# Patient Record
Sex: FEMALE | Race: WHITE | NOT HISPANIC OR LATINO | Employment: OTHER | URBAN - METROPOLITAN AREA
[De-identification: names, ages, dates, MRNs, and addresses within clinical notes are randomized per-mention and may not be internally consistent; named-entity substitution may affect disease eponyms.]

---

## 2023-03-11 DIAGNOSIS — R06.02 SOB (SHORTNESS OF BREATH): Primary | ICD-10-CM

## 2023-03-13 DIAGNOSIS — J45.20 MILD INTERMITTENT REACTIVE AIRWAY DISEASE WITHOUT COMPLICATION (HHS-HCC): Primary | ICD-10-CM

## 2023-03-13 RX ORDER — FLUTICASONE PROPIONATE AND SALMETEROL XINAFOATE 115; 21 UG/1; UG/1
2 AEROSOL, METERED RESPIRATORY (INHALATION) 2 TIMES DAILY
Qty: 12 G | Refills: 0 | Status: SHIPPED | OUTPATIENT
Start: 2023-03-13 | End: 2023-05-22 | Stop reason: ALTCHOICE

## 2023-03-13 RX ORDER — FLUTICASONE PROPIONATE AND SALMETEROL XINAFOATE 115; 21 UG/1; UG/1
2 AEROSOL, METERED RESPIRATORY (INHALATION) 2 TIMES DAILY
COMMUNITY
End: 2023-03-13 | Stop reason: SDUPTHER

## 2023-03-15 RX ORDER — FLUTICASONE PROPIONATE AND SALMETEROL XINAFOATE 115; 21 UG/1; UG/1
AEROSOL, METERED RESPIRATORY (INHALATION)
Qty: 12 G | Refills: 0 | Status: SHIPPED | OUTPATIENT
Start: 2023-03-15 | End: 2023-06-13

## 2023-03-16 DIAGNOSIS — I50.32 CHRONIC DIASTOLIC HEART FAILURE (MULTI): Primary | ICD-10-CM

## 2023-03-16 RX ORDER — TORSEMIDE 10 MG/1
10 TABLET ORAL DAILY
Qty: 135 TABLET | Refills: 0 | Status: SHIPPED | OUTPATIENT
Start: 2023-03-16

## 2023-03-16 RX ORDER — TORSEMIDE 10 MG/1
10 TABLET ORAL
COMMUNITY
End: 2023-03-16 | Stop reason: SDUPTHER

## 2023-03-20 DIAGNOSIS — I10 HYPERTENSION, UNSPECIFIED TYPE: Primary | ICD-10-CM

## 2023-03-20 RX ORDER — METOPROLOL TARTRATE 25 MG/1
12.5 TABLET, FILM COATED ORAL DAILY
Qty: 45 TABLET | Refills: 0 | Status: SHIPPED | OUTPATIENT
Start: 2023-03-20 | End: 2023-09-13

## 2023-03-20 RX ORDER — METOPROLOL TARTRATE 25 MG/1
25 TABLET, FILM COATED ORAL DAILY
COMMUNITY
End: 2023-03-20 | Stop reason: SDUPTHER

## 2023-05-18 PROBLEM — I10 HTN (HYPERTENSION): Status: ACTIVE | Noted: 2023-05-18

## 2023-05-18 PROBLEM — I83.899 VARICOSE VEINS OF LEG WITH COMPLICATIONS: Status: ACTIVE | Noted: 2023-05-18

## 2023-05-18 PROBLEM — M19.90 ARTHRITIS: Status: ACTIVE | Noted: 2023-05-18

## 2023-05-18 PROBLEM — I50.20 SYSTOLIC CHF (MULTI): Status: ACTIVE | Noted: 2023-05-18

## 2023-05-18 PROBLEM — R06.09 DYSPNEA ON MINIMAL EXERTION: Status: ACTIVE | Noted: 2023-05-18

## 2023-05-18 PROBLEM — I50.32 CHRONIC DIASTOLIC CONGESTIVE HEART FAILURE (MULTI): Status: ACTIVE | Noted: 2023-05-18

## 2023-05-18 PROBLEM — R20.2 PARESTHESIA: Status: ACTIVE | Noted: 2023-05-18

## 2023-05-18 PROBLEM — Z95.0 PRESENCE OF PERMANENT CARDIAC PACEMAKER: Status: ACTIVE | Noted: 2023-05-18

## 2023-05-18 PROBLEM — R42 VERTIGO: Status: ACTIVE | Noted: 2023-05-18

## 2023-05-18 PROBLEM — Z86.79 HISTORY OF PAROXYSMAL SUPRAVENTRICULAR TACHYCARDIA: Status: ACTIVE | Noted: 2023-05-18

## 2023-05-18 PROBLEM — I35.0 AORTIC STENOSIS, MILD: Status: ACTIVE | Noted: 2023-05-18

## 2023-05-18 PROBLEM — I44.2 COMPLETE HEART BLOCK (MULTI): Status: ACTIVE | Noted: 2023-05-18

## 2023-05-18 PROBLEM — H90.3 SENSORINEURAL HEARING LOSS (SNHL) OF BOTH EARS: Status: ACTIVE | Noted: 2023-05-18

## 2023-05-18 PROBLEM — J44.9 COPD (CHRONIC OBSTRUCTIVE PULMONARY DISEASE) (MULTI): Status: ACTIVE | Noted: 2023-05-18

## 2023-05-18 PROBLEM — I73.00 RAYNAUDS DISEASE: Status: ACTIVE | Noted: 2023-05-18

## 2023-05-18 LAB
FERRITIN (UG/LL) IN SER/PLAS: 72 UG/L (ref 8–150)
IRON (UG/DL) IN SER/PLAS: 114 UG/DL (ref 35–150)
IRON BINDING CAPACITY (UG/DL) IN SER/PLAS: 406 UG/DL (ref 240–445)
IRON SATURATION (%) IN SER/PLAS: 28 % (ref 25–45)

## 2023-05-18 RX ORDER — NAPROXEN SODIUM 220 MG/1
81 TABLET, FILM COATED ORAL DAILY
COMMUNITY
End: 2023-12-11 | Stop reason: ALTCHOICE

## 2023-05-18 RX ORDER — METOPROLOL SUCCINATE 25 MG/1
TABLET, EXTENDED RELEASE ORAL
COMMUNITY
End: 2023-09-05 | Stop reason: ALTCHOICE

## 2023-05-18 RX ORDER — SPIRONOLACTONE 25 MG/1
1 TABLET ORAL DAILY
COMMUNITY
Start: 2018-04-24 | End: 2023-05-22 | Stop reason: ALTCHOICE

## 2023-05-18 RX ORDER — ACETAMINOPHEN 500 MG
TABLET ORAL
COMMUNITY
End: 2023-09-05 | Stop reason: ALTCHOICE

## 2023-05-22 ENCOUNTER — OFFICE VISIT (OUTPATIENT)
Dept: PRIMARY CARE | Facility: CLINIC | Age: 88
End: 2023-05-22
Payer: MEDICARE

## 2023-05-22 VITALS
DIASTOLIC BLOOD PRESSURE: 64 MMHG | OXYGEN SATURATION: 98 % | RESPIRATION RATE: 17 BRPM | BODY MASS INDEX: 29.9 KG/M2 | SYSTOLIC BLOOD PRESSURE: 108 MMHG | TEMPERATURE: 97.3 F | WEIGHT: 168.8 LBS | HEART RATE: 83 BPM

## 2023-05-22 DIAGNOSIS — I10 PRIMARY HYPERTENSION: ICD-10-CM

## 2023-05-22 DIAGNOSIS — I50.32 CHRONIC DIASTOLIC CONGESTIVE HEART FAILURE (MULTI): Primary | ICD-10-CM

## 2023-05-22 DIAGNOSIS — I44.2 COMPLETE HEART BLOCK (MULTI): ICD-10-CM

## 2023-05-22 PROCEDURE — 99213 OFFICE O/P EST LOW 20 MIN: CPT | Performed by: INTERNAL MEDICINE

## 2023-05-22 PROCEDURE — 1159F MED LIST DOCD IN RCRD: CPT | Performed by: INTERNAL MEDICINE

## 2023-05-22 PROCEDURE — 3074F SYST BP LT 130 MM HG: CPT | Performed by: INTERNAL MEDICINE

## 2023-05-22 PROCEDURE — 3078F DIAST BP <80 MM HG: CPT | Performed by: INTERNAL MEDICINE

## 2023-05-22 PROCEDURE — 1036F TOBACCO NON-USER: CPT | Performed by: INTERNAL MEDICINE

## 2023-05-22 ASSESSMENT — PAIN SCALES - GENERAL: PAINLEVEL: 8

## 2023-05-22 NOTE — PROGRESS NOTES
Subjective   Patient ID: Yesenia Milner is a 89 y.o. female who presents for 4 month f/u.    HPI patient requested presents to the office for scheduled follow-up was last seen in the office in mid January she was in the hospital emergency room for an evaluation of paresthesias which we did not find any concerning issues    We have reviewed that the patient was slightly anemic in the emergency room so we will battery of blood test including a autoimmune is setting a vascular study fairly negative finding at the end of the day with mild anemia with normal iron profile    None of those issues have occurred again    Is followed by cardiology fairly regularly    Patient said fairly well compensated diastolic heart failure    I had previous history of complete heart block on a permanent pacemaker status which appears to be reaching its and about 10 years now    Mild valvular disease no progression echo reviewed    BP is good    Activity level is adequate    Patient remains independent    No falls      Review of Systems 10 systems reviewed does not mention were negative    Objective   /64 (BP Location: Left arm, Patient Position: Sitting, BP Cuff Size: Adult)   Pulse 83   Temp 36.3 °C (97.3 °F) (Temporal)   Resp 17   Wt 76.6 kg (168 lb 12.8 oz)   SpO2 98%   BMI 29.90 kg/m²     Physical Exam HEENT normocephalic atraumatic pupils round and reactive no oropharyngeal exudate no thyromegaly  Carotid bruits absent  Cardiovascular regular rate and rhythm no murmurs  Respiratory bilateral breath sounds without rales or rhonchi or mitral chest expansion bilaterally  Abdomen soft nontender bowel sounds are present no organomegaly  Skin warm without any rashes  Musculoskeletal no digital clubbing or cyanosis normal gait no muscle atrophy  Neuro alert and oriented Ãƒ-3. Speech clear. Follows commands appropriately  Pulse normal carotid pulse normal radial pulse normal pedal pulses      Assessment/Plan   Problem List  Items Addressed This Visit          Circulatory    Chronic diastolic congestive heart failure (CMS/HCC) - Primary    Relevant Medications    metoprolol succinate XL (Toprol-XL) 25 mg 24 hr tablet    Complete heart block (CMS/HCC)    HTN (hypertension)     Patient remains fairly independent with no ongoing concern regarding the actual battery life of Her Permanent pacemaker placement    Mild anemia does not appear to be iron deficiency    Diastolic heart failure compensated    BP is adequate    Labs reviewed    Respiratory status COPD on Advair stable    No falls remains independent

## 2023-06-10 DIAGNOSIS — R06.02 SOB (SHORTNESS OF BREATH): ICD-10-CM

## 2023-06-12 DIAGNOSIS — R06.02 SOB (SHORTNESS OF BREATH): ICD-10-CM

## 2023-06-13 RX ORDER — FLUTICASONE PROPIONATE AND SALMETEROL XINAFOATE 115; 21 UG/1; UG/1
AEROSOL, METERED RESPIRATORY (INHALATION)
Qty: 12 G | Refills: 0 | Status: SHIPPED | OUTPATIENT
Start: 2023-06-13 | End: 2023-09-05 | Stop reason: ALTCHOICE

## 2023-06-13 RX ORDER — FLUTICASONE PROPIONATE AND SALMETEROL XINAFOATE 115; 21 UG/1; UG/1
AEROSOL, METERED RESPIRATORY (INHALATION)
Qty: 12 G | Refills: 0 | Status: SHIPPED | OUTPATIENT
Start: 2023-06-13 | End: 2023-09-05 | Stop reason: SDUPTHER

## 2023-07-13 ENCOUNTER — HOSPITAL ENCOUNTER (OUTPATIENT)
Dept: DATA CONVERSION | Facility: HOSPITAL | Age: 88
End: 2023-07-13
Attending: INTERNAL MEDICINE | Admitting: INTERNAL MEDICINE
Payer: MEDICARE

## 2023-07-13 DIAGNOSIS — I44.2 ATRIOVENTRICULAR BLOCK, COMPLETE (MULTI): ICD-10-CM

## 2023-07-13 DIAGNOSIS — I49.5 SICK SINUS SYNDROME (MULTI): ICD-10-CM

## 2023-07-13 DIAGNOSIS — Z45.010 ENCOUNTER FOR CHECKING AND TESTING OF CARDIAC PACEMAKER PULSE GENERATOR (BATTERY): ICD-10-CM

## 2023-07-13 DIAGNOSIS — I50.20 UNSPECIFIED SYSTOLIC (CONGESTIVE) HEART FAILURE (MULTI): ICD-10-CM

## 2023-07-13 LAB
ACTIVATED PARTIAL THROMBOPLASTIN TIME IN PPP BY COAGULATION ASSAY: 33 SEC (ref 27–38)
ANION GAP IN SER/PLAS: 13 MMOL/L (ref 10–20)
CALCIUM (MG/DL) IN SER/PLAS: 9.6 MG/DL (ref 8.6–10.3)
CARBON DIOXIDE, TOTAL (MMOL/L) IN SER/PLAS: 26 MMOL/L (ref 21–32)
CHLORIDE (MMOL/L) IN SER/PLAS: 103 MMOL/L (ref 98–107)
CREATININE (MG/DL) IN SER/PLAS: 1.14 MG/DL (ref 0.5–1.05)
ERYTHROCYTE DISTRIBUTION WIDTH (RATIO) BY AUTOMATED COUNT: 13 % (ref 11.5–14.5)
ERYTHROCYTE MEAN CORPUSCULAR HEMOGLOBIN CONCENTRATION (G/DL) BY AUTOMATED: 31.8 G/DL (ref 32–36)
ERYTHROCYTE MEAN CORPUSCULAR VOLUME (FL) BY AUTOMATED COUNT: 96 FL (ref 80–100)
ERYTHROCYTES (10*6/UL) IN BLOOD BY AUTOMATED COUNT: 3.83 X10E12/L (ref 4–5.2)
GFR FEMALE: 46 ML/MIN/1.73M2
GLUCOSE (MG/DL) IN SER/PLAS: 101 MG/DL (ref 74–99)
HEMATOCRIT (%) IN BLOOD BY AUTOMATED COUNT: 36.8 % (ref 36–46)
HEMOGLOBIN (G/DL) IN BLOOD: 11.7 G/DL (ref 12–16)
INR IN PPP BY COAGULATION ASSAY: 1.1 (ref 0.9–1.1)
LEUKOCYTES (10*3/UL) IN BLOOD BY AUTOMATED COUNT: 5.4 X10E9/L (ref 4.4–11.3)
NRBC (PER 100 WBCS) BY AUTOMATED COUNT: 0 /100 WBC (ref 0–0)
PLATELETS (10*3/UL) IN BLOOD AUTOMATED COUNT: 220 X10E9/L (ref 150–450)
POTASSIUM (MMOL/L) IN SER/PLAS: 4.5 MMOL/L (ref 3.5–5.3)
PROTHROMBIN TIME (PT) IN PPP BY COAGULATION ASSAY: 12.5 SEC (ref 9.8–12.8)
SODIUM (MMOL/L) IN SER/PLAS: 137 MMOL/L (ref 136–145)
UREA NITROGEN (MG/DL) IN SER/PLAS: 26 MG/DL (ref 6–23)

## 2023-07-15 LAB
ATRIAL RATE: 60 BPM
Q ONSET: 184 MS
QRS COUNT: 10 BEATS
QRS DURATION: 164 MS
QT INTERVAL: 460 MS
QTC CALCULATION(BAZETT): 478 MS
QTC FREDERICIA: 472 MS
R AXIS: -60 DEGREES
T AXIS: 109 DEGREES
T OFFSET: 414 MS
VENTRICULAR RATE: 65 BPM

## 2023-08-25 ENCOUNTER — PATIENT OUTREACH (OUTPATIENT)
Dept: PRIMARY CARE | Facility: CLINIC | Age: 88
End: 2023-08-25
Payer: MEDICARE

## 2023-08-25 RX ORDER — ATORVASTATIN CALCIUM 20 MG/1
20 TABLET, FILM COATED ORAL DAILY
COMMUNITY
Start: 2023-08-23 | End: 2023-12-11 | Stop reason: ALTCHOICE

## 2023-08-25 RX ORDER — CLOPIDOGREL BISULFATE 75 MG/1
75 TABLET ORAL DAILY
COMMUNITY
Start: 2023-08-23

## 2023-08-25 RX ORDER — SPIRONOLACTONE 25 MG/1
25 TABLET ORAL DAILY
COMMUNITY
Start: 2023-06-06

## 2023-08-25 NOTE — PROGRESS NOTES
Discharge Facility:  [facility]  Discharge Diagnosis: CVA (cerebral vascular accident)  Admission Date: 8/21/2023  Discharge Date: 8/23/2023    PCP Appointment Date: 9/11 outside of rec 14 day window  Specialist Appointment Date: neurology referral needed per patient and TBD with her cardiologist  Hospital Encounter and Summary: not available at this time  See discharge assessment below for further details  Engagement  Call Start Time: 0917 (8/25/2023  9:29 AM)    Medications  Medications reviewed with patient/caregiver?: Yes (new meds discussed) (8/25/2023  9:29 AM)  Is the patient having any side effects they believe may be caused by any medication additions or changes?: No (8/25/2023  9:29 AM)  Does the patient have all medications ordered at discharge?: Yes (8/25/2023  9:29 AM)  Care Management Interventions: No intervention needed (8/25/2023  9:29 AM)  Prescription Comments: see med list (8/25/2023  9:29 AM)  Is the patient taking all medications as directed (includes completed medication regime)?: Yes (8/25/2023  9:29 AM)  Care Management Interventions: Provided patient education (8/25/2023  9:29 AM)    Appointments  Does the patient have a primary care provider?: Yes (8/25/2023  9:29 AM)  Care Management Interventions: Verified appointment date/time/provider (8/25/2023  9:29 AM)  Has the patient kept scheduled appointments due by today?: Yes (8/25/2023  9:29 AM)  Care Management Interventions: Advised to schedule with specialist (8/25/2023  9:29 AM)    Self Management  What is the home health agency?: [address] [phone] (8/25/2023  9:29 AM)  Has home health visited the patient within 72 hours of discharge?: Not applicable (< 72 hrs) (8/25/2023  9:29 AM)  Has all Durable Medical Equipment (DME) been delivered?: Yes (8/25/2023  9:29 AM)    Patient Teaching  Does the patient have access to their discharge instructions?: Yes (8/25/2023  9:29 AM)  Care Management Interventions: Reviewed instructions  with patient (8/25/2023  9:29 AM)  What is the patient's perception of their health status since discharge?: Improving (8/25/2023  9:29 AM)  Is the patient/caregiver able to teach back the hierarchy of who to call/visit for symptoms/problems? PCP, Specialist, Home Health nurse, Urgent Care, ED, 911: Yes (8/25/2023  9:29 AM)  Patient/Caregiver Education Comments: Patient states she believes her speech is getting better since coming home. She states [facility] has not reached out yet but they will call by end of the day today to ensure services are still being offerred to them. States her daughter, Vannessa, is helping her at this time as needed as well and is staying with her at her home currently. (8/25/2023  9:29 AM)         Home

## 2023-08-29 DIAGNOSIS — I50.32 CHRONIC DIASTOLIC CONGESTIVE HEART FAILURE (MULTI): Primary | ICD-10-CM

## 2023-08-29 DIAGNOSIS — J44.9 CHRONIC OBSTRUCTIVE PULMONARY DISEASE, UNSPECIFIED COPD TYPE (MULTI): ICD-10-CM

## 2023-09-05 ENCOUNTER — TELEMEDICINE (OUTPATIENT)
Dept: PHARMACY | Facility: HOSPITAL | Age: 88
End: 2023-09-05
Payer: MEDICARE

## 2023-09-05 DIAGNOSIS — I50.32 CHRONIC DIASTOLIC CONGESTIVE HEART FAILURE (MULTI): ICD-10-CM

## 2023-09-05 DIAGNOSIS — I63.9 CEREBROVASCULAR ACCIDENT (CVA), UNSPECIFIED MECHANISM (MULTI): Primary | ICD-10-CM

## 2023-09-05 DIAGNOSIS — J44.9 CHRONIC OBSTRUCTIVE PULMONARY DISEASE, UNSPECIFIED COPD TYPE (MULTI): ICD-10-CM

## 2023-09-05 RX ORDER — FLUTICASONE PROPIONATE AND SALMETEROL XINAFOATE 115; 21 UG/1; UG/1
2 AEROSOL, METERED RESPIRATORY (INHALATION) 2 TIMES DAILY
Qty: 12 G | Refills: 11 | Status: SHIPPED | OUTPATIENT
Start: 2023-09-05

## 2023-09-05 NOTE — ASSESSMENT & PLAN NOTE
Patient has COPD diagnosis and is not currently experiencing any SOB, coughing, or wheezing.  Continue advair 115-21 mcg 2 puffs twice daily.  Recommend patient have a rescue inhaler at home, but she declines at this time.

## 2023-09-05 NOTE — PROGRESS NOTES
Pharmacy Post-Discharge Visit  Yesenia Milner is a 89 y.o. female was referred to Clinical Pharmacy Team to complete a post-discharge medication optimization and monitoring visit.  The patient was referred for their COPD, Congestive Heart Failure, and Cerebrovascular Accident.    Admission Date: 23  Discharge Date: 23    Referring Provider: Everton Rubio, DO    Subjective   Allergies   Allergen Reactions    Shrimp Nausea Only and Unknown       [facility] [address] [place], [place] [address]  Phone: [phone] Fax: [phone]      Social History     Social History Narrative    Not on file      Notable Medication changes following discharge:  Start: aspirin 81 mg daily, clopidogrel 75 mg daily, atorvastatin 20 mg daily    HPI    STROKE MANAGEMENT ASSESSMENT    - Needs referral for neurology     Stroke Regimen:  -Cause of stroke: Non-Cardioembolic  -Stroke Meds:   -Anticoagulant: No   -Antiplatelet: Yes, describe: aspirin 81 mg, clopidogrel 75 mg  -Duration of therapy: 21 days DAPT;  plan to take plavix monotherapy thereafter  -Feels like she's bleeding/bruising easier but nothing out of the ordinary     -The ASCVD Risk score (Marquita NESBITT, et al., 2019) failed to calculate for the following reasons:    The 2019 ASCVD risk score is only valid for ages 40 to 79    The patient has a prior MI or stroke diagnosis     HTN Assessment  -HTN diagnosis: yes  -There were no vitals filed for this visit.   -Current Regimen   - none  -BP Cuff at home? yes  -HTN at goal? yes  --115/60; highest 120's/70's    HLD Assessment  -Current LDL: 81  -Current T  -Current Regimen   -atorvastatin 20 mg daily (initiated at mod dose d/t patient's age)  -HLD at goal? yes    Diabetes Mellitus Assessment:  -Diagnosis? no    CHF Assessment    - Cardiologist seeing     Symptom/Staging:  -Most recent ejection fraction: 60-65  -NYHA Stage: N/A  -ABCD Stage:  N/A  -Weight changes?: No  - Swelling in legs? No; wears compression socks    Guideline-Directed Medical Therapy:  -ARNI: No   -If no, then ACEi/ARB?: No  -Beta Blocker: Yes, describe: metoprolol tartrate 12.5 mg daily  -MRA: Yes, describe: spironolactone 25 mg daily  -SGLT2i: No    Secondary Prevention:  -The ASCVD Risk score (Marquita NESBITT, et al., 2019) failed to calculate for the following reasons:    The 2019 ASCVD risk score is only valid for ages 40 to 79    The patient has a prior MI or stroke diagnosis   -Aspirin 81mg? yes  -Statin?: Yes, describe: atorvastatin 20 mg daily  -HTN?: Yes, describe: no medications specifically for htn      COPD ASSESSMENT    - Takes advair 115-21 mcg 2 puffs twice daily; needs refill  - Has had albuterol rescue inhaler in the past, but doesn't want one now    Rescue Inhaler Use:  -How many times per week do you use your rescue inhaler? Doesn't have one    Inhaler Technique:  -How do you use your rescue inhaler?  --N/A    -How do you use your maintenance inhaler?  --no issues    Secondary Prevention (vaccines):  -Influenza: Date [10/12/19]  -PCV13: Date [12/9/14]  -PPSV23: Date [4/3/17]    Sx Management:  -Increased cough? no  -Increased sputum production? no  -Increased SOB? no    Exacerbation Hx:  -When was your last hospitalization for an exacerbation? Not sure  -When was the last time you were treated with antibiotics and/or steroids? Not sure     Review of Systems    Objective     There were no vitals taken for this visit.     LAB  Lab Results   Component Value Date    BILITOT 0.4 08/21/2023    CALCIUM 9.0 08/23/2023    CO2 26 08/23/2023     08/23/2023    CREATININE 0.90 08/23/2023    GLUCOSE 87 08/23/2023    ALKPHOS 84 08/21/2023    K 4.3 08/23/2023    PROT 6.9 08/21/2023     08/23/2023    AST 25 08/21/2023    ALT 20 08/21/2023    BUN 19 08/23/2023    ANIONGAP 10 08/23/2023    MG 2.31 08/22/2023    ALBUMIN 4.3 08/21/2023    GFRF 61 08/23/2023     Lab Results    Component Value Date    TRIG 85 08/21/2023    CHOL 162 08/21/2023    HDL 64.3 08/21/2023     Lab Results   Component Value Date    HGBA1C 5.9 (A) 08/21/2023         Current Outpatient Medications on File Prior to Visit   Medication Sig Dispense Refill    aspirin 81 mg chewable tablet Chew 1 tablet (81 mg) once daily.      atorvastatin (Lipitor) 20 mg tablet Take 1 tablet (20 mg) by mouth once daily.      CALCIUM CARBONATE-VITAMIN D3 ORAL Take by mouth.      clopidogrel (Plavix) 75 mg tablet Take 1 tablet (75 mg) by mouth once daily.      metoprolol tartrate (Lopressor) 25 mg tablet Take 0.5 tablets (12.5 mg) by mouth once daily. 0.5 tab(s) orally once a day (in the morning) 45 tablet 0    spironolactone (Aldactone) 25 mg tablet Take 1 tablet (25 mg) by mouth once daily.      torsemide (Demadex) 10 mg tablet Take 1 tablet (10 mg) by mouth once daily. Take 1 1/2 tablets (15 mg) daily 135 tablet 0    vit A/vit C/vit E/zinc/copper (PRESERVISION AREDS ORAL) Take 2 capsules by mouth once daily.      [DISCONTINUED] fluticasone propion-salmeteroL (Advair) 115-21 mcg/actuation inhaler Inhale 2 puffs by mouth twice daily 12 g 0    [DISCONTINUED] cholecalciferol (Vitamin D-3) 50 mcg (2,000 unit) capsule Take by mouth.      [DISCONTINUED] fluticasone propion-salmeteroL (Advair) 115-21 mcg/actuation inhaler Inhale 2 puffs by mouth twice daily 12 g 0    [DISCONTINUED] metoprolol succinate XL (Toprol-XL) 25 mg 24 hr tablet        No current facility-administered medications on file prior to visit.      DRUG INTERATIONS  - none    Assessment/Plan   Problem List Items Addressed This Visit       Chronic diastolic congestive heart failure (CMS/HCC)     Patient has chronic HFpEF with most recent EF = 60-65%  Continue metoprolol tartrate 12.5 mg daily and spironolactone 25 mg daily.  Follow up with cardiologist (Dr. Jiang) at appointment 9/14 for assessment & medication management.         COPD (chronic obstructive pulmonary  disease) (CMS/[facility])     Patient has COPD diagnosis and is not currently experiencing any SOB, coughing, or wheezing.  Continue advair 115-21 mcg 2 puffs twice daily.  Recommend patient have a rescue inhaler at home, but she declines at this time.          Relevant Medications    fluticasone propion-salmeteroL (Advair) 115-21 mcg/actuation inhaler    CVA (cerebral vascular accident) (CMS/[facility]) - Primary     Patient recently hospitalized for non-cardioembolic CVA (atherosclerotic in nature).   Continue aspirin 81 mg daily, plavix 75 mg daily, and atorvastatin 20 mg daily. DAPT to only be taken x 21 days, then plavix monotherapy thereafter.   Patient hasn't seen neurologist, but will request referral at PCP appt on 9/11.          Continue all meds under the continuation of care with the referring provider and clinical pharmacy team.    Adore Carroll, PharmD     Verbal consent to manage patient's drug therapy was obtained from [the patient and/or an individual authorized to act on behalf of a patient]. They were informed they may decline to participate or withdraw from participation in pharmacy services at any time.

## 2023-09-05 NOTE — ASSESSMENT & PLAN NOTE
Patient recently hospitalized for non-cardioembolic CVA (atherosclerotic in nature).   Continue aspirin 81 mg daily, plavix 75 mg daily, and atorvastatin 20 mg daily. DAPT to only be taken x 21 days, then plavix monotherapy thereafter.   Patient hasn't seen neurologist, but will request referral at PCP appt on 9/11.

## 2023-09-05 NOTE — ASSESSMENT & PLAN NOTE
Patient has chronic HFpEF with most recent EF = 60-65%  Continue metoprolol tartrate 12.5 mg daily and spironolactone 25 mg daily.  Follow up with cardiologist (Dr. Jiang) at appointment 9/14 for assessment & medication management.

## 2023-09-11 ENCOUNTER — OFFICE VISIT (OUTPATIENT)
Dept: PRIMARY CARE | Facility: CLINIC | Age: 88
End: 2023-09-11
Payer: MEDICARE

## 2023-09-11 VITALS
SYSTOLIC BLOOD PRESSURE: 124 MMHG | BODY MASS INDEX: 29.55 KG/M2 | HEART RATE: 81 BPM | OXYGEN SATURATION: 96 % | WEIGHT: 166.8 LBS | TEMPERATURE: 97.1 F | DIASTOLIC BLOOD PRESSURE: 66 MMHG

## 2023-09-11 DIAGNOSIS — I63.9 CEREBROVASCULAR ACCIDENT (CVA), UNSPECIFIED MECHANISM (MULTI): Primary | ICD-10-CM

## 2023-09-11 DIAGNOSIS — R47.1 DYSARTHRIA: ICD-10-CM

## 2023-09-11 PROCEDURE — 3078F DIAST BP <80 MM HG: CPT | Performed by: FAMILY MEDICINE

## 2023-09-11 PROCEDURE — 1126F AMNT PAIN NOTED NONE PRSNT: CPT | Performed by: FAMILY MEDICINE

## 2023-09-11 PROCEDURE — 3074F SYST BP LT 130 MM HG: CPT | Performed by: FAMILY MEDICINE

## 2023-09-11 PROCEDURE — 1036F TOBACCO NON-USER: CPT | Performed by: FAMILY MEDICINE

## 2023-09-11 PROCEDURE — 99214 OFFICE O/P EST MOD 30 MIN: CPT | Performed by: FAMILY MEDICINE

## 2023-09-11 PROCEDURE — 1159F MED LIST DOCD IN RCRD: CPT | Performed by: FAMILY MEDICINE

## 2023-09-11 RX ORDER — ALBUTEROL SULFATE 90 UG/1
2 AEROSOL, METERED RESPIRATORY (INHALATION) 4 TIMES DAILY
COMMUNITY
Start: 2019-04-19

## 2023-09-11 RX ORDER — ACETAMINOPHEN 500 MG
TABLET ORAL
COMMUNITY
End: 2023-09-11

## 2023-09-11 ASSESSMENT — PAIN SCALES - GENERAL: PAINLEVEL: 0-NO PAIN

## 2023-09-11 ASSESSMENT — ENCOUNTER SYMPTOMS: DEPRESSION: 0

## 2023-09-11 NOTE — PROGRESS NOTES
Subjective   Patient ID: Yesenia Milner is a 89 y.o. female who presents for Hospital Follow-up (Hospital follow up ) and New Patient Visit (Establish care with pcp. ).    HPI patient here with her daughter for follow-up from hospitalization for stroke.  She was having right-sided facial drooping and dysarthria.  Her facial droop as well as her dysarthria have improved.  She is not having any choking or swallowing issues.  She would like to get back to driving.    Review of Systems    Objective   /66 (BP Location: Left arm, Patient Position: Sitting, BP Cuff Size: Adult)   Pulse 81   Temp 36.2 °C (97.1 °F) (Temporal)   Wt 75.7 kg (166 lb 12.8 oz)   SpO2 96%   BMI 29.55 kg/m²     Physical Exam  Alert, pleasant and in no acute distress.  Heart: Regular rate and rhythm without murmur  Lungs: Clear to auscultation  Lower extremities: No edema  Very mild right facial droop noted  Assessment/Plan   Problem List Items Addressed This Visit          Neuro    CVA (cerebral vascular accident) (CMS/[facility]) - Primary    Relevant Orders    Referral to Speech Therapy     Other Visit Diagnoses       Dysarthria        Relevant Orders    Referral to Speech Therapy          Patient here with her daughter.  Patient feels she is doing well since her stroke.  Daughter agrees.  Patient and daughter feel she is stable to drive.  Cleared to drive.  Hospital records and labs were reviewed with patient and daughter.  Speech therapy ordered.    I will see her back in 3 months

## 2023-09-12 ENCOUNTER — PATIENT OUTREACH (OUTPATIENT)
Dept: PRIMARY CARE | Facility: CLINIC | Age: 88
End: 2023-09-12
Payer: MEDICARE

## 2023-09-14 ENCOUNTER — TELEPHONE (OUTPATIENT)
Dept: PRIMARY CARE | Facility: CLINIC | Age: 88
End: 2023-09-14
Payer: MEDICARE

## 2023-09-20 PROBLEM — I34.0 MODERATE MITRAL VALVE REGURGITATION: Status: ACTIVE | Noted: 2023-09-20

## 2023-09-20 PROBLEM — R31.0 GROSS HEMATURIA: Status: ACTIVE | Noted: 2020-10-07

## 2023-09-20 PROBLEM — I69.322 DYSARTHRIA FOLLOWING CEREBRAL INFARCTION: Status: ACTIVE | Noted: 2023-09-20

## 2023-09-20 PROBLEM — Z78.0 POST-MENOPAUSAL: Status: ACTIVE | Noted: 2023-09-20

## 2023-09-20 PROBLEM — M79.89 LEG SWELLING: Status: ACTIVE | Noted: 2023-09-20

## 2023-09-20 PROBLEM — I34.0 MITRAL VALVE INSUFFICIENCY: Status: ACTIVE | Noted: 2023-09-20

## 2023-09-20 PROBLEM — E66.3 OVERWEIGHT WITH BODY MASS INDEX (BMI) 25.0-29.9: Status: ACTIVE | Noted: 2023-09-20

## 2023-09-20 PROBLEM — H35.30 MACULAR DEGENERATION: Status: ACTIVE | Noted: 2023-09-20

## 2023-09-20 PROBLEM — H81.10 BENIGN PAROXYSMAL POSITIONAL VERTIGO: Status: ACTIVE | Noted: 2023-09-20

## 2023-09-25 ENCOUNTER — TELEPHONE (OUTPATIENT)
Dept: PRIMARY CARE | Facility: CLINIC | Age: 88
End: 2023-09-25
Payer: MEDICARE

## 2023-09-29 ENCOUNTER — OFFICE VISIT (OUTPATIENT)
Dept: PRIMARY CARE | Facility: CLINIC | Age: 88
End: 2023-09-29
Payer: MEDICARE

## 2023-09-29 VITALS
BODY MASS INDEX: 29.8 KG/M2 | DIASTOLIC BLOOD PRESSURE: 78 MMHG | TEMPERATURE: 97.3 F | WEIGHT: 168.2 LBS | SYSTOLIC BLOOD PRESSURE: 128 MMHG

## 2023-09-29 DIAGNOSIS — I50.32 CHRONIC DIASTOLIC CONGESTIVE HEART FAILURE (MULTI): ICD-10-CM

## 2023-09-29 DIAGNOSIS — I63.9 CEREBROVASCULAR ACCIDENT (CVA), UNSPECIFIED MECHANISM (MULTI): Primary | ICD-10-CM

## 2023-09-29 DIAGNOSIS — I10 PRIMARY HYPERTENSION: ICD-10-CM

## 2023-09-29 DIAGNOSIS — M79.10 MYALGIA: ICD-10-CM

## 2023-09-29 LAB
ANION GAP IN SER/PLAS: 14 MMOL/L (ref 10–20)
BASOPHILS (10*3/UL) IN BLOOD BY AUTOMATED COUNT: 0.03 X10E9/L (ref 0–0.1)
BASOPHILS/100 LEUKOCYTES IN BLOOD BY AUTOMATED COUNT: 0.4 % (ref 0–2)
CALCIUM (MG/DL) IN SER/PLAS: 9.7 MG/DL (ref 8.6–10.6)
CARBON DIOXIDE, TOTAL (MMOL/L) IN SER/PLAS: 26 MMOL/L (ref 21–32)
CHLORIDE (MMOL/L) IN SER/PLAS: 102 MMOL/L (ref 98–107)
CREATINE KINASE (U/L) IN SER/PLAS: 88 U/L (ref 0–215)
CREATININE (MG/DL) IN SER/PLAS: 0.95 MG/DL (ref 0.5–1.05)
EOSINOPHILS (10*3/UL) IN BLOOD BY AUTOMATED COUNT: 0.53 X10E9/L (ref 0–0.4)
EOSINOPHILS/100 LEUKOCYTES IN BLOOD BY AUTOMATED COUNT: 7.8 % (ref 0–6)
ERYTHROCYTE DISTRIBUTION WIDTH (RATIO) BY AUTOMATED COUNT: 12.8 % (ref 11.5–14.5)
ERYTHROCYTE MEAN CORPUSCULAR HEMOGLOBIN CONCENTRATION (G/DL) BY AUTOMATED: 31.2 G/DL (ref 32–36)
ERYTHROCYTE MEAN CORPUSCULAR VOLUME (FL) BY AUTOMATED COUNT: 100 FL (ref 80–100)
ERYTHROCYTES (10*6/UL) IN BLOOD BY AUTOMATED COUNT: 3.54 X10E12/L (ref 4–5.2)
GFR FEMALE: 57 ML/MIN/1.73M2
GLUCOSE (MG/DL) IN SER/PLAS: 104 MG/DL (ref 74–99)
HEMATOCRIT (%) IN BLOOD BY AUTOMATED COUNT: 35.3 % (ref 36–46)
HEMOGLOBIN (G/DL) IN BLOOD: 11 G/DL (ref 12–16)
IMMATURE GRANULOCYTES/100 LEUKOCYTES IN BLOOD BY AUTOMATED COUNT: 0.3 % (ref 0–0.9)
LEUKOCYTES (10*3/UL) IN BLOOD BY AUTOMATED COUNT: 6.8 X10E9/L (ref 4.4–11.3)
LYMPHOCYTES (10*3/UL) IN BLOOD BY AUTOMATED COUNT: 1.16 X10E9/L (ref 0.8–3)
LYMPHOCYTES/100 LEUKOCYTES IN BLOOD BY AUTOMATED COUNT: 17.2 % (ref 13–44)
MONOCYTES (10*3/UL) IN BLOOD BY AUTOMATED COUNT: 0.75 X10E9/L (ref 0.05–0.8)
MONOCYTES/100 LEUKOCYTES IN BLOOD BY AUTOMATED COUNT: 11.1 % (ref 2–10)
NEUTROPHILS (10*3/UL) IN BLOOD BY AUTOMATED COUNT: 4.27 X10E9/L (ref 1.6–5.5)
NEUTROPHILS/100 LEUKOCYTES IN BLOOD BY AUTOMATED COUNT: 63.2 % (ref 40–80)
NRBC (PER 100 WBCS) BY AUTOMATED COUNT: 0 /100 WBC (ref 0–0)
PLATELETS (10*3/UL) IN BLOOD AUTOMATED COUNT: 285 X10E9/L (ref 150–450)
POTASSIUM (MMOL/L) IN SER/PLAS: 4.7 MMOL/L (ref 3.5–5.3)
SODIUM (MMOL/L) IN SER/PLAS: 137 MMOL/L (ref 136–145)
UREA NITROGEN (MG/DL) IN SER/PLAS: 22 MG/DL (ref 6–23)

## 2023-09-29 PROCEDURE — 80048 BASIC METABOLIC PNL TOTAL CA: CPT

## 2023-09-29 PROCEDURE — 1159F MED LIST DOCD IN RCRD: CPT | Performed by: FAMILY MEDICINE

## 2023-09-29 PROCEDURE — 85025 COMPLETE CBC W/AUTO DIFF WBC: CPT

## 2023-09-29 PROCEDURE — 99214 OFFICE O/P EST MOD 30 MIN: CPT | Performed by: FAMILY MEDICINE

## 2023-09-29 PROCEDURE — 1126F AMNT PAIN NOTED NONE PRSNT: CPT | Performed by: FAMILY MEDICINE

## 2023-09-29 PROCEDURE — 3074F SYST BP LT 130 MM HG: CPT | Performed by: FAMILY MEDICINE

## 2023-09-29 PROCEDURE — 82550 ASSAY OF CK (CPK): CPT

## 2023-09-29 PROCEDURE — 3078F DIAST BP <80 MM HG: CPT | Performed by: FAMILY MEDICINE

## 2023-09-29 PROCEDURE — 36415 COLL VENOUS BLD VENIPUNCTURE: CPT

## 2023-09-29 PROCEDURE — 1036F TOBACCO NON-USER: CPT | Performed by: FAMILY MEDICINE

## 2023-09-29 ASSESSMENT — PAIN SCALES - GENERAL: PAINLEVEL: 0-NO PAIN

## 2023-09-29 ASSESSMENT — ENCOUNTER SYMPTOMS: DEPRESSION: 0

## 2023-09-29 NOTE — PROGRESS NOTES
Subjective   Patient ID: Yesenia Milner is a 89 y.o. female who presents for Follow-up (Follow up to review medications. ).    HPI Had severe body aches one week ago.  Also had stomach cramps and diarrhea.  Back to baseline now.  She felt it was her medications and called the office.  We had her cut her aspirin, Plavix and atorvastatin in half as these were her new medications.  She actually was doing better prior to us making this change.    Review of Systems    Objective   /78 (BP Location: Left arm, Patient Position: Sitting, BP Cuff Size: Adult)   Temp 36.3 °C (97.3 °F) (Temporal)   Wt 76.3 kg (168 lb 3.2 oz)   BMI 29.80 kg/m²     Physical Exam  Alert, pleasant and in no acute distress.  Heart: Regular rate and rhythm without murmur  Lungs: Clear to auscultation  Lower extremities: No edema  Assessment/Plan   Problem List Items Addressed This Visit             ICD-10-CM       Cardiac and Vasculature    Chronic diastolic congestive heart failure (CMS/[facility]) I50.32    HTN (hypertension) I10       Neuro    CVA (cerebral vascular accident) (CMS/[facility]) - Primary I63.9     Other Visit Diagnoses         Codes    Myalgia     M79.10    Relevant Orders    CBC and Auto Differential    CK    Basic Metabolic Panel        Patient here for follow-up on her stroke.  She had developed some myalgias and GI symptoms.  Believing it was her medications we had her cut the dose down in half.  She actually was getting better prior to that change.  Listening to the symptoms now, I do believe it was a GI virus or something she ate that perhaps triggered this.  We are going to slowly increase her medications back over the next week.  Discussed that these medications are very important at preventing another stroke.  She is to call with any recurrence of symptoms.  Also have ordered CPK, CBC and BMP to rule out any other potential causes.  Again, patient is presently doing well.  We will contact patient in 3 to 4 days with results.   Plan follow-up in 3 to 4 months

## 2023-09-29 NOTE — PATIENT INSTRUCTIONS
Medications:  Take the aspirin 81mg, the torsemide 10mg, and the sprironolactone 25 daily as directed.  If feeling OK on Sunday, increase the clopidrogel 75mg back to whole tablet daily.  If feeling OK on Wednesday, increase the atorvastatin back to a whole tablet daily.  Call if having any problems.

## 2023-09-30 NOTE — H&P
History of Present Illness:   History Present Illness:  Reason for surgery: CHB, generator change   HPI:    This is a 89 year old female with a PMH significant for HTN, AS, chronic diastolic congestive heart failure and CHB s/p PPM insertion.  he patient underwent [employer]  dual chamber pacemaker in 2012 for high-grade AV block.  The device has reached on of battery life.  She presents to [facility] today, 7/13/23 for pacemaker generator change with Dr. Ramicone.    PMH:        Allergies:        Allergies:  ·  No Known Allergies :        Intolerances:  ·  Shell Fish : Diarrhea (Severe), Nausea/Vomiting (Severe)    Home Medication Review:   Home Medications Reviewed: yes     Impression/Procedure:   ·  Impression and Planned Procedure: Generator change       Physical Exam by System:    Constitutional: alert and cooperative   Eyes: clear sclera   ENMT: mucous membranes moist   Head/Neck: No JVD, trachea midline   Respiratory/Thorax: Patent airways, CTAB, normal  breath sounds with good chest expansion, thorax symmetric   Cardiovascular: Regular, rate and rhythm, no murmurs,  + pulses of the extremities, normal S1 and S2   Gastrointestinal: Nondistended, soft, non-tender,  no masses palpable, no organomegaly, +BS   Musculoskeletal: ROM intact, no joint swelling, normal  strength   Extremities: no cyanosis, no edema   Neurological: alert and oriented x3   Psychological: Appropriate mood and behavior   Skin: Warm and dry     Airway/Sedation Assessment:  ·  Mouth Opening OK yes   ·  Neck Flexibility OK yes   ·  Loose Teeth no     Oropharyngeal Classification:          ·  Oropharyngeal Classification Class III   ·  ASA PS Classification ASA III   ·  Sedation Plan moderate sedation     Consent:   COVID-19 Consent:  ·  COVID-19 Risk Consent Surgeon has reviewed key risks related to the risk of desi COVID-19 and if they contract COVID-19 what the risks are.       Electronic Signatures:  Emely Troy (APRN-CNP)    (Signed 13-Jul-2023 15:56)   Authored: History of Present Illness, Allergies, Home Medication Review, Impression/Procedure, Physical Exam, Consent, Note Completion  Ramicone, James (DO)   (Signed 18-Jul-2023 07:56)   Co-Signer: History of Present Illness, Allergies, Home Medication Review, Impression/Procedure, Physical Exam, Consent, Note Completion    Last Updated: 18-Jul-2023 07:56 by Ramicone, James (DO)

## 2023-10-02 ENCOUNTER — TELEPHONE (OUTPATIENT)
Dept: PRIMARY CARE | Facility: CLINIC | Age: 88
End: 2023-10-02
Payer: MEDICARE

## 2023-10-12 ENCOUNTER — TREATMENT (OUTPATIENT)
Dept: SPEECH THERAPY | Facility: CLINIC | Age: 88
End: 2023-10-12
Payer: MEDICARE

## 2023-10-12 DIAGNOSIS — I69.322 DYSARTHRIA FOLLOWING CEREBRAL INFARCTION: Primary | ICD-10-CM

## 2023-10-12 DIAGNOSIS — I63.9 CEREBROVASCULAR ACCIDENT (CVA), UNSPECIFIED MECHANISM (MULTI): ICD-10-CM

## 2023-10-12 PROCEDURE — 92507 TX SP LANG VOICE COMM INDIV: CPT | Mod: GN | Performed by: SPEECH-LANGUAGE PATHOLOGIST

## 2023-10-12 ASSESSMENT — PAIN SCALES - GENERAL: PAINLEVEL_OUTOF10: 0 - NO PAIN

## 2023-10-12 ASSESSMENT — PAIN - FUNCTIONAL ASSESSMENT: PAIN_FUNCTIONAL_ASSESSMENT: 0-10

## 2023-10-12 NOTE — PROGRESS NOTES
Speech-Language Pathology    Outpatient Speech-Language Pathology Treatment     Patient Name: Yesenia Milner  MRN: [id number]  Today's Date: 10/12/2023  Time Calculation  Start Time: 1430  Stop Time: 1515  Time Calculation (min): 45 min       Patient is being seen for her first follow-up visit in [facility] this date. For full history, evaluation, and other details from previous care to-date, please refer to past medical records in Ambulatory Electronic Medical Records. Most recent eval/re-eval was completed on 9/1/23.      Current Problem:   Patient Active Problem List   Diagnosis    Aortic stenosis, mild    Arthritis    Chronic diastolic congestive heart failure (CMS/HCC)    Complete heart block (CMS/HCC)    COPD (chronic obstructive pulmonary disease) (CMS/HCC)    History of paroxysmal supraventricular tachycardia    HTN (hypertension)    Paresthesia    Presence of permanent cardiac pacemaker    Raynauds disease    Sensorineural hearing loss (SNHL) of both ears    Dyspnea on minimal exertion    Systolic CHF (CMS/HCC)    Varicose veins of lower extremities with inflammation    Vertigo    CVA (cerebral vascular accident) (CMS/HCC)    Acquired deformity of toe    Benign neoplasm of skin of foot    Benign paroxysmal positional vertigo    Dermatophytosis of nail    Gross hematuria    Leg swelling    Macular degeneration    Mitral valve insufficiency    Moderate mitral valve regurgitation    Overweight with body mass index (BMI) 25.0-29.9    Plantar wart of left foot    Ulcer of toe of left foot (CMS/HCC)    Ulcer of toe of right foot (CMS/HCC)    Venous insufficiency    Dysarthria following cerebral infarction    Post-menopausal         SLP Assessment:   Pt did not bring EMST 150 on this date.   Pt participated in an oral reading activity to target loudness and articulatory accuracy. Pt used appropriate volume modulation when reading statements 30% of the time. Pt participated in a word finding activity to target clarity of  speech and loudness. Pt produced answers with clarity in articulation 70% of the time independently. Pt benefited from a verbal prompt to reread the troubled word with a verbal model. Difficulty was observed when producing /t/ and /d/ in all positions of words.   Pt was educated on over articulating words and increasing volume. Pt expressed verbal understanding of this information.        Plan:   Continue with POC.       Subjective   Pt was seen 1-on-1 and was pleasant and participated well throughout the session.     General Visit Information:    Insurance reviewed   Visit number: 2   Onset Date: 2023   Medicare Certification Period: Beginnin2023 Endin2023        Pain Assessment:   Pain Assessment: 0-10  Pain Score: 0 - No pain      Objective   By discharge JULY MILLER will achieve the following goals:   LTGs:  1. Pt will be able to improve her ability to verbally express herself to be understood by others consistently in her ADLs.   STGs:  1. Pt will be able to maintain speech at 55-60 dB in short conversations 90% of the time with mod cues.   2. Pt will be able to complete JOSIAH with return demo at 90% with min cues.   3. Pt will be able to produce words in sentences with 'tongue tip sounds', alveolar ridge articulatory point at 90% with min cues.   4. Pt will be able to produce veolar ridge speech sounds in words in sentences at 90% with min cues.   5. Pt/family education/support to be provided each session.       Outpatient Education:  Education was provided to pt/family and reviewed how to carryover strategies learned in session to home.

## 2023-10-17 ENCOUNTER — TREATMENT (OUTPATIENT)
Dept: SPEECH THERAPY | Facility: CLINIC | Age: 88
End: 2023-10-17
Payer: MEDICARE

## 2023-10-17 ENCOUNTER — PATIENT OUTREACH (OUTPATIENT)
Dept: PRIMARY CARE | Facility: CLINIC | Age: 88
End: 2023-10-17

## 2023-10-17 DIAGNOSIS — I63.9 CEREBROVASCULAR ACCIDENT (CVA), UNSPECIFIED MECHANISM (MULTI): ICD-10-CM

## 2023-10-17 DIAGNOSIS — I69.322 DYSARTHRIA FOLLOWING CEREBRAL INFARCTION: Primary | ICD-10-CM

## 2023-10-17 PROCEDURE — 92507 TX SP LANG VOICE COMM INDIV: CPT | Mod: GN | Performed by: SPEECH-LANGUAGE PATHOLOGIST

## 2023-10-17 ASSESSMENT — PAIN SCALES - GENERAL: PAINLEVEL_OUTOF10: 0 - NO PAIN

## 2023-10-17 ASSESSMENT — PAIN - FUNCTIONAL ASSESSMENT: PAIN_FUNCTIONAL_ASSESSMENT: 0-10

## 2023-10-17 NOTE — PROGRESS NOTES
"Call placed regarding two month post discharge follow up call.  At time of outreach call the patient feels as if their condition has improved since initial visit with PCP or specialist.  Questions or concerns regarding recovery period addressed at this time. Patient asked if she needed to continue her ASA with Plavix. According to notes from last PCP visit both of these medications are to be continued. Patient verbalized understanding of this. Patient also states she contacted her cardiologist r/t abdominal pain and \"trouble with my joints and stomach\" so he did take patient off the Atorvastatin completely. Noted this in patient's med rec.   "

## 2023-10-17 NOTE — PROGRESS NOTES
Speech-Language Pathology    SLP ADULT Outpatient Speech-Language Cognition    Patient Name: Yesenia Milner  MRN: [id number]  Today's Date: 10/17/2023   Time Calculation  Start Time: 1330  Stop Time: 1415  Time Calculation (min): 45 min         Current Problem:   Patient Active Problem List   Diagnosis    Aortic stenosis, mild    Arthritis    Chronic diastolic congestive heart failure (CMS/HCC)    Complete heart block (CMS/HCC)    COPD (chronic obstructive pulmonary disease) (CMS/HCC)    History of paroxysmal supraventricular tachycardia    HTN (hypertension)    Paresthesia    Presence of permanent cardiac pacemaker    Raynauds disease    Sensorineural hearing loss (SNHL) of both ears    Dyspnea on minimal exertion    Systolic CHF (CMS/HCC)    Varicose veins of lower extremities with inflammation    Vertigo    CVA (cerebral vascular accident) (CMS/HCC)    Acquired deformity of toe    Benign neoplasm of skin of foot    Benign paroxysmal positional vertigo    Dermatophytosis of nail    Gross hematuria    Leg swelling    Macular degeneration    Mitral valve insufficiency    Moderate mitral valve regurgitation    Overweight with body mass index (BMI) 25.0-29.9    Plantar wart of left foot    Ulcer of toe of left foot (CMS/HCC)    Ulcer of toe of right foot (CMS/HCC)    Venous insufficiency    Dysarthria following cerebral infarction    Post-menopausal         SLP Assessment:   Pt completed five sets of five repetitions using her EMST 150. Pt had her EMST turned an additional 1/4 turn this date. Pt completed oral reading exercises to target clarity of speech. Pt tends to drop off the endings of her words, which makes it hard to be understood. Pt benefited from reminders to keep her strength strong throughout the remainder of the words. Pt read bi syllabic words at 65% clarity independently, which increased to 100% with minimal verbal cues. Pt read polysyllabic words at 85% clarity independently, which increased to 100%  with minimal verbal cues. Pt read phrases at 70% clarity independently, which increased to 100% with minimal verbal cues. Pt was given additional phrases to practice at home and was encouraged to continue using her EMST. Pt expressed verbal understanding of this information.       SLP Plan:   Continue with POC.       Subjective   Pt was seen 1-on-1 and was pleasant and participated well throughout the session.     Pain:  Pain Assessment  Pain Assessment: 0-10  Pain Score: 0 - No pain  General Visit Information:  Insurance reviewed   Visit number: 3  Onset Date: 2023   Medicare Certification Period: Beginnin2023 Endin2023       Objective   By discharge JULY MILLER will achieve the following goals:   LTGs:  1. Pt will be able to improve her ability to verbally express herself to be understood by others consistently in her ADLs.   STGs:  1. Pt will be able to maintain speech at 55-60 dB in short conversations 90% of the time with mod cues.   2. Pt will be able to complete JOSIAH with return demo at 90% with min cues.   3. Pt will be able to produce words in sentences with 'tongue tip sounds', alveolar ridge articulatory point at 90% with min cues.   4. Pt will be able to produce veolar ridge speech sounds in words in sentences at 90% with min cues.   5. Pt/family education/support to be provided each session.              Outpatient Education:   Education was provided to pt/family and reviewed how to carryover strategies learned in session to home.

## 2023-10-25 ENCOUNTER — TREATMENT (OUTPATIENT)
Dept: SPEECH THERAPY | Facility: CLINIC | Age: 88
End: 2023-10-25
Payer: MEDICARE

## 2023-10-25 DIAGNOSIS — I69.322 DYSARTHRIA FOLLOWING CEREBRAL INFARCTION: Primary | ICD-10-CM

## 2023-10-25 PROCEDURE — 92507 TX SP LANG VOICE COMM INDIV: CPT | Mod: GN | Performed by: SPEECH-LANGUAGE PATHOLOGIST

## 2023-10-25 ASSESSMENT — PAIN SCALES - GENERAL: PAINLEVEL_OUTOF10: 0 - NO PAIN

## 2023-10-25 ASSESSMENT — PAIN - FUNCTIONAL ASSESSMENT: PAIN_FUNCTIONAL_ASSESSMENT: 0-10

## 2023-10-25 NOTE — PROGRESS NOTES
Speech-Language Pathology    Outpatient Speech-Language Pathology Treatment     Patient Name: July Milner  MRN: [id number]  Today's Date: 10/25/2023  Time Calculation  Start Time: 1330  Stop Time: 1415  Time Calculation (min): 45 min     Current Problem:   1. Dysarthria following cerebral infarction          SLP Assessment:   Pt participated in oral reading to target loudness levels and clarity of speech. Pt increased vocal modulation from last week. Pt was easier to understand in an ideal environment, but if Pt was in a restaurant or another location with increased background noise she would be hard to understand. Pt was clear 75% of the time when reading simple sentences independently, which increased to 90% with minimal cues. Pt reported using EMST 150 three days a week at home. Pt was encouraged to keep practicing at home and try to increase to five days. Pt was also sent home with the sentences practiced today to practice at home. Pt expressed verbal understanding of this information.      Plan:   Continue with POC.    Subjective   Pt was seen 1-on-1 and was pleasant and participated well throughout the session.      General Visit Information:   Insurance reviewed   Visit number: 4  Onset Date: 2023   Medicare Certification Period: Beginnin2023 Endin2023     Pain Assessment:   Pain Assessment: 0-10  Pain Score: 0 - No pain    Objective   By discharge JULY MILNER will achieve the following goals:   LTGs:  1. Pt will be able to improve her ability to verbally express herself to be understood by others consistently in her ADLs.   STGs:  1. Pt will be able to maintain speech at 55-60 dB in short conversations 90% of the time with mod cues.   2. Pt will be able to complete JOSIAH with return demo at 90% with min cues.   3. Pt will be able to produce words in sentences with 'tongue tip sounds', alveolar ridge articulatory point at 90% with min cues.   4. Pt will be able to produce  anshu swanson speech sounds in words in sentences at 90% with min cues.   5. Pt/family education/support to be provided each session.     Outpatient Education:   Education was provided to pt/family and reviewed how to carryover strategies learned in session to home.

## 2023-11-01 ENCOUNTER — DOCUMENTATION (OUTPATIENT)
Dept: SPEECH THERAPY | Facility: CLINIC | Age: 88
End: 2023-11-01
Payer: MEDICARE

## 2023-11-01 NOTE — PROGRESS NOTES
Speech-Language Pathology    Discharge Summary    Name: Yesenia Milner  MRN: [id number]  : 1933  Date: 23    Discharge Summary: SLP    Discharge Information: Date of discharge 23  Date of last visit 10/25/23  Date of evaluation 23  Number of attended visits: 5  Referred by: Dr. Everton Rubio  Referred for Dysarthria    Therapy Summary: Pt has made progress towards goals, but has not fully met them.  She called in this date to be discharged at this time. Pt feels that she has returned to her baseline.  She has increased greatly with her clarity of speech, but remains fairly quiet.  Pt is not aware of how quiet she is when she talks and feels that she has a life alert button and does not need to have a louder volume of voice to be able to communicate, even in an emergency.  Progress towards goals:  LTGs:  1. Pt will be able to improve her ability to verbally express herself to be understood by others consistently in her ADLs.  IMPROVING, BUT NOT YET FULLY MET - LIKELY AT 85%.  STGs:  1. Pt will be able to maintain speech at 55-60 dB in short conversations 90% of the time with mod cues. CURRENT IS 50 dB.  2. Pt will be able to complete JOSIAH with return demo at 90% with min cues.  CURRENT IS 90% WITH MOD CUES.  3. Pt will be able to produce words in sentences with 'tongue tip sounds', alveolar ridge articulatory point at 90% with min cues. GOAL MET.  4. Pt will be able to produce veolar ridge speech sounds in words in sentences at 90% with min cues. GOAL MET.  5. Pt/family education/support to be provided each session.     Rehab Discharge Reason: Patient requested due to pt feeling like she has returned to baseline.  Clinician did review with pt that she is welcome to return to speech therapy at any time.  Pt expressed understanding of this.

## 2023-11-02 ENCOUNTER — APPOINTMENT (OUTPATIENT)
Dept: SPEECH THERAPY | Facility: CLINIC | Age: 88
End: 2023-11-02
Payer: MEDICARE

## 2023-11-16 ENCOUNTER — APPOINTMENT (OUTPATIENT)
Dept: SPEECH THERAPY | Facility: CLINIC | Age: 88
End: 2023-11-16
Payer: MEDICARE

## 2023-11-21 ENCOUNTER — PATIENT OUTREACH (OUTPATIENT)
Dept: PRIMARY CARE | Facility: CLINIC | Age: 88
End: 2023-11-21
Payer: MEDICARE

## 2023-11-21 NOTE — PROGRESS NOTES
"Call placed regarding two month post discharge follow up call.  At time of outreach call the patient feels as if their condition has returned to baseline since initial visit with PCP or specialist. Patient states she is doing well and believes that she is \"back to normal\".   Patient has met target of no readmission for (90) days post (hospital) discharge and is graduated from Transitional Care Management program at this time.   " Detail Level: Detailed

## 2023-11-22 ENCOUNTER — APPOINTMENT (OUTPATIENT)
Dept: SPEECH THERAPY | Facility: CLINIC | Age: 88
End: 2023-11-22
Payer: MEDICARE

## 2023-11-29 ENCOUNTER — APPOINTMENT (OUTPATIENT)
Dept: SPEECH THERAPY | Facility: CLINIC | Age: 88
End: 2023-11-29
Payer: MEDICARE

## 2023-12-11 ENCOUNTER — HOSPITAL ENCOUNTER (OUTPATIENT)
Dept: CARDIOLOGY | Facility: CLINIC | Age: 88
Discharge: HOME | End: 2023-12-11
Payer: MEDICARE

## 2023-12-11 ENCOUNTER — OFFICE VISIT (OUTPATIENT)
Dept: CARDIOLOGY | Facility: CLINIC | Age: 88
End: 2023-12-11
Payer: MEDICARE

## 2023-12-11 VITALS
HEART RATE: 64 BPM | OXYGEN SATURATION: 97 % | DIASTOLIC BLOOD PRESSURE: 62 MMHG | SYSTOLIC BLOOD PRESSURE: 122 MMHG | HEIGHT: 63 IN | BODY MASS INDEX: 29.16 KG/M2 | WEIGHT: 164.6 LBS

## 2023-12-11 DIAGNOSIS — I35.0 AORTIC STENOSIS, MILD: ICD-10-CM

## 2023-12-11 DIAGNOSIS — I10 PRIMARY HYPERTENSION: Primary | ICD-10-CM

## 2023-12-11 DIAGNOSIS — Z95.0 PRESENCE OF PERMANENT CARDIAC PACEMAKER: ICD-10-CM

## 2023-12-11 DIAGNOSIS — I50.32 CHRONIC DIASTOLIC CONGESTIVE HEART FAILURE (MULTI): ICD-10-CM

## 2023-12-11 DIAGNOSIS — R06.09 DYSPNEA ON MINIMAL EXERTION: ICD-10-CM

## 2023-12-11 DIAGNOSIS — I44.2 COMPLETE HEART BLOCK (MULTI): ICD-10-CM

## 2023-12-11 PROCEDURE — 99213 OFFICE O/P EST LOW 20 MIN: CPT | Performed by: PHYSICIAN ASSISTANT

## 2023-12-11 PROCEDURE — 3078F DIAST BP <80 MM HG: CPT | Performed by: PHYSICIAN ASSISTANT

## 2023-12-11 PROCEDURE — 93294 REM INTERROG EVL PM/LDLS PM: CPT | Performed by: INTERNAL MEDICINE

## 2023-12-11 PROCEDURE — 1036F TOBACCO NON-USER: CPT | Performed by: PHYSICIAN ASSISTANT

## 2023-12-11 PROCEDURE — 3074F SYST BP LT 130 MM HG: CPT | Performed by: PHYSICIAN ASSISTANT

## 2023-12-11 PROCEDURE — 93296 REM INTERROG EVL PM/IDS: CPT

## 2023-12-11 PROCEDURE — 1126F AMNT PAIN NOTED NONE PRSNT: CPT | Performed by: PHYSICIAN ASSISTANT

## 2023-12-11 PROCEDURE — 1159F MED LIST DOCD IN RCRD: CPT | Performed by: PHYSICIAN ASSISTANT

## 2023-12-11 NOTE — PROGRESS NOTES
"Chief Complaint:   Follow-up (3 month follow up)     History Of Present Illness:    Yesenia Milner is a 89 y.o. female presenting for routine cardiovascular follow up.  Patient denies chest pain, chest pressure, palpitations, dyspnea on exertion, shortness of breath at rest, diaphoresis, nausea/vomiting, back pain, headache, lightheadedness, dizziness, syncope or presyncopal episodes, active bleeding signs or symptoms, excessive weight gain, muscle or joint pain, claudication.       Last Recorded Vitals:  Vitals:    12/11/23 1404   BP: 122/62   BP Location: Left arm   Patient Position: Sitting   BP Cuff Size: Adult   Pulse: 64   SpO2: 97%   Weight: 74.7 kg (164 lb 9.6 oz)   Height: 1.6 m (5' 3\")       Past Medical History:  She has a past medical history of Other conditions influencing health status, Personal history of other medical treatment, and Personal history of other medical treatment.    Past Surgical History:  She has a past surgical history that includes Appendectomy (11/22/2017); Hysterectomy (11/22/2017); Tonsillectomy (11/22/2017); Other surgical history (11/22/2017); Other surgical history (11/22/2017); Cardiac pacemaker placement (03/11/2021); IR angiogram renal bilateral (Bilateral, 12/14/2020); IR angiogram inferior epigastric pelvic (12/14/2020); and IR angiogram inferior epigastric pelvic (12/14/2020).      Social History:  She reports that she quit smoking about 50 years ago. Her smoking use included cigarettes. She has been exposed to tobacco smoke. She has never used smokeless tobacco. She reports that she does not drink alcohol and does not use drugs.    Family History:  Family History   Problem Relation Name Age of Onset    No Known Problems Mother          Allergies:  Iodine and Shrimp    Outpatient Medications:  Current Outpatient Medications   Medication Instructions    albuterol 90 mcg/actuation inhaler 2 puffs, inhalation, 4 times daily    CALCIUM CARBONATE-VITAMIN D3 ORAL oral    " clopidogrel (Plavix) 75 mg tablet Take 1 tablet (75 mg) by mouth once daily.    fluticasone propion-salmeteroL (Advair) 115-21 mcg/actuation inhaler 2 puffs, inhalation, 2 times daily, Rinse mouth with water after use to reduce aftertaste and incidence of candidiasis. Do not swallow.    metoprolol tartrate (Lopressor) 25 mg tablet TAKE 1/2 (ONE-HALF) TABLET BY MOUTH ONCE DAILY IN THE MORNING    spironolactone (ALDACTONE) 25 mg, oral, Daily    torsemide (DEMADEX) 10 mg, oral, Daily, Take 1 1/2 tablets (15 mg) daily    vit A/vit C/vit E/zinc/copper (PRESERVISION AREDS ORAL) 2 capsules, oral, Daily       Physical Exam:  Constitutional: awake and alert, oriented ×3, no apparent distress  Skin: warm, dry, good turgor no obvious lesions  Eyes: pupils equal, round, reactive to light, conjunctiva pink and noninjected, no discharge  HENT: normocephalic and atraumatic, mucous membranes moist, trachea midline with no masses/goiter  Cardiovascular: S1/S2 regular, no murmur no rubs/gallops, no carotid bruits, no JVD  Pulmonary: symmetrical chest expansion, lungs are clear to auscultation bilaterally, no wheezes/rales/rhonchi, normal effort  Abdomen: nontender, nondistended, active bowel sounds, no ascites  Extremities: no cyanosis, clubbing, no LE edema no lesions; palpable pedal pulses  Neurologic: cranial nerves II - XII grossly intact, stable gait, no tremor       Last Labs:  CBC -  Lab Results   Component Value Date    WBC 6.8 09/29/2023    HGB 11.0 (L) 09/29/2023    HCT 35.3 (L) 09/29/2023     09/29/2023     09/29/2023       CMP -  Lab Results   Component Value Date    CALCIUM 9.7 09/29/2023    PROT 6.9 08/21/2023    ALBUMIN 4.3 08/21/2023    AST 25 08/21/2023    ALT 20 08/21/2023    ALKPHOS 84 08/21/2023    BILITOT 0.4 08/21/2023       LIPID PANEL -   Lab Results   Component Value Date    CHOL 162 08/21/2023    TRIG 85 08/21/2023    HDL 64.3 08/21/2023    CHHDL 2.5 08/21/2023    LDLF 81 08/21/2023    VLDL 17  "08/21/2023       RENAL FUNCTION PANEL -   Lab Results   Component Value Date    GLUCOSE 104 (H) 09/29/2023     09/29/2023    K 4.7 09/29/2023     09/29/2023    CO2 26 09/29/2023    ANIONGAP 14 09/29/2023    BUN 22 09/29/2023    CREATININE 0.95 09/29/2023    CALCIUM 9.7 09/29/2023    ALBUMIN 4.3 08/21/2023        Lab Results   Component Value Date     (H) 03/30/2022    HGBA1C 5.9 (A) 08/21/2023       Last Cardiology Tests:  ECG:  No results found for this or any previous visit from the past 1095 days.      Echo:  No results found for this or any previous visit from the past 1095 days.      Ejection Fractions:  No results found for: \"EF\"    Cath:  No results found for this or any previous visit from the past 1095 days.      Stress Test:  No results found for this or any previous visit from the past 1095 days.      Cardiac Imaging:  No results found for this or any previous visit from the past 1095 days.      Assessment/Plan   Problem List Items Addressed This Visit             ICD-10-CM       Cardiac and Vasculature    Aortic stenosis, mild I35.0    Chronic diastolic congestive heart failure (CMS/HCC) I50.32    HTN (hypertension) - Primary I10    Dyspnea on minimal exertion R06.09     -Continue current GDMT as prescribed    -F/U in 6 months    Joselito Madden PA-C  "

## 2023-12-18 ENCOUNTER — APPOINTMENT (OUTPATIENT)
Dept: PRIMARY CARE | Facility: CLINIC | Age: 88
End: 2023-12-18
Payer: MEDICARE

## 2024-01-03 ENCOUNTER — OFFICE VISIT (OUTPATIENT)
Dept: PRIMARY CARE | Facility: CLINIC | Age: 89
End: 2024-01-03
Payer: MEDICARE

## 2024-01-03 VITALS
HEART RATE: 75 BPM | SYSTOLIC BLOOD PRESSURE: 138 MMHG | DIASTOLIC BLOOD PRESSURE: 70 MMHG | WEIGHT: 161 LBS | BODY MASS INDEX: 28.52 KG/M2

## 2024-01-03 DIAGNOSIS — I50.32 CHRONIC DIASTOLIC CONGESTIVE HEART FAILURE (MULTI): ICD-10-CM

## 2024-01-03 DIAGNOSIS — R73.9 HYPERGLYCEMIA: ICD-10-CM

## 2024-01-03 DIAGNOSIS — I10 PRIMARY HYPERTENSION: ICD-10-CM

## 2024-01-03 DIAGNOSIS — I63.9 CEREBROVASCULAR ACCIDENT (CVA), UNSPECIFIED MECHANISM (MULTI): Primary | ICD-10-CM

## 2024-01-03 DIAGNOSIS — D64.9 ANEMIA, UNSPECIFIED TYPE: ICD-10-CM

## 2024-01-03 PROCEDURE — 1126F AMNT PAIN NOTED NONE PRSNT: CPT | Performed by: FAMILY MEDICINE

## 2024-01-03 PROCEDURE — 3078F DIAST BP <80 MM HG: CPT | Performed by: FAMILY MEDICINE

## 2024-01-03 PROCEDURE — 99214 OFFICE O/P EST MOD 30 MIN: CPT | Performed by: FAMILY MEDICINE

## 2024-01-03 PROCEDURE — 1036F TOBACCO NON-USER: CPT | Performed by: FAMILY MEDICINE

## 2024-01-03 PROCEDURE — 3075F SYST BP GE 130 - 139MM HG: CPT | Performed by: FAMILY MEDICINE

## 2024-01-03 PROCEDURE — 1159F MED LIST DOCD IN RCRD: CPT | Performed by: FAMILY MEDICINE

## 2024-01-03 ASSESSMENT — PATIENT HEALTH QUESTIONNAIRE - PHQ9
SUM OF ALL RESPONSES TO PHQ9 QUESTIONS 1 AND 2: 0
1. LITTLE INTEREST OR PLEASURE IN DOING THINGS: NOT AT ALL
2. FEELING DOWN, DEPRESSED OR HOPELESS: NOT AT ALL

## 2024-01-03 NOTE — PROGRESS NOTES
Subjective   Patient ID: Yesenia Milner is a 89 y.o. female who presents for Follow-up (No problems).    HPI   Patient here for follow-up.  She has been doing well since her stroke.  She completed physical and speech therapy.  No complaints today.    Review of Systems    Objective   /70   Pulse 75   Wt 73 kg (161 lb)   BMI 28.52 kg/m²     Physical Exam  Alert, pleasant and in no acute distress.  Heart: Regular rate and rhythm without murmur  Lungs: Clear to auscultation  Lower extremities: No edema  Assessment/Plan   Problem List Items Addressed This Visit             ICD-10-CM       Cardiac and Vasculature    Chronic diastolic congestive heart failure (CMS/[facility]) I50.32    Relevant Orders    Comprehensive Metabolic Panel    CBC    Lipid Panel    HTN (hypertension) I10    Relevant Orders    Comprehensive Metabolic Panel    CBC    Lipid Panel       Neuro    CVA (cerebral vascular accident) (CMS/[facility]) - Primary I63.9    Relevant Orders    Comprehensive Metabolic Panel    CBC    Lipid Panel     Other Visit Diagnoses         Codes    Anemia, unspecified type     D64.9    Relevant Orders    Comprehensive Metabolic Panel    CBC    Vitamin B12    Folate    Hyperglycemia     R73.9    Relevant Orders    Hemoglobin A1C        Patient here for follow-up.  She continues to do well since her stroke.  She has had cardiology as well as podiatric follow-up.  We will recheck some labs to follow-up on her past anemia and hyperglycemia.  Will contact patient in 3 to 5 days with results.  Plan follow-up in 6 months to complete Medicare wellness

## 2024-01-04 DIAGNOSIS — I44.2 COMPLETE HEART BLOCK (MULTI): ICD-10-CM

## 2024-01-15 ENCOUNTER — APPOINTMENT (OUTPATIENT)
Dept: CARDIOLOGY | Facility: CLINIC | Age: 89
End: 2024-01-15
Payer: MEDICARE

## 2024-01-24 ENCOUNTER — LAB (OUTPATIENT)
Dept: LAB | Facility: LAB | Age: 89
End: 2024-01-24
Payer: MEDICARE

## 2024-01-24 DIAGNOSIS — I10 PRIMARY HYPERTENSION: ICD-10-CM

## 2024-01-24 DIAGNOSIS — R73.9 HYPERGLYCEMIA: ICD-10-CM

## 2024-01-24 DIAGNOSIS — I63.9 CEREBROVASCULAR ACCIDENT (CVA), UNSPECIFIED MECHANISM (MULTI): ICD-10-CM

## 2024-01-24 DIAGNOSIS — D64.9 ANEMIA, UNSPECIFIED TYPE: ICD-10-CM

## 2024-01-24 DIAGNOSIS — I50.32 CHRONIC DIASTOLIC CONGESTIVE HEART FAILURE (MULTI): ICD-10-CM

## 2024-01-24 LAB
ALBUMIN SERPL BCP-MCNC: 4.2 G/DL (ref 3.4–5)
ALP SERPL-CCNC: 79 U/L (ref 33–136)
ALT SERPL W P-5'-P-CCNC: 15 U/L (ref 7–45)
ANION GAP SERPL CALC-SCNC: 14 MMOL/L (ref 10–20)
AST SERPL W P-5'-P-CCNC: 18 U/L (ref 9–39)
BILIRUB SERPL-MCNC: 0.7 MG/DL (ref 0–1.2)
BUN SERPL-MCNC: 21 MG/DL (ref 6–23)
CALCIUM SERPL-MCNC: 9.4 MG/DL (ref 8.6–10.3)
CHLORIDE SERPL-SCNC: 104 MMOL/L (ref 98–107)
CHOLEST SERPL-MCNC: 152 MG/DL (ref 0–199)
CHOLESTEROL/HDL RATIO: 2.3
CO2 SERPL-SCNC: 24 MMOL/L (ref 21–32)
CREAT SERPL-MCNC: 1 MG/DL (ref 0.5–1.05)
EGFRCR SERPLBLD CKD-EPI 2021: 54 ML/MIN/1.73M*2
ERYTHROCYTE [DISTWIDTH] IN BLOOD BY AUTOMATED COUNT: 13.3 % (ref 11.5–14.5)
EST. AVERAGE GLUCOSE BLD GHB EST-MCNC: 108 MG/DL
FOLATE SERPL-MCNC: 15.8 NG/ML
GLUCOSE SERPL-MCNC: 98 MG/DL (ref 74–99)
HBA1C MFR BLD: 5.4 %
HCT VFR BLD AUTO: 37.5 % (ref 36–46)
HDLC SERPL-MCNC: 65.4 MG/DL
HGB BLD-MCNC: 12.4 G/DL (ref 12–16)
LDLC SERPL CALC-MCNC: 73 MG/DL
MCH RBC QN AUTO: 31.2 PG (ref 26–34)
MCHC RBC AUTO-ENTMCNC: 33.1 G/DL (ref 32–36)
MCV RBC AUTO: 95 FL (ref 80–100)
NON HDL CHOLESTEROL: 87 MG/DL (ref 0–149)
NRBC BLD-RTO: 0 /100 WBCS (ref 0–0)
PLATELET # BLD AUTO: 233 X10*3/UL (ref 150–450)
POTASSIUM SERPL-SCNC: 4.5 MMOL/L (ref 3.5–5.3)
PROT SERPL-MCNC: 6.8 G/DL (ref 6.4–8.2)
RBC # BLD AUTO: 3.97 X10*6/UL (ref 4–5.2)
SODIUM SERPL-SCNC: 137 MMOL/L (ref 136–145)
TRIGL SERPL-MCNC: 66 MG/DL (ref 0–149)
VIT B12 SERPL-MCNC: 278 PG/ML (ref 211–911)
VLDL: 13 MG/DL (ref 0–40)
WBC # BLD AUTO: 6.2 X10*3/UL (ref 4.4–11.3)

## 2024-01-24 PROCEDURE — 85027 COMPLETE CBC AUTOMATED: CPT

## 2024-01-24 PROCEDURE — 80061 LIPID PANEL: CPT

## 2024-01-24 PROCEDURE — 80053 COMPREHEN METABOLIC PANEL: CPT

## 2024-01-24 PROCEDURE — 83036 HEMOGLOBIN GLYCOSYLATED A1C: CPT

## 2024-01-24 PROCEDURE — 82607 VITAMIN B-12: CPT

## 2024-01-24 PROCEDURE — 36415 COLL VENOUS BLD VENIPUNCTURE: CPT

## 2024-01-24 PROCEDURE — 82746 ASSAY OF FOLIC ACID SERUM: CPT

## 2024-02-16 ENCOUNTER — APPOINTMENT (OUTPATIENT)
Dept: PRIMARY CARE | Facility: CLINIC | Age: 89
End: 2024-02-16
Payer: MEDICARE

## 2024-02-21 ENCOUNTER — APPOINTMENT (OUTPATIENT)
Dept: CARDIOLOGY | Facility: CLINIC | Age: 89
End: 2024-02-21
Payer: MEDICARE

## 2024-02-22 PROBLEM — R20.2 HAND PARESTHESIA: Status: ACTIVE | Noted: 2024-02-22

## 2024-02-22 PROBLEM — N39.0 URINARY TRACT INFECTION: Status: ACTIVE | Noted: 2024-02-22

## 2024-02-22 PROBLEM — H61.20 IMPACTED CERUMEN: Status: ACTIVE | Noted: 2024-02-22

## 2024-02-22 PROBLEM — R73.9 HYPERGLYCEMIA: Status: ACTIVE | Noted: 2023-08-23

## 2024-02-26 ENCOUNTER — OFFICE VISIT (OUTPATIENT)
Dept: CARDIOLOGY | Facility: CLINIC | Age: 89
End: 2024-02-26
Payer: MEDICARE

## 2024-02-26 VITALS
BODY MASS INDEX: 29.23 KG/M2 | SYSTOLIC BLOOD PRESSURE: 128 MMHG | DIASTOLIC BLOOD PRESSURE: 70 MMHG | WEIGHT: 165 LBS | HEIGHT: 63 IN | OXYGEN SATURATION: 95 % | HEART RATE: 91 BPM

## 2024-02-26 DIAGNOSIS — I49.5 SICK SINUS SYNDROME (MULTI): ICD-10-CM

## 2024-02-26 DIAGNOSIS — Z95.0 PRESENCE OF PERMANENT CARDIAC PACEMAKER: Primary | ICD-10-CM

## 2024-02-26 PROCEDURE — 99213 OFFICE O/P EST LOW 20 MIN: CPT | Performed by: INTERNAL MEDICINE

## 2024-02-26 PROCEDURE — 1036F TOBACCO NON-USER: CPT | Performed by: INTERNAL MEDICINE

## 2024-02-26 PROCEDURE — 1159F MED LIST DOCD IN RCRD: CPT | Performed by: INTERNAL MEDICINE

## 2024-02-26 PROCEDURE — 1157F ADVNC CARE PLAN IN RCRD: CPT | Performed by: INTERNAL MEDICINE

## 2024-02-26 PROCEDURE — 3074F SYST BP LT 130 MM HG: CPT | Performed by: INTERNAL MEDICINE

## 2024-02-26 PROCEDURE — 1126F AMNT PAIN NOTED NONE PRSNT: CPT | Performed by: INTERNAL MEDICINE

## 2024-02-26 PROCEDURE — 3078F DIAST BP <80 MM HG: CPT | Performed by: INTERNAL MEDICINE

## 2024-02-26 NOTE — PROGRESS NOTES
"History Of Present Illness:      This is a 89-year-old female with a history of sick sinus syndrome.  She underwent a pacemaker pulse generator replacement in July 2023.  The patient had a stroke in August 2023 and is now taking clopidogrel 75 mg daily.  No complaints of palpitations, chest pain, shortness of breath and no pacemaker related problems.    Review of Systems  Other review of systems negative     Last Recorded Vitals:      5/22/2023     1:17 PM 8/9/2023    10:44 AM 9/11/2023    12:15 PM 9/29/2023     9:58 AM 12/11/2023     2:04 PM 1/3/2024     3:33 PM 2/26/2024     2:00 PM   Vitals   Systolic 108 116 124 128 122 138 128   Diastolic 64 60 66 78 62 70 70   Heart Rate 83 82 81  64 75 91   Temp 36.3 °C (97.3 °F)  36.2 °C (97.1 °F) 36.3 °C (97.3 °F)      Resp 17         Height (in)  1.6 m (5' 3\")   1.6 m (5' 3\")  1.6 m (5' 3\")   Weight (lb) 168.8 168 166.8 168.2 164.6 161 165   BMI 29.9 kg/m2 29.76 kg/m2 29.55 kg/m2 29.8 kg/m2 29.16 kg/m2 28.52 kg/m2 29.23 kg/m2   BSA (m2) 1.85 m2 1.84 m2 1.83 m2 1.84 m2 1.82 m2 1.8 m2 1.82 m2   Visit Report Report  Report Report Report Report Report          Allergies:  Iodine, Shrimp, and Hydrocodone    Outpatient Medications:  Current Outpatient Medications   Medication Instructions    albuterol 90 mcg/actuation inhaler 2 puffs, inhalation, 4 times daily    CALCIUM CARBONATE-VITAMIN D3 ORAL oral    clopidogrel (Plavix) 75 mg tablet Take 1 tablet (75 mg) by mouth once daily.    fluticasone propion-salmeteroL (Advair) 115-21 mcg/actuation inhaler 2 puffs, inhalation, 2 times daily, Rinse mouth with water after use to reduce aftertaste and incidence of candidiasis. Do not swallow.    metoprolol tartrate (Lopressor) 25 mg tablet TAKE 1/2 (ONE-HALF) TABLET BY MOUTH ONCE DAILY IN THE MORNING    spironolactone (ALDACTONE) 25 mg, oral, Daily    torsemide (DEMADEX) 10 mg, oral, Daily, Take 1 1/2 tablets (15 mg) daily    vit A/vit C/vit E/zinc/copper (PRESERVISION AREDS ORAL) 2 " capsules, oral, Daily       Physical Exam:    General Appearance:  Alert, oriented, no distress  Skin:  Warm and dry  Head and Neck:  No elevation of JVP, no carotid bruits  Cardiac Exam:  Rhythm is regular, S1 and S2 are normal, grade 2/6 systolic murmur at the lower left sternal border and apex  Lungs:  Clear to auscultation  Extremities:  no edema  Neurologic:  No focal deficits  Psychiatric:  Appropriate mood and behavior         Cardiology Tests:  I have personally review the diagnostic cardiac testing and my interpretation is as follows:    Echocardiogram August 2023: Ejection fraction 60 to 65% with moderate mitral valve regurgitation      Assessment/Plan   Problem List Items Addressed This Visit             ICD-10-CM    Presence of permanent cardiac pacemaker - Primary Z95.0    Sick sinus syndrome (CMS/HCC) I49.5     1.  Sick sinus syndrome: This patient has a history of symptomatic sinus bradycardia and has a Medtronic dual-chamber permanent pacemaker which was replaced in July 2023.  The original device implantation was October 2012.  The pacemaker is functioning appropriately and the battery status is stable.  Continue follow-up as scheduled through the cardiac device clinic.  The patient will follow-up with me in 1 year.            James C Ramicone, DO

## 2024-02-26 NOTE — ASSESSMENT & PLAN NOTE
1.  Sick sinus syndrome: This patient has a history of symptomatic sinus bradycardia and has a Medtronic dual-chamber permanent pacemaker which was replaced in July 2023.  The original device implantation was October 2012.  The pacemaker is functioning appropriately and the battery status is stable.  Continue follow-up as scheduled through the cardiac device clinic.  The patient will follow-up with me in 1 year.

## 2024-03-06 ENCOUNTER — HOSPITAL ENCOUNTER (OUTPATIENT)
Dept: CARDIOLOGY | Facility: CLINIC | Age: 89
Discharge: HOME | End: 2024-03-06
Payer: MEDICARE

## 2024-03-06 DIAGNOSIS — I44.2 COMPLETE HEART BLOCK (MULTI): ICD-10-CM

## 2024-06-26 ENCOUNTER — APPOINTMENT (OUTPATIENT)
Dept: PRIMARY CARE | Facility: CLINIC | Age: 89
End: 2024-06-26
Payer: MEDICARE

## 2024-06-26 VITALS
DIASTOLIC BLOOD PRESSURE: 68 MMHG | BODY MASS INDEX: 29.02 KG/M2 | SYSTOLIC BLOOD PRESSURE: 124 MMHG | OXYGEN SATURATION: 97 % | HEART RATE: 79 BPM | TEMPERATURE: 97.1 F | WEIGHT: 163.8 LBS

## 2024-06-26 DIAGNOSIS — I10 HYPERTENSION, UNSPECIFIED TYPE: ICD-10-CM

## 2024-06-26 DIAGNOSIS — I63.9 CEREBROVASCULAR ACCIDENT (CVA), UNSPECIFIED MECHANISM (MULTI): ICD-10-CM

## 2024-06-26 DIAGNOSIS — I10 PRIMARY HYPERTENSION: ICD-10-CM

## 2024-06-26 DIAGNOSIS — Z00.00 ANNUAL PHYSICAL EXAM: Primary | ICD-10-CM

## 2024-06-26 DIAGNOSIS — I50.32 CHRONIC DIASTOLIC HEART FAILURE (MULTI): ICD-10-CM

## 2024-06-26 DIAGNOSIS — I50.32 CHRONIC DIASTOLIC CONGESTIVE HEART FAILURE (MULTI): ICD-10-CM

## 2024-06-26 DIAGNOSIS — J44.9 CHRONIC OBSTRUCTIVE PULMONARY DISEASE, UNSPECIFIED COPD TYPE (MULTI): ICD-10-CM

## 2024-06-26 PROBLEM — S37.009A INJURY OF KIDNEY: Status: ACTIVE | Noted: 2024-06-26

## 2024-06-26 LAB
ANION GAP SERPL CALC-SCNC: 15 MMOL/L (ref 10–20)
BUN SERPL-MCNC: 25 MG/DL (ref 6–23)
CALCIUM SERPL-MCNC: 9.6 MG/DL (ref 8.6–10.6)
CHLORIDE SERPL-SCNC: 104 MMOL/L (ref 98–107)
CO2 SERPL-SCNC: 24 MMOL/L (ref 21–32)
CREAT SERPL-MCNC: 0.96 MG/DL (ref 0.5–1.05)
EGFRCR SERPLBLD CKD-EPI 2021: 56 ML/MIN/1.73M*2
GLUCOSE SERPL-MCNC: 96 MG/DL (ref 74–99)
POTASSIUM SERPL-SCNC: 4.2 MMOL/L (ref 3.5–5.3)
SODIUM SERPL-SCNC: 139 MMOL/L (ref 136–145)

## 2024-06-26 PROCEDURE — 1126F AMNT PAIN NOTED NONE PRSNT: CPT | Performed by: FAMILY MEDICINE

## 2024-06-26 PROCEDURE — 3078F DIAST BP <80 MM HG: CPT | Performed by: FAMILY MEDICINE

## 2024-06-26 PROCEDURE — 3074F SYST BP LT 130 MM HG: CPT | Performed by: FAMILY MEDICINE

## 2024-06-26 PROCEDURE — 99214 OFFICE O/P EST MOD 30 MIN: CPT | Performed by: FAMILY MEDICINE

## 2024-06-26 PROCEDURE — 1170F FXNL STATUS ASSESSED: CPT | Performed by: FAMILY MEDICINE

## 2024-06-26 PROCEDURE — 1160F RVW MEDS BY RX/DR IN RCRD: CPT | Performed by: FAMILY MEDICINE

## 2024-06-26 PROCEDURE — 1157F ADVNC CARE PLAN IN RCRD: CPT | Performed by: FAMILY MEDICINE

## 2024-06-26 PROCEDURE — 80048 BASIC METABOLIC PNL TOTAL CA: CPT

## 2024-06-26 PROCEDURE — 1036F TOBACCO NON-USER: CPT | Performed by: FAMILY MEDICINE

## 2024-06-26 PROCEDURE — 36415 COLL VENOUS BLD VENIPUNCTURE: CPT

## 2024-06-26 PROCEDURE — G0439 PPPS, SUBSEQ VISIT: HCPCS | Performed by: FAMILY MEDICINE

## 2024-06-26 PROCEDURE — 1159F MED LIST DOCD IN RCRD: CPT | Performed by: FAMILY MEDICINE

## 2024-06-26 RX ORDER — METOPROLOL TARTRATE 25 MG/1
TABLET, FILM COATED ORAL
Qty: 45 TABLET | Refills: 1 | Status: SHIPPED | OUTPATIENT
Start: 2024-06-26

## 2024-06-26 RX ORDER — TORSEMIDE 10 MG/1
10 TABLET ORAL DAILY
Qty: 90 TABLET | Refills: 1 | Status: SHIPPED | OUTPATIENT
Start: 2024-06-26

## 2024-06-26 RX ORDER — CLOPIDOGREL BISULFATE 75 MG/1
75 TABLET ORAL DAILY
Qty: 90 TABLET | Refills: 1 | Status: SHIPPED | OUTPATIENT
Start: 2024-06-26

## 2024-06-26 RX ORDER — SPIRONOLACTONE 25 MG/1
25 TABLET ORAL DAILY
Qty: 90 TABLET | Refills: 1 | Status: SHIPPED | OUTPATIENT
Start: 2024-06-26

## 2024-06-26 RX ORDER — FLUTICASONE PROPIONATE AND SALMETEROL XINAFOATE 115; 21 UG/1; UG/1
2 AEROSOL, METERED RESPIRATORY (INHALATION) 2 TIMES DAILY
Qty: 12 G | Refills: 11 | Status: SHIPPED | OUTPATIENT
Start: 2024-06-26

## 2024-06-26 ASSESSMENT — ACTIVITIES OF DAILY LIVING (ADL)
BATHING: NEEDS ASSISTANCE
GROCERY_SHOPPING: INDEPENDENT
TAKING_MEDICATION: INDEPENDENT
DOING_HOUSEWORK: INDEPENDENT
MANAGING_FINANCES: INDEPENDENT
DRESSING: INDEPENDENT

## 2024-06-26 ASSESSMENT — ENCOUNTER SYMPTOMS
LOSS OF SENSATION IN FEET: 0
DEPRESSION: 0
OCCASIONAL FEELINGS OF UNSTEADINESS: 0

## 2024-06-26 ASSESSMENT — PAIN SCALES - GENERAL: PAINLEVEL: 0-NO PAIN

## 2024-06-26 ASSESSMENT — PATIENT HEALTH QUESTIONNAIRE - PHQ9
2. FEELING DOWN, DEPRESSED OR HOPELESS: NOT AT ALL
1. LITTLE INTEREST OR PLEASURE IN DOING THINGS: NOT AT ALL
SUM OF ALL RESPONSES TO PHQ9 QUESTIONS 1 AND 2: 0

## 2024-06-26 NOTE — PROGRESS NOTES
Subjective   Patient ID: Yesenia Milner is a 89 y.o. female who presents for Medicare Annual Wellness Visit Subsequent (Medicare annual wellness exam, pt is fasting. ) and Annual Exam (Annual physical exam. ).    HPI   Patient here for Medicare wellness.  She feels she is generally doing good.  She feels she has recovered fairly well from her stroke last summer.  Had pacemaker check 4 months ago.  Seeing podiatry regularly  Review of Systems  Cardiovascular: no chest pain, no edema, no palpitations, and no syncope.   Respiratory: no cough,no shortness of breath, and no wheezing  Gastrointestinal: no abdominal pain, nausea, vomiting or blood in stools  Genitourinary: no dysuria or hematuria  Objective   /68 (BP Location: Left arm, Patient Position: Sitting, BP Cuff Size: Adult)   Pulse 79   Temp 36.2 °C (97.1 °F) (Temporal)   Wt 74.3 kg (163 lb 12.8 oz)   SpO2 97%   BMI 29.02 kg/m²     Physical Exam  Alert, pleasant and in no acute distress.  Neck: Supple, no JVD or carotid bruit  Heart: Regular rate and rhythm, no murmur  Lungs: Clear to auscultation  Lower extremities: No edema    Assessment/Plan   Problem List Items Addressed This Visit             ICD-10-CM    Chronic diastolic congestive heart failure (Multi) I50.32    Relevant Medications    clopidogrel (Plavix) 75 mg tablet    metoprolol tartrate (Lopressor) 25 mg tablet    spironolactone (Aldactone) 25 mg tablet    Other Relevant Orders    Basic Metabolic Panel    COPD (chronic obstructive pulmonary disease) (Multi) J44.9    Relevant Medications    fluticasone propion-salmeteroL (Advair) 115-21 mcg/actuation inhaler    HTN (hypertension) I10    Relevant Medications    metoprolol tartrate (Lopressor) 25 mg tablet    Other Relevant Orders    Basic Metabolic Panel     Other Visit Diagnoses         Codes    Annual physical exam    -  Primary Z00.00    Cerebrovascular accident (CVA), unspecified mechanism (Multi)     I63.9    Relevant Medications     clopidogrel (Plavix) 75 mg tablet    Other Relevant Orders    Basic Metabolic Panel    Chronic diastolic heart failure (Multi)     I50.32    Relevant Medications    clopidogrel (Plavix) 75 mg tablet    metoprolol tartrate (Lopressor) 25 mg tablet    torsemide (Demadex) 10 mg tablet        1.  Health maintenance: Care gaps addressed.  2.  CVA: Stable on Plavix  3.  Hypertension/CHF: Has been stable.  4.  COPD: Mild and doing well.  With no flareups, advised patient to decrease her Advair inhaler to 1 puff twice daily.  Follow-up 6 months  We will call in 2 to 5 days with results of BMP (other labs have been stable)

## 2024-08-21 DIAGNOSIS — I50.32 CHRONIC DIASTOLIC HEART FAILURE (MULTI): ICD-10-CM

## 2024-08-21 RX ORDER — TORSEMIDE 10 MG/1
15 TABLET ORAL DAILY
COMMUNITY
Start: 2024-08-21

## 2024-09-04 ENCOUNTER — HOSPITAL ENCOUNTER (OUTPATIENT)
Dept: CARDIOLOGY | Facility: CLINIC | Age: 89
Discharge: HOME | End: 2024-09-04
Payer: MEDICARE

## 2024-09-04 DIAGNOSIS — Z95.0 PRESENCE OF PERMANENT CARDIAC PACEMAKER: ICD-10-CM

## 2024-09-04 DIAGNOSIS — I44.2 COMPLETE HEART BLOCK (MULTI): ICD-10-CM

## 2024-09-04 PROCEDURE — 93296 REM INTERROG EVL PM/IDS: CPT

## 2024-09-04 PROCEDURE — 93294 REM INTERROG EVL PM/LDLS PM: CPT | Performed by: INTERNAL MEDICINE

## 2024-09-17 ENCOUNTER — HOSPITAL ENCOUNTER (OUTPATIENT)
Dept: CARDIOLOGY | Facility: CLINIC | Age: 89
Discharge: HOME | End: 2024-09-17
Payer: MEDICARE

## 2024-09-17 DIAGNOSIS — I44.2 ATRIOVENTRICULAR BLOCK, COMPLETE (MULTI): ICD-10-CM

## 2024-12-20 ENCOUNTER — APPOINTMENT (OUTPATIENT)
Dept: RADIOLOGY | Facility: HOSPITAL | Age: 89
DRG: 280 | End: 2024-12-20
Payer: MEDICARE

## 2024-12-20 ENCOUNTER — APPOINTMENT (OUTPATIENT)
Dept: CARDIOLOGY | Facility: HOSPITAL | Age: 89
DRG: 280 | End: 2024-12-20
Payer: MEDICARE

## 2024-12-20 ENCOUNTER — HOSPITAL ENCOUNTER (INPATIENT)
Facility: HOSPITAL | Age: 89
DRG: 280 | End: 2024-12-20
Attending: STUDENT IN AN ORGANIZED HEALTH CARE EDUCATION/TRAINING PROGRAM | Admitting: STUDENT IN AN ORGANIZED HEALTH CARE EDUCATION/TRAINING PROGRAM
Payer: MEDICARE

## 2024-12-20 DIAGNOSIS — I21.4 NSTEMI (NON-ST ELEVATED MYOCARDIAL INFARCTION) (MULTI): Primary | ICD-10-CM

## 2024-12-20 DIAGNOSIS — R06.02 SHORTNESS OF BREATH: ICD-10-CM

## 2024-12-20 LAB
ALBUMIN SERPL BCP-MCNC: 4 G/DL (ref 3.4–5)
ALP SERPL-CCNC: 86 U/L (ref 33–136)
ALT SERPL W P-5'-P-CCNC: 16 U/L (ref 7–45)
ANION GAP SERPL CALC-SCNC: 15 MMOL/L (ref 10–20)
APPEARANCE UR: CLEAR
APTT PPP: 31 SECONDS (ref 27–38)
AST SERPL W P-5'-P-CCNC: 21 U/L (ref 9–39)
BASOPHILS # BLD AUTO: 0.02 X10*3/UL (ref 0–0.1)
BASOPHILS NFR BLD AUTO: 0.3 %
BILIRUB SERPL-MCNC: 0.3 MG/DL (ref 0–1.2)
BILIRUB UR STRIP.AUTO-MCNC: NEGATIVE MG/DL
BNP SERPL-MCNC: 379 PG/ML (ref 0–99)
BUN SERPL-MCNC: 32 MG/DL (ref 6–23)
CALCIUM SERPL-MCNC: 9.3 MG/DL (ref 8.6–10.3)
CARDIAC TROPONIN I PNL SERPL HS: 112 NG/L (ref 0–13)
CARDIAC TROPONIN I PNL SERPL HS: 1426 NG/L (ref 0–13)
CARDIAC TROPONIN I PNL SERPL HS: 8220 NG/L (ref 0–13)
CHLORIDE SERPL-SCNC: 104 MMOL/L (ref 98–107)
CHOLEST SERPL-MCNC: 145 MG/DL (ref 0–199)
CHOLESTEROL/HDL RATIO: 2.3
CO2 SERPL-SCNC: 20 MMOL/L (ref 21–32)
COLOR UR: NORMAL
CREAT SERPL-MCNC: 1.19 MG/DL (ref 0.5–1.05)
EGFRCR SERPLBLD CKD-EPI 2021: 43 ML/MIN/1.73M*2
EOSINOPHIL # BLD AUTO: 0.17 X10*3/UL (ref 0–0.4)
EOSINOPHIL NFR BLD AUTO: 2.5 %
ERYTHROCYTE [DISTWIDTH] IN BLOOD BY AUTOMATED COUNT: 14 % (ref 11.5–14.5)
GLUCOSE SERPL-MCNC: 108 MG/DL (ref 74–99)
GLUCOSE UR STRIP.AUTO-MCNC: NORMAL MG/DL
HCT VFR BLD AUTO: 32.5 % (ref 36–46)
HDLC SERPL-MCNC: 63.1 MG/DL
HGB BLD-MCNC: 10.7 G/DL (ref 12–16)
HOLD SPECIMEN: NORMAL
IMM GRANULOCYTES # BLD AUTO: 0.04 X10*3/UL (ref 0–0.5)
IMM GRANULOCYTES NFR BLD AUTO: 0.6 % (ref 0–0.9)
INR PPP: 1.1 (ref 0.9–1.1)
KETONES UR STRIP.AUTO-MCNC: NEGATIVE MG/DL
LDLC SERPL CALC-MCNC: 68 MG/DL
LEUKOCYTE ESTERASE UR QL STRIP.AUTO: NEGATIVE
LYMPHOCYTES # BLD AUTO: 1.91 X10*3/UL (ref 0.8–3)
LYMPHOCYTES NFR BLD AUTO: 27.6 %
MAGNESIUM SERPL-MCNC: 2.19 MG/DL (ref 1.6–2.4)
MCH RBC QN AUTO: 29.6 PG (ref 26–34)
MCHC RBC AUTO-ENTMCNC: 32.9 G/DL (ref 32–36)
MCV RBC AUTO: 90 FL (ref 80–100)
MONOCYTES # BLD AUTO: 0.85 X10*3/UL (ref 0.05–0.8)
MONOCYTES NFR BLD AUTO: 12.3 %
NEUTROPHILS # BLD AUTO: 3.93 X10*3/UL (ref 1.6–5.5)
NEUTROPHILS NFR BLD AUTO: 56.7 %
NITRITE UR QL STRIP.AUTO: NEGATIVE
NON HDL CHOLESTEROL: 82 MG/DL (ref 0–149)
NRBC BLD-RTO: 0 /100 WBCS (ref 0–0)
PH UR STRIP.AUTO: 6 [PH]
PLATELET # BLD AUTO: 253 X10*3/UL (ref 150–450)
POTASSIUM SERPL-SCNC: 4.2 MMOL/L (ref 3.5–5.3)
PROT SERPL-MCNC: 6.5 G/DL (ref 6.4–8.2)
PROT UR STRIP.AUTO-MCNC: NORMAL MG/DL
PROTHROMBIN TIME: 12.6 SECONDS (ref 9.8–12.8)
RBC # BLD AUTO: 3.61 X10*6/UL (ref 4–5.2)
RBC # UR STRIP.AUTO: NEGATIVE /UL
RBC #/AREA URNS AUTO: NORMAL /HPF
SODIUM SERPL-SCNC: 135 MMOL/L (ref 136–145)
SP GR UR STRIP.AUTO: 1.02
SQUAMOUS #/AREA URNS AUTO: NORMAL /HPF
TRIGL SERPL-MCNC: 70 MG/DL (ref 0–149)
UFH PPP CHRO-ACNC: 1.1 IU/ML
UROBILINOGEN UR STRIP.AUTO-MCNC: NORMAL MG/DL
VLDL: 14 MG/DL (ref 0–40)
WBC # BLD AUTO: 6.9 X10*3/UL (ref 4.4–11.3)
WBC #/AREA URNS AUTO: NORMAL /HPF

## 2024-12-20 PROCEDURE — 83880 ASSAY OF NATRIURETIC PEPTIDE: CPT

## 2024-12-20 PROCEDURE — 7100000010 HC PHASE TWO TIME - EACH INCREMENTAL 1 MINUTE: Performed by: INTERNAL MEDICINE

## 2024-12-20 PROCEDURE — 2500000001 HC RX 250 WO HCPCS SELF ADMINISTERED DRUGS (ALT 637 FOR MEDICARE OP): Performed by: STUDENT IN AN ORGANIZED HEALTH CARE EDUCATION/TRAINING PROGRAM

## 2024-12-20 PROCEDURE — 4B02XSZ MEASUREMENT OF CARDIAC PACEMAKER, EXTERNAL APPROACH: ICD-10-PCS | Performed by: INTERNAL MEDICINE

## 2024-12-20 PROCEDURE — 96375 TX/PRO/DX INJ NEW DRUG ADDON: CPT | Mod: 59

## 2024-12-20 PROCEDURE — 71045 X-RAY EXAM CHEST 1 VIEW: CPT

## 2024-12-20 PROCEDURE — 80053 COMPREHEN METABOLIC PANEL: CPT | Performed by: STUDENT IN AN ORGANIZED HEALTH CARE EDUCATION/TRAINING PROGRAM

## 2024-12-20 PROCEDURE — C1887 CATHETER, GUIDING: HCPCS | Performed by: INTERNAL MEDICINE

## 2024-12-20 PROCEDURE — 85610 PROTHROMBIN TIME: CPT | Performed by: STUDENT IN AN ORGANIZED HEALTH CARE EDUCATION/TRAINING PROGRAM

## 2024-12-20 PROCEDURE — 2780000003 HC OR 278 NO HCPCS: Performed by: INTERNAL MEDICINE

## 2024-12-20 PROCEDURE — 96366 THER/PROPH/DIAG IV INF ADDON: CPT | Mod: 59

## 2024-12-20 PROCEDURE — 96365 THER/PROPH/DIAG IV INF INIT: CPT | Mod: 59

## 2024-12-20 PROCEDURE — 99223 1ST HOSP IP/OBS HIGH 75: CPT | Performed by: INTERNAL MEDICINE

## 2024-12-20 PROCEDURE — 71275 CT ANGIOGRAPHY CHEST: CPT

## 2024-12-20 PROCEDURE — 2720000007 HC OR 272 NO HCPCS: Performed by: INTERNAL MEDICINE

## 2024-12-20 PROCEDURE — 2500000001 HC RX 250 WO HCPCS SELF ADMINISTERED DRUGS (ALT 637 FOR MEDICARE OP): Performed by: INTERNAL MEDICINE

## 2024-12-20 PROCEDURE — 4A023N7 MEASUREMENT OF CARDIAC SAMPLING AND PRESSURE, LEFT HEART, PERCUTANEOUS APPROACH: ICD-10-PCS | Performed by: INTERNAL MEDICINE

## 2024-12-20 PROCEDURE — 2500000004 HC RX 250 GENERAL PHARMACY W/ HCPCS (ALT 636 FOR OP/ED): Performed by: INTERNAL MEDICINE

## 2024-12-20 PROCEDURE — 85025 COMPLETE CBC W/AUTO DIFF WBC: CPT | Performed by: STUDENT IN AN ORGANIZED HEALTH CARE EDUCATION/TRAINING PROGRAM

## 2024-12-20 PROCEDURE — 2500000001 HC RX 250 WO HCPCS SELF ADMINISTERED DRUGS (ALT 637 FOR MEDICARE OP)

## 2024-12-20 PROCEDURE — 74174 CTA ABD&PLVS W/CONTRAST: CPT | Performed by: SURGERY

## 2024-12-20 PROCEDURE — 71045 X-RAY EXAM CHEST 1 VIEW: CPT | Mod: FOREIGN READ | Performed by: RADIOLOGY

## 2024-12-20 PROCEDURE — C1894 INTRO/SHEATH, NON-LASER: HCPCS | Performed by: INTERNAL MEDICINE

## 2024-12-20 PROCEDURE — 85520 HEPARIN ASSAY: CPT

## 2024-12-20 PROCEDURE — 99285 EMERGENCY DEPT VISIT HI MDM: CPT | Mod: 25 | Performed by: STUDENT IN AN ORGANIZED HEALTH CARE EDUCATION/TRAINING PROGRAM

## 2024-12-20 PROCEDURE — 84484 ASSAY OF TROPONIN QUANT: CPT | Performed by: STUDENT IN AN ORGANIZED HEALTH CARE EDUCATION/TRAINING PROGRAM

## 2024-12-20 PROCEDURE — 36415 COLL VENOUS BLD VENIPUNCTURE: CPT | Performed by: STUDENT IN AN ORGANIZED HEALTH CARE EDUCATION/TRAINING PROGRAM

## 2024-12-20 PROCEDURE — B2111ZZ FLUOROSCOPY OF MULTIPLE CORONARY ARTERIES USING LOW OSMOLAR CONTRAST: ICD-10-PCS | Performed by: INTERNAL MEDICINE

## 2024-12-20 PROCEDURE — 99152 MOD SED SAME PHYS/QHP 5/>YRS: CPT | Performed by: INTERNAL MEDICINE

## 2024-12-20 PROCEDURE — 81001 URINALYSIS AUTO W/SCOPE: CPT

## 2024-12-20 PROCEDURE — 83735 ASSAY OF MAGNESIUM: CPT | Performed by: STUDENT IN AN ORGANIZED HEALTH CARE EDUCATION/TRAINING PROGRAM

## 2024-12-20 PROCEDURE — 93005 ELECTROCARDIOGRAM TRACING: CPT

## 2024-12-20 PROCEDURE — 71275 CT ANGIOGRAPHY CHEST: CPT | Performed by: SURGERY

## 2024-12-20 PROCEDURE — 99223 1ST HOSP IP/OBS HIGH 75: CPT | Performed by: STUDENT IN AN ORGANIZED HEALTH CARE EDUCATION/TRAINING PROGRAM

## 2024-12-20 PROCEDURE — C1760 CLOSURE DEV, VASC: HCPCS | Performed by: INTERNAL MEDICINE

## 2024-12-20 PROCEDURE — 2550000001 HC RX 255 CONTRASTS: Performed by: INTERNAL MEDICINE

## 2024-12-20 PROCEDURE — 2060000001 HC INTERMEDIATE ICU ROOM DAILY

## 2024-12-20 PROCEDURE — 83036 HEMOGLOBIN GLYCOSYLATED A1C: CPT

## 2024-12-20 PROCEDURE — 2550000001 HC RX 255 CONTRASTS: Performed by: STUDENT IN AN ORGANIZED HEALTH CARE EDUCATION/TRAINING PROGRAM

## 2024-12-20 PROCEDURE — 2500000004 HC RX 250 GENERAL PHARMACY W/ HCPCS (ALT 636 FOR OP/ED): Performed by: STUDENT IN AN ORGANIZED HEALTH CARE EDUCATION/TRAINING PROGRAM

## 2024-12-20 PROCEDURE — 85730 THROMBOPLASTIN TIME PARTIAL: CPT | Performed by: STUDENT IN AN ORGANIZED HEALTH CARE EDUCATION/TRAINING PROGRAM

## 2024-12-20 PROCEDURE — 7100000009 HC PHASE TWO TIME - INITIAL BASE CHARGE: Performed by: INTERNAL MEDICINE

## 2024-12-20 PROCEDURE — 80061 LIPID PANEL: CPT

## 2024-12-20 PROCEDURE — 93458 L HRT ARTERY/VENTRICLE ANGIO: CPT | Performed by: INTERNAL MEDICINE

## 2024-12-20 RX ORDER — HEPARIN SODIUM 5000 [USP'U]/ML
4000 INJECTION, SOLUTION INTRAVENOUS; SUBCUTANEOUS ONCE
Status: COMPLETED | OUTPATIENT
Start: 2024-12-20 | End: 2024-12-20

## 2024-12-20 RX ORDER — NAPROXEN SODIUM 220 MG/1
81 TABLET, FILM COATED ORAL DAILY
Status: DISCONTINUED | OUTPATIENT
Start: 2024-12-21 | End: 2024-12-23 | Stop reason: HOSPADM

## 2024-12-20 RX ORDER — POLYETHYLENE GLYCOL 3350 17 G/17G
17 POWDER, FOR SOLUTION ORAL DAILY PRN
Status: DISCONTINUED | OUTPATIENT
Start: 2024-12-20 | End: 2024-12-23 | Stop reason: HOSPADM

## 2024-12-20 RX ORDER — NITROGLYCERIN 20 MG/100ML
5-200 INJECTION INTRAVENOUS CONTINUOUS
Status: DISCONTINUED | OUTPATIENT
Start: 2024-12-20 | End: 2024-12-21

## 2024-12-20 RX ORDER — ASPIRIN 325 MG
TABLET ORAL AS NEEDED
Status: DISCONTINUED | OUTPATIENT
Start: 2024-12-20 | End: 2024-12-20 | Stop reason: HOSPADM

## 2024-12-20 RX ORDER — ACETAMINOPHEN 325 MG/1
650 TABLET ORAL EVERY 4 HOURS PRN
Status: DISCONTINUED | OUTPATIENT
Start: 2024-12-20 | End: 2024-12-23 | Stop reason: HOSPADM

## 2024-12-20 RX ORDER — FENTANYL CITRATE 50 UG/ML
50 INJECTION, SOLUTION INTRAMUSCULAR; INTRAVENOUS ONCE
Status: COMPLETED | OUTPATIENT
Start: 2024-12-20 | End: 2024-12-20

## 2024-12-20 RX ORDER — DIPHENHYDRAMINE HYDROCHLORIDE 50 MG/ML
INJECTION INTRAMUSCULAR; INTRAVENOUS AS NEEDED
Status: DISCONTINUED | OUTPATIENT
Start: 2024-12-20 | End: 2024-12-20 | Stop reason: HOSPADM

## 2024-12-20 RX ORDER — HEPARIN SODIUM 5000 [USP'U]/ML
2000-4000 INJECTION, SOLUTION INTRAVENOUS; SUBCUTANEOUS EVERY 4 HOURS PRN
Status: DISCONTINUED | OUTPATIENT
Start: 2024-12-20 | End: 2024-12-20

## 2024-12-20 RX ORDER — MIDAZOLAM HYDROCHLORIDE 1 MG/ML
INJECTION, SOLUTION INTRAMUSCULAR; INTRAVENOUS AS NEEDED
Status: DISCONTINUED | OUTPATIENT
Start: 2024-12-20 | End: 2024-12-20 | Stop reason: HOSPADM

## 2024-12-20 RX ORDER — ACETAMINOPHEN 160 MG/5ML
650 SOLUTION ORAL EVERY 4 HOURS PRN
Status: DISCONTINUED | OUTPATIENT
Start: 2024-12-20 | End: 2024-12-23 | Stop reason: HOSPADM

## 2024-12-20 RX ORDER — FENTANYL CITRATE 50 UG/ML
INJECTION, SOLUTION INTRAMUSCULAR; INTRAVENOUS AS NEEDED
Status: DISCONTINUED | OUTPATIENT
Start: 2024-12-20 | End: 2024-12-20 | Stop reason: HOSPADM

## 2024-12-20 RX ORDER — CLOPIDOGREL BISULFATE 75 MG/1
75 TABLET ORAL DAILY
Status: DISCONTINUED | OUTPATIENT
Start: 2024-12-21 | End: 2024-12-23 | Stop reason: HOSPADM

## 2024-12-20 RX ORDER — CALCIUM CARBONATE/VITAMIN D3 600MG-5MCG
1 TABLET ORAL DAILY
Status: ON HOLD | COMMUNITY

## 2024-12-20 RX ORDER — NAPROXEN SODIUM 220 MG/1
324 TABLET, FILM COATED ORAL ONCE
Status: COMPLETED | OUTPATIENT
Start: 2024-12-20 | End: 2024-12-20

## 2024-12-20 RX ORDER — NAPROXEN SODIUM 220 MG/1
81 TABLET, FILM COATED ORAL DAILY
Status: DISCONTINUED | OUTPATIENT
Start: 2024-12-20 | End: 2024-12-20 | Stop reason: SDUPTHER

## 2024-12-20 RX ORDER — ACETAMINOPHEN 650 MG/1
650 SUPPOSITORY RECTAL EVERY 4 HOURS PRN
Status: DISCONTINUED | OUTPATIENT
Start: 2024-12-20 | End: 2024-12-23 | Stop reason: HOSPADM

## 2024-12-20 RX ORDER — HEPARIN SODIUM 10000 [USP'U]/100ML
0-4000 INJECTION, SOLUTION INTRAVENOUS CONTINUOUS
Status: DISCONTINUED | OUTPATIENT
Start: 2024-12-20 | End: 2024-12-23 | Stop reason: HOSPADM

## 2024-12-20 RX ORDER — METHYLPREDNISOLONE SODIUM SUCCINATE 125 MG/2ML
INJECTION INTRAMUSCULAR; INTRAVENOUS AS NEEDED
Status: DISCONTINUED | OUTPATIENT
Start: 2024-12-20 | End: 2024-12-20 | Stop reason: HOSPADM

## 2024-12-20 RX ORDER — LIDOCAINE HYDROCHLORIDE 20 MG/ML
INJECTION, SOLUTION INFILTRATION; PERINEURAL AS NEEDED
Status: DISCONTINUED | OUTPATIENT
Start: 2024-12-20 | End: 2024-12-20 | Stop reason: HOSPADM

## 2024-12-20 RX ORDER — CLOPIDOGREL BISULFATE 75 MG/1
75 TABLET ORAL ONCE
Status: COMPLETED | OUTPATIENT
Start: 2024-12-20 | End: 2024-12-20

## 2024-12-20 RX ORDER — HEPARIN SODIUM 1000 [USP'U]/ML
INJECTION, SOLUTION INTRAVENOUS; SUBCUTANEOUS AS NEEDED
Status: DISCONTINUED | OUTPATIENT
Start: 2024-12-20 | End: 2024-12-20 | Stop reason: HOSPADM

## 2024-12-20 RX ORDER — VIT C/E/ZN/COPPR/LUTEIN/ZEAXAN 250MG-90MG
2 CAPSULE ORAL DAILY
Status: ON HOLD | COMMUNITY

## 2024-12-20 RX ADMIN — IOHEXOL 100 ML: 350 INJECTION, SOLUTION INTRAVENOUS at 03:39

## 2024-12-20 RX ADMIN — CLOPIDOGREL BISULFATE 75 MG: 75 TABLET ORAL at 06:02

## 2024-12-20 RX ADMIN — ACETAMINOPHEN 650 MG: 325 TABLET, FILM COATED ORAL at 14:48

## 2024-12-20 RX ADMIN — NITROGLYCERIN 5 MCG/MIN: 20 INJECTION INTRAVENOUS at 11:07

## 2024-12-20 RX ADMIN — ASPIRIN 81 MG CHEWABLE TABLET 324 MG: 81 TABLET CHEWABLE at 04:48

## 2024-12-20 RX ADMIN — FENTANYL CITRATE 50 MCG: 50 INJECTION INTRAMUSCULAR; INTRAVENOUS at 02:22

## 2024-12-20 RX ADMIN — HEPARIN SODIUM 4000 UNITS: 5000 INJECTION INTRAVENOUS; SUBCUTANEOUS at 05:59

## 2024-12-20 RX ADMIN — HEPARIN SODIUM 900 UNITS/HR: 10000 INJECTION, SOLUTION INTRAVENOUS at 05:59

## 2024-12-20 SDOH — SOCIAL STABILITY: SOCIAL INSECURITY: DO YOU FEEL ANYONE HAS EXPLOITED OR TAKEN ADVANTAGE OF YOU FINANCIALLY OR OF YOUR PERSONAL PROPERTY?: NO

## 2024-12-20 SDOH — SOCIAL STABILITY: SOCIAL INSECURITY: WITHIN THE LAST YEAR, HAVE YOU BEEN AFRAID OF YOUR PARTNER OR EX-PARTNER?: NO

## 2024-12-20 SDOH — SOCIAL STABILITY: SOCIAL INSECURITY
WITHIN THE LAST YEAR, HAVE YOU BEEN RAPED OR FORCED TO HAVE ANY KIND OF SEXUAL ACTIVITY BY YOUR PARTNER OR EX-PARTNER?: NO

## 2024-12-20 SDOH — SOCIAL STABILITY: SOCIAL INSECURITY: WITHIN THE LAST YEAR, HAVE YOU BEEN HUMILIATED OR EMOTIONALLY ABUSED IN OTHER WAYS BY YOUR PARTNER OR EX-PARTNER?: NO

## 2024-12-20 SDOH — SOCIAL STABILITY: SOCIAL INSECURITY: HAVE YOU HAD THOUGHTS OF HARMING ANYONE ELSE?: NO

## 2024-12-20 SDOH — SOCIAL STABILITY: SOCIAL INSECURITY: HAVE YOU HAD ANY THOUGHTS OF HARMING ANYONE ELSE?: NO

## 2024-12-20 SDOH — ECONOMIC STABILITY: FOOD INSECURITY: WITHIN THE PAST 12 MONTHS, THE FOOD YOU BOUGHT JUST DIDN'T LAST AND YOU DIDN'T HAVE MONEY TO GET MORE.: NEVER TRUE

## 2024-12-20 SDOH — SOCIAL STABILITY: SOCIAL INSECURITY
WITHIN THE LAST YEAR, HAVE YOU BEEN KICKED, HIT, SLAPPED, OR OTHERWISE PHYSICALLY HURT BY YOUR PARTNER OR EX-PARTNER?: NO

## 2024-12-20 SDOH — SOCIAL STABILITY: SOCIAL INSECURITY: HAS ANYONE EVER THREATENED TO HURT YOUR FAMILY OR YOUR PETS?: NO

## 2024-12-20 SDOH — SOCIAL STABILITY: SOCIAL INSECURITY: WERE YOU ABLE TO COMPLETE ALL THE BEHAVIORAL HEALTH SCREENINGS?: YES

## 2024-12-20 SDOH — SOCIAL STABILITY: SOCIAL INSECURITY: DOES ANYONE TRY TO KEEP YOU FROM HAVING/CONTACTING OTHER FRIENDS OR DOING THINGS OUTSIDE YOUR HOME?: NO

## 2024-12-20 SDOH — ECONOMIC STABILITY: INCOME INSECURITY: IN THE PAST 12 MONTHS HAS THE ELECTRIC, GAS, OIL, OR WATER COMPANY THREATENED TO SHUT OFF SERVICES IN YOUR HOME?: NO

## 2024-12-20 SDOH — ECONOMIC STABILITY: FOOD INSECURITY: WITHIN THE PAST 12 MONTHS, YOU WORRIED THAT YOUR FOOD WOULD RUN OUT BEFORE YOU GOT THE MONEY TO BUY MORE.: NEVER TRUE

## 2024-12-20 SDOH — SOCIAL STABILITY: SOCIAL INSECURITY: ARE THERE ANY APPARENT SIGNS OF INJURIES/BEHAVIORS THAT COULD BE RELATED TO ABUSE/NEGLECT?: NO

## 2024-12-20 SDOH — SOCIAL STABILITY: SOCIAL INSECURITY: ARE YOU OR HAVE YOU BEEN THREATENED OR ABUSED PHYSICALLY, EMOTIONALLY, OR SEXUALLY BY ANYONE?: NO

## 2024-12-20 SDOH — SOCIAL STABILITY: SOCIAL INSECURITY: ABUSE: ADULT

## 2024-12-20 SDOH — SOCIAL STABILITY: SOCIAL INSECURITY: DO YOU FEEL UNSAFE GOING BACK TO THE PLACE WHERE YOU ARE LIVING?: NO

## 2024-12-20 ASSESSMENT — PAIN SCALES - GENERAL
PAINLEVEL_OUTOF10: 3
PAINLEVEL_OUTOF10: 0 - NO PAIN
PAINLEVEL_OUTOF10: 7
PAINLEVEL_OUTOF10: 8
PAINLEVEL_OUTOF10: 0 - NO PAIN
PAINLEVEL_OUTOF10: 3
PAINLEVEL_OUTOF10: 0 - NO PAIN

## 2024-12-20 ASSESSMENT — LIFESTYLE VARIABLES
HOW OFTEN DO YOU HAVE A DRINK CONTAINING ALCOHOL: NEVER
PRESCIPTION_ABUSE_PAST_12_MONTHS: NO
SKIP TO QUESTIONS 9-10: 1
HOW OFTEN DO YOU HAVE 6 OR MORE DRINKS ON ONE OCCASION: NEVER
SUBSTANCE_ABUSE_PAST_12_MONTHS: NO
HOW MANY STANDARD DRINKS CONTAINING ALCOHOL DO YOU HAVE ON A TYPICAL DAY: PATIENT DOES NOT DRINK
AUDIT-C TOTAL SCORE: 0
AUDIT-C TOTAL SCORE: 0

## 2024-12-20 ASSESSMENT — ACTIVITIES OF DAILY LIVING (ADL)
GROOMING: INDEPENDENT
DRESSING YOURSELF: NEEDS ASSISTANCE
LACK_OF_TRANSPORTATION: NO
TOILETING: NEEDS ASSISTANCE
BATHING: NEEDS ASSISTANCE
PATIENT'S MEMORY ADEQUATE TO SAFELY COMPLETE DAILY ACTIVITIES?: YES
JUDGMENT_ADEQUATE_SAFELY_COMPLETE_DAILY_ACTIVITIES: YES
HEARING - LEFT EAR: HEARING AID
WALKS IN HOME: NEEDS ASSISTANCE
HEARING - RIGHT EAR: HEARING AID
FEEDING YOURSELF: INDEPENDENT
ADEQUATE_TO_COMPLETE_ADL: YES
ASSISTIVE_DEVICE: WALKER

## 2024-12-20 ASSESSMENT — PAIN - FUNCTIONAL ASSESSMENT
PAIN_FUNCTIONAL_ASSESSMENT: 0-10

## 2024-12-20 ASSESSMENT — COGNITIVE AND FUNCTIONAL STATUS - GENERAL
WALKING IN HOSPITAL ROOM: A LITTLE
STANDING UP FROM CHAIR USING ARMS: A LITTLE
MOBILITY SCORE: 17
HELP NEEDED FOR BATHING: A LITTLE
CLIMB 3 TO 5 STEPS WITH RAILING: A LOT
MOVING FROM LYING ON BACK TO SITTING ON SIDE OF FLAT BED WITH BEDRAILS: A LITTLE
MOVING FROM LYING ON BACK TO SITTING ON SIDE OF FLAT BED WITH BEDRAILS: A LITTLE
HELP NEEDED FOR BATHING: A LITTLE
STANDING UP FROM CHAIR USING ARMS: A LITTLE
TOILETING: A LITTLE
CLIMB 3 TO 5 STEPS WITH RAILING: A LOT
WALKING IN HOSPITAL ROOM: A LITTLE
TURNING FROM BACK TO SIDE WHILE IN FLAT BAD: A LITTLE
MOVING TO AND FROM BED TO CHAIR: A LITTLE
DRESSING REGULAR LOWER BODY CLOTHING: A LITTLE
PATIENT BASELINE BEDBOUND: NO
TURNING FROM BACK TO SIDE WHILE IN FLAT BAD: A LITTLE
MOBILITY SCORE: 17
DRESSING REGULAR LOWER BODY CLOTHING: A LITTLE
DAILY ACTIVITIY SCORE: 21
MOVING TO AND FROM BED TO CHAIR: A LITTLE
DAILY ACTIVITIY SCORE: 21
TOILETING: A LITTLE

## 2024-12-20 ASSESSMENT — PAIN DESCRIPTION - LOCATION
LOCATION: CHEST

## 2024-12-20 ASSESSMENT — PAIN DESCRIPTION - ONSET: ONSET: SUDDEN

## 2024-12-20 ASSESSMENT — COLUMBIA-SUICIDE SEVERITY RATING SCALE - C-SSRS
2. HAVE YOU ACTUALLY HAD ANY THOUGHTS OF KILLING YOURSELF?: NO
6. HAVE YOU EVER DONE ANYTHING, STARTED TO DO ANYTHING, OR PREPARED TO DO ANYTHING TO END YOUR LIFE?: NO
2. HAVE YOU ACTUALLY HAD ANY THOUGHTS OF KILLING YOURSELF?: NO
1. IN THE PAST MONTH, HAVE YOU WISHED YOU WERE DEAD OR WISHED YOU COULD GO TO SLEEP AND NOT WAKE UP?: NO
1. IN THE PAST MONTH, HAVE YOU WISHED YOU WERE DEAD OR WISHED YOU COULD GO TO SLEEP AND NOT WAKE UP?: NO
6. HAVE YOU EVER DONE ANYTHING, STARTED TO DO ANYTHING, OR PREPARED TO DO ANYTHING TO END YOUR LIFE?: NO

## 2024-12-20 ASSESSMENT — PATIENT HEALTH QUESTIONNAIRE - PHQ9
SUM OF ALL RESPONSES TO PHQ9 QUESTIONS 1 & 2: 0
1. LITTLE INTEREST OR PLEASURE IN DOING THINGS: NOT AT ALL
2. FEELING DOWN, DEPRESSED OR HOPELESS: NOT AT ALL

## 2024-12-20 ASSESSMENT — PAIN DESCRIPTION - PAIN TYPE: TYPE: ACUTE PAIN

## 2024-12-20 NOTE — ED TRIAGE NOTES
"Pt BIBA from home for chest pain that began approx 1 hour ago while watching TV. Pain is 7/10 to anterior chest non radiating and endorses feeling like \"throat is tight\".   "

## 2024-12-20 NOTE — CARE PLAN
The patient's goals for the shift include      The clinical goals for the shift include        Problem: Skin  Goal: Participates in plan/prevention/treatment measures  Flowsheets (Taken 12/20/2024 1820)  Participates in plan/prevention/treatment measures: Increase activity/out of bed for meals  Goal: Prevent/manage excess moisture  Flowsheets (Taken 12/20/2024 1820)  Prevent/manage excess moisture: Moisturize dry skin  Goal: Prevent/minimize sheer/friction injuries  Flowsheets (Taken 12/20/2024 1820)  Prevent/minimize sheer/friction injuries: Utilize specialty bed per algorithm  Goal: Promote/optimize nutrition  Flowsheets (Taken 12/20/2024 1820)  Promote/optimize nutrition: Monitor/record intake including meals  Goal: Promote skin healing  Flowsheets (Taken 12/20/2024 1820)  Promote skin healing: Turn/reposition every 2 hours/use positioning/transfer devices

## 2024-12-20 NOTE — NURSING NOTE
Report called to Yomaira BASS on 8th floor. Site stable with no bleeding or hematoma observed. Pain interventions in place. No complain of nausea. Belongings with patient daughter. Vital signs OK for transfer.

## 2024-12-20 NOTE — PROGRESS NOTES
Asked by Dr. Hess to see patient for non-STEMI.  89-year-old with sick sinus syndrome has a pacemaker, stroke in 2023 on Plavix.    She lives alone independent in ADLs.  She had sudden onset of chest pressure radiating to the jaw and both arms which prompted ER visit.  EKG showed V paced rhythm, troponin peaked at 8000 from 110.  Patient was started on heparin drip nitro drip.  During my assessment she continues to experience and report chest pain report describes a pressure across her chest.  Daughter at bedside.  Discussed all options including conservative approach although she is on heparin drip and nitro drip and continues to have symptoms versus invasive approach with.  Discussed risk and benefit of cardiac catheterization/PCI, risks include but not limited to death MI stroke vascular complication PCI related complication etc.  After giving him time to discuss all these options, they agree to proceed with cardiac catheterization.  This is reasonable considering patient is independent in ADLs lives alone and has good quality of life.  Indication is for non-STEMI with ongoing chest pain despite nitro drip heparin drip.

## 2024-12-20 NOTE — CONSULTS
Consults  History Of Present Illness:    Yesenia Milner is a 89 y.o. female presenting with chest pain.  PMH of CVA, HFpEF, HTN, SSS S/P PM and COPD presented with chest pain beginning 12:00 last night  whil watching TV.  It began gradually and progressively worsened  eventully going from her stomach to chest down both arms.  Now greatly improved.     Last Recorded Vitals:  Vitals:    12/20/24 0700 12/20/24 0800 12/20/24 0900 12/20/24 1000   BP: 157/70 146/85 167/72 150/67   BP Location:  Right arm Right arm Right arm   Patient Position:  Sitting Sitting Sitting   Pulse: 74 62 62 70   Resp: (!) 24 20 20 20   Temp:  36.2 °C (97.2 °F) 36.3 °C (97.3 °F) 36.4 °C (97.5 °F)   TempSrc:  Temporal Temporal Temporal   SpO2: 97% 97% 96% 96%   Weight:       Height:           Last Labs:  CBC - 12/20/2024:  2:04 AM  6.9 10.7 253    32.5      CMP - 12/20/2024:  2:04 AM  9.3 6.5 21 --- 0.3   _ 4.0 16 86      PTT - 12/20/2024:  2:51 AM  1.1   12.6 31     Troponin I, High Sensitivity   Date/Time Value Ref Range Status   12/20/2024 04:39 AM 8,220 (HH) 0 - 13 ng/L Final     Comment:     Previous result verified on 12/20/2024 0332 on specimen/case [id number] called with component [facility] for procedure Troponin, High Sensitivity, 1 Hour with value 1,426 ng/L.   12/20/2024 02:51 AM 1,426 (HH) 0 - 13 ng/L Final   12/20/2024 02:04  (HH) 0 - 13 ng/L Final     BNP   Date/Time Value Ref Range Status   12/20/2024 02:51  (H) 0 - 99 pg/mL Final   03/30/2022 04:11  (H) 0 - 99 pg/mL Final     Comment:     .  <100 pg/mL - Heart failure unlikely  100-299 pg/mL - Intermediate probability of acute heart  .               failure exacerbation. Correlate with clinical  .               context and patient history.    >=300 pg/mL - Heart Failure likely. Correlate with clinical  .               context and patient history.  BNP testing is performed using different testing   methodology at [facility] than at other   system  hospitals. Direct result comparisons should   only be made within the same method.     04/15/2020 02:03  (H) 0 - 99 pg/mL Final     Comment:     .  <100 pg/mL - Heart failure unlikely  100-299 pg/mL - Intermediate probability of acute heart  .               failure exacerbation. Correlate with clinical  .               context and patient history.    >=300 pg/mL - Heart Failure likely. Correlate with clinical  .               context and patient history.  BNP testing is performed using different testing   methodology at [facility] than at other   [facility] hospitals. Direct result comparisons should   only be made within the same method.       Hemoglobin A1C   Date/Time Value Ref Range Status   01/24/2024 10:49 AM 5.4 see below % Final   08/21/2023 12:56 AM 5.9 (A) % Final     Comment:          Diagnosis of Diabetes-Adults   Non-Diabetic: < or = 5.6%   Increased risk for developing diabetes: 5.7-6.4%   Diagnostic of diabetes: > or = 6.5%  .       Monitoring of Diabetes                Age (y)     Therapeutic Goal (%)   Adults:          >18           <7.0   Pediatrics:    13-18           <7.5                   7-12           <8.0                   0- 6            7.5-8.5   American Diabetes Association. Diabetes Care 33(S1), Jan 2010.       LDL Calculated   Date/Time Value Ref Range Status   01/24/2024 10:49 AM 73 <=99 mg/dL Final     Comment:                                 Near   Borderline      AGE      Desirable  Optimal    High     High     Very High     0-19 Y     0 - 109     ---    110-129   >/= 130     ----    20-24 Y     0 - 119     ---    120-159   >/= 160     ----      >24 Y     0 -  99   100-129  130-159   160-189     >/=190       VLDL   Date/Time Value Ref Range Status   01/24/2024 10:49 AM 13 0 - 40 mg/dL Final   08/21/2023 12:56 AM 17 0 - 40 mg/dL Final   01/17/2023 09:43 AM 14 0 - 40 mg/dL Final   03/16/2021 10:15 AM 16 0 - 40 mg/dL Final      Last I/O:  No intake/output data  "recorded.    Past Cardiology Tests (Last 3 Years):  EKG:  No results found for this or any previous visit from the past 1095 days.    Echo:  No results found for this or any previous visit from the past 1095 days.    Ejection Fractions:  No results found for: \"EF\"  Cath:  No results found for this or any previous visit from the past 1095 days.    Stress Test:  No results found for this or any previous visit from the past 1095 days.    Cardiac Imaging:  No results found for this or any previous visit from the past 1095 days.      Past Medical History:  She has a past medical history of Other conditions influencing health status, Personal history of other medical treatment, and Personal history of other medical treatment.    Past Surgical History:  She has a past surgical history that includes Appendectomy (11/22/2017); Hysterectomy (11/22/2017); Tonsillectomy (11/22/2017); Other surgical history (11/22/2017); Other surgical history (11/22/2017); Cardiac pacemaker placement (03/11/2021); IR angiogram renal bilateral (Bilateral, 12/14/2020); IR angiogram inferior epigastric pelvic (12/14/2020); and IR angiogram inferior epigastric pelvic (12/14/2020).      Social History:  She reports that she quit smoking about 52 years ago. Her smoking use included cigarettes. She has been exposed to tobacco smoke. She has never used smokeless tobacco. She reports that she does not drink alcohol and does not use drugs.    Family History:  Family History   Problem Relation Name Age of Onset    No Known Problems Mother          Allergies:  Iodine, Shrimp, Hydrocodone, and Adhesive tape-silicones    Inpatient Medications:  Scheduled medications   Medication Dose Route Frequency    [START ON 12/21/2024] aspirin  81 mg oral Daily     PRN medications   Medication    acetaminophen    Or    acetaminophen    Or    acetaminophen    heparin    oxygen    polyethylene glycol     Continuous Medications   Medication Dose Last Rate    heparin  " 0-4,000 Units/hr 900 Units/hr (12/20/24 3201)     Outpatient Medications:  Current Outpatient Medications   Medication Instructions    calcium carbonate-vitamin D3 (Calcium 600 + D,3,) 600 mg-5 mcg (200 unit) tablet 1 tablet, oral, Daily    clopidogrel (PLAVIX) 75 mg, oral, Daily    fluticasone propion-salmeteroL (Advair) 115-21 mcg/actuation inhaler 2 puffs, inhalation, 2 times daily, Rinse mouth with water after use to reduce aftertaste and incidence of candidiasis. Do not swallow.    lubricating eye drops ophthalmic solution 1 drop, Both Eyes, As needed    metoprolol tartrate (Lopressor) 25 mg tablet TAKE 1/2 (ONE-HALF) TABLET BY MOUTH ONCE DAILY IN THE MORNING    spironolactone (ALDACTONE) 25 mg, oral, Daily    torsemide (DEMADEX) 10 mg, oral, Daily    vit C,U-Ot-nqeoy-lutein-zeaxan (PreserVision AREDS-2) 250-90-40-1 mg capsule 2 capsules, oral, Daily       Physical Exam:  GEN: Frail 89 yo wf lying 40 degrees comfortably  HEENT: Atraumatic, normocephalic  COR: Reg.  LUNGS Clear  EXT: No edema     Assessment/Plan   1.) Acute NSTEMI with Troponin elevations to  8,220  2.) HFpEf  3.) HTN  4.) SSS S/P PPM  4.) COPD  REC:  1.) Agree with echocardiogram and IV heparin  2.) IV nitroglycerin  Discussed with JERONIMO  Will discuss with Dr. Sanches       Thank you for the consultation                       Will follow with you                                         JLS      Peripheral IV 12/20/24 20 G Left;Dorsal Forearm (Active)   Site Assessment Clean;Dry 12/20/24 0155   Dressing Type Transparent 12/20/24 0155   Line Status Blood return noted;Lab draw;Saline locked;Flushed 12/20/24 0155   Dressing Status Clean;Dry 12/20/24 0155   Number of days: 0       Code Status:  Full Code    I spent 30 minutes in the professional and overall care of this patient.        Magnus Hess MD

## 2024-12-20 NOTE — PROCEDURES
Cardiac catheterization performed from the right radial artery.  No evidence of obstructive coronary artery disease.  LV gram showed an LV function of 35% with basal hypokinesis mid to apical severe hypokinesis apical akinesis.  This is suggestive of stress-induced Takotsubo cardiomyopathy.  Will defer to primary cardiologist for treatment.  Recommend heart failure therapy as blood pressure tolerates  DAPT for 6 months then continue Plavix (chronically for TIA/stroke)

## 2024-12-20 NOTE — ED PROVIDER NOTES
Chief Complaint   Patient presents with    Chest Pain        HPI:  89 y.o. female with a history of sick sinus syndrome status post pacemaker placement, CVA, HFpEF, hypertension, COPD who is presenting to the ED with chest pain.  Patient reports she was laying in bed when she suddenly developed intense chest tightness.  It is located throughout the entire chest radiating to both arms and the neck.  She reports associated shortness of breath and nausea.  No vomiting.  Has never had pain like this before.  Had taken a Tylenol earlier this evening but has not had any improvement.    History provided by: patient  Limitations to history: none    ------------------------------------------------------------------------------------------------------------------------------------------    Physical Exam:  T 36 °C (96.8 °F)  HR 64  /74  RR 18  O2 98 % None (Room air)    Triage vitals reviewed.  Constitutional: Well developed adult, appears uncomfortable, non toxic in appearance  Head: Normocephalic, atraumatic  Eyes: Pupils are equal. No conjunctival injection.  Neck: Supple. Trachea is midline.  Pulmonary: Normal work of breathing with no accessory muscle use noted.  Clear to auscultation bilaterally.  No wheezing rhonchi or rales.  Cardiovascular: RRR. 2+ radial pulses bilaterally.   Abdomen: Soft, nondistended.  Mild epigastric tenderness without rebound or guarding.   Extremities: No peripheral edema or calf pain.  No gross deformities.  Moving all extremities spontaneously without difficulty.  Neuro: Awake and alert. Face is symmetric.  Speech is clear. No obvious focal findings.  Psych: Appropriate mood and affect.    ------------------------------------------------------------------------------------------------------------------------------------------    Medical Decision Making:  This patient is a 89 y.o. female with a history of sick sinus syndrome status post pacemaker placement, CVA, HFpEF, hypertension,  COPD who is presenting to the ED with chest pain.  On arrival, patient is visibly uncomfortable.  She is hypertensive but otherwise hemodynamically stable.  Concern for ACS, aortic dissection.  Lower suspicion for PE given she has no hypoxia, tachycardia, and no evidence of VTE on exam.  Chest x-ray, EKG, and CTA chest abdomen pelvis ordered.  Fentanyl given for pain control. See ED course below for remainder of details.      ED Course:  ED Course as of 02/09/25 0116   Fri Dec 20, 2024   0149 EKG interpreted by me.  AV paced.  Rate of 67 bpm.  Morphology similar to EKG dated 8/21/2023 [DR]   0305 Troponin I, High Sensitivity(!!): 112 [DR]   0305 Repeat EKG interpreted by me. Still paced rhythm with rate of 77 bpm.  Morphology unchanged from initial EKG. [DR]   0530 Troponin I, High Sensitivity(!!): 1,426 [DR]   0531 Troponin I, High Sensitivity(!!): 8,220 [DR]   0555 Discussed with Dr. Yates from cardiology who agrees with NSTEMI treatment and admission for further work up. Recommended clopidogrel as well.  [DR]      ED Course User Index  [] Angella Leos DO         Diagnoses as of 02/09/25 0116   NSTEMI (non-ST elevated myocardial infarction) (Multi)        Clinical Impression:  NSTEMI    Disposition:  Admit     Angella Leos DO  Emergency Medicine         Angella Leos DO  02/09/25 0120

## 2024-12-20 NOTE — H&P
"History Of Present Illness  Yesenia Milner is a 89 y.o. female with a past medical history of CVA, HFpEF, HTN, SSS s/p pacemaker, and COPD who is presenting to the emergency department with complaint of chest pain.  Patient states the chest pain began around 12:00 last night while she was watching TV.  She states it came on little by little at first, then got increasingly worse, so she called 911.  She states she lives at home by herself.  She states the pain is located all the way from her stomach up to her neck and radiates into both arms.  She states her left arm is feeling better now, but her right arm is still hurting her.  She denies any active pain right now, just describes a \"weight\" still on her chest.  She states she took a Tylenol around 10:30 PM last night, but did not take anything after the pain began.  She states she has never had pain like this before, and rates it an 8 out of 10 at its peak.  Currently, rates it a 4 out of 10.  She states that last night she also began experiencing shortness of breath.  She does tell me that her cardiologist recently lowered her dose of her water pill from 1-1/2 pills to just 1.  She otherwise denies fever/chills, headache, dizziness, syncope, URI symptoms, leg swelling, nausea/vomiting, diarrhea, urinary symptoms, numbness/tingling, diaphoresis, use of O2 at home.  She states she is eating and drinking like normal at home.  Daughter is at the bedside assisting with history.  I discussed CODE STATUS with patient and daughter at the bedside.  Patient states she would like to talk to the cardiologist before she makes a decision.  We agreed to leave her status as full code in the computer.    ED course: On arrival, patient's /74, heart rate 64, respirations 18, afebrile, saturating 98% on room air.  Labs and imaging performed, revealing mild hyponatremia, creatinine 1.19 and BUN 32 (0.96 and 25 on 6/26/2024). Initial troponin 112, delta 1,426, third troponin " 8,220.  No white count.  Hemoglobin 10.7.  Platelets WNL.  Chest x-ray shows diffusely prominent peribronchial markings which may reflect viral infection or volume overload.  CTA chest/abdomen shows no evidence of acute abnormality of the imaged systemic arterial vasculature.  No aortic dissection.  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  Fairly well delineated hypoattenuating filling defect in the SVC, suspicious for nonocclusive thrombus.  EKG, per ED physician, AV paced with a rate of 67 bpm.  Morphology similar to EKG dated 8/21/2023. Prolonged QTc at 518.  Patient given aspirin, Plavix, fentanyl, and initiated on heparin drip in the ED.  Cardiology consulted in the ED.  Patient to be admitted inpatient under Dr. Gabriel.     Past Medical History  Past Medical History:   Diagnosis Date    Other conditions influencing health status     Normal stress echocardiogram    Personal history of other medical treatment     History of echocardiogram    Personal history of other medical treatment     H/O Doppler ultrasound       Surgical History  Past Surgical History:   Procedure Laterality Date    APPENDECTOMY  11/22/2017    Appendectomy    CARDIAC PACEMAKER PLACEMENT  03/11/2021    Pacemaker Placement    HYSTERECTOMY  11/22/2017    Hysterectomy    IR ANGIOGRAM INFERIOR EPIGASTRIC PELVIC  12/14/2020    IR ANGIOGRAM INFERIOR EPIGASTRIC PELVIC 12/14/2020 [facility]    IR ANGIOGRAM INFERIOR EPIGASTRIC PELVIC  12/14/2020    IR ANGIOGRAM INFERIOR EPIGASTRIC PELVIC 12/14/2020 [facility]    IR ANGIOGRAM RENAL BILATERAL Bilateral 12/14/2020    IR ANGIOGRAM RENAL BILATERAL 12/14/2020 [facility]    OTHER SURGICAL HISTORY  11/22/2017    Stab Phlebectomy Of Varicose Veins    OTHER SURGICAL HISTORY  11/22/2017    Venous Ligation With Stripping    TONSILLECTOMY  11/22/2017    Tonsillectomy        Social History  She reports that she quit smoking about 52 years ago. Her smoking use included cigarettes. She has been  exposed to tobacco smoke. She has never used smokeless tobacco. She reports that she does not drink alcohol and does not use drugs.    Family History  Family History   Problem Relation Name Age of Onset    No Known Problems Mother          Allergies  Iodine, Shrimp, and Hydrocodone    Review of Systems  Negative except as stated above in HPI.     Physical Exam  Constitutional:       General: She is not in acute distress.     Appearance: She is ill-appearing. She is not toxic-appearing.      Comments: Patient alert and oriented x 3, answering questions appropriately.  Daughter at the bedside assisting with history.   HENT:      Head: Normocephalic and atraumatic.      Nose: Nose normal.      Mouth/Throat:      Mouth: Mucous membranes are moist.      Pharynx: Oropharynx is clear.   Eyes:      Extraocular Movements: Extraocular movements intact.      Conjunctiva/sclera: Conjunctivae normal.      Pupils: Pupils are equal, round, and reactive to light.   Cardiovascular:      Rate and Rhythm: Normal rate and regular rhythm.      Pulses: Normal pulses.   Pulmonary:      Effort: Pulmonary effort is normal.      Breath sounds: Wheezing (Wheezes present in right lower lobe and left lower lobe) present.   Abdominal:      General: Abdomen is flat. Bowel sounds are normal.      Tenderness: There is abdominal tenderness (RLQ).   Musculoskeletal:         General: No tenderness. Normal range of motion.      Cervical back: Normal range of motion.      Right lower leg: No edema.      Left lower leg: No edema.      Comments: Tenderness to palpation of right chest wall.   Skin:     General: Skin is warm and dry.   Neurological:      General: No focal deficit present.      Mental Status: She is alert and oriented to person, place, and time.   Psychiatric:         Mood and Affect: Mood normal.         Behavior: Behavior normal.          Last Recorded Vitals  Blood pressure 146/85, pulse 62, temperature 36.2 °C (97.2 °F), temperature  "source Temporal, resp. rate 20, height 1.626 m (5' 4\"), weight 73.5 kg (162 lb), SpO2 97%.    Relevant Results    Scheduled medications  [START ON 12/21/2024] aspirin, 81 mg, oral, Daily      Continuous medications  heparin, 0-4,000 Units/hr, Last Rate: 900 Units/hr (12/20/24 0559)      PRN medications  PRN medications: acetaminophen **OR** acetaminophen **OR** acetaminophen, heparin, oxygen, polyethylene glycol    Results for orders placed or performed during the hospital encounter of 12/20/24 (from the past 24 hours)   CBC and Auto Differential   Result Value Ref Range    WBC 6.9 4.4 - 11.3 x10*3/uL    nRBC 0.0 0.0 - 0.0 /100 WBCs    RBC 3.61 (L) 4.00 - 5.20 x10*6/uL    Hemoglobin 10.7 (L) 12.0 - 16.0 g/dL    Hematocrit 32.5 (L) 36.0 - 46.0 %    MCV 90 80 - 100 fL    MCH 29.6 26.0 - 34.0 pg    MCHC 32.9 32.0 - 36.0 g/dL    RDW 14.0 11.5 - 14.5 %    Platelets 253 150 - 450 x10*3/uL    Neutrophils % 56.7 40.0 - 80.0 %    Immature Granulocytes %, Automated 0.6 0.0 - 0.9 %    Lymphocytes % 27.6 13.0 - 44.0 %    Monocytes % 12.3 2.0 - 10.0 %    Eosinophils % 2.5 0.0 - 6.0 %    Basophils % 0.3 0.0 - 2.0 %    Neutrophils Absolute 3.93 1.60 - 5.50 x10*3/uL    Immature Granulocytes Absolute, Automated 0.04 0.00 - 0.50 x10*3/uL    Lymphocytes Absolute 1.91 0.80 - 3.00 x10*3/uL    Monocytes Absolute 0.85 (H) 0.05 - 0.80 x10*3/uL    Eosinophils Absolute 0.17 0.00 - 0.40 x10*3/uL    Basophils Absolute 0.02 0.00 - 0.10 x10*3/uL   Comprehensive Metabolic Panel   Result Value Ref Range    Glucose 108 (H) 74 - 99 mg/dL    Sodium 135 (L) 136 - 145 mmol/L    Potassium 4.2 3.5 - 5.3 mmol/L    Chloride 104 98 - 107 mmol/L    Bicarbonate 20 (L) 21 - 32 mmol/L    Anion Gap 15 10 - 20 mmol/L    Urea Nitrogen 32 (H) 6 - 23 mg/dL    Creatinine 1.19 (H) 0.50 - 1.05 mg/dL    eGFR 43 (L) >60 mL/min/1.73m*2    Calcium 9.3 8.6 - 10.3 mg/dL    Albumin 4.0 3.4 - 5.0 g/dL    Alkaline Phosphatase 86 33 - 136 U/L    Total Protein 6.5 6.4 - 8.2 g/dL "    AST 21 9 - 39 U/L    Bilirubin, Total 0.3 0.0 - 1.2 mg/dL    ALT 16 7 - 45 U/L   Magnesium   Result Value Ref Range    Magnesium 2.19 1.60 - 2.40 mg/dL   Troponin I, High Sensitivity, Initial   Result Value Ref Range    Troponin I, High Sensitivity 112 (HH) 0 - 13 ng/L   Troponin, High Sensitivity, 1 Hour   Result Value Ref Range    Troponin I, High Sensitivity 1,426 (HH) 0 - 13 ng/L   Light Blue Top   Result Value Ref Range    Extra Tube Hold for add-ons.    Lavender Top   Result Value Ref Range    Extra Tube Hold for add-ons.    Protime-INR   Result Value Ref Range    Protime 12.6 9.8 - 12.8 seconds    INR 1.1 0.9 - 1.1   APTT   Result Value Ref Range    aPTT 31 27 - 38 seconds   Troponin I, High Sensitivity   Result Value Ref Range    Troponin I, High Sensitivity 8,220 (HH) 0 - 13 ng/L     CT angio chest abdomen pelvis    Result Date: 12/20/2024  Interpreted By:  Abdiel Barrios, STUDY: CT ANGIO CHEST ABDOMEN PELVIS; ;  12/20/2024 3:38 am   INDICATION: Signs/Symptoms:chest pain radiating to shoulders r/o aortic dissection.     COMPARISON: Correlation made to noncontrast CT abdomen and pelvis of 01/22/2020.   ACCESSION NUMBER(S): [id number]   ORDERING CLINICIAN: JAMILAH ELLINGTON   TECHNIQUE: Noncontrast CT of the chest, abdomen and pelvis was followed by CT angiography of the chest, abdomen and pelvis after IV administration of 100 cc Omnipaque 350. Multiplanar, MIP and 3D reformations.   FINDINGS: CHEST:   VESSELS: No evidence of acute intramural hematoma or dissection. Aorta is normal in caliber. Moderate atherosclerotic calcification is present. Coronary artery calcifications are noted; please note that technique is not optimized for evaluation of coronary arteries. 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 There is a fairly well-delineated hypoattenuating  filling defect in the SVC (series 501, images 84-97), suspicious for nonocclusive thrombus. Main pulmonary artery is of normal caliber. No evidence of pulmonary embolus. HEART: Mildly enlarged. Pacing leads in right atrium and right ventricle. No pericardial effusion. MEDIASTINUM AND HARITHA: No pathologically enlarged thoracic lymph nodes. LUNG, PLEURA, LARGE AIRWAYS: Interlobular septal thickening and mosaic attenuation of the lung parenchyma are compatible with acute pulmonary interstitial edema/CHF. Mild dependent atelectasis trace pleural effusions. No pneumothorax. CHEST WALL AND LOWER NECK: Within normal limits.   ABDOMEN:   BONES: No acute osseous abnormality. Osteopenia. Moderate to severe degenerative changes of the imaged spine. Destructive osseous lesion. ABDOMINAL WALL: Within normal limits.   LIVER: Within normal limits. BILE DUCTS: Normal caliber. GALLBLADDER: The gallbladder is distended. No calcified cholelithiasis is seen. No wall thickening or pericholecystic fluid. PANCREAS: Within normal limits. SPLEEN: Within normal limits. ADRENALS: Within normal limits. KIDNEYS: Incidentally noted ptotic right kidney. Otherwise, normal. URETERS: No hydroureter.   PELVIS:   REPRODUCTIVE ORGANS: No pelvic mass or significant free pelvic fluid. Uterus is absent. BLADDER: Within normal limits.     RETROPERITONEUM: Within normal limits. BOWEL: Stomach appears grossly normal. No dilated or thickened bowel. Colonic diverticulosis without evidence of acute diverticulitis. Normal appendix. PERITONEUM: No ascites or free air, no fluid collection.       No evidence of acute abnormality of the imaged systemic arterial vasculature. Specifically, no aortic intramural hematoma or dissection. Atherosclerosis, without definite hemodynamically significant luminal narrowing seen in the imaged systemic arteries. 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   There is a fairly well-delineated  hypoattenuating filling defect in the SVC (series 501, images 84-97), suspicious for nonocclusive thrombus. Main pulmonary artery is of normal caliber. No evidence of pulmonary embolus.   Interlobular septal thickening and mosaic attenuation of the lung parenchyma are compatible with acute pulmonary interstitial edema/CHF. Mild dependent atelectasis trace pleural effusions. No pneumothorax.   Mild cardiomegaly.   No evidence of acute abnormality in the abdomen or pelvis.   Appendectomy and hysterectomy.   Colonic diverticula without diverticulitis.   Additional findings as discussed above.   MACRO: None   Signed by: Abdiel Barrios 12/20/2024 4:09 AM Dictation workstation:   [id number]    XR chest 1 view    Result Date: 12/20/2024  STUDY: Chest Radiograph;  12/20/2024 2:14 AM INDICATION: Chest pain. COMPARISON: None Available ACCESSION NUMBER(S): [id number] ORDERING CLINICIAN: JAMILAH ELLINGTON TECHNIQUE:  Frontal chest was obtained at 02:14 hours. FINDINGS: CARDIOMEDIASTINAL SILHOUETTE: Cardiomediastinal silhouette is mildly enlarged.  Left-sided pacemaker noted.  LUNGS: Diffusely prominent peribronchial markings.  ABDOMEN: No remarkable upper abdominal findings.  BONES: No acute osseous changes.    Diffusely prominent peribronchial markings which may reflect viral infection or volume overload in the proper clinical setting. Signed by Jac Cuevas MD          Assessment/Plan   Assessment & Plan  NSTEMI (non-ST elevated myocardial infarction) (Multi)    NSTEMI  HFpEF  Hypertension  SSS s/p pacemaker  Prolonged QTc  -Cardiology consulted in the emergency department, appreciate recommendations  -EKG, per ED physician, AV paced with a rate of 67 bpm.  Morphology similar to EKG dated 8/21/2023. Prolonged QTc at 518  -CTA chest/abdomen shows no evidence of acute abnormality of the imaged systemic arterial vasculature.  No aortic dissection.  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  Fairly well delineated  hypoattenuating filling defect in the SVC, suspicious for nonocclusive thrombus.  -Patient given aspirin, Plavix, and initiated on heparin drip in the ED.  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      Kylah Pagan PA-C

## 2024-12-20 NOTE — PROGRESS NOTES
Pharmacy Medication History Review    Yesenia Milner is a 89 y.o. female admitted for NSTEMI (non-ST elevated myocardial infarction) ([facility]). Pharmacy reviewed the patient's [id number] medications and allergies for accuracy.    The list below reflectives the updated PTA list. Please review each medication in order reconciliation for additional clarification and justification.  Prior to Admission medications    Medication Sig Start Date End Date Authorizing Provider   calcium carbonate-vitamin D3 (Calcium 600 + D,3,) 600 mg-5 mcg (200 unit) tablet Take 1 tablet by mouth once daily.   Historical Provider, MD   clopidogrel (Plavix) 75 mg tablet Take 1 tablet (75 mg) by mouth once daily.   Everton Rubio DO   fluticasone propion-salmeteroL (Advair) 115-21 mcg/actuation inhaler Inhale 2 puffs 2 times a day. Rinse mouth with water after use to reduce aftertaste and incidence of candidiasis. Do not swallow.   Everton Rubio DO   lubricating eye drops ophthalmic solution Administer 1 drop into both eyes if needed for dry eyes.   Historical Provider, MD   metoprolol tartrate (Lopressor) 25 mg tablet TAKE 1/2 (ONE-HALF) TABLET BY MOUTH ONCE DAILY IN THE MORNING   Everton Rubio DO   spironolactone (Aldactone) 25 mg tablet Take 1 tablet (25 mg) by mouth once daily.   Everton Rubio DO   torsemide (Demadex) 10 mg tablet Take 1 tablet (10 mg) by mouth once daily.   Everton Rubio DO   vit C,X-Ot-qurvu-lutein-zeaxan (PreserVision AREDS-2) 250-90-40-1 mg capsule Take 2 capsules by mouth once daily.   Historical Provider, MD        The list below reflectives the updated allergy list. Please review each documented allergy for additional clarification and justification.  Allergies  Reviewed by Kirti Brito on 12/20/2024        Severity Reactions Comments    Iodine Medium Rash     Shrimp Medium Diarrhea, Nausea/vomiting     Hydrocodone Not Specified Unknown Pt does not remember    Adhesive  Tape-silicones Low Itching             Below are additional concerns with the patient's PTA list.      Kirti Brito

## 2024-12-21 ENCOUNTER — APPOINTMENT (OUTPATIENT)
Dept: CARDIOLOGY | Facility: HOSPITAL | Age: 89
DRG: 280 | End: 2024-12-21
Payer: MEDICARE

## 2024-12-21 LAB
ANION GAP SERPL CALC-SCNC: 11 MMOL/L (ref 10–20)
AORTIC VALVE MEAN GRADIENT: 6 MMHG
AORTIC VALVE PEAK VELOCITY: 1.66 M/S
APTT PPP: 30 SECONDS (ref 27–38)
AV PEAK GRADIENT: 11 MMHG
AVA (PEAK VEL): 2.03 CM2
AVA (VTI): 1.99 CM2
BUN SERPL-MCNC: 28 MG/DL (ref 6–23)
CALCIUM SERPL-MCNC: 9.2 MG/DL (ref 8.6–10.3)
CHLORIDE SERPL-SCNC: 105 MMOL/L (ref 98–107)
CO2 SERPL-SCNC: 22 MMOL/L (ref 21–32)
CREAT SERPL-MCNC: 1.06 MG/DL (ref 0.5–1.05)
EGFRCR SERPLBLD CKD-EPI 2021: 50 ML/MIN/1.73M*2
EJECTION FRACTION APICAL 4 CHAMBER: 52.8
EJECTION FRACTION: 63 %
ERYTHROCYTE [DISTWIDTH] IN BLOOD BY AUTOMATED COUNT: 14.5 % (ref 11.5–14.5)
GLUCOSE BLD MANUAL STRIP-MCNC: 129 MG/DL (ref 74–99)
GLUCOSE SERPL-MCNC: 124 MG/DL (ref 74–99)
HCT VFR BLD AUTO: 31.2 % (ref 36–46)
HGB BLD-MCNC: 10.5 G/DL (ref 12–16)
INR PPP: 1.1 (ref 0.9–1.1)
LEFT ATRIUM VOLUME AREA LENGTH INDEX BSA: 26.9 ML/M2
LEFT VENTRICLE INTERNAL DIMENSION DIASTOLE: 4.1 CM (ref 3.5–6)
LEFT VENTRICULAR OUTFLOW TRACT DIAMETER: 2.1 CM
MCH RBC QN AUTO: 30.6 PG (ref 26–34)
MCHC RBC AUTO-ENTMCNC: 33.7 G/DL (ref 32–36)
MCV RBC AUTO: 91 FL (ref 80–100)
NRBC BLD-RTO: 0 /100 WBCS (ref 0–0)
PLATELET # BLD AUTO: 231 X10*3/UL (ref 150–450)
POTASSIUM SERPL-SCNC: 4.8 MMOL/L (ref 3.5–5.3)
PROTHROMBIN TIME: 12.8 SECONDS (ref 9.8–12.8)
RBC # BLD AUTO: 3.43 X10*6/UL (ref 4–5.2)
RIGHT VENTRICLE FREE WALL PEAK S': 13.7 CM/S
RIGHT VENTRICLE PEAK SYSTOLIC PRESSURE: 48.4 MMHG
SODIUM SERPL-SCNC: 133 MMOL/L (ref 136–145)
TRICUSPID ANNULAR PLANE SYSTOLIC EXCURSION: 2.7 CM
WBC # BLD AUTO: 8.3 X10*3/UL (ref 4.4–11.3)

## 2024-12-21 PROCEDURE — 36415 COLL VENOUS BLD VENIPUNCTURE: CPT

## 2024-12-21 PROCEDURE — 93306 TTE W/DOPPLER COMPLETE: CPT

## 2024-12-21 PROCEDURE — 2500000004 HC RX 250 GENERAL PHARMACY W/ HCPCS (ALT 636 FOR OP/ED): Performed by: INTERNAL MEDICINE

## 2024-12-21 PROCEDURE — 2500000001 HC RX 250 WO HCPCS SELF ADMINISTERED DRUGS (ALT 637 FOR MEDICARE OP)

## 2024-12-21 PROCEDURE — 85027 COMPLETE CBC AUTOMATED: CPT

## 2024-12-21 PROCEDURE — 2500000001 HC RX 250 WO HCPCS SELF ADMINISTERED DRUGS (ALT 637 FOR MEDICARE OP): Performed by: INTERNAL MEDICINE

## 2024-12-21 PROCEDURE — 93306 TTE W/DOPPLER COMPLETE: CPT | Performed by: INTERNAL MEDICINE

## 2024-12-21 PROCEDURE — 2060000001 HC INTERMEDIATE ICU ROOM DAILY

## 2024-12-21 PROCEDURE — 80048 BASIC METABOLIC PNL TOTAL CA: CPT

## 2024-12-21 PROCEDURE — 93005 ELECTROCARDIOGRAM TRACING: CPT

## 2024-12-21 PROCEDURE — 99232 SBSQ HOSP IP/OBS MODERATE 35: CPT | Performed by: STUDENT IN AN ORGANIZED HEALTH CARE EDUCATION/TRAINING PROGRAM

## 2024-12-21 PROCEDURE — 85730 THROMBOPLASTIN TIME PARTIAL: CPT

## 2024-12-21 PROCEDURE — 93010 ELECTROCARDIOGRAM REPORT: CPT | Performed by: INTERNAL MEDICINE

## 2024-12-21 PROCEDURE — 82947 ASSAY GLUCOSE BLOOD QUANT: CPT

## 2024-12-21 PROCEDURE — 99233 SBSQ HOSP IP/OBS HIGH 50: CPT | Performed by: INTERNAL MEDICINE

## 2024-12-21 RX ORDER — METOPROLOL TARTRATE 25 MG/1
25 TABLET, FILM COATED ORAL 2 TIMES DAILY
Status: DISCONTINUED | OUTPATIENT
Start: 2024-12-21 | End: 2024-12-23

## 2024-12-21 RX ORDER — SPIRONOLACTONE 25 MG/1
25 TABLET ORAL DAILY
Status: DISCONTINUED | OUTPATIENT
Start: 2024-12-22 | End: 2024-12-23 | Stop reason: HOSPADM

## 2024-12-21 RX ORDER — LISINOPRIL 10 MG/1
10 TABLET ORAL DAILY
Status: DISCONTINUED | OUTPATIENT
Start: 2024-12-21 | End: 2024-12-23 | Stop reason: HOSPADM

## 2024-12-21 RX ORDER — TORSEMIDE 10 MG/1
10 TABLET ORAL DAILY
Status: DISCONTINUED | OUTPATIENT
Start: 2024-12-22 | End: 2024-12-23 | Stop reason: HOSPADM

## 2024-12-21 RX ADMIN — CLOPIDOGREL BISULFATE 75 MG: 75 TABLET ORAL at 09:01

## 2024-12-21 RX ADMIN — LISINOPRIL 10 MG: 10 TABLET ORAL at 16:02

## 2024-12-21 RX ADMIN — ASPIRIN 81 MG CHEWABLE TABLET 81 MG: 81 TABLET CHEWABLE at 09:01

## 2024-12-21 RX ADMIN — SODIUM CHLORIDE 200 ML: 9 INJECTION, SOLUTION INTRAVENOUS at 20:44

## 2024-12-21 ASSESSMENT — COGNITIVE AND FUNCTIONAL STATUS - GENERAL
STANDING UP FROM CHAIR USING ARMS: A LITTLE
MOVING FROM LYING ON BACK TO SITTING ON SIDE OF FLAT BED WITH BEDRAILS: A LITTLE
DRESSING REGULAR LOWER BODY CLOTHING: A LITTLE
MOBILITY SCORE: 17
WALKING IN HOSPITAL ROOM: A LITTLE
TURNING FROM BACK TO SIDE WHILE IN FLAT BAD: A LITTLE
MOVING TO AND FROM BED TO CHAIR: A LITTLE
DAILY ACTIVITIY SCORE: 21
TOILETING: A LITTLE
CLIMB 3 TO 5 STEPS WITH RAILING: A LOT
HELP NEEDED FOR BATHING: A LITTLE

## 2024-12-21 ASSESSMENT — PAIN - FUNCTIONAL ASSESSMENT: PAIN_FUNCTIONAL_ASSESSMENT: 0-10

## 2024-12-21 ASSESSMENT — PAIN SCALES - GENERAL
PAINLEVEL_OUTOF10: 0 - NO PAIN
PAINLEVEL_OUTOF10: 0 - NO PAIN

## 2024-12-21 NOTE — PROGRESS NOTES
"Yesenia Milner is a 89 y.o. female on day 1 of admission presenting with NSTEMI (non-ST elevated myocardial infarction) (Multi).    Subjective   Doing well, tired       Objective     Physical Exam  Constitutional:       Appearance: Normal appearance.   HENT:      Head: Normocephalic and atraumatic.      Right Ear: Tympanic membrane and ear canal normal.      Left Ear: Tympanic membrane and ear canal normal.      Mouth/Throat:      Mouth: Mucous membranes are moist.      Pharynx: Oropharynx is clear.   Eyes:      Extraocular Movements: Extraocular movements intact.      Conjunctiva/sclera: Conjunctivae normal.      Pupils: Pupils are equal, round, and reactive to light.   Cardiovascular:      Rate and Rhythm: Normal rate and regular rhythm.      Pulses: Normal pulses.      Heart sounds: Normal heart sounds.   Pulmonary:      Effort: Pulmonary effort is normal.      Breath sounds: Normal breath sounds.   Abdominal:      General: Abdomen is flat. Bowel sounds are normal.      Palpations: Abdomen is soft.   Musculoskeletal:         General: Normal range of motion.      Cervical back: Normal range of motion and neck supple.   Skin:     General: Skin is warm and dry.      Capillary Refill: Capillary refill takes 2 to 3 seconds.   Neurological:      General: No focal deficit present.      Mental Status: She is alert and oriented to person, place, and time. Mental status is at baseline.   Psychiatric:         Mood and Affect: Mood normal.         Behavior: Behavior normal.         Thought Content: Thought content normal.         Judgment: Judgment normal.         Last Recorded Vitals  Blood pressure 132/61, pulse 69, temperature 35.8 °C (96.4 °F), temperature source Temporal, resp. rate 16, height 1.626 m (5' 4\"), weight 73.5 kg (162 lb), SpO2 96%.  Intake/Output last 3 Shifts:  I/O last 3 completed shifts:  In: 69.9 (1 mL/kg) [I.V.:69.9 (1 mL/kg)]  Out: 405 (5.5 mL/kg) [Urine:400 (0.2 mL/kg/hr); Blood:5]  Weight: 73.5 kg "     Relevant Results            This patient currently has cardiac telemetry ordered; if you would like to modify or discontinue the telemetry order, click here to go to the orders activity to modify/discontinue the order.                 Assessment/Plan   Assessment & Plan  NSTEMI (non-ST elevated myocardial infarction) (Multi)    NSTEMI  HFpEF  Hypertension  SSS s/p pacemaker  Prolonged QTc  -Cardiology consulted in the emergency department, appreciate recommendations  -EKG, per ED physician, AV paced with a rate of 67 bpm.  Morphology similar to EKG dated 8/21/2023. Prolonged QTc at 518  -CTA chest/abdomen shows no evidence of acute abnormality of the imaged systemic arterial vasculature.  No aortic dissection.  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  Fairly well delineated hypoattenuating filling defect in the SVC, suspicious for nonocclusive thrombus.  -Patient given aspirin, Plavix, and initiated on heparin drip in the ED.  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  Troponins peaked to 8002 discussed with cardiology felt he needed catheterization given troponins.  Last week she was treated conservatively given her    12/21: cath negatrive, stress induced cm, contineu current car, echo reviewed, pt/ot       I spent 35 minutes in the professional and overall care of this patient.      Iglesia Gabriel, DO

## 2024-12-21 NOTE — PROGRESS NOTES
Subjective Data:  No further chest pain    Overnight Events:    None noted     Objective Data:  Last Recorded Vitals:  Vitals:    12/20/24 1930 12/20/24 2300 12/21/24 0319 12/21/24 0758   BP: 125/56 141/67 128/63 132/61   BP Location: Left arm Left arm Left arm Left arm   Patient Position: Lying Lying Lying Lying   Pulse: 62 59 70 69   Resp: 17 16 16 16   Temp: 35.9 °C (96.6 °F) 35.3 °C (95.5 °F) 35.4 °C (95.7 °F) 35.8 °C (96.4 °F)   TempSrc: Temporal Temporal Temporal Temporal   SpO2: 92% 91% 97% 96%   Weight:       Height:           Last Labs:  CBC - 12/21/2024:  5:38 AM  8.3 10.5 231    31.2      CMP - 12/21/2024:  5:38 AM  9.2 6.5 21 --- 0.3   _ 4.0 16 86      PTT - 12/21/2024:  5:38 AM  1.1   12.8 30     TROPHS   Date/Time Value Ref Range Status   12/20/2024 04:39 AM 8,220 0 - 13 ng/L Final     Comment:     Previous result verified on 12/20/2024 0332 on specimen/case [id number] called with component [facility] for procedure Troponin, High Sensitivity, 1 Hour with value 1,426 ng/L.   12/20/2024 02:51 AM 1,426 0 - 13 ng/L Final   12/20/2024 02:04  0 - 13 ng/L Final     BNP   Date/Time Value Ref Range Status   12/20/2024 02:51  0 - 99 pg/mL Final   03/30/2022 04:11  0 - 99 pg/mL Final     Comment:     .  <100 pg/mL - Heart failure unlikely  100-299 pg/mL - Intermediate probability of acute heart  .               failure exacerbation. Correlate with clinical  .               context and patient history.    >=300 pg/mL - Heart Failure likely. Correlate with clinical  .               context and patient history.  BNP testing is performed using different testing   methodology at [facility] than at other   [facility] hospitals. Direct result comparisons should   only be made within the same method.     04/15/2020 02:03  0 - 99 pg/mL Final     Comment:     .  <100 pg/mL - Heart failure unlikely  100-299 pg/mL - Intermediate probability of acute heart  .               failure  exacerbation. Correlate with clinical  .               context and patient history.    >=300 pg/mL - Heart Failure likely. Correlate with clinical  .               context and patient history.  BNP testing is performed using different testing   methodology at [facility] than at other   [facility] hospitals. Direct result comparisons should   only be made within the same method.       HGBA1C   Date/Time Value Ref Range Status   01/24/2024 10:49 AM 5.4 see below % Final   08/21/2023 12:56 AM 5.9 % Final     Comment:          Diagnosis of Diabetes-Adults   Non-Diabetic: < or = 5.6%   Increased risk for developing diabetes: 5.7-6.4%   Diagnostic of diabetes: > or = 6.5%  .       Monitoring of Diabetes                Age (y)     Therapeutic Goal (%)   Adults:          >18           <7.0   Pediatrics:    13-18           <7.5                   7-12           <8.0                   0- 6            7.5-8.5   American Diabetes Association. Diabetes Care 33(S1), Jan 2010.       LDLCALC   Date/Time Value Ref Range Status   12/20/2024 02:04 AM 68 <=99 mg/dL Final     Comment:                                 Near   Borderline      AGE      Desirable  Optimal    High     High     Very High     0-19 Y     0 - 109     ---    110-129   >/= 130     ----    20-24 Y     0 - 119     ---    120-159   >/= 160     ----      >24 Y     0 -  99   100-129  130-159   160-189     >/=190     01/24/2024 10:49 AM 73 <=99 mg/dL Final     Comment:                                 Near   Borderline      AGE      Desirable  Optimal    High     High     Very High     0-19 Y     0 - 109     ---    110-129   >/= 130     ----    20-24 Y     0 - 119     ---    120-159   >/= 160     ----      >24 Y     0 -  99   100-129  130-159   160-189     >/=190       VLDL   Date/Time Value Ref Range Status   12/20/2024 02:04 AM 14 0 - 40 mg/dL Final   01/24/2024 10:49 AM 13 0 - 40 mg/dL Final   08/21/2023 12:56 AM 17 0 - 40 mg/dL Final   01/17/2023 09:43 AM 14 0  - 40 mg/dL Final   03/16/2021 10:15 AM 16 0 - 40 mg/dL Final      Last I/O:  I/O last 3 completed shifts:  In: 69.9 (1 mL/kg) [I.V.:69.9 (1 mL/kg)]  Out: 405 (5.5 mL/kg) [Urine:400 (0.2 mL/kg/hr); Blood:5]  Weight: 73.5 kg     Past Cardiology Tests (Last 3 Years):  EKG:  No results found for this or any previous visit from the past 1095 days.    Echo:  Transthoracic echo (TTE) complete 12/21/2024    Ejection Fractions:  EF   Date/Time Value Ref Range Status   12/21/2024 09:03 AM 63 %      Cath:  Cardiac Catheterization Procedure 12/20/2024    Stress Test:  No results found for this or any previous visit from the past 1095 days.    Cardiac Imaging:  No results found for this or any previous visit from the past 1095 days.      Inpatient Medications:  Scheduled medications   Medication Dose Route Frequency    aspirin  81 mg oral Daily    clopidogrel  75 mg oral Daily     PRN medications   Medication    acetaminophen    Or    acetaminophen    Or    acetaminophen    oxygen    polyethylene glycol     Continuous Medications   Medication Dose Last Rate    heparin  0-4,000 Units/hr Stopped (12/20/24 1146)    nitroglycerin  5-200 mcg/min Stopped (12/20/24 1900)       Physical Exam:  GEN :Frail 89 yo wf sitting comfortably over the side of the bed  HEENT: Atraumatic, normocephalic  COR: Reg.  LUNGS: Clear  EXT: Warm     Assessment/Plan   1.) Takotsubo cardiomyopathy with echo showing reasonably well recovered LV systolic function with LVEF = 60-65%  2.) S/P PPM  PLAN: Low dose Lisinopril  2.) Continue tele monitor  Peripheral IV 12/20/24 20 G Left;Dorsal Forearm (Active)   Site Assessment Clean;Intact;Dry 12/21/24 0722   Dressing Status Clean;Dry 12/21/24 0722   Number of days: 1       Code Status:  Full Code    I spent 30 minutes in the professional and overall care of this patient.        Magnus Hess MD

## 2024-12-21 NOTE — CARE PLAN
Problem: Skin  Goal: Prevent/minimize sheer/friction injuries  12/20/2024 2247 by Kirsten Hawkins RN  Outcome: Progressing  12/20/2024 2247 by Kirsten Hawkins RN  Outcome: Progressing  Flowsheets (Taken 12/20/2024 2247)  Prevent/minimize sheer/friction injuries:   Turn/reposition every 2 hours/use positioning/transfer devices   HOB 30 degrees or less   Complete micro-shifts as needed if patient unable. Adjust patient position to relieve pressure points, not a full turn     Problem: Pain - Adult  Goal: Verbalizes/displays adequate comfort level or baseline comfort level  Outcome: Progressing     Problem: Safety - Adult  Goal: Free from fall injury  Outcome: Progressing   The patient's goals for the shift include      The clinical goals for the shift include comfort

## 2024-12-21 NOTE — CARE PLAN
The patient's goals for the shift include rest.     The clinical goals for the shift include complete echocardiogram.      Problem: Skin  Goal: Participates in plan/prevention/treatment measures  Outcome: Progressing     Problem: Skin  Goal: Prevent/manage excess moisture  Outcome: Progressing     Problem: Skin  Goal: Prevent/minimize sheer/friction injuries  Outcome: Progressing     Problem: Skin  Goal: Promote/optimize nutrition  Outcome: Progressing     Problem: Skin  Goal: Promote skin healing  Outcome: Progressing     Problem: Pain - Adult  Goal: Verbalizes/displays adequate comfort level or baseline comfort level  Outcome: Progressing     Problem: Safety - Adult  Goal: Free from fall injury  Outcome: Progressing     Problem: Fall/Injury  Goal: Pace activities to prevent fatigue by end of the shift  Outcome: Progressing

## 2024-12-22 VITALS
WEIGHT: 162 LBS | RESPIRATION RATE: 18 BRPM | SYSTOLIC BLOOD PRESSURE: 90 MMHG | HEIGHT: 64 IN | BODY MASS INDEX: 27.66 KG/M2 | OXYGEN SATURATION: 93 % | TEMPERATURE: 96.8 F | HEART RATE: 64 BPM | DIASTOLIC BLOOD PRESSURE: 42 MMHG

## 2024-12-22 LAB
ERYTHROCYTE [DISTWIDTH] IN BLOOD BY AUTOMATED COUNT: 14.6 % (ref 11.5–14.5)
HCT VFR BLD AUTO: 29.7 % (ref 36–46)
HGB BLD-MCNC: 9.8 G/DL (ref 12–16)
HOLD SPECIMEN: NORMAL
MCH RBC QN AUTO: 30.2 PG (ref 26–34)
MCHC RBC AUTO-ENTMCNC: 33 G/DL (ref 32–36)
MCV RBC AUTO: 92 FL (ref 80–100)
NRBC BLD-RTO: 0 /100 WBCS (ref 0–0)
PLATELET # BLD AUTO: 207 X10*3/UL (ref 150–450)
RBC # BLD AUTO: 3.24 X10*6/UL (ref 4–5.2)
WBC # BLD AUTO: 8.2 X10*3/UL (ref 4.4–11.3)

## 2024-12-22 PROCEDURE — 2500000001 HC RX 250 WO HCPCS SELF ADMINISTERED DRUGS (ALT 637 FOR MEDICARE OP): Performed by: STUDENT IN AN ORGANIZED HEALTH CARE EDUCATION/TRAINING PROGRAM

## 2024-12-22 PROCEDURE — 36415 COLL VENOUS BLD VENIPUNCTURE: CPT

## 2024-12-22 PROCEDURE — 2500000002 HC RX 250 W HCPCS SELF ADMINISTERED DRUGS (ALT 637 FOR MEDICARE OP, ALT 636 FOR OP/ED): Performed by: STUDENT IN AN ORGANIZED HEALTH CARE EDUCATION/TRAINING PROGRAM

## 2024-12-22 PROCEDURE — 2060000001 HC INTERMEDIATE ICU ROOM DAILY

## 2024-12-22 PROCEDURE — 99233 SBSQ HOSP IP/OBS HIGH 50: CPT | Performed by: INTERNAL MEDICINE

## 2024-12-22 PROCEDURE — 99222 1ST HOSP IP/OBS MODERATE 55: CPT | Performed by: INTERNAL MEDICINE

## 2024-12-22 PROCEDURE — 2500000001 HC RX 250 WO HCPCS SELF ADMINISTERED DRUGS (ALT 637 FOR MEDICARE OP)

## 2024-12-22 PROCEDURE — 85027 COMPLETE CBC AUTOMATED: CPT

## 2024-12-22 RX ADMIN — ASPIRIN 81 MG CHEWABLE TABLET 81 MG: 81 TABLET CHEWABLE at 08:09

## 2024-12-22 RX ADMIN — CLOPIDOGREL BISULFATE 75 MG: 75 TABLET ORAL at 08:09

## 2024-12-22 RX ADMIN — TORSEMIDE 10 MG: 10 TABLET ORAL at 08:09

## 2024-12-22 RX ADMIN — SPIRONOLACTONE 25 MG: 25 TABLET, FILM COATED ORAL at 08:09

## 2024-12-22 RX ADMIN — METOPROLOL TARTRATE 25 MG: 25 TABLET, FILM COATED ORAL at 08:09

## 2024-12-22 ASSESSMENT — COGNITIVE AND FUNCTIONAL STATUS - GENERAL
DRESSING REGULAR LOWER BODY CLOTHING: A LITTLE
TURNING FROM BACK TO SIDE WHILE IN FLAT BAD: A LITTLE
MOBILITY SCORE: 17
STANDING UP FROM CHAIR USING ARMS: A LITTLE
MOVING FROM LYING ON BACK TO SITTING ON SIDE OF FLAT BED WITH BEDRAILS: A LITTLE
CLIMB 3 TO 5 STEPS WITH RAILING: A LOT
WALKING IN HOSPITAL ROOM: A LITTLE
DAILY ACTIVITIY SCORE: 21
TOILETING: A LITTLE
MOVING TO AND FROM BED TO CHAIR: A LITTLE
HELP NEEDED FOR BATHING: A LITTLE

## 2024-12-22 ASSESSMENT — PAIN SCALES - GENERAL: PAINLEVEL_OUTOF10: 0 - NO PAIN

## 2024-12-22 NOTE — PROGRESS NOTES
Subjective Data:  Notes fatigue    Overnight Events:    Unable to tolerate Lisinopril     Objective Data:  Last Recorded Vitals:  Vitals:    12/21/24 2130 12/22/24 0019 12/22/24 0407 12/22/24 0736   BP: 111/54 111/53 120/58 121/60   BP Location: Left arm Left arm Left arm Left arm   Patient Position: Lying Lying Lying Sitting   Pulse: 66 77 64 101   Resp: 16 16 16 17   Temp:  36 °C (96.8 °F) 36.2 °C (97.2 °F) 36.2 °C (97.2 °F)   TempSrc:  Temporal Temporal Temporal   SpO2:  92% 94% 90%   Weight:       Height:           Last Labs:  CBC - 12/22/2024:  5:45 AM  8.2 9.8 207    29.7      CMP - 12/21/2024:  5:38 AM  9.2 6.5 21 --- 0.3   _ 4.0 16 86      PTT - 12/21/2024:  5:38 AM  1.1   12.8 30     TROPHS   Date/Time Value Ref Range Status   12/20/2024 04:39 AM 8,220 0 - 13 ng/L Final     Comment:     Previous result verified on 12/20/2024 0332 on specimen/case [id number] called with component [facility] for procedure Troponin, High Sensitivity, 1 Hour with value 1,426 ng/L.   12/20/2024 02:51 AM 1,426 0 - 13 ng/L Final   12/20/2024 02:04  0 - 13 ng/L Final     BNP   Date/Time Value Ref Range Status   12/20/2024 02:51  0 - 99 pg/mL Final   03/30/2022 04:11  0 - 99 pg/mL Final     Comment:     .  <100 pg/mL - Heart failure unlikely  100-299 pg/mL - Intermediate probability of acute heart  .               failure exacerbation. Correlate with clinical  .               context and patient history.    >=300 pg/mL - Heart Failure likely. Correlate with clinical  .               context and patient history.  BNP testing is performed using different testing   methodology at [facility] than at other   [facility] hospitals. Direct result comparisons should   only be made within the same method.     04/15/2020 02:03  0 - 99 pg/mL Final     Comment:     .  <100 pg/mL - Heart failure unlikely  100-299 pg/mL - Intermediate probability of acute heart  .               failure exacerbation. Correlate with  clinical  .               context and patient history.    >=300 pg/mL - Heart Failure likely. Correlate with clinical  .               context and patient history.  BNP testing is performed using different testing   methodology at [facility] than at other   [facility] hospitals. Direct result comparisons should   only be made within the same method.       HGBA1C   Date/Time Value Ref Range Status   01/24/2024 10:49 AM 5.4 see below % Final   08/21/2023 12:56 AM 5.9 % Final     Comment:          Diagnosis of Diabetes-Adults   Non-Diabetic: < or = 5.6%   Increased risk for developing diabetes: 5.7-6.4%   Diagnostic of diabetes: > or = 6.5%  .       Monitoring of Diabetes                Age (y)     Therapeutic Goal (%)   Adults:          >18           <7.0   Pediatrics:    13-18           <7.5                   7-12           <8.0                   0- 6            7.5-8.5   American Diabetes Association. Diabetes Care 33(S1), Jan 2010.       LDLCALC   Date/Time Value Ref Range Status   12/20/2024 02:04 AM 68 <=99 mg/dL Final     Comment:                                 Near   Borderline      AGE      Desirable  Optimal    High     High     Very High     0-19 Y     0 - 109     ---    110-129   >/= 130     ----    20-24 Y     0 - 119     ---    120-159   >/= 160     ----      >24 Y     0 -  99   100-129  130-159   160-189     >/=190     01/24/2024 10:49 AM 73 <=99 mg/dL Final     Comment:                                 Near   Borderline      AGE      Desirable  Optimal    High     High     Very High     0-19 Y     0 - 109     ---    110-129   >/= 130     ----    20-24 Y     0 - 119     ---    120-159   >/= 160     ----      >24 Y     0 -  99   100-129  130-159   160-189     >/=190       VLDL   Date/Time Value Ref Range Status   12/20/2024 02:04 AM 14 0 - 40 mg/dL Final   01/24/2024 10:49 AM 13 0 - 40 mg/dL Final   08/21/2023 12:56 AM 17 0 - 40 mg/dL Final   01/17/2023 09:43 AM 14 0 - 40 mg/dL Final    03/16/2021 10:15 AM 16 0 - 40 mg/dL Final      Last I/O:  I/O last 3 completed shifts:  In: 336.6 (4.6 mL/kg) [I.V.:69.9 (1 mL/kg); IV Piggyback:266.7]  Out: 400 (5.4 mL/kg) [Urine:400 (0.2 mL/kg/hr)]  Weight: 73.5 kg     Past Cardiology Tests (Last 3 Years):  EKG:  No results found for this or any previous visit from the past 1095 days.    Echo:  Transthoracic echo (TTE) complete 12/21/2024    Ejection Fractions:  EF   Date/Time Value Ref Range Status   12/21/2024 09:03 AM 63 %      Cath:  Cardiac Catheterization Procedure 12/20/2024    Stress Test:  No results found for this or any previous visit from the past 1095 days.    Cardiac Imaging:  No results found for this or any previous visit from the past 1095 days.      Inpatient Medications:  Scheduled medications   Medication Dose Route Frequency    aspirin  81 mg oral Daily    clopidogrel  75 mg oral Daily    [Held by provider] lisinopril  10 mg oral Daily    metoprolol tartrate  25 mg oral BID    spironolactone  25 mg oral Daily    torsemide  10 mg oral Daily     PRN medications   Medication    acetaminophen    Or    acetaminophen    Or    acetaminophen    oxygen    polyethylene glycol     Continuous Medications   Medication Dose Last Rate    [Held by provider] heparin  0-4,000 Units/hr Stopped (12/20/24 1146)       Physical Exam:  GEN: Frail 89 yo wf sitting comfortably in the bedside chair  HEENT: Atraumatic, normocephalic  COR: Reg  EXT: Warm     Assessment/Plan   1.) Takotsubo cardiomyopathy with well recovered LV funcrion unable to tolerte  low dose Lisinopril        P: Stao Lisinopril  2.) Tele review ?pacer malfunction        P: Dr. Ramicone to evluate 12/23/24  Peripheral IV 20 G Proximal;Right;Anterior Forearm (Active)   Site Assessment Clean;Dry;Intact 12/22/24 0800   Dressing Type Transparent 12/22/24 0800   Line Status Flushed 12/22/24 0800   Dressing Status Clean;Dry 12/22/24 0800   Number of days:        Code Status:  Full Code    I spent 30  minutes in the professional and overall care of this patient.        Magnus Hess MD

## 2024-12-22 NOTE — CARE PLAN
"  Problem: Skin  Goal: Prevent/manage excess moisture  Outcome: Progressing  Flowsheets (Taken 12/21/2024 1928)  Prevent/manage excess moisture: Moisturize dry skin     Problem: Pain - Adult  Goal: Verbalizes/displays adequate comfort level or baseline comfort level  Outcome: Progressing     Problem: Safety - Adult  Goal: Free from fall injury  Outcome: Progressing    The patient's goals for the shift include  get good sleep    The clinical goals for the shift include Patient to maintain HDS throughout end of shift    2030: Patient given lisinopril on day shift. Patient stating she feels dizzy and feels like her vision is \"like a kaleidoscope\". Dr. Hess messaged, ordered to hold the lisinopril and give a 200 ml NS bolus over 2 hours. Patient BP responding shortly after bolus initiated. Patient does endorse dizziness and \"just feeling off\" even with BP responding.   "

## 2024-12-23 ENCOUNTER — HOME HEALTH ADMISSION (OUTPATIENT)
Dept: HOME HEALTH SERVICES | Facility: HOME HEALTH | Age: 89
End: 2024-12-23
Payer: MEDICARE

## 2024-12-23 ENCOUNTER — DOCUMENTATION (OUTPATIENT)
Dept: HOME HEALTH SERVICES | Facility: HOME HEALTH | Age: 89
End: 2024-12-23
Payer: MEDICARE

## 2024-12-23 VITALS
OXYGEN SATURATION: 95 % | HEART RATE: 76 BPM | RESPIRATION RATE: 18 BRPM | HEIGHT: 64 IN | DIASTOLIC BLOOD PRESSURE: 56 MMHG | WEIGHT: 162 LBS | TEMPERATURE: 97 F | BODY MASS INDEX: 27.66 KG/M2 | SYSTOLIC BLOOD PRESSURE: 100 MMHG

## 2024-12-23 LAB
ATRIAL RATE: 88 BPM
EST. AVERAGE GLUCOSE BLD GHB EST-MCNC: 111 MG/DL
HBA1C MFR BLD: 5.5 %
Q ONSET: 187 MS
QRS COUNT: 13 BEATS
QRS DURATION: 168 MS
QT INTERVAL: 462 MS
QTC CALCULATION(BAZETT): 529 MS
QTC FREDERICIA: 506 MS
R AXIS: -63 DEGREES
T AXIS: 135 DEGREES
T OFFSET: 418 MS
VENTRICULAR RATE: 79 BPM

## 2024-12-23 PROCEDURE — 97161 PT EVAL LOW COMPLEX 20 MIN: CPT | Mod: GP

## 2024-12-23 PROCEDURE — 99238 HOSP IP/OBS DSCHRG MGMT 30/<: CPT | Performed by: STUDENT IN AN ORGANIZED HEALTH CARE EDUCATION/TRAINING PROGRAM

## 2024-12-23 PROCEDURE — 2500000001 HC RX 250 WO HCPCS SELF ADMINISTERED DRUGS (ALT 637 FOR MEDICARE OP): Performed by: STUDENT IN AN ORGANIZED HEALTH CARE EDUCATION/TRAINING PROGRAM

## 2024-12-23 PROCEDURE — 97165 OT EVAL LOW COMPLEX 30 MIN: CPT | Mod: GO

## 2024-12-23 PROCEDURE — 2500000001 HC RX 250 WO HCPCS SELF ADMINISTERED DRUGS (ALT 637 FOR MEDICARE OP)

## 2024-12-23 PROCEDURE — 2500000002 HC RX 250 W HCPCS SELF ADMINISTERED DRUGS (ALT 637 FOR MEDICARE OP, ALT 636 FOR OP/ED): Performed by: STUDENT IN AN ORGANIZED HEALTH CARE EDUCATION/TRAINING PROGRAM

## 2024-12-23 RX ORDER — NAPROXEN SODIUM 220 MG/1
81 TABLET, FILM COATED ORAL DAILY
Qty: 30 TABLET | Refills: 0 | Status: SHIPPED | OUTPATIENT
Start: 2024-12-24 | End: 2024-12-27 | Stop reason: DRUGHIGH

## 2024-12-23 RX ORDER — METOPROLOL TARTRATE 25 MG/1
12.5 TABLET, FILM COATED ORAL 2 TIMES DAILY
Qty: 60 TABLET | Refills: 0 | Status: ON HOLD | OUTPATIENT
Start: 2024-12-23

## 2024-12-23 RX ORDER — METOPROLOL TARTRATE 25 MG/1
12.5 TABLET, FILM COATED ORAL 2 TIMES DAILY
Status: DISCONTINUED | OUTPATIENT
Start: 2024-12-23 | End: 2024-12-23 | Stop reason: HOSPADM

## 2024-12-23 RX ADMIN — METOPROLOL TARTRATE 25 MG: 25 TABLET, FILM COATED ORAL at 09:19

## 2024-12-23 RX ADMIN — CLOPIDOGREL BISULFATE 75 MG: 75 TABLET ORAL at 09:19

## 2024-12-23 RX ADMIN — SPIRONOLACTONE 25 MG: 25 TABLET, FILM COATED ORAL at 09:19

## 2024-12-23 RX ADMIN — ASPIRIN 81 MG CHEWABLE TABLET 81 MG: 81 TABLET CHEWABLE at 09:19

## 2024-12-23 RX ADMIN — TORSEMIDE 10 MG: 10 TABLET ORAL at 09:19

## 2024-12-23 ASSESSMENT — COGNITIVE AND FUNCTIONAL STATUS - GENERAL
WALKING IN HOSPITAL ROOM: A LITTLE
MOBILITY SCORE: 17
MOVING TO AND FROM BED TO CHAIR: A LITTLE
TURNING FROM BACK TO SIDE WHILE IN FLAT BAD: A LITTLE
WALKING IN HOSPITAL ROOM: A LITTLE
CLIMB 3 TO 5 STEPS WITH RAILING: A LITTLE
DAILY ACTIVITIY SCORE: 19
DAILY ACTIVITIY SCORE: 21
CLIMB 3 TO 5 STEPS WITH RAILING: A LOT
DRESSING REGULAR LOWER BODY CLOTHING: A LITTLE
TOILETING: A LITTLE
MOBILITY SCORE: 19
HELP NEEDED FOR BATHING: A LITTLE
DRESSING REGULAR UPPER BODY CLOTHING: A LITTLE
MOVING FROM LYING ON BACK TO SITTING ON SIDE OF FLAT BED WITH BEDRAILS: A LITTLE
STANDING UP FROM CHAIR USING ARMS: A LITTLE
HELP NEEDED FOR BATHING: A LITTLE
STANDING UP FROM CHAIR USING ARMS: A LITTLE
TURNING FROM BACK TO SIDE WHILE IN FLAT BAD: A LITTLE
MOVING TO AND FROM BED TO CHAIR: A LITTLE
DRESSING REGULAR LOWER BODY CLOTHING: A LITTLE
TOILETING: A LITTLE
PERSONAL GROOMING: A LITTLE

## 2024-12-23 ASSESSMENT — PAIN SCALES - GENERAL
PAINLEVEL_OUTOF10: 0 - NO PAIN
PAINLEVEL_OUTOF10: 0 - NO PAIN

## 2024-12-23 ASSESSMENT — PAIN - FUNCTIONAL ASSESSMENT: PAIN_FUNCTIONAL_ASSESSMENT: 0-10

## 2024-12-23 NOTE — PROGRESS NOTES
Occupational Therapy    Evaluation    Patient Name: Yesenia Milner  MRN: [id number]  Department: [facility]  Room: [address] [place] [address]  Today's Date: 12/23/2024  Time Calculation  Start Time: 0948  Stop Time: 1002  Time Calculation (min): 14 min        Assessment:  End of Session Communication: Bedside nurse  End of Session Patient Position: Alarm off, not on at start of session, Up in chair     Plan:  Treatment Interventions: ADL retraining, Functional transfer training, Endurance training, Compensatory technique education  OT Frequency: 3 times per week  OT Discharge Recommendations: Low intensity level of continued care  OT - OK to Discharge: Yes (to next level of care when cleared by medical team)  Treatment Interventions: ADL retraining, Functional transfer training, Endurance training, Compensatory technique education    Subjective   Current Problem:  1. NSTEMI (non-ST elevated myocardial infarction) (Multi)  Transthoracic echo (TTE) complete    Transthoracic echo (TTE) complete    Case Request Cath Lab: Left Heart Cath, With LV    Case Request Cath Lab: Left Heart Cath, With LV    Cardiac Catheterization Procedure    Cardiac Catheterization Procedure    CANCELED: Transthoracic echo (TTE) complete    CANCELED: Transthoracic echo (TTE) complete      2. Shortness of breath  Transthoracic echo (TTE) complete        General:  General  Reason for Referral: impaired adl; pt. admitted with chest pain, nstemi-Takotsubo cardiomyopathy, elevated  troponins/echo/heparin iv/iv nitro, Dr. Ramicone to evaluate 12/23/24  Referred By: Fei  Past Medical History Relevant to Rehab: copd, cva, htn, HFpEF, SSS s/p pacemaker  Prior to Session Communication: Bedside nurse  Patient Position Received: Up in chair, Alarm off, not on at start of session  General Comment: pt. agreeable to therapy intervention  Precautions:  Precautions Comment: cardiac, tele, remote pacemaker    Vital Sign (Past 2hrs)        Date/Time Vitals Session Patient Position  Pulse Resp SpO2 BP MAP (mmHg)    12/23/24 11:37:36 --  --  76  --  95 %  100/56  72                         Pain:  Pain Assessment  Pain Assessment:  (no pain complaints)    Objective   Cognition:              Home Living:  Home Living Comments: pt. lives alone, lives in a split level home, no entry steps from garage, chair lift to next floor up which has kitchen/living room, chair lift up again to top floor where bedroom/full bath level, from entry level down 4 steps is basement/laundry, st. cane, rollator, wh. walker, tub/shower, transfer bench, gb x 2, hhs, RTS with gb, reacher and sock aide  Prior Function:  Prior Function Comments: pt. uses no device for ambulation, completes adl at independent level, drives and completes all other iadl tasks independently  IADL History:     ADL:  ADL Comments: pt. able to don/dof sock while seated in chair supervision, demonstrates sufficient standing balance/arom/strength to complete standing aspects of adl tasks at sba/cga level  Bed Mobility/Transfers:      Transfers  Transfer:  (sit<> stand from recliner chair:  sba)      Functional Mobility:  Functional Mobility  Functional Mobility Performed:  (mobility via wh. walker completed with sba)     Strength:  Strength Comments: bue's at least 3/5, pt. has R wrist bandage s/p cardiac cath?  pt. instructed to avoid push/pull    Outcome Measures:[facility] Daily Activity  Putting on and taking off regular lower body clothing: A little  Bathing (including washing, rinsing, drying): A little  Putting on and taking off regular upper body clothing: A little  Toileting, which includes using toilet, bedpan or urinal: A little  Taking care of personal grooming such as brushing teeth: A little  Eating Meals: None  Daily Activity - Total Score: 19        Education Documentation  ADL Training, taught by Riya Berumen OT at 12/23/2024 12:06 PM.  Learner: Patient  Readiness: Acceptance  Method: Explanation  Response: Verbalizes Understanding,  Needs Reinforcement      Goals:  Encounter Problems       Encounter Problems (Active)       OT Goals       Increase functional mobility and  functional transfers to mod I for bed/chair/toilet/shower/car with dme prn   (Progressing)       Start:  12/23/24    Expected End:  12/30/24            increase bue ther ex/activity x 7-10 minutes and increase standing tolerance x 3-5 minutes with mod I to promote greater activity tolerance for assist with adl.   (Progressing)       Start:  12/23/24    Expected End:  12/30/24            pt. to apply ec/ws techniques without cues to all mobility/transfer/adl to decrease fatigue/promote efficient use of energy toward completion of functional tasks.  (Progressing)       Start:  12/23/24    Expected End:  12/30/24

## 2024-12-23 NOTE — PROGRESS NOTES
Physical Therapy    Physical Therapy Evaluation    Patient Name: Yesenia Milner  MRN: [id number]  [id number]  Today's Date: 12/23/2024   Time Calculation  Start Time: 0947  Stop Time: 1002  Time Calculation (min): 15 min    Assessment/Plan   PT Assessment  PT Assessment Results: Decreased endurance, Decreased mobility  Rehab Prognosis: Good  Evaluation/Treatment Tolerance: Patient tolerated treatment well  End of Session Communication: Bedside nurse  End of Session Patient Position: Up in chair, Alarm off, not on at start of session  IP OR SWING BED PT PLAN  Inpatient or Swing Bed: Inpatient  PT Plan  Treatment/Interventions: Bed mobility, Transfer training, Gait training, Stair training, Strengthening, Endurance training, Therapeutic exercise, Therapeutic activity  PT Plan: Ongoing PT  PT Frequency: 3 times per week  PT Discharge Recommendations: Low intensity level of continued care  PT - OK to Discharge: Yes    Subjective     Current Problem:  Patient Active Problem List   Diagnosis    Aortic stenosis, mild    Arthritis    Chronic diastolic congestive heart failure    Complete heart block    COPD (chronic obstructive pulmonary disease) (Multi)    History of paroxysmal supraventricular tachycardia    HTN (hypertension)    Paresthesia    Presence of permanent cardiac pacemaker    Raynauds disease    Sensorineural hearing loss (SNHL) of both ears    Dyspnea on minimal exertion    Systolic CHF (Multi)    Varicose veins of lower extremities with inflammation    Vertigo    Ischemic cerebrovascular accident (CVA) (Multi)    Acquired deformity of toe    Benign neoplasm of skin of foot    Benign paroxysmal positional vertigo    Dermatophytosis of nail    Gross hematuria    Leg swelling    Macular degeneration    Mitral valve insufficiency    Moderate mitral valve regurgitation    Overweight with body mass index (BMI) 25.0-29.9    Plantar wart of left foot    Ulcer of toe of left foot (Multi)    Ulcer of toe of right foot  (Multi)    Venous insufficiency    Dysarthria following cerebral infarction    Post-menopausal    Impacted cerumen    Urinary tract infection    Hyperglycemia    Hand paresthesia    Sick sinus syndrome (Multi)    Injury of kidney    NSTEMI (non-ST elevated myocardial infarction) (Multi)       General Visit Information:  General  Reason for Referral: PT eval and treat; chest pain, NSTEMI, takotsubo cardiomyopathy  Referred By: Chuck Enamorado  Past Medical History Relevant to Rehab: COPD, CVA, HTN, HFpEF, SSS s/p pacemaker  Prior to Session Communication: Bedside nurse  Patient Position Received: Up in chair, Alarm off, not on at start of session  General Comment: Pt sitting in recliner upon entering, agreeable to PT.    Home Living:  Home Living  Home Living Comments: Pt lives alone in a split level house with no stairs to enter, then a chair lift to main living level. Then another stair lift up to second floor with bedroom and bathroom. 4 steps down to basement with rail to laundry. Also has one step down to living room. Tub shower with grab bar, hand held shower hose. Toilet elevated with grab bar. Pt owns cane, WW, rollator, reacher and sock aide.    Prior Level of Function:  Prior Function Per Pt/Caregiver Report  Prior Function Comments: IND ADLs, IND IADLs, IND Ambulation without device. (+) driving    Precautions:  Precautions  Precautions Comment: cardiac, OOB with assist orders    Vital Signs:     Objective     Pain:  Pain Assessment  Pain Assessment: 0-10  0-10 (Numeric) Pain Score: 0 - No pain    Cognition:  Cognition  Overall Cognitive Status: Within Functional Limits    General Assessments:         Sensation  Light Touch: No apparent deficits  Strength  Strength Comments: BLE 4/5 for MMT, ROM WFL        Postural Control  Postural Control: Within Functional Limits  Static Sitting Balance  Static Sitting-Level of Assistance: Independent  Dynamic Sitting Balance  Dynamic Sitting-Level of Assistance:  Independent  Static Standing Balance  Static Standing-Level of Assistance: Close supervision  Dynamic Standing Balance  Dynamic Standing-Level of Assistance: Close supervision    Functional Assessments:     Bed Mobility  Bed Mobility:  (supine to sit not assessed, pt up in chair)  Transfers  Transfer:  (supine to sit with SBA with WW, cues for hand placement)  Ambulation/Gait Training  Ambulation/Gait Training Performed:  (Pt ambulates 50ft with WW with SBA with no acute LOB noted. PT gives cues for appropriate walker management.)      Outcome Measures:  [facility] Basic Mobility  Turning from your back to your side while in a flat bed without using bedrails: None  Moving from lying on your back to sitting on the side of a flat bed without using bedrails: A little  Moving to and from bed to chair (including a wheelchair): A little  Standing up from a chair using your arms (e.g. wheelchair or bedside chair): A little  To walk in hospital room: A little  Climbing 3-5 steps with railing: A little  Basic Mobility - Total Score: 19       Goals:  Encounter Problems       Encounter Problems (Active)       PT Problem       PT Goal 1 STG - Pt will amb >80' using WW or LRD with MOD IND   (Progressing)       Start:  12/23/24    Expected End:  01/06/25            PT Goal 2 STG - Pt will SPT bed <> chair with MOD IND   (Progressing)       Start:  12/23/24    Expected End:  01/06/25            PT Goal 3 STG -  Pt will navigate 4 stairs using rail with SBA  (Progressing)       Start:  12/23/24    Expected End:  01/06/25            PT Goal 4 STG - Pt will transfer STS with MOD IND  (Progressing)       Start:  12/23/24    Expected End:  01/06/25               Pain - Adult            Education Documentation  Mobility Training, taught by Laina Gardner, PT at 12/23/2024  2:56 PM.  Learner: Patient  Readiness: Acceptance  Method: Explanation  Response: Verbalizes Understanding    Education Comments  No comments found.

## 2024-12-23 NOTE — PROGRESS NOTES
"Yesenia Milner is a 89 y.o. female on day 2 of admission presenting with NSTEMI (non-ST elevated myocardial infarction) (Multi).    Subjective   No events overnight. Patient seen and examined at bedside. No complaints.        Objective     Physical Exam  Constitutional:       Appearance: Normal appearance.   HENT:      Head: Normocephalic and atraumatic.      Right Ear: Tympanic membrane and ear canal normal.      Left Ear: Tympanic membrane and ear canal normal.      Mouth/Throat:      Mouth: Mucous membranes are moist.      Pharynx: Oropharynx is clear.   Eyes:      Extraocular Movements: Extraocular movements intact.      Conjunctiva/sclera: Conjunctivae normal.      Pupils: Pupils are equal, round, and reactive to light.   Cardiovascular:      Rate and Rhythm: Normal rate and regular rhythm.      Pulses: Normal pulses.      Heart sounds: Normal heart sounds.   Pulmonary:      Effort: Pulmonary effort is normal.      Breath sounds: Normal breath sounds.   Abdominal:      General: Abdomen is flat. Bowel sounds are normal.      Palpations: Abdomen is soft.   Musculoskeletal:         General: Normal range of motion.      Cervical back: Normal range of motion and neck supple.   Skin:     General: Skin is warm and dry.      Capillary Refill: Capillary refill takes 2 to 3 seconds.   Neurological:      General: No focal deficit present.      Mental Status: She is alert and oriented to person, place, and time. Mental status is at baseline.   Psychiatric:         Mood and Affect: Mood normal.         Behavior: Behavior normal.         Thought Content: Thought content normal.         Judgment: Judgment normal.         Last Recorded Vitals  Blood pressure (!) 90/42, pulse 64, temperature 36 °C (96.8 °F), resp. rate 18, height 1.626 m (5' 4\"), weight 73.5 kg (162 lb), SpO2 93%.  Intake/Output last 3 Shifts:  I/O last 3 completed shifts:  In: 266.7 (3.6 mL/kg) [IV Piggyback:266.7]  Out: - (0 mL/kg)   Weight: 73.5 kg "     Relevant Results            This patient currently has cardiac telemetry ordered; if you would like to modify or discontinue the telemetry order, click here to go to the orders activity to modify/discontinue the order.                 Assessment/Plan   Assessment & Plan  NSTEMI (non-ST elevated myocardial infarction) (Multi)    NSTEMI  HFpEF  Hypertension  SSS s/p pacemaker  Prolonged QTc  -Cardiology consulted in the emergency department, appreciate recommendations  -EKG, per ED physician, AV paced with a rate of 67 bpm.  Morphology similar to EKG dated 8/21/2023. Prolonged QTc at 518  -CTA chest/abdomen shows no evidence of acute abnormality of the imaged systemic arterial vasculature.  No aortic dissection.  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  Fairly well delineated hypoattenuating filling defect in the SVC, suspicious for nonocclusive thrombus.  -Patient given aspirin, Plavix, and initiated on heparin drip in the ED.  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  Troponins peaked to 8002 discussed with cardiology felt he needed catheterization given troponins.  Last week she was treated conservatively given her    12/21: cath negatrive, stress induced cm, contineu current car, echo reviewed, pt/ot    12/22: PT/OT ordered, dispo pending evaluation.         I spent 35 minutes in the professional and overall care of this patient.      Chuck Enamorado,  DO

## 2024-12-23 NOTE — CARE PLAN
The patient's goals for the shift include sit in chair and work with therapy.     The clinical goals for the shift include ambulate as tolerated.      Problem: Skin  Goal: Participates in plan/prevention/treatment measures  Outcome: Progressing     Problem: Skin  Goal: Prevent/manage excess moisture  Outcome: Progressing     Problem: Pain - Adult  Goal: Verbalizes/displays adequate comfort level or baseline comfort level  Outcome: Progressing     Problem: Safety - Adult  Goal: Free from fall injury  Outcome: Progressing     Problem: Fall/Injury  Goal: Pace activities to prevent fatigue by end of the shift  Outcome: Progressing     Problem: Fall/Injury  Goal: Use assistive devices by end of the shift  Outcome: Progressing

## 2024-12-23 NOTE — HH CARE COORDINATION
Home Care received a Referral for Physical Therapy and Occupational Therapy. We have processed the referral for a Start of Care on 12.24 or 12.25.2024.     If you have any questions or concerns, please feel free to contact us at [phone]. Follow the prompts, enter your five digit zip code, and you will be directed to your care team on WEST 3.

## 2024-12-23 NOTE — DISCHARGE SUMMARY
Discharge Diagnosis  NSTEMI (non-ST elevated myocardial infarction) (Multi)    Issues Requiring Follow-Up  nstemi    Test Results Pending At Discharge  Pending Labs       No current pending labs.            Hospital Course   NSTEMI  HFpEF  Hypertension  SSS s/p pacemaker  Prolonged QTc  -Cardiology consulted in the emergency department, appreciate recommendations  -EKG, per ED physician, AV paced with a rate of 67 bpm.  Morphology similar to EKG dated 8/21/2023. Prolonged QTc at 518  -CTA chest/abdomen shows no evidence of acute abnormality of the imaged systemic arterial vasculature.  No aortic dissection.  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  Fairly well delineated hypoattenuating filling defect in the SVC, suspicious for nonocclusive thrombus.  -Patient given aspirin, Plavix, and initiated on heparin drip in the ED.  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  Troponins peaked to 8002 discussed with cardiology felt he needed catheterization given troponins.  Last week she was treated conservatively given her     12/21: cath negatrive, stress induced cm, contineu current car, echo reviewed, pt/ot     12/22: PT/OT ordered, dispo pending evaluation.    Pertinent Physical Exam At Time of Discharge  Physical Exam  Constitutional:       Appearance: Normal appearance.   HENT:      Head: Normocephalic and atraumatic.      Right Ear: Tympanic membrane and ear canal  normal.      Left Ear: Tympanic membrane and ear canal normal.      Mouth/Throat:      Mouth: Mucous membranes are moist.      Pharynx: Oropharynx is clear.   Eyes:      Extraocular Movements: Extraocular movements intact.      Conjunctiva/sclera: Conjunctivae normal.      Pupils: Pupils are equal, round, and reactive to light.   Cardiovascular:      Rate and Rhythm: Normal rate and regular rhythm.      Pulses: Normal pulses.      Heart sounds: Normal heart sounds.   Pulmonary:      Effort: Pulmonary effort is normal.      Breath sounds: Normal breath sounds.   Abdominal:      General: Abdomen is flat. Bowel sounds are normal.      Palpations: Abdomen is soft.   Musculoskeletal:         General: Normal range of motion.      Cervical back: Normal range of motion and neck supple.   Skin:     General: Skin is warm and dry.      Capillary Refill: Capillary refill takes 2 to 3 seconds.   Neurological:      General: No focal deficit present.      Mental Status: She is alert and oriented to person, place, and time. Mental status is at baseline.   Psychiatric:         Mood and Affect: Mood normal.         Behavior: Behavior normal.         Thought Content: Thought content normal.         Judgment: Judgment normal.         Home Medications     Medication List      START taking these medications     aspirin 81 mg chewable tablet; Chew 1 tablet (81 mg) once daily.; Start   taking on: December 24, 2024     CHANGE how you take these medications     metoprolol tartrate 25 mg tablet; Commonly known as: Lopressor; Take 0.5   tablets (12.5 mg) by mouth 2 times a day.; What changed: how much to take,   how to take this, when to take this, additional instructions     CONTINUE taking these medications     Calcium 600 + D(3) 600 mg-5 mcg (200 unit) tablet; Generic drug: calcium   carbonate-vitamin D3   clopidogrel 75 mg tablet; Commonly known as: Plavix; Take 1 tablet (75   mg) by mouth once daily.   fluticasone propion-salmeteroL  115-21 mcg/actuation inhaler; Commonly   known as: Advair; Inhale 2 puffs 2 times a day. Rinse mouth with water   after use to reduce aftertaste and incidence of candidiasis. Do not   swallow.   lubricating eye drops ophthalmic solution   PreserVision AREDS-2 250-90-40-1 mg capsule; Generic drug: vit   C,F-Xa-qzyyw-lutein-zeaxan     STOP taking these medications     spironolactone 25 mg tablet; Commonly known as: Aldactone   torsemide 10 mg tablet; Commonly known as: Demadex       Outpatient Follow-Up  Future Appointments   Date Time Provider Department Center   12/27/2024 11:30 AM Everton Rubio DO [id number] [place]   2/24/2025  2:00 PM James C Ramicone, DO [id number] [place]   9/17/2025  2:30 PM PARMA RAMICONE [id number]       Iglesia Gabriel DO

## 2024-12-23 NOTE — PROGRESS NOTES
12/23/24 1051   Discharge Planning   Living Arrangements Alone   Support Systems Children   Assistance Needed Independent, uses walker as needed. Patient does drive   Type of Residence Private residence  (split level home, has chair lift to 1st floor and chair lift to 2nd floor)   Number of Stairs to Enter Residence 0     Met with patient at bedside, introduced self and role on care transitions team. Admission assessment completed with patient. Address, insurance and contact information verified. Patient lives at home. PT/OT evaluation is pending.  PCP: Dr. Everton Rubio, has visit scheduled for December 27th  Pharmacy: [facility] in [place]. Patient states she is able to afford and obtain her home medications.     Addendum 1248: Met with patient and patients daughter at bedside to follow up on discharge plan. Offered patient home health care at discharge. Patient has declined home health care. Patients daughter states she will be staying with patient tonight and longer if needed. Patient has declined any home going needs.

## 2024-12-24 ENCOUNTER — TELEPHONE (OUTPATIENT)
Dept: HOME HEALTH SERVICES | Facility: HOME HEALTH | Age: 89
End: 2024-12-24
Payer: MEDICARE

## 2024-12-24 ENCOUNTER — PATIENT OUTREACH (OUTPATIENT)
Dept: PRIMARY CARE | Facility: CLINIC | Age: 89
End: 2024-12-24
Payer: MEDICARE

## 2024-12-24 NOTE — PROGRESS NOTES
Discharge Facility: [facility]  Discharge Diagnosis: NSTEMI  Admission Date:12/20/24  Discharge Date: 12/23/24    PCP Appointment Date: 12/27/24  Specialist Appointment Date: 2/24/25 Dr Ramicone  [facility] Encounter and Summary Linked: Yes  See discharge assessment below for further details    Engagement  Call Start Time: 0941 (12/24/2024 10:39 AM)    Medications  Medications reviewed with patient/caregiver?: Yes (12/24/2024 10:39 AM)  Is the patient having any side effects they believe may be caused by any medication additions or changes?: No (12/24/2024 10:39 AM)  Does the patient have all medications ordered at discharge?: Yes (12/24/2024 10:39 AM)  Care Management Interventions: Provided patient education (12/24/2024 10:39 AM)  Prescription Comments: sent home on increased metoprolol dose 12.5mg twice daily, stopped aldactone and torsemide, started on aspirin (12/24/2024 10:39 AM)  Is the patient taking all medications as directed (includes completed medication regime)?: Yes (12/24/2024 10:39 AM)  Care Management Interventions: Provided patient education (12/24/2024 10:39 AM)  Medication Comments: Provided extensive education about which medications to stop for now until reassessment from PCP (12/24/2024 10:39 AM)    Appointments  Does the patient have a primary care provider?: Yes (12/24/2024 10:39 AM)  Care Management Interventions: Verified appointment date/time/provider (12/24/2024 10:39 AM)  Has the patient kept scheduled appointments due by today?: Yes (12/24/2024 10:39 AM)  Care Management Interventions: Advised patient to keep appointment (12/24/2024 10:39 AM)    Self Management  What is the home health agency?: per notes, declined HHC (12/24/2024 10:39 AM)  Has home health visited the patient within 72 hours of discharge?: Not applicable (12/24/2024 10:39 AM)    Patient Teaching  Does the patient have access to their discharge instructions?: Yes (12/24/2024 10:39 AM)  Care Management Interventions:  Reviewed instructions with patient (12/24/2024 10:39 AM)  What is the patient's perception of their health status since discharge?: Returned to baseline/stable (12/24/2024 10:39 AM)  Is the patient/caregiver able to teach back the hierarchy of who to call/visit for symptoms/problems? PCP, Specialist, Home Health nurse, Urgent Care, ED, 911: Yes (12/24/2024 10:39 AM)  Patient/Caregiver Education Comments: Aware to seek care with any return of chest pain (12/24/2024 10:39 AM)    Wrap Up  Wrap Up Additional Comments: Successful transition of care outreach with patient. Patient reports doing well at home since discharge. New meds/changes reviewed with patient during outreach. Patient denies CP/SOB/abdominal pain. Patient denies further discharge questions/concerns/needs at time of outreach call. Emphasized that follow up appts are needed after discharge with PCP and reviewed needed follow ups with any specialties to assess response to treatment from hospitalization. Patient aware of my availability for non-emergent concerns. (12/24/2024 10:39 AM)  Call End Time: 0950 (12/24/2024 10:39 AM)

## 2024-12-24 NOTE — TELEPHONE ENCOUNTER
FYI patient was referred to [facility] for PT/OT upon hospital discharge however declined services when contacted for scheduling. Please ensure patient has a hospital follow up scheduled as they will not be monitored by home care.

## 2024-12-26 LAB
ATRIAL RATE: 67 BPM
ATRIAL RATE: 87 BPM
P AXIS: 97 DEGREES
PR INTERVAL: 178 MS
Q ONSET: 184 MS
Q ONSET: 185 MS
QRS COUNT: 11 BEATS
QRS COUNT: 13 BEATS
QRS DURATION: 162 MS
QRS DURATION: 166 MS
QT INTERVAL: 448 MS
QT INTERVAL: 458 MS
QTC CALCULATION(BAZETT): 473 MS
QTC CALCULATION(BAZETT): 518 MS
QTC FREDERICIA: 465 MS
QTC FREDERICIA: 497 MS
R AXIS: -61 DEGREES
R AXIS: -64 DEGREES
T AXIS: 82 DEGREES
T AXIS: 94 DEGREES
T OFFSET: 408 MS
T OFFSET: 414 MS
VENTRICULAR RATE: 67 BPM
VENTRICULAR RATE: 77 BPM

## 2024-12-27 ENCOUNTER — APPOINTMENT (OUTPATIENT)
Dept: PRIMARY CARE | Facility: CLINIC | Age: 89
End: 2024-12-27
Payer: MEDICARE

## 2024-12-27 VITALS
HEART RATE: 65 BPM | HEIGHT: 64 IN | WEIGHT: 165 LBS | BODY MASS INDEX: 28.17 KG/M2 | DIASTOLIC BLOOD PRESSURE: 52 MMHG | SYSTOLIC BLOOD PRESSURE: 110 MMHG | OXYGEN SATURATION: 96 %

## 2024-12-27 DIAGNOSIS — I63.9 CEREBROVASCULAR ACCIDENT (CVA), UNSPECIFIED MECHANISM (MULTI): Primary | ICD-10-CM

## 2024-12-27 DIAGNOSIS — R79.89 ELEVATED SERUM CREATININE: ICD-10-CM

## 2024-12-27 DIAGNOSIS — I50.32 CHRONIC DIASTOLIC CONGESTIVE HEART FAILURE: ICD-10-CM

## 2024-12-27 DIAGNOSIS — D64.9 ANEMIA, UNSPECIFIED TYPE: ICD-10-CM

## 2024-12-27 DIAGNOSIS — I21.4 NSTEMI (NON-ST ELEVATED MYOCARDIAL INFARCTION) (MULTI): ICD-10-CM

## 2024-12-27 DIAGNOSIS — I10 HYPERTENSION, UNSPECIFIED TYPE: ICD-10-CM

## 2024-12-27 DIAGNOSIS — E87.1 HYPONATREMIA: ICD-10-CM

## 2024-12-27 LAB
ANION GAP SERPL CALC-SCNC: 10 MMOL/L (ref 10–20)
BUN SERPL-MCNC: 21 MG/DL (ref 6–23)
CALCIUM SERPL-MCNC: 8.9 MG/DL (ref 8.6–10.6)
CHLORIDE SERPL-SCNC: 106 MMOL/L (ref 98–107)
CO2 SERPL-SCNC: 23 MMOL/L (ref 21–32)
CREAT SERPL-MCNC: 0.89 MG/DL (ref 0.5–1.05)
EGFRCR SERPLBLD CKD-EPI 2021: 61 ML/MIN/1.73M*2
ERYTHROCYTE [DISTWIDTH] IN BLOOD BY AUTOMATED COUNT: 13.9 % (ref 11.5–14.5)
GLUCOSE SERPL-MCNC: 122 MG/DL (ref 74–99)
HCT VFR BLD AUTO: 30.2 % (ref 36–46)
HGB BLD-MCNC: 9.7 G/DL (ref 12–16)
MCH RBC QN AUTO: 29.6 PG (ref 26–34)
MCHC RBC AUTO-ENTMCNC: 32.1 G/DL (ref 32–36)
MCV RBC AUTO: 92 FL (ref 80–100)
NRBC BLD-RTO: 0 /100 WBCS (ref 0–0)
PLATELET # BLD AUTO: 277 X10*3/UL (ref 150–450)
POTASSIUM SERPL-SCNC: 4.4 MMOL/L (ref 3.5–5.3)
RBC # BLD AUTO: 3.28 X10*6/UL (ref 4–5.2)
SODIUM SERPL-SCNC: 135 MMOL/L (ref 136–145)
WBC # BLD AUTO: 5.6 X10*3/UL (ref 4.4–11.3)

## 2024-12-27 PROCEDURE — 3078F DIAST BP <80 MM HG: CPT | Performed by: FAMILY MEDICINE

## 2024-12-27 PROCEDURE — 1111F DSCHRG MED/CURRENT MED MERGE: CPT | Performed by: FAMILY MEDICINE

## 2024-12-27 PROCEDURE — 1126F AMNT PAIN NOTED NONE PRSNT: CPT | Performed by: FAMILY MEDICINE

## 2024-12-27 PROCEDURE — 80048 BASIC METABOLIC PNL TOTAL CA: CPT

## 2024-12-27 PROCEDURE — 1157F ADVNC CARE PLAN IN RCRD: CPT | Performed by: FAMILY MEDICINE

## 2024-12-27 PROCEDURE — 1159F MED LIST DOCD IN RCRD: CPT | Performed by: FAMILY MEDICINE

## 2024-12-27 PROCEDURE — 99496 TRANSJ CARE MGMT HIGH F2F 7D: CPT | Performed by: FAMILY MEDICINE

## 2024-12-27 PROCEDURE — 3074F SYST BP LT 130 MM HG: CPT | Performed by: FAMILY MEDICINE

## 2024-12-27 PROCEDURE — 85027 COMPLETE CBC AUTOMATED: CPT

## 2024-12-27 RX ORDER — NAPROXEN SODIUM 220 MG/1
81 TABLET, FILM COATED ORAL DAILY
Status: ON HOLD
Start: 2024-12-27

## 2024-12-27 ASSESSMENT — ENCOUNTER SYMPTOMS: DEPRESSION: 0

## 2024-12-27 ASSESSMENT — PAIN SCALES - GENERAL: PAINLEVEL_OUTOF10: 0-NO PAIN

## 2024-12-27 NOTE — PROGRESS NOTES
"Subjective   Patient ID: Yesenia Milner is a 89 y.o. female who presents for Hospital Follow-up (Heart attack.).    HPI   Here for fu after MI.  Accompanied by daughter.  Records reviewed.  Doing well.  Feels back to normal.  Review of Systems  Cardiovascular: no chest pain, no edema, no palpitations, and no syncope.   Respiratory: no cough,no shortness of breath, and no wheezing  Gastrointestinal: no abdominal pain, nausea, vomiting or blood in stools  Genitourinary: no dysuria or hematuria    Objective   /52 (BP Location: Right arm, Patient Position: Sitting, BP Cuff Size: Adult)   Pulse 65   Ht 1.626 m (5' 4\")   Wt 74.8 kg (165 lb)   SpO2 96%   BMI 28.32 kg/m²     Physical Exam  Alert, pleasant and in no acute distress.  Neck: Supple, no JVD or carotid bruit  Heart: Regular rate and rhythm, no murmur  Lungs: Clear to auscultation  Lower extremities: No edema    Assessment/Plan     Patient here for follow-up.  She had an NSTEMI a week ago.  She is doing well.  She had some mild anemia, hyponatremia and elevated creatinine during her hospital stay.  Will get labs to follow-up on these.  Overall, patient's breathing has been stable.  She was not given her Advair and is doing well.  We have been weaning the dose and so have asked her to restart for the winter months: Restart Advair at 1 puff twice daily. If does well can stop the advair in spring     "

## 2024-12-28 NOTE — DOCUMENTATION CLARIFICATION NOTE
"    PATIENT:               JULY MILLER  ACCT #:                  [id number]  MRN:                       [id number]  :                       1933  ADMIT DATE:       2024 1:40 AM  DISCH DATE:        2024 2:40 PM  RESPONDING PROVIDER #:        [id number]          PROVIDER RESPONSE TEXT:    Acute Hypoxemic Respiratory Failure    CDI QUERY TEXT:    Clarification    Instruction:    Based on your assessment of the patient and the clinical information, please provide the requested documentation by clicking on the appropriate radio button and enter any additional information if prompted.    Question: Is there a diagnosis indicative of the clinical information    When answering this query, please exercise your independent professional judgment. The fact that a question is being asked, does not imply that any particular answer is desired or expected.    The patient's clinical indicators include:  Clinical Information: 89 yo female admitted with stress-induced Takotsubo cardiomyopathy.    Clinical Indicators:  Vitals (345) Temp-n/a hR-62 RR-29 BP-n/a SPO2-82 RA  RR: 30 ( 0200), 36 ( 0300), 29 ( 0345), 28 ( 0500),  SPO2: 82 RA ( 0345), 91 6L ( 0347), 97 unclear NC ( 1215), 92 RA ( 193)  P/F ratios: 136 [91 6L]     ED Dr. Leos: 'She reports associated shortness of breath and nausea\"  \"Pulmonary: Normal work of breathing with no accessory muscle use noted. Clear to auscultation bilaterally. No wheezing rhonchi or rales\"      Kylah Pagan PA: \"She states that last night she also began experiencing shortness of breath. She does tell me that her cardiologist recently lowered her dose of her water pill from 1-1/2 pills to just 1.\"      Dr. Gabriel: \"Pulmonary:  Effort: Pulmonary effort is normal.  Breath sounds: Wheezing (Wheezes present in right lower lobe and left lower lobe) present\"     NMN Dr. Gabriel:  \"Pulmonary:  Effort: Pulmonary effort is " "normal.  Breath sounds: Normal breath sounds\"    Treatment: 02 therapy, monitoring    Risk Factors: cardiomyopathy, shortness of breath  Options provided:  -- Acute Hypoxemic Respiratory Failure  -- No acute respiratory failure  -- Other - I will add my own diagnosis  -- Refer to Clinical Documentation Reviewer    Query created by: Aretha Collins on 12/27/2024 3:27 PM      Electronically signed by:  RADHA SALOMON DO 12/28/2024 8:49 AM          "

## 2024-12-30 LAB
ATRIAL RATE: 88 BPM
Q ONSET: 187 MS
QRS COUNT: 13 BEATS
QRS DURATION: 168 MS
QT INTERVAL: 462 MS
QTC CALCULATION(BAZETT): 529 MS
QTC FREDERICIA: 506 MS
R AXIS: -63 DEGREES
T AXIS: 135 DEGREES
T OFFSET: 418 MS
VENTRICULAR RATE: 79 BPM

## 2024-12-31 DIAGNOSIS — J44.9 CHRONIC OBSTRUCTIVE PULMONARY DISEASE, UNSPECIFIED COPD TYPE (MULTI): Primary | ICD-10-CM

## 2024-12-31 DIAGNOSIS — I50.20 SYSTOLIC CONGESTIVE HEART FAILURE, UNSPECIFIED HF CHRONICITY: ICD-10-CM

## 2024-12-31 DIAGNOSIS — I63.9 ISCHEMIC CEREBROVASCULAR ACCIDENT (CVA) (MULTI): ICD-10-CM

## 2025-01-02 ENCOUNTER — APPOINTMENT (OUTPATIENT)
Dept: RADIOLOGY | Facility: HOSPITAL | Age: OVER 89
End: 2025-01-02
Payer: MEDICARE

## 2025-01-02 ENCOUNTER — APPOINTMENT (OUTPATIENT)
Dept: CARDIOLOGY | Facility: HOSPITAL | Age: OVER 89
End: 2025-01-02
Payer: MEDICARE

## 2025-01-02 ENCOUNTER — HOSPITAL ENCOUNTER (INPATIENT)
Facility: HOSPITAL | Age: OVER 89
End: 2025-01-02
Attending: STUDENT IN AN ORGANIZED HEALTH CARE EDUCATION/TRAINING PROGRAM | Admitting: PHYSICIAN ASSISTANT
Payer: MEDICARE

## 2025-01-02 DIAGNOSIS — R06.02 SHORTNESS OF BREATH: Primary | ICD-10-CM

## 2025-01-02 DIAGNOSIS — I21.4 NSTEMI (NON-ST ELEVATED MYOCARDIAL INFARCTION) (MULTI): ICD-10-CM

## 2025-01-02 DIAGNOSIS — R60.1 GENERALIZED EDEMA: ICD-10-CM

## 2025-01-02 DIAGNOSIS — I82.210: ICD-10-CM

## 2025-01-02 DIAGNOSIS — M20.60: ICD-10-CM

## 2025-01-02 DIAGNOSIS — J81.0 ACUTE PULMONARY EDEMA: ICD-10-CM

## 2025-01-02 DIAGNOSIS — I82.C11 ACUTE THROMBOSIS OF RIGHT INTERNAL JUGULAR VEIN (MULTI): ICD-10-CM

## 2025-01-02 DIAGNOSIS — I82.721: ICD-10-CM

## 2025-01-02 LAB
ANION GAP SERPL CALC-SCNC: 12 MMOL/L (ref 10–20)
APTT PPP: 34 SECONDS (ref 27–38)
BASOPHILS # BLD AUTO: 0.02 X10*3/UL (ref 0–0.1)
BASOPHILS NFR BLD AUTO: 0.4 %
BNP SERPL-MCNC: 485 PG/ML (ref 0–99)
BUN SERPL-MCNC: 12 MG/DL (ref 6–23)
CALCIUM SERPL-MCNC: 9.1 MG/DL (ref 8.6–10.3)
CARDIAC TROPONIN I PNL SERPL HS: 36 NG/L (ref 0–13)
CARDIAC TROPONIN I PNL SERPL HS: 38 NG/L (ref 0–13)
CHLORIDE SERPL-SCNC: 105 MMOL/L (ref 98–107)
CO2 SERPL-SCNC: 22 MMOL/L (ref 21–32)
CREAT SERPL-MCNC: 0.88 MG/DL (ref 0.5–1.05)
EGFRCR SERPLBLD CKD-EPI 2021: 62 ML/MIN/1.73M*2
EOSINOPHIL # BLD AUTO: 0.1 X10*3/UL (ref 0–0.4)
EOSINOPHIL NFR BLD AUTO: 2 %
ERYTHROCYTE [DISTWIDTH] IN BLOOD BY AUTOMATED COUNT: 14.3 % (ref 11.5–14.5)
GLUCOSE SERPL-MCNC: 111 MG/DL (ref 74–99)
HCT VFR BLD AUTO: 29.2 % (ref 36–46)
HGB BLD-MCNC: 9.6 G/DL (ref 12–16)
IMM GRANULOCYTES # BLD AUTO: 0.03 X10*3/UL (ref 0–0.5)
IMM GRANULOCYTES NFR BLD AUTO: 0.6 % (ref 0–0.9)
LYMPHOCYTES # BLD AUTO: 1.17 X10*3/UL (ref 0.8–3)
LYMPHOCYTES NFR BLD AUTO: 23.7 %
MAGNESIUM SERPL-MCNC: 2.05 MG/DL (ref 1.6–2.4)
MCH RBC QN AUTO: 29.8 PG (ref 26–34)
MCHC RBC AUTO-ENTMCNC: 32.9 G/DL (ref 32–36)
MCV RBC AUTO: 91 FL (ref 80–100)
MONOCYTES # BLD AUTO: 0.49 X10*3/UL (ref 0.05–0.8)
MONOCYTES NFR BLD AUTO: 9.9 %
NEUTROPHILS # BLD AUTO: 3.13 X10*3/UL (ref 1.6–5.5)
NEUTROPHILS NFR BLD AUTO: 63.4 %
NRBC BLD-RTO: 0 /100 WBCS (ref 0–0)
PLATELET # BLD AUTO: 265 X10*3/UL (ref 150–450)
POTASSIUM SERPL-SCNC: 4.3 MMOL/L (ref 3.5–5.3)
RBC # BLD AUTO: 3.22 X10*6/UL (ref 4–5.2)
SODIUM SERPL-SCNC: 135 MMOL/L (ref 136–145)
WBC # BLD AUTO: 4.9 X10*3/UL (ref 4.4–11.3)

## 2025-01-02 PROCEDURE — 99285 EMERGENCY DEPT VISIT HI MDM: CPT | Mod: 25 | Performed by: STUDENT IN AN ORGANIZED HEALTH CARE EDUCATION/TRAINING PROGRAM

## 2025-01-02 PROCEDURE — 99223 1ST HOSP IP/OBS HIGH 75: CPT | Performed by: PHYSICIAN ASSISTANT

## 2025-01-02 PROCEDURE — 1200000002 HC GENERAL ROOM WITH TELEMETRY DAILY

## 2025-01-02 PROCEDURE — 96374 THER/PROPH/DIAG INJ IV PUSH: CPT | Mod: 59

## 2025-01-02 PROCEDURE — 2500000001 HC RX 250 WO HCPCS SELF ADMINISTERED DRUGS (ALT 637 FOR MEDICARE OP): Performed by: STUDENT IN AN ORGANIZED HEALTH CARE EDUCATION/TRAINING PROGRAM

## 2025-01-02 PROCEDURE — 71275 CT ANGIOGRAPHY CHEST: CPT | Mod: FOREIGN READ | Performed by: RADIOLOGY

## 2025-01-02 PROCEDURE — 2550000001 HC RX 255 CONTRASTS: Performed by: STUDENT IN AN ORGANIZED HEALTH CARE EDUCATION/TRAINING PROGRAM

## 2025-01-02 PROCEDURE — 85025 COMPLETE CBC W/AUTO DIFF WBC: CPT | Performed by: STUDENT IN AN ORGANIZED HEALTH CARE EDUCATION/TRAINING PROGRAM

## 2025-01-02 PROCEDURE — 93005 ELECTROCARDIOGRAM TRACING: CPT

## 2025-01-02 PROCEDURE — 71046 X-RAY EXAM CHEST 2 VIEWS: CPT

## 2025-01-02 PROCEDURE — 2500000004 HC RX 250 GENERAL PHARMACY W/ HCPCS (ALT 636 FOR OP/ED): Performed by: STUDENT IN AN ORGANIZED HEALTH CARE EDUCATION/TRAINING PROGRAM

## 2025-01-02 PROCEDURE — 85730 THROMBOPLASTIN TIME PARTIAL: CPT | Performed by: STUDENT IN AN ORGANIZED HEALTH CARE EDUCATION/TRAINING PROGRAM

## 2025-01-02 PROCEDURE — 80048 BASIC METABOLIC PNL TOTAL CA: CPT | Performed by: STUDENT IN AN ORGANIZED HEALTH CARE EDUCATION/TRAINING PROGRAM

## 2025-01-02 PROCEDURE — 71046 X-RAY EXAM CHEST 2 VIEWS: CPT | Performed by: RADIOLOGY

## 2025-01-02 PROCEDURE — 2500000001 HC RX 250 WO HCPCS SELF ADMINISTERED DRUGS (ALT 637 FOR MEDICARE OP): Performed by: PHYSICIAN ASSISTANT

## 2025-01-02 PROCEDURE — 36415 COLL VENOUS BLD VENIPUNCTURE: CPT | Performed by: STUDENT IN AN ORGANIZED HEALTH CARE EDUCATION/TRAINING PROGRAM

## 2025-01-02 PROCEDURE — 84484 ASSAY OF TROPONIN QUANT: CPT | Performed by: STUDENT IN AN ORGANIZED HEALTH CARE EDUCATION/TRAINING PROGRAM

## 2025-01-02 PROCEDURE — 85520 HEPARIN ASSAY: CPT | Performed by: STUDENT IN AN ORGANIZED HEALTH CARE EDUCATION/TRAINING PROGRAM

## 2025-01-02 PROCEDURE — 83735 ASSAY OF MAGNESIUM: CPT | Performed by: STUDENT IN AN ORGANIZED HEALTH CARE EDUCATION/TRAINING PROGRAM

## 2025-01-02 PROCEDURE — 93308 TTE F-UP OR LMTD: CPT | Performed by: STUDENT IN AN ORGANIZED HEALTH CARE EDUCATION/TRAINING PROGRAM

## 2025-01-02 PROCEDURE — 71275 CT ANGIOGRAPHY CHEST: CPT

## 2025-01-02 PROCEDURE — 83880 ASSAY OF NATRIURETIC PEPTIDE: CPT | Performed by: STUDENT IN AN ORGANIZED HEALTH CARE EDUCATION/TRAINING PROGRAM

## 2025-01-02 RX ORDER — HEPARIN SODIUM 10000 [USP'U]/100ML
0-4500 INJECTION, SOLUTION INTRAVENOUS CONTINUOUS
Status: DISPENSED | OUTPATIENT
Start: 2025-01-02 | End: 2025-01-05

## 2025-01-02 RX ORDER — FLUTICASONE FUROATE AND VILANTEROL 200; 25 UG/1; UG/1
1 POWDER RESPIRATORY (INHALATION)
Status: DISPENSED | OUTPATIENT
Start: 2025-01-03

## 2025-01-02 RX ORDER — PSYLLIUM HUSK 0.4 G
1 CAPSULE ORAL DAILY
Status: DISPENSED | OUTPATIENT
Start: 2025-01-03

## 2025-01-02 RX ORDER — HEPARIN SODIUM 5000 [USP'U]/ML
80 INJECTION, SOLUTION INTRAVENOUS; SUBCUTANEOUS ONCE
Status: COMPLETED | OUTPATIENT
Start: 2025-01-02 | End: 2025-01-02

## 2025-01-02 RX ORDER — CLOPIDOGREL BISULFATE 75 MG/1
75 TABLET ORAL DAILY
Status: DISPENSED | OUTPATIENT
Start: 2025-01-03

## 2025-01-02 RX ORDER — ACETAMINOPHEN 325 MG/1
650 TABLET ORAL EVERY 4 HOURS PRN
Status: ACTIVE | OUTPATIENT
Start: 2025-01-02

## 2025-01-02 RX ORDER — METOPROLOL TARTRATE 25 MG/1
12.5 TABLET, FILM COATED ORAL 2 TIMES DAILY
Status: DISPENSED | OUTPATIENT
Start: 2025-01-02

## 2025-01-02 RX ORDER — ACETAMINOPHEN 650 MG/1
650 SUPPOSITORY RECTAL EVERY 4 HOURS PRN
Status: ACTIVE | OUTPATIENT
Start: 2025-01-02

## 2025-01-02 RX ORDER — NAPROXEN SODIUM 220 MG/1
81 TABLET, FILM COATED ORAL DAILY
Status: DISPENSED | OUTPATIENT
Start: 2025-01-03

## 2025-01-02 RX ORDER — ALBUTEROL SULFATE 0.83 MG/ML
2.5 SOLUTION RESPIRATORY (INHALATION) EVERY 4 HOURS PRN
Status: DISCONTINUED | OUTPATIENT
Start: 2025-01-02 | End: 2025-01-03

## 2025-01-02 RX ORDER — FUROSEMIDE 10 MG/ML
20 INJECTION INTRAMUSCULAR; INTRAVENOUS DAILY
Status: DISCONTINUED | OUTPATIENT
Start: 2025-01-03 | End: 2025-01-05

## 2025-01-02 RX ORDER — ACETAMINOPHEN 160 MG/5ML
650 SOLUTION ORAL EVERY 4 HOURS PRN
Status: ACTIVE | OUTPATIENT
Start: 2025-01-02

## 2025-01-02 RX ORDER — TORSEMIDE 10 MG/1
10 TABLET ORAL ONCE
Status: COMPLETED | OUTPATIENT
Start: 2025-01-02 | End: 2025-01-02

## 2025-01-02 RX ORDER — POLYETHYLENE GLYCOL 3350 17 G/17G
17 POWDER, FOR SOLUTION ORAL DAILY PRN
Status: ACTIVE | OUTPATIENT
Start: 2025-01-02

## 2025-01-02 RX ORDER — HEPARIN SODIUM 5000 [USP'U]/ML
3000-6000 INJECTION, SOLUTION INTRAVENOUS; SUBCUTANEOUS EVERY 4 HOURS PRN
Status: ACTIVE | OUTPATIENT
Start: 2025-01-02

## 2025-01-02 RX ADMIN — METOPROLOL TARTRATE 12.5 MG: 25 TABLET, FILM COATED ORAL at 21:05

## 2025-01-02 RX ADMIN — HEPARIN SODIUM 6000 UNITS: 5000 INJECTION INTRAVENOUS; SUBCUTANEOUS at 19:47

## 2025-01-02 RX ADMIN — TORSEMIDE 10 MG: 10 TABLET ORAL at 18:23

## 2025-01-02 RX ADMIN — IOHEXOL 65 ML: 350 INJECTION, SOLUTION INTRAVENOUS at 17:07

## 2025-01-02 RX ADMIN — HEPARIN SODIUM 1300 UNITS/HR: 10000 INJECTION, SOLUTION INTRAVENOUS at 19:47

## 2025-01-02 ASSESSMENT — LIFESTYLE VARIABLES
HAVE YOU EVER FELT YOU SHOULD CUT DOWN ON YOUR DRINKING: NO
EVER FELT BAD OR GUILTY ABOUT YOUR DRINKING: NO
EVER HAD A DRINK FIRST THING IN THE MORNING TO STEADY YOUR NERVES TO GET RID OF A HANGOVER: NO
HAVE PEOPLE ANNOYED YOU BY CRITICIZING YOUR DRINKING: NO
TOTAL SCORE: 0

## 2025-01-02 ASSESSMENT — PAIN SCALES - GENERAL
PAINLEVEL_OUTOF10: 0 - NO PAIN
PAINLEVEL_OUTOF10: 0 - NO PAIN

## 2025-01-02 ASSESSMENT — COLUMBIA-SUICIDE SEVERITY RATING SCALE - C-SSRS
6. HAVE YOU EVER DONE ANYTHING, STARTED TO DO ANYTHING, OR PREPARED TO DO ANYTHING TO END YOUR LIFE?: NO
2. HAVE YOU ACTUALLY HAD ANY THOUGHTS OF KILLING YOURSELF?: NO
1. IN THE PAST MONTH, HAVE YOU WISHED YOU WERE DEAD OR WISHED YOU COULD GO TO SLEEP AND NOT WAKE UP?: NO

## 2025-01-02 ASSESSMENT — PAIN - FUNCTIONAL ASSESSMENT: PAIN_FUNCTIONAL_ASSESSMENT: 0-10

## 2025-01-02 NOTE — ED TRIAGE NOTES
Pt arrives via EMS from home.  States she developed shortness of breath this morning.  EMS states pulse ox 92% on roomair.  Placed on 2l.  Pt arrived to the ED no complaints of chest pain.  Pulse ox 96% on room air.

## 2025-01-02 NOTE — DOCUMENTATION CLARIFICATION NOTE
"    PATIENT:               JULY MILLER  ACCT #:                  [id number]  MRN:                       [id number]  :                       1933  ADMIT DATE:       2024 1:40 AM  DISCH DATE:        2024 2:40 PM  RESPONDING PROVIDER #:        [id number]          PROVIDER RESPONSE TEXT:    Type II MI    CDI QUERY TEXT:    Clarification    Instruction:    Based on your assessment of the patient and the clinical information, please provide the requested documentation by clicking on the appropriate radio button and enter any additional information if prompted.    Question: Please further clarify the diagnosis of NSTEMI as    When answering this query, please exercise your independent professional judgment. The fact that a question is being asked, does not imply that any particular answer is desired or expected.    The patient's clinical indicators include:  Clinical Information: 89 yo female admitted with stress-induced Takotsubo cardiomyopathy.    Documented Diagnosis: \"NSTEMI\"    Clinical Indicators and Documentation:  -Vital Signs:  ( 0345) Temp-n/a hR-62 RR-29 BP-n/a SPO2-82 RA  -Troponin trend: 112 ( 0204), 1426 ( 0251), 8220 ( 0439)  -EKG findings: Ventricular-paced rhythm ()  -ECHO findings: PHYSICIAN INTERPRETATION:  Left Ventricle: Left ventricular ejection fraction is normal, by visual estimate at 60-65%. Left venticular wall motion is abnormal. The left ventricular cavity size is normal. There is mildly increased septal and normal posterior left ventricular wall thickness. Left ventricular diastolic filling is indeterminate. There is evidence of a moderate apical and anteroapical myocardial infarction.    -Physical Exam or Documented/Presenting symptoms: 8/10 chest pain  -Cardiac Interventions: Catheterization  CONCLUSIONS:  1. - Non obstructive CAD  - EDP 22mmHg  - EF 30%. Basal hyperkinesis, mid to apical walls are hypokinetic. Dyskinetic apex.    -Cardiac Consult: " "12/22 PN Dr. Hess  \"Assessment/Plan  1.) Takotsubo cardiomyopathy with well recovered LV function unable to tolerate  low dose Lisinopril  P: Stao Lisinopril\"    Treatment: LHC,  cardiology consult, heparin IV, ECHO, Trend troponins    Risk Factors: chest pain, elevated troponins, no CAD  Options provided:  -- NSTEMI as evidenced by, Please specify additional information below  -- Type II MI, Please specify additional information below  -- Other - I will add my own diagnosis  -- Refer to Clinical Documentation Reviewer    Query created by: Aretha Collins on 12/31/2024 12:50 PM      Electronically signed by:  RADHA SALOMON DO 1/2/2025 9:20 AM          "

## 2025-01-02 NOTE — ED PROVIDER NOTES
[facility] ED Note    Date of Service: 1/2/2025  Reason for Visit: Shortness of Breath      Patient History     HPI  Yesenia Milner is a 89 y.o. female with a past medical history of COPD, HTN, sick sinus syndrome (s/p pacemaker), HFpEF (recent TTE on 12/21/2024 showing indeterminate left ventricular diastolic filling pressures), and recent NSTEMI who presents the emergency department for shortness of breath.  Patient states when she was admitted to hospital for NSTEMI, they discontinued her diuretics.  Patient states she has been doing well over the past few weeks, did not note any significant lower extremity swelling, but did note that her legs felt heavy.  She states today when she was doing her exercises she had some shortness of breath that lasted for 2 hours concerning her and therefore she called her doctors and subsequently came to the ED.  She denies any chest pain, abdominal pain, nausea/vomiting or lightheaded/dizziness.  She states she has otherwise felt well prior to this.      Past Medical History:   Diagnosis Date    Other conditions influencing health status     Normal stress echocardiogram    Personal history of other medical treatment     History of echocardiogram    Personal history of other medical treatment     H/O Doppler ultrasound     Past Surgical History:   Procedure Laterality Date    APPENDECTOMY  11/22/2017    Appendectomy    CARDIAC CATHETERIZATION N/A 12/20/2024    Procedure: Left Heart Cath, With LV;  Surgeon: Tahir Ruelas MD;  Location: [facility] Cardiac Cath Lab;  Service: Cardiovascular;  Laterality: N/A;    CARDIAC PACEMAKER PLACEMENT  03/11/2021    Pacemaker Placement    HYSTERECTOMY  11/22/2017    Hysterectomy    IR ANGIOGRAM INFERIOR EPIGASTRIC PELVIC  12/14/2020    IR ANGIOGRAM INFERIOR EPIGASTRIC PELVIC 12/14/2020 [facility]    IR ANGIOGRAM INFERIOR EPIGASTRIC PELVIC  12/14/2020    IR ANGIOGRAM INFERIOR EPIGASTRIC PELVIC 12/14/2020 [facility]    IR ANGIOGRAM  RENAL BILATERAL Bilateral 12/14/2020    IR ANGIOGRAM RENAL BILATERAL 12/14/2020 [facility]    OTHER SURGICAL HISTORY  11/22/2017    Stab Phlebectomy Of Varicose Veins    OTHER SURGICAL HISTORY  11/22/2017    Venous Ligation With Stripping    TONSILLECTOMY  11/22/2017    Tonsillectomy         Physical Exam     Vitals:    01/02/25 1300 01/02/25 1400 01/02/25 1500 01/02/25 1530   BP: (!) 163/124 121/56 144/65 143/64   Pulse: 87 62 63 65   Resp:  20  20   Temp:       SpO2: 99% (!) 93% 94% 96%   Weight:       Height:         General: Age-appropriate female, nontoxic, sitting comfortably in the gurney no acute distress.  Pulmonary:   Non-labored breathing. Breath sounds clear bilaterally  Cardiac:  Regular rate   Abdomen:  Soft. Non-tender. Non-distended  Musculoskeletal: Trace bilateral lower extremity edema.  Skin:  Dry, no rashes  Neuro: Alert and oriented.  Moves all 4 extremity spontaneously.    Diagnostic Studies     Labs:  Please see EMR for labs obtained during this patient encounter.    Radiology:  Please see EMR for imaging obtained during this patient encounter.    EKG:  AV dual paced rhythm with a ventricular rate of 69.  Left axis deviation.  Normal GA interval of 178.  Ventricular conduction in a paced rhythm with a QS duration 166 and a prolonged QTc interval of 522.  Relative unchanged compared to prior EKG in 12/21/2024.      ED Course and MDM     Yesenia Milner is a 89 y.o. female with a history and presentation as described above in HPI.      Upon presentation, the patient was afebrile, well-appearing, with unremarkable vital signs.  Patient presented to the emergency department for shortness of breath.  Differential diagnose includes ACS, heart failure, pneumonia, or possible pulmonary embolism.  Overall, lower suspicion for ACS given EKG without any signs of arrhythmia or ischemia with a mildly elevated troponin of 36, repeat stable at 38.  This is significantly lower than her troponin of 8220  thirteen days ago in the setting of Takotsubo's cardiomyopathy.  There was concerns that this may be due to patient's underlying heart failure given that she was recently taken off both her spironolactone and her torsemide.  Patient's chest x-ray did not show any significant findings, however given the partial thrombus seen on SVC, there was concern this could be a pulmonary embolism and therefore CTPA was obtained that did not show any pulmonary embolism, but did show groundglass opacities likely pulmonary edema as well as a increased nonocclusive thrombus on her SVC and possibly in her IJ.  Patient will be started on a heparin drip given concerns for clot propagation and new clot in her IJ.  Patient was also given torsemide for pulmonary edema with concerns for possible heart failure exacerbation.  Patient's bedside ultrasound did not show any decrease in systolic function, however she does have known diastolic dysfunction as well, given that she is off her diuretics, that may be playing a part in this pulmonary edema.  Patient will ultimately require admission for her increased thrombus as well as pulmonary edema in the setting of possible CHF extubation.  Discussed with Dr. 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 Emergency Medicine         Jac Odonnell MD  01/02/25 1914       Jac Odonnell MD  01/02/25 1950

## 2025-01-02 NOTE — ED PROCEDURE NOTE
Procedure    Performed by: Jac Odonnell MD  Authorized by: Jac Odonnell MD  Cardiac Indications: shortness of breath                Procedure: Cardiac Ultrasound    Findings:   Views: parasternal long, parasternal short, apical four and subxiphoid  Effusion: The pericardial space was visualized and was positive for a PERICARDIAL EFFUSION.  Activity: Ventricular contractions were visualized.  LV: LV systolic function was NORMAL.  RV: RV size was NORMAL.    Impression:  Cardiac: The focused cardiac ultrasound exam was NORMAL.    Comments: No appreciable pericardial effusion.  Left ventricular function appears adequate.  No signs of right heart strain appreciated.               Jac Odonnell MD  01/02/25 2368

## 2025-01-03 ENCOUNTER — APPOINTMENT (OUTPATIENT)
Dept: PRIMARY CARE | Facility: CLINIC | Age: OVER 89
End: 2025-01-03
Payer: MEDICARE

## 2025-01-03 LAB
ANION GAP SERPL CALC-SCNC: 14 MMOL/L (ref 10–20)
ATRIAL RATE: 69 BPM
BUN SERPL-MCNC: 14 MG/DL (ref 6–23)
CALCIUM SERPL-MCNC: 8.9 MG/DL (ref 8.6–10.3)
CHLORIDE SERPL-SCNC: 106 MMOL/L (ref 98–107)
CO2 SERPL-SCNC: 21 MMOL/L (ref 21–32)
CREAT SERPL-MCNC: 1 MG/DL (ref 0.5–1.05)
EGFRCR SERPLBLD CKD-EPI 2021: 53 ML/MIN/1.73M*2
ERYTHROCYTE [DISTWIDTH] IN BLOOD BY AUTOMATED COUNT: 14.5 % (ref 11.5–14.5)
GLUCOSE SERPL-MCNC: 71 MG/DL (ref 74–99)
HCT VFR BLD AUTO: 29.3 % (ref 36–46)
HGB BLD-MCNC: 9.1 G/DL (ref 12–16)
MCH RBC QN AUTO: 29.5 PG (ref 26–34)
MCHC RBC AUTO-ENTMCNC: 31.1 G/DL (ref 32–36)
MCV RBC AUTO: 95 FL (ref 80–100)
NRBC BLD-RTO: 0 /100 WBCS (ref 0–0)
PLATELET # BLD AUTO: 244 X10*3/UL (ref 150–450)
POTASSIUM SERPL-SCNC: 4.3 MMOL/L (ref 3.5–5.3)
PR INTERVAL: 178 MS
Q ONSET: 201 MS
QRS COUNT: 12 BEATS
QRS DURATION: 166 MS
QT INTERVAL: 488 MS
QTC CALCULATION(BAZETT): 522 MS
QTC FREDERICIA: 511 MS
R AXIS: -57 DEGREES
RBC # BLD AUTO: 3.08 X10*6/UL (ref 4–5.2)
SODIUM SERPL-SCNC: 137 MMOL/L (ref 136–145)
T AXIS: 142 DEGREES
T OFFSET: 445 MS
UFH PPP CHRO-ACNC: 0.5 IU/ML
UFH PPP CHRO-ACNC: 0.7 IU/ML
UFH PPP CHRO-ACNC: 0.9 IU/ML
UFH PPP CHRO-ACNC: 1 IU/ML
VENTRICULAR RATE: 69 BPM
WBC # BLD AUTO: 6 X10*3/UL (ref 4.4–11.3)

## 2025-01-03 PROCEDURE — 80048 BASIC METABOLIC PNL TOTAL CA: CPT | Performed by: PHYSICIAN ASSISTANT

## 2025-01-03 PROCEDURE — 36415 COLL VENOUS BLD VENIPUNCTURE: CPT | Performed by: STUDENT IN AN ORGANIZED HEALTH CARE EDUCATION/TRAINING PROGRAM

## 2025-01-03 PROCEDURE — 85520 HEPARIN ASSAY: CPT | Performed by: STUDENT IN AN ORGANIZED HEALTH CARE EDUCATION/TRAINING PROGRAM

## 2025-01-03 PROCEDURE — 2500000004 HC RX 250 GENERAL PHARMACY W/ HCPCS (ALT 636 FOR OP/ED): Performed by: PHYSICIAN ASSISTANT

## 2025-01-03 PROCEDURE — 99222 1ST HOSP IP/OBS MODERATE 55: CPT | Performed by: STUDENT IN AN ORGANIZED HEALTH CARE EDUCATION/TRAINING PROGRAM

## 2025-01-03 PROCEDURE — 85027 COMPLETE CBC AUTOMATED: CPT | Performed by: PHYSICIAN ASSISTANT

## 2025-01-03 PROCEDURE — 99223 1ST HOSP IP/OBS HIGH 75: CPT | Performed by: STUDENT IN AN ORGANIZED HEALTH CARE EDUCATION/TRAINING PROGRAM

## 2025-01-03 PROCEDURE — 2500000004 HC RX 250 GENERAL PHARMACY W/ HCPCS (ALT 636 FOR OP/ED): Performed by: STUDENT IN AN ORGANIZED HEALTH CARE EDUCATION/TRAINING PROGRAM

## 2025-01-03 PROCEDURE — 1200000002 HC GENERAL ROOM WITH TELEMETRY DAILY

## 2025-01-03 PROCEDURE — 2500000001 HC RX 250 WO HCPCS SELF ADMINISTERED DRUGS (ALT 637 FOR MEDICARE OP): Performed by: PHYSICIAN ASSISTANT

## 2025-01-03 PROCEDURE — 94640 AIRWAY INHALATION TREATMENT: CPT

## 2025-01-03 RX ORDER — ALBUTEROL SULFATE 0.83 MG/ML
2.5 SOLUTION RESPIRATORY (INHALATION) EVERY 2 HOUR PRN
Status: ACTIVE | OUTPATIENT
Start: 2025-01-03

## 2025-01-03 RX ADMIN — METOPROLOL TARTRATE 12.5 MG: 25 TABLET, FILM COATED ORAL at 09:48

## 2025-01-03 RX ADMIN — ASPIRIN 81 MG 81 MG: 81 TABLET ORAL at 09:49

## 2025-01-03 RX ADMIN — FUROSEMIDE 20 MG: 10 INJECTION, SOLUTION INTRAMUSCULAR; INTRAVENOUS at 09:49

## 2025-01-03 RX ADMIN — METOPROLOL TARTRATE 12.5 MG: 25 TABLET, FILM COATED ORAL at 20:56

## 2025-01-03 RX ADMIN — CLOPIDOGREL BISULFATE 75 MG: 75 TABLET ORAL at 09:49

## 2025-01-03 RX ADMIN — FLUTICASONE FUROATE AND VILANTEROL TRIFENATATE 1 PUFF: 200; 25 POWDER RESPIRATORY (INHALATION) at 07:17

## 2025-01-03 RX ADMIN — Medication 1 TABLET: at 11:03

## 2025-01-03 RX ADMIN — HEPARIN SODIUM 900 UNITS/HR: 10000 INJECTION, SOLUTION INTRAVENOUS at 19:43

## 2025-01-03 SDOH — ECONOMIC STABILITY: INCOME INSECURITY: IN THE PAST 12 MONTHS HAS THE ELECTRIC, GAS, OIL, OR WATER COMPANY THREATENED TO SHUT OFF SERVICES IN YOUR HOME?: NO

## 2025-01-03 SDOH — SOCIAL STABILITY: SOCIAL INSECURITY: HAS ANYONE EVER THREATENED TO HURT YOUR FAMILY OR YOUR PETS?: NO

## 2025-01-03 SDOH — ECONOMIC STABILITY: FOOD INSECURITY: WITHIN THE PAST 12 MONTHS, YOU WORRIED THAT YOUR FOOD WOULD RUN OUT BEFORE YOU GOT THE MONEY TO BUY MORE.: NEVER TRUE

## 2025-01-03 SDOH — SOCIAL STABILITY: SOCIAL INSECURITY: WITHIN THE LAST YEAR, HAVE YOU BEEN HUMILIATED OR EMOTIONALLY ABUSED IN OTHER WAYS BY YOUR PARTNER OR EX-PARTNER?: NO

## 2025-01-03 SDOH — SOCIAL STABILITY: SOCIAL INSECURITY: WITHIN THE LAST YEAR, HAVE YOU BEEN AFRAID OF YOUR PARTNER OR EX-PARTNER?: NO

## 2025-01-03 SDOH — SOCIAL STABILITY: SOCIAL INSECURITY: WERE YOU ABLE TO COMPLETE ALL THE BEHAVIORAL HEALTH SCREENINGS?: YES

## 2025-01-03 SDOH — SOCIAL STABILITY: SOCIAL INSECURITY: ABUSE: ADULT

## 2025-01-03 SDOH — SOCIAL STABILITY: SOCIAL INSECURITY: DO YOU FEEL UNSAFE GOING BACK TO THE PLACE WHERE YOU ARE LIVING?: NO

## 2025-01-03 SDOH — SOCIAL STABILITY: SOCIAL INSECURITY: ARE THERE ANY APPARENT SIGNS OF INJURIES/BEHAVIORS THAT COULD BE RELATED TO ABUSE/NEGLECT?: NO

## 2025-01-03 SDOH — SOCIAL STABILITY: SOCIAL INSECURITY: DOES ANYONE TRY TO KEEP YOU FROM HAVING/CONTACTING OTHER FRIENDS OR DOING THINGS OUTSIDE YOUR HOME?: NO

## 2025-01-03 SDOH — SOCIAL STABILITY: SOCIAL INSECURITY: HAVE YOU HAD ANY THOUGHTS OF HARMING ANYONE ELSE?: NO

## 2025-01-03 SDOH — SOCIAL STABILITY: SOCIAL INSECURITY: HAVE YOU HAD THOUGHTS OF HARMING ANYONE ELSE?: NO

## 2025-01-03 SDOH — SOCIAL STABILITY: SOCIAL INSECURITY: DO YOU FEEL ANYONE HAS EXPLOITED OR TAKEN ADVANTAGE OF YOU FINANCIALLY OR OF YOUR PERSONAL PROPERTY?: NO

## 2025-01-03 SDOH — SOCIAL STABILITY: SOCIAL INSECURITY: ARE YOU OR HAVE YOU BEEN THREATENED OR ABUSED PHYSICALLY, EMOTIONALLY, OR SEXUALLY BY ANYONE?: NO

## 2025-01-03 SDOH — ECONOMIC STABILITY: FOOD INSECURITY: WITHIN THE PAST 12 MONTHS, THE FOOD YOU BOUGHT JUST DIDN'T LAST AND YOU DIDN'T HAVE MONEY TO GET MORE.: NEVER TRUE

## 2025-01-03 ASSESSMENT — COGNITIVE AND FUNCTIONAL STATUS - GENERAL
TOILETING: A LITTLE
STANDING UP FROM CHAIR USING ARMS: A LITTLE
DAILY ACTIVITIY SCORE: 21
MOVING FROM LYING ON BACK TO SITTING ON SIDE OF FLAT BED WITH BEDRAILS: A LITTLE
PATIENT BASELINE BEDBOUND: NO
HELP NEEDED FOR BATHING: A LITTLE
WALKING IN HOSPITAL ROOM: A LOT
MOBILITY SCORE: 18
CLIMB 3 TO 5 STEPS WITH RAILING: A LITTLE
STANDING UP FROM CHAIR USING ARMS: A LITTLE
MOBILITY SCORE: 18
DRESSING REGULAR LOWER BODY CLOTHING: A LITTLE
MOVING TO AND FROM BED TO CHAIR: A LITTLE
TURNING FROM BACK TO SIDE WHILE IN FLAT BAD: A LITTLE
CLIMB 3 TO 5 STEPS WITH RAILING: A LOT
WALKING IN HOSPITAL ROOM: A LITTLE
MOVING TO AND FROM BED TO CHAIR: A LITTLE

## 2025-01-03 ASSESSMENT — ACTIVITIES OF DAILY LIVING (ADL)
LACK_OF_TRANSPORTATION: NO
ASSISTIVE_DEVICE: WALKER
HEARING - RIGHT EAR: HEARING AID
GROOMING: INDEPENDENT
BATHING: NEEDS ASSISTANCE
JUDGMENT_ADEQUATE_SAFELY_COMPLETE_DAILY_ACTIVITIES: YES
TOILETING: NEEDS ASSISTANCE
FEEDING YOURSELF: INDEPENDENT
HEARING - LEFT EAR: HEARING AID
WALKS IN HOME: NEEDS ASSISTANCE
DRESSING YOURSELF: NEEDS ASSISTANCE
ADEQUATE_TO_COMPLETE_ADL: YES
PATIENT'S MEMORY ADEQUATE TO SAFELY COMPLETE DAILY ACTIVITIES?: YES

## 2025-01-03 ASSESSMENT — LIFESTYLE VARIABLES
SKIP TO QUESTIONS 9-10: 1
HOW OFTEN DO YOU HAVE A DRINK CONTAINING ALCOHOL: NEVER
PRESCIPTION_ABUSE_PAST_12_MONTHS: NO
AUDIT-C TOTAL SCORE: 0
HOW OFTEN DO YOU HAVE 6 OR MORE DRINKS ON ONE OCCASION: NEVER
SUBSTANCE_ABUSE_PAST_12_MONTHS: NO
HOW MANY STANDARD DRINKS CONTAINING ALCOHOL DO YOU HAVE ON A TYPICAL DAY: PATIENT DOES NOT DRINK
AUDIT-C TOTAL SCORE: 0

## 2025-01-03 ASSESSMENT — PAIN SCALES - GENERAL: PAINLEVEL_OUTOF10: 0 - NO PAIN

## 2025-01-03 NOTE — H&P
History Of Present Illness  Yesenia Milner is a 89 y.o. female with past medical history significant for COPD, HTN, CVA, sick sinus syndrome s/p pacemaker, HFpEF, and recent NSTEMI within the past month who presents to the ED with complaints of shortness of breath.  States her diuretics were discontinued when she was admitted for her NSTEMI a couple weeks ago.  Denies any significant lower extremity swelling over the past couple weeks, however does admit to some leg heaviness over the past couple days.  Wears compression stockings at home.  Reports shortness of breath today while she was doing her home exercises that lasted for couple hours and she was concerned so she wanted to come to the ED for further evaluation.  Denies any chest pains.  Denies any other complaints, does report her shortness of breath is feeling better currently in the ED.  Does not wear oxygen at home.  Lives at home by herself and performs daily ADLs.  Denies headaches, dizziness, cough or congestion, abdominal pain or nausea, urinary/bowel changes, weakness, or fever/chills.  Has been compliant with her home medications.  Denies smoking, tobacco use, or alcohol use.    ED course: On arrival to the ED, patient afebrile and hemodynamically stable with SpO2 96% on room air.  Glucose 111, sodium 135, renal function WNL, BNP 45.  Initial troponin 36, repeat troponin 38.  WBC 4.9, hemoglobin 9.6/hematocrit 29.2.  Platelets 265.  Chest x-ray shows COPD/chronic interstitial lung disease again noted without acute infiltrates. CTA chest negative for PE or thoracic aortic dissection.  Findings suggestive of nonocclusive SVC thrombus, increased from prior exam.  Additionally there is focal region of not enhancement in lower margin of right internal jugular vein which may represent new thrombus.  Moderate sized right pleural effusion and small left pleural effusion.  Multifocal groundglass regions of airspace density both lungs could be due to  pulmonary edema.  Patient started on heparin infusion and given 2 mg torsemide tablet in the ED.    EKG (interpreted by ED physician): AV dual paced rhythm with ventricular rate 69, left axis deviation, normal DE interval 178, ventricular conduction and a paced rhythm with QS duration of 166 and prolonged QTc 522.  Relatively unchanged compared to prior EKG in 12/21/2024.    Admitting provider is Dr. Gabriel     Past Medical History  Past Medical History:   Diagnosis Date    Other conditions influencing health status     Normal stress echocardiogram    Personal history of other medical treatment     History of echocardiogram    Personal history of other medical treatment     H/O Doppler ultrasound     Surgical History  Past Surgical History:   Procedure Laterality Date    APPENDECTOMY  11/22/2017    Appendectomy    CARDIAC CATHETERIZATION N/A 12/20/2024    Procedure: Left Heart Cath, With LV;  Surgeon: Tahir Ruelas MD;  Location: [facility] [place] [facility];  Service: Cardiovascular;  Laterality: N/A;    CARDIAC PACEMAKER PLACEMENT  03/11/2021    Pacemaker Placement    HYSTERECTOMY  11/22/2017    Hysterectomy    IR ANGIOGRAM INFERIOR EPIGASTRIC PELVIC  12/14/2020    IR ANGIOGRAM INFERIOR EPIGASTRIC PELVIC 12/14/2020 [facility]    IR ANGIOGRAM INFERIOR EPIGASTRIC PELVIC  12/14/2020    IR ANGIOGRAM INFERIOR EPIGASTRIC PELVIC 12/14/2020 [facility]    IR ANGIOGRAM RENAL BILATERAL Bilateral 12/14/2020    IR ANGIOGRAM RENAL BILATERAL 12/14/2020 [facility]    OTHER SURGICAL HISTORY  11/22/2017    Stab Phlebectomy Of Varicose Veins    OTHER SURGICAL HISTORY  11/22/2017    Venous Ligation With Stripping    TONSILLECTOMY  11/22/2017    Tonsillectomy     Social History  She reports that she quit smoking about 52 years ago. Her smoking use included cigarettes. She has been exposed to tobacco smoke. She has never used smokeless tobacco. She reports that she does not drink alcohol and does not use drugs.    Family  "History  Family History   Problem Relation Name Age of Onset    No Known Problems Mother       Allergies  Iodine, Shrimp, Hydrocodone, and Adhesive tape-silicones    Review of Systems  10 point review system negative except as noted above in HPI    Physical Exam  Constitutional:       Appearance: Normal appearance.      Comments: Family at bedside   HENT:      Head: Normocephalic and atraumatic.      Mouth/Throat:      Mouth: Mucous membranes are moist.      Pharynx: Oropharynx is clear.   Eyes:      Extraocular Movements: Extraocular movements intact.      Conjunctiva/sclera: Conjunctivae normal.      Pupils: Pupils are equal, round, and reactive to light.   Cardiovascular:      Rate and Rhythm: Normal rate and regular rhythm.      Pulses: Normal pulses.      Heart sounds: Normal heart sounds.   Pulmonary:      Effort: Pulmonary effort is normal. No respiratory distress.      Breath sounds: Normal breath sounds. No wheezing.      Comments: SpO2 95% on room air.  No conversational dyspnea.  Abdominal:      General: Bowel sounds are normal. There is no distension.      Palpations: Abdomen is soft.      Tenderness: There is no abdominal tenderness.   Musculoskeletal:         General: Normal range of motion.      Right lower leg: Edema present.      Left lower leg: Edema present.   Skin:     General: Skin is warm and dry.   Neurological:      General: No focal deficit present.      Mental Status: She is alert and oriented to person, place, and time.   Psychiatric:         Mood and Affect: Mood normal.         Behavior: Behavior normal.       Last Recorded Vitals  Blood pressure 120/55, pulse 60, temperature 36.4 °C (97.5 °F), temperature source Tympanic, resp. rate 18, height 1.626 m (5' 4\"), weight 75.8 kg (167 lb), SpO2 97%.    Relevant Results  Results for orders placed or performed during the hospital encounter of 01/02/25 (from the past 24 hours)   CBC and Auto Differential   Result Value Ref Range    WBC 4.9 4.4 " - 11.3 x10*3/uL    nRBC 0.0 0.0 - 0.0 /100 WBCs    RBC 3.22 (L) 4.00 - 5.20 x10*6/uL    Hemoglobin 9.6 (L) 12.0 - 16.0 g/dL    Hematocrit 29.2 (L) 36.0 - 46.0 %    MCV 91 80 - 100 fL    MCH 29.8 26.0 - 34.0 pg    MCHC 32.9 32.0 - 36.0 g/dL    RDW 14.3 11.5 - 14.5 %    Platelets 265 150 - 450 x10*3/uL    Neutrophils % 63.4 40.0 - 80.0 %    Immature Granulocytes %, Automated 0.6 0.0 - 0.9 %    Lymphocytes % 23.7 13.0 - 44.0 %    Monocytes % 9.9 2.0 - 10.0 %    Eosinophils % 2.0 0.0 - 6.0 %    Basophils % 0.4 0.0 - 2.0 %    Neutrophils Absolute 3.13 1.60 - 5.50 x10*3/uL    Immature Granulocytes Absolute, Automated 0.03 0.00 - 0.50 x10*3/uL    Lymphocytes Absolute 1.17 0.80 - 3.00 x10*3/uL    Monocytes Absolute 0.49 0.05 - 0.80 x10*3/uL    Eosinophils Absolute 0.10 0.00 - 0.40 x10*3/uL    Basophils Absolute 0.02 0.00 - 0.10 x10*3/uL   Basic metabolic panel   Result Value Ref Range    Glucose 111 (H) 74 - 99 mg/dL    Sodium 135 (L) 136 - 145 mmol/L    Potassium 4.3 3.5 - 5.3 mmol/L    Chloride 105 98 - 107 mmol/L    Bicarbonate 22 21 - 32 mmol/L    Anion Gap 12 10 - 20 mmol/L    Urea Nitrogen 12 6 - 23 mg/dL    Creatinine 0.88 0.50 - 1.05 mg/dL    eGFR 62 >60 mL/min/1.73m*2    Calcium 9.1 8.6 - 10.3 mg/dL   Magnesium   Result Value Ref Range    Magnesium 2.05 1.60 - 2.40 mg/dL   B-Type Natriuretic Peptide   Result Value Ref Range     (H) 0 - 99 pg/mL   Troponin I, High Sensitivity, Initial   Result Value Ref Range    Troponin I, High Sensitivity 36 (H) 0 - 13 ng/L   ECG 12 lead   Result Value Ref Range    Ventricular Rate 69 BPM    Atrial Rate 69 BPM    NM Interval 178 ms    QRS Duration 166 ms    QT Interval 488 ms    QTC Calculation(Bazett) 522 ms    R Axis -57 degrees    T Axis 142 degrees    QRS Count 12 beats    Q Onset 201 ms    T Offset 445 ms    QTC Fredericia 511 ms   Troponin, High Sensitivity, 1 Hour   Result Value Ref Range    Troponin I, High Sensitivity 38 (H) 0 - 13 ng/L   aPTT - baseline   Result  Value Ref Range    aPTT 34 27 - 38 seconds   Heparin Assay, UFH   Result Value Ref Range    Heparin Unfractionated 1.0 See Comment Below for Therapeutic Ranges IU/mL   CBC   Result Value Ref Range    WBC 6.0 4.4 - 11.3 x10*3/uL    nRBC 0.0 0.0 - 0.0 /100 WBCs    RBC 3.08 (L) 4.00 - 5.20 x10*6/uL    Hemoglobin 9.1 (L) 12.0 - 16.0 g/dL    Hematocrit 29.3 (L) 36.0 - 46.0 %    MCV 95 80 - 100 fL    MCH 29.5 26.0 - 34.0 pg    MCHC 31.1 (L) 32.0 - 36.0 g/dL    RDW 14.5 11.5 - 14.5 %    Platelets 244 150 - 450 x10*3/uL   Basic metabolic panel   Result Value Ref Range    Glucose 71 (L) 74 - 99 mg/dL    Sodium 137 136 - 145 mmol/L    Potassium 4.3 3.5 - 5.3 mmol/L    Chloride 106 98 - 107 mmol/L    Bicarbonate 21 21 - 32 mmol/L    Anion Gap 14 10 - 20 mmol/L    Urea Nitrogen 14 6 - 23 mg/dL    Creatinine 1.00 0.50 - 1.05 mg/dL    eGFR 53 (L) >60 mL/min/1.73m*2    Calcium 8.9 8.6 - 10.3 mg/dL   Heparin Assay   Result Value Ref Range    Heparin Unfractionated 0.9 See Comment Below for Therapeutic Ranges IU/mL   Heparin Assay, UFH   Result Value Ref Range    Heparin Unfractionated 0.7 See Comment Below for Therapeutic Ranges IU/mL      CT angio chest for pulmonary embolism    Addendum Date: 1/2/2025    Findings discussed withDr. Jac Marquez at 7:00 PM 1/2/2025 Signed by Ayan Martins MD    Result Date: 1/2/2025  STUDY: CT Angiogram of the Chest; 01/02/2025, 5:06 PM INDICATION: Recent admission for NSTEMI, non occlusive thrombus possibly seen in SVC at that time. COMPARISON: CTA CAP 12/20/2024. ACCESSION NUMBER(S): [id number] ORDERING CLINICIAN: JAC MARQUEZ TECHNIQUE:  CTA of the chest was performed with intravenous contrast. Images are reviewed and processed at a workstation according to the CT angiogram protocol with 3-D and/or MIP post processing imaging generated.  Omnipaque 350, 65 mL was administered intravenously. Automated mA/kV exposure control was utilized and patient examination was performed in strict  accordance with principles of ALARA. FINDINGS: Pulmonary arteries are adequately opacified without acute or chronic filling defects.  The thoracic aorta is normal in course and caliber without dissection or aneurysm.  Contrast was injected within the right upper extremity vein.  There is a focal region of nonenhancement within the anterior aspect of the superior vena cava at the level of the pacemaker leads image 99 and image 125.  Overall length of the region of nonenhancement in the SVC has increased compared to exam 12/20/2024.  Additionally there is a region of nonenhancement right internal jugular vein image 7 series 402.  Small amount of contrast refluxes into the azygos vein. Mild cardiac enlargement.  Pacemaker leads within the right ventricle and right atrium.  Small mediastinal and hilar lymph nodes are unchanged. Moderate size right pleural effusion and small left pleural effusion. The airways are patent. Multifocal groundglass regions of airspace density both lungs. Upper abdomen demonstrates no acute pathology. There are no acute fractures.  No suspicious bony lesions.    Negative for pulmonary embolism or thoracic aortic dissection.  Again identified are regions of nonenhancement along the anterior aspect of the mid SVC and suggestive of nonocclusive SVC thrombus. Compared to prior exam overall length of nonenhancement increased. Additionally there is a focal region of nonenhancement lower margin of the right internal jugular vein and may represent new thrombus. Moderate size right pleural effusion and small left pleural effusion are new. Cardiomegaly. Multifocal groundglass regions of airspace density both lungs could be due to pulmonary edema. Signed by Ayan Martins MD    Point of Care Ultrasound    Result Date: 1/2/2025  Jac Odonnell MD     1/2/2025  5:49 PM Performed by: Jac Odonnell MD Authorized by: Jac Odonnell MD  Cardiac Indications: shortness of breath Procedure: Cardiac  Ultrasound Findings:  Views: parasternal long, parasternal short, apical four and subxiphoid Effusion: The pericardial space was visualized and was positive for a PERICARDIAL EFFUSION. Activity: Ventricular contractions were visualized. LV: LV systolic function was NORMAL. RV: RV size was NORMAL. Impression: Cardiac: The focused cardiac ultrasound exam was NORMAL. Comments: No appreciable pericardial effusion.  Left ventricular function appears adequate.  No signs of right heart strain appreciated.    XR chest 2 views    Result Date: 1/2/2025  Interpreted By:  Darvin Vieira, STUDY: XR CHEST 2 VIEWS; 1/2/2025 1:05 pm   INDICATION: Signs/Symptoms:shortness of breath   COMPARISON: December 2024.   ACCESSION NUMBER(S): [id number]   ORDERING CLINICIAN: GALE MARQUEZ   TECHNIQUE: Number of films: Two view chest radiographs.   FINDINGS: A pacemaker is again noted, with the leads overlying the right atrium and right ventricle. The cardiomediastinal silhouette is within normal limits. The lungs are hyperinflated, with prominent interstitial markings bilaterally and increased AP diameter of the chest. There is no pneumothorax, confluent infiltrates or pleural effusions. Previously noted superimposed bilateral interstitial infiltrates have resolved. Degenerative changes involve the thoracic spine.       COPD/chronic interstitial lung disease again noted without acute infiltrates.     Signed by: Darvin Vieira 1/2/2025 1:45 PM Dictation workstation:   [id number]     Assessment/Plan   Assessment & Plan  Shortness of breath      Yesenia Milner is a 89 y.o. female with past medical history significant for COPD, HTN, CVA, sick sinus syndrome s/p pacemaker, HFpEF, and recent NSTEMI within the past month who presents to the ED with complaints of shortness of breath.  States her diuretics were discontinued when she was admitted for her NSTEMI a couple weeks ago.  Denies any significant lower extremity swelling over the past  couple weeks, however does admit to some leg heaviness over the past couple days. Denies any chest pains. Does not wear oxygen at home.     CODE STATUS: Full code    #Acute CHF,  today  #Acute pulmonary edema  #Bilateral pleural effusions, R > L  #Mild bilateral lower extremity edema  #Shortness of breath  Admit as inpatient with telemetry monitoring  Cardiology consult  IV Lasix daily, will likely need to resume home diuretics upon discharge  PT/OT for evaluation and treatment  Tylenol, breathing treatments as needed  Oxygen as needed  Cardiac diet  Q 4 vitals  CBC, BMP in the a.m.  Daily weights, fluid restriction  Repeat chest x-ray in 2 days  -Echocardiogram, 12/20/2024: Normal left ventricular ejection fraction, EF 60-65%, moderate mitral valve regurgitation, moderate/severe tricuspid regurgitation, moderately elevated RVSP.    #Nonocclusive SVC thrombus and also potentially new thrombus in right internal jugular vein  Vascular surgery consult  CTA chest negative for PE or thoracic aortic dissection, does show regions of nonenhancement along anterior aspect of mid SVC suggestive of nonocclusive SVC thrombus, additionally there is focal region of nonenhancement lower margin of right internal jugular vein which may represent new thrombus.  Heparin infusion  Heparin assays    #Recent NSTEMI 12/20  #Mildly elevated troponins  Denies chest pains  EKG shows AV dual paced rhythm, rate 69, no acute ischemia  Initial troponin 36, repeat troponin 38.    #Anemia, chronic  Stable per EMR review  No current signs of bleeding, monitor with a.m. labs    Continue home medications as appropriate    Chronic conditions:  COPD, HTN, CVA, sick sinus syndrome s/p pacemaker, HFpEF, NSTEMI    #DVT prophylaxis  Heparin  Ambulation, SCDs       1/3: continue blood thinners, likely, sob caused by effuions, consult vascular and cardio    I spent 60 minutes in the professional and overall care of this patient.    Iglesia Gabriel, DO

## 2025-01-03 NOTE — PROGRESS NOTES
Pharmacy Medication History Review    Yesenia Milner is a 89 y.o. female admitted for No Principal Problem: There is no principal problem currently on the Problem List. Please update the Problem List and refresh.. Pharmacy reviewed the patient's [id number] medications and allergies for accuracy.    The list below reflectives the updated PTA list. Please review each medication in order reconciliation for additional clarification and justification.  Prior to Admission medications    Medication Sig Start Date End Date Authorizing Provider   aspirin 81 mg chewable tablet Chew 1 tablet (81 mg) once daily.   Everton Rubio, DO   calcium carbonate-vitamin D3 (Calcium 600 + D,3,) 600 mg-5 mcg (200 unit) tablet Take 1 tablet by mouth once daily.   Historical Provider, MD   clopidogrel (Plavix) 75 mg tablet Take 1 tablet (75 mg) by mouth once daily.   Everton Rubio, DO   fluticasone propion-salmeteroL (Advair) 115-21 mcg/actuation inhaler Inhale 2 puffs 2 times a day. Rinse mouth with water after use to reduce aftertaste and incidence of candidiasis. Do not swallow.   Everton Rubio, DO   lubricating eye drops ophthalmic solution Administer 1 drop into both eyes if needed for dry eyes.   Historical Provider, MD   metoprolol tartrate (Lopressor) 25 mg tablet Take 0.5 tablets (12.5 mg) by mouth 2 times a day.   Iglesia Gabriel, DO   vit C,X-Ex-hjmlk-lutein-zeaxan (PreserVision AREDS-2) 250-90-40-1 mg capsule Take 2 capsules by mouth once daily.   Historical Provider, MD        The list below reflectives the updated allergy list. Please review each documented allergy for additional clarification and justification.  Allergies  Reviewed by Katerina Lindo RN on 1/2/2025        Severity Reactions Comments    Iodine Medium Rash     Shrimp Medium Diarrhea, Nausea/vomiting     Hydrocodone Not Specified Unknown Pt does not remember    Adhesive Tape-silicones Low Itching             Below are additional concerns with  the patient's PTA list.      Kirti Brito

## 2025-01-03 NOTE — PROGRESS NOTES
Occupational Therapy                 Therapy Communication Note    Patient Name: Yesenia Milner  MRN: [id number]  Department: [facility]  Room: 610610-A  Today's Date: 1/3/2025     Discipline: Occupational Therapy    OT Missed Visit: Yes     Missed Visit Reason:  Chart reviewed and case conference with RN at 10:13 am when attempted initial OT eval.  Patient on hold due to medical intervention further needed with medications prior to proceeding with therapy evaluations.    Missed Time: Attempt

## 2025-01-03 NOTE — PROGRESS NOTES
Physical Therapy                 Therapy Communication Note    Patient Name: Yesenia Milner  MRN: [id number]  Department: [facility] ED  Room: [facility]  Today's Date: 1/3/2025     Discipline: Physical Therapy    PT Missed Visit: Yes     Missed Visit Reason: Missed Visit Reason: Patient placed on medical hold (Hold per nursing at this time d/t acute medical needs. Will reattempt as able/appropriate.)    Missed Time: Cancel

## 2025-01-03 NOTE — H&P
History Of Present Illness  Yesenia Milner is a 89 y.o. female with past medical history significant for COPD, HTN, CVA, sick sinus syndrome s/p pacemaker, HFpEF, and recent NSTEMI within the past month who presents to the ED with complaints of shortness of breath.  States her diuretics were discontinued when she was admitted for her NSTEMI a couple weeks ago.  Denies any significant lower extremity swelling over the past couple weeks, however does admit to some leg heaviness over the past couple days.  Wears compression stockings at home.  Reports shortness of breath today while she was doing her home exercises that lasted for couple hours and she was concerned so she wanted to come to the ED for further evaluation.  Denies any chest pains.  Denies any other complaints, does report her shortness of breath is feeling better currently in the ED.  Does not wear oxygen at home.  Lives at home by herself and performs daily ADLs.  Denies headaches, dizziness, cough or congestion, abdominal pain or nausea, urinary/bowel changes, weakness, or fever/chills.  Has been compliant with her home medications.  Denies smoking, tobacco use, or alcohol use.    ED course: On arrival to the ED, patient afebrile and hemodynamically stable with SpO2 96% on room air.  Glucose 111, sodium 135, renal function WNL, BNP 45.  Initial troponin 36, repeat troponin 38.  WBC 4.9, hemoglobin 9.6/hematocrit 29.2.  Platelets 265.  Chest x-ray shows COPD/chronic interstitial lung disease again noted without acute infiltrates. CTA chest negative for PE or thoracic aortic dissection.  Findings suggestive of nonocclusive SVC thrombus, increased from prior exam.  Additionally there is focal region of not enhancement in lower margin of right internal jugular vein which may represent new thrombus.  Moderate sized right pleural effusion and small left pleural effusion.  Multifocal groundglass regions of airspace density both lungs could be due to  pulmonary edema.  Patient started on heparin infusion and given 2 mg torsemide tablet in the ED.    EKG (interpreted by ED physician): AV dual paced rhythm with ventricular rate 69, left axis deviation, normal WA interval 178, ventricular conduction and a paced rhythm with QS duration of 166 and prolonged QTc 522.  Relatively unchanged compared to prior EKG in 12/21/2024.    Admitting provider is Dr. Gabriel     Past Medical History  Past Medical History:   Diagnosis Date    Other conditions influencing health status     Normal stress echocardiogram    Personal history of other medical treatment     History of echocardiogram    Personal history of other medical treatment     H/O Doppler ultrasound     Surgical History  Past Surgical History:   Procedure Laterality Date    APPENDECTOMY  11/22/2017    Appendectomy    CARDIAC CATHETERIZATION N/A 12/20/2024    Procedure: Left Heart Cath, With LV;  Surgeon: Tahir Ruelas MD;  Location: [facility];  Service: Cardiovascular;  Laterality: N/A;    CARDIAC PACEMAKER PLACEMENT  03/11/2021    Pacemaker Placement    HYSTERECTOMY  11/22/2017    Hysterectomy    IR ANGIOGRAM INFERIOR EPIGASTRIC PELVIC  12/14/2020    IR ANGIOGRAM INFERIOR EPIGASTRIC PELVIC 12/14/2020 [facility]    IR ANGIOGRAM INFERIOR EPIGASTRIC PELVIC  12/14/2020    IR ANGIOGRAM INFERIOR EPIGASTRIC PELVIC 12/14/2020 [facility]    IR ANGIOGRAM RENAL BILATERAL Bilateral 12/14/2020    IR ANGIOGRAM RENAL BILATERAL 12/14/2020 [facility]    OTHER SURGICAL HISTORY  11/22/2017    Stab Phlebectomy Of Varicose Veins    OTHER SURGICAL HISTORY  11/22/2017    Venous Ligation With Stripping    TONSILLECTOMY  11/22/2017    Tonsillectomy     Social History  She reports that she quit smoking about 52 years ago. Her smoking use included cigarettes. She has been exposed to tobacco smoke. She has never used smokeless tobacco. She reports that she does not drink alcohol and does not use drugs.    Family  "History  Family History   Problem Relation Name Age of Onset    No Known Problems Mother       Allergies  Iodine, Shrimp, Hydrocodone, and Adhesive tape-silicones    Review of Systems  10 point review system negative except as noted above in HPI    Physical Exam  Constitutional:       Appearance: Normal appearance.      Comments: Family at bedside   HENT:      Head: Normocephalic and atraumatic.      Mouth/Throat:      Mouth: Mucous membranes are moist.      Pharynx: Oropharynx is clear.   Eyes:      Extraocular Movements: Extraocular movements intact.      Conjunctiva/sclera: Conjunctivae normal.      Pupils: Pupils are equal, round, and reactive to light.   Cardiovascular:      Rate and Rhythm: Normal rate and regular rhythm.      Pulses: Normal pulses.      Heart sounds: Normal heart sounds.   Pulmonary:      Effort: Pulmonary effort is normal. No respiratory distress.      Breath sounds: Normal breath sounds. No wheezing.      Comments: SpO2 95% on room air.  No conversational dyspnea.  Abdominal:      General: Bowel sounds are normal. There is no distension.      Palpations: Abdomen is soft.      Tenderness: There is no abdominal tenderness.   Musculoskeletal:         General: Normal range of motion.      Right lower leg: Edema present.      Left lower leg: Edema present.   Skin:     General: Skin is warm and dry.   Neurological:      General: No focal deficit present.      Mental Status: She is alert and oriented to person, place, and time.   Psychiatric:         Mood and Affect: Mood normal.         Behavior: Behavior normal.       Last Recorded Vitals  Blood pressure 123/57, pulse 69, temperature 36.7 °C (98.1 °F), resp. rate 20, height 1.626 m (5' 4\"), weight 73.9 kg (163 lb), SpO2 95%.    Relevant Results  Results for orders placed or performed during the hospital encounter of 01/02/25 (from the past 24 hours)   CBC and Auto Differential   Result Value Ref Range    WBC 4.9 4.4 - 11.3 x10*3/uL    nRBC 0.0 " 0.0 - 0.0 /100 WBCs    RBC 3.22 (L) 4.00 - 5.20 x10*6/uL    Hemoglobin 9.6 (L) 12.0 - 16.0 g/dL    Hematocrit 29.2 (L) 36.0 - 46.0 %    MCV 91 80 - 100 fL    MCH 29.8 26.0 - 34.0 pg    MCHC 32.9 32.0 - 36.0 g/dL    RDW 14.3 11.5 - 14.5 %    Platelets 265 150 - 450 x10*3/uL    Neutrophils % 63.4 40.0 - 80.0 %    Immature Granulocytes %, Automated 0.6 0.0 - 0.9 %    Lymphocytes % 23.7 13.0 - 44.0 %    Monocytes % 9.9 2.0 - 10.0 %    Eosinophils % 2.0 0.0 - 6.0 %    Basophils % 0.4 0.0 - 2.0 %    Neutrophils Absolute 3.13 1.60 - 5.50 x10*3/uL    Immature Granulocytes Absolute, Automated 0.03 0.00 - 0.50 x10*3/uL    Lymphocytes Absolute 1.17 0.80 - 3.00 x10*3/uL    Monocytes Absolute 0.49 0.05 - 0.80 x10*3/uL    Eosinophils Absolute 0.10 0.00 - 0.40 x10*3/uL    Basophils Absolute 0.02 0.00 - 0.10 x10*3/uL   Basic metabolic panel   Result Value Ref Range    Glucose 111 (H) 74 - 99 mg/dL    Sodium 135 (L) 136 - 145 mmol/L    Potassium 4.3 3.5 - 5.3 mmol/L    Chloride 105 98 - 107 mmol/L    Bicarbonate 22 21 - 32 mmol/L    Anion Gap 12 10 - 20 mmol/L    Urea Nitrogen 12 6 - 23 mg/dL    Creatinine 0.88 0.50 - 1.05 mg/dL    eGFR 62 >60 mL/min/1.73m*2    Calcium 9.1 8.6 - 10.3 mg/dL   Magnesium   Result Value Ref Range    Magnesium 2.05 1.60 - 2.40 mg/dL   B-Type Natriuretic Peptide   Result Value Ref Range     (H) 0 - 99 pg/mL   Troponin I, High Sensitivity, Initial   Result Value Ref Range    Troponin I, High Sensitivity 36 (H) 0 - 13 ng/L   Troponin, High Sensitivity, 1 Hour   Result Value Ref Range    Troponin I, High Sensitivity 38 (H) 0 - 13 ng/L   aPTT - baseline   Result Value Ref Range    aPTT 34 27 - 38 seconds      CT angio chest for pulmonary embolism    Addendum Date: 1/2/2025    Findings discussed withDr. Jac Odonnell at 7:00 PM 1/2/2025 Signed by Ayan Martins MD    Result Date: 1/2/2025  STUDY: CT Angiogram of the Chest; 01/02/2025, 5:06 PM INDICATION: Recent admission for NSTEMI, non occlusive  thrombus possibly seen in SVC at that time. COMPARISON: CTA CAP 12/20/2024. ACCESSION NUMBER(S): [id number] ORDERING CLINICIAN: GALE MARQUEZ TECHNIQUE:  CTA of the chest was performed with intravenous contrast. Images are reviewed and processed at a workstation according to the CT angiogram protocol with 3-D and/or MIP post processing imaging generated.  Omnipaque 350, 65 mL was administered intravenously. Automated mA/kV exposure control was utilized and patient examination was performed in strict accordance with principles of ALARA. FINDINGS: Pulmonary arteries are adequately opacified without acute or chronic filling defects.  The thoracic aorta is normal in course and caliber without dissection or aneurysm.  Contrast was injected within the right upper extremity vein.  There is a focal region of nonenhancement within the anterior aspect of the superior vena cava at the level of the pacemaker leads image 99 and image 125.  Overall length of the region of nonenhancement in the SVC has increased compared to exam 12/20/2024.  Additionally there is a region of nonenhancement right internal jugular vein image 7 series 402.  Small amount of contrast refluxes into the azygos vein. Mild cardiac enlargement.  Pacemaker leads within the right ventricle and right atrium.  Small mediastinal and hilar lymph nodes are unchanged. Moderate size right pleural effusion and small left pleural effusion. The airways are patent. Multifocal groundglass regions of airspace density both lungs. Upper abdomen demonstrates no acute pathology. There are no acute fractures.  No suspicious bony lesions.    Negative for pulmonary embolism or thoracic aortic dissection.  Again identified are regions of nonenhancement along the anterior aspect of the mid SVC and suggestive of nonocclusive SVC thrombus. Compared to prior exam overall length of nonenhancement increased. Additionally there is a focal region of nonenhancement lower margin of  the right internal jugular vein and may represent new thrombus. Moderate size right pleural effusion and small left pleural effusion are new. Cardiomegaly. Multifocal groundglass regions of airspace density both lungs could be due to pulmonary edema. Signed by Ayan Martins MD    Point of Care Ultrasound    Result Date: 1/2/2025  Jac Marquez MD     1/2/2025  5:49 PM Performed by: Jac Marquez MD Authorized by: Jac Marquez MD  Cardiac Indications: shortness of breath Procedure: Cardiac Ultrasound Findings:  Views: parasternal long, parasternal short, apical four and subxiphoid Effusion: The pericardial space was visualized and was positive for a PERICARDIAL EFFUSION. Activity: Ventricular contractions were visualized. LV: LV systolic function was NORMAL. RV: RV size was NORMAL. Impression: Cardiac: The focused cardiac ultrasound exam was NORMAL. Comments: No appreciable pericardial effusion.  Left ventricular function appears adequate.  No signs of right heart strain appreciated.    XR chest 2 views    Result Date: 1/2/2025  Interpreted By:  Darvin Vieira, STUDY: XR CHEST 2 VIEWS; 1/2/2025 1:05 pm   INDICATION: Signs/Symptoms:shortness of breath   COMPARISON: December 2024.   ACCESSION NUMBER(S): [id number]   ORDERING CLINICIAN: JAC MARQUEZ   TECHNIQUE: Number of films: Two view chest radiographs.   FINDINGS: A pacemaker is again noted, with the leads overlying the right atrium and right ventricle. The cardiomediastinal silhouette is within normal limits. The lungs are hyperinflated, with prominent interstitial markings bilaterally and increased AP diameter of the chest. There is no pneumothorax, confluent infiltrates or pleural effusions. Previously noted superimposed bilateral interstitial infiltrates have resolved. Degenerative changes involve the thoracic spine.       COPD/chronic interstitial lung disease again noted without acute infiltrates.     Signed by: Darvin Vieira  1/2/2025 1:45 PM Dictation workstation:   [id number]     Assessment/Plan   Assessment & Plan  Shortness of breath      Yesenia Milner is a 89 y.o. female with past medical history significant for COPD, HTN, CVA, sick sinus syndrome s/p pacemaker, HFpEF, and recent NSTEMI within the past month who presents to the ED with complaints of shortness of breath.  States her diuretics were discontinued when she was admitted for her NSTEMI a couple weeks ago.  Denies any significant lower extremity swelling over the past couple weeks, however does admit to some leg heaviness over the past couple days. Denies any chest pains. Does not wear oxygen at home.     CODE STATUS: Full code    #Acute CHF,  today  #Acute pulmonary edema  #Bilateral pleural effusions, R > L  #Mild bilateral lower extremity edema  #Shortness of breath  Admit as inpatient with telemetry monitoring  Cardiology consult  IV Lasix daily, will likely need to resume home diuretics upon discharge  PT/OT for evaluation and treatment  Tylenol, breathing treatments as needed  Oxygen as needed  Cardiac diet  Q 4 vitals  CBC, BMP in the a.m.  Daily weights, fluid restriction  Repeat chest x-ray in 2 days  -Echocardiogram, 12/20/2024: Normal left ventricular ejection fraction, EF 60-65%, moderate mitral valve regurgitation, moderate/severe tricuspid regurgitation, moderately elevated RVSP.    #Nonocclusive SVC thrombus and also potentially new thrombus in right internal jugular vein  Vascular surgery consult  CTA chest negative for PE or thoracic aortic dissection, does show regions of nonenhancement along anterior aspect of mid SVC suggestive of nonocclusive SVC thrombus, additionally there is focal region of nonenhancement lower margin of right internal jugular vein which may represent new thrombus.  Heparin infusion  Heparin assays    #Recent NSTEMI 12/20  #Mildly elevated troponins  Denies chest pains  EKG shows AV dual paced rhythm, rate 69, no acute  ischemia  Initial troponin 36, repeat troponin 38.    #Anemia, chronic  Stable per EMR review  No current signs of bleeding, monitor with a.m. labs    Continue home medications as appropriate    Chronic conditions:  COPD, HTN, CVA, sick sinus syndrome s/p pacemaker, HFpEF, NSTEMI    #DVT prophylaxis  Heparin  Ambulation, SCDs       I spent 60 minutes in the professional and overall care of this patient.    Noel Gusman PA-C

## 2025-01-03 NOTE — CONSULTS
Yesenia Milner is a 89 y.o. female     Subjective/History     Yesenia Milner is a 89 y.o. female with a history of COPD, hypertension, CVA, sick sinus syndrome status post pacemaker, HFpEF, recent NSTEMI within the past month.  Patient presented to ED for shortness of breath.  Patient's diuretics were discontinued when she was admitted for NSTEMI a few weeks ago.  Underwent cardiac catheterization on 12/20/2024 during previous visit, findings were suggestive of Takotsubo cardiomyopathy without evidence of obstructive coronary artery disease    Previous hospitalization:  -12/23/2024: Patient admitted for NSTEMI, taken off spironolactone and torsemide at discharge    Hospital course: Patient's vitals were initially stable. BMP revealed mild hyponatremia 135, troponin 36 then 38.  .  CBC with chronic anemia.  Initial x-ray showed COPD/chronic interstitial lung disease without infiltrates.  CTA chest showed no pulmonary embolus and moderate size right pleural effusion and small left pleural effusion with multiple groundglass opacities on both lung, regions of nonenhancement along SVC were suggestive of nonocclusive thrombus (increased from prior exam).  Patient was given a dose of torsemide and started on IV Lasix.  Patient also started on heparin drip for thrombus.  Patient admitted for acute pulmonary edema concerning for CHF exacerbation.  Cardiology consulted for further assistance    Surgical history: Appendectomy, cardiac catheterization, pacemaker placement, hysterectomy, tonsillectomy    Objective       Physical Exam:  General:  Pleasant and cooperative. No apparent distress.  HEENT:  Normocephalic, atraumatic  Chest:  Clear to auscultation bilaterally. No wheezes, rales, or rhonchi.  CV:  Regular rate and rhythm. No murmurs    Abdomen: Abdomen is soft, non-tender, non-distended. BS +   Extremities: No significant bilateral lower extremity edema  Neurological:  AAOx3. No focal deficits.  Skin:  Warm  and dry.          Assessment / Plan          # Acute pulmonary edema secondary to HFpEF exacerbation  -Echocardiogram, 12/20/2024: EF 60-65%, moderate MVR, moderate/severe Tricuspid regurg  Plan:  -Continue with diuresis of Lasix 20, patient is responding appropriately  -Continue patient's home spironolactone and torsemide at discharge  -Continue daily weights, fluid restriction, follow ins and outs    #Recent NSTEMI 12/20  #Mildly elevated troponins  Plan:  -Troponin levels only mildly elevated, patient has complete absence of cardiac symptoms or new EKG changes concerning for ischemia  -Elevated troponins likely due to demand ischemia in setting of CHF exacerbation    #New thrombus in SVC  Plan:  -Will hold aspirin given patient is already on heparin drip and Plavix  -Systemic anticoagulation with heparin; anticipate transition to DOC prior to discharge    # History of sick sinus syndrome status post pacemaker  Plan:  -Continue to monitor, follow-up with cardiology outpatient           Ryley Ceabllos MD   PGY-II, Internal Medicine  This is a preliminary note, please await attending attestation for final A/P      Cardiology Staff Addendum:    I saw and evaluated the patient on 1/3/2025.  I personally obtained the key and critical portions of the history and physical exam or was physically present for key and critical portions performed by the resident. I reviewed the resident’s documentation and discussed the patient with the resident.  I agree with the resident’s medical decision making as documented/amended in the note.    Nikko Meyer MD

## 2025-01-04 ENCOUNTER — APPOINTMENT (OUTPATIENT)
Dept: RADIOLOGY | Facility: HOSPITAL | Age: OVER 89
End: 2025-01-04
Payer: MEDICARE

## 2025-01-04 LAB
ERYTHROCYTE [DISTWIDTH] IN BLOOD BY AUTOMATED COUNT: 14.4 % (ref 11.5–14.5)
HCT VFR BLD AUTO: 28.3 % (ref 36–46)
HGB BLD-MCNC: 9.4 G/DL (ref 12–16)
HOLD SPECIMEN: NORMAL
MCH RBC QN AUTO: 30 PG (ref 26–34)
MCHC RBC AUTO-ENTMCNC: 33.2 G/DL (ref 32–36)
MCV RBC AUTO: 90 FL (ref 80–100)
NRBC BLD-RTO: 0 /100 WBCS (ref 0–0)
PLATELET # BLD AUTO: 255 X10*3/UL (ref 150–450)
RBC # BLD AUTO: 3.13 X10*6/UL (ref 4–5.2)
UFH PPP CHRO-ACNC: 0.4 IU/ML
WBC # BLD AUTO: 6 X10*3/UL (ref 4.4–11.3)

## 2025-01-04 PROCEDURE — 94640 AIRWAY INHALATION TREATMENT: CPT

## 2025-01-04 PROCEDURE — 71045 X-RAY EXAM CHEST 1 VIEW: CPT | Performed by: STUDENT IN AN ORGANIZED HEALTH CARE EDUCATION/TRAINING PROGRAM

## 2025-01-04 PROCEDURE — 36415 COLL VENOUS BLD VENIPUNCTURE: CPT | Performed by: STUDENT IN AN ORGANIZED HEALTH CARE EDUCATION/TRAINING PROGRAM

## 2025-01-04 PROCEDURE — 1200000002 HC GENERAL ROOM WITH TELEMETRY DAILY

## 2025-01-04 PROCEDURE — 85027 COMPLETE CBC AUTOMATED: CPT | Performed by: STUDENT IN AN ORGANIZED HEALTH CARE EDUCATION/TRAINING PROGRAM

## 2025-01-04 PROCEDURE — 2500000001 HC RX 250 WO HCPCS SELF ADMINISTERED DRUGS (ALT 637 FOR MEDICARE OP): Performed by: PHYSICIAN ASSISTANT

## 2025-01-04 PROCEDURE — 85520 HEPARIN ASSAY: CPT | Performed by: STUDENT IN AN ORGANIZED HEALTH CARE EDUCATION/TRAINING PROGRAM

## 2025-01-04 PROCEDURE — 99223 1ST HOSP IP/OBS HIGH 75: CPT | Performed by: INTERNAL MEDICINE

## 2025-01-04 PROCEDURE — 2500000004 HC RX 250 GENERAL PHARMACY W/ HCPCS (ALT 636 FOR OP/ED): Performed by: PHYSICIAN ASSISTANT

## 2025-01-04 PROCEDURE — 99232 SBSQ HOSP IP/OBS MODERATE 35: CPT | Performed by: STUDENT IN AN ORGANIZED HEALTH CARE EDUCATION/TRAINING PROGRAM

## 2025-01-04 PROCEDURE — 71045 X-RAY EXAM CHEST 1 VIEW: CPT

## 2025-01-04 RX ADMIN — METOPROLOL TARTRATE 12.5 MG: 25 TABLET, FILM COATED ORAL at 21:11

## 2025-01-04 RX ADMIN — FUROSEMIDE 20 MG: 10 INJECTION, SOLUTION INTRAMUSCULAR; INTRAVENOUS at 14:30

## 2025-01-04 RX ADMIN — Medication 1 TABLET: at 10:23

## 2025-01-04 RX ADMIN — METOPROLOL TARTRATE 12.5 MG: 25 TABLET, FILM COATED ORAL at 10:23

## 2025-01-04 RX ADMIN — FLUTICASONE FUROATE AND VILANTEROL TRIFENATATE 1 PUFF: 200; 25 POWDER RESPIRATORY (INHALATION) at 08:29

## 2025-01-04 RX ADMIN — CLOPIDOGREL BISULFATE 75 MG: 75 TABLET ORAL at 10:23

## 2025-01-04 ASSESSMENT — COGNITIVE AND FUNCTIONAL STATUS - GENERAL
STANDING UP FROM CHAIR USING ARMS: A LITTLE
MOBILITY SCORE: 18
WALKING IN HOSPITAL ROOM: A LITTLE
TURNING FROM BACK TO SIDE WHILE IN FLAT BAD: A LITTLE
DRESSING REGULAR UPPER BODY CLOTHING: A LITTLE
TOILETING: A LITTLE
HELP NEEDED FOR BATHING: A LITTLE
DAILY ACTIVITIY SCORE: 20
CLIMB 3 TO 5 STEPS WITH RAILING: A LITTLE
MOVING TO AND FROM BED TO CHAIR: A LITTLE
MOVING FROM LYING ON BACK TO SITTING ON SIDE OF FLAT BED WITH BEDRAILS: A LITTLE
DRESSING REGULAR LOWER BODY CLOTHING: A LITTLE

## 2025-01-04 ASSESSMENT — PAIN SCALES - GENERAL
PAINLEVEL_OUTOF10: 0 - NO PAIN
PAINLEVEL_OUTOF10: 0 - NO PAIN

## 2025-01-04 ASSESSMENT — PAIN - FUNCTIONAL ASSESSMENT: PAIN_FUNCTIONAL_ASSESSMENT: 0-10

## 2025-01-04 NOTE — PROGRESS NOTES
"Cardiology Consult Progress Note:    Interval History:  - no new cardiovascular complaints; tolerating heparin ggt    Objective:     Inpatient Medications:  Scheduled medications   Medication Dose Route Frequency    [Held by provider] aspirin  81 mg oral Daily    calcium carbonate-vitamin D3  1 tablet oral Daily    clopidogrel  75 mg oral Daily    fluticasone furoate-vilanteroL  1 puff inhalation Daily    furosemide  20 mg intravenous Daily    metoprolol tartrate  12.5 mg oral BID       PRN medications   Medication    acetaminophen    Or    acetaminophen    Or    acetaminophen    albuterol    heparin    lubricating eye drops    oxygen    polyethylene glycol       Continuous Medications   Medication Dose Last Rate    heparin  0-4,500 Units/hr 900 Units/hr (01/03/25 2256)       Intake / Output Summary:     Intake/Output Summary (Last 24 hours) at 1/4/2025 1602  Last data filed at 1/3/2025 2256  Gross per 24 hour   Intake 185.95 ml   Output --   Net 185.95 ml       Net IO Since Admission: 185.95 mL [01/04/25 1602]    Physical Exam:  /57   Pulse 68   Temp 36.4 °C (97.5 °F)   Resp 18   Ht 1.626 m (5' 4\")   Wt 75.8 kg (167 lb)   SpO2 95%   BMI 28.67 kg/m²     Physical Exam  Constitutional:       General: She is not in acute distress.     Comments: Elderly   HENT:      Head: Normocephalic.      Mouth/Throat:      Mouth: Mucous membranes are moist.   Eyes:      Extraocular Movements: Extraocular movements intact.      Conjunctiva/sclera: Conjunctivae normal.   Neck:      Vascular: No JVD.   Cardiovascular:      Rate and Rhythm: Normal rate and regular rhythm.      Pulses: Normal pulses.      Heart sounds: No murmur heard.  Pulmonary:      Effort: Pulmonary effort is normal. No respiratory distress.      Breath sounds: Normal breath sounds.   Abdominal:      General: Bowel sounds are normal. There is no distension.      Palpations: Abdomen is soft.   Musculoskeletal:         General: No swelling.   Skin:     " General: Skin is warm and dry.   Neurological:      General: No focal deficit present.      Mental Status: She is alert.   Psychiatric:         Mood and Affect: Mood normal.         Behavior: Behavior normal.         Lab/Radiology/Diagnostic Review:    Labs  Results for orders placed or performed during the hospital encounter of 01/02/25 (from the past 24 hours)   PST Top   Result Value Ref Range    Extra Tube Hold for add-ons.    CBC   Result Value Ref Range    WBC 6.0 4.4 - 11.3 x10*3/uL    nRBC 0.0 0.0 - 0.0 /100 WBCs    RBC 3.13 (L) 4.00 - 5.20 x10*6/uL    Hemoglobin 9.4 (L) 12.0 - 16.0 g/dL    Hematocrit 28.3 (L) 36.0 - 46.0 %    MCV 90 80 - 100 fL    MCH 30.0 26.0 - 34.0 pg    MCHC 33.2 32.0 - 36.0 g/dL    RDW 14.4 11.5 - 14.5 %    Platelets 255 150 - 450 x10*3/uL   Heparin Assay   Result Value Ref Range    Heparin Unfractionated 0.4 See Comment Below for Therapeutic Ranges IU/mL       Peripheral IV 01/02/25 20 G Right Antecubital (Active)   Placement Date/Time: 01/02/25 1214   Hand Hygiene Completed: Yes  Size (Gauge): 20 G  Orientation: Right  Location: Antecubital  Insertion attempts: 1  Patient Tolerance: Age appropriate   Number of days: 2       Peripheral IV 01/03/25 20 G Left Hand (Active)   Placement Date/Time: 01/03/25 0000   Size (Gauge): 20 G  Orientation: Left  Location: Hand  Placed by: faheem mathew   Number of days: 1        Troponin I, High Sensitivity   Date/Time Value Ref Range Status   01/02/2025 02:20 PM 38 (H) 0 - 13 ng/L Final   01/02/2025 12:27 PM 36 (H) 0 - 13 ng/L Final   12/20/2024 04:39 AM 8,220 (HH) 0 - 13 ng/L Final     Comment:     Previous result verified on 12/20/2024 0332 on specimen/case [id number] called with component [facility] for procedure Troponin, High Sensitivity, 1 Hour with value 1,426 ng/L.     BNP   Date/Time Value Ref Range Status   01/02/2025 12:27  (H) 0 - 99 pg/mL Final   12/20/2024 02:51  (H) 0 - 99 pg/mL Final     Hemoglobin A1C   Date/Time Value  Ref Range Status   12/20/2024 02:04 AM 5.5 See comment % Final   01/24/2024 10:49 AM 5.4 see below % Final     LDL Calculated   Date/Time Value Ref Range Status   12/20/2024 02:04 AM 68 <=99 mg/dL Final     Comment:                                 Near   Borderline      AGE      Desirable  Optimal    High     High     Very High     0-19 Y     0 - 109     ---    110-129   >/= 130     ----    20-24 Y     0 - 119     ---    120-159   >/= 160     ----      >24 Y     0 -  99   100-129  130-159   160-189     >/=190     01/24/2024 10:49 AM 73 <=99 mg/dL Final     Comment:                                 Near   Borderline      AGE      Desirable  Optimal    High     High     Very High     0-19 Y     0 - 109     ---    110-129   >/= 130     ----    20-24 Y     0 - 119     ---    120-159   >/= 160     ----      >24 Y     0 -  99   100-129  130-159   160-189     >/=190       VLDL   Date/Time Value Ref Range Status   12/20/2024 02:04 AM 14 0 - 40 mg/dL Final   01/24/2024 10:49 AM 13 0 - 40 mg/dL Final   08/21/2023 12:56 AM 17 0 - 40 mg/dL Final   01/17/2023 09:43 AM 14 0 - 40 mg/dL Final   03/16/2021 10:15 AM 16 0 - 40 mg/dL Final       Assessment and Plan:  89 y.o. female with a history of COPD, hypertension, CVA, sick sinus syndrome status post pacemaker, HFpEF, recent NSTEMI within the past month.  Patient presented to ED for shortness of breath.  Patient's diuretics were discontinued when she was admitted for NSTEMI a few weeks ago.  Underwent cardiac catheterization on 12/20/2024 during previous visit, findings were suggestive of Takotsubo cardiomyopathy without evidence of obstructive coronary artery disease     Clinical Course:  1/4/2025: tolerating heparin ggt > anticipate transition to DOAC prior to discharge; appears euvolemic > recommend switching to PO diuretic therapy starting 1/5/2025    Overall Recommendations:    #Recent NSTEMI 12/20  #Mildly elevated troponins  Plan:  -Troponin levels only mildly elevated,  patient has complete absence of cardiac symptoms or new EKG changes concerning for ischemia  -Elevated troponins likely due to demand ischemia in setting of CHF exacerbation     #New thrombus in SVC  Plan:  -Will hold aspirin given patient is already on heparin drip and Plavix  -Systemic anticoagulation with heparin; anticipate transition to DOC prior to discharge     # Acute pulmonary edema secondary to HFpEF exacerbation (improving)   -Echocardiogram, 12/20/2024: EF 60-65%, moderate MVR, moderate/severe Tricuspid regurg  Plan:  -Transition to PO torsemide 1/5/2025  -Continue patient's home spironolactone and torsemide at discharge  -Continue daily weights, fluid restriction, follow ins and outs    # History of sick sinus syndrome status post pacemaker  Plan:  -Continue to monitor, follow-up with cardiology outpatient         Thank you for the cardiology consult. We will follow with you.       Nikko Meyer MD

## 2025-01-04 NOTE — CONSULTS
Inpatient consult to [facility]  Consult performed by: Lang Smith MD  Consult ordered by: SAQIB Franz-CNP  Reason for consult: SVC thrombus along PPM        History Of Present Illness:    Yesenia Milner is a 89 y.o. female presenting with shortness of breath due to diuretic cessation after NSTEMI a couple weeks ago (?Takosubo).  Incidentally, CT scan PE protocol showed increasing nonenhancement along the anterior aspect of the mid SVC, questionable internal jugular involvement of the right.    Patient denies any edema of the upper extremities or face.  She has baseline thin skin and she has prominent superficial veins present on the chest wall.  Her daughters were at the bedside also state they have prominent superficial veins present d/t thin skin.  There are no varicosities along the chest wall, close she has varicosities on the left thigh.  She has previously undergone varicose vein intervention on the right leg.     Last Recorded Vitals:  Vitals:    01/04/25 0300 01/04/25 0829 01/04/25 1142 01/04/25 1625   BP: 120/74  112/57 112/57   BP Location: Right arm      Patient Position: Lying      Pulse: 92  68 60   Resp:       Temp: 36.3 °C (97.3 °F)  36.4 °C (97.5 °F) 36.2 °C (97.2 °F)   TempSrc: Temporal      SpO2: 95% 97% 95% 93%   Weight:       Height:           Last Labs:  CBC - 1/4/2025:  6:12 AM  6.0 9.4 255    28.3      CMP - 1/3/2025:  3:44 AM  8.9 6.5 21 --- 0.3   _ 4.0 16 86      PTT - 1/2/2025:  7:42 PM  1.1   12.8 34     Troponin I, High Sensitivity   Date/Time Value Ref Range Status   01/02/2025 02:20 PM 38 (H) 0 - 13 ng/L Final   01/02/2025 12:27 PM 36 (H) 0 - 13 ng/L Final   12/20/2024 04:39 AM 8,220 (HH) 0 - 13 ng/L Final     Comment:     Previous result verified on 12/20/2024 0332 on specimen/case [id number] [facility] Troponin, High Sensitivity, 1 Hour with value 1,426 ng/L.     BNP   Date/Time Value Ref Range Status   01/02/2025 12:27   (H) 0 - 99 pg/mL Final   12/20/2024 02:51  (H) 0 - 99 pg/mL Final     Hemoglobin A1C   Date/Time Value Ref Range Status   12/20/2024 02:04 AM 5.5 See comment % Final   01/24/2024 10:49 AM 5.4 see below % Final     LDL Calculated   Date/Time Value Ref Range Status   12/20/2024 02:04 AM 68 <=99 mg/dL Final     Comment:                                 Near   Borderline      AGE      Desirable  Optimal    High     High     Very High     0-19 Y     0 - 109     ---    110-129   >/= 130     ----    20-24 Y     0 - 119     ---    120-159   >/= 160     ----      >24 Y     0 -  99   100-129  130-159   160-189     >/=190     01/24/2024 10:49 AM 73 <=99 mg/dL Final     Comment:                                 Near   Borderline      AGE      Desirable  Optimal    High     High     Very High     0-19 Y     0 - 109     ---    110-129   >/= 130     ----    20-24 Y     0 - 119     ---    120-159   >/= 160     ----      >24 Y     0 -  99   100-129  130-159   160-189     >/=190       VLDL   Date/Time Value Ref Range Status   12/20/2024 02:04 AM 14 0 - 40 mg/dL Final   01/24/2024 10:49 AM 13 0 - 40 mg/dL Final   08/21/2023 12:56 AM 17 0 - 40 mg/dL Final   01/17/2023 09:43 AM 14 0 - 40 mg/dL Final   03/16/2021 10:15 AM 16 0 - 40 mg/dL Final      Last I/O:  I/O last 3 completed shifts:  In: 186 (2.5 mL/kg) [I.V.:186 (2.5 mL/kg)]  Out: - (0 mL/kg)   Weight: 75.7 kg     Past Cardiology Tests (Last 3 Years):  CT scan 1/2/2025:  I have personally reviewed the CT PE myself several times.  There is perhaps a lucency along the pacemaker wires, difficult to discern if this is artifact only.    Echo:  Transthoracic echo (TTE) complete 12/21/2024  Personally reviewed, there is apical hypokinesis.  Remainder of LVEF is normal.    Ejection Fractions:  EF   Date/Time Value Ref Range Status   12/21/2024 09:03 AM 63 %      Cath:  Cardiac Catheterization Procedure 12/20/2024  Personally reviewed, there is no evidence of obstructive  disease.      Past Medical History:  She has a past medical history of Other conditions influencing health status, Personal history of other medical treatment, and Personal history of other medical treatment.    Past Surgical History:  She has a past surgical history that includes Appendectomy (11/22/2017); Hysterectomy (11/22/2017); Tonsillectomy (11/22/2017); Other surgical history (11/22/2017); Other surgical history (11/22/2017); Cardiac pacemaker placement (03/11/2021); IR angiogram renal bilateral (Bilateral, 12/14/2020); IR angiogram inferior epigastric pelvic (12/14/2020); IR angiogram inferior epigastric pelvic (12/14/2020); and Cardiac catheterization (N/A, 12/20/2024).      Social History:  She reports that she quit smoking about 52 years ago. Her smoking use included cigarettes. She has been exposed to tobacco smoke. She has never used smokeless tobacco. She reports that she does not drink alcohol and does not use drugs.    Family History:  Family History   Problem Relation Name Age of Onset    No Known Problems Mother          Allergies:  Iodine, Shrimp, Hydrocodone, and Adhesive tape-silicones    Inpatient Medications:  Scheduled medications   Medication Dose Route Frequency    [Held by provider] aspirin  81 mg oral Daily    calcium carbonate-vitamin D3  1 tablet oral Daily    clopidogrel  75 mg oral Daily    fluticasone furoate-vilanteroL  1 puff inhalation Daily    furosemide  20 mg intravenous Daily    metoprolol tartrate  12.5 mg oral BID     PRN medications   Medication    acetaminophen    Or    acetaminophen    Or    acetaminophen    albuterol    heparin    lubricating eye drops    oxygen    polyethylene glycol     Continuous Medications   Medication Dose Last Rate    heparin  0-4,500 Units/hr 900 Units/hr (01/03/25 0189)     Outpatient Medications:  Current Outpatient Medications   Medication Instructions    aspirin 81 mg, oral, Daily    calcium carbonate-vitamin D3 (Calcium 600 + D,3,) 600 mg-5  mcg (200 unit) tablet 1 tablet, Daily    clopidogrel (PLAVIX) 75 mg, oral, Daily    fluticasone propion-salmeteroL (Advair) 115-21 mcg/actuation inhaler 2 puffs, inhalation, 2 times daily, Rinse mouth with water after use to reduce aftertaste and incidence of candidiasis. Do not swallow.    lubricating eye drops ophthalmic solution 1 drop, As needed    metoprolol tartrate (LOPRESSOR) 12.5 mg, oral, 2 times daily    vit C,T-Jk-aynnw-lutein-zeaxan (PreserVision AREDS-2) 250-90-40-1 mg capsule 2 capsules, Daily       Physical Exam:  General: no acute distress  HEENT: EOMI, no scleral icterus.  Lungs: Clear to auscultation bilaterally without wheezing, rales, or rhonchi.  Cardiovascular: Regular rhythm and rate. Normal S1 and S2. No murmurs, rubs, or gallops are appreciated. JVP normal.  no bruits  Abdomen: Soft, nontender, nondistended. Bowel sounds present.  Extremities: diminished distal pulses BLE, 1+ edema BLE, and varicose veins are noted in the left thigh.  Neurologic: Alert and oriented x3.  Skin: There is prominence of superficial veins on the chest wall but no varicosities.  There is no prominent vein in the abdominal wall.  No edema is noted in the face or the upper extremities.     Assessment/Plan   89-year-old woman with COPD, hypertension, CVA history, sick sinus syndrome status post pacemaker.  Patient reports that she had pacemaker battery change in 2023, no wire manipulation was performed at that time.    I reviewed the scans extensively and have discussed with Dr. Nicholson from EP.  Given the lack of symptoms, it is reasonable to perform anticoagulation only for now without any mechanical thrombectomy.  I would really favor further investigation with upper extremity DVT scans bilaterally to be performed on Monday (can be limited to the upper arms to understand flow and extrapolate the patency of SVC based on this analysis).  I question whether or not this is merely artifact from wires as well.    I am okay  with the patient transitioning to DOAC as I do not envision a plan for mechanical thrombectomy.  Eliquis is likely better due to age.    Peripheral IV 01/02/25 20 G Right Antecubital (Active)   Site Assessment Clean;Dry;Intact 01/04/25 0900   Dressing Status Clean;Dry 01/04/25 0900   Number of days: 2       Peripheral IV 01/03/25 20 G Left Hand (Active)   Site Assessment Clean;Dry;Intact 01/04/25 0900   Dressing Status Clean;Dry 01/04/25 0900   Number of days: 1       Code Status:  Full Code    Lang Smith MD, FACC, FSCAI, RPVI  Co-Director, Vascular Center, and  Co-Director, Pulmonary Embolism Response Team,   [facility] [place]                                 of Medicine,                                                                 [facility] [employer] [facility] [employer]

## 2025-01-04 NOTE — CARE PLAN
The patient's goals for the shift include  comfort and safety    The clinical goals for the shift include injury free    Over the shift, the patient is making progress toward the following goals.     Problem: Safety - Adult  Goal: Free from fall injury  Outcome: Progressing     Problem: Chronic Conditions and Co-morbidities  Goal: Patient's chronic conditions and co-morbidity symptoms are monitored and maintained or improved  Outcome: Progressing     Problem: Skin  Goal: Decreased wound size/increased tissue granulation at next dressing change  Outcome: Progressing  Goal: Participates in plan/prevention/treatment measures  Outcome: Progressing  Goal: Prevent/manage excess moisture  Outcome: Progressing  Goal: Prevent/minimize sheer/friction injuries  Outcome: Progressing  Goal: Promote/optimize nutrition  Outcome: Progressing  Goal: Promote skin healing  Outcome: Progressing

## 2025-01-05 VITALS
HEIGHT: 64 IN | HEART RATE: 89 BPM | SYSTOLIC BLOOD PRESSURE: 128 MMHG | OXYGEN SATURATION: 95 % | WEIGHT: 167 LBS | DIASTOLIC BLOOD PRESSURE: 88 MMHG | RESPIRATION RATE: 18 BRPM | BODY MASS INDEX: 28.51 KG/M2 | TEMPERATURE: 98.8 F

## 2025-01-05 LAB
ANION GAP SERPL CALC-SCNC: 14 MMOL/L (ref 10–20)
BUN SERPL-MCNC: 21 MG/DL (ref 6–23)
CALCIUM SERPL-MCNC: 9.3 MG/DL (ref 8.6–10.3)
CHLORIDE SERPL-SCNC: 101 MMOL/L (ref 98–107)
CO2 SERPL-SCNC: 24 MMOL/L (ref 21–32)
CREAT SERPL-MCNC: 1.08 MG/DL (ref 0.5–1.05)
EGFRCR SERPLBLD CKD-EPI 2021: 49 ML/MIN/1.73M*2
ERYTHROCYTE [DISTWIDTH] IN BLOOD BY AUTOMATED COUNT: 14.2 % (ref 11.5–14.5)
GLUCOSE SERPL-MCNC: 121 MG/DL (ref 74–99)
HCT VFR BLD AUTO: 27.8 % (ref 36–46)
HGB BLD-MCNC: 9.2 G/DL (ref 12–16)
MCH RBC QN AUTO: 30.1 PG (ref 26–34)
MCHC RBC AUTO-ENTMCNC: 33.1 G/DL (ref 32–36)
MCV RBC AUTO: 91 FL (ref 80–100)
NRBC BLD-RTO: 0 /100 WBCS (ref 0–0)
PLATELET # BLD AUTO: 242 X10*3/UL (ref 150–450)
POTASSIUM SERPL-SCNC: 4.1 MMOL/L (ref 3.5–5.3)
RBC # BLD AUTO: 3.06 X10*6/UL (ref 4–5.2)
SODIUM SERPL-SCNC: 135 MMOL/L (ref 136–145)
UFH PPP CHRO-ACNC: 0.4 IU/ML
UFH PPP CHRO-ACNC: 1.8 IU/ML
WBC # BLD AUTO: 5.4 X10*3/UL (ref 4.4–11.3)

## 2025-01-05 PROCEDURE — 36415 COLL VENOUS BLD VENIPUNCTURE: CPT | Performed by: STUDENT IN AN ORGANIZED HEALTH CARE EDUCATION/TRAINING PROGRAM

## 2025-01-05 PROCEDURE — 85520 HEPARIN ASSAY: CPT | Performed by: STUDENT IN AN ORGANIZED HEALTH CARE EDUCATION/TRAINING PROGRAM

## 2025-01-05 PROCEDURE — 1200000002 HC GENERAL ROOM WITH TELEMETRY DAILY

## 2025-01-05 PROCEDURE — 2500000004 HC RX 250 GENERAL PHARMACY W/ HCPCS (ALT 636 FOR OP/ED): Performed by: STUDENT IN AN ORGANIZED HEALTH CARE EDUCATION/TRAINING PROGRAM

## 2025-01-05 PROCEDURE — 99232 SBSQ HOSP IP/OBS MODERATE 35: CPT | Performed by: STUDENT IN AN ORGANIZED HEALTH CARE EDUCATION/TRAINING PROGRAM

## 2025-01-05 PROCEDURE — 80048 BASIC METABOLIC PNL TOTAL CA: CPT | Performed by: PHYSICIAN ASSISTANT

## 2025-01-05 PROCEDURE — 2500000001 HC RX 250 WO HCPCS SELF ADMINISTERED DRUGS (ALT 637 FOR MEDICARE OP): Performed by: PHYSICIAN ASSISTANT

## 2025-01-05 PROCEDURE — 85027 COMPLETE CBC AUTOMATED: CPT | Performed by: STUDENT IN AN ORGANIZED HEALTH CARE EDUCATION/TRAINING PROGRAM

## 2025-01-05 PROCEDURE — 36415 COLL VENOUS BLD VENIPUNCTURE: CPT | Performed by: PHYSICIAN ASSISTANT

## 2025-01-05 PROCEDURE — 2500000001 HC RX 250 WO HCPCS SELF ADMINISTERED DRUGS (ALT 637 FOR MEDICARE OP): Performed by: INTERNAL MEDICINE

## 2025-01-05 RX ORDER — TORSEMIDE 20 MG/1
20 TABLET ORAL DAILY
Status: ACTIVE | OUTPATIENT
Start: 2025-01-06

## 2025-01-05 RX ADMIN — CLOPIDOGREL BISULFATE 75 MG: 75 TABLET ORAL at 09:53

## 2025-01-05 RX ADMIN — METOPROLOL TARTRATE 12.5 MG: 25 TABLET, FILM COATED ORAL at 22:45

## 2025-01-05 RX ADMIN — APIXABAN 5 MG: 5 TABLET, FILM COATED ORAL at 09:53

## 2025-01-05 RX ADMIN — Medication 1 TABLET: at 09:53

## 2025-01-05 RX ADMIN — APIXABAN 5 MG: 5 TABLET, FILM COATED ORAL at 22:46

## 2025-01-05 RX ADMIN — METOPROLOL TARTRATE 12.5 MG: 25 TABLET, FILM COATED ORAL at 09:53

## 2025-01-05 RX ADMIN — HEPARIN SODIUM 900 UNITS/HR: 10000 INJECTION, SOLUTION INTRAVENOUS at 04:55

## 2025-01-05 ASSESSMENT — PAIN - FUNCTIONAL ASSESSMENT: PAIN_FUNCTIONAL_ASSESSMENT: 0-10

## 2025-01-05 ASSESSMENT — PAIN SCALES - GENERAL
PAINLEVEL_OUTOF10: 0 - NO PAIN
PAINLEVEL_OUTOF10: 0 - NO PAIN

## 2025-01-05 ASSESSMENT — COGNITIVE AND FUNCTIONAL STATUS - GENERAL
STANDING UP FROM CHAIR USING ARMS: A LITTLE
MOBILITY SCORE: 18
TOILETING: A LITTLE
WALKING IN HOSPITAL ROOM: A LITTLE
DRESSING REGULAR UPPER BODY CLOTHING: A LITTLE
MOVING TO AND FROM BED TO CHAIR: A LITTLE
CLIMB 3 TO 5 STEPS WITH RAILING: A LITTLE
TURNING FROM BACK TO SIDE WHILE IN FLAT BAD: A LITTLE
DAILY ACTIVITIY SCORE: 20
HELP NEEDED FOR BATHING: A LITTLE
MOVING FROM LYING ON BACK TO SITTING ON SIDE OF FLAT BED WITH BEDRAILS: A LITTLE
DRESSING REGULAR LOWER BODY CLOTHING: A LITTLE

## 2025-01-05 NOTE — PROGRESS NOTES
Physical Therapy                 Therapy Communication Note    Patient Name: Yesenia Milner  MRN: [id number]  Department: [facility]  Room: [address] [place] [address]  Today's Date: 1/5/2025     Discipline: Physical Therapy    PT Missed Visit: Yes     Missed Visit Reason: Missed Visit Reason: Patient placed on medical hold (Pt pending BUE duplex per vascular. Nursing notified. Will reattempt as appropriate.)    Missed Time: Attempt

## 2025-01-05 NOTE — PROGRESS NOTES
Yesenia Milner is a 89 y.o. female on day 3 of admission presenting with Shortness of breath.      Subjective   No events overnight.  Patient seen and examined at bedside.  She has no acute complaints.  Her head is feeling better today.       Objective     Last Recorded Vitals  /55   Pulse 61   Temp 35.3 °C (95.5 °F)   Resp 18   Wt 75.8 kg (167 lb)   SpO2 94%   Intake/Output last 3 Shifts:    Intake/Output Summary (Last 24 hours) at 1/5/2025 1103  Last data filed at 1/5/2025 0500  Gross per 24 hour   Intake --   Output 400 ml   Net -400 ml       Admission Weight  Weight: 73.9 kg (163 lb) (01/02/25 1210)    Daily Weight  01/03/25 : 75.8 kg (167 lb)    Image Results  ECG 12 lead  AV dual-paced rhythm  Abnormal ECG  When compared with ECG of 21-DEC-2024 10:16,  Vent. rate has decreased BY  10 BPM      Physical Exam  Constitutional:       Appearance: Normal appearance.   HENT:      Head: Normocephalic and atraumatic.      Mouth/Throat:      Mouth: Mucous membranes are moist.      Pharynx: Oropharynx is clear.   Eyes:      Extraocular Movements: Extraocular movements intact.      Conjunctiva/sclera: Conjunctivae normal.      Pupils: Pupils are equal, round, and reactive to light.   Cardiovascular:      Rate and Rhythm: Normal rate and regular rhythm.      Pulses: Normal pulses.      Heart sounds: Normal heart sounds.   Pulmonary:      Effort: Pulmonary effort is normal. No respiratory distress.      Breath sounds: Normal breath sounds. No wheezing.      Comments: SpO2 95% on room air.  No conversational dyspnea.  Abdominal:      General: Bowel sounds are normal. There is no distension.      Palpations: Abdomen is soft.      Tenderness: There is no abdominal tenderness.   Musculoskeletal:         General: Normal range of motion.      Right lower leg: Edema present.      Left lower leg: Edema present.   Skin:     General: Skin is warm and dry.   Neurological:      General: No focal deficit present.      Mental  Status: She is alert and oriented to person, place, and time.   Psychiatric:         Mood and Affect: Mood normal.         Behavior: Behavior normal.      Relevant Results               Assessment/Plan                  Assessment & Plan  Shortness of breath    Yesenia Milner is a 89 y.o. female with past medical history significant for COPD, HTN, CVA, sick sinus syndrome s/p pacemaker, HFpEF, and recent NSTEMI within the past month who presents to the ED with complaints of shortness of breath.  States her diuretics were discontinued when she was admitted for her NSTEMI a couple weeks ago.  Denies any significant lower extremity swelling over the past couple weeks, however does admit to some leg heaviness over the past couple days. Denies any chest pains. Does not wear oxygen at home.      CODE STATUS: Full code     #Acute CHF,  today  #Acute pulmonary edema  #Bilateral pleural effusions, R > L  #Mild bilateral lower extremity edema  #Shortness of breath  Admit as inpatient with telemetry monitoring  Cardiology consult  IV Lasix daily, will likely need to resume home diuretics upon discharge  PT/OT for evaluation and treatment  Tylenol, breathing treatments as needed  Oxygen as needed  Cardiac diet  Q 4 vitals  CBC, BMP in the a.m.  Daily weights, fluid restriction  Repeat chest x-ray in 2 days  -Echocardiogram, 12/20/2024: Normal left ventricular ejection fraction, EF 60-65%, moderate mitral valve regurgitation, moderate/severe tricuspid regurgitation, moderately elevated RVSP.     #Nonocclusive SVC thrombus and also potentially new thrombus in right internal jugular vein  Vascular surgery consult  CTA chest negative for PE or thoracic aortic dissection, does show regions of nonenhancement along anterior aspect of mid SVC suggestive of nonocclusive SVC thrombus, additionally there is focal region of nonenhancement lower margin of right internal jugular vein which may represent new thrombus.  Heparin  infusion  Heparin assays     #Recent NSTEMI 12/20  #Mildly elevated troponins  Denies chest pains  EKG shows AV dual paced rhythm, rate 69, no acute ischemia  Initial troponin 36, repeat troponin 38.     #Anemia, chronic  Stable per EMR review  No current signs of bleeding, monitor with a.m. labs     Continue home medications as appropriate     Chronic conditions:  COPD, HTN, CVA, sick sinus syndrome s/p pacemaker, HFpEF, NSTEMI     #DVT prophylaxis  Heparin  Ambulation, SCDs     1/3: continue blood thinners, likely, sob caused by effuions, consult vascular and cardio     1/4: Patient remains on heparin drip.  Await vascular surgery recommendations.  Continue diuretics.    11/5: Patient changed to p.o. torsemide per cardiology.  Patient also transitioned from heparin drip to p.o. Eliquis per vascular surgery recommendations.  No plan for intervention.  Patient will need upper extremity ultrasounds tomorrow.            Chuck Enamorado, DO

## 2025-01-05 NOTE — PROGRESS NOTES
"Cardiology Consult Progress Note:    Interval History:  - Appears euvolemic on exam; transitioned from IV furosemide to PO torsemide  - Transitioned to Apixaban; await upper extremity venous duplex    Objective:     Inpatient Medications:  Scheduled medications   Medication Dose Route Frequency    apixaban  5 mg oral BID    [Held by provider] aspirin  81 mg oral Daily    calcium carbonate-vitamin D3  1 tablet oral Daily    clopidogrel  75 mg oral Daily    fluticasone furoate-vilanteroL  1 puff inhalation Daily    furosemide  20 mg intravenous Daily    metoprolol tartrate  12.5 mg oral BID       PRN medications   Medication    acetaminophen    Or    acetaminophen    Or    acetaminophen    albuterol    heparin    lubricating eye drops    oxygen    polyethylene glycol       Continuous Medications   Medication Dose Last Rate       Intake / Output Summary:     Intake/Output Summary (Last 24 hours) at 1/5/2025 1340  Last data filed at 1/5/2025 0500  Gross per 24 hour   Intake --   Output 400 ml   Net -400 ml       Net IO Since Admission: -214.05 mL [01/05/25 1340]    Physical Exam:  /56   Pulse 66   Temp 36 °C (96.8 °F)   Resp 18   Ht 1.626 m (5' 4\")   Wt 75.8 kg (167 lb)   SpO2 94%   BMI 28.67 kg/m²     Physical Exam  Constitutional:       General: She is not in acute distress.     Comments: Elderly   HENT:      Head: Normocephalic.      Mouth/Throat:      Mouth: Mucous membranes are moist.   Eyes:      Extraocular Movements: Extraocular movements intact.      Conjunctiva/sclera: Conjunctivae normal.   Neck:      Vascular: No JVD.   Cardiovascular:      Rate and Rhythm: Normal rate and regular rhythm.      Pulses: Normal pulses.      Heart sounds: No murmur heard.  Pulmonary:      Effort: Pulmonary effort is normal. No respiratory distress.      Breath sounds: Normal breath sounds.   Abdominal:      General: Bowel sounds are normal. There is no distension.      Palpations: Abdomen is soft.   Musculoskeletal: "         General: No swelling.   Skin:     General: Skin is warm and dry.   Neurological:      General: No focal deficit present.      Mental Status: She is alert.   Psychiatric:         Mood and Affect: Mood normal.         Behavior: Behavior normal.         Lab/Radiology/Diagnostic Review:    Labs  Results for orders placed or performed during the hospital encounter of 01/02/25 (from the past 24 hours)   Basic Metabolic Panel   Result Value Ref Range    Glucose 121 (H) 74 - 99 mg/dL    Sodium 135 (L) 136 - 145 mmol/L    Potassium 4.1 3.5 - 5.3 mmol/L    Chloride 101 98 - 107 mmol/L    Bicarbonate 24 21 - 32 mmol/L    Anion Gap 14 10 - 20 mmol/L    Urea Nitrogen 21 6 - 23 mg/dL    Creatinine 1.08 (H) 0.50 - 1.05 mg/dL    eGFR 49 (L) >60 mL/min/1.73m*2    Calcium 9.3 8.6 - 10.3 mg/dL   Heparin Assay, UFH   Result Value Ref Range    Heparin Unfractionated 0.4 See Comment Below for Therapeutic Ranges IU/mL       Peripheral IV 01/02/25 20 G Right Antecubital (Active)   Placement Date/Time: 01/02/25 1214   Hand Hygiene Completed: Yes  Size (Gauge): 20 G  Orientation: Right  Location: Antecubital  Insertion attempts: 1  Patient Tolerance: Age appropriate   Number of days: 2       Peripheral IV 01/03/25 20 G Left Hand (Active)   Placement Date/Time: 01/03/25 0000   Size (Gauge): 20 G  Orientation: Left  Location: Hand  Placed by: faheem mathew   Number of days: 1        Troponin I, High Sensitivity   Date/Time Value Ref Range Status   01/02/2025 02:20 PM 38 (H) 0 - 13 ng/L Final   01/02/2025 12:27 PM 36 (H) 0 - 13 ng/L Final   12/20/2024 04:39 AM 8,220 (HH) 0 - 13 ng/L Final     Comment:     Previous result verified on 12/20/2024 0332 on specimen/case [id number] called with component [facility] for procedure Troponin, High Sensitivity, 1 Hour with value 1,426 ng/L.     BNP   Date/Time Value Ref Range Status   01/02/2025 12:27  (H) 0 - 99 pg/mL Final   12/20/2024 02:51  (H) 0 - 99 pg/mL Final     Hemoglobin A1C    Date/Time Value Ref Range Status   12/20/2024 02:04 AM 5.5 See comment % Final   01/24/2024 10:49 AM 5.4 see below % Final     LDL Calculated   Date/Time Value Ref Range Status   12/20/2024 02:04 AM 68 <=99 mg/dL Final     Comment:                                 Near   Borderline      AGE      Desirable  Optimal    High     High     Very High     0-19 Y     0 - 109     ---    110-129   >/= 130     ----    20-24 Y     0 - 119     ---    120-159   >/= 160     ----      >24 Y     0 -  99   100-129  130-159   160-189     >/=190     01/24/2024 10:49 AM 73 <=99 mg/dL Final     Comment:                                 Near   Borderline      AGE      Desirable  Optimal    High     High     Very High     0-19 Y     0 - 109     ---    110-129   >/= 130     ----    20-24 Y     0 - 119     ---    120-159   >/= 160     ----      >24 Y     0 -  99   100-129  130-159   160-189     >/=190       VLDL   Date/Time Value Ref Range Status   12/20/2024 02:04 AM 14 0 - 40 mg/dL Final   01/24/2024 10:49 AM 13 0 - 40 mg/dL Final   08/21/2023 12:56 AM 17 0 - 40 mg/dL Final   01/17/2023 09:43 AM 14 0 - 40 mg/dL Final   03/16/2021 10:15 AM 16 0 - 40 mg/dL Final       Assessment and Plan:  89 y.o. female with a history of COPD, hypertension, CVA, sick sinus syndrome status post pacemaker, HFpEF, recent NSTEMI within the past month.  Patient presented to ED for shortness of breath.  Patient's diuretics were discontinued when she was admitted for NSTEMI a few weeks ago.  Underwent cardiac catheterization on 12/20/2024 during previous visit, findings were suggestive of Takotsubo cardiomyopathy without evidence of obstructive coronary artery disease     Clinical Course:  1/4/2025: tolerating heparin ggt > anticipate transition to DOAC prior to discharge; appears euvolemic > recommend switching to PO diuretic therapy starting 1/5/2025 1/5/2025: transitioned to PO torsemide 20 mg daily; on apixaban; await upper extremity duplex     Overall  Recommendations:    #Recent NSTEMI 12/20  #Mildly elevated troponins  Plan:  -Troponin levels only mildly elevated, patient has complete absence of cardiac symptoms or new EKG changes concerning for ischemia  -Elevated troponins likely due to demand ischemia in setting of CHF exacerbation     #New thrombus in SVC  Plan:  -Will hold aspirin given patient is already on heparin drip and Plavix  -Continue apixaban      # Acute pulmonary edema secondary to HFpEF exacerbation (improving)   -Echocardiogram, 12/20/2024: EF 60-65%, moderate MVR, moderate/severe Tricuspid regurg  Plan:  -Transitioned to PO torsemide 1/5/2025  -Continue patient's home spironolactone and torsemide at discharge  -Continue daily weights, fluid restriction, follow ins and outs    # History of sick sinus syndrome status post pacemaker  Plan:  -Continue to monitor, follow-up with cardiology outpatient         Thank you for the cardiology consult. We will follow with you.       Nikko Meyer MD

## 2025-01-05 NOTE — CARE PLAN
The patient's goals for the shift include  comfort and safety    The clinical goals for the shift include injury free    Over the shift, the patient is making progress toward the following goals.

## 2025-01-05 NOTE — PROGRESS NOTES
"Yesenia Milner is a 89 y.o. female on day 3 of admission presenting with Shortness of breath.      Subjective   No events overnight.  Patient seen and examined at bedside.  She has no acute complaints.  She feels the swelling in her arm has improved, but she does admit to feeling \"fullness\" in her head.       Objective     Last Recorded Vitals  /57 (BP Location: Right arm, Patient Position: Lying)   Pulse 62   Temp 36.4 °C (97.5 °F) (Temporal)   Resp 18   Wt 75.8 kg (167 lb)   SpO2 95%   Intake/Output last 3 Shifts:    Intake/Output Summary (Last 24 hours) at 1/5/2025 0617  Last data filed at 1/5/2025 0500  Gross per 24 hour   Intake --   Output 400 ml   Net -400 ml       Admission Weight  Weight: 73.9 kg (163 lb) (01/02/25 1210)    Daily Weight  01/03/25 : 75.8 kg (167 lb)    Image Results  ECG 12 lead  AV dual-paced rhythm  Abnormal ECG  When compared with ECG of 21-DEC-2024 10:16,  Vent. rate has decreased BY  10 BPM      Physical Exam  Constitutional:       Appearance: Normal appearance.   HENT:      Head: Normocephalic and atraumatic.      Mouth/Throat:      Mouth: Mucous membranes are moist.      Pharynx: Oropharynx is clear.   Eyes:      Extraocular Movements: Extraocular movements intact.      Conjunctiva/sclera: Conjunctivae normal.      Pupils: Pupils are equal, round, and reactive to light.   Cardiovascular:      Rate and Rhythm: Normal rate and regular rhythm.      Pulses: Normal pulses.      Heart sounds: Normal heart sounds.   Pulmonary:      Effort: Pulmonary effort is normal. No respiratory distress.      Breath sounds: Normal breath sounds. No wheezing.      Comments: SpO2 95% on room air.  No conversational dyspnea.  Abdominal:      General: Bowel sounds are normal. There is no distension.      Palpations: Abdomen is soft.      Tenderness: There is no abdominal tenderness.   Musculoskeletal:         General: Normal range of motion.      Right lower leg: Edema present.      Left lower " leg: Edema present.   Skin:     General: Skin is warm and dry.   Neurological:      General: No focal deficit present.      Mental Status: She is alert and oriented to person, place, and time.   Psychiatric:         Mood and Affect: Mood normal.         Behavior: Behavior normal.      Relevant Results               Assessment/Plan                  Assessment & Plan  Shortness of breath    Yesenia Milner is a 89 y.o. female with past medical history significant for COPD, HTN, CVA, sick sinus syndrome s/p pacemaker, HFpEF, and recent NSTEMI within the past month who presents to the ED with complaints of shortness of breath.  States her diuretics were discontinued when she was admitted for her NSTEMI a couple weeks ago.  Denies any significant lower extremity swelling over the past couple weeks, however does admit to some leg heaviness over the past couple days. Denies any chest pains. Does not wear oxygen at home.      CODE STATUS: Full code     #Acute CHF,  today  #Acute pulmonary edema  #Bilateral pleural effusions, R > L  #Mild bilateral lower extremity edema  #Shortness of breath  Admit as inpatient with telemetry monitoring  Cardiology consult  IV Lasix daily, will likely need to resume home diuretics upon discharge  PT/OT for evaluation and treatment  Tylenol, breathing treatments as needed  Oxygen as needed  Cardiac diet  Q 4 vitals  CBC, BMP in the a.m.  Daily weights, fluid restriction  Repeat chest x-ray in 2 days  -Echocardiogram, 12/20/2024: Normal left ventricular ejection fraction, EF 60-65%, moderate mitral valve regurgitation, moderate/severe tricuspid regurgitation, moderately elevated RVSP.     #Nonocclusive SVC thrombus and also potentially new thrombus in right internal jugular vein  Vascular surgery consult  CTA chest negative for PE or thoracic aortic dissection, does show regions of nonenhancement along anterior aspect of mid SVC suggestive of nonocclusive SVC thrombus, additionally  there is focal region of nonenhancement lower margin of right internal jugular vein which may represent new thrombus.  Heparin infusion  Heparin assays     #Recent NSTEMI 12/20  #Mildly elevated troponins  Denies chest pains  EKG shows AV dual paced rhythm, rate 69, no acute ischemia  Initial troponin 36, repeat troponin 38.     #Anemia, chronic  Stable per EMR review  No current signs of bleeding, monitor with a.m. labs     Continue home medications as appropriate     Chronic conditions:  COPD, HTN, CVA, sick sinus syndrome s/p pacemaker, HFpEF, NSTEMI     #DVT prophylaxis  Heparin  Ambulation, SCDs     1/3: continue blood thinners, likely, sob caused by effuions, consult vascular and cardio     1/4: Patient remains on heparin drip.  Await vascular surgery recommendations.  Continue diuretics.              Chuck Enamorado, DO

## 2025-01-06 ENCOUNTER — APPOINTMENT (OUTPATIENT)
Dept: VASCULAR MEDICINE | Facility: HOSPITAL | Age: OVER 89
End: 2025-01-06
Payer: MEDICARE

## 2025-01-06 PROBLEM — I82.C11: Status: ACTIVE | Noted: 2025-01-06

## 2025-01-06 PROBLEM — I82.210: Status: ACTIVE | Noted: 2025-01-06

## 2025-01-06 LAB
ANION GAP SERPL CALC-SCNC: 10 MMOL/L (ref 10–20)
BUN SERPL-MCNC: 24 MG/DL (ref 6–23)
CALCIUM SERPL-MCNC: 9.1 MG/DL (ref 8.6–10.3)
CHLORIDE SERPL-SCNC: 103 MMOL/L (ref 98–107)
CO2 SERPL-SCNC: 24 MMOL/L (ref 21–32)
CREAT SERPL-MCNC: 0.99 MG/DL (ref 0.5–1.05)
EGFRCR SERPLBLD CKD-EPI 2021: 54 ML/MIN/1.73M*2
GLUCOSE SERPL-MCNC: 92 MG/DL (ref 74–99)
HOLD SPECIMEN: NORMAL
POTASSIUM SERPL-SCNC: 4.3 MMOL/L (ref 3.5–5.3)
SODIUM SERPL-SCNC: 133 MMOL/L (ref 136–145)

## 2025-01-06 PROCEDURE — 2500000001 HC RX 250 WO HCPCS SELF ADMINISTERED DRUGS (ALT 637 FOR MEDICARE OP): Performed by: INTERNAL MEDICINE

## 2025-01-06 PROCEDURE — 2500000001 HC RX 250 WO HCPCS SELF ADMINISTERED DRUGS (ALT 637 FOR MEDICARE OP): Performed by: STUDENT IN AN ORGANIZED HEALTH CARE EDUCATION/TRAINING PROGRAM

## 2025-01-06 PROCEDURE — 93970 EXTREMITY STUDY: CPT

## 2025-01-06 PROCEDURE — 99232 SBSQ HOSP IP/OBS MODERATE 35: CPT | Performed by: STUDENT IN AN ORGANIZED HEALTH CARE EDUCATION/TRAINING PROGRAM

## 2025-01-06 PROCEDURE — 1100000001 HC PRIVATE ROOM DAILY

## 2025-01-06 PROCEDURE — 82374 ASSAY BLOOD CARBON DIOXIDE: CPT | Performed by: PHYSICIAN ASSISTANT

## 2025-01-06 PROCEDURE — 99232 SBSQ HOSP IP/OBS MODERATE 35: CPT | Performed by: NURSE PRACTITIONER

## 2025-01-06 PROCEDURE — 2500000001 HC RX 250 WO HCPCS SELF ADMINISTERED DRUGS (ALT 637 FOR MEDICARE OP): Performed by: PHYSICIAN ASSISTANT

## 2025-01-06 PROCEDURE — 36415 COLL VENOUS BLD VENIPUNCTURE: CPT | Performed by: PHYSICIAN ASSISTANT

## 2025-01-06 PROCEDURE — 94640 AIRWAY INHALATION TREATMENT: CPT

## 2025-01-06 PROCEDURE — 93970 EXTREMITY STUDY: CPT | Performed by: SURGERY

## 2025-01-06 RX ADMIN — APIXABAN 5 MG: 5 TABLET, FILM COATED ORAL at 21:22

## 2025-01-06 RX ADMIN — Medication 1 TABLET: at 10:20

## 2025-01-06 RX ADMIN — CLOPIDOGREL BISULFATE 75 MG: 75 TABLET ORAL at 10:20

## 2025-01-06 RX ADMIN — METOPROLOL TARTRATE 12.5 MG: 25 TABLET, FILM COATED ORAL at 21:22

## 2025-01-06 RX ADMIN — METOPROLOL TARTRATE 12.5 MG: 25 TABLET, FILM COATED ORAL at 10:20

## 2025-01-06 RX ADMIN — TORSEMIDE 20 MG: 20 TABLET ORAL at 10:20

## 2025-01-06 RX ADMIN — FLUTICASONE FUROATE AND VILANTEROL TRIFENATATE 1 PUFF: 200; 25 POWDER RESPIRATORY (INHALATION) at 08:01

## 2025-01-06 RX ADMIN — APIXABAN 5 MG: 5 TABLET, FILM COATED ORAL at 10:20

## 2025-01-06 ASSESSMENT — COGNITIVE AND FUNCTIONAL STATUS - GENERAL
MOBILITY SCORE: 24
DAILY ACTIVITIY SCORE: 24

## 2025-01-06 ASSESSMENT — PAIN SCALES - GENERAL: PAINLEVEL_OUTOF10: 0 - NO PAIN

## 2025-01-06 NOTE — PROGRESS NOTES
Physical Therapy                 Therapy Communication Note    Patient Name: Yesenia Milner  MRN: [id number]  Department: [facility] VASCULAR  Room: 610/610-A  Today's Date: 1/6/2025     Discipline: Physical Therapy    PT Missed Visit: Yes     Missed Visit Reason: Patient placed on medical hold (Pt off the floor for UE duplex, will hold until medically cleared.)    Missed Time: Attempt       Clothing/Cell Phone/PDA (specify)

## 2025-01-06 NOTE — PROGRESS NOTES
"Yesenia Milner is a 89 y.o. female on day 4 of admission presenting with Shortness of breath.    Subjective   Patient was evaluated at bedside with Dr. Julian.  Patient is asymptomatic of her SVC thrombus and right IJ thrombus.  Patient transitioned to apixaban.    Objective     Vitals:    01/06/25 0740   BP: 112/54   Pulse: 67   Resp:    Temp: 36.5 °C (97.7 °F)   SpO2: 94%      Physical Exam  Constitutional: Well developed , awake/alert/oriented x3, in no distress,  Eyes: Clear sclera  ENMT: mucous membranes are moist, no apparent injury, no lesions seen,   Head/neck: Neck supple, trachea  is midline, no apparent injury, no bruits, no mass, no stridor  Respiratory/thorax: Breath sounds clear and equal diminished bilaterally throughout, thorax symmetric  Cardiac/Vascular: Regular, rate and rhythm, no murmurs, 2+ radial pulses, bilateral DPs +1  Gastrointestinal: Nondistended soft nontender, positive bowel sounds, no bruits.  Musculoskeletal: Moves all extremities, limited range of motion , no joint swelling,   Extremities: No cyanosis,   Neurological: Alert and oriented x3,   Lymphatic: No significant lymphadenopathy  Skin: Warm and dry, no lesions, no rashes  Psychological: Appropriate mood and behavior  Blood pressure 112/54, pulse 67, temperature 36.5 °C (97.7 °F), resp. rate 18, height 1.626 m (5' 4\"), weight 75.8 kg (167 lb), SpO2 94%.        Intake/Output last 3 Shifts:  I/O last 3 completed shifts:  In: - (0 mL/kg)   Out: 400 (5.3 mL/kg) [Urine:400 (0.1 mL/kg/hr)]  Weight: 75.7 kg     Patient Active Problem List    Diagnosis Date Noted    Shortness of breath 01/02/2025    NSTEMI (non-ST elevated myocardial infarction) (Multi) 12/20/2024    Injury of kidney 06/26/2024    Sick sinus syndrome (Multi) 02/26/2024    Impacted cerumen 02/22/2024    Urinary tract infection 02/22/2024    Hand paresthesia 02/22/2024    Benign paroxysmal positional vertigo 09/20/2023    Leg swelling 09/20/2023    Macular degeneration " 09/20/2023    Mitral valve insufficiency 09/20/2023    Moderate mitral valve regurgitation 09/20/2023    Overweight with body mass index (BMI) 25.0-29.9 09/20/2023    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    Post-menopausal 09/20/2023    Ischemic cerebrovascular accident (CVA) (Multi) 09/05/2023    Hyperglycemia 08/23/2023    Aortic stenosis, mild 05/18/2023    Arthritis 05/18/2023    Chronic diastolic congestive heart failure 05/18/2023    Complete heart block 05/18/2023    COPD (chronic obstructive pulmonary disease) (Multi) 05/18/2023    History of paroxysmal supraventricular tachycardia 05/18/2023    HTN (hypertension) 05/18/2023    Paresthesia 05/18/2023    Presence of permanent cardiac pacemaker 05/18/2023    Raynauds disease 05/18/2023    Sensorineural hearing loss (SNHL) of both ears 05/18/2023    Dyspnea on minimal exertion 05/18/2023    Systolic CHF (Multi) 05/18/2023    Vertigo 05/18/2023    Gross hematuria 10/07/2020    Benign neoplasm of skin of foot 07/03/2016    Plantar wart of left foot 07/03/2016    Ulcer of toe of right foot (Multi) 04/24/2016    Ulcer of toe of left foot (Multi) 12/06/2015    Varicose veins of lower extremities with inflammation 05/31/2015    Acquired deformity of toe 05/31/2015    Dermatophytosis of nail 08/09/2014    Venous insufficiency 08/09/2014          Current Facility-Administered Medications:     acetaminophen (Tylenol) tablet 650 mg, 650 mg, oral, q4h PRN **OR** acetaminophen (Tylenol) oral liquid 650 mg, 650 mg, oral, q4h PRN **OR** acetaminophen (Tylenol) suppository 650 mg, 650 mg, rectal, q4h PRN, Noel Gusman PA-C    albuterol 2.5 mg /3 mL (0.083 %) nebulizer solution 2.5 mg, 2.5 mg, nebulization, q2h PRN, Iglesia Gabriel DO    apixaban (Eliquis) tablet 5 mg, 5 mg, oral, BID, Chuck Enamorado DO, 5 mg at 01/06/25 1020    [Held by provider] aspirin chewable tablet 81 mg, 81 mg, oral, Daily, Noel Gusman PA-C, 81 mg at 01/03/25 0949    calcium  carbonate-vitamin D3 500 mg-5 mcg (200 unit) per tablet 1 tablet, 1 tablet, oral, Daily, Noel Gusman PA-C, 1 tablet at 01/06/25 1020    clopidogrel (Plavix) tablet 75 mg, 75 mg, oral, Daily, Noel Gusman PA-C, 75 mg at 01/06/25 1020    fluticasone furoate-vilanteroL (Breo Ellipta) 200-25 mcg/dose inhaler 1 puff, 1 puff, inhalation, Daily, Noel Gusman PA-C, 1 puff at 01/06/25 0801    heparin (porcine) injection 3,000-6,000 Units, 3,000-6,000 Units, intravenous, q4h PRN, Jac Odonnell MD    lubricating eye drops ophthalmic solution 1 drop, 1 drop, Both Eyes, PRN, Noel Gusman PA-C    metoprolol tartrate (Lopressor) tablet 12.5 mg, 12.5 mg, oral, BID, Noel Gusman PA-C, 12.5 mg at 01/06/25 1020    oxygen (O2) therapy, , inhalation, Continuous PRN - O2/gases, Noel Gusman PA-C    polyethylene glycol (Glycolax, Miralax) packet 17 g, 17 g, oral, Daily PRN, Noel Gusman PA-C    torsemide (Demadex) tablet 20 mg, 20 mg, oral, Daily, Nikko Meyer MD, 20 mg at 01/06/25 1020     Lab Results   Component Value Date    WBC 5.4 01/05/2025    HGB 9.2 (L) 01/05/2025    HCT 27.8 (L) 01/05/2025     01/05/2025    CHOL 145 12/20/2024    TRIG 70 12/20/2024    HDL 63.1 12/20/2024    ALT 16 12/20/2024    AST 21 12/20/2024     (L) 01/06/2025    K 4.3 01/06/2025     01/06/2025    CREATININE 0.99 01/06/2025    BUN 24 (H) 01/06/2025    CO2 24 01/06/2025    TSH 2.25 01/17/2023    INR 1.1 12/21/2024    HGBA1C 5.5 12/20/2024       ECG 12 lead    Result Date: 1/3/2025  AV dual-paced rhythm Abnormal ECG When compared with ECG of 21-DEC-2024 10:16, Vent. rate has decreased BY  10 BPM    CT angio chest for pulmonary embolism    Addendum Date: 1/2/2025    Findings discussed withDr. Jac Odonnell at 7:00 PM 1/2/2025 Signed by Ayan Martins MD    Result Date: 1/2/2025  STUDY: CT Angiogram of the Chest; 01/02/2025, 5:06 PM INDICATION: Recent admission for NSTEMI, non occlusive  thrombus possibly seen in SVC at that time. COMPARISON: CTA CAP 12/20/2024. ACCESSION NUMBER(S): [id number] ORDERING CLINICIAN: GALE MARQUEZ TECHNIQUE:  CTA of the chest was performed with intravenous contrast. Images are reviewed and processed at a workstation according to the CT angiogram protocol with 3-D and/or MIP post processing imaging generated.  Omnipaque 350, 65 mL was administered intravenously. Automated mA/kV exposure control was utilized and patient examination was performed in strict accordance with principles of ALARA. FINDINGS: Pulmonary arteries are adequately opacified without acute or chronic filling defects.  The thoracic aorta is normal in course and caliber without dissection or aneurysm.  Contrast was injected within the right upper extremity vein.  There is a focal region of nonenhancement within the anterior aspect of the superior vena cava at the level of the pacemaker leads image 99 and image 125.  Overall length of the region of nonenhancement in the SVC has increased compared to exam 12/20/2024.  Additionally there is a region of nonenhancement right internal jugular vein image 7 series 402.  Small amount of contrast refluxes into the azygos vein. Mild cardiac enlargement.  Pacemaker leads within the right ventricle and right atrium.  Small mediastinal and hilar lymph nodes are unchanged. Moderate size right pleural effusion and small left pleural effusion. The airways are patent. Multifocal groundglass regions of airspace density both lungs. Upper abdomen demonstrates no acute pathology. There are no acute fractures.  No suspicious bony lesions.    Negative for pulmonary embolism or thoracic aortic dissection.  Again identified are regions of nonenhancement along the anterior aspect of the mid SVC and suggestive of nonocclusive SVC thrombus. Compared to prior exam overall length of nonenhancement increased. Additionally there is a focal region of nonenhancement lower margin of  the right internal jugular vein and may represent new thrombus. Moderate size right pleural effusion and small left pleural effusion are new. Cardiomegaly. Multifocal groundglass regions of airspace density both lungs could be due to pulmonary edema. Signed by Ayan Martins MD    Point of Care Ultrasound    Result Date: 1/2/2025  Jac Marquez MD     1/2/2025  5:49 PM Performed by: Jac Marquez MD Authorized by: Jac Marquez MD  Cardiac Indications: shortness of breath Procedure: Cardiac Ultrasound Findings:  Views: parasternal long, parasternal short, apical four and subxiphoid Effusion: The pericardial space was visualized and was positive for a PERICARDIAL EFFUSION. Activity: Ventricular contractions were visualized. LV: LV systolic function was NORMAL. RV: RV size was NORMAL. Impression: Cardiac: The focused cardiac ultrasound exam was NORMAL. Comments: No appreciable pericardial effusion.  Left ventricular function appears adequate.  No signs of right heart strain appreciated.    XR chest 2 views    Result Date: 1/2/2025  Interpreted By:  Darvin Vieira, STUDY: XR CHEST 2 VIEWS; 1/2/2025 1:05 pm   INDICATION: Signs/Symptoms:shortness of breath   COMPARISON: December 2024.   ACCESSION NUMBER(S): [id number]   ORDERING CLINICIAN: JAC MARQUEZ   TECHNIQUE: Number of films: Two view chest radiographs.   FINDINGS: A pacemaker is again noted, with the leads overlying the right atrium and right ventricle. The cardiomediastinal silhouette is within normal limits. The lungs are hyperinflated, with prominent interstitial markings bilaterally and increased AP diameter of the chest. There is no pneumothorax, confluent infiltrates or pleural effusions. Previously noted superimposed bilateral interstitial infiltrates have resolved. Degenerative changes involve the thoracic spine.       COPD/chronic interstitial lung disease again noted without acute infiltrates.     Signed by: Darvin Vieira  1/2/2025 1:45 PM Dictation workstation:   [id number]              Assessment & Plan  Shortness of breath    Acute superior vena cava thrombosis (Multi)    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)  Patient evaluated by this practitioner and Dr. Julian.  Patient transition to DOAC (Eliquis).  Asymptomatic of her right IJ and SVC thrombus.  No planned mechanical thrombectomy.  Continue to monitor for bleeding.  Patient lives at home no history of falls would recommend PT OT evaluate for home safety.  Awaiting results of vascular ultrasound of upper extremity.    Thank you very much for allowing Vascular Surgery to be involved in the care of your patient sincerely Antonio Bar APRN-CNP .  (This note was generated with voice recognition software and may contain errors including spelling, grammar, syntax and missed recognition of what was dictated, of which may not have been fully corrected)        Antonio Bar, APRN-CNP

## 2025-01-06 NOTE — CARE PLAN
The patient's goals for the shift include      The clinical goals for the shift include comfort and safety    Over the shift, the patient did make progress toward the following goals.

## 2025-01-06 NOTE — ASSESSMENT & PLAN NOTE
Patient evaluated by this practitioner and Dr. Julian.  Patient transition to DOAC (Eliquis).  Asymptomatic of her right IJ and SVC thrombus.  No planned mechanical thrombectomy.  Continue to monitor for bleeding.  Patient lives at home no history of falls would recommend PT OT evaluate for home safety.  Awaiting results of vascular ultrasound of upper extremity.    Thank you very much for allowing Vascular Surgery to be involved in the care of your patient sincerely Antonio MONTALVO .  (This note was generated with voice recognition software and may contain errors including spelling, grammar, syntax and missed recognition of what was dictated, of which may not have been fully corrected)

## 2025-01-06 NOTE — PROGRESS NOTES
July Milner is a 89 y.o. female on day 4 of admission presenting with Shortness of breath.      Subjective   No events overnight.  Patient seen and examined at bedside.  She has no acute complaints.  Her head is feeling better today. On doac, cleared by vascular       Objective     Last Recorded Vitals  /53   Pulse 66   Temp 36.5 °C (97.7 °F)   Resp 18   Wt 75.8 kg (167 lb)   SpO2 91%   Intake/Output last 3 Shifts:  No intake or output data in the 24 hours ending 01/06/25 1249      Admission Weight  Weight: 73.9 kg (163 lb) (01/02/25 1210)    Daily Weight  01/03/25 : 75.8 kg (167 lb)    Image Results  Vascular US Upper Extremity Venous Duplex Bilateral  Preliminary Cardiology Report             [place] [address]  Tel [phone] and Fax [phone]               [facility] Lab Report     [facility]       Patient Name:      JULY MILNER Reading Physician: [id number] Skyler Meneses MD  Study Date:        1/6/2025         Ordering           [id number] [facility]                                      Physician:  MRN/PID:           [id number]         Technologist:      Tammy Amador JEREMIAH  Accession#:        [id number]     Technologist 2:  Date of Birth/Age: 7/29/1933        Encounter#:        [id number]  Gender:            F  Admission Status:  Inpatient        Location           [facility]                                      Performed:       Diagnosis/ICD: Generalized edema (anasarca)-R60.1  Indication:    Limb edema  Procedure/CPT: 51990 Peripheral venous duplex scan for DVT complete       PRELIMINARY CONCLUSIONS:  Right Upper Venous: No evidence of acute deep vein thrombus visualized in the right upper extremity.  Left Upper Venous: No evidence of acute deep vein thrombus visualized in the left upper extremity.     Imaging & Doppler Findings:     Right             Compressible Thrombus        Flow  Internal Jugular     Yes        None   Spontaneous/Phasic  Subclavian           Yes        None   Spontaneous/Phasic  Axillary             Yes        None   Spontaneous/Phasic  Brachial             Yes        None  Cephalic             Yes        None  Basilic              Yes        None       Left             Compress Thrombus        Flow  Internal Jugular   Yes      None   Spontaneous/Phasic  Subclavian         Yes      None   Spontaneous/Phasic  Axillary           Yes      None   Spontaneous/Phasic  Brachial           Yes      None  Cephalic           Yes      None  Basilic            Yes      None    VASCULAR PRELIMINARY REPORT  completed by Tammy Amador RVT on 1/6/2025 at 12:29:11 PM       ** Final **  XR chest 1 view  Narrative: Interpreted By:  Frederic Granados,   STUDY:  XR CHEST 1 VIEW; 1/4/2025 6:24 am      INDICATION:  Signs/Symptoms:monitor pulm edema.      COMPARISON:  Chest x-ray 01/02/2025      ACCESSION NUMBER(S):  [id number]      ORDERING CLINICIAN:  LUIS CR      TECHNIQUE:  1 view of the chest was performed.      FINDINGS:  Right-greater-than-left ill-defined basal infiltrate/atelectasis.  Trace bilateral pleural effusion.. No pneumothorax.  The  cardiomediastinal silhouette is stable. Left-sided chest wall  pacemaker noted.      Impression: 1. Bilateral ill-defined basal pulmonary infiltrate/atelectasis along  with a trace bilateral pleural effusion, new from chest x-ray  01/02/2025. Advise clinical correlation and follow-up to ensure  resolution.      Signed by: Frederic Granados 1/6/2025 8:56 AM  Dictation workstation:   [id number]      Physical Exam  Constitutional:       Appearance: Normal appearance.   HENT:      Head: Normocephalic and atraumatic.      Mouth/Throat:      Mouth: Mucous membranes are moist.      Pharynx: Oropharynx is clear.   Eyes:      Extraocular Movements: Extraocular movements intact.      Conjunctiva/sclera: Conjunctivae  normal.      Pupils: Pupils are equal, round, and reactive to light.   Cardiovascular:      Rate and Rhythm: Normal rate and regular rhythm.      Pulses: Normal pulses.      Heart sounds: Normal heart sounds.   Pulmonary:      Effort: Pulmonary effort is normal. No respiratory distress.      Breath sounds: Normal breath sounds. No wheezing.      Comments: SpO2 95% on room air.  No conversational dyspnea.  Abdominal:      General: Bowel sounds are normal. There is no distension.      Palpations: Abdomen is soft.      Tenderness: There is no abdominal tenderness.   Musculoskeletal:         General: Normal range of motion.      Right lower leg: Edema present.      Left lower leg: Edema present.   Skin:     General: Skin is warm and dry.   Neurological:      General: No focal deficit present.      Mental Status: She is alert and oriented to person, place, and time.   Psychiatric:         Mood and Affect: Mood normal.         Behavior: Behavior normal.      Relevant Results               Assessment/Plan                  Assessment & Plan  Shortness of breath    Acute superior vena cava thrombosis (Multi)    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)    Yesenia Milner is a 89 y.o. female with past medical history significant for COPD, HTN, CVA, sick sinus syndrome s/p pacemaker, HFpEF, and recent NSTEMI within the past month who presents to the ED with complaints of shortness of breath.  States her diuretics were discontinued when she was admitted for her NSTEMI a couple weeks ago.  Denies any significant lower extremity swelling over the past couple weeks, however does admit to some leg heaviness over the past couple days. Denies any chest pains. Does not wear oxygen at home.      CODE STATUS: Full code     #Acute CHF,  today  #Acute pulmonary edema  #Bilateral pleural effusions, R > L  #Mild bilateral lower extremity edema  #Shortness of breath  Admit as inpatient with telemetry  monitoring  Cardiology consult  IV Lasix daily, will likely need to resume home diuretics upon discharge  PT/OT for evaluation and treatment  Tylenol, breathing treatments as needed  Oxygen as needed  Cardiac diet  Q 4 vitals  CBC, BMP in the a.m.  Daily weights, fluid restriction  Repeat chest x-ray in 2 days  -Echocardiogram, 12/20/2024: Normal left ventricular ejection fraction, EF 60-65%, moderate mitral valve regurgitation, moderate/severe tricuspid regurgitation, moderately elevated RVSP.     #Nonocclusive SVC thrombus and also potentially new thrombus in right internal jugular vein  Vascular surgery consult  CTA chest negative for PE or thoracic aortic dissection, does show regions of nonenhancement along anterior aspect of mid SVC suggestive of nonocclusive SVC thrombus, additionally there is focal region of nonenhancement lower margin of right internal jugular vein which may represent new thrombus.  Heparin infusion  Heparin assays     #Recent NSTEMI 12/20  #Mildly elevated troponins  Denies chest pains  EKG shows AV dual paced rhythm, rate 69, no acute ischemia  Initial troponin 36, repeat troponin 38.     #Anemia, chronic  Stable per EMR review  No current signs of bleeding, monitor with a.m. labs     Continue home medications as appropriate     Chronic conditions:  COPD, HTN, CVA, sick sinus syndrome s/p pacemaker, HFpEF, NSTEMI     #DVT prophylaxis  Heparin  Ambulation, SCDs     1/3: continue blood thinners, likely, sob caused by effuions, consult vascular and cardio     1/4: Patient remains on heparin drip.  Await vascular surgery recommendations.  Continue diuretics.    11/5: Patient changed to p.o. torsemide per cardiology.  Patient also transitioned from heparin drip to p.o. Eliquis per vascular surgery recommendations.  No plan for intervention.  Patient will need upper extremity ultrasounds tomorrow.     1/6; extremities nomrla. Pt/ot, anticpate dc tomorrow          Iglesia Gabriel, DO

## 2025-01-06 NOTE — PROGRESS NOTES
Occupational Therapy                 Therapy Communication Note    Patient Name: Yesenia Milner  MRN: [id number]  Department: [facility] VASCULAR  Room: 610/610-A  Today's Date: 1/6/2025     Discipline: Occupational Therapy          Missed Visit Reason: Missed Visit Reason: Patient placed on medical hold (Pt off the floor for UE duplex, will hold until medically cleared.)    Missed Time: Cancel    Comment:

## 2025-01-06 NOTE — PROGRESS NOTES
01/06/25 1648   Discharge Planning   Living Arrangements Alone   Support Systems Children;Family members   Type of Residence Private residence   Number of Stairs to Enter Residence 0   Number of Stairs Within Residence 21   Expected Discharge Disposition Home H     I met with patient at the bedside, verified insurance and demographics.  Patient lives alone.  Since her very recent stroke, she keeps a walker at her bedside in case she has to get up to use the bathroom in the middle of the night.  Per patient she has no residual deficits from stroke.  Patient denies falls, still drives and is independent with ADLs.  Her home is a split level and she has chair lifts on both sets of stairs.  PT/OT evals ordered, pending, to help determine discharge planning needs.  Care Coordination team following.  Frank BASS TCC

## 2025-01-07 ENCOUNTER — DOCUMENTATION (OUTPATIENT)
Dept: HOME HEALTH SERVICES | Facility: HOME HEALTH | Age: OVER 89
End: 2025-01-07
Payer: MEDICARE

## 2025-01-07 ENCOUNTER — PHARMACY VISIT (OUTPATIENT)
Dept: PHARMACY | Facility: CLINIC | Age: OVER 89
End: 2025-01-07
Payer: COMMERCIAL

## 2025-01-07 VITALS
OXYGEN SATURATION: 94 % | SYSTOLIC BLOOD PRESSURE: 101 MMHG | DIASTOLIC BLOOD PRESSURE: 51 MMHG | HEIGHT: 64 IN | RESPIRATION RATE: 20 BRPM | WEIGHT: 167 LBS | BODY MASS INDEX: 28.51 KG/M2 | HEART RATE: 63 BPM | TEMPERATURE: 98.2 F

## 2025-01-07 PROBLEM — R06.02 SHORTNESS OF BREATH: Status: RESOLVED | Noted: 2025-01-02 | Resolved: 2025-01-07

## 2025-01-07 LAB
ANION GAP SERPL CALC-SCNC: 9 MMOL/L (ref 10–20)
BUN SERPL-MCNC: 27 MG/DL (ref 6–23)
CALCIUM SERPL-MCNC: 8.9 MG/DL (ref 8.6–10.3)
CHLORIDE SERPL-SCNC: 103 MMOL/L (ref 98–107)
CO2 SERPL-SCNC: 26 MMOL/L (ref 21–32)
CREAT SERPL-MCNC: 1.08 MG/DL (ref 0.5–1.05)
EGFRCR SERPLBLD CKD-EPI 2021: 49 ML/MIN/1.73M*2
GLUCOSE SERPL-MCNC: 87 MG/DL (ref 74–99)
HOLD SPECIMEN: NORMAL
POTASSIUM SERPL-SCNC: 4 MMOL/L (ref 3.5–5.3)
SODIUM SERPL-SCNC: 134 MMOL/L (ref 136–145)

## 2025-01-07 PROCEDURE — 2500000001 HC RX 250 WO HCPCS SELF ADMINISTERED DRUGS (ALT 637 FOR MEDICARE OP): Performed by: PHYSICIAN ASSISTANT

## 2025-01-07 PROCEDURE — 2500000001 HC RX 250 WO HCPCS SELF ADMINISTERED DRUGS (ALT 637 FOR MEDICARE OP): Performed by: STUDENT IN AN ORGANIZED HEALTH CARE EDUCATION/TRAINING PROGRAM

## 2025-01-07 PROCEDURE — 97161 PT EVAL LOW COMPLEX 20 MIN: CPT | Mod: GP

## 2025-01-07 PROCEDURE — RXMED WILLOW AMBULATORY MEDICATION CHARGE

## 2025-01-07 PROCEDURE — 97165 OT EVAL LOW COMPLEX 30 MIN: CPT | Mod: GO

## 2025-01-07 PROCEDURE — 99238 HOSP IP/OBS DSCHRG MGMT 30/<: CPT | Performed by: STUDENT IN AN ORGANIZED HEALTH CARE EDUCATION/TRAINING PROGRAM

## 2025-01-07 PROCEDURE — 82374 ASSAY BLOOD CARBON DIOXIDE: CPT | Performed by: PHYSICIAN ASSISTANT

## 2025-01-07 PROCEDURE — 36415 COLL VENOUS BLD VENIPUNCTURE: CPT | Performed by: PHYSICIAN ASSISTANT

## 2025-01-07 PROCEDURE — 2500000001 HC RX 250 WO HCPCS SELF ADMINISTERED DRUGS (ALT 637 FOR MEDICARE OP): Performed by: INTERNAL MEDICINE

## 2025-01-07 RX ORDER — TORSEMIDE 20 MG/1
20 TABLET ORAL DAILY
Qty: 30 TABLET | Refills: 0 | Status: ON HOLD | OUTPATIENT
Start: 2025-01-08

## 2025-01-07 RX ADMIN — CLOPIDOGREL BISULFATE 75 MG: 75 TABLET ORAL at 09:34

## 2025-01-07 RX ADMIN — APIXABAN 5 MG: 5 TABLET, FILM COATED ORAL at 09:34

## 2025-01-07 RX ADMIN — METOPROLOL TARTRATE 12.5 MG: 25 TABLET, FILM COATED ORAL at 09:35

## 2025-01-07 RX ADMIN — Medication 1 TABLET: at 09:34

## 2025-01-07 RX ADMIN — TORSEMIDE 20 MG: 20 TABLET ORAL at 09:35

## 2025-01-07 ASSESSMENT — COGNITIVE AND FUNCTIONAL STATUS - GENERAL
STANDING UP FROM CHAIR USING ARMS: A LITTLE
TOILETING: A LITTLE
DRESSING REGULAR UPPER BODY CLOTHING: A LITTLE
DRESSING REGULAR LOWER BODY CLOTHING: A LITTLE
WALKING IN HOSPITAL ROOM: A LITTLE
PERSONAL GROOMING: A LITTLE
CLIMB 3 TO 5 STEPS WITH RAILING: A LOT
WALKING IN HOSPITAL ROOM: A LITTLE
STANDING UP FROM CHAIR USING ARMS: A LITTLE
PERSONAL GROOMING: A LITTLE
DRESSING REGULAR UPPER BODY CLOTHING: A LITTLE
EATING MEALS: A LITTLE
DAILY ACTIVITIY SCORE: 18
MOVING TO AND FROM BED TO CHAIR: A LITTLE
HELP NEEDED FOR BATHING: A LITTLE
DRESSING REGULAR LOWER BODY CLOTHING: A LITTLE
HELP NEEDED FOR BATHING: A LITTLE
MOVING TO AND FROM BED TO CHAIR: A LITTLE
MOBILITY SCORE: 21
TOILETING: A LITTLE
MOBILITY SCORE: 18
DAILY ACTIVITIY SCORE: 19
TURNING FROM BACK TO SIDE WHILE IN FLAT BAD: A LITTLE

## 2025-01-07 ASSESSMENT — PAIN SCALES - GENERAL
PAINLEVEL_OUTOF10: 0 - NO PAIN
PAINLEVEL_OUTOF10: 0 - NO PAIN

## 2025-01-07 NOTE — PROGRESS NOTES
Physical Therapy    Physical Therapy Evaluation    Patient Name: Yesenia Milner  MRN: [id number]  Today's Date: 1/7/2025   Time Calculation  Start Time: 0947  Stop Time: 0957  Time Calculation (min): 10 min  610/610-A    Assessment/Plan   PT Assessment  PT Assessment Results: Decreased strength, Decreased endurance, Impaired balance, Decreased mobility, Decreased safety awareness  Rehab Prognosis: Good  Barriers to Discharge Home: No anticipated barriers  Evaluation/Treatment Tolerance: Patient limited by fatigue  End of Session Communication: Bedside nurse  Assessment Comment: Continued skilled PT intervention indicated to facilitate increased strength, balance & gait stability  End of Session Patient Position: Up in chair, Alarm off, not on at start of session (le's elevated, call light in reach)  IP OR SWING BED PT PLAN  Inpatient or Swing Bed: Inpatient  PT Plan  Treatment/Interventions: Bed mobility, Transfer training, Gait training, Balance training, Endurance training, Therapeutic exercise, Therapeutic activity  PT Plan: Ongoing PT  PT Frequency: 3 times per week  PT Recommended Transfer Status: Assist x1  PT - OK to Discharge: Yes (when cleared by medical team)    Subjective     Current Problem:  1. Shortness of breath        2. Acute pulmonary edema        3. Acute thrombosis of superior vena cava (Multi)  Vascular US Upper Extremity Venous Duplex Bilateral    Vascular US Upper Extremity Venous Duplex Bilateral      4. Acquired deformity of toe        5. 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 Generalized edema  Vascular US Upper Extremity Venous Duplex Bilateral      7. 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)        8. Acute superior vena cava thrombosis (Multi)  Follow Up In Cardiology        Patient Active Problem List   Diagnosis    Aortic stenosis,  mild    Arthritis    Chronic diastolic congestive heart failure    Complete heart block    COPD (chronic obstructive pulmonary disease) (Multi)    History of paroxysmal supraventricular tachycardia    HTN (hypertension)    Paresthesia    Presence of permanent cardiac pacemaker    Raynauds disease    Sensorineural hearing loss (SNHL) of both ears    Dyspnea on minimal exertion    Systolic CHF (Multi)    Varicose veins of lower extremities with inflammation    Vertigo    Ischemic cerebrovascular accident (CVA) (Multi)    Acquired deformity of toe    Benign neoplasm of skin of foot    Benign paroxysmal positional vertigo    Dermatophytosis of nail    Gross hematuria    Leg swelling    Macular degeneration    Mitral valve insufficiency    Moderate mitral valve regurgitation    Overweight with body mass index (BMI) 25.0-29.9    Plantar wart of left foot    Ulcer of toe of left foot (Multi)    Ulcer of toe of right foot (Multi)    Venous insufficiency    Dysarthria following cerebral infarction    Post-menopausal    Impacted cerumen    Urinary tract infection    Hyperglycemia    Hand paresthesia    Sick sinus syndrome (Multi)    Injury of kidney    NSTEMI (non-ST elevated myocardial infarction) (Multi)    Shortness of breath    Acute superior vena cava thrombosis (Multi)    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)       General Visit Information:  General  Reason for Referral: PT eval & treat/impaired mobility; 89 y.o. female with past medical history significant for COPD, HTN, CVA, sick sinus syndrome s/p pacemaker, HFpEF, and recent NSTEMI within the past month who presents to the ED with complaints of shortness of breath.  States her diuretics were discontinued when she was admitted for her NSTEMI a couple weeks ago.  Denies any significant lower extremity swelling over the past couple weeks, however does admit to some leg heaviness over the past couple days.  Wears compression stockings at home.   Reports shortness of breath today while she was doing her home exercises that lasted for couple hours and she was concerned so she wanted to come to the ED for further evaluation  Referred By: Iglesia Gabriel  Caregiver Feedback: Per conference w/ RN patient stable to participate in therapy  Co-Treatment: OT  Co-Treatment Reason: to  maximize pt safety& mobility  Patient Position Received: Bed, 3 rail up, Alarm off, not on at start of session  General Comment: Pleasant & cooperative, receptive to mobility& instructions; overall decreased endurance but able to tolerate basic functional mobility w/ VSS    Home Living:  Home Living  Home Living Comments: Pt lives alone in a split level house with no stairs to enter, then a chair lift to main living level. Then another stair lift up to second floor with bedroom and bathroom. 4 steps down to basement with rail to laundry. Also has one step down to living room. Tub shower with grab bar, hand held shower hose. Toilet elevated with grab bar. Pt owns cane, WW, rollator, reacher and sock aide.    Pt reports daughter will stay w/ her initially upon d/c home    Prior Level of Function:  Prior Function Per Pt/Caregiver Report  Prior Function Comments: IND ADLs, IND IADLs, IND Ambulation with use of ww prn, denies h/o falls;  (+) driving    Precautions:  Precautions  Precautions Comment: fall, strict I&O, telemetry, scd's, purewick    Vital Signs:     Objective     Pain:  Pain Assessment  0-10 (Numeric) Pain Score: 0 - No pain    Cognition:  Cognition  Orientation Level: Oriented X4    General Assessments:      Activity Tolerance  Endurance: Decreased tolerance for upright activites  Sensation  Light Touch: No apparent deficits     Functional Assessments:     Bed Mobility  Bed Mobility:  (sba supine>sit)  Transfers  Transfer:  (sba sit>stand effortful; sba stand>sit cues for eccentric control)  Ambulation/Gait Training  Ambulation/Gait Training Performed:  (sba/cga w/ increased assist  changing direction use of ww, initial cues for upright posture & safe position to ww, cues to monitor tolerance to activity)     Extremity/Trunk Assessments:        RLE   RLE :  (arom wfl; strength grossly 4/5)  LLE   LLE :  (arom wfl; strength grossly 4/5)    Outcome Measures:     [facility] Basic Mobility  Turning from your back to your side while in a flat bed without using bedrails: None  Moving from lying on your back to sitting on the side of a flat bed without using bedrails: A little  Moving to and from bed to chair (including a wheelchair): A little  Standing up from a chair using your arms (e.g. wheelchair or bedside chair): A little  To walk in hospital room: A little  Climbing 3-5 steps with railing: A lot  Basic Mobility - Total Score: 18      Goals:  Encounter Problems       Encounter Problems (Active)       PT Problem       STG - Pt will transition supine <> sitting independently  (Progressing)       Start:  01/07/25    Expected End:  01/21/25            STG - Pt will transfer STS with supervision  (Progressing)       Start:  01/07/25    Expected End:  01/21/25            STG - Pt will amb >=50' using ww with supervision   (Progressing)       Start:  01/07/25    Expected End:  01/21/25            STG - Pt will perform a B LE ther ex program of 2-3 sets of 10  (Progressing)       Start:  01/07/25    Expected End:  01/21/25            STG - Pt will maintain supported dynamic  standing balance with no LOB noted   (Progressing)       Start:  01/07/25    Expected End:  01/21/25               Pain - Adult            Education Documentation  Mobility Training, taught by Stephanie Palomino PT at 1/7/2025 11:32 AM.  Learner: Patient  Readiness: Acceptance  Method: Explanation  Response: Verbalizes Understanding  Comment: safety, activity progression, use of ww

## 2025-01-07 NOTE — PROGRESS NOTES
Occupational Therapy    Evaluation    Patient Name: Yesenia Milner  MRN: [id number]  Department: [facility] VASCULAR  Room: 610/610-A  Today's Date: 1/7/2025  Time Calculation  Start Time: 0947  Stop Time: 0957  Time Calculation (min): 10 min        Assessment:  End of Session Communication: Bedside nurse  End of Session Patient Position: Up in chair, Alarm off, not on at start of session (le's elevated, call light in reach)  OT Assessment Results: Decreased ADL status, Decreased upper extremity strength, Decreased safe judgment during ADL, Decreased endurance, Decreased functional mobility  Strengths: Living arrangement secure, Support of extended family/friends  Barriers to Participation: Comorbidities  Plan:  Treatment Interventions: ADL retraining, Functional transfer training, UE strengthening/ROM, Endurance training, Equipment evaluation/education, Compensatory technique education  OT Frequency: 3 times per week  OT Discharge Recommendations: Low intensity level of continued care (with 24 hr support)  OT - OK to Discharge: Yes (once medically appropriate to next level of care)  Treatment Interventions: ADL retraining, Functional transfer training, UE strengthening/ROM, Endurance training, Equipment evaluation/education, Compensatory technique education    Subjective   Current Problem:  1. Shortness of breath        2. Acute pulmonary edema  Referral to Home Care      3. Acute thrombosis of superior vena cava (Multi)  Vascular US Upper Extremity Venous Duplex Bilateral    Vascular US Upper Extremity Venous Duplex Bilateral      4. Acquired deformity of toe        5. 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 Generalized edema  Vascular US Upper Extremity Venous Duplex Bilateral      7. 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)        8. Acute superior vena cava thrombosis  (Multi)  Follow Up In Cardiology    apixaban (Eliquis) 5 mg tablet      9. NSTEMI (non-ST elevated myocardial infarction) (Multi)  torsemide (Demadex) 20 mg tablet        General:  General  Reason for Referral: OT eval and tx; ADLs. dx; 89 y.o. female with past medical history significant for COPD, HTN, CVA, sick sinus syndrome s/p pacemaker, HFpEF, and recent NSTEMI within the past month who presents to the ED with complaints of shortness of breath.  States her diuretics were discontinued when she was admitted for her NSTEMI a couple weeks ago.  Denies any significant lower extremity swelling over the past couple weeks, however does admit to some leg heaviness over the past couple days.  Wears compression stockings at home.  Reports shortness of breath today while she was doing her home exercises that lasted for couple hours and she was concerned so she wanted to come to the ED for further evaluation. CTA: PE negative. findings suggestive of non-occlusive SVG thrombus and potential new thrombus in R IJV. DVT scan UE: negative  Referred By: Ice  Co-Treatment: PT  Co-Treatment Reason: for safety and to maximize therapeutic performane  Prior to Session Communication: Bedside nurse  Patient Position Received: Bed, 3 rail up, Alarm off, not on at start of session  General Comment: patient pleasant and cooperative to participate; decreased endurance, but able to participate and VSS  Precautions:  Precautions Comment: fall, strict I&O, telemetry, scd's, purewick        Pain:  Pain Assessment  Pain Assessment:  (denies pain)    Objective   Cognition:  Overall Cognitive Status: Within Functional Limits           Home Living:  Type of Home:  (per most recent OT eval, and patient report; lives home alone in a split level home. 0 ANNAMARIE from garage. chair lift to next floor up which is kitchen/LR.)  Bathroom Shower/Tub:  (tub shower with bench, grab bars, HHS)  Bathroom Toilet:  (raised toilet seat with grab bars)  Prior  Function:  Level of [place]:  (patient typically independent in all ADLs/IADLs PLOF. drives. owns cane, rollator, WW, reacher, sock aid; patient reports uses WW PRN PLOF. daughter is planning to stay with patient at d/c per patient report.)    ADL:  LE Dressing Assistance:  (seated EOB, patient also adjusting socks with SBA)  ADL Comments: anticipate SBA for ADLs based on performance with transfers and mobility this date  Activity Tolerance:  Endurance: Decreased tolerance for upright activites  Bed Mobility/Transfers: Bed Mobility  Bed Mobility:  (completed supine to sit with SBA)    Transfers  Transfer:  (completed STS with SBA)      Functional Mobility:  Functional Mobility  Functional Mobility Performed:  (engaged in simple mobility with SBA-CGA with support of WW)     Sensation:  Light Touch: No apparent deficits  Strength:  Strength Comments: BUE ROM/MMT WFL for patient age      Outcome Measures:[facility] Daily Activity  Putting on and taking off regular lower body clothing: A little  Bathing (including washing, rinsing, drying): A little  Putting on and taking off regular upper body clothing: A little  Toileting, which includes using toilet, bedpan or urinal: A little  Taking care of personal grooming such as brushing teeth: A little  Eating Meals: None  Daily Activity - Total Score: 19        Education Documentation  Body Mechanics, taught by Lexi Burleson OT at 1/7/2025 12:19 PM.  Learner: Patient  Readiness: Acceptance  Method: Explanation  Response: Verbalizes Understanding, Needs Reinforcement    ADL Training, taught by Lexi Burleson OT at 1/7/2025 12:19 PM.  Learner: Patient  Readiness: Acceptance  Method: Explanation  Response: Verbalizes Understanding, Needs Reinforcement    Education Comments  No comments found.        OP EDUCATION:       Goals:  Encounter Problems       Encounter Problems (Active)       OT Goals       STG- patient will complete LB dressing at MOD I with use of ae/ad/dme prn  (Progressing)       Start:  01/07/25    Expected End:  01/21/25            STG- patient will complete toileting at MOD I with use of ae/ad/dme prn (Progressing)       Start:  01/07/25    Expected End:  01/21/25            STG- patient will complete transfers to/from bed, chair, commode at MOD I with use of ae/ad/dme prn (Progressing)       Start:  01/07/25    Expected End:  01/21/25            STG- patient will complete simple mobility at MOD I with use of ae/ad/dme prn (Progressing)       Start:  01/07/25    Expected End:  01/21/25            STG- patient will complete grooming at MOD I with use of ae/ad/dme prn (Progressing)       Start:  01/07/25    Expected End:  01/21/25

## 2025-01-07 NOTE — CARE PLAN
I have reviewed the DVT scan performed today.  There is no evidence of IJ thrombus.  Waveforms are normal in the IJ as well as subclavian, suggestive that even if there is a thrombus in the SVC, it is not hemodynamically significant.    I would suggest anticoagulation with DOAC as you are x 6-week duration with plan for repeat CT scan.  I have notified Dr. Ramicone of above recommendations, who concurs.    My team will set up the appointment for follow-up with us in clinic.  Please arrange for CT scan as outpatient.    Lang Smith MD, FACC, FSCAI, RPVI  Co-Director, Vascular Center, and  Co-Director, Pulmonary Embolism Response Team,   Formerly [facility] [place]                                 of Medicine,                                                                 [facility] [employer]               negative history

## 2025-01-07 NOTE — HH CARE COORDINATION
This referral has been made a Non Admit with  Home Care due to Patient Declines Home Care . If you have further questions, feel free to reach out to our office at [phone]. Thank you, [facility] Intake.

## 2025-01-07 NOTE — PROGRESS NOTES
1/7/2025  [facility] 18, low recommended.  Followed up with pt in room, discussed how she did in therapy and recommendation.  Explained [facility].  Pt stated she does not feel she needs it.  Stated she does daily exercises.  Discussed safety with pt.  Pt is from home, lives alone. Stated daughter available to help if needed. Daughter will  on discharge.   RN updated.   Reviewed IMM with pt, pt signed, copy to pt.    Ct Team will continue to follow.   Salena Talavera Rn TCC

## 2025-01-07 NOTE — NURSING NOTE
Met with patient and daughter at the bedside. Discharge Planning discussed. AVS updated with follow-ups, education, and medication information. Verified/ entered a PCP and pharmacy. New medications and side effects discussed. Discussed follow -ups and repeat testing. All questions answered.  Updated nurse on this info. AVS printed and hi-lighted. IV and tele removed.

## 2025-01-08 ENCOUNTER — PATIENT OUTREACH (OUTPATIENT)
Dept: PRIMARY CARE | Facility: CLINIC | Age: OVER 89
End: 2025-01-08
Payer: MEDICARE

## 2025-01-08 NOTE — PROGRESS NOTES
Discharge Facility:[facility]  Discharge Diagnosis:Shortness of breath  Admission Date:1/3/25  Discharge Date:1/7/25    PCP Appointment Date:1/15/25  Specialist Appointment Date: D  Hospital Encounter and Summary Linked: Yes  See discharge assessment below for further details    Medications  Medications reviewed with patient/caregiver?: Yes (1/8/2025 10:01 AM)  Is the patient having any side effects they believe may be caused by any medication additions or changes?: No (1/8/2025 10:01 AM)  Does the patient have all medications ordered at discharge?: Yes (1/8/2025 10:01 AM)  Care Management Interventions: Provided patient education (1/8/2025 10:01 AM)  Prescription Comments: Eliquis, Torsemide (1/8/2025 10:01 AM)  Is the patient taking all medications as directed (includes completed medication regime)?: Yes (1/8/2025 10:01 AM)  Care Management Interventions: Provided patient education (1/8/2025 10:01 AM)  Medication Comments: Patient has her discharge medication. (1/8/2025 10:01 AM)    Appointments  Does the patient have a primary care provider?: Yes (1/8/2025 10:01 AM)  Care Management Interventions: Verified appointment date/time/provider (1/8/2025 10:01 AM)  Has the patient kept scheduled appointments due by today?: Yes (1/8/2025 10:01 AM)  Care Management Interventions: Advised patient to keep appointment; Educated on importance of keeping appointment (1/8/2025 10:01 AM)    Self Management  Has home health visited the patient within 72 hours of discharge?: Not applicable (1/8/2025 10:01 AM)  What Durable Medical Equipment (DME) was ordered?: N/A (1/8/2025 10:01 AM)    Patient Teaching  Does the patient have access to their discharge instructions?: Yes (1/8/2025 10:01 AM)  Care Management Interventions: Reviewed instructions with patient (1/8/2025 10:01 AM)  What is the patient's perception of their health status since discharge?: Improving (1/8/2025 10:01 AM)  Is the patient/caregiver able to teach back the  hierarchy of who to call/visit for symptoms/problems? PCP, Specialist, Home Health nurse, Urgent Care, ED, 911: Yes (1/8/2025 10:01 AM)    Wrap Up  Wrap Up Additional Comments: Patient is doing well today. She has all of her medications. Pcp appointment scheduled 1/15/25. No questions or concerns at this time. (1/8/2025 10:01 AM)

## 2025-01-08 NOTE — DISCHARGE SUMMARY
Discharge Diagnosis  Shortness of breath    Issues Requiring Follow-Up  thrombus    Test Results Pending At Discharge  Pending Labs       No current pending labs.            Hospital Course   Acute superior vena cava thrombosis (Multi)     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)     Yesenia Milner is a 89 y.o. female with past medical history significant for COPD, HTN, CVA, sick sinus syndrome s/p pacemaker, HFpEF, and recent NSTEMI within the past month who presents to the ED with complaints of shortness of breath.  States her diuretics were discontinued when she was admitted for her NSTEMI a couple weeks ago.  Denies any significant lower extremity swelling over the past couple weeks, however does admit to some leg heaviness over the past couple days. Denies any chest pains. Does not wear oxygen at home.      CODE STATUS: Full code     #Acute CHF,  today  #Acute pulmonary edema  #Bilateral pleural effusions, R > L  #Mild bilateral lower extremity edema  #Shortness of breath  Admit as inpatient with telemetry monitoring  Cardiology consult  IV Lasix daily, will likely need to resume home diuretics upon discharge  PT/OT for evaluation and treatment  Tylenol, breathing treatments as needed  Oxygen as needed  Cardiac diet  Q 4 vitals  CBC, BMP in the a.m.  Daily weights, fluid restriction  Repeat chest x-ray in 2 days  -Echocardiogram, 12/20/2024: Normal left ventricular ejection fraction, EF 60-65%, moderate mitral valve regurgitation, moderate/severe tricuspid regurgitation, moderately elevated RVSP.     #Nonocclusive SVC thrombus and also potentially new thrombus in right internal jugular vein  Vascular surgery consult  CTA chest negative for PE or thoracic aortic dissection, does show regions of nonenhancement along anterior aspect of mid SVC suggestive of nonocclusive SVC thrombus, additionally there is focal region of nonenhancement lower margin of right internal jugular vein which may  represent new thrombus.  Heparin infusion  Heparin assays     #Recent NSTEMI 12/20  #Mildly elevated troponins  Denies chest pains  EKG shows AV dual paced rhythm, rate 69, no acute ischemia  Initial troponin 36, repeat troponin 38.     #Anemia, chronic  Stable per EMR review  No current signs of bleeding, monitor with a.m. labs     Continue home medications as appropriate     Chronic conditions:  COPD, HTN, CVA, sick sinus syndrome s/p pacemaker, HFpEF, NSTEMI     #DVT prophylaxis  Heparin  Ambulation, SCDs     1/3: continue blood thinners, likely, sob caused by effuions, consult vascular and cardio     1/4: Patient remains on heparin drip.  Await vascular surgery recommendations.  Continue diuretics.     11/5: Patient changed to p.o. torsemide per cardiology.  Patient also transitioned from heparin drip to p.o. Eliquis per vascular surgery recommendations.  No plan for intervention.  Patient will need upper extremity ultrasounds tomorrow.  1/6; extremities nomrla. Pt/ot, anticpate dc tomorrow          Pertinent Physical Exam At Time of Discharge  Physical Exam  Constitutional:       Appearance: Normal appearance.   HENT:      Head: Normocephalic and atraumatic.      Right Ear: Tympanic membrane and ear canal normal.      Left Ear: Tympanic membrane and ear canal normal.      Mouth/Throat:      Mouth: Mucous membranes are moist.      Pharynx: Oropharynx is clear.   Eyes:      Extraocular Movements: Extraocular movements intact.      Conjunctiva/sclera: Conjunctivae normal.      Pupils: Pupils are equal, round, and reactive to light.   Cardiovascular:      Rate and Rhythm: Normal rate and regular rhythm.      Pulses: Normal pulses.      Heart sounds: Normal heart sounds.   Pulmonary:      Effort: Pulmonary effort is normal.      Breath sounds: Normal breath sounds.   Abdominal:      General: Abdomen is flat. Bowel sounds are normal.      Palpations: Abdomen is soft.   Musculoskeletal:         General: Normal range  of motion.      Cervical back: Normal range of motion and neck supple.   Skin:     General: Skin is warm and dry.      Capillary Refill: Capillary refill takes 2 to 3 seconds.   Neurological:      General: No focal deficit present.      Mental Status: She is alert and oriented to person, place, and time. Mental status is at baseline.   Psychiatric:         Mood and Affect: Mood normal.         Behavior: Behavior normal.         Thought Content: Thought content normal.         Judgment: Judgment normal.         Home Medications     Medication List      START taking these medications     Eliquis 5 mg tablet; Generic drug: apixaban; Take 1 tablet (5 mg) by   mouth 2 times a day.   torsemide 20 mg tablet; Commonly known as: Demadex; Take 1 tablet (20   mg) by mouth once daily.     CONTINUE taking these medications     aspirin 81 mg chewable tablet; Chew 1 tablet (81 mg) once daily.   Calcium 600 + D(3) 600 mg-5 mcg (200 unit) tablet; Generic drug: calcium   carbonate-vitamin D3   clopidogrel 75 mg tablet; Commonly known as: Plavix; Take 1 tablet (75   mg) by mouth once daily.   fluticasone propion-salmeteroL 115-21 mcg/actuation inhaler; Commonly   known as: Advair; Inhale 2 puffs 2 times a day. Rinse mouth with water   after use to reduce aftertaste and incidence of candidiasis. Do not   swallow.   lubricating eye drops ophthalmic solution   metoprolol tartrate 25 mg tablet; Commonly known as: Lopressor; Take 0.5   tablets (12.5 mg) by mouth 2 times a day.   PreserVision AREDS-2 250-90-40-1 mg capsule; Generic drug: vit   C,I-Fb-vngoh-lutein-zeaxan       Outpatient Follow-Up  Future Appointments   Date Time Provider Department Center   1/15/2025 12:00 PM Naima Keane MD [id number] [place]   2/20/2025  9:30 AM Lang Smith MD [id number] [place]   2/24/2025  2:00 PM James C Ramicone, DO [id number] [place]   9/17/2025  2:30 PM [facility] RAMICONE [facility] [id number]       Iglesia Gabriel DO

## 2025-01-10 LAB
ATRIAL RATE: 69 BPM
PR INTERVAL: 178 MS
Q ONSET: 201 MS
QRS COUNT: 12 BEATS
QRS DURATION: 166 MS
QT INTERVAL: 488 MS
QTC CALCULATION(BAZETT): 522 MS
QTC FREDERICIA: 511 MS
R AXIS: -57 DEGREES
T AXIS: 142 DEGREES
T OFFSET: 445 MS
VENTRICULAR RATE: 69 BPM

## 2025-01-12 ENCOUNTER — APPOINTMENT (OUTPATIENT)
Dept: CARDIOLOGY | Facility: HOSPITAL | Age: OVER 89
DRG: 374 | End: 2025-01-12
Payer: MEDICARE

## 2025-01-12 ENCOUNTER — APPOINTMENT (OUTPATIENT)
Dept: RADIOLOGY | Facility: HOSPITAL | Age: OVER 89
End: 2025-01-12
Payer: MEDICARE

## 2025-01-12 ENCOUNTER — HOSPITAL ENCOUNTER (OUTPATIENT)
Facility: HOSPITAL | Age: OVER 89
Setting detail: OBSERVATION
End: 2025-01-12
Attending: EMERGENCY MEDICINE | Admitting: STUDENT IN AN ORGANIZED HEALTH CARE EDUCATION/TRAINING PROGRAM
Payer: MEDICARE

## 2025-01-12 VITALS
TEMPERATURE: 97.3 F | WEIGHT: 165 LBS | RESPIRATION RATE: 31 BRPM | DIASTOLIC BLOOD PRESSURE: 64 MMHG | SYSTOLIC BLOOD PRESSURE: 142 MMHG | BODY MASS INDEX: 28.17 KG/M2 | OXYGEN SATURATION: 91 % | HEIGHT: 64 IN | HEART RATE: 61 BPM

## 2025-01-12 DIAGNOSIS — K92.2 GASTROINTESTINAL HEMORRHAGE, UNSPECIFIED GASTROINTESTINAL HEMORRHAGE TYPE: Primary | ICD-10-CM

## 2025-01-12 DIAGNOSIS — K63.89 COLONIC MASS: ICD-10-CM

## 2025-01-12 DIAGNOSIS — R09.02 HYPOXIA: ICD-10-CM

## 2025-01-12 DIAGNOSIS — I82.210 SUPERIOR VENA CAVA THROMBOSIS (MULTI): ICD-10-CM

## 2025-01-12 DIAGNOSIS — D64.9 ANEMIA, UNSPECIFIED TYPE: ICD-10-CM

## 2025-01-12 LAB
ABO GROUP (TYPE) IN BLOOD: NORMAL
ABO GROUP (TYPE) IN BLOOD: NORMAL
ALBUMIN SERPL BCP-MCNC: 3.9 G/DL (ref 3.4–5)
ALP SERPL-CCNC: 83 U/L (ref 33–136)
ALT SERPL W P-5'-P-CCNC: 15 U/L (ref 7–45)
ANION GAP SERPL CALC-SCNC: 14 MMOL/L (ref 10–20)
ANTIBODY SCREEN: NORMAL
AST SERPL W P-5'-P-CCNC: 17 U/L (ref 9–39)
BASOPHILS # BLD AUTO: 0.02 X10*3/UL (ref 0–0.1)
BASOPHILS NFR BLD AUTO: 0.4 %
BILIRUB SERPL-MCNC: 0.5 MG/DL (ref 0–1.2)
BUN SERPL-MCNC: 24 MG/DL (ref 6–23)
CALCIUM SERPL-MCNC: 9.1 MG/DL (ref 8.6–10.3)
CHLORIDE SERPL-SCNC: 101 MMOL/L (ref 98–107)
CO2 SERPL-SCNC: 23 MMOL/L (ref 21–32)
CREAT SERPL-MCNC: 0.92 MG/DL (ref 0.5–1.05)
EGFRCR SERPLBLD CKD-EPI 2021: 59 ML/MIN/1.73M*2
EOSINOPHIL # BLD AUTO: 0.09 X10*3/UL (ref 0–0.4)
EOSINOPHIL NFR BLD AUTO: 1.6 %
ERYTHROCYTE [DISTWIDTH] IN BLOOD BY AUTOMATED COUNT: 13.6 % (ref 11.5–14.5)
GLUCOSE SERPL-MCNC: 109 MG/DL (ref 74–99)
HCT VFR BLD AUTO: 28 % (ref 36–46)
HGB BLD-MCNC: 9.2 G/DL (ref 12–16)
IMM GRANULOCYTES # BLD AUTO: 0.02 X10*3/UL (ref 0–0.5)
IMM GRANULOCYTES NFR BLD AUTO: 0.4 % (ref 0–0.9)
INR PPP: 1.7 (ref 0.9–1.1)
LACTATE SERPL-SCNC: 1.1 MMOL/L (ref 0.4–2)
LIPASE SERPL-CCNC: 23 U/L (ref 9–82)
LYMPHOCYTES # BLD AUTO: 1.05 X10*3/UL (ref 0.8–3)
LYMPHOCYTES NFR BLD AUTO: 18.8 %
MAGNESIUM SERPL-MCNC: 2.21 MG/DL (ref 1.6–2.4)
MCH RBC QN AUTO: 29 PG (ref 26–34)
MCHC RBC AUTO-ENTMCNC: 32.9 G/DL (ref 32–36)
MCV RBC AUTO: 88 FL (ref 80–100)
MONOCYTES # BLD AUTO: 0.71 X10*3/UL (ref 0.05–0.8)
MONOCYTES NFR BLD AUTO: 12.7 %
NEUTROPHILS # BLD AUTO: 3.71 X10*3/UL (ref 1.6–5.5)
NEUTROPHILS NFR BLD AUTO: 66.1 %
NRBC BLD-RTO: 0 /100 WBCS (ref 0–0)
PLATELET # BLD AUTO: 275 X10*3/UL (ref 150–450)
POTASSIUM SERPL-SCNC: 3.9 MMOL/L (ref 3.5–5.3)
PROT SERPL-MCNC: 6.5 G/DL (ref 6.4–8.2)
PROTHROMBIN TIME: 18.7 SECONDS (ref 9.8–12.8)
RBC # BLD AUTO: 3.17 X10*6/UL (ref 4–5.2)
RH FACTOR (ANTIGEN D): NORMAL
RH FACTOR (ANTIGEN D): NORMAL
SODIUM SERPL-SCNC: 134 MMOL/L (ref 136–145)
WBC # BLD AUTO: 5.6 X10*3/UL (ref 4.4–11.3)

## 2025-01-12 PROCEDURE — G0378 HOSPITAL OBSERVATION PER HR: HCPCS

## 2025-01-12 PROCEDURE — 36415 COLL VENOUS BLD VENIPUNCTURE: CPT | Performed by: NURSE PRACTITIONER

## 2025-01-12 PROCEDURE — 80053 COMPREHEN METABOLIC PANEL: CPT | Performed by: NURSE PRACTITIONER

## 2025-01-12 PROCEDURE — 2550000001 HC RX 255 CONTRASTS: Performed by: EMERGENCY MEDICINE

## 2025-01-12 PROCEDURE — 83605 ASSAY OF LACTIC ACID: CPT | Performed by: NURSE PRACTITIONER

## 2025-01-12 PROCEDURE — 99223 1ST HOSP IP/OBS HIGH 75: CPT | Performed by: NURSE PRACTITIONER

## 2025-01-12 PROCEDURE — 83690 ASSAY OF LIPASE: CPT | Performed by: NURSE PRACTITIONER

## 2025-01-12 PROCEDURE — 2500000004 HC RX 250 GENERAL PHARMACY W/ HCPCS (ALT 636 FOR OP/ED): Performed by: NURSE PRACTITIONER

## 2025-01-12 PROCEDURE — 96374 THER/PROPH/DIAG INJ IV PUSH: CPT

## 2025-01-12 PROCEDURE — 83735 ASSAY OF MAGNESIUM: CPT | Performed by: NURSE PRACTITIONER

## 2025-01-12 PROCEDURE — 96376 TX/PRO/DX INJ SAME DRUG ADON: CPT

## 2025-01-12 PROCEDURE — 99291 CRITICAL CARE FIRST HOUR: CPT | Performed by: NURSE PRACTITIONER

## 2025-01-12 PROCEDURE — 74174 CTA ABD&PLVS W/CONTRAST: CPT

## 2025-01-12 PROCEDURE — 93005 ELECTROCARDIOGRAM TRACING: CPT

## 2025-01-12 PROCEDURE — 2500000001 HC RX 250 WO HCPCS SELF ADMINISTERED DRUGS (ALT 637 FOR MEDICARE OP): Performed by: NURSE PRACTITIONER

## 2025-01-12 PROCEDURE — 86901 BLOOD TYPING SEROLOGIC RH(D): CPT | Performed by: NURSE PRACTITIONER

## 2025-01-12 PROCEDURE — 74174 CTA ABD&PLVS W/CONTRAST: CPT | Performed by: RADIOLOGY

## 2025-01-12 PROCEDURE — 85610 PROTHROMBIN TIME: CPT | Performed by: NURSE PRACTITIONER

## 2025-01-12 PROCEDURE — 85025 COMPLETE CBC W/AUTO DIFF WBC: CPT | Performed by: NURSE PRACTITIONER

## 2025-01-12 PROCEDURE — 96375 TX/PRO/DX INJ NEW DRUG ADDON: CPT

## 2025-01-12 RX ORDER — IPRATROPIUM BROMIDE AND ALBUTEROL SULFATE 2.5; .5 MG/3ML; MG/3ML
3 SOLUTION RESPIRATORY (INHALATION)
Status: DISCONTINUED | OUTPATIENT
Start: 2025-01-12 | End: 2025-01-12

## 2025-01-12 RX ORDER — DIPHENHYDRAMINE HYDROCHLORIDE 50 MG/ML
50 INJECTION INTRAMUSCULAR; INTRAVENOUS ONCE
Status: COMPLETED | OUTPATIENT
Start: 2025-01-12 | End: 2025-01-12

## 2025-01-12 RX ORDER — TORSEMIDE 20 MG/1
20 TABLET ORAL DAILY
Status: ACTIVE | OUTPATIENT
Start: 2025-01-13

## 2025-01-12 RX ORDER — CLOPIDOGREL BISULFATE 75 MG/1
75 TABLET ORAL DAILY
Status: ACTIVE | OUTPATIENT
Start: 2025-01-13

## 2025-01-12 RX ORDER — ACETAMINOPHEN 325 MG/1
650 TABLET ORAL EVERY 6 HOURS PRN
Status: ACTIVE | OUTPATIENT
Start: 2025-01-12

## 2025-01-12 RX ORDER — ACETAMINOPHEN 650 MG/1
650 SUPPOSITORY RECTAL EVERY 6 HOURS PRN
Status: ACTIVE | OUTPATIENT
Start: 2025-01-12

## 2025-01-12 RX ORDER — PANTOPRAZOLE SODIUM 40 MG/10ML
80 INJECTION, POWDER, LYOPHILIZED, FOR SOLUTION INTRAVENOUS ONCE
Status: COMPLETED | OUTPATIENT
Start: 2025-01-12 | End: 2025-01-12

## 2025-01-12 RX ORDER — NAPROXEN SODIUM 220 MG/1
81 TABLET, FILM COATED ORAL DAILY
Status: ACTIVE | OUTPATIENT
Start: 2025-01-13

## 2025-01-12 RX ORDER — IPRATROPIUM BROMIDE AND ALBUTEROL SULFATE 2.5; .5 MG/3ML; MG/3ML
3 SOLUTION RESPIRATORY (INHALATION) EVERY 2 HOUR PRN
Status: ACTIVE | OUTPATIENT
Start: 2025-01-12

## 2025-01-12 RX ORDER — FLUTICASONE FUROATE AND VILANTEROL 200; 25 UG/1; UG/1
1 POWDER RESPIRATORY (INHALATION)
Status: ACTIVE | OUTPATIENT
Start: 2025-01-13

## 2025-01-12 RX ORDER — ACETAMINOPHEN 160 MG/5ML
650 SOLUTION ORAL EVERY 6 HOURS PRN
Status: ACTIVE | OUTPATIENT
Start: 2025-01-12

## 2025-01-12 RX ORDER — PSYLLIUM HUSK 0.4 G
1 CAPSULE ORAL DAILY
Status: ACTIVE | OUTPATIENT
Start: 2025-01-13

## 2025-01-12 RX ORDER — METOPROLOL TARTRATE 25 MG/1
12.5 TABLET, FILM COATED ORAL 2 TIMES DAILY
Status: DISPENSED | OUTPATIENT
Start: 2025-01-12

## 2025-01-12 RX ADMIN — METHYLPREDNISOLONE SODIUM SUCCINATE 40 MG: 40 INJECTION, POWDER, FOR SOLUTION INTRAMUSCULAR; INTRAVENOUS at 12:55

## 2025-01-12 RX ADMIN — DIPHENHYDRAMINE HYDROCHLORIDE 50 MG: 50 INJECTION, SOLUTION INTRAMUSCULAR; INTRAVENOUS at 16:42

## 2025-01-12 RX ADMIN — PANTOPRAZOLE SODIUM 80 MG: 40 INJECTION, POWDER, FOR SOLUTION INTRAVENOUS at 12:00

## 2025-01-12 RX ADMIN — METOPROLOL TARTRATE 12.5 MG: 25 TABLET, FILM COATED ORAL at 22:25

## 2025-01-12 RX ADMIN — IOHEXOL 115 ML: 350 INJECTION, SOLUTION INTRAVENOUS at 17:13

## 2025-01-12 RX ADMIN — METHYLPREDNISOLONE SODIUM SUCCINATE 40 MG: 40 INJECTION, POWDER, FOR SOLUTION INTRAMUSCULAR; INTRAVENOUS at 16:42

## 2025-01-12 RX ADMIN — METHYLPREDNISOLONE SODIUM SUCCINATE 40 MG: 40 INJECTION, POWDER, FOR SOLUTION INTRAMUSCULAR; INTRAVENOUS at 22:25

## 2025-01-12 ASSESSMENT — LIFESTYLE VARIABLES
HAVE YOU EVER FELT YOU SHOULD CUT DOWN ON YOUR DRINKING: NO
EVER HAD A DRINK FIRST THING IN THE MORNING TO STEADY YOUR NERVES TO GET RID OF A HANGOVER: NO
TOTAL SCORE: 0
HAVE PEOPLE ANNOYED YOU BY CRITICIZING YOUR DRINKING: NO
EVER FELT BAD OR GUILTY ABOUT YOUR DRINKING: NO

## 2025-01-12 NOTE — ED PROVIDER NOTES
EMERGENCY DEPARTMENT ENCOUNTER      Pt Name: Yesenia Milner  MRN: [id number]  Birthdate 7/29/1933  Date of evaluation: 1/12/2025  Provider: SELVIN Rubin    CHIEF COMPLAINT       Chief Complaint   Patient presents with   • Black or Bloody Stool     Pt BIBA for c/o dark/bloody stool last night. Pt alert x4. Denies CP or SOB.        HISTORY OF PRESENT ILLNESS    Yesenia Milner is a 89 y.o. female who presents to the emergency department with complaint of vomiting blood onset this morning.  Patient was recently admitted to our hospital for an NSTEMI and started on Eliquis approximately 5 days ago.  States that last evening she developed dark red stools and this morning after she had 1 small episode of bloody vomitus she decided to come into the emergency department for evaluation.  She is additionally on Plavix.  She is endorsing associated abdominal pain.  Denies any trauma, fall, injury, recent travel, large crowds, known sick contacts, smoking, drugs or alcohol.  Denies any fevers, chills, weakness, dizziness, shortness of breath, chest pain, urinary symptoms or any additional symptoms or complaints at this time.    Nursing Notes were reviewed.    History provided by patient.  No  used.    REVIEW OF SYSTEMS     ROS is otherwise negative unless stated above.    PAST MEDICAL HISTORY     Past Medical History:   Diagnosis Date   • Other conditions influencing health status     Normal stress echocardiogram   • Personal history of other medical treatment     History of echocardiogram   • Personal history of other medical treatment     H/O Doppler ultrasound       SURGICAL HISTORY       Past Surgical History:   Procedure Laterality Date   • APPENDECTOMY  11/22/2017    Appendectomy   • CARDIAC CATHETERIZATION N/A 12/20/2024    Procedure: Left Heart Cath, With LV;  Surgeon: Tahir Ruelas MD;  Location[facility] Cardiac Cath Lab;  Service: Cardiovascular;  Laterality: N/A;   • CARDIAC  PACEMAKER PLACEMENT  2021    Pacemaker Placement   • HYSTERECTOMY  2017    Hysterectomy   • IR ANGIOGRAM INFERIOR EPIGASTRIC PELVIC  2020    IR ANGIOGRAM INFERIOR EPIGASTRIC PELVIC 2020 PAR AIB LEGACY   • IR ANGIOGRAM INFERIOR EPIGASTRIC PELVIC  2020    IR ANGIOGRAM INFERIOR EPIGASTRIC PELVIC 2020 PAR AIB LEGACY   • IR ANGIOGRAM RENAL BILATERAL Bilateral 2020    IR ANGIOGRAM RENAL BILATERAL 2020 PAR AIB LEGACY   • OTHER SURGICAL HISTORY  2017    Stab Phlebectomy Of Varicose Veins   • OTHER SURGICAL HISTORY  2017    Venous Ligation With Stripping   • TONSILLECTOMY  2017    Tonsillectomy       ALLERGIES     Iodine, Shrimp, Hydrocodone, and Adhesive tape-silicones    FAMILY HISTORY       Family History   Problem Relation Name Age of Onset   • No Known Problems Mother          SOCIAL HISTORY       Social History     Socioeconomic History   • Marital status:    Tobacco Use   • Smoking status: Former     Current packs/day: 0.00     Types: Cigarettes     Quit date: 1973     Years since quittin.0     Passive exposure: Past   • Smokeless tobacco: Never   Vaping Use   • Vaping status: Never Used   Substance and Sexual Activity   • Alcohol use: Never   • Drug use: Never     Social Drivers of Health     Financial Resource Strain: Low Risk  (1/3/2025)    Overall Financial Resource Strain (CARDIA)    • Difficulty of Paying Living Expenses: Not very hard   Food Insecurity: No Food Insecurity (1/3/2025)    Hunger Vital Sign    • Worried About Running Out of Food in the Last Year: Never true    • Ran Out of Food in the Last Year: Never true   Transportation Needs: No Transportation Needs (1/3/2025)    PRAPARE - Transportation    • Lack of Transportation (Medical): No    • Lack of Transportation (Non-Medical): No   Intimate Partner Violence: Not At Risk (1/3/2025)    Humiliation, Afraid, Rape, and Kick questionnaire    • Fear of Current or Ex-Partner: No     • Emotionally Abused: No    • Physically Abused: No    • Sexually Abused: No   Housing Stability: Low Risk  (1/3/2025)    Housing Stability Vital Sign    • Unable to Pay for Housing in the Last Year: No    • Number of Times Moved in the Last Year: 1    • Homeless in the Last Year: No       PHYSICAL EXAM   VS: As documented in the triage note and EMR flowsheet from this visit were reviewed.    GEN: NAD, nontoxic, well-appearing, elderly, resting comfortably in ED cart without difficulty or dyspnea  EYES: PERRLA  HEENT: Airway patent, ears with clear tympanic membranes bilaterally. Nasal mucosa clear. Mouth with normal mucosa.  No area of abscess or fluctuance noted.  No drainage noted. Throat has no vesicles, no oropharyngeal exudates and uvula is midline.  No blood noted in posterior oropharynx.  Face with no lymph node enlargement. No trismus or drooling noted.  Handling secretions.  Speech clear.  CARD: RRR, nontender chest, no crepitus deformities, no JVD, no murmurs rubs or gallops ; No edema noted.  Positive pulses bilaterally throughout.  Capillary refill less than 3 seconds.  No abnormal redness, warmth, tenderness or swelling noted to bilateral lower extremities.  PULMONARY: Clear all lung fields. Moving air well, Nonlabored, no accessory muscle use, able to speak complete sentences  ABDOMEN: Abdomen soft, non-distended, no rebound, no guarding. Bowel sounds normal in all 4 quadrants.  Generalized tenderness to palpation.  No CVA tenderness.  No masses or organomegaly noted.  No evidence of peritonitis. Negative Molina's and McBurney's point tenderness.    : deferred  MUSK: Spine appears normal, range of motion is not limited, no muscle or joint tenderness. Strength 5 out of 5 equal bilaterally throughout.  Generalized weakness noted.  No step-offs, deformities or additional signs of trauma noted.  No spinal/midline tenderness to palpation  SKIN: Skin normal color for race, warm, dry and intact. No  evidence of trauma. No rash noted.  NEURO: Alert and oriented x 3, speech is clear, no obvious deficits noted. No facial droop noted.    LYMPH: No adenopathy or splenomegaly. No cervical, supraclavicular or inguinal lymphadenopathy.    DIAGNOSTIC RESULTS   RADIOLOGY:   Non-plain film images such as CT, Ultrasound and MRI are read by the radiologist. Plain radiographic images are visualized and preliminarily interpreted by myself with the below findings: None      Interpretation per the Radiologist below, if available at the time of this note:    CT angio abdomen pelvis w and or wo IV IV contrast    (Results Pending)         ED BEDSIDE ULTRASOUND:   Performed by myself - none    LABS:  Labs Reviewed   CBC WITH AUTO DIFFERENTIAL - Abnormal       Result Value    WBC 5.6      nRBC 0.0      RBC 3.17 (*)     Hemoglobin 9.2 (*)     Hematocrit 28.0 (*)     MCV 88      MCH 29.0      MCHC 32.9      RDW 13.6      Platelets 275      Neutrophils % 66.1      Immature Granulocytes %, Automated 0.4      Lymphocytes % 18.8      Monocytes % 12.7      Eosinophils % 1.6      Basophils % 0.4      Neutrophils Absolute 3.71      Immature Granulocytes Absolute, Automated 0.02      Lymphocytes Absolute 1.05      Monocytes Absolute 0.71      Eosinophils Absolute 0.09      Basophils Absolute 0.02     COMPREHENSIVE METABOLIC PANEL - Abnormal    Glucose 109 (*)     Sodium 134 (*)     Potassium 3.9      Chloride 101      Bicarbonate 23      Anion Gap 14      Urea Nitrogen 24 (*)     Creatinine 0.92      eGFR 59 (*)     Calcium 9.1      Albumin 3.9      Alkaline Phosphatase 83      Total Protein 6.5      AST 17      Bilirubin, Total 0.5      ALT 15     PROTIME-INR - Abnormal    Protime 18.7 (*)     INR 1.7 (*)    MAGNESIUM - Normal    Magnesium 2.21     LACTATE - Normal    Lactate 1.1      Narrative:     Venipuncture immediately after or during the administration of Metamizole may lead to falsely low results. Testing should be performed  immediately prior to Metamizole dosing.   LIPASE - Normal    Lipase 23      Narrative:     Venipuncture immediately after or during the administration of Metamizole may lead to falsely low results. Testing should be performed immediately prior to Metamizole dosing.   TYPE AND SCREEN    ABO TYPE A      Rh TYPE POS      ANTIBODY SCREEN NEG     VERAB/VERIFY ABORH    ABO TYPE A      Rh TYPE POS         All other labs were within normal range or not returned as of this dictation.    EMERGENCY DEPARTMENT COURSE/MDM:   Vitals:    Vitals:    01/12/25 1600 01/12/25 1615 01/12/25 1630 01/12/25 1645   BP: 126/68  127/62    Pulse: 65 62 63 66   Resp: (!) 31 (!) 29 (!) 26 (!) 24   Temp:       TempSrc:       SpO2: (!) 90% (!) 88% (!) 90% (!) 89%   Weight:       Height:           I reviewed the patient's triage vitals.    This is a 89-year-old female presents ED for evaluation of GI bleeding in the setting of recently starting Eliquis.  She is neurologically intact, generalized weakness noted.  Placed on continuous cardiac monitor and pulse oximetry.  She has generalized tenderness to palpation, no peritonitis noted.  She has no active bleeding on my examination and there is no blood in her posterior oropharynx.  At this time there is no indication for Eliquis reversal as she is hemodynamically stable without any active bleeding.  She is given Protonix IV for her GI bleed.  Initial workup reviewed.  She had no significant drop in her hemoglobin per chart review and has no active bleeding throughout my ED course of care.  Developed hypoxia in the high 80s on room air, does not wear oxygen at baseline, placed on a new oxygen requirement of 2 L nasal cannula.  She remained otherwise hemodynamically stable, ED course of care without any hypotension or tachycardia.  She is signed out to my ED attending pending workup results as she needed contrast prep prior to her CT angio and final disposition.  ED Course as of 01/12/25 5823   Sun  Jan 12, 2025 1910 IMPRESSION:  No aortic aneurysm or dissection.    No evidence of active GI bleed.    Focal circumferential wall thickening of the distal transverse colon.  There are no significant surrounding inflammatory changes, raising  suspicion for malignancy (rather than focal colitis/diverticulitis).  Correlation with presenting history is recommended and follow-up  colonoscopy should be considered.    Findings suggestive of CHF with interstitial edema and small pleural  effusions.    Colonic mass, noted.  Will admit for GI bleed on multiple anticoagulants. [BT]   1939 HEMOGLOBIN(!): 9.2 [BT]      ED Course User Index  [BT] Skyler Heller,          Diagnoses as of 01/12/25 2219   Gastrointestinal hemorrhage, unspecified gastrointestinal hemorrhage type   Hypoxia   Colonic mass   Superior vena cava thrombosis (Multi)       Patient was counseled regarding labs, imaging, likely diagnosis, and plan. All questions were answered.     ------------------------------------------------------------------  EKG Interpretation: AV paced at a rate 81, , QTc 551 without evidence of STEMI or ischemia    Differential Diagnoses Considered: GI bleed, anemia, electrolyte abnormality, dehydration, viral gastroenteritis, peptic ulcer disease, GERD    Chronic Medical Conditions Significantly Affecting Care: None    External Records Reviewed: I reviewed recent and relevant outside records including: PCP notes, prior discharge summary, previous radiologic studies, HIE, recent hospitalization     Escalation of Care:  Signed out to ED attending pending workup results and final disposition with need for GI consultation    Social Determinants of Health Significantly Affecting Care:  None    Prescription Drug Consideration: N/A    Diagnostic testing considered: No additional indicated at this time    Discussion of Management with Other Providers:   I discussed the patient/results with: joshua  provider      ------------------------------------------------------------------  ED Medications administered this visit:    Medications   methylPREDNISolone sod succinate (SOLU-Medrol) 40 mg/mL injection 40 mg (40 mg intravenous Given 1/12/25 1642)   pantoprazole (ProtoNix) injection 80 mg (80 mg intravenous Given 1/12/25 1200)   diphenhydrAMINE (BENADryl) injection 50 mg (50 mg intravenous Given 1/12/25 1642)   iohexol (OMNIPaque) 350 mg iodine/mL solution 115 mL (115 mL intravenous Given 1/12/25 1713)       New Prescriptions from this visit:    New Prescriptions    No medications on file       Follow-up:  No follow-up provider specified.      Final Impression:   1. Gastrointestinal hemorrhage, unspecified gastrointestinal hemorrhage type    2. Hypoxia          SELVIN Rubin    This patient was staffed with ED Attending Dr. Heller to review the plan of care during ED course.    (Please note that portions of this note were completed with a voice recognition program.  Efforts were made to edit the dictations but occasionally words are mis-transcribed.)     SELVIN Rubin  01/12/25 181       SELVIN Rubin  01/12/25 5177

## 2025-01-12 NOTE — PROGRESS NOTES
Pharmacy Medication History Review    Yesenia Milner is a 89 y.o. female admitted for No Principal Problem: There is no principal problem currently on the Problem List. Please update the Problem List and refresh.. Pharmacy reviewed the patient's [id number] medications and allergies for accuracy.    The list below reflectives the updated PTA list. Please review each medication in order reconciliation for additional clarification and justification.  Prior to Admission medications    Medication Sig Start Date End Date Authorizing Provider   apixaban (Eliquis) 5 mg tablet Take 1 tablet (5 mg) by mouth 2 times a day. 1/7/25  Iglesia Gabriel, DO   aspirin 81 mg chewable tablet Chew 1 tablet (81 mg) once daily. 12/27/24  Everton Rubio, DO   calcium carbonate-vitamin D3 (Calcium 600 + D,3,) 600 mg-5 mcg (200 unit) tablet Take 1 tablet by mouth once daily.   Historical Provider, MD   clopidogrel (Plavix) 75 mg tablet Take 1 tablet (75 mg) by mouth once daily.   Everton Rubio, DO   fluticasone propion-salmeteroL (Advair) 115-21 mcg/actuation inhaler Inhale 2 puffs 2 times a day. Rinse mouth with water after use to reduce aftertaste and incidence of candidiasis. Do not swallow.   Everton Rubio, DO   lubricating eye drops ophthalmic solution Administer 1 drop into both eyes if needed for dry eyes.   Historical Provider, MD   metoprolol tartrate (Lopressor) 25 mg tablet Take 0.5 tablets (12.5 mg) by mouth 2 times a day. 12/23/24  Iglesia Gabriel, DO   torsemide (Demadex) 20 mg tablet Take 1 tablet (20 mg) by mouth once daily. 1/8/25  Iglesia Ice, DO   vit C,F-Bw-bufrz-lutein-zeaxan (PreserVision AREDS-2) 250-90-40-1 mg capsule Take 2 capsules by mouth once daily.   Historical Provider, MD        The list below reflectives the updated allergy list. Please review each documented allergy for additional clarification and justification.  Allergies  Reviewed by Fabiola Beasley, SAQIB-CNP on 1/12/2025        Severity  Reactions Comments    Iodine Medium Rash     Shrimp Medium Diarrhea, Nausea/vomiting     Hydrocodone Not Specified Unknown Pt does not remember    Adhesive Tape-silicones Low Itching             Below are additional concerns with the patient's PTA list.      Kirti Brito

## 2025-01-12 NOTE — ED PROCEDURE NOTE
Procedure  Critical Care    Performed by: SELVIN Rubin  Authorized by: Skyler Heller DO    Critical care provider statement:     Critical care time (minutes):  32    Critical care time was exclusive of:  Separately billable procedures and treating other patients and teaching time    Critical care was necessary to treat or prevent imminent or life-threatening deterioration of the following conditions:  Respiratory failure    Critical care was time spent personally by me on the following activities:  Blood draw for specimens, ordering and performing treatments and interventions, development of treatment plan with patient or surrogate, ordering and review of laboratory studies, ordering and review of radiographic studies, pulse oximetry, evaluation of patient's response to treatment, re-evaluation of patient's condition, examination of patient, review of old charts and obtaining history from patient or surrogate    Care discussed with: admitting provider                 SELVIN Rubin  01/12/25 [id number]

## 2025-01-13 LAB
ALBUMIN SERPL BCP-MCNC: 3.6 G/DL (ref 3.4–5)
ALP SERPL-CCNC: 78 U/L (ref 33–136)
ALT SERPL W P-5'-P-CCNC: 16 U/L (ref 7–45)
ANION GAP SERPL CALC-SCNC: 12 MMOL/L (ref 10–20)
AST SERPL W P-5'-P-CCNC: 16 U/L (ref 9–39)
ATRIAL RATE: 0 BPM
BILIRUB SERPL-MCNC: 0.5 MG/DL (ref 0–1.2)
BUN SERPL-MCNC: 23 MG/DL (ref 6–23)
CALCIUM SERPL-MCNC: 9.3 MG/DL (ref 8.6–10.3)
CHLORIDE SERPL-SCNC: 103 MMOL/L (ref 98–107)
CO2 SERPL-SCNC: 25 MMOL/L (ref 21–32)
CREAT SERPL-MCNC: 1.02 MG/DL (ref 0.5–1.05)
EGFRCR SERPLBLD CKD-EPI 2021: 52 ML/MIN/1.73M*2
ERYTHROCYTE [DISTWIDTH] IN BLOOD BY AUTOMATED COUNT: 13.7 % (ref 11.5–14.5)
FERRITIN SERPL-MCNC: 28 NG/ML (ref 8–150)
GLUCOSE SERPL-MCNC: 138 MG/DL (ref 74–99)
HCT VFR BLD AUTO: 28 % (ref 36–46)
HGB BLD-MCNC: 9.1 G/DL (ref 12–16)
IRON SATN MFR SERPL: 5 % (ref 25–45)
IRON SERPL-MCNC: 18 UG/DL (ref 35–150)
MCH RBC QN AUTO: 29.2 PG (ref 26–34)
MCHC RBC AUTO-ENTMCNC: 32.5 G/DL (ref 32–36)
MCV RBC AUTO: 90 FL (ref 80–100)
NRBC BLD-RTO: 0 /100 WBCS (ref 0–0)
P AXIS: 0 DEGREES
PLATELET # BLD AUTO: 262 X10*3/UL (ref 150–450)
POTASSIUM SERPL-SCNC: 4.6 MMOL/L (ref 3.5–5.3)
PR INTERVAL: 36 MS
PROT SERPL-MCNC: 6.1 G/DL (ref 6.4–8.2)
Q ONSET: 251 MS
QRS COUNT: 12 BEATS
QRS DURATION: 154 MS
QT INTERVAL: 474 MS
QTC CALCULATION(BAZETT): 551 MS
QTC FREDERICIA: 523 MS
R AXIS: -56 DEGREES
RBC # BLD AUTO: 3.12 X10*6/UL (ref 4–5.2)
SODIUM SERPL-SCNC: 135 MMOL/L (ref 136–145)
T AXIS: 154 DEGREES
T OFFSET: 488 MS
TIBC SERPL-MCNC: 389 UG/DL (ref 240–445)
UIBC SERPL-MCNC: 371 UG/DL (ref 110–370)
VENTRICULAR RATE: 81 BPM
WBC # BLD AUTO: 9.8 X10*3/UL (ref 4.4–11.3)

## 2025-01-13 PROCEDURE — 84075 ASSAY ALKALINE PHOSPHATASE: CPT | Performed by: NURSE PRACTITIONER

## 2025-01-13 PROCEDURE — 1200000002 HC GENERAL ROOM WITH TELEMETRY DAILY

## 2025-01-13 PROCEDURE — 83540 ASSAY OF IRON: CPT | Performed by: NURSE PRACTITIONER

## 2025-01-13 PROCEDURE — 36415 COLL VENOUS BLD VENIPUNCTURE: CPT | Performed by: NURSE PRACTITIONER

## 2025-01-13 PROCEDURE — 99222 1ST HOSP IP/OBS MODERATE 55: CPT | Performed by: INTERNAL MEDICINE

## 2025-01-13 PROCEDURE — 99222 1ST HOSP IP/OBS MODERATE 55: CPT | Performed by: NURSE PRACTITIONER

## 2025-01-13 PROCEDURE — 82728 ASSAY OF FERRITIN: CPT | Mod: PARLAB | Performed by: NURSE PRACTITIONER

## 2025-01-13 PROCEDURE — 99232 SBSQ HOSP IP/OBS MODERATE 35: CPT | Performed by: INTERNAL MEDICINE

## 2025-01-13 PROCEDURE — 2500000001 HC RX 250 WO HCPCS SELF ADMINISTERED DRUGS (ALT 637 FOR MEDICARE OP): Performed by: NURSE PRACTITIONER

## 2025-01-13 PROCEDURE — 99223 1ST HOSP IP/OBS HIGH 75: CPT | Performed by: STUDENT IN AN ORGANIZED HEALTH CARE EDUCATION/TRAINING PROGRAM

## 2025-01-13 PROCEDURE — 85027 COMPLETE CBC AUTOMATED: CPT | Performed by: NURSE PRACTITIONER

## 2025-01-13 RX ORDER — POLYETHYLENE GLYCOL 3350, SODIUM CHLORIDE, SODIUM BICARBONATE, POTASSIUM CHLORIDE 420; 11.2; 5.72; 1.48 G/4L; G/4L; G/4L; G/4L
4000 POWDER, FOR SOLUTION ORAL ONCE
Status: COMPLETED | OUTPATIENT
Start: 2025-01-14 | End: 2025-01-14

## 2025-01-13 RX ADMIN — METOPROLOL TARTRATE 12.5 MG: 25 TABLET, FILM COATED ORAL at 08:55

## 2025-01-13 RX ADMIN — TORSEMIDE 20 MG: 20 TABLET ORAL at 08:56

## 2025-01-13 RX ADMIN — METOPROLOL TARTRATE 12.5 MG: 25 TABLET, FILM COATED ORAL at 20:28

## 2025-01-13 RX ADMIN — Medication 1 TABLET: at 08:56

## 2025-01-13 SDOH — SOCIAL STABILITY: SOCIAL INSECURITY: ARE THERE ANY APPARENT SIGNS OF INJURIES/BEHAVIORS THAT COULD BE RELATED TO ABUSE/NEGLECT?: NO

## 2025-01-13 SDOH — SOCIAL STABILITY: SOCIAL INSECURITY: DO YOU FEEL ANYONE HAS EXPLOITED OR TAKEN ADVANTAGE OF YOU FINANCIALLY OR OF YOUR PERSONAL PROPERTY?: NO

## 2025-01-13 SDOH — SOCIAL STABILITY: SOCIAL INSECURITY: HAVE YOU HAD THOUGHTS OF HARMING ANYONE ELSE?: NO

## 2025-01-13 SDOH — SOCIAL STABILITY: SOCIAL INSECURITY: ARE YOU OR HAVE YOU BEEN THREATENED OR ABUSED PHYSICALLY, EMOTIONALLY, OR SEXUALLY BY ANYONE?: NO

## 2025-01-13 SDOH — ECONOMIC STABILITY: INCOME INSECURITY: IN THE PAST 12 MONTHS HAS THE ELECTRIC, GAS, OIL, OR WATER COMPANY THREATENED TO SHUT OFF SERVICES IN YOUR HOME?: NO

## 2025-01-13 SDOH — SOCIAL STABILITY: SOCIAL INSECURITY: WITHIN THE LAST YEAR, HAVE YOU BEEN AFRAID OF YOUR PARTNER OR EX-PARTNER?: NO

## 2025-01-13 SDOH — ECONOMIC STABILITY: FOOD INSECURITY: WITHIN THE PAST 12 MONTHS, THE FOOD YOU BOUGHT JUST DIDN'T LAST AND YOU DIDN'T HAVE MONEY TO GET MORE.: NEVER TRUE

## 2025-01-13 SDOH — SOCIAL STABILITY: SOCIAL INSECURITY: WERE YOU ABLE TO COMPLETE ALL THE BEHAVIORAL HEALTH SCREENINGS?: YES

## 2025-01-13 SDOH — SOCIAL STABILITY: SOCIAL INSECURITY: WITHIN THE LAST YEAR, HAVE YOU BEEN HUMILIATED OR EMOTIONALLY ABUSED IN OTHER WAYS BY YOUR PARTNER OR EX-PARTNER?: NO

## 2025-01-13 SDOH — SOCIAL STABILITY: SOCIAL INSECURITY: DO YOU FEEL UNSAFE GOING BACK TO THE PLACE WHERE YOU ARE LIVING?: NO

## 2025-01-13 SDOH — SOCIAL STABILITY: SOCIAL INSECURITY: DOES ANYONE TRY TO KEEP YOU FROM HAVING/CONTACTING OTHER FRIENDS OR DOING THINGS OUTSIDE YOUR HOME?: NO

## 2025-01-13 SDOH — ECONOMIC STABILITY: FOOD INSECURITY: WITHIN THE PAST 12 MONTHS, YOU WORRIED THAT YOUR FOOD WOULD RUN OUT BEFORE YOU GOT THE MONEY TO BUY MORE.: NEVER TRUE

## 2025-01-13 SDOH — SOCIAL STABILITY: SOCIAL INSECURITY: HAS ANYONE EVER THREATENED TO HURT YOUR FAMILY OR YOUR PETS?: NO

## 2025-01-13 SDOH — SOCIAL STABILITY: SOCIAL INSECURITY: ABUSE: ADULT

## 2025-01-13 ASSESSMENT — COGNITIVE AND FUNCTIONAL STATUS - GENERAL
PATIENT BASELINE BEDBOUND: NO
MOBILITY SCORE: 22
MOBILITY SCORE: 22
DAILY ACTIVITIY SCORE: 23
CLIMB 3 TO 5 STEPS WITH RAILING: A LITTLE
HELP NEEDED FOR BATHING: A LITTLE
WALKING IN HOSPITAL ROOM: A LITTLE
HELP NEEDED FOR BATHING: A LITTLE
CLIMB 3 TO 5 STEPS WITH RAILING: A LITTLE
DAILY ACTIVITIY SCORE: 23
WALKING IN HOSPITAL ROOM: A LITTLE

## 2025-01-13 ASSESSMENT — ACTIVITIES OF DAILY LIVING (ADL)
HEARING - RIGHT EAR: HEARING AID
PATIENT'S MEMORY ADEQUATE TO SAFELY COMPLETE DAILY ACTIVITIES?: YES
FEEDING YOURSELF: INDEPENDENT
WALKS IN HOME: INDEPENDENT
TOILETING: INDEPENDENT
LACK_OF_TRANSPORTATION: NO
HEARING - LEFT EAR: HEARING AID
DRESSING YOURSELF: INDEPENDENT
GROOMING: INDEPENDENT
BATHING: INDEPENDENT
JUDGMENT_ADEQUATE_SAFELY_COMPLETE_DAILY_ACTIVITIES: YES
ADEQUATE_TO_COMPLETE_ADL: YES

## 2025-01-13 ASSESSMENT — PAIN SCALES - GENERAL
PAINLEVEL_OUTOF10: 0 - NO PAIN

## 2025-01-13 ASSESSMENT — PATIENT HEALTH QUESTIONNAIRE - PHQ9
SUM OF ALL RESPONSES TO PHQ9 QUESTIONS 1 & 2: 0
2. FEELING DOWN, DEPRESSED OR HOPELESS: NOT AT ALL
1. LITTLE INTEREST OR PLEASURE IN DOING THINGS: NOT AT ALL

## 2025-01-13 ASSESSMENT — LIFESTYLE VARIABLES
HOW MANY STANDARD DRINKS CONTAINING ALCOHOL DO YOU HAVE ON A TYPICAL DAY: PATIENT DOES NOT DRINK
SKIP TO QUESTIONS 9-10: 1
HOW OFTEN DO YOU HAVE A DRINK CONTAINING ALCOHOL: NEVER
HOW OFTEN DO YOU HAVE 6 OR MORE DRINKS ON ONE OCCASION: NEVER
AUDIT-C TOTAL SCORE: 0
AUDIT-C TOTAL SCORE: 0

## 2025-01-13 NOTE — CONSULTS
"Reason For Consult  GI bleed on DAPT and DOAC, suspect ischemic colitis    History Of Present Illness  Yesenia Milner is a 89 y.o. female   with past medical history of COPD, HTN, CVA, sick sinus syndrome s/p pacemaker, HFpEF, recent NSTEMI, and recent hospitalization for CHF exacerbation with acute pulmonary edema, imaging at that time was concerning for SVC thrombus and patient was evaluated by vascular who recommended DOAC for 6 months and repeat CT. Patient presented to [facility] 1/12/25  from home with 1 bloody bowel movement last evening.     On examination today patient denies any abdominal or epigastric pain, dysphagia, nausea, vomiting or diarrhea.  Prior to bleeding episode, saw bright red blood on wiping tissue 2 days before.  Other than that no history of upper or lower GI bleed.  Mild abdominal discomfort 1 week ago which improved after moving her bowels.  Bowel movements usually daily  No recent history of endoscopic studies, states got 2 colonoscopies with last one in her mid 70s with history of benign polyps.  Per daughter who is at patient's bedside \"all her life she had very irritated bowels\" and \"colitis\", \"diverticulitis for years\" but unable to offer any more details.    Patient is on Plavix, Eliquis and ASA 81  Last dose Saturday a.m. per patient    Lab work today shows H&H 9.1, in the 10 range in December of last year, 11 in 2023, normal in 2020 1/2022.  INR 1.7 yesterday.  LFTs unremarkable.    CTA of abdomen and pelvis no evidence of active GI bleed, focal circumferential wall thickening of the distal transverse colon without no significant surrounding inflammatory changes, raising suspicion for malignancy.    Past Medical History  She has a past medical history of Other conditions influencing health status, Personal history of other medical treatment, and Personal history of other medical treatment.    Surgical History  She has a past surgical history that includes Appendectomy (11/22/2017); " Hysterectomy (11/22/2017); Tonsillectomy (11/22/2017); Other surgical history (11/22/2017); Other surgical history (11/22/2017); Cardiac pacemaker placement (03/11/2021); IR angiogram renal bilateral (Bilateral, 12/14/2020); IR angiogram inferior epigastric pelvic (12/14/2020); IR angiogram inferior epigastric pelvic (12/14/2020); and Cardiac catheterization (N/A, 12/20/2024).     Social History  She reports that she quit smoking about 52 years ago. Her smoking use included cigarettes. She has been exposed to tobacco smoke. She has never used smokeless tobacco. She reports that she does not drink alcohol and does not use drugs.    Family History  Family History   Problem Relation Name Age of Onset    No Known Problems Mother          Allergies  Iodine, Shrimp, Hydrocodone, and Adhesive tape-silicones    Review of Systems    A 10 point review of system is negative except for what is mentioned in the HPI     Physical Exam     The note was created using voice recognition transcription software. Despite proofreading, unintentional typographical errors may be present. Please contact the GI office with any questions or concerns.     Current Medications: reviewed    Vital Signs: Reviewed    Physical Exam:  General: no apparent distress, pleasant and cooperative elderly lady  Skin:  Warm and dry, no jaundice  HEENT: No scleral icterus, no conjunctival pallor, normocephalic, atraumatic, mucous membranes moist  Neck:  atraumatic, trachea midline, no JVD  Chest:  decreased air entry to auscultation bilaterally. No wheezes, rales, or rhonchi  CV:  Regular rate and rhythm.  Positive S1/S2  Abdomen: no distension, +BS, soft, non-tender to palpation, no rebound tenderness, no guarding, no rigidity, no discernible ascites   Extremities: no lower extremity edema, Chronic pigmentary changes, no cyanosis  Neurological:  A&Ox3 , no asterixis  Psychiatric: cooperative     Investigations:  Labs, radiological imaging and cardiac work up  "were reviewed     Last Recorded Vitals  Blood pressure 123/60, pulse 61, temperature 36 °C (96.8 °F), temperature source Temporal, resp. rate 18, height 1.626 m (5' 4\"), weight 74.8 kg (165 lb), SpO2 95%.    Relevant Results      Scheduled medications  [Held by provider] apixaban, 5 mg, oral, BID  [Held by provider] aspirin, 81 mg, oral, Daily  calcium carbonate-vitamin D3, 1 tablet, oral, Daily  fluticasone furoate-vilanteroL, 1 puff, inhalation, Daily  metoprolol tartrate, 12.5 mg, oral, BID  [START ON 1/14/2025] polyethylene glycol-electrolytes, 4,000 mL, oral, Once  torsemide, 20 mg, oral, Daily      Continuous medications     PRN medications  PRN medications: acetaminophen **OR** acetaminophen **OR** acetaminophen, ipratropium-albuteroL, lubricating eye drops, oxygen, oxygen    Results for orders placed or performed during the hospital encounter of 01/12/25 (from the past 24 hours)   CBC   Result Value Ref Range    WBC 9.8 4.4 - 11.3 x10*3/uL    nRBC 0.0 0.0 - 0.0 /100 WBCs    RBC 3.12 (L) 4.00 - 5.20 x10*6/uL    Hemoglobin 9.1 (L) 12.0 - 16.0 g/dL    Hematocrit 28.0 (L) 36.0 - 46.0 %    MCV 90 80 - 100 fL    MCH 29.2 26.0 - 34.0 pg    MCHC 32.5 32.0 - 36.0 g/dL    RDW 13.7 11.5 - 14.5 %    Platelets 262 150 - 450 x10*3/uL   Comprehensive Metabolic Panel   Result Value Ref Range    Glucose 138 (H) 74 - 99 mg/dL    Sodium 135 (L) 136 - 145 mmol/L    Potassium 4.6 3.5 - 5.3 mmol/L    Chloride 103 98 - 107 mmol/L    Bicarbonate 25 21 - 32 mmol/L    Anion Gap 12 10 - 20 mmol/L    Urea Nitrogen 23 6 - 23 mg/dL    Creatinine 1.02 0.50 - 1.05 mg/dL    eGFR 52 (L) >60 mL/min/1.73m*2    Calcium 9.3 8.6 - 10.3 mg/dL    Albumin 3.6 3.4 - 5.0 g/dL    Alkaline Phosphatase 78 33 - 136 U/L    Total Protein 6.1 (L) 6.4 - 8.2 g/dL    AST 16 9 - 39 U/L    Bilirubin, Total 0.5 0.0 - 1.2 mg/dL    ALT 16 7 - 45 U/L          Assessment/Plan     Yesenia Milner is a 89 y.o. female   with past medical history of COPD, HTN, CVA, sick " sinus syndrome s/p pacemaker, HFpEF, recent NSTEMI, and recent hospitalization for CHF exacerbation with acute pulmonary edema, imaging at that time was concerning for SVC thrombus and patient was evaluated by vascular who recommended DOAC for 6 months and repeat CT. Patient presented to [facility] 1/12/25  from home with 1 bloody bowel movement last evening.     #Lower GI bleed  #Chronic anemia, steadily downtrending  #Abnormal colon CT    Given no recent history of colonoscopy and stated downtrend in H&H within last 2 or 3 years as well as circumferential thickening of distal transverse colon it is necessary to rule out colonic neoplasm  Colonoscopy Wednesday or Thursday to allow sufficient time for anticoagulation to wear off, last taken on Saturday a.m.    Continue daily CBC, active type and screen on file, 2 large-bore IV access, replenish as necessary    Iron studies    Okay for regular diet today  Clear liquid diet tomorrow  GoLytely 4 L p.o. x 1 tomorrow    Colonoscopy Wednesday    Requesting cardiology clearance given recent cardiac history    Patient seen and examined with Dr. Ca      I spent 60 minutes in the professional and overall care of this patient.      Narcisa Vuong, APRN-CNP

## 2025-01-13 NOTE — CARE PLAN
Problem: Pain - Adult  Goal: Verbalizes/displays adequate comfort level or baseline comfort level  Outcome: Progressing   The patient's goals for the shift include sleep    The clinical goals for the shift include pt will maintain spo2 above 92% on 2 L of o2    Over the shift, the patient did not make progress toward the following goals. Barriers to progression include =. Recommendations to address these barriers include .

## 2025-01-13 NOTE — H&P
History Of Present Illness  Yesenia Milner is a 89-year-old female with past medical history of COPD, HTN, CVA, sick sinus syndrome s/p pacemaker, HFpEF, recent NSTEMI, and recent hospitalization for CHF exacerbation with acute pulmonary edema, imaging at that time was concerning for SVC thrombus and patient was evaluated by vascular who recommended DOAC for 6 months and repeat CT. Patient presents from home with 1 bloody bowel movement yesterday evening and small amount of blood in her mouth and when she blew her nose this morning.  Her bowel movement she describes as brown stool mixed with red blood, she was unable to identify clots.  States this morning she rinsed out her mouth and spit out the water, noted red blood.  Then she cleared her nose and there was small clots of blood.  Denies however overt epistaxis.  She denies any black stools or prior history of GI bleed.  Her last colonoscopy was many years ago and no longer gets screenings due to age.  She reports that she was not feeling very well yesterday, has felt dizzy with ambulation and also reports associated upper abdominal pain/discomfort that feels like bloating.  She denies nausea or vomiting.  No recent history of diarrhea constipation. Review of systems additionally positive for intermittent lower extremity edema (none currently), shortness of breath with wheezing and cough.  Denies chest pains or palpitations.  Currently patient is on Plavix, aspirin, and Elquis.     Dr Smith's last note on 1/6/2025 “I have reviewed the DVT scan performed today.  There is no evidence of IJ thrombus.  Waveforms are normal in the IJ as well as subclavian, suggestive that even if there is a thrombus in the SVC, it is not hemodynamically significant.”     ER Course: Hemodynamically stable, tachypneic, respirations unlabored.  Afebrile.  EKG showed A-V dual-paced rhythm.  WBC 5.6, hemoglobin 9.2/hematocrit 28 (baseline hemoglobin recently 9-10, but was previously 10-11),  platelets 275.  INR 1.7.  Glucose 1 9, sodium 134, BUN 24, creatinine 0.92, EGFR 59, magnesium 2.21.  Lactate 1.1.  Lipase 23.  CTA A/P - No aortic aneurysm or dissection. No evidence of active GI bleed. Focal circumferential wall thickening of the distal transverse colon. Findings suggestive of CHF with interstitial edema and small pleural effusions.     Past medical history: As above  Past Surgical History: Appendectomy, cardiac catheterization, pacemaker placement, hysterectomy, tonsillectomy  Social History: Former smoker, quit ~1972. Smoked on and off for 18 years.  No alcohol or illicit drug use.     Past Medical History  Past Medical History:   Diagnosis Date    Other conditions influencing health status     Normal stress echocardiogram    Personal history of other medical treatment     History of echocardiogram    Personal history of other medical treatment     H/O Doppler ultrasound       Surgical History  Past Surgical History:   Procedure Laterality Date    APPENDECTOMY  11/22/2017    Appendectomy    CARDIAC CATHETERIZATION N/A 12/20/2024    Procedure: Left Heart Cath, With LV;  Surgeon: Tahir Ruelas MD;  Location: [facility];  Service: Cardiovascular;  Laterality: N/A;    CARDIAC PACEMAKER PLACEMENT  03/11/2021    Pacemaker Placement    HYSTERECTOMY  11/22/2017    Hysterectomy    IR ANGIOGRAM INFERIOR EPIGASTRIC PELVIC  12/14/2020    IR ANGIOGRAM INFERIOR EPIGASTRIC PELVIC 12/14/2020 [facility]    IR ANGIOGRAM INFERIOR EPIGASTRIC PELVIC  12/14/2020    IR ANGIOGRAM INFERIOR EPIGASTRIC PELVIC 12/14/2020 [facility]    IR ANGIOGRAM RENAL BILATERAL Bilateral 12/14/2020    IR ANGIOGRAM RENAL BILATERAL 12/14/2020 [facility]    OTHER SURGICAL HISTORY  11/22/2017    Stab Phlebectomy Of Varicose Veins    OTHER SURGICAL HISTORY  11/22/2017    Venous Ligation With Stripping    TONSILLECTOMY  11/22/2017    Tonsillectomy        Social History  She reports that she quit smoking about 52 years  "ago. Her smoking use included cigarettes. She has been exposed to tobacco smoke. She has never used smokeless tobacco. She reports that she does not drink alcohol and does not use drugs.    Family History  Family History   Problem Relation Name Age of Onset    No Known Problems Mother          Allergies  Iodine, Shrimp, Hydrocodone, and Adhesive tape-silicones    Review of Systems     10 point ROS negative except as note above in the HPI    Physical Exam  Constitutional:       General: She is awake. She is not in acute distress.     Appearance: Normal appearance. She is not toxic-appearing.   HENT:      Head: Atraumatic.      Nose: Nose normal.      Mouth/Throat:      Mouth: Mucous membranes are moist.   Eyes:      Conjunctiva/sclera: Conjunctivae normal.   Cardiovascular:      Rate and Rhythm: Normal rate and regular rhythm.      Pulses: Normal pulses.      Heart sounds: No murmur heard.  Pulmonary:      Breath sounds: Wheezing present.      Comments: Tachypneic  Abdominal:      General: Bowel sounds are normal. There is no distension.      Palpations: Abdomen is soft.      Tenderness: There is no abdominal tenderness. There is no guarding.   Musculoskeletal:         General: No swelling, deformity or signs of injury. Normal range of motion.      Cervical back: Neck supple.      Right lower leg: No edema.      Left lower leg: No edema.   Skin:     General: Skin is warm and dry.      Capillary Refill: Capillary refill takes less than 2 seconds.      Findings: No ecchymosis, erythema or wound.   Neurological:      General: No focal deficit present.      Mental Status: She is alert and oriented to person, place, and time.   Psychiatric:         Mood and Affect: Mood normal.         Behavior: Behavior is cooperative.          Last Recorded Vitals  Blood pressure 123/60, pulse 61, temperature 36 °C (96.8 °F), temperature source Temporal, resp. rate 18, height 1.626 m (5' 4\"), weight 74.8 kg (165 lb), SpO2 " 95%.    Relevant Results      Results for orders placed or performed during the hospital encounter of 01/12/25 (from the past 24 hours)   CBC   Result Value Ref Range    WBC 9.8 4.4 - 11.3 x10*3/uL    nRBC 0.0 0.0 - 0.0 /100 WBCs    RBC 3.12 (L) 4.00 - 5.20 x10*6/uL    Hemoglobin 9.1 (L) 12.0 - 16.0 g/dL    Hematocrit 28.0 (L) 36.0 - 46.0 %    MCV 90 80 - 100 fL    MCH 29.2 26.0 - 34.0 pg    MCHC 32.5 32.0 - 36.0 g/dL    RDW 13.7 11.5 - 14.5 %    Platelets 262 150 - 450 x10*3/uL   Comprehensive Metabolic Panel   Result Value Ref Range    Glucose 138 (H) 74 - 99 mg/dL    Sodium 135 (L) 136 - 145 mmol/L    Potassium 4.6 3.5 - 5.3 mmol/L    Chloride 103 98 - 107 mmol/L    Bicarbonate 25 21 - 32 mmol/L    Anion Gap 12 10 - 20 mmol/L    Urea Nitrogen 23 6 - 23 mg/dL    Creatinine 1.02 0.50 - 1.05 mg/dL    eGFR 52 (L) >60 mL/min/1.73m*2    Calcium 9.3 8.6 - 10.3 mg/dL    Albumin 3.6 3.4 - 5.0 g/dL    Alkaline Phosphatase 78 33 - 136 U/L    Total Protein 6.1 (L) 6.4 - 8.2 g/dL    AST 16 9 - 39 U/L    Bilirubin, Total 0.5 0.0 - 1.2 mg/dL    ALT 16 7 - 45 U/L     *Note: Due to a large number of results and/or encounters for the requested time period, some results have not been displayed. A complete set of results can be found in Results Review.     CT angio abdomen pelvis w and or wo IV IV contrast    Result Date: 1/12/2025  Interpreted By:  Zenobia Raza, STUDY: CT ANGIO ABDOMEN PELVIS W AND/OR WO IV IV CONTRAST;  1/12/2025 5:13 pm   INDICATION: Signs/Symptoms:gib, abd pain.   COMPARISON: None.   ACCESSION NUMBER(S): [id number]   ORDERING CLINICIAN: HERIBERTO VINCENT   TECHNIQUE: Axial CT images of the abdomen and pelvis  before and after intravenous administration of contrast using CT angiographic technique. Coronal and sagittal images are reconstructed.  3D reconstructions were obtained at a separate workstation.   FINDINGS: VASCULAR: ABDOMINAL AORTA: No aortic aneurysm or dissection. Mild abdominal aortic  calcification. The celiac, superior mesenteric, renal and inferior mesenteric arteries are patent with no significant stenosis.   The bilateral common iliac, internal iliac, external iliac and common femoral arteries are patent with no significant stenosis.   ABDOMEN: LOWER CHEST: Small bilateral pleural effusions. Interlobular septal thickening at the lung bases suggests interstitial edema. Partially imaged lead right ventricle. BONES: No acute osseous abnormality. Diffuse degenerative disc changes in the lumbar facet arthropathy. ABDOMINAL WALL: Within normal limits.   LIVER: Within normal limits. BILE DUCTS: No biliary dilatation. GALLBLADDER: No calcified gallstones. No pericholecystic inflammatory changes. PANCREAS: Within normal limits. SPLEEN: Within normal limits. ADRENALS:  Within normal limits. KIDNEYS: Symmetric renal enhancement. No hydronephrosis or perinephric fluid collection. Subcentimeter cortical hypodensities in both kidneys are too small to characterize, but statistically likely small cysts. URETERS: No hydroureter.   PELVIS:   REPRODUCTIVE ORGANS: No pelvic masses. BLADDER: Within normal limits.   RETROPERITONEUM: Within normal limits. BOWEL: No evidence of active GI bleed. The stomach is nondistended. No dilated small bowel. There is focal circumferential colonic wall thickening of the distal transverse colon (Series 7, Image 117). Colonic diverticulosis is noted without acute diverticulitis. The appendix has been removed. PERITONEUM: No ascites or free air, no fluid collection.       No aortic aneurysm or dissection.   No evidence of active GI bleed.   Focal circumferential wall thickening of the distal transverse colon. There are no significant surrounding inflammatory changes, raising suspicion for malignancy (rather than focal colitis/diverticulitis). Correlation with presenting history is recommended and follow-up colonoscopy should be considered.   Findings suggestive of CHF with  interstitial edema and small pleural effusions.       MACRO: Critical Finding:  See findings. Notification was initiated on 1/12/2025 at 6:27 pm by  Zenobia Raza.  (**-YCF-**) Instructions:   Signed by: Zenobia Raza 1/12/2025 6:27 PM Dictation workstation:   [id number]    ECG 12 lead    Result Date: 1/10/2025  AV dual-paced rhythm Abnormal ECG When compared with ECG of 21-DEC-2024 10:16, Vent. rate has decreased BY  10 BPM See ED provider note for full interpretation and clinical correlation Confirmed by Teena Maldonado ([id number]) on 1/10/2025 6:04:38 PM    Vascular US Upper Extremity Venous Duplex Bilateral    Result Date: 1/6/2025           [place] [address] Tel [phone] and Fax [phone]  Vascular Lab Report VASC US UPPER EXTREMITY VENOUS DUPLEX BILATERAL  Patient Name:     JULY LOPEZ ANGELA    Reading           [id number] Skyler Meneses                                       Physician:        MD Study Date:       1/6/2025            Ordering          [id number] RAELY SYKES                                       Physician: MRN/PID:          [id number]            Technologist:     Tammy Amador [facility] Accession#:       [id number]        Technologist 2: Date of           7/29/1933 / 89      Encounter#:       [id number] Birth/Age:        years Gender:           F Admission Status: Inpatient           Location          [facility]                                       Performed:  Diagnosis/ICD: Generalized edema (anasarca)-R60.1 Indication:    Limb edema CPT Codes:     87970 Peripheral venous duplex scan for DVT complete  CONCLUSIONS: Right Upper Venous: No evidence of acute deep vein thrombus visualized in the right upper extremity. Left Upper Venous: No evidence of acute deep vein thrombus visualized in the left upper extremity.  Imaging & Doppler Findings:  Right            Compressible Thrombus        Flow Internal Jugular     Yes        None   Spontaneous/Phasic Subclavian            Yes        None   Spontaneous/Phasic Axillary             Yes        None   Spontaneous/Phasic Brachial             Yes        None Cephalic             Yes        None Basilic              Yes        None  Left             Compress Thrombus        Flow Internal Jugular   Yes      None   Spontaneous/Phasic Subclavian         Yes      None   Spontaneous/Phasic Axillary           Yes      None   Spontaneous/Phasic Brachial           Yes      None Cephalic           Yes      None Basilic            Yes      None  [id number] Skyler Meneses MD Electronically signed by [id number] Skyler Meneses MD on 1/6/2025 at 3:28:39 PM  ** Final **     XR chest 1 view    Result Date: 1/6/2025  Interpreted By:  Frederic Granados, STUDY: XR CHEST 1 VIEW; 1/4/2025 6:24 am   INDICATION: Signs/Symptoms:monitor pulm edema.   COMPARISON: Chest x-ray 01/02/2025   ACCESSION NUMBER(S): [id number]   ORDERING CLINICIAN: LUIS CR   TECHNIQUE: 1 view of the chest was performed.   FINDINGS: Right-greater-than-left ill-defined basal infiltrate/atelectasis. Trace bilateral pleural effusion.. No pneumothorax.  The cardiomediastinal silhouette is stable. Left-sided chest wall pacemaker noted.       1. Bilateral ill-defined basal pulmonary infiltrate/atelectasis along with a trace bilateral pleural effusion, new from chest x-ray 01/02/2025. Advise clinical correlation and follow-up to ensure resolution.   Signed by: Frederic Granados 1/6/2025 8:56 AM Dictation workstation:   [id number]    CT angio chest for pulmonary embolism    Addendum Date: 1/2/2025    Findings discussed withDr. Gale Marquez at 7:00 PM 1/2/2025 Signed by Ayan Martins MD    Result Date: 1/2/2025  STUDY: CT Angiogram of the Chest; 01/02/2025, 5:06 PM INDICATION: Recent admission for NSTEMI, non occlusive thrombus possibly seen in SVC at that time. COMPARISON: CTA CAP 12/20/2024. ACCESSION NUMBER(S): [id number] ORDERING CLINICIAN: GALE MARQUEZ TECHNIQUE:  CTA of the chest  was performed with intravenous contrast. Images are reviewed and processed at a workstation according to the CT angiogram protocol with 3-D and/or MIP post processing imaging generated.  Omnipaque 350, 65 mL was administered intravenously. Automated mA/kV exposure control was utilized and patient examination was performed in strict accordance with principles of ALARA. FINDINGS: Pulmonary arteries are adequately opacified without acute or chronic filling defects.  The thoracic aorta is normal in course and caliber without dissection or aneurysm.  Contrast was injected within the right upper extremity vein.  There is a focal region of nonenhancement within the anterior aspect of the superior vena cava at the level of the pacemaker leads image 99 and image 125.  Overall length of the region of nonenhancement in the SVC has increased compared to exam 12/20/2024.  Additionally there is a region of nonenhancement right internal jugular vein image 7 series 402.  Small amount of contrast refluxes into the azygos vein. Mild cardiac enlargement.  Pacemaker leads within the right ventricle and right atrium.  Small mediastinal and hilar lymph nodes are unchanged. Moderate size right pleural effusion and small left pleural effusion. The airways are patent. Multifocal groundglass regions of airspace density both lungs. Upper abdomen demonstrates no acute pathology. There are no acute fractures.  No suspicious bony lesions.    Negative for pulmonary embolism or thoracic aortic dissection.  Again identified are regions of nonenhancement along the anterior aspect of the mid SVC and suggestive of nonocclusive SVC thrombus. Compared to prior exam overall length of nonenhancement increased. Additionally there is a focal region of nonenhancement lower margin of the right internal jugular vein and may represent new thrombus. Moderate size right pleural effusion and small left pleural effusion are new. Cardiomegaly. Multifocal groundglass  regions of airspace density both lungs could be due to pulmonary edema. Signed by Ayan Martins MD    Point of Care Ultrasound    Result Date: 1/2/2025  Jac Marquez MD     1/2/2025  5:49 PM Performed by: Jac Marquez MD Authorized by: Jac Marquez MD  Cardiac Indications: shortness of breath Procedure: Cardiac Ultrasound Findings:  Views: parasternal long, parasternal short, apical four and subxiphoid Effusion: The pericardial space was visualized and was positive for a PERICARDIAL EFFUSION. Activity: Ventricular contractions were visualized. LV: LV systolic function was NORMAL. RV: RV size was NORMAL. Impression: Cardiac: The focused cardiac ultrasound exam was NORMAL. Comments: No appreciable pericardial effusion.  Left ventricular function appears adequate.  No signs of right heart strain appreciated.    XR chest 2 views    Result Date: 1/2/2025  Interpreted By:  Darvin Vieira, STUDY: XR CHEST 2 VIEWS; 1/2/2025 1:05 pm   INDICATION: Signs/Symptoms:shortness of breath   COMPARISON: December 2024.   ACCESSION NUMBER(S): [id number]   ORDERING CLINICIAN: JAC MARQUEZ   TECHNIQUE: Number of films: Two view chest radiographs.   FINDINGS: A pacemaker is again noted, with the leads overlying the right atrium and right ventricle. The cardiomediastinal silhouette is within normal limits. The lungs are hyperinflated, with prominent interstitial markings bilaterally and increased AP diameter of the chest. There is no pneumothorax, confluent infiltrates or pleural effusions. Previously noted superimposed bilateral interstitial infiltrates have resolved. Degenerative changes involve the thoracic spine.       COPD/chronic interstitial lung disease again noted without acute infiltrates.     Signed by: Darvin Vieira 1/2/2025 1:45 PM Dictation workstation:   [id number]    ECG 12 lead    Result Date: 12/30/2024  Ventricular-paced rhythm Abnormal ECG When compared with ECG of 20-DEC-2024 02:50,  (unconfirmed) Vent. rate has increased BY   2 BPM Confirmed by Magnus Hess ([id number]) on 12/30/2024 7:07:18 PM    ECG 12 lead    Result Date: 12/26/2024  Ventricular-paced rhythm Abnormal ECG When compared with ECG of 20-DEC-2024 01:44, (unconfirmed) Vent. rate has increased BY  10 BPM See ED provider note for full interpretation and clinical correlation Confirmed by Teena Maldonado ([id number]) on 12/26/2024 10:22:47 AM    ECG 12 lead    Result Date: 12/26/2024  AV dual-paced rhythm Abnormal ECG When compared with ECG of 21-AUG-2023 01:07, PREVIOUS ECG IS PRESENT See ED provider note for full interpretation and clinical correlation Confirmed by Teena Maldonado ([id number]) on 12/26/2024 10:22:41 AM    Transthoracic echo (TTE) complete    Result Date: 12/21/2024   [facility], [address] [place] [address]           Tel [phone] and Fax [phone] TRANSTHORACIC ECHOCARDIOGRAM REPORT  Patient Name:       JULY Steel Physician:    [id number] Magnus Hess MD Study Date:         12/21/2024          Ordering Provider:    [id number] EARLE GILMORE MRN/PID:            [id number]            Fellow: Accession#:         [id number]        Nurse: Date of Birth/Age:  7/29/1933 / 89      Sonographer:          Nadeem Rubio                     years                                     ACS, RDCS, FASE Gender assigned at  F                   Additional Staff: Birth: Height:             162.56 cm           Admit Date:           12/20/2024 Weight:             73.48 kg            Admission Status:     Inpatient -                                                               Routine BSA / BMI:          1.79 m2 / 27.81     Encounter#:           [id number]                     kg/m2 Blood Pressure:     132/61 mmHg         Department Location:  [facility] Study Type:    TRANSTHORACIC ECHO (TTE) COMPLETE  Diagnosis/ICD: Shortness of breath-R06.02 Indication:    Dyspnea CPT Code:      Echo Complete w Full Doppler-99137 Patient History: Pertinent History: COPD, CHF, Chest Pain, Dyspnea and HTN. Study Detail: The following Echo studies were performed: 2D, M-Mode, Doppler and               color flow. Technically challenging study due to body habitus.               Patient has a pacemaker.  PHYSICIAN INTERPRETATION: Left Ventricle: Left ventricular ejection fraction is normal, by visual estimate at 60-65%. Left venticular wall motion is abnormal. The left ventricular cavity size is normal. There is mildly increased septal and normal posterior left ventricular wall thickness. Left ventricular diastolic filling is indeterminate. There is evidence of a moderate apical and anteroapical myocardial infarction. Left Atrium: The left atrium is normal in size. Right Ventricle: The right ventricle is normal in size. There is normal right ventricular global systolic function. A device is visualized in the right ventricle. Right Atrium: The right atrium is normal in size. There is a device visualized in the right atrium. Aortic Valve: The aortic valve is trileaflet. There is moderate aortic valve thickening. There is evidence of mildly elevated transaortic gradients consistent with sclerosis of the aortic valve. The aortic valve dimensionless index is 0.57. There is trace aortic valve regurgitation. The peak instantaneous gradient of the aortic valve is 11 mmHg. The mean gradient of the aortic valve is 6 mmHg. Mitral Valve: The mitral valve is mildly thickened. There is moderate mitral annular calcification. There is moderate mitral valve regurgitation. Tricuspid Valve: The tricuspid valve is structurally normal. There is moderate to severe tricuspid regurgitation. The Doppler estimated RVSP is moderately elevated at 48.4 mmHg. Pulmonic Valve: The pulmonic valve is structurally normal. There is no indication of pulmonic valve  regurgitation. Pericardium: There is no pericardial effusion noted. Aorta: The aortic root is normal. There is no dilatation of the ascending aorta. There is no dilatation of the aortic root. Systemic Veins: The inferior vena cava appears normal in size.  CONCLUSIONS:  1. Left ventricular ejection fraction is normal, by visual estimate at 60-65%.  2. Abnormal left venticular wall motion.  3. Left ventricular diastolic filling is indeterminate.  4. There is a moderate apical and anteroapical myocardial infarction.  5. There is normal right ventricular global systolic function.  6. There is moderate mitral annular calcification.  7. Moderate mitral valve regurgitation.  8. Moderate to severe tricuspid regurgitation visualized.  9. Moderately elevated right ventricular systolic pressure. 10. Aortic valve sclerosis. QUANTITATIVE DATA SUMMARY:  2D MEASUREMENTS:          Normal Ranges: Ao Root d:       3.30 cm  (2.0-3.7cm) LAs:             3.20 cm  (2.7-4.0cm) IVSd:            1.00 cm  (0.6-1.1cm) LVPWd:           0.80 cm  (0.6-1.1cm) LVIDd:           4.10 cm  (3.9-5.9cm) LVIDs:           3.10 cm LV Mass Index:   64 g/m2 LVEDV Index:     45 ml/m2 LV % FS          24.4 %  LA VOLUME:                    Normal Ranges: LA Vol A4C:        51.0 ml    (22+/-6mL/m2) LA Vol A2C:        44.7 ml LA Vol BP:         48.2 ml LA Vol Index A4C:  28.5ml/m2 LA Vol Index A2C:  25.0 ml/m2 LA Vol Index BP:   26.9 ml/m2 LA Area A4C:       18.0 cm2 LA Area A2C:       17.0 cm2 LA Major Axis A4C: 5.4 cm LA Major Axis A2C: 5.5 cm LA Volume Index:   25.0 ml/m2  RA VOLUME BY A/L METHOD:          Normal Ranges: RA Area A4C:             15.0 cm2  M-MODE MEASUREMENTS:         Normal Ranges: Ao Root:             3.40 cm (2.0-3.7cm) LAs:                 4.30 cm (2.7-4.0cm)  AORTA MEASUREMENTS:         Normal Ranges: Asc Ao, d:          3.10 cm (2.1-3.4cm)  LV SYSTOLIC FUNCTION BY 2D PLANIMETRY (MOD):                      Normal Ranges: EF-A4C View:    53  % (>=55%) EF-A2C View:    64 % EF-Biplane:     62 % EF-Visual:      63 % LV EF Reported: 63 %  AORTIC VALVE:                      Normal Ranges: AoV Vmax:                1.66 m/s  (<=1.7m/s) AoV Peak P.0 mmHg (<20mmHg) AoV Mean P.0 mmHg  (1.7-11.5mmHg) LVOT Max Marek:            0.97 m/s  (<=1.1m/s) AoV VTI:                 38.10 cm  (18-25cm) LVOT VTI:                21.90 cm LVOT Diameter:           2.10 cm   (1.8-2.4cm) AoV Area, VTI:           1.99 cm2  (2.5-5.5cm2) AoV Area,Vmax:           2.03 cm2  (2.5-4.5cm2) AoV Dimensionless Index: 0.57  RIGHT VENTRICLE: RV Basal 3.90 cm TAPSE:   27.2 mm RV s'    0.14 m/s  TRICUSPID VALVE/RVSP:          Normal Ranges: Peak TR Velocity:     3.37 m/s RV Syst Pressure:     48 mmHg  (< 30mmHg) IVC Diam:             1.90 cm  PULMONIC VALVE:          Normal Ranges: PV Max Marek:     0.9 m/s  (0.6-0.9m/s) PV Max PG:      3.3 mmHg  [id number] Magnus Hess MD Electronically signed on 2024 at 10:29:11 AM  ** Final **     Cardiac Catheterization Procedure    Result Date: 2024   [facility], Cath Lab, [address] [place] [address] Cardiovascular Catheterization Report Patient Name:      JULY MILLER     Performing Physician:  [id number] Ruelas MD Study Date:        2024           Verifying Physician:   Flip Ruelas MD MRN/PID:           [id number]             Cardiologist/Co-Scrub: Accession#:        [id number]         Ordering Provider:     Flip RUELAS Date of Birth/Age: 1933 / 89 years Cardiologist: Gender:            F                    Fellow: Encounter#:        [id number]           Surgeon:  Study: Left Heart Cath  Indications: JULY MILLER is a 89 year old female who presents with hypertension and a  chest pain assessment of typical angina. NSTE - ACS. Medical History: Stress test performed: No. CTA performed: NoDmitry Ruchiirma accessed: No. LVEF Assessed: No. Cardiac arrest: No. Cardiac surgical consult: No. Cardiovascular instability: No. Frailty status of patient entering lab: 6 = Moderately frail.  Procedure Description: After infiltration with 2% Lidocaine, the right radial artery was cannulated with a modified Seldinger technique. Subsequently a 6 Sinhala sheath was placed in the right radial artery.  Coronary Angiography: The coronary circulation is right dominant.  Coronary Angiography Comments: LM: large and patent LAD: LArge transapical with mild luminal irrgularities. Diag are medium and patent LCX: large non domiant and patent. 2 OM are medium with irregularities and patent. RCA: large dominant and patent. PDA and PLB medium with irregularities. LVEDP 22mmHg EF 30%. Basal hyperkinesis, mid to apical walls are hypokinetic. Dyskinetic apex.  Additional Findings: - Non obstructive CAD - EDP 22mmHg - EF 30%. Basal hyperkinesis, mid to apical walls are hypokinetic. Dyskinetic apex.  Hemo Personnel: +------------------+---------+ Name              Duty      +------------------+---------+ Tahir Ruelas MD 1 +------------------+---------+  Hemodynamic Pressures:  +----+---------------+----------+-------------+--------------+-------+---------+ Site   Date Time   Phase NameSystolic mmHgDiastolic mmHgED mmHgMean mmHg +----+---------------+----------+-------------+--------------+-------+---------+   LV     12/20/2024   O2 REST          131            10     22                  12:25:07 PM                                                      +----+---------------+----------+-------------+--------------+-------+---------+   LV     12/20/2024   O2 REST          133             5     22                  12:25:14 PM                                                       +----+---------------+----------+-------------+--------------+-------+---------+  LVp     12/20/2024   O2 REST          133            48     51                  12:25:17 PM                                                      +----+---------------+----------+-------------+--------------+-------+---------+  AOp     12/20/2024   O2 REST          137            53              86         12:25:23 PM                                                      +----+---------------+----------+-------------+--------------+-------+---------+   AO     12/20/2024   O2 REST          132            64              91         12:27:24 PM                                                      +----+---------------+----------+-------------+--------------+-------+---------+   LV     12/20/2024   O2 REST          131            18     28                  12:33:06 PM                                                      +----+---------------+----------+-------------+--------------+-------+---------+   LV     12/20/2024   O2 REST          125            18     27                  12:33:13 PM                                                      +----+---------------+----------+-------------+--------------+-------+---------+  LVp     12/20/2024   O2 REST          123            12     28                  12:33:17 PM                                                      +----+---------------+----------+-------------+--------------+-------+---------+  AOp     12/20/2024   O2 REST          124            54              80         12:33:22 PM                                                      +----+---------------+----------+-------------+--------------+-------+---------+   AO     12/20/2024   O2 REST          123            57              83         12:33:28 PM                                                       +----+---------------+----------+-------------+--------------+-------+---------+  Cardiac Cath Post Procedure Notes: Post Procedure Diagnosis: - Non obstructive CAD                           - EDP 22mmHg                           - EF 30%. Basal hyperkinesis, mid to apical walls are                           hypokinetic. Dyskinetic apex. Blood Loss:               Estimated blood loss during the procedure was 0 mls. Specimens Removed:        Number of specimen(s) removed: none. ____________________________________________________________________________________ CONCLUSIONS:  1. - Non obstructive CAD     - EDP 22mmHg     - EF 30%. Basal hyperkinesis, mid to apical walls are hypokinetic. Dyskinetic apex. ICD 10 Codes: Non ST elevation (NSTEMI) myocardial infarction-I21.4  CPT Codes: Angiography, Each 1st additional vessel studied after basic exam-13033; Angiography, Each 2nd additional vessel studied after basic exam-28830; Angiography, Each 3rd additional vessel studied after basic exam-22848; Left Heart Cath (visualization of coronaries) and LV-[id number]  [id number] Tahir Ruelas MD Performing Physician Electronically signed by [id number] Tahir Ruelas MD on 12/20/2024 at 12:54:51 PM  ** Final **     CT angio chest abdomen pelvis    Result Date: 12/20/2024  Interpreted By:  Abdiel Barrios, STUDY: CT ANGIO CHEST ABDOMEN PELVIS; ;  12/20/2024 3:38 am   INDICATION: Signs/Symptoms:chest pain radiating to shoulders r/o aortic dissection.     COMPARISON: Correlation made to noncontrast CT abdomen and pelvis of 01/22/2020.   ACCESSION NUMBER(S): [id number]   ORDERING CLINICIAN: JAMILAH ELLINGTON   TECHNIQUE: Noncontrast CT of the chest, abdomen and pelvis was followed by CT angiography of the chest, abdomen and pelvis after IV administration of 100 cc Omnipaque 350. Multiplanar, MIP and 3D reformations.   FINDINGS: CHEST:   VESSELS: No evidence of acute intramural hematoma or dissection. Aorta is normal in caliber. Moderate  atherosclerotic calcification is present. Coronary artery calcifications are noted; please note that technique is not optimized for evaluation of coronary arteries. 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 There is a fairly well-delineated hypoattenuating filling defect in the SVC (series 501, images 84-97), suspicious for nonocclusive thrombus. Main pulmonary artery is of normal caliber. No evidence of pulmonary embolus. HEART: Mildly enlarged. Pacing leads in right atrium and right ventricle. No pericardial effusion. MEDIASTINUM AND HARITHA: No pathologically enlarged thoracic lymph nodes. LUNG, PLEURA, LARGE AIRWAYS: Interlobular septal thickening and mosaic attenuation of the lung parenchyma are compatible with acute pulmonary interstitial edema/CHF. Mild dependent atelectasis trace pleural effusions. No pneumothorax. CHEST WALL AND LOWER NECK: Within normal limits.   ABDOMEN:   BONES: No acute osseous abnormality. Osteopenia. Moderate to severe degenerative changes of the imaged spine. Destructive osseous lesion. ABDOMINAL WALL: Within normal limits.   LIVER: Within normal limits. BILE DUCTS: Normal caliber. GALLBLADDER: The gallbladder is distended. No calcified cholelithiasis is seen. No wall thickening or pericholecystic fluid. PANCREAS: Within normal limits. SPLEEN: Within normal limits. ADRENALS: Within normal limits. KIDNEYS: Incidentally noted ptotic right kidney. Otherwise, normal. URETERS: No hydroureter.   PELVIS:   REPRODUCTIVE ORGANS: No pelvic mass or significant free pelvic fluid. Uterus is absent. BLADDER: Within normal limits.     RETROPERITONEUM: Within normal limits. BOWEL: Stomach appears grossly normal. No dilated or thickened bowel. Colonic diverticulosis without evidence of acute diverticulitis. Normal appendix. PERITONEUM: No ascites or free  air, no fluid collection.       No evidence of acute abnormality of the imaged systemic arterial vasculature. Specifically, no aortic intramural hematoma or dissection. Atherosclerosis, without definite hemodynamically significant luminal narrowing seen in the imaged systemic arteries. 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   There is a fairly well-delineated hypoattenuating filling defect in the SVC (series 501, images 84-97), suspicious for nonocclusive thrombus. Main pulmonary artery is of normal caliber. No evidence of pulmonary embolus.   Interlobular septal thickening and mosaic attenuation of the lung parenchyma are compatible with acute pulmonary interstitial edema/CHF. Mild dependent atelectasis trace pleural effusions. No pneumothorax.   Mild cardiomegaly.   No evidence of acute abnormality in the abdomen or pelvis.   Appendectomy and hysterectomy.   Colonic diverticula without diverticulitis.   Additional findings as discussed above.   MACRO: None   Signed by: Abdiel Barrios 12/20/2024 4:09 AM Dictation workstation:   [id number]    XR chest 1 view    Result Date: 12/20/2024  STUDY: Chest Radiograph;  12/20/2024 2:14 AM INDICATION: Chest pain. COMPARISON: None Available ACCESSION NUMBER(S): [id number] ORDERING CLINICIAN: JAMILAH ELLINGTON TECHNIQUE:  Frontal chest was obtained at 02:14 hours. FINDINGS: CARDIOMEDIASTINAL SILHOUETTE: Cardiomediastinal silhouette is mildly enlarged.  Left-sided pacemaker noted.  LUNGS: Diffusely prominent peribronchial markings.  ABDOMEN: No remarkable upper abdominal findings.  BONES: No acute osseous changes.    Diffusely prominent peribronchial markings which may reflect viral infection or volume overload in the proper clinical setting. Signed by Jac Cuevas MD     Echo  CONCLUSIONS:   1. Left ventricular ejection fraction is normal, by visual estimate at 60-65%.   2. Abnormal left venticular wall motion.   3. Left ventricular diastolic  filling is indeterminate.   4. There is a moderate apical and anteroapical myocardial infarction.   5. There is normal right ventricular global systolic function.   6. There is moderate mitral annular calcification.   7. Moderate mitral valve regurgitation.   8. Moderate to severe tricuspid regurgitation visualized.   9. Moderately elevated right ventricular systolic pressure.  10. Aortic valve sclerosis.        Assessment/Plan   Assessment & Plan  Gastrointestinal hemorrhage, unspecified gastrointestinal hemorrhage type        89-year-old female with past medical history of COPD, HTN, CVA, sick sinus syndrome s/p pacemaker, HFpEF, recent NSTEMI, and recent hospitalization for CHF exacerbation with acute pulmonary edema, imaging at that time was concerning for SVC thrombus and patient was evaluated by vascular who recommended DOAC for 6 months and repeat CT.  She presents with red blood per rectum and possible epistaxis. Hospitalized for further evaluation and management.    #GI bleed, suspect lower -> likely ischemic colitis (wall thickening at distal transverse colon, associated abdominal discomfort and dizziness, brown stool with red blood reported).  #Epistaxis ?- no reported vomiting.  Blood noted with clearing of her nose and when rinsing her mouth with fluids.   #SVC thrombus    Presentation is concerning for ischemic colitis as noted above.  Avoid hypotension  Consult gastroenterology, appreciate input  Will hold aspirin, Plavix, and Eliquis for the time being  Trend H&H, stable on presentation.  Patient agreeable to blood transfusions as needed.  N.p.o. after midnight  Low suspicion for upper GI source, however will continue with Protonix 40 mg IV twice daily until evaluated by GI.  Consult to vascular surgery regarding ongoing need for Eliquis.   Dr Smith's last note on 1/6/2025 “I have reviewed the DVT scan performed today.  There is no evidence of IJ thrombus.  Waveforms are normal in the IJ as well as  subclavian, suggestive that even if there is a thrombus in the SVC, it is not hemodynamically significant.”   Check Orthostatic vitals    #Acute hypoxic respiratory failure  #Acute on chronic diastolic congestive heart failure  #History sick sinus syndrome s/p pacemaker  #History NSTEMI  #Valvular hear disease  #pleural effusions    CT angio A/P was suggestive of CHF with interstitial edema and small pleural effusions.   Low-sodium diet  Consult cardiology, patient follows with Dr Jiang and Ramicone for EP  Supplemental oxygen as needed  Continue oral diuretics, will defer to cardiology IV diuresis .  DuoNebs as needed  RT to evaluate    #DVT Ppx  SCD  Holding ac      1/13: doing ok, informed about CT results, gi on board,     Iglesia Gabriel, DO

## 2025-01-13 NOTE — CONSULTS
Consults  Vascular Surgery  Reason For Consult  ?  SVC thrombus, GIB  History Of Present Illness  Yesenia Milner is a 89 y.o. female presenting with  past medical history of COPD, HTN, CVA, sick sinus syndrome s/p pacemaker, HFpEF, recent NSTEMI, and recent hospitalization for CHF exacerbation with acute pulmonary edema, imaging at that time was concerning for SVC thrombus and patient was evaluated by vascular who recommended DOAC for 6 months and repeat CT. Patient presents from home with 1 bloody bowel movement yesterday evening and small amount of blood in her mouth and when she blew her nose this morning.  Her bowel movement she describes as brown stool mixed with red blood, she was unable to identify clots.  States this morning she rinsed out her mouth and spit out the water, noted red blood.  Then she cleared her nose and there was small clots of blood.  Denies however overt epistaxis.  She denies any black stools or prior history of GI bleed.  Her last colonoscopy was many years ago and no longer gets screenings due to age.  She reports that she was not feeling very well yesterday, has felt dizzy with ambulation and also reports associated upper abdominal pain/discomfort that feels like bloating.  She denies nausea or vomiting.  No recent history of diarrhea constipation. Review of systems additionally positive for intermittent lower extremity edema (none currently), shortness of breath with wheezing and cough.  Denies chest pains or palpitations.  Currently patient is on Plavix, aspirin, and Elquis.      Dr Smith's last note on 1/6/2025 “I have reviewed the DVT scan performed today.  There is no evidence of IJ thrombus.  Waveforms are normal in the IJ as well as subclavian, suggestive that even if there is a thrombus in the SVC, it is not hemodynamically significant.”   I evaluated this patient at bedside family member was present during evaluation information was obtained from chart review and patient  interview.     Past Medical History  She has a past medical history of Other conditions influencing health status, Personal history of other medical treatment, and Personal history of other medical treatment.    Surgical History  She has a past surgical history that includes Appendectomy (11/22/2017); Hysterectomy (11/22/2017); Tonsillectomy (11/22/2017); Other surgical history (11/22/2017); Other surgical history (11/22/2017); Cardiac pacemaker placement (03/11/2021); IR angiogram renal bilateral (Bilateral, 12/14/2020); IR angiogram inferior epigastric pelvic (12/14/2020); IR angiogram inferior epigastric pelvic (12/14/2020); and Cardiac catheterization (N/A, 12/20/2024).     Social History  She reports that she quit smoking about 52 years ago. Her smoking use included cigarettes. She has been exposed to tobacco smoke. She has never used smokeless tobacco. She reports that she does not drink alcohol and does not use drugs.    Family History  Family History   Problem Relation Name Age of Onset    No Known Problems Mother          Allergies  Iodine, Shrimp, Hydrocodone, and Adhesive tape-silicones    Review of Systems  A 10 point ROS was performed with the patient denying any complaint at this time aside from those listed in the HPI above.  Physical Exam  Constitutional: Well developed , awake/alert/oriented x3, in no distress,  Eyes: Clear sclera  ENMT: mucous membranes are moist, no apparent injury, no lesions seen,   Head/neck: Neck supple, trachea  is midline, no apparent injury, no bruits, no mass, no stridor  Respiratory/thorax: Breath sounds clear and equal diminished bilaterally throughout, thorax symmetric  Cardiac/Vascular: Regular, rate and rhythm, no murmurs, 2+ radial pulses,  Gastrointestinal: Nondistended soft nontender, positive bowel sounds, no bruits.  Musculoskeletal: Moves all extremities, limited range of motion , no joint swelling,   Extremities: No cyanosis, negative for any upper extremity  "edema  Neurological: Alert and oriented x3,   Lymphatic: No significant lymphadenopathy  Skin: Warm and dry, no lesions, no rashes  Psychological: Appropriate mood and behavior  Last Recorded Vitals  Blood pressure 115/57, pulse 67, temperature 36 °C (96.8 °F), temperature source Temporal, resp. rate 18, height 1.626 m (5' 4\"), weight 74.8 kg (165 lb), SpO2 94%.    Relevant Results     Results for orders placed or performed during the hospital encounter of 01/12/25 (from the past 24 hours)   CBC   Result Value Ref Range    WBC 9.8 4.4 - 11.3 x10*3/uL    nRBC 0.0 0.0 - 0.0 /100 WBCs    RBC 3.12 (L) 4.00 - 5.20 x10*6/uL    Hemoglobin 9.1 (L) 12.0 - 16.0 g/dL    Hematocrit 28.0 (L) 36.0 - 46.0 %    MCV 90 80 - 100 fL    MCH 29.2 26.0 - 34.0 pg    MCHC 32.5 32.0 - 36.0 g/dL    RDW 13.7 11.5 - 14.5 %    Platelets 262 150 - 450 x10*3/uL   Comprehensive Metabolic Panel   Result Value Ref Range    Glucose 138 (H) 74 - 99 mg/dL    Sodium 135 (L) 136 - 145 mmol/L    Potassium 4.6 3.5 - 5.3 mmol/L    Chloride 103 98 - 107 mmol/L    Bicarbonate 25 21 - 32 mmol/L    Anion Gap 12 10 - 20 mmol/L    Urea Nitrogen 23 6 - 23 mg/dL    Creatinine 1.02 0.50 - 1.05 mg/dL    eGFR 52 (L) >60 mL/min/1.73m*2    Calcium 9.3 8.6 - 10.3 mg/dL    Albumin 3.6 3.4 - 5.0 g/dL    Alkaline Phosphatase 78 33 - 136 U/L    Total Protein 6.1 (L) 6.4 - 8.2 g/dL    AST 16 9 - 39 U/L    Bilirubin, Total 0.5 0.0 - 1.2 mg/dL    ALT 16 7 - 45 U/L       ECG 12 lead    Result Date: 1/13/2025  A-V dual-paced rhythm with some inhibition    CT angio abdomen pelvis w and or wo IV IV contrast    Result Date: 1/12/2025  Interpreted By:  Zenobia Raza, STUDY: CT ANGIO ABDOMEN PELVIS W AND/OR WO IV IV CONTRAST;  1/12/2025 5:13 pm   INDICATION: Signs/Symptoms:gib, abd pain.   COMPARISON: None.   ACCESSION NUMBER(S): [id number]   ORDERING CLINICIAN: HERIBERTO VINCENT   TECHNIQUE: Axial CT images of the abdomen and pelvis  before and after intravenous administration of " contrast using CT angiographic technique. Coronal and sagittal images are reconstructed.  3D reconstructions were obtained at a separate workstation.   FINDINGS: VASCULAR: ABDOMINAL AORTA: No aortic aneurysm or dissection. Mild abdominal aortic calcification. The celiac, superior mesenteric, renal and inferior mesenteric arteries are patent with no significant stenosis.   The bilateral common iliac, internal iliac, external iliac and common femoral arteries are patent with no significant stenosis.   ABDOMEN: LOWER CHEST: Small bilateral pleural effusions. Interlobular septal thickening at the lung bases suggests interstitial edema. Partially imaged lead right ventricle. BONES: No acute osseous abnormality. Diffuse degenerative disc changes in the lumbar facet arthropathy. ABDOMINAL WALL: Within normal limits.   LIVER: Within normal limits. BILE DUCTS: No biliary dilatation. GALLBLADDER: No calcified gallstones. No pericholecystic inflammatory changes. PANCREAS: Within normal limits. SPLEEN: Within normal limits. ADRENALS:  Within normal limits. KIDNEYS: Symmetric renal enhancement. No hydronephrosis or perinephric fluid collection. Subcentimeter cortical hypodensities in both kidneys are too small to characterize, but statistically likely small cysts. URETERS: No hydroureter.   PELVIS:   REPRODUCTIVE ORGANS: No pelvic masses. BLADDER: Within normal limits.   RETROPERITONEUM: Within normal limits. BOWEL: No evidence of active GI bleed. The stomach is nondistended. No dilated small bowel. There is focal circumferential colonic wall thickening of the distal transverse colon (Series 7, Image 117). Colonic diverticulosis is noted without acute diverticulitis. The appendix has been removed. PERITONEUM: No ascites or free air, no fluid collection.       No aortic aneurysm or dissection.   No evidence of active GI bleed.   Focal circumferential wall thickening of the distal transverse colon. There are no significant  surrounding inflammatory changes, raising suspicion for malignancy (rather than focal colitis/diverticulitis). Correlation with presenting history is recommended and follow-up colonoscopy should be considered.   Findings suggestive of CHF with interstitial edema and small pleural effusions.       MACRO: Critical Finding:  See findings. Notification was initiated on 1/12/2025 at 6:27 pm by  Zenobia Raza.  (**-YCF-**) Instructions:   Signed by: Zenobia Raza 1/12/2025 6:27 PM Dictation workstation:   [id number]    ECG 12 lead    Result Date: 1/10/2025  AV dual-paced rhythm Abnormal ECG When compared with ECG of 21-DEC-2024 10:16, Vent. rate has decreased BY  10 BPM See ED provider note for full interpretation and clinical correlation Confirmed by Teena Maldonado ([id number]) on 1/10/2025 6:04:38 PM    Vascular US Upper Extremity Venous Duplex Bilateral    Result Date: 1/6/2025           [place] [address] Tel [phone] and Fax [phone]  Vascular Lab Report VASC US UPPER EXTREMITY VENOUS DUPLEX BILATERAL  Patient Name:     JULY MILLER    Reading           [id number] Skyler Meneses                                       Physician:        MD Study Date:       1/6/2025            Ordering          [id number] ARELY SYKES                                       Physician: MRN/PID:          [id number]            Technologist:     Tammy Amador RVT Accession#:       [id number]        Technologist 2: Date of           7/29/1933 / 89      Encounter#:       [id number] Birth/Age:        years Gender:           F Admission Status: Inpatient           Location          [facility]                                       Performed:  Diagnosis/ICD: Generalized edema (anasarca)-R60.1 Indication:    Limb edema CPT Codes:     93757 Peripheral venous duplex scan for DVT complete  CONCLUSIONS: Right Upper Venous: No evidence of acute deep vein thrombus visualized in the right upper extremity. Left  Upper Venous: No evidence of acute deep vein thrombus visualized in the left upper extremity.  Imaging & Doppler Findings:  Right            Compressible Thrombus        Flow Internal Jugular     Yes        None   Spontaneous/Phasic Subclavian           Yes        None   Spontaneous/Phasic Axillary             Yes        None   Spontaneous/Phasic Brachial             Yes        None Cephalic             Yes        None Basilic              Yes        None  Left             Compress Thrombus        Flow Internal Jugular   Yes      None   Spontaneous/Phasic Subclavian         Yes      None   Spontaneous/Phasic Axillary           Yes      None   Spontaneous/Phasic Brachial           Yes      None Cephalic           Yes      None Basilic            Yes      None  [id number] Skyler Meneses MD Electronically signed by [id number] Skyler Meneses MD on 1/6/2025 at 3:28:39 PM  ** Final **     XR chest 1 view    Result Date: 1/6/2025  Interpreted By:  Frederic Granados, STUDY: XR CHEST 1 VIEW; 1/4/2025 6:24 am   INDICATION: Signs/Symptoms:monitor pulm edema.   COMPARISON: Chest x-ray 01/02/2025   ACCESSION NUMBER(S): [id number]   ORDERING CLINICIAN: LUIS CR   TECHNIQUE: 1 view of the chest was performed.   FINDINGS: Right-greater-than-left ill-defined basal infiltrate/atelectasis. Trace bilateral pleural effusion.. No pneumothorax.  The cardiomediastinal silhouette is stable. Left-sided chest wall pacemaker noted.       1. Bilateral ill-defined basal pulmonary infiltrate/atelectasis along with a trace bilateral pleural effusion, new from chest x-ray 01/02/2025. Advise clinical correlation and follow-up to ensure resolution.   Signed by: Frederic Granados 1/6/2025 8:56 AM Dictation workstation:   [id number]    CT angio chest for pulmonary embolism    Addendum Date: 1/2/2025    Findings discussed withDr. Jac Odonnell at 7:00 PM 1/2/2025 Signed by Ayan Martins MD    Result Date: 1/2/2025  STUDY: CT Angiogram of the Chest;  01/02/2025, 5:06 PM INDICATION: Recent admission for NSTEMI, non occlusive thrombus possibly seen in SVC at that time. COMPARISON: CTA CAP 12/20/2024. ACCESSION NUMBER(S): [id number] ORDERING CLINICIAN: GALE MARQUEZ TECHNIQUE:  CTA of the chest was performed with intravenous contrast. Images are reviewed and processed at a workstation according to the CT angiogram protocol with 3-D and/or MIP post processing imaging generated.  Omnipaque 350, 65 mL was administered intravenously. Automated mA/kV exposure control was utilized and patient examination was performed in strict accordance with principles of ALARA. FINDINGS: Pulmonary arteries are adequately opacified without acute or chronic filling defects.  The thoracic aorta is normal in course and caliber without dissection or aneurysm.  Contrast was injected within the right upper extremity vein.  There is a focal region of nonenhancement within the anterior aspect of the superior vena cava at the level of the pacemaker leads image 99 and image 125.  Overall length of the region of nonenhancement in the SVC has increased compared to exam 12/20/2024.  Additionally there is a region of nonenhancement right internal jugular vein image 7 series 402.  Small amount of contrast refluxes into the azygos vein. Mild cardiac enlargement.  Pacemaker leads within the right ventricle and right atrium.  Small mediastinal and hilar lymph nodes are unchanged. Moderate size right pleural effusion and small left pleural effusion. The airways are patent. Multifocal groundglass regions of airspace density both lungs. Upper abdomen demonstrates no acute pathology. There are no acute fractures.  No suspicious bony lesions.    Negative for pulmonary embolism or thoracic aortic dissection.  Again identified are regions of nonenhancement along the anterior aspect of the mid SVC and suggestive of nonocclusive SVC thrombus. Compared to prior exam overall length of nonenhancement  increased. Additionally there is a focal region of nonenhancement lower margin of the right internal jugular vein and may represent new thrombus. Moderate size right pleural effusion and small left pleural effusion are new. Cardiomegaly. Multifocal groundglass regions of airspace density both lungs could be due to pulmonary edema. Signed by Ayan Martins MD    Point of Care Ultrasound    Result Date: 1/2/2025  Jac Marquez MD     1/2/2025  5:49 PM Performed by: Jac Marquez MD Authorized by: Jac Marquez MD  Cardiac Indications: shortness of breath Procedure: Cardiac Ultrasound Findings:  Views: parasternal long, parasternal short, apical four and subxiphoid Effusion: The pericardial space was visualized and was positive for a PERICARDIAL EFFUSION. Activity: Ventricular contractions were visualized. LV: LV systolic function was NORMAL. RV: RV size was NORMAL. Impression: Cardiac: The focused cardiac ultrasound exam was NORMAL. Comments: No appreciable pericardial effusion.  Left ventricular function appears adequate.  No signs of right heart strain appreciated.    XR chest 2 views    Result Date: 1/2/2025  Interpreted By:  Darvin Vieira, STUDY: XR CHEST 2 VIEWS; 1/2/2025 1:05 pm   INDICATION: Signs/Symptoms:shortness of breath   COMPARISON: December 2024.   ACCESSION NUMBER(S): [id number]   ORDERING CLINICIAN: JAC MARQUEZ   TECHNIQUE: Number of films: Two view chest radiographs.   FINDINGS: A pacemaker is again noted, with the leads overlying the right atrium and right ventricle. The cardiomediastinal silhouette is within normal limits. The lungs are hyperinflated, with prominent interstitial markings bilaterally and increased AP diameter of the chest. There is no pneumothorax, confluent infiltrates or pleural effusions. Previously noted superimposed bilateral interstitial infiltrates have resolved. Degenerative changes involve the thoracic spine.       COPD/chronic interstitial lung  disease again noted without acute infiltrates.     Signed by: Darvin Vieira 1/2/2025 1:45 PM Dictation workstation:   [id number]    ECG 12 lead    Result Date: 12/30/2024  Ventricular-paced rhythm Abnormal ECG When compared with ECG of 20-DEC-2024 02:50, (unconfirmed) Vent. rate has increased BY   2 BPM Confirmed by Magnus Hess ([id number]) on 12/30/2024 7:07:18 PM    ECG 12 lead    Result Date: 12/26/2024  Ventricular-paced rhythm Abnormal ECG When compared with ECG of 20-DEC-2024 01:44, (unconfirmed) Vent. rate has increased BY  10 BPM See ED provider note for full interpretation and clinical correlation Confirmed by Teena Maldonado ([id number]) on 12/26/2024 10:22:47 AM    ECG 12 lead    Result Date: 12/26/2024  AV dual-paced rhythm Abnormal ECG When compared with ECG of 21-AUG-2023 01:07, PREVIOUS ECG IS PRESENT See ED provider note for full interpretation and clinical correlation Confirmed by Teena Maldonado ([id number]) on 12/26/2024 10:22:41 AM    Transthoracic echo (TTE) complete    Result Date: 12/21/2024   [facility], [address] [place] [address]           Tel [phone] and Fax [phone] TRANSTHORACIC ECHOCARDIOGRAM REPORT  Patient Name:       JULY Steel Physician:    [id number] Magnus Hess MD Study Date:         12/21/2024          Ordering Provider:    [id number] GILMORE MRN/PID:            [id number]            Fellow: Accession#:         [id number]        Nurse: Date of Birth/Age:  7/29/1933 / 89      Sonographer:          Nadeem Rubio                     years                                     ACS, RDCS, FASE Gender assigned at  F                   Additional Staff: Birth: Height:             162.56 cm           Admit Date:           12/20/2024 Weight:             73.48 kg            Admission Status:     Inpatient -                                                                Routine BSA / BMI:          1.79 m2 / 27.81     Encounter#:           [id number]                     kg/m2 Blood Pressure:     132/61 mmHg         Department Location:  [facility] Study Type:    TRANSTHORACIC ECHO (TTE) COMPLETE Diagnosis/ICD: Shortness of breath-R06.02 Indication:    Dyspnea CPT Code:      Echo Complete w Full Doppler-[id number] Patient History: Pertinent History: COPD, CHF, Chest Pain, Dyspnea and HTN. Study Detail: The following Echo studies were performed: 2D, M-Mode, Doppler and               color flow. Technically challenging study due to body habitus.               Patient has a pacemaker.  PHYSICIAN INTERPRETATION: Left Ventricle: Left ventricular ejection fraction is normal, by visual estimate at 60-65%. Left venticular wall motion is abnormal. The left ventricular cavity size is normal. There is mildly increased septal and normal posterior left ventricular wall thickness. Left ventricular diastolic filling is indeterminate. There is evidence of a moderate apical and anteroapical myocardial infarction. Left Atrium: The left atrium is normal in size. Right Ventricle: The right ventricle is normal in size. There is normal right ventricular global systolic function. A device is visualized in the right ventricle. Right Atrium: The right atrium is normal in size. There is a device visualized in the right atrium. Aortic Valve: The aortic valve is trileaflet. There is moderate aortic valve thickening. There is evidence of mildly elevated transaortic gradients consistent with sclerosis of the aortic valve. The aortic valve dimensionless index is 0.57. There is trace aortic valve regurgitation. The peak instantaneous gradient of the aortic valve is 11 mmHg. The mean gradient of the aortic valve is 6 mmHg. Mitral Valve: The mitral valve is mildly thickened. There is moderate mitral annular calcification. There is moderate mitral valve regurgitation. Tricuspid Valve: The tricuspid  valve is structurally normal. There is moderate to severe tricuspid regurgitation. The Doppler estimated RVSP is moderately elevated at 48.4 mmHg. Pulmonic Valve: The pulmonic valve is structurally normal. There is no indication of pulmonic valve regurgitation. Pericardium: There is no pericardial effusion noted. Aorta: The aortic root is normal. There is no dilatation of the ascending aorta. There is no dilatation of the aortic root. Systemic Veins: The inferior vena cava appears normal in size.  CONCLUSIONS:  1. Left ventricular ejection fraction is normal, by visual estimate at 60-65%.  2. Abnormal left venticular wall motion.  3. Left ventricular diastolic filling is indeterminate.  4. There is a moderate apical and anteroapical myocardial infarction.  5. There is normal right ventricular global systolic function.  6. There is moderate mitral annular calcification.  7. Moderate mitral valve regurgitation.  8. Moderate to severe tricuspid regurgitation visualized.  9. Moderately elevated right ventricular systolic pressure. 10. Aortic valve sclerosis. QUANTITATIVE DATA SUMMARY:  2D MEASUREMENTS:          Normal Ranges: Ao Root d:       3.30 cm  (2.0-3.7cm) LAs:             3.20 cm  (2.7-4.0cm) IVSd:            1.00 cm  (0.6-1.1cm) LVPWd:           0.80 cm  (0.6-1.1cm) LVIDd:           4.10 cm  (3.9-5.9cm) LVIDs:           3.10 cm LV Mass Index:   64 g/m2 LVEDV Index:     45 ml/m2 LV % FS          24.4 %  LA VOLUME:                    Normal Ranges: LA Vol A4C:        51.0 ml    (22+/-6mL/m2) LA Vol A2C:        44.7 ml LA Vol BP:         48.2 ml LA Vol Index A4C:  28.5ml/m2 LA Vol Index A2C:  25.0 ml/m2 LA Vol Index BP:   26.9 ml/m2 LA Area A4C:       18.0 cm2 LA Area A2C:       17.0 cm2 LA Major Axis A4C: 5.4 cm LA Major Axis A2C: 5.5 cm LA Volume Index:   25.0 ml/m2  RA VOLUME BY A/L METHOD:          Normal Ranges: RA Area A4C:             15.0 cm2  M-MODE MEASUREMENTS:         Normal Ranges: Ao Root:              3.40 cm (2.0-3.7cm) LAs:                 4.30 cm (2.7-4.0cm)  AORTA MEASUREMENTS:         Normal Ranges: Asc Ao, d:          3.10 cm (2.1-3.4cm)  LV SYSTOLIC FUNCTION BY 2D PLANIMETRY (MOD):                      Normal Ranges: EF-A4C View:    53 % (>=55%) EF-A2C View:    64 % EF-Biplane:     62 % EF-Visual:      63 % LV EF Reported: 63 %  AORTIC VALVE:                      Normal Ranges: AoV Vmax:                1.66 m/s  (<=1.7m/s) AoV Peak P.0 mmHg (<20mmHg) AoV Mean P.0 mmHg  (1.7-11.5mmHg) LVOT Max Marek:            0.97 m/s  (<=1.1m/s) AoV VTI:                 38.10 cm  (18-25cm) LVOT VTI:                21.90 cm LVOT Diameter:           2.10 cm   (1.8-2.4cm) AoV Area, VTI:           1.99 cm2  (2.5-5.5cm2) AoV Area,Vmax:           2.03 cm2  (2.5-4.5cm2) AoV Dimensionless Index: 0.57  RIGHT VENTRICLE: RV Basal 3.90 cm TAPSE:   27.2 mm RV s'    0.14 m/s  TRICUSPID VALVE/RVSP:          Normal Ranges: Peak TR Velocity:     3.37 m/s RV Syst Pressure:     48 mmHg  (< 30mmHg) IVC Diam:             1.90 cm  PULMONIC VALVE:          Normal Ranges: PV Max Marek:     0.9 m/s  (0.6-0.9m/s) PV Max PG:      3.3 mmHg  [id number] Magnus Hess MD Electronically signed on 2024 at 10:29:11 AM  ** Final **     Cardiac Catheterization Procedure    Result Date: 2024   [facility], Cath Lab, [address] [place] [id number] Cardiovascular Catheterization Report Patient Name:      JULY MILLER     Performing Physician:  [id number] Ruelas MD Study Date:        2024           Verifying Physician:   [id number] Ruelas MD MRN/PID:           [id number]             Cardiologist/Co-Scrub: Accession#:        [id number]         Ordering Provider:     [id number] RUELAS Date of Birth/Age:  7/29/1933 / 89 years Cardiologist: Gender:            F                    Fellow: Encounter#:        [id number]           Surgeon:  Study: Left Heart Cath  Indications: JULY MILLER is a 89 year old female who presents with hypertension and a chest pain assessment of typical angina. NSTE - ACS. Medical History: Stress test performed: No. CTA performed: Tiffanie Perkins accessed: No. LVEF Assessed: No. Cardiac arrest: No. Cardiac surgical consult: No. Cardiovascular instability: No. Frailty status of patient entering lab: 6 = Moderately frail.  Procedure Description: After infiltration with 2% Lidocaine, the right radial artery was cannulated with a modified Seldinger technique. Subsequently a 6 Portuguese sheath was placed in the right radial artery.  Coronary Angiography: The coronary circulation is right dominant.  Coronary Angiography Comments: LM: large and patent LAD: LArge transapical with mild luminal irrgularities. Diag are medium and patent LCX: large non domiant and patent. 2 OM are medium with irregularities and patent. RCA: large dominant and patent. PDA and PLB medium with irregularities. LVEDP 22mmHg EF 30%. Basal hyperkinesis, mid to apical walls are hypokinetic. Dyskinetic apex.  Additional Findings: - Non obstructive CAD - EDP 22mmHg - EF 30%. Basal hyperkinesis, mid to apical walls are hypokinetic. Dyskinetic apex.  Hemo Personnel: +------------------+---------+ Name              Duty      +------------------+---------+ Tahir Ruelas MD 1 +------------------+---------+  Hemodynamic Pressures:  +----+---------------+----------+-------------+--------------+-------+---------+ Site   Date Time   Phase NameSystolic mmHgDiastolic mmHgED mmHgMean mmHg +----+---------------+----------+-------------+--------------+-------+---------+   LV     12/20/2024   O2 REST          131            10     22                  12:25:07 PM                                                       +----+---------------+----------+-------------+--------------+-------+---------+   LV     12/20/2024   O2 REST          133             5     22                  12:25:14 PM                                                      +----+---------------+----------+-------------+--------------+-------+---------+  LVp     12/20/2024   O2 REST          133            48     51                  12:25:17 PM                                                      +----+---------------+----------+-------------+--------------+-------+---------+  AOp     12/20/2024   O2 REST          137            53              86         12:25:23 PM                                                      +----+---------------+----------+-------------+--------------+-------+---------+   AO     12/20/2024   O2 REST          132            64              91         12:27:24 PM                                                      +----+---------------+----------+-------------+--------------+-------+---------+   LV     12/20/2024   O2 REST          131            18     28                  12:33:06 PM                                                      +----+---------------+----------+-------------+--------------+-------+---------+   LV     12/20/2024   O2 REST          125            18     27                  12:33:13 PM                                                      +----+---------------+----------+-------------+--------------+-------+---------+  LVp     12/20/2024   O2 REST          123            12     28                  12:33:17 PM                                                      +----+---------------+----------+-------------+--------------+-------+---------+  AOp     12/20/2024   O2 REST          124            54              80         12:33:22 PM                                                       +----+---------------+----------+-------------+--------------+-------+---------+   AO     12/20/2024   O2 REST          123            57              83         12:33:28 PM                                                      +----+---------------+----------+-------------+--------------+-------+---------+  Cardiac Cath Post Procedure Notes: Post Procedure Diagnosis: - Non obstructive CAD                           - EDP 22mmHg                           - EF 30%. Basal hyperkinesis, mid to apical walls are                           hypokinetic. Dyskinetic apex. Blood Loss:               Estimated blood loss during the procedure was 0 mls. Specimens Removed:        Number of specimen(s) removed: none. ____________________________________________________________________________________ CONCLUSIONS:  1. - Non obstructive CAD     - EDP 22mmHg     - EF 30%. Basal hyperkinesis, mid to apical walls are hypokinetic. Dyskinetic apex. ICD 10 Codes: Non ST elevation (NSTEMI) myocardial infarction-I21.4  CPT Codes: Angiography, Each 1st additional vessel studied after basic exam-67859; Angiography, Each 2nd additional vessel studied after basic exam-76995; Angiography, Each 3rd additional vessel studied after basic exam-13393; Left Heart Cath (visualization of coronaries) and LV-[id number]  [id number] Tahir Ruelas MD Performing Physician Electronically signed by [id number] Tahir Ruelas MD on 12/20/2024 at 12:54:51 PM  ** Final **     CT angio chest abdomen pelvis    Result Date: 12/20/2024  Interpreted By:  Abdiel Barrios, STUDY: CT ANGIO CHEST ABDOMEN PELVIS; ;  12/20/2024 3:38 am   INDICATION: Signs/Symptoms:chest pain radiating to shoulders r/o aortic dissection.     COMPARISON: Correlation made to noncontrast CT abdomen and pelvis of 01/22/2020.   ACCESSION NUMBER(S): [id number]   ORDERING CLINICIAN: JAMILAH ELLINTGON   TECHNIQUE: Noncontrast CT of the chest, abdomen and pelvis was followed by CT angiography of the chest,  abdomen and pelvis after IV administration of 100 cc Omnipaque 350. Multiplanar, MIP and 3D reformations.   FINDINGS: CHEST:   VESSELS: No evidence of acute intramural hematoma or dissection. Aorta is normal in caliber. Moderate atherosclerotic calcification is present. Coronary artery calcifications are noted; please note that technique is not optimized for evaluation of coronary arteries. 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 There is a fairly well-delineated hypoattenuating filling defect in the SVC (series 501, images 84-97), suspicious for nonocclusive thrombus. Main pulmonary artery is of normal caliber. No evidence of pulmonary embolus. HEART: Mildly enlarged. Pacing leads in right atrium and right ventricle. No pericardial effusion. MEDIASTINUM AND HARITHA: No pathologically enlarged thoracic lymph nodes. LUNG, PLEURA, LARGE AIRWAYS: Interlobular septal thickening and mosaic attenuation of the lung parenchyma are compatible with acute pulmonary interstitial edema/CHF. Mild dependent atelectasis trace pleural effusions. No pneumothorax. CHEST WALL AND LOWER NECK: Within normal limits.   ABDOMEN:   BONES: No acute osseous abnormality. Osteopenia. Moderate to severe degenerative changes of the imaged spine. Destructive osseous lesion. ABDOMINAL WALL: Within normal limits.   LIVER: Within normal limits. BILE DUCTS: Normal caliber. GALLBLADDER: The gallbladder is distended. No calcified cholelithiasis is seen. No wall thickening or pericholecystic fluid. PANCREAS: Within normal limits. SPLEEN: Within normal limits. ADRENALS: Within normal limits. KIDNEYS: Incidentally noted ptotic right kidney. Otherwise, normal. URETERS: No hydroureter.   PELVIS:   REPRODUCTIVE ORGANS: No pelvic mass or significant free pelvic fluid. Uterus is absent. BLADDER: Within normal  limits.     RETROPERITONEUM: Within normal limits. BOWEL: Stomach appears grossly normal. No dilated or thickened bowel. Colonic diverticulosis without evidence of acute diverticulitis. Normal appendix. PERITONEUM: No ascites or free air, no fluid collection.       No evidence of acute abnormality of the imaged systemic arterial vasculature. Specifically, no aortic intramural hematoma or dissection. Atherosclerosis, without definite hemodynamically significant luminal narrowing seen in the imaged systemic arteries. 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   There is a fairly well-delineated hypoattenuating filling defect in the SVC (series 501, images 84-97), suspicious for nonocclusive thrombus. Main pulmonary artery is of normal caliber. No evidence of pulmonary embolus.   Interlobular septal thickening and mosaic attenuation of the lung parenchyma are compatible with acute pulmonary interstitial edema/CHF. Mild dependent atelectasis trace pleural effusions. No pneumothorax.   Mild cardiomegaly.   No evidence of acute abnormality in the abdomen or pelvis.   Appendectomy and hysterectomy.   Colonic diverticula without diverticulitis.   Additional findings as discussed above.   MACRO: None   Signed by: Abdiel Barrios 12/20/2024 4:09 AM Dictation workstation:   [id number]    XR chest 1 view    Result Date: 12/20/2024  STUDY: Chest Radiograph;  12/20/2024 2:14 AM INDICATION: Chest pain. COMPARISON: None Available ACCESSION NUMBER(S): [id number] ORDERING CLINICIAN: JAMILAH ELLINGTON TECHNIQUE:  Frontal chest was obtained at 02:14 hours. FINDINGS: CARDIOMEDIASTINAL SILHOUETTE: Cardiomediastinal silhouette is mildly enlarged.  Left-sided pacemaker noted.  LUNGS: Diffusely prominent peribronchial markings.  ABDOMEN: No remarkable upper abdominal findings.  BONES: No acute osseous changes.    Diffusely prominent peribronchial markings which may reflect viral infection or volume overload in the  proper clinical setting. Signed by Jac Cuevas MD     Assessment/Plan  1. Gastrointestinal hemorrhage, unspecified gastrointestinal hemorrhage type  Colonoscopy Diagnostic      2. Hypoxia        3. Colonic mass        4. Superior vena cava thrombosis (Multi)        5. Anemia, unspecified type           I examined patient at bedside family when was present during examination.  Patient has history of SVC thrombus.  Patient is asymptomatic of her SVC thrombus.  Patient is to be managed and to follow-up with cardiology team for this issue.  Risk outweighs benefit for continuation of anticoagulation therapy until GI input has been provided.  Recommend cardiology with Dr. Smith's group to manage care.  Vascular surgery to sign off at this time. Thank you for the opportunity to be a part of this patient's multidisciplinary treatment team.  If any additional questions or concerns arise, please do not hesitate to contact via [employer] and  [employer].       Thank you very much for allowing Vascular Surgery to be involved in the care of your patient sincerely Antonio MONTALVO .  (This note was generated with voice recognition software and may contain errors including spelling, grammar, syntax and missed recognition of what was dictated, of which may not have been fully corrected)

## 2025-01-13 NOTE — H&P
History Of Present Illness  Yesenia Milner is a 89-year-old female with past medical history of COPD, HTN, CVA, sick sinus syndrome s/p pacemaker, HFpEF, recent NSTEMI, and recent hospitalization for CHF exacerbation with acute pulmonary edema, imaging at that time was concerning for SVC thrombus and patient was evaluated by vascular who recommended DOAC for 6 months and repeat CT. Patient presents from home with 1 bloody bowel movement yesterday evening and small amount of blood in her mouth and when she blew her nose this morning.  Her bowel movement she describes as brown stool mixed with red blood, she was unable to identify clots.  States this morning she rinsed out her mouth and spit out the water, noted red blood.  Then she cleared her nose and there was small clots of blood.  Denies however overt epistaxis.  She denies any black stools or prior history of GI bleed.  Her last colonoscopy was many years ago and no longer gets screenings due to age.  She reports that she was not feeling very well yesterday, has felt dizzy with ambulation and also reports associated upper abdominal pain/discomfort that feels like bloating.  She denies nausea or vomiting.  No recent history of diarrhea constipation. Review of systems additionally positive for intermittent lower extremity edema (none currently), shortness of breath with wheezing and cough.  Denies chest pains or palpitations.  Currently patient is on Plavix, aspirin, and Elquis.     Dr Smith's last note on 1/6/2025 “I have reviewed the DVT scan performed today.  There is no evidence of IJ thrombus.  Waveforms are normal in the IJ as well as subclavian, suggestive that even if there is a thrombus in the SVC, it is not hemodynamically significant.”     ER Course: Hemodynamically stable, tachypneic, respirations unlabored.  Afebrile.  EKG showed A-V dual-paced rhythm.  WBC 5.6, hemoglobin 9.2/hematocrit 28 (baseline hemoglobin recently 9-10, but was previously 10-11),  platelets 275.  INR 1.7.  Glucose 1 9, sodium 134, BUN 24, creatinine 0.92, EGFR 59, magnesium 2.21.  Lactate 1.1.  Lipase 23.  CTA A/P - No aortic aneurysm or dissection. No evidence of active GI bleed. Focal circumferential wall thickening of the distal transverse colon. Findings suggestive of CHF with interstitial edema and small pleural effusions.     Past medical history: As above  Past Surgical History: Appendectomy, cardiac catheterization, pacemaker placement, hysterectomy, tonsillectomy  Social History: Former smoker, quit ~1972. Smoked on and off for 18 years.  No alcohol or illicit drug use.     Past Medical History  Past Medical History:   Diagnosis Date    Other conditions influencing health status     Normal stress echocardiogram    Personal history of other medical treatment     History of echocardiogram    Personal history of other medical treatment     H/O Doppler ultrasound       Surgical History  Past Surgical History:   Procedure Laterality Date    APPENDECTOMY  11/22/2017    Appendectomy    CARDIAC CATHETERIZATION N/A 12/20/2024    Procedure: Left Heart Cath, With LV;  Surgeon: Tahir Ruelas MD;  Location: [facility];  Service: Cardiovascular;  Laterality: N/A;    CARDIAC PACEMAKER PLACEMENT  03/11/2021    Pacemaker Placement    HYSTERECTOMY  11/22/2017    Hysterectomy    IR ANGIOGRAM INFERIOR EPIGASTRIC PELVIC  12/14/2020    IR ANGIOGRAM INFERIOR EPIGASTRIC PELVIC 12/14/2020 [facility]    IR ANGIOGRAM INFERIOR EPIGASTRIC PELVIC  12/14/2020    IR ANGIOGRAM INFERIOR EPIGASTRIC PELVIC 12/14/2020 [facility]    IR ANGIOGRAM RENAL BILATERAL Bilateral 12/14/2020    IR ANGIOGRAM RENAL BILATERAL 12/14/2020 [facility]    OTHER SURGICAL HISTORY  11/22/2017    Stab Phlebectomy Of Varicose Veins    OTHER SURGICAL HISTORY  11/22/2017    Venous Ligation With Stripping    TONSILLECTOMY  11/22/2017    Tonsillectomy        Social History  She reports that she quit smoking about 52 years  "ago. Her smoking use included cigarettes. She has been exposed to tobacco smoke. She has never used smokeless tobacco. She reports that she does not drink alcohol and does not use drugs.    Family History  Family History   Problem Relation Name Age of Onset    No Known Problems Mother          Allergies  Iodine, Shrimp, Hydrocodone, and Adhesive tape-silicones    Review of Systems     10 point ROS negative except as note above in the HPI    Physical Exam  Constitutional:       General: She is awake. She is not in acute distress.     Appearance: Normal appearance. She is not toxic-appearing.   HENT:      Head: Atraumatic.      Nose: Nose normal.      Mouth/Throat:      Mouth: Mucous membranes are moist.   Eyes:      Conjunctiva/sclera: Conjunctivae normal.   Cardiovascular:      Rate and Rhythm: Normal rate and regular rhythm.      Pulses: Normal pulses.      Heart sounds: No murmur heard.  Pulmonary:      Breath sounds: Wheezing present.      Comments: Tachypneic  Abdominal:      General: Bowel sounds are normal. There is no distension.      Palpations: Abdomen is soft.      Tenderness: There is no abdominal tenderness. There is no guarding.   Musculoskeletal:         General: No swelling, deformity or signs of injury. Normal range of motion.      Cervical back: Neck supple.      Right lower leg: No edema.      Left lower leg: No edema.   Skin:     General: Skin is warm and dry.      Capillary Refill: Capillary refill takes less than 2 seconds.      Findings: No ecchymosis, erythema or wound.   Neurological:      General: No focal deficit present.      Mental Status: She is alert and oriented to person, place, and time.   Psychiatric:         Mood and Affect: Mood normal.         Behavior: Behavior is cooperative.          Last Recorded Vitals  Blood pressure 142/63, pulse 62, temperature 36 °C (96.8 °F), temperature source Temporal, resp. rate (!) 28, height 1.626 m (5' 4\"), weight 74.8 kg (165 lb), SpO2 " 94%.    Relevant Results      Results for orders placed or performed during the hospital encounter of 01/12/25 (from the past 24 hours)   CBC and Auto Differential   Result Value Ref Range    WBC 5.6 4.4 - 11.3 x10*3/uL    nRBC 0.0 0.0 - 0.0 /100 WBCs    RBC 3.17 (L) 4.00 - 5.20 x10*6/uL    Hemoglobin 9.2 (L) 12.0 - 16.0 g/dL    Hematocrit 28.0 (L) 36.0 - 46.0 %    MCV 88 80 - 100 fL    MCH 29.0 26.0 - 34.0 pg    MCHC 32.9 32.0 - 36.0 g/dL    RDW 13.6 11.5 - 14.5 %    Platelets 275 150 - 450 x10*3/uL    Neutrophils % 66.1 40.0 - 80.0 %    Immature Granulocytes %, Automated 0.4 0.0 - 0.9 %    Lymphocytes % 18.8 13.0 - 44.0 %    Monocytes % 12.7 2.0 - 10.0 %    Eosinophils % 1.6 0.0 - 6.0 %    Basophils % 0.4 0.0 - 2.0 %    Neutrophils Absolute 3.71 1.60 - 5.50 x10*3/uL    Immature Granulocytes Absolute, Automated 0.02 0.00 - 0.50 x10*3/uL    Lymphocytes Absolute 1.05 0.80 - 3.00 x10*3/uL    Monocytes Absolute 0.71 0.05 - 0.80 x10*3/uL    Eosinophils Absolute 0.09 0.00 - 0.40 x10*3/uL    Basophils Absolute 0.02 0.00 - 0.10 x10*3/uL   Magnesium   Result Value Ref Range    Magnesium 2.21 1.60 - 2.40 mg/dL   Comprehensive metabolic panel   Result Value Ref Range    Glucose 109 (H) 74 - 99 mg/dL    Sodium 134 (L) 136 - 145 mmol/L    Potassium 3.9 3.5 - 5.3 mmol/L    Chloride 101 98 - 107 mmol/L    Bicarbonate 23 21 - 32 mmol/L    Anion Gap 14 10 - 20 mmol/L    Urea Nitrogen 24 (H) 6 - 23 mg/dL    Creatinine 0.92 0.50 - 1.05 mg/dL    eGFR 59 (L) >60 mL/min/1.73m*2    Calcium 9.1 8.6 - 10.3 mg/dL    Albumin 3.9 3.4 - 5.0 g/dL    Alkaline Phosphatase 83 33 - 136 U/L    Total Protein 6.5 6.4 - 8.2 g/dL    AST 17 9 - 39 U/L    Bilirubin, Total 0.5 0.0 - 1.2 mg/dL    ALT 15 7 - 45 U/L   Protime-INR   Result Value Ref Range    Protime 18.7 (H) 9.8 - 12.8 seconds    INR 1.7 (H) 0.9 - 1.1   Lactate   Result Value Ref Range    Lactate 1.1 0.4 - 2.0 mmol/L   Type And Screen   Result Value Ref Range    ABO TYPE A     Rh TYPE POS      ANTIBODY SCREEN NEG    Lipase   Result Value Ref Range    Lipase 23 9 - 82 U/L   VERIFY ABO/Rh Group Test   Result Value Ref Range    ABO TYPE A     Rh TYPE POS      CT angio abdomen pelvis w and or wo IV IV contrast    Result Date: 1/12/2025  Interpreted By:  Zenobia Raza, STUDY: CT ANGIO ABDOMEN PELVIS W AND/OR WO IV IV CONTRAST;  1/12/2025 5:13 pm   INDICATION: Signs/Symptoms:gib, abd pain.   COMPARISON: None.   ACCESSION NUMBER(S): [id number]   ORDERING CLINICIAN: HERIBERTO VINCENT   TECHNIQUE: Axial CT images of the abdomen and pelvis  before and after intravenous administration of contrast using CT angiographic technique. Coronal and sagittal images are reconstructed.  3D reconstructions were obtained at a separate workstation.   FINDINGS: VASCULAR: ABDOMINAL AORTA: No aortic aneurysm or dissection. Mild abdominal aortic calcification. The celiac, superior mesenteric, renal and inferior mesenteric arteries are patent with no significant stenosis.   The bilateral common iliac, internal iliac, external iliac and common femoral arteries are patent with no significant stenosis.   ABDOMEN: LOWER CHEST: Small bilateral pleural effusions. Interlobular septal thickening at the lung bases suggests interstitial edema. Partially imaged lead right ventricle. BONES: No acute osseous abnormality. Diffuse degenerative disc changes in the lumbar facet arthropathy. ABDOMINAL WALL: Within normal limits.   LIVER: Within normal limits. BILE DUCTS: No biliary dilatation. GALLBLADDER: No calcified gallstones. No pericholecystic inflammatory changes. PANCREAS: Within normal limits. SPLEEN: Within normal limits. ADRENALS:  Within normal limits. KIDNEYS: Symmetric renal enhancement. No hydronephrosis or perinephric fluid collection. Subcentimeter cortical hypodensities in both kidneys are too small to characterize, but statistically likely small cysts. URETERS: No hydroureter.   PELVIS:   REPRODUCTIVE ORGANS: No pelvic masses.  BLADDER: Within normal limits.   RETROPERITONEUM: Within normal limits. BOWEL: No evidence of active GI bleed. The stomach is nondistended. No dilated small bowel. There is focal circumferential colonic wall thickening of the distal transverse colon (Series 7, Image 117). Colonic diverticulosis is noted without acute diverticulitis. The appendix has been removed. PERITONEUM: No ascites or free air, no fluid collection.       No aortic aneurysm or dissection.   No evidence of active GI bleed.   Focal circumferential wall thickening of the distal transverse colon. There are no significant surrounding inflammatory changes, raising suspicion for malignancy (rather than focal colitis/diverticulitis). Correlation with presenting history is recommended and follow-up colonoscopy should be considered.   Findings suggestive of CHF with interstitial edema and small pleural effusions.       MACRO: Critical Finding:  See findings. Notification was initiated on 1/12/2025 at 6:27 pm by  Zenobia Raza.  (**-YCF-**) Instructions:   Signed by: Zenobia Raza 1/12/2025 6:27 PM Dictation workstation:   [id number]    ECG 12 lead    Result Date: 1/10/2025  AV dual-paced rhythm Abnormal ECG When compared with ECG of 21-DEC-2024 10:16, Vent. rate has decreased BY  10 BPM See ED provider note for full interpretation and clinical correlation Confirmed by Teena Maldonado ([id number]) on 1/10/2025 6:04:38 PM    Vascular US Upper Extremity Venous Duplex Bilateral    Result Date: 1/6/2025           [place] [address] Tel [phone] and Fax [phone]  Vascular Lab Report VASC US UPPER EXTREMITY VENOUS DUPLEX BILATERAL  Patient Name:     JULY MILLER    Reading           [id number] Skyler Meneses                                       Physician:        MD Study Date:       1/6/2025            Ordering          [id number] ARELY SYKES                                       Physician: MRN/PID:          [id number]             Technologist:     Tammy Amador RVT Accession#:       [id number]        Technologist 2: Date of           7/29/1933 / 89      Encounter#:       [id number] Birth/Age:        years Gender:           F Admission Status: Inpatient           Location          [facility]                                       Performed:  Diagnosis/ICD: Generalized edema (anasarca)-R60.1 Indication:    Limb edema CPT Codes:     57557 Peripheral venous duplex scan for DVT complete  CONCLUSIONS: Right Upper Venous: No evidence of acute deep vein thrombus visualized in the right upper extremity. Left Upper Venous: No evidence of acute deep vein thrombus visualized in the left upper extremity.  Imaging & Doppler Findings:  Right            Compressible Thrombus        Flow Internal Jugular     Yes        None   Spontaneous/Phasic Subclavian           Yes        None   Spontaneous/Phasic Axillary             Yes        None   Spontaneous/Phasic Brachial             Yes        None Cephalic             Yes        None Basilic              Yes        None  Left             Compress Thrombus        Flow Internal Jugular   Yes      None   Spontaneous/Phasic Subclavian         Yes      None   Spontaneous/Phasic Axillary           Yes      None   Spontaneous/Phasic Brachial           Yes      None Cephalic           Yes      None Basilic            Yes      None  [id number] Skyler Meneses MD Electronically signed by [id number] Skyler Meneses MD on 1/6/2025 at 3:28:39 PM  ** Final **     XR chest 1 view    Result Date: 1/6/2025  Interpreted By:  Frederic Granados, STUDY: XR CHEST 1 VIEW; 1/4/2025 6:24 am   INDICATION: Signs/Symptoms:monitor pulm edema.   COMPARISON: Chest x-ray 01/02/2025   ACCESSION NUMBER(S): [id number]   ORDERING CLINICIAN: LUIS CR   TECHNIQUE: 1 view of the chest was performed.   FINDINGS: Right-greater-than-left ill-defined basal infiltrate/atelectasis. Trace bilateral pleural effusion.. No pneumothorax.  The  cardiomediastinal silhouette is stable. Left-sided chest wall pacemaker noted.       1. Bilateral ill-defined basal pulmonary infiltrate/atelectasis along with a trace bilateral pleural effusion, new from chest x-ray 01/02/2025. Advise clinical correlation and follow-up to ensure resolution.   Signed by: Frederic Granados 1/6/2025 8:56 AM Dictation workstation:   [id number]    CT angio chest for pulmonary embolism    Addendum Date: 1/2/2025    Findings discussed withDrDmitry Marquez at 7:00 PM 1/2/2025 Signed by Ayan Martins MD    Result Date: 1/2/2025  STUDY: CT Angiogram of the Chest; 01/02/2025, 5:06 PM INDICATION: Recent admission for NSTEMI, non occlusive thrombus possibly seen in SVC at that time. COMPARISON: CTA CAP 12/20/2024. ACCESSION NUMBER(S): [id number] ORDERING CLINICIAN: GALE MARQUEZ TECHNIQUE:  CTA of the chest was performed with intravenous contrast. Images are reviewed and processed at a workstation according to the CT angiogram protocol with 3-D and/or MIP post processing imaging generated.  Omnipaque 350, 65 mL was administered intravenously. Automated mA/kV exposure control was utilized and patient examination was performed in strict accordance with principles of ALARA. FINDINGS: Pulmonary arteries are adequately opacified without acute or chronic filling defects.  The thoracic aorta is normal in course and caliber without dissection or aneurysm.  Contrast was injected within the right upper extremity vein.  There is a focal region of nonenhancement within the anterior aspect of the superior vena cava at the level of the pacemaker leads image 99 and image 125.  Overall length of the region of nonenhancement in the SVC has increased compared to exam 12/20/2024.  Additionally there is a region of nonenhancement right internal jugular vein image 7 series 402.  Small amount of contrast refluxes into the azygos vein. Mild cardiac enlargement.  Pacemaker leads within the right ventricle and  right atrium.  Small mediastinal and hilar lymph nodes are unchanged. Moderate size right pleural effusion and small left pleural effusion. The airways are patent. Multifocal groundglass regions of airspace density both lungs. Upper abdomen demonstrates no acute pathology. There are no acute fractures.  No suspicious bony lesions.    Negative for pulmonary embolism or thoracic aortic dissection.  Again identified are regions of nonenhancement along the anterior aspect of the mid SVC and suggestive of nonocclusive SVC thrombus. Compared to prior exam overall length of nonenhancement increased. Additionally there is a focal region of nonenhancement lower margin of the right internal jugular vein and may represent new thrombus. Moderate size right pleural effusion and small left pleural effusion are new. Cardiomegaly. Multifocal groundglass regions of airspace density both lungs could be due to pulmonary edema. Signed by Ayan Martins MD    Point of Care Ultrasound    Result Date: 1/2/2025  Jac Odonnell MD     1/2/2025  5:49 PM Performed by: Jac Odonnell MD Authorized by: Jac Odonnell MD  Cardiac Indications: shortness of breath Procedure: Cardiac Ultrasound Findings:  Views: parasternal long, parasternal short, apical four and subxiphoid Effusion: The pericardial space was visualized and was positive for a PERICARDIAL EFFUSION. Activity: Ventricular contractions were visualized. LV: LV systolic function was NORMAL. RV: RV size was NORMAL. Impression: Cardiac: The focused cardiac ultrasound exam was NORMAL. Comments: No appreciable pericardial effusion.  Left ventricular function appears adequate.  No signs of right heart strain appreciated.    XR chest 2 views    Result Date: 1/2/2025  Interpreted By:  Darvin Vieira, STUDY: XR CHEST 2 VIEWS; 1/2/2025 1:05 pm   INDICATION: Signs/Symptoms:shortness of breath   COMPARISON: December 2024.   ACCESSION NUMBER(S): [id number]   ORDERING CLINICIAN:  GALEKELLY MARQUEZ   TECHNIQUE: Number of films: Two view chest radiographs.   FINDINGS: A pacemaker is again noted, with the leads overlying the right atrium and right ventricle. The cardiomediastinal silhouette is within normal limits. The lungs are hyperinflated, with prominent interstitial markings bilaterally and increased AP diameter of the chest. There is no pneumothorax, confluent infiltrates or pleural effusions. Previously noted superimposed bilateral interstitial infiltrates have resolved. Degenerative changes involve the thoracic spine.       COPD/chronic interstitial lung disease again noted without acute infiltrates.     Signed by: Darvin Vieira 1/2/2025 1:45 PM Dictation workstation:   [id number]    ECG 12 lead    Result Date: 12/30/2024  Ventricular-paced rhythm Abnormal ECG When compared with ECG of 20-DEC-2024 02:50, (unconfirmed) Vent. rate has increased BY   2 BPM Confirmed by Magnus Hess ([id number]) on 12/30/2024 7:07:18 PM    ECG 12 lead    Result Date: 12/26/2024  Ventricular-paced rhythm Abnormal ECG When compared with ECG of 20-DEC-2024 01:44, (unconfirmed) Vent. rate has increased BY  10 BPM See ED provider note for full interpretation and clinical correlation Confirmed by Teena Maldonado ([id number]) on 12/26/2024 10:22:47 AM    ECG 12 lead    Result Date: 12/26/2024  AV dual-paced rhythm Abnormal ECG When compared with ECG of 21-AUG-2023 01:07, PREVIOUS ECG IS PRESENT See ED provider note for full interpretation and clinical correlation Confirmed by Teena Maldonado ([id number]) on 12/26/2024 10:22:41 AM    Transthoracic echo (TTE) complete    Result Date: 12/21/2024   [facility], [address] [place], [place] [address]           Tel [phone] and Fax [phone] TRANSTHORACIC ECHOCARDIOGRAM REPORT  Patient Name:       JULY Steel Physician:    [id number] Magnus Hess MD Study Date:         12/21/2024          Ordering  Provider:    [id number] EARLE GILMORE MRN/PID:            [id number]            Fellow: Accession#:         [id number]        Nurse: Date of Birth/Age:  7/29/1933 / 89      Sonographer:          Nadeem Rubio                     years                                     ACS, RDCS, MAURIE Gender assigned at  F                   Additional Staff: Birth: Height:             162.56 cm           Admit Date:           12/20/2024 Weight:             73.48 kg            Admission Status:     Inpatient -                                                               Routine BSA / BMI:          1.79 m2 / 27.81     Encounter#:           [id number]                     kg/m2 Blood Pressure:     132/61 mmHg         Department Location:  [facility] Study Type:    TRANSTHORACIC ECHO (TTE) COMPLETE Diagnosis/ICD: Shortness of breath-R06.02 Indication:    Dyspnea CPT Code:      Echo Complete w Full Doppler-79542 Patient History: Pertinent History: COPD, CHF, Chest Pain, Dyspnea and HTN. Study Detail: The following Echo studies were performed: 2D, M-Mode, Doppler and               color flow. Technically challenging study due to body habitus.               Patient has a pacemaker.  PHYSICIAN INTERPRETATION: Left Ventricle: Left ventricular ejection fraction is normal, by visual estimate at 60-65%. Left venticular wall motion is abnormal. The left ventricular cavity size is normal. There is mildly increased septal and normal posterior left ventricular wall thickness. Left ventricular diastolic filling is indeterminate. There is evidence of a moderate apical and anteroapical myocardial infarction. Left Atrium: The left atrium is normal in size. Right Ventricle: The right ventricle is normal in size. There is normal right ventricular global systolic function. A device is visualized in the right ventricle. Right Atrium: The right atrium is normal in size. There is a device  visualized in the right atrium. Aortic Valve: The aortic valve is trileaflet. There is moderate aortic valve thickening. There is evidence of mildly elevated transaortic gradients consistent with sclerosis of the aortic valve. The aortic valve dimensionless index is 0.57. There is trace aortic valve regurgitation. The peak instantaneous gradient of the aortic valve is 11 mmHg. The mean gradient of the aortic valve is 6 mmHg. Mitral Valve: The mitral valve is mildly thickened. There is moderate mitral annular calcification. There is moderate mitral valve regurgitation. Tricuspid Valve: The tricuspid valve is structurally normal. There is moderate to severe tricuspid regurgitation. The Doppler estimated RVSP is moderately elevated at 48.4 mmHg. Pulmonic Valve: The pulmonic valve is structurally normal. There is no indication of pulmonic valve regurgitation. Pericardium: There is no pericardial effusion noted. Aorta: The aortic root is normal. There is no dilatation of the ascending aorta. There is no dilatation of the aortic root. Systemic Veins: The inferior vena cava appears normal in size.  CONCLUSIONS:  1. Left ventricular ejection fraction is normal, by visual estimate at 60-65%.  2. Abnormal left venticular wall motion.  3. Left ventricular diastolic filling is indeterminate.  4. There is a moderate apical and anteroapical myocardial infarction.  5. There is normal right ventricular global systolic function.  6. There is moderate mitral annular calcification.  7. Moderate mitral valve regurgitation.  8. Moderate to severe tricuspid regurgitation visualized.  9. Moderately elevated right ventricular systolic pressure. 10. Aortic valve sclerosis. QUANTITATIVE DATA SUMMARY:  2D MEASUREMENTS:          Normal Ranges: Ao Root d:       3.30 cm  (2.0-3.7cm) LAs:             3.20 cm  (2.7-4.0cm) IVSd:            1.00 cm  (0.6-1.1cm) LVPWd:           0.80 cm  (0.6-1.1cm) LVIDd:           4.10 cm  (3.9-5.9cm) LVIDs:            3.10 cm LV Mass Index:   64 g/m2 LVEDV Index:     45 ml/m2 LV % FS          24.4 %  LA VOLUME:                    Normal Ranges: LA Vol A4C:        51.0 ml    (22+/-6mL/m2) LA Vol A2C:        44.7 ml LA Vol BP:         48.2 ml LA Vol Index A4C:  28.5ml/m2 LA Vol Index A2C:  25.0 ml/m2 LA Vol Index BP:   26.9 ml/m2 LA Area A4C:       18.0 cm2 LA Area A2C:       17.0 cm2 LA Major Axis A4C: 5.4 cm LA Major Axis A2C: 5.5 cm LA Volume Index:   25.0 ml/m2  RA VOLUME BY A/L METHOD:          Normal Ranges: RA Area A4C:             15.0 cm2  M-MODE MEASUREMENTS:         Normal Ranges: Ao Root:             3.40 cm (2.0-3.7cm) LAs:                 4.30 cm (2.7-4.0cm)  AORTA MEASUREMENTS:         Normal Ranges: Asc Ao, d:          3.10 cm (2.1-3.4cm)  LV SYSTOLIC FUNCTION BY 2D PLANIMETRY (MOD):                      Normal Ranges: EF-A4C View:    53 % (>=55%) EF-A2C View:    64 % EF-Biplane:     62 % EF-Visual:      63 % LV EF Reported: 63 %  AORTIC VALVE:                      Normal Ranges: AoV Vmax:                1.66 m/s  (<=1.7m/s) AoV Peak P.0 mmHg (<20mmHg) AoV Mean P.0 mmHg  (1.7-11.5mmHg) LVOT Max Marek:            0.97 m/s  (<=1.1m/s) AoV VTI:                 38.10 cm  (18-25cm) LVOT VTI:                21.90 cm LVOT Diameter:           2.10 cm   (1.8-2.4cm) AoV Area, VTI:           1.99 cm2  (2.5-5.5cm2) AoV Area,Vmax:           2.03 cm2  (2.5-4.5cm2) AoV Dimensionless Index: 0.57  RIGHT VENTRICLE: RV Basal 3.90 cm TAPSE:   27.2 mm RV s'    0.14 m/s  TRICUSPID VALVE/RVSP:          Normal Ranges: Peak TR Velocity:     3.37 m/s RV Syst Pressure:     48 mmHg  (< 30mmHg) IVC Diam:             1.90 cm  PULMONIC VALVE:          Normal Ranges: PV Max Marek:     0.9 m/s  (0.6-0.9m/s) PV Max PG:      3.3 mmHg  [id number] Magnus Hess MD Electronically signed on 2024 at 10:29:11 AM  ** Final **     Cardiac Catheterization Procedure    Result Date: 2024   [facility], Cath Lab,  [address], [place] [address] Cardiovascular Catheterization Report Patient Name:      JULY MILLER     Performing Physician:  [id number] Ruelas MD Study Date:        12/20/2024           Verifying Physician:   [id number] Ruelas MD MRN/PID:           [id number]             Cardiologist/Co-Scrub: Accession#:        [id number]         Ordering Provider:     [id number] RUELAS Date of Birth/Age: 7/29/1933 / 89 years Cardiologist: Gender:            F                    Fellow: Encounter#:        [id number]           Surgeon:  Study: Left Heart Cath  Indications: JULY MILLER is a 89 year old female who presents with hypertension and a chest pain assessment of typical angina. NSTE - ACS. Medical History: Stress test performed: No. CTA performed: NoDmitry Perkins accessed: No. LVEF Assessed: No. Cardiac arrest: No. Cardiac surgical consult: No. Cardiovascular instability: No. Frailty status of patient entering lab: 6 = Moderately frail.  Procedure Description: After infiltration with 2% Lidocaine, the right radial artery was cannulated with a modified Seldinger technique. Subsequently a 6 Malian sheath was placed in the right radial artery.  Coronary Angiography: The coronary circulation is right dominant.  Coronary Angiography Comments: LM: large and patent LAD: LArge transapical with mild luminal irrgularities. Diag are medium and patent LCX: large non domiant and patent. 2 OM are medium with irregularities and patent. RCA: large dominant and patent. PDA and PLB medium with irregularities. LVEDP 22mmHg EF 30%. Basal hyperkinesis, mid to apical walls are hypokinetic. Dyskinetic apex.  Additional Findings: - Non obstructive CAD - EDP 22mmHg - EF 30%. Basal hyperkinesis, mid to apical walls are hypokinetic. Dyskinetic  apex.  Hemo Personnel: +------------------+---------+ Name              Duty      +------------------+---------+ Tahir Ruelas MD 1 +------------------+---------+  Hemodynamic Pressures:  +----+---------------+----------+-------------+--------------+-------+---------+ Site   Date Time   Phase NameSystolic mmHgDiastolic mmHgED mmHgMean mmHg +----+---------------+----------+-------------+--------------+-------+---------+   LV     12/20/2024   O2 REST          131            10     22                  12:25:07 PM                                                      +----+---------------+----------+-------------+--------------+-------+---------+   LV     12/20/2024   O2 REST          133             5     22                  12:25:14 PM                                                      +----+---------------+----------+-------------+--------------+-------+---------+  LVp     12/20/2024   O2 REST          133            48     51                  12:25:17 PM                                                      +----+---------------+----------+-------------+--------------+-------+---------+  AOp     12/20/2024   O2 REST          137            53              86         12:25:23 PM                                                      +----+---------------+----------+-------------+--------------+-------+---------+   AO     12/20/2024   O2 REST          132            64              91         12:27:24 PM                                                      +----+---------------+----------+-------------+--------------+-------+---------+   LV     12/20/2024   O2 REST          131            18     28                  12:33:06 PM                                                      +----+---------------+----------+-------------+--------------+-------+---------+   LV     12/20/2024   O2 REST           125            18     27                  12:33:13 PM                                                      +----+---------------+----------+-------------+--------------+-------+---------+  LVp     12/20/2024   O2 REST          123            12     28                  12:33:17 PM                                                      +----+---------------+----------+-------------+--------------+-------+---------+  AOp     12/20/2024   O2 REST          124            54              80         12:33:22 PM                                                      +----+---------------+----------+-------------+--------------+-------+---------+   AO     12/20/2024   O2 REST          123            57              83         12:33:28 PM                                                      +----+---------------+----------+-------------+--------------+-------+---------+  Cardiac Cath Post Procedure Notes: Post Procedure Diagnosis: - Non obstructive CAD                           - EDP 22mmHg                           - EF 30%. Basal hyperkinesis, mid to apical walls are                           hypokinetic. Dyskinetic apex. Blood Loss:               Estimated blood loss during the procedure was 0 mls. Specimens Removed:        Number of specimen(s) removed: none. ____________________________________________________________________________________ CONCLUSIONS:  1. - Non obstructive CAD     - EDP 22mmHg     - EF 30%. Basal hyperkinesis, mid to apical walls are hypokinetic. Dyskinetic apex. ICD 10 Codes: Non ST elevation (NSTEMI) myocardial infarction-I21.4  CPT Codes: Angiography, Each 1st additional vessel studied after basic exam-46628; Angiography, Each 2nd additional vessel studied after basic exam-40676; Angiography, Each 3rd additional vessel studied after basic exam-99739; Left Heart Cath (visualization of coronaries) and -[id number]  [id number] Tahir Ruelas MD Performing  Physician Electronically signed by [id number] Tahir Ruelas MD on 12/20/2024 at 12:54:51 PM  ** Final **     CT angio chest abdomen pelvis    Result Date: 12/20/2024  Interpreted By:  Abdiel Barrios, STUDY: CT ANGIO CHEST ABDOMEN PELVIS; ;  12/20/2024 3:38 am   INDICATION: Signs/Symptoms:chest pain radiating to shoulders r/o aortic dissection.     COMPARISON: Correlation made to noncontrast CT abdomen and pelvis of 01/22/2020.   ACCESSION NUMBER(S): [id number]   ORDERING CLINICIAN: JAMILAH ELLINGTON   TECHNIQUE: Noncontrast CT of the chest, abdomen and pelvis was followed by CT angiography of the chest, abdomen and pelvis after IV administration of 100 cc Omnipaque 350. Multiplanar, MIP and 3D reformations.   FINDINGS: CHEST:   VESSELS: No evidence of acute intramural hematoma or dissection. Aorta is normal in caliber. Moderate atherosclerotic calcification is present. Coronary artery calcifications are noted; please note that technique is not optimized for evaluation of coronary arteries. 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 There is a fairly well-delineated hypoattenuating filling defect in the SVC (series 501, images 84-97), suspicious for nonocclusive thrombus. Main pulmonary artery is of normal caliber. No evidence of pulmonary embolus. HEART: Mildly enlarged. Pacing leads in right atrium and right ventricle. No pericardial effusion. MEDIASTINUM AND HARITHA: No pathologically enlarged thoracic lymph nodes. LUNG, PLEURA, LARGE AIRWAYS: Interlobular septal thickening and mosaic attenuation of the lung parenchyma are compatible with acute pulmonary interstitial edema/CHF. Mild dependent atelectasis trace pleural effusions. No pneumothorax. CHEST WALL AND LOWER NECK: Within normal limits.   ABDOMEN:   BONES: No acute osseous abnormality. Osteopenia. Moderate to severe  degenerative changes of the imaged spine. Destructive osseous lesion. ABDOMINAL WALL: Within normal limits.   LIVER: Within normal limits. BILE DUCTS: Normal caliber. GALLBLADDER: The gallbladder is distended. No calcified cholelithiasis is seen. No wall thickening or pericholecystic fluid. PANCREAS: Within normal limits. SPLEEN: Within normal limits. ADRENALS: Within normal limits. KIDNEYS: Incidentally noted ptotic right kidney. Otherwise, normal. URETERS: No hydroureter.   PELVIS:   REPRODUCTIVE ORGANS: No pelvic mass or significant free pelvic fluid. Uterus is absent. BLADDER: Within normal limits.     RETROPERITONEUM: Within normal limits. BOWEL: Stomach appears grossly normal. No dilated or thickened bowel. Colonic diverticulosis without evidence of acute diverticulitis. Normal appendix. PERITONEUM: No ascites or free air, no fluid collection.       No evidence of acute abnormality of the imaged systemic arterial vasculature. Specifically, no aortic intramural hematoma or dissection. Atherosclerosis, without definite hemodynamically significant luminal narrowing seen in the imaged systemic arteries. 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   There is a fairly well-delineated hypoattenuating filling defect in the SVC (series 501, images 84-97), suspicious for nonocclusive thrombus. Main pulmonary artery is of normal caliber. No evidence of pulmonary embolus.   Interlobular septal thickening and mosaic attenuation of the lung parenchyma are compatible with acute pulmonary interstitial edema/CHF. Mild dependent atelectasis trace pleural effusions. No pneumothorax.   Mild cardiomegaly.   No evidence of acute abnormality in the abdomen or pelvis.   Appendectomy and hysterectomy.   Colonic diverticula without diverticulitis.   Additional findings as discussed above.   MACRO: None   Signed by: Abdiel Barrios 12/20/2024 4:09 AM Dictation workstation:   [id number]    XR chest 1  view    Result Date: 12/20/2024  STUDY: Chest Radiograph;  12/20/2024 2:14 AM INDICATION: Chest pain. COMPARISON: None Available ACCESSION NUMBER(S): [id number] ORDERING CLINICIAN: JAMILAH ELLINGTON TECHNIQUE:  Frontal chest was obtained at 02:14 hours. FINDINGS: CARDIOMEDIASTINAL SILHOUETTE: Cardiomediastinal silhouette is mildly enlarged.  Left-sided pacemaker noted.  LUNGS: Diffusely prominent peribronchial markings.  ABDOMEN: No remarkable upper abdominal findings.  BONES: No acute osseous changes.    Diffusely prominent peribronchial markings which may reflect viral infection or volume overload in the proper clinical setting. Signed by Jac Cuevas MD     Echo  CONCLUSIONS:   1. Left ventricular ejection fraction is normal, by visual estimate at 60-65%.   2. Abnormal left venticular wall motion.   3. Left ventricular diastolic filling is indeterminate.   4. There is a moderate apical and anteroapical myocardial infarction.   5. There is normal right ventricular global systolic function.   6. There is moderate mitral annular calcification.   7. Moderate mitral valve regurgitation.   8. Moderate to severe tricuspid regurgitation visualized.   9. Moderately elevated right ventricular systolic pressure.  10. Aortic valve sclerosis.        Assessment/Plan   Assessment & Plan  Gastrointestinal hemorrhage, unspecified gastrointestinal hemorrhage type        89-year-old female with past medical history of COPD, HTN, CVA, sick sinus syndrome s/p pacemaker, HFpEF, recent NSTEMI, and recent hospitalization for CHF exacerbation with acute pulmonary edema, imaging at that time was concerning for SVC thrombus and patient was evaluated by vascular who recommended DOAC for 6 months and repeat CT.  She presents with red blood per rectum and possible epistaxis. Hospitalized for further evaluation and management.    #GI bleed, suspect lower -> likely ischemic colitis (wall thickening at distal transverse colon, associated  abdominal discomfort and dizziness, brown stool with red blood reported).  #Epistaxis ?- no reported vomiting.  Blood noted with clearing of her nose and when rinsing her mouth with fluids.   #SVC thrombus    Presentation is concerning for ischemic colitis as noted above.  Avoid hypotension  Consult gastroenterology, appreciate input  Will hold aspirin, Plavix, and Eliquis for the time being  Trend H&H, stable on presentation.  Patient agreeable to blood transfusions as needed.  N.p.o. after midnight  Low suspicion for upper GI source, however will continue with Protonix 40 mg IV twice daily until evaluated by GI.  Consult to vascular surgery regarding ongoing need for Eliquis.   Dr Smith's last note on 1/6/2025 “I have reviewed the DVT scan performed today.  There is no evidence of IJ thrombus.  Waveforms are normal in the IJ as well as subclavian, suggestive that even if there is a thrombus in the SVC, it is not hemodynamically significant.”   Check Orthostatic vitals    #Acute hypoxic respiratory failure  #Acute on chronic diastolic congestive heart failure  #History sick sinus syndrome s/p pacemaker  #History NSTEMI  #Valvular hear disease  #pleural effusions    CT angio A/P was suggestive of CHF with interstitial edema and small pleural effusions.   Low-sodium diet  Consult cardiology, patient follows with Dr Jiang and Ramicone for EP  Supplemental oxygen as needed  Continue oral diuretics, will defer to cardiology IV diuresis .  DuoNebs as needed  RT to evaluate    #DVT Ppx  SCD  Holding ac          Lokesh Colindres, APRN-CNP

## 2025-01-13 NOTE — CARE PLAN
The patient's goals for the shift include sleep    The clinical goals for the shift include pt will remain injury free    Over the shift, the patient did not make progress toward the following goals. Barriers to progression include calling for assistance. Recommendations to address these barriers include frequent rounds.

## 2025-01-14 PROCEDURE — 2500000001 HC RX 250 WO HCPCS SELF ADMINISTERED DRUGS (ALT 637 FOR MEDICARE OP): Performed by: NURSE PRACTITIONER

## 2025-01-14 PROCEDURE — 99232 SBSQ HOSP IP/OBS MODERATE 35: CPT | Performed by: NURSE PRACTITIONER

## 2025-01-14 PROCEDURE — 1200000002 HC GENERAL ROOM WITH TELEMETRY DAILY

## 2025-01-14 PROCEDURE — 99232 SBSQ HOSP IP/OBS MODERATE 35: CPT | Performed by: STUDENT IN AN ORGANIZED HEALTH CARE EDUCATION/TRAINING PROGRAM

## 2025-01-14 RX ADMIN — POLYETHYLENE GLYCOL-3350, SODIUM CHLORIDE, POTASSIUM CHLORIDE AND SODIUM BICARBONATE 4000 ML: 420; 11.2; 5.72; 1.48 POWDER, FOR SOLUTION ORAL at 17:11

## 2025-01-14 RX ADMIN — Medication 1 TABLET: at 08:40

## 2025-01-14 RX ADMIN — METOPROLOL TARTRATE 12.5 MG: 25 TABLET, FILM COATED ORAL at 21:42

## 2025-01-14 RX ADMIN — TORSEMIDE 20 MG: 20 TABLET ORAL at 08:40

## 2025-01-14 RX ADMIN — METOPROLOL TARTRATE 12.5 MG: 25 TABLET, FILM COATED ORAL at 08:40

## 2025-01-14 ASSESSMENT — PAIN SCALES - GENERAL: PAINLEVEL_OUTOF10: 0 - NO PAIN

## 2025-01-14 NOTE — PROGRESS NOTES
Subjective   Subjective Data and Overnight Events:  Patient was readmitted with GI bleed; she is on anticoagulation for SVC thrombus that was noted by CT.  Recall that her duplex did not show any obstructive flow pattern on prior admission a week ago.    Objective   Vital Signs:  Patient Vitals for the past 24 hrs:   BP Temp Temp src Pulse Resp SpO2   01/13/25 1926 102/51 36 °C (96.8 °F) -- 80 18 96 %   01/13/25 1700 116/58 -- Temporal 79 18 95 %   01/13/25 1300 115/57 -- Temporal 67 18 94 %   01/13/25 1000 123/60 36 °C (96.8 °F) Temporal 61 18 95 %   01/13/25 0549 142/63 36.8 °C (98.2 °F) Temporal 67 16 91 %   01/13/25 0021 -- -- -- -- -- 96 %   01/12/25 2327 142/64 36.3 °C (97.3 °F) Temporal 61 18 (!) 91 %   01/12/25 2100 167/86 -- -- 75 (!) 31 94 %   01/12/25 2000 142/63 -- -- 62 (!) 28 94 %       Physical Exam:  General: no acute distress  Head/neck: no edema  Neurologic: Alert and oriented x3.    Current Medications:  Scheduled medications   Medication Dose Route Frequency    [Held by provider] apixaban  5 mg oral BID    [Held by provider] aspirin  81 mg oral Daily    calcium carbonate-vitamin D3  1 tablet oral Daily    fluticasone furoate-vilanteroL  1 puff inhalation Daily    metoprolol tartrate  12.5 mg oral BID    [START ON 1/14/2025] polyethylene glycol-electrolytes  4,000 mL oral Once    torsemide  20 mg oral Daily     Continuous Medications   Medication Dose Last Rate     PRN medications   Medication    acetaminophen    Or    acetaminophen    Or    acetaminophen    ipratropium-albuteroL    lubricating eye drops    oxygen    oxygen       Pertinent Recent Cardiovascular Studies (personally reviewed):  NA    Laboratory values:  CMP:  Recent Labs     01/13/25  0626 01/12/25  1122 01/07/25  0647 01/06/25  0707 01/05/25  0841 01/03/25  0344 01/02/25  1227 12/27/24  1122 12/21/24  0538 12/20/24  0204 08/23/23  0523 08/22/23  0539   * 134* 134* 133* 135* 137 135* 135*   < > 135*   < > 139   K 4.6 3.9 4.0  4.3 4.1 4.3 4.3 4.4   < > 4.2   < > 4.8    101 103 103 101 106 105 106   < > 104   < > 107   CO2 25 23 26 24 24 21 22 23   < > 20*   < > 27   ANIONGAP 12 14 9* 10 14 14 12 10   < > 15   < > 10   BUN 23 24* 27* 24* 21 14 12 21   < > 32*   < > 19   CREATININE 1.02 0.92 1.08* 0.99 1.08* 1.00 0.88 0.89   < > 1.19*   < > 1.05   EGFR 52* 59* 49* 54* 49* 53* 62 61   < > 43*   < >  --    MG  --  2.21  --   --   --   --  2.05  --   --  2.19  --  2.31    < > = values in this interval not displayed.     Recent Labs     01/13/25  0626 01/12/25  1122 12/20/24  0204 01/24/24  1049 08/21/23  0056   ALBUMIN 3.6 3.9 4.0 4.2 4.3   ALKPHOS 78 83 86 79 84   ALT 16 15 16 15 20   AST 16 17 21 18 25   BILITOT 0.5 0.5 0.3 0.7 0.4   LIPASE  --  23  --   --   --      CBC:  Recent Labs     01/13/25  0626 01/12/25  1122 01/05/25  2328 01/04/25  0612 01/03/25  0344 01/02/25  1227 12/27/24  1122 12/22/24  0545   WBC 9.8 5.6 5.4 6.0 6.0 4.9 5.6 8.2   HGB 9.1* 9.2* 9.2* 9.4* 9.1* 9.6* 9.7* 9.8*   HCT 28.0* 28.0* 27.8* 28.3* 29.3* 29.2* 30.2* 29.7*    275 242 255 244 265 277 207   MCV 90 88 91 90 95 91 92 92     COAG:   Recent Labs     01/12/25  1122 01/05/25  1412 01/05/25  1028 01/04/25  0612 01/03/25  1539 01/03/25  0900 01/03/25  0344 01/02/25  2334 12/21/24  0538 12/20/24  1013 12/20/24  0251 08/21/23  0056 07/13/23  1335 01/08/23  1412 12/14/20  0731 05/17/19  0222   INR 1.7*  --   --   --   --   --   --   --  1.1  --  1.1 1.1 1.1 1.2* 1.1 1.1   HAUF  --  1.8* 0.4 0.4 0.5 0.7 0.9 1.0  --  1.1*  --   --   --   --   --   --      ABO:   Recent Labs     01/12/25  1205   ABO A     HEME/ENDO:  Recent Labs     01/13/25  0626 12/20/24  0204 01/24/24  1049 08/21/23  0056 05/18/23  1346 01/17/23  0943 04/11/19  0930   FERRITIN  --   --   --   --  72  --   --    IRONSAT 5*  --   --   --  28  --  30   TSH  --   --   --   --   --  2.25  --    HGBA1C  --  5.5 5.4 5.9*  --   --   --       CARDIAC:   Recent Labs     01/02/25  1420 01/02/25  1227  12/20/24  0439 12/20/24  0251 12/20/24  0204 08/21/23  0056 01/08/23  1412 03/30/22  1611 04/15/20  1403 04/19/19  2035   TROPHS 38* 36* 8,220* 1,426* 112* 10 9  --   --   --    BNP  --  485*  --  379*  --   --   --  212* 138* 112*     Recent Labs     12/20/24  0204 01/24/24  1049 08/21/23  0056 01/17/23  0943 03/16/21  1015   CHOL 145 152 162 155 180   LDLF  --   --  81 75 106*   HDL 63.1 65.4 64.3 65.5 57.8   TRIG 70 66 85 72 82     MICRO:   Recent Labs     01/17/23  0943   CRP 1.76*     No results found for the last 90 days.         I have personally reviewed most recent PCP, cardiology, vascular, and/or podiatry documentation.      Assessment/Plan   89 y.o. female with COPD, hypertension, CVA history, sick sinus syndrome status post pacemaker.  Patient reports that she had pacemaker battery change in 2023, no wire manipulation was performed at that time.    On her last admission, she had no evidence of SVC syndrome.  In addition, duplex really shows patent waveforms on IJ, subclavian venous interrogation.     Patient is readmitted with GI bleed.  Given very lukewarm indication for anticoagulation, I would favor cessation at this time.  I have discussed with noninvasive cardiac imaging department, can be protocoled for CMR with SVC evaluation for thrombus.  I will have to discuss with Dr. Ramicone tomorrow to see if device and leads are MRI compatible.  If they are, we will need to coordinate for CMR at [facility] as an outpatient.  I will formalize a plan with you tomorrow once we get some clarity from EP.       Lang Smith MD, FACC, FSCAI, RPVI  Co-Director, Vascular Center, and  Co-Director, Pulmonary Embolism Response Team,   [facility] [place]                                 of Medicine,                                                                 [facility] [employer]

## 2025-01-14 NOTE — PROGRESS NOTES
"Yesenia Milner is a 89 y.o. female on day 1 of admission presenting with Gastrointestinal hemorrhage, unspecified gastrointestinal hemorrhage type.    Subjective   1/14/2025 no major changes overnight.  Denies any GI complaints.  Abdomen soft, nontender, bowel sounds present anticoagulation on hold.  No reported overt bleed.  H&H stable.  Tolerates clear liquid diet well       Objective     A 10 point review of system is negative except for what is mentioned in the HPI     Physical Exam     The note was created using voice recognition transcription software. Despite proofreading, unintentional typographical errors may be present. Please contact the GI office with any questions or concerns.      Current Medications: reviewed     Vital Signs: Reviewed     Physical Exam:  General: no apparent distress, pleasant and cooperative elderly lady  Skin:  Warm and dry, no jaundice  HEENT: No scleral icterus, no conjunctival pallor, normocephalic, atraumatic, mucous membranes moist  Neck:  atraumatic, trachea midline, no JVD  Chest:  decreased air entry to auscultation bilaterally. No wheezes, rales, or rhonchi  CV:  Regular rate and rhythm.  Positive S1/S2  Abdomen: no distension, +BS, soft, non-tender to palpation, no rebound tenderness, no guarding, no rigidity, no discernible ascites   Extremities: no lower extremity edema, Chronic pigmentary changes, no cyanosis  Neurological:  A&Ox3 , no asterixis  Psychiatric: cooperative      Investigations:  Labs, radiological imaging and cardiac work up were reviewed    Last Recorded Vitals  Blood pressure 112/53, pulse 71, temperature 36.8 °C (98.2 °F), temperature source Tympanic, resp. rate 18, height 1.626 m (5' 4\"), weight 74.8 kg (165 lb), SpO2 94%.  Intake/Output last 3 Shifts:  I/O last 3 completed shifts:  In: 590 (7.9 mL/kg) [P.O.:590]  Out: - (0 mL/kg)   Weight: 74.8 kg     Relevant Results        Scheduled medications  calcium carbonate-vitamin D3, 1 tablet, oral, " Daily  fluticasone furoate-vilanteroL, 1 puff, inhalation, Daily  metoprolol tartrate, 12.5 mg, oral, BID  polyethylene glycol-electrolytes, 4,000 mL, oral, Once  torsemide, 20 mg, oral, Daily      Continuous medications     PRN medications  PRN medications: acetaminophen **OR** acetaminophen **OR** acetaminophen, ipratropium-albuteroL, lubricating eye drops, oxygen, oxygen    No results found for this or any previous visit (from the past 24 hours).      * Cannot find OR log *  Last relevant procedure:            Assessment/Plan   Assessment & Plan  Gastrointestinal hemorrhage, unspecified gastrointestinal hemorrhage type    Yesenia Milner is a 89 y.o. female   with past medical history of COPD, HTN, CVA, sick sinus syndrome s/p pacemaker, HFpEF, recent NSTEMI, and recent hospitalization for CHF exacerbation with acute pulmonary edema, imaging at that time was concerning for SVC thrombus and patient was evaluated by vascular who recommended DOAC for 6 months and repeat CT. Patient presented to [facility] 1/12/25  from home with history of 1 bloody bowel movement the night before     #Lower GI bleed  #Chronic anemia, steadily downtrending  #Abnormal colon CT     Given no recent history of colonoscopy and stated downtrend in H&H within last 2 or 3 years as well as circumferential thickening of distal transverse colon it is necessary to rule out colonic neoplasm  Colonoscopy Wednesday or Thursday to allow sufficient time for anticoagulation to wear off, last taken on Saturday a.m.     Continue daily CBC, active type and screen on file, 2 large-bore IV access, replenish as necessary     Iron studies show significant iron deficiency    Anticoagulation on hold     Clear liquid diet today   GoLytely 4 L p.o. x 1 as ordered  Will need cardiology clearance for procedure    Colonoscopy tomorrow     I spent 30 minutes in the professional and overall care of this patient.      Narcisa Vuong, APRN-CNP

## 2025-01-14 NOTE — CARE PLAN
The patient's goals for the shift include sleep    The clinical goals for the shift include Pt will have no signs of active bleeding throughout shift    Over the shift, the patient did not make progress toward the following goals. Barriers to progression include acute illness. Recommendations to address these barriers include communication.

## 2025-01-14 NOTE — PROGRESS NOTES
"Yesenia Milner is a 89 y.o. female on day 1 of admission presenting with Gastrointestinal hemorrhage, unspecified gastrointestinal hemorrhage type.    Subjective   Doing well, on clears, plans for colonscopy later thsi week       Objective     Physical Exam  Constitutional:       Appearance: Normal appearance.   HENT:      Head: Normocephalic and atraumatic.      Right Ear: Tympanic membrane and ear canal normal.      Left Ear: Tympanic membrane and ear canal normal.      Mouth/Throat:      Mouth: Mucous membranes are moist.      Pharynx: Oropharynx is clear.   Eyes:      Extraocular Movements: Extraocular movements intact.      Conjunctiva/sclera: Conjunctivae normal.      Pupils: Pupils are equal, round, and reactive to light.   Cardiovascular:      Rate and Rhythm: Normal rate and regular rhythm.      Pulses: Normal pulses.      Heart sounds: Normal heart sounds.   Pulmonary:      Effort: Pulmonary effort is normal.      Breath sounds: Normal breath sounds.   Abdominal:      General: Abdomen is flat. Bowel sounds are normal.      Palpations: Abdomen is soft.   Musculoskeletal:         General: Normal range of motion.      Cervical back: Normal range of motion and neck supple.   Skin:     General: Skin is warm and dry.      Capillary Refill: Capillary refill takes 2 to 3 seconds.   Neurological:      General: No focal deficit present.      Mental Status: She is alert and oriented to person, place, and time. Mental status is at baseline.   Psychiatric:         Mood and Affect: Mood normal.         Behavior: Behavior normal.         Thought Content: Thought content normal.         Judgment: Judgment normal.         Last Recorded Vitals  Blood pressure 112/53, pulse 71, temperature 36.8 °C (98.2 °F), temperature source Tympanic, resp. rate 18, height 1.626 m (5' 4\"), weight 74.8 kg (165 lb), SpO2 94%.  Intake/Output last 3 Shifts:  I/O last 3 completed shifts:  In: 590 (7.9 mL/kg) [P.O.:590]  Out: - (0 mL/kg) "   Weight: 74.8 kg     Relevant Results                              Assessment/Plan   Assessment & Plan  Gastrointestinal hemorrhage, unspecified gastrointestinal hemorrhage type    89-year-old female with past medical history of COPD, HTN, CVA, sick sinus syndrome s/p pacemaker, HFpEF, recent NSTEMI, and recent hospitalization for CHF exacerbation with acute pulmonary edema, imaging at that time was concerning for SVC thrombus and patient was evaluated by vascular who recommended DOAC for 6 months and repeat CT.  She presents with red blood per rectum and possible epistaxis. Hospitalized for further evaluation and management.     #GI bleed, suspect lower -> likely ischemic colitis (wall thickening at distal transverse colon, associated abdominal discomfort and dizziness, brown stool with red blood reported).  #Epistaxis ?- no reported vomiting.  Blood noted with clearing of her nose and when rinsing her mouth with fluids.   #SVC thrombus     Presentation is concerning for ischemic colitis as noted above.  Avoid hypotension  Consult gastroenterology, appreciate input  Will hold aspirin, Plavix, and Eliquis for the time being  Trend H&H, stable on presentation.  Patient agreeable to blood transfusions as needed.  N.p.o. after midnight  Low suspicion for upper GI source, however will continue with Protonix 40 mg IV twice daily until evaluated by GI.  Consult to vascular surgery regarding ongoing need for Eliquis.   Dr Smith's last note on 1/6/2025 “I have reviewed the DVT scan performed today.  There is no evidence of IJ thrombus.  Waveforms are normal in the IJ as well as subclavian, suggestive that even if there is a thrombus in the SVC, it is not hemodynamically significant.”   Check Orthostatic vitals     #Acute hypoxic respiratory failure  #Acute on chronic diastolic congestive heart failure  #History sick sinus syndrome s/p pacemaker  #History NSTEMI  #Valvular hear disease  #pleural effusions     CT angio A/P was  suggestive of CHF with interstitial edema and small pleural effusions.   Low-sodium diet  Consult cardiology, patient follows with Dr Jiang and Ramicone for EP  Supplemental oxygen as needed  Continue oral diuretics, will defer to cardiology IV diuresis .  DuoNebs as needed  RT to evaluate     #DVT Ppx  SCD  Holding ac        1/13: doing ok, informed about CT results, gi on board,     1/14: CLD. Plans for colonscopy to get definitive dx, ok to stop eliquis per vascular/cardio       I spent 35 minutes in the professional and overall care of this patient.      Iglesia Gabriel, DO

## 2025-01-14 NOTE — CARE PLAN
Problem: Pain - Adult  Goal: Verbalizes/displays adequate comfort level or baseline comfort level  Outcome: Progressing     Problem: Safety - Adult  Goal: Free from fall injury  Outcome: Progressing   The patient's goals for the shift include sleep    The clinical goals for the shift include pt will not show any signs of bleeding throughout the shift    Over the shift, the patient did not make progress toward the following goals. Barriers to progression include . Recommendations to address these barriers include .

## 2025-01-15 ENCOUNTER — ANESTHESIA (OUTPATIENT)
Dept: GASTROENTEROLOGY | Facility: HOSPITAL | Age: OVER 89
End: 2025-01-15
Payer: MEDICARE

## 2025-01-15 ENCOUNTER — APPOINTMENT (OUTPATIENT)
Dept: GASTROENTEROLOGY | Facility: HOSPITAL | Age: OVER 89
End: 2025-01-15
Payer: MEDICARE

## 2025-01-15 ENCOUNTER — ANESTHESIA EVENT (OUTPATIENT)
Dept: GASTROENTEROLOGY | Facility: HOSPITAL | Age: OVER 89
End: 2025-01-15
Payer: MEDICARE

## 2025-01-15 ENCOUNTER — APPOINTMENT (OUTPATIENT)
Dept: PRIMARY CARE | Facility: CLINIC | Age: OVER 89
End: 2025-01-15
Payer: MEDICARE

## 2025-01-15 LAB
ANION GAP SERPL CALC-SCNC: 11 MMOL/L (ref 10–20)
BUN SERPL-MCNC: 23 MG/DL (ref 6–23)
CALCIUM SERPL-MCNC: 8.8 MG/DL (ref 8.6–10.3)
CHLORIDE SERPL-SCNC: 103 MMOL/L (ref 98–107)
CO2 SERPL-SCNC: 28 MMOL/L (ref 21–32)
CREAT SERPL-MCNC: 0.94 MG/DL (ref 0.5–1.05)
EGFRCR SERPLBLD CKD-EPI 2021: 57 ML/MIN/1.73M*2
ERYTHROCYTE [DISTWIDTH] IN BLOOD BY AUTOMATED COUNT: 13.6 % (ref 11.5–14.5)
GLUCOSE SERPL-MCNC: 107 MG/DL (ref 74–99)
HCT VFR BLD AUTO: 26.9 % (ref 36–46)
HGB BLD-MCNC: 8.5 G/DL (ref 12–16)
MCH RBC QN AUTO: 28.5 PG (ref 26–34)
MCHC RBC AUTO-ENTMCNC: 31.6 G/DL (ref 32–36)
MCV RBC AUTO: 90 FL (ref 80–100)
NRBC BLD-RTO: 0 /100 WBCS (ref 0–0)
PLATELET # BLD AUTO: 243 X10*3/UL (ref 150–450)
POTASSIUM SERPL-SCNC: 3.4 MMOL/L (ref 3.5–5.3)
RBC # BLD AUTO: 2.98 X10*6/UL (ref 4–5.2)
SODIUM SERPL-SCNC: 139 MMOL/L (ref 136–145)
WBC # BLD AUTO: 8.7 X10*3/UL (ref 4.4–11.3)

## 2025-01-15 PROCEDURE — 85027 COMPLETE CBC AUTOMATED: CPT | Performed by: STUDENT IN AN ORGANIZED HEALTH CARE EDUCATION/TRAINING PROGRAM

## 2025-01-15 PROCEDURE — 2500000001 HC RX 250 WO HCPCS SELF ADMINISTERED DRUGS (ALT 637 FOR MEDICARE OP): Performed by: NURSE PRACTITIONER

## 2025-01-15 PROCEDURE — 45380 COLONOSCOPY AND BIOPSY: CPT | Performed by: INTERNAL MEDICINE

## 2025-01-15 PROCEDURE — 3700000002 HC GENERAL ANESTHESIA TIME - EACH INCREMENTAL 1 MINUTE

## 2025-01-15 PROCEDURE — 99232 SBSQ HOSP IP/OBS MODERATE 35: CPT | Performed by: STUDENT IN AN ORGANIZED HEALTH CARE EDUCATION/TRAINING PROGRAM

## 2025-01-15 PROCEDURE — 88305 TISSUE EXAM BY PATHOLOGIST: CPT | Performed by: STUDENT IN AN ORGANIZED HEALTH CARE EDUCATION/TRAINING PROGRAM

## 2025-01-15 PROCEDURE — 88342 IMHCHEM/IMCYTCHM 1ST ANTB: CPT | Performed by: STUDENT IN AN ORGANIZED HEALTH CARE EDUCATION/TRAINING PROGRAM

## 2025-01-15 PROCEDURE — 36415 COLL VENOUS BLD VENIPUNCTURE: CPT | Performed by: STUDENT IN AN ORGANIZED HEALTH CARE EDUCATION/TRAINING PROGRAM

## 2025-01-15 PROCEDURE — 82374 ASSAY BLOOD CARBON DIOXIDE: CPT | Performed by: STUDENT IN AN ORGANIZED HEALTH CARE EDUCATION/TRAINING PROGRAM

## 2025-01-15 PROCEDURE — 1200000002 HC GENERAL ROOM WITH TELEMETRY DAILY

## 2025-01-15 PROCEDURE — 45381 COLONOSCOPY SUBMUCOUS NJX: CPT | Performed by: INTERNAL MEDICINE

## 2025-01-15 PROCEDURE — 7100000002 HC RECOVERY ROOM TIME - EACH INCREMENTAL 1 MINUTE

## 2025-01-15 PROCEDURE — 45381 COLONOSCOPY SUBMUCOUS NJX: CPT | Mod: 52

## 2025-01-15 PROCEDURE — 3700000001 HC GENERAL ANESTHESIA TIME - INITIAL BASE CHARGE

## 2025-01-15 PROCEDURE — 88341 IMHCHEM/IMCYTCHM EA ADD ANTB: CPT | Performed by: STUDENT IN AN ORGANIZED HEALTH CARE EDUCATION/TRAINING PROGRAM

## 2025-01-15 PROCEDURE — 7100000001 HC RECOVERY ROOM TIME - INITIAL BASE CHARGE

## 2025-01-15 PROCEDURE — 88305 TISSUE EXAM BY PATHOLOGIST: CPT | Mod: TC,PARLAB | Performed by: INTERNAL MEDICINE

## 2025-01-15 PROCEDURE — 2720000007 HC OR 272 NO HCPCS

## 2025-01-15 PROCEDURE — 0DBL8ZX EXCISION OF TRANSVERSE COLON, VIA NATURAL OR ARTIFICIAL OPENING ENDOSCOPIC, DIAGNOSTIC: ICD-10-PCS | Performed by: INTERNAL MEDICINE

## 2025-01-15 PROCEDURE — 2500000004 HC RX 250 GENERAL PHARMACY W/ HCPCS (ALT 636 FOR OP/ED): Performed by: ANESTHESIOLOGY

## 2025-01-15 RX ORDER — LIDOCAINE HCL/PF 100 MG/5ML
SYRINGE (ML) INTRAVENOUS AS NEEDED
Status: DISCONTINUED | OUTPATIENT
Start: 2025-01-15 | End: 2025-01-15

## 2025-01-15 RX ORDER — PROPOFOL 10 MG/ML
INJECTION, EMULSION INTRAVENOUS AS NEEDED
Status: DISCONTINUED | OUTPATIENT
Start: 2025-01-15 | End: 2025-01-15

## 2025-01-15 RX ADMIN — TORSEMIDE 20 MG: 20 TABLET ORAL at 10:14

## 2025-01-15 RX ADMIN — METOPROLOL TARTRATE 12.5 MG: 25 TABLET, FILM COATED ORAL at 20:08

## 2025-01-15 RX ADMIN — PROPOFOL 70 MG: 10 INJECTION, EMULSION INTRAVENOUS at 13:49

## 2025-01-15 RX ADMIN — METOPROLOL TARTRATE 12.5 MG: 25 TABLET, FILM COATED ORAL at 10:14

## 2025-01-15 RX ADMIN — PROPOFOL 115 MCG/KG/MIN: 10 INJECTION, EMULSION INTRAVENOUS at 13:50

## 2025-01-15 RX ADMIN — LIDOCAINE HYDROCHLORIDE 100 MG: 20 INJECTION, SOLUTION INTRAVENOUS at 13:49

## 2025-01-15 RX ADMIN — Medication 1 TABLET: at 10:14

## 2025-01-15 SDOH — HEALTH STABILITY: MENTAL HEALTH: CURRENT SMOKER: 0

## 2025-01-15 ASSESSMENT — COGNITIVE AND FUNCTIONAL STATUS - GENERAL
DRESSING REGULAR LOWER BODY CLOTHING: A LITTLE
WALKING IN HOSPITAL ROOM: A LITTLE
CLIMB 3 TO 5 STEPS WITH RAILING: A LITTLE
TOILETING: A LITTLE
MOVING TO AND FROM BED TO CHAIR: A LITTLE
MOBILITY SCORE: 20
STANDING UP FROM CHAIR USING ARMS: A LITTLE
DAILY ACTIVITIY SCORE: 22

## 2025-01-15 ASSESSMENT — PAIN - FUNCTIONAL ASSESSMENT
PAIN_FUNCTIONAL_ASSESSMENT: 0-10

## 2025-01-15 ASSESSMENT — PAIN SCALES - WONG BAKER: WONGBAKER_NUMERICALRESPONSE: NO HURT

## 2025-01-15 ASSESSMENT — PAIN SCALES - GENERAL
PAINLEVEL_OUTOF10: 0 - NO PAIN

## 2025-01-15 NOTE — ANESTHESIA PREPROCEDURE EVALUATION
Chief Complaint   Patient presents with   • Follow-up   • Derm Problem       HISTORY OF PRESENT ILLNESS: Dave presents today for wound follow-up.       HISTORY OF PRESENT ILLNESS:  The patient has a peripheral arterial ulcer, left lower extremity.    He has been seen by [facility] every day for the last 4-5 months.   The ulcer seems to be getting smaller.       He is trying to elevate his leg more frequently by getting out of his wheelchair and into his recliner or bed.   He has no pain in the leg and the wound seems to be gradually improving.    He reports no fevers or chills.     There is a question of osteomyelitis about the distal fibula. I have elected to maintain him on Doxycycline 100 mg b.i.d. chronically.     He continues on a probiotic to help reduce the risk of Clostridium difficile.       He does have a history of multiple drug resistant organism as well as MRSA.     Past Medical History:   Diagnosis Date   • Atherosclerosis of native arteries of the extremities with rest pain 1/19/2010     left dipesh 0 .60  severe distal    • Bladder spasms 1/29/2018   • BPH with urinary obstruction     Chronic prostatism   • Cellulitis and abscess of leg, except foot 12/20/2010   • Chronic heel ulcer (CMS/HCC) 12/15/2013   • Dependent edema     Chronic dependant edema   • DERMATITIS STASIS     venous stasis, chronic   • Do not resuscitate 06/14/2018   • ERB'S DISEASE     Erb's palsy with gait issues   • Essential hypertension    • Hydrocele, unspecified 05/23/2006    Right sided, s/p Hydrocelectomy   • Ischemic heel ulcer (CMS/[facility]) 1/12/2014   • Lumbosacral spondylosis with myelopathy 12/04/2016   • 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   • Osteomyelitis of left fibula (CMS/[facility])  Patient: Yesenia Milner    Procedure Information       Date/Time: 01/15/25 1230    Scheduled providers: Solis Clark MD    Procedure: COLONOSCOPY    [facility]            Relevant Problems   Anesthesia   (-) Difficult intubation   (-) PONV (postoperative nausea and vomiting)      Cardiac   (+) Aortic stenosis, mild   (+) Chronic diastolic congestive heart failure   (+) Complete heart block   (+) HTN (hypertension)   (+) Mitral valve insufficiency   (+) Moderate mitral valve regurgitation   (+) NSTEMI (non-ST elevated myocardial infarction) (Multi)   (+) Presence of permanent cardiac pacemaker   (+) Sick sinus syndrome (Multi)   (+) Systolic CHF (Multi)      Pulmonary   (+) COPD (chronic obstructive pulmonary disease) (Multi)   (+) Dyspnea on minimal exertion      Neuro   (+) Ischemic cerebrovascular accident (CVA) (Multi)      GI   (+) Gastrointestinal hemorrhage, unspecified gastrointestinal hemorrhage type      /Renal   (+) Urinary tract infection      Hematology   (+) 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)      HEENT   (+) Sensorineural hearing loss (SNHL) of both ears      ID   (+) Dermatophytosis of nail   (+) Plantar wart of left foot   (+) Urinary tract infection       Clinical information reviewed:    Allergies  Meds               NPO Detail:  NPO/Void Status  Date of Last Liquid: 01/14/25  Time of Last Liquid: 0800  Date of Last Solid: 01/13/25  Time of Last Solid: 2100         Physical Exam    Airway  Mallampati: II  TM distance: >3 FB  Neck ROM: full     Cardiovascular - normal exam  Rhythm: regular  Rate: normal     Dental    Pulmonary - normal exam     Abdominal            Anesthesia Plan    History of general anesthesia?: yes  History of complications of general anesthesia?: no    ASA 3     MAC     The patient is not a current smoker.  Education provided regarding risk of obstructive sleep apnea.  intravenous induction   Anesthetic plan and risks  5/30/2017   • Osteomyelitis, chronic (CMS/[facility]) 9/17/2017   • Positive MRSA culture buttock abscess 12/19/10, 4/27/11    lower leg ulcer   • Right lumbar radiculopathy 12/4/2016   • STENOSIS  SPINAL, LUMBAR    • Suprapubic catheter (CMS/[facility]) 1/29/2018   • Venous stasis of lower extremity 4/17/2011     Past Surgical History:   Procedure Laterality Date   • Angio aortobifemoral w cath  07/06/2017    Dr. Kraft:  Findings: Severe below knee disease otherwise no significant disease (patent stents in left SFA).   • Appendectomy     • Colonoscopy remove lesion by snare  08/18/2008    colon polyps   • Femoral artery stent Left 7/10     Feiring - superficial femoral artery stent   • Fracture surgery     • Ir insert suprapubic cath  2/4/15   • Laparoscopic hydrocelectomy  05/23/2006    Right sided, , [facility]   • Leg/ankle surgery proc unlisted      Unspecified left ankle procedure   • Popliteal artery stent Left     ptca- tibial-peroneal trunk    • Removal of elbow bursa      Left elbow   • Remove tonsils/adenoids,<13 y/o     • Repair achilles tendon,primary      Left Achilles   • Total hip replacement  ~~  2004    Unspecified Right hip  procedure- [facility]      Outpatient Medications Marked as Taking for the 5/16/19 encounter (Office Visit) with Greg Lucas, DO   Medication Sig Dispense Refill   • collagenase (SANTYL) 250 UNIT/GM ointment Apply topically daily. 30 g 0   • Probiotic Product (IVELISSE-BID PROBIOTIC) Tab Take 1 capsule by mouth 2 times daily. 28 tablet 11   • pantoprazole (PROTONIX) 40 MG tablet Take 1 tablet by mouth daily. 90 tablet 3   • simvastatin (ZOCOR) 40 MG tablet Take 1 tablet by mouth nightly. 90 tablet 3   • doxycycline hyclate (VIBRAMYCIN) 100 MG capsule Take 1 capsule by mouth 2 times daily. 180 capsule 1   • oxybutynin (DITROPAN-XL) 10 MG 24 hr tablet Take 1 tablet by mouth daily. 90 tablet 1   • mirabegron ER (MYRBETRIQ) 50 MG 24 hr tablet Take 1 tablet by mouth daily. 90 tablet  discussed with patient.    Plan discussed with CRNA, CAA and attending.       3   • loratadine (CLARITIN) 10 MG tablet Take 1 tablet by mouth daily. For allergy 90 tablet 3   • furosemide (LASIX) 40 MG tablet Take 1 tablet by mouth daily. 90 tablet 2   • baclofen (LIORESAL) 10 MG tablet Take 0.5 tablets by mouth 2 times daily. At noon and at bedtime 90 tablet 1   • clopidogrel (PLAVIX) 75 MG tablet Take 1 tablet by mouth daily. 90 tablet 2   • irbesartan (AVAPRO) 150 MG tablet Take 1 tablet by mouth daily. 90 tablet 3   • amLODIPine (NORVASC) 5 MG tablet Take 1 tablet by mouth daily. 90 tablet 2   • [DISCONTINUED] Ferrous Sulfate (IRON) 325 (65 Fe) MG Tab Take 1 tablet by mouth daily. 90 tablet 0   • nystatin (MYCOSTATIN) 597220 UNIT/GM powder Apply topically every 12 hours. 30 g 0   • triamcinolone (ARISTOCORT) 0.1 % cream Compound 1:1 with vanicream and apply prn once daily to red scaling rash on lower extremity 60 g 1   • Wound Dressings (ALLEVYN GENTLE BORDER LITE) Pads Apply 1 each topically daily. To the left leg ulcer 10 each 1   • DESENEX 2 % topical powder APPLY TOPICALLY TO RASH IN SKIN FOLDS TWICE DAILY AS NEEDED 90 g 10   • chlorhexidine (HIBICLENS) 4 % topical liquid  240 mL 3   • cholecalciferol (VITAMIN D3) 1000 UNITS tablet Take 1 tablet by mouth daily (at noon).     • ammonium lactate (LAC-HYDRIN) 12 % cream Apply topically daily. To left lower leg for 7 days 385 g 0   • diclofenac (VOLTAREN) 1 % gel Apply topically 4 times daily. Apply to painful areas BID PRN.     • mineral oil-hydrophilic petrolatum (AQUAPHOR) ointment Apply to bilateral LE BID.     • FIBER SELECT GUMMIES PO      • Omega-3 Fatty Acids (FISH OIL) 1000 MG CAPSULE DELAYED RELEASE Take 1 capsule by mouth daily.     • [place] 300 MG Tab Take 2 tablets by mouth daily.     • CARBOXYMethylcellulose (REFRESH PLUS) 0.5 % Solution Apply 1 drop to eye nightly. Before bed. In both eyes.     • miconazole (MITRAZOLE) 2 % cream Compound 1:1 with triamcinolone and apply once daily for rash 60 g 1   • Control Gel  Formula Dressing (DUODERM CGF SPOTS EXTRA THIN) MISC Apply topically to heel area daily (Patient taking differently: Apply topically to heel and leg area daily) 1 each 1   • Multiple Vitamins-Minerals (MULTIVITAMIN & MINERAL PO) Take 1 tablet by mouth daily (at noon).      • aspirin 81 MG tablet Take 1 tablet by mouth daily.       ALLERGIES:   Allergen Reactions   • Ceftin [Cefuroxime] RASH     Mod.  rash ceftin or diflucan ?    • Fluconazole RASH     Mild rash ceftin or diflucan ?    • Levaquin RASH     8/2011     • Sulfa Antibiotics RASH   • Augmentin [Amoxicillin-Pot Clavulanate] DIARRHEA   • Latex   (Environmental) RASH     Not a food allergy     Social History     Tobacco Use   • Smoking status: Former Smoker     Years: 5.00   • Smokeless tobacco: Former User   • Tobacco comment: Quit at 27 and smoked at most a pack a week   Substance Use Topics   • Alcohol use: Yes     Alcohol/week: 1.2 oz     Types: 1 Shots of liquor, 1 Cans of beer per week   • Drug use: No      Social History     Tobacco Use   Smoking Status Former Smoker   • Years: 5.00   Smokeless Tobacco Former User   Tobacco Comment    Quit at 27 and smoked at most a pack a week        Review of systems:  He continues on his current medications for hypertension as well as furosemide.    He reports no orthopnea or paroxysmal nocturnal dyspnea.  He has chronic urinary incontinence and he has a suprapubic catheter in place.    Bladder spasms seemed to be managed well with a combination of Myrbetriq as well as Oxybutynin.  He does appear to need both in order to reduce bladder spasms.  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    He uses Baclofen 5 mg twice daily, which definitely helps with toning in the leg and hands.  He continues on Plavix for his peripheral vascular disease.    His appetite is too good.    He denies any new vision changes or headaches.      PHYSICAL  EXAMINATION:     Vitals:  Blood pressure 122/60, pulse 76, weight 110 kg.    GENERAL:  Pleasant man in no distress.  SKIN:  Skin about the face is warm and dry.  He has numerous keratoses and recently saw dermatology.   NECK: No palpable thyromegaly.   LUNGS:  Symmetrical breath sounds.  No rales or wheezes.  HEART:  Regular rhythm, rate controlled.  Soft 1/6 systolic ejection murmur at the left upper sternal border without radiation.  EXTREMITIES:  Upper extremities show the hands to be warm and well perfused.  -  Left lower extremity shows trace edema about the leg, 1/4  pitting edema about the foot.  Skin of that leg is dramatically improved.  Almost all of the scaling is gone.  There is some pinpoint dryness without open sores about the anterior lower leg due to the Kerlix being wrapped around this area.  He has a deep ulceration about the lateral fibular area that still measures ~ 3 x 2 cm respectively.  It is shallower than before now just involving the sub-Q tissue. The wound edges look relatively clean.  There was no bleeding.  There was no odor or discharge.  The wound was cleansed with Hibiclens.    Following this, a Mepilex dressing was applied.    The leg was gently wrapped with Kerlix.           IMPRESSION:    ICD-10-CM   1. Atherosclerosis of native artery of left lower extremity with ulceration of other part of lower leg (CMS/[facility]) I70.248   2. Peripheral vascular disease (CMS/[facility]) I73.9   3. Essential hypertension I10   4. Osteomyelitis of left fibula, unspecified type (CMS/[facility]) M86.9   5. Suprapubic catheter (CMS/[facility]) Z93.59     PLAN:      PLAN:  Will maintain current medications.     Will check CBC, sed rate, and BMP before next visit  .    Will continue with daily wound care.    Will communicate with [facility] as needed.      Signs and symptoms of infection were discussed.    Reviewed hypertension. Continue current medication    reviewed his lumbar issues and chronic neurological  issues  continue current medications.       Return in about 6 weeks (around 6/27/2019) for follow up on today's problems.    See orders as entered this encounter. See patient instructions as documented within this encounter.    For new acute problems the  patient understands that this is a provisional diagnosis. Provisional diagnoses can and do change. The diagnosis provided  is based on the history and symptoms with which the patient presented today.

## 2025-01-15 NOTE — CARE PLAN
Status post colonoscopy 1/15/2025, findings were malignant appearing mass (not traversable) in the distal transverse colon, covering the whole circumference, biopsied, tattooed.  Diverticulosis in the sigmoid colon    Await pathology results  Consult surgery and oncology  Hold antiplatelets and anticoagulation    Patient discussed with Dr. Ailyn QUINN to sign off at this time.  Please do not hesitate to reconsult/reach out with questions should any arise

## 2025-01-15 NOTE — CARE PLAN
The patient's goals for the shift include get colonoscopy.    The clinical goals for the shift include Maintain patient safety    Over the shift, the patient did not make progress toward the following goals. Barriers to progression include acute illness. Recommendations to address these barriers include hourly rounding/ call light within reach.

## 2025-01-15 NOTE — ANESTHESIA POSTPROCEDURE EVALUATION
Patient: Yesenia Milner    Procedure Summary       Date: 01/15/25 Room / Location: [facility]    Anesthesia Start: 1343 Anesthesia Stop: 1420    Procedure: COLONOSCOPY Diagnosis: Gastrointestinal hemorrhage, unspecified gastrointestinal hemorrhage type    Scheduled Providers: Solis Clark MD Responsible Provider: Solis Clark MD    Anesthesia Type: MAC ASA Status: 3            Anesthesia Type: MAC    Vitals Value Taken Time   BP 92/54 01/15/25 1419   Temp 36.1 01/15/25 1420   Pulse 66 01/15/25 1419   Resp 33 01/15/25 1419   SpO2 99 % 01/15/25 1419   Vitals shown include unfiled device data.    Anesthesia Post Evaluation    Patient location during evaluation: PACU  Patient participation: waiting for patient participation  Level of consciousness: responsive to verbal stimuli  Pain management: adequate  Airway patency: patent  Cardiovascular status: acceptable  Respiratory status: acceptable  Hydration status: acceptable  Postoperative Nausea and Vomiting: none      No notable events documented.

## 2025-01-15 NOTE — PROGRESS NOTES
"Yesenia Milner is a 89 y.o. female on day 2 of admission presenting with Gastrointestinal hemorrhage, unspecified gastrointestinal hemorrhage type.    Subjective   Doing well, on clears, plans for colonscopy later thsi week       Objective     Physical Exam  Constitutional:       Appearance: Normal appearance.   HENT:      Head: Normocephalic and atraumatic.      Right Ear: Tympanic membrane and ear canal normal.      Left Ear: Tympanic membrane and ear canal normal.      Mouth/Throat:      Mouth: Mucous membranes are moist.      Pharynx: Oropharynx is clear.   Eyes:      Extraocular Movements: Extraocular movements intact.      Conjunctiva/sclera: Conjunctivae normal.      Pupils: Pupils are equal, round, and reactive to light.   Cardiovascular:      Rate and Rhythm: Normal rate and regular rhythm.      Pulses: Normal pulses.      Heart sounds: Normal heart sounds.   Pulmonary:      Effort: Pulmonary effort is normal.      Breath sounds: Normal breath sounds.   Abdominal:      General: Abdomen is flat. Bowel sounds are normal.      Palpations: Abdomen is soft.   Musculoskeletal:         General: Normal range of motion.      Cervical back: Normal range of motion and neck supple.   Skin:     General: Skin is warm and dry.      Capillary Refill: Capillary refill takes 2 to 3 seconds.   Neurological:      General: No focal deficit present.      Mental Status: She is alert and oriented to person, place, and time. Mental status is at baseline.   Psychiatric:         Mood and Affect: Mood normal.         Behavior: Behavior normal.         Thought Content: Thought content normal.         Judgment: Judgment normal.         Last Recorded Vitals  Blood pressure 129/61, pulse 69, temperature 35.8 °C (96.4 °F), resp. rate 16, height 1.626 m (5' 4\"), weight 76 kg (167 lb 8 oz), SpO2 94%.  Intake/Output last 3 Shifts:  I/O last 3 completed shifts:  In: 1200 (15.8 mL/kg) [P.O.:1200]  Out: - (0 mL/kg)   Weight: 76 kg     Relevant " Results                              Assessment/Plan   Assessment & Plan  Gastrointestinal hemorrhage, unspecified gastrointestinal hemorrhage type    89-year-old female with past medical history of COPD, HTN, CVA, sick sinus syndrome s/p pacemaker, HFpEF, recent NSTEMI, and recent hospitalization for CHF exacerbation with acute pulmonary edema, imaging at that time was concerning for SVC thrombus and patient was evaluated by vascular who recommended DOAC for 6 months and repeat CT.  She presents with red blood per rectum and possible epistaxis. Hospitalized for further evaluation and management.     #GI bleed, suspect lower -> likely ischemic colitis (wall thickening at distal transverse colon, associated abdominal discomfort and dizziness, brown stool with red blood reported).  #Epistaxis ?- no reported vomiting.  Blood noted with clearing of her nose and when rinsing her mouth with fluids.   #SVC thrombus     Presentation is concerning for ischemic colitis as noted above.  Avoid hypotension  Consult gastroenterology, appreciate input  Will hold aspirin, Plavix, and Eliquis for the time being  Trend H&H, stable on presentation.  Patient agreeable to blood transfusions as needed.  N.p.o. after midnight  Low suspicion for upper GI source, however will continue with Protonix 40 mg IV twice daily until evaluated by GI.  Consult to vascular surgery regarding ongoing need for Eliquis.   Dr Smith's last note on 1/6/2025 “I have reviewed the DVT scan performed today.  There is no evidence of IJ thrombus.  Waveforms are normal in the IJ as well as subclavian, suggestive that even if there is a thrombus in the SVC, it is not hemodynamically significant.”   Check Orthostatic vitals     #Acute hypoxic respiratory failure  #Acute on chronic diastolic congestive heart failure  #History sick sinus syndrome s/p pacemaker  #History NSTEMI  #Valvular hear disease  #pleural effusions     CT angio A/P was suggestive of CHF with  interstitial edema and small pleural effusions.   Low-sodium diet  Consult cardiology, patient follows with Dr Jiang and Ramicone for EP  Supplemental oxygen as needed  Continue oral diuretics, will defer to cardiology IV diuresis .  DuoNebs as needed  RT to evaluate     #DVT Ppx  SCD  Holding ac        1/13: doing ok, informed about CT results, gi on board,     1/14: CLD. Plans for colonscopy to get definitive dx, ok to stop eliquis per vascular/cardio    1/15: plans for scope tomorrow       I spent 35 minutes in the professional and overall care of this patient.      Iglesia Gabriel, DO

## 2025-01-15 NOTE — CARE PLAN
The patient's goals for the shift include sleep    The clinical goals for the shift include Pt will have no signs of active bleeding throughout shift    Over the shift, the patient did make progress toward the following goals.       Problem: Safety - Adult  Goal: Free from fall injury  Outcome: Progressing

## 2025-01-16 LAB
ANION GAP SERPL CALC-SCNC: 10 MMOL/L (ref 10–20)
BUN SERPL-MCNC: 22 MG/DL (ref 6–23)
CALCIUM SERPL-MCNC: 8.6 MG/DL (ref 8.6–10.3)
CHLORIDE SERPL-SCNC: 99 MMOL/L (ref 98–107)
CO2 SERPL-SCNC: 29 MMOL/L (ref 21–32)
CREAT SERPL-MCNC: 1.04 MG/DL (ref 0.5–1.05)
EGFRCR SERPLBLD CKD-EPI 2021: 51 ML/MIN/1.73M*2
ERYTHROCYTE [DISTWIDTH] IN BLOOD BY AUTOMATED COUNT: 13.4 % (ref 11.5–14.5)
GLUCOSE SERPL-MCNC: 111 MG/DL (ref 74–99)
HCT VFR BLD AUTO: 26 % (ref 36–46)
HGB BLD-MCNC: 8.2 G/DL (ref 12–16)
INR PPP: 1.5 (ref 0.9–1.1)
LAB AP ASR DISCLAIMER: NORMAL
LABORATORY COMMENT REPORT: NORMAL
MCH RBC QN AUTO: 28.2 PG (ref 26–34)
MCHC RBC AUTO-ENTMCNC: 31.5 G/DL (ref 32–36)
MCV RBC AUTO: 89 FL (ref 80–100)
NRBC BLD-RTO: 0 /100 WBCS (ref 0–0)
PATH REPORT.FINAL DX SPEC: NORMAL
PATH REPORT.GROSS SPEC: NORMAL
PATH REPORT.RELEVANT HX SPEC: NORMAL
PATH REPORT.TOTAL CANCER: NORMAL
PLATELET # BLD AUTO: 228 X10*3/UL (ref 150–450)
POTASSIUM SERPL-SCNC: 3.4 MMOL/L (ref 3.5–5.3)
PROTHROMBIN TIME: 16.5 SECONDS (ref 9.8–12.8)
RBC # BLD AUTO: 2.91 X10*6/UL (ref 4–5.2)
SODIUM SERPL-SCNC: 135 MMOL/L (ref 136–145)
WBC # BLD AUTO: 7.7 X10*3/UL (ref 4.4–11.3)

## 2025-01-16 PROCEDURE — 85610 PROTHROMBIN TIME: CPT | Performed by: NURSE PRACTITIONER

## 2025-01-16 PROCEDURE — 99232 SBSQ HOSP IP/OBS MODERATE 35: CPT | Performed by: STUDENT IN AN ORGANIZED HEALTH CARE EDUCATION/TRAINING PROGRAM

## 2025-01-16 PROCEDURE — 85027 COMPLETE CBC AUTOMATED: CPT | Performed by: STUDENT IN AN ORGANIZED HEALTH CARE EDUCATION/TRAINING PROGRAM

## 2025-01-16 PROCEDURE — 99223 1ST HOSP IP/OBS HIGH 75: CPT | Performed by: SURGERY

## 2025-01-16 PROCEDURE — 2500000001 HC RX 250 WO HCPCS SELF ADMINISTERED DRUGS (ALT 637 FOR MEDICARE OP): Performed by: NURSE PRACTITIONER

## 2025-01-16 PROCEDURE — 36415 COLL VENOUS BLD VENIPUNCTURE: CPT | Performed by: STUDENT IN AN ORGANIZED HEALTH CARE EDUCATION/TRAINING PROGRAM

## 2025-01-16 PROCEDURE — 99222 1ST HOSP IP/OBS MODERATE 55: CPT | Performed by: REGISTERED NURSE

## 2025-01-16 PROCEDURE — 2500000004 HC RX 250 GENERAL PHARMACY W/ HCPCS (ALT 636 FOR OP/ED): Performed by: REGISTERED NURSE

## 2025-01-16 PROCEDURE — 1200000002 HC GENERAL ROOM WITH TELEMETRY DAILY

## 2025-01-16 PROCEDURE — 82947 ASSAY GLUCOSE BLOOD QUANT: CPT | Performed by: STUDENT IN AN ORGANIZED HEALTH CARE EDUCATION/TRAINING PROGRAM

## 2025-01-16 RX ORDER — POLYETHYLENE GLYCOL 3350 17 G/17G
17 POWDER, FOR SOLUTION ORAL 2 TIMES DAILY
Status: DISCONTINUED | OUTPATIENT
Start: 2025-01-16 | End: 2025-01-23 | Stop reason: HOSPADM

## 2025-01-16 RX ADMIN — Medication 1 TABLET: at 10:23

## 2025-01-16 RX ADMIN — POLYETHYLENE GLYCOL 3350 17 G: 17 POWDER, FOR SOLUTION ORAL at 21:07

## 2025-01-16 RX ADMIN — TORSEMIDE 20 MG: 20 TABLET ORAL at 10:23

## 2025-01-16 RX ADMIN — METOPROLOL TARTRATE 12.5 MG: 25 TABLET, FILM COATED ORAL at 21:06

## 2025-01-16 RX ADMIN — METOPROLOL TARTRATE 12.5 MG: 25 TABLET, FILM COATED ORAL at 10:23

## 2025-01-16 ASSESSMENT — COGNITIVE AND FUNCTIONAL STATUS - GENERAL
MOBILITY SCORE: 20
DRESSING REGULAR LOWER BODY CLOTHING: A LITTLE
WALKING IN HOSPITAL ROOM: A LITTLE
CLIMB 3 TO 5 STEPS WITH RAILING: A LITTLE
STANDING UP FROM CHAIR USING ARMS: A LITTLE
TOILETING: A LITTLE
MOBILITY SCORE: 19
MOVING TO AND FROM BED TO CHAIR: A LITTLE
TOILETING: A LITTLE
MOVING TO AND FROM BED TO CHAIR: A LITTLE
WALKING IN HOSPITAL ROOM: A LITTLE
STANDING UP FROM CHAIR USING ARMS: A LITTLE
DAILY ACTIVITIY SCORE: 22
CLIMB 3 TO 5 STEPS WITH RAILING: A LOT
DRESSING REGULAR LOWER BODY CLOTHING: A LITTLE

## 2025-01-16 ASSESSMENT — PAIN SCALES - GENERAL
PAINLEVEL_OUTOF10: 0 - NO PAIN

## 2025-01-16 ASSESSMENT — PAIN SCALES - WONG BAKER: WONGBAKER_NUMERICALRESPONSE: NO HURT

## 2025-01-16 NOTE — PROGRESS NOTES
01/16/25 1359   Discharge Planning   Living Arrangements Alone   Support Systems Children   Assistance Needed none (has walker- uses once in a while)   Type of Residence Private residence   Number of Stairs Within Residence   (split level- has 2 chair lifts)   Expected Discharge Disposition Home H   Does the patient need discharge transport arranged? No   Intensity of Service   Intensity of Service 0-30 min     1/16/2025  Met with pt in room, introduced self and explained role.  Verified insurance, address, phone.   PCP- [facility]  Pharmacy= [facility]       Able to obtain and afford medications, takes as prescribed.   Pt is from home, lives alone.  Stated she is normally independent. Stated occasionally uses a walker if she has been sitting for a long time.  Home is a split level. States she had 2 chair lifts. Performs own ADL's.  Has grab bars in the bathroom, and a transfer seat.    No home O2 (currently on 1L). Still drives.  Has 2 daughters in the area. Discussed possible needs upon discharge.  Pt stated not sure what she will need, stated cannot get out of bed or walk unless someone is with her.  So, she is unsure.  Asked MD for therapy.    Discussed Healthy at home.  Stated she is interested.    Await therapy for further planning.  Ct Team will follow.   Salena Talavera Rn TCC

## 2025-01-16 NOTE — CARE PLAN
The patient's goals for the shift include sleep    The clinical goals for the shift include pt will remain safe throughout the shift.    Over the shift, the patient did make progress toward the following goals.       Problem: Safety - Adult  Goal: Free from fall injury  Outcome: Progressing

## 2025-01-16 NOTE — CONSULTS
Reason For Consult  Colon cancer    History Of Present Illness  Yesenia Milner is a 89 y.o. female who was admitted to the hospital on January 12, 2025 with a GI bleed.  The patient had colonoscopy yesterday which showed a distal transverse colon circumferential mass which was not traversable by the colonoscope.  The colon was tattooed distal to the mass.  The patient has no abdominal pain.  She has been tolerating a regular diet without nausea or vomiting.  No abdominal bloating.  Her bowel movements have been normal prior to the onset of the lower GI bleed.  She was on Plavix, aspirin and Eliquis prior to admission.    Past medical history:  Hospital admission from December 20 to December 23, 2024 for non-STEMI.  Hospital admission from January 2 to January 7, 2025 with acute CHF.  Hypertension  Sick sinus syndrome status post pacemaker placement  COPD  CVA in 2023 with no residual deficits  Possible SVC thrombus recently diagnosed.  Appendectomy in 2018  Vaginal hysterectomy for benign disease       Past Medical History  She has a past medical history of Other conditions influencing health status, Personal history of other medical treatment, and Personal history of other medical treatment.    Surgical History  She has a past surgical history that includes Appendectomy (11/22/2017); Hysterectomy (11/22/2017); Tonsillectomy (11/22/2017); Other surgical history (11/22/2017); Other surgical history (11/22/2017); Cardiac pacemaker placement (03/11/2021); IR angiogram renal bilateral (Bilateral, 12/14/2020); IR angiogram inferior epigastric pelvic (12/14/2020); IR angiogram inferior epigastric pelvic (12/14/2020); and Cardiac catheterization (N/A, 12/20/2024).     Social History  She reports that she quit smoking about 52 years ago. Her smoking use included cigarettes. She has been exposed to tobacco smoke. She has never used smokeless tobacco. She reports that she does not drink alcohol and does not use  "drugs.    Family History  Family History   Problem Relation Name Age of Onset    No Known Problems Mother          Allergies  Iodine, Shrimp, Hydrocodone, and Adhesive tape-silicones    Review of Systems  As above     Physical Exam  Constitutional: Well-developed, well-nourished, alert and oriented, no acute distress  Skin: Warm and dry, no lesions, no rashes, no jaundice  HEENT: Normocephalic, atraumatic, EOMI, no scleral icterus, eyes have no redness or swelling or discharge, external inspection of ears and nose is normal, mucous membranes moist  Neck: Soft, nontender, no mass or adenopathy  Cardiac: Regular rate and rhythm, no murmur  Chest: Patent airway, clear to auscultation, normal breath sounds with good chest expansion, no wheezes or rales or rhonchi noted, thorax symmetric  Abdomen: Nondistended, positive bowel sounds, soft, nontender, no mass  Rectal: Not performed  Extremities: No injury, no lower extremity edema or calf tenderness  Lymphatic: No cervical adenopathy  Musculoskeletal: Range of motion intact, no joint swelling, normal strength  Neurological: Alert and oriented x3, intact sensory and motor function, no obvious focal neurologic abnormalities, normal gait  Psychological: Appropriate mood and behavior  Examination chaperoned by Cecilia Hobson     Last Recorded Vitals  Blood pressure 128/56, pulse 79, temperature 36.6 °C (97.9 °F), temperature source Temporal, resp. rate 16, height 1.62 m (5' 3.78\"), weight 74.8 kg (164 lb 14.5 oz), SpO2 94%.    Relevant Results  Labs from today: WBC 7.7, hemoglobin 8.2, platelet 228.  INR 1.5  Sodium 135, potassium 3.4, other electrolytes normal, creatinine 1.04     I reviewed the CT abdomen/pelvis report and images from January 12, 2025.  I reviewed the images with a radiologist.  IMPRESSION:  No aortic aneurysm or dissection.    No evidence of active GI bleed.    Focal circumferential wall thickening of the distal transverse colon.  There are no significant " surrounding inflammatory changes, raising  suspicion for malignancy (rather than focal colitis/diverticulitis).  Correlation with presenting history is recommended and follow-up  colonoscopy should be considered.    Findings suggestive of CHF with interstitial edema and small pleural  effusions.    I reviewed the CT angio chest report and images from January 2, 2025.  IMPRESSION:  Negative for pulmonary embolism or thoracic aortic dissection.    Again identified are regions of nonenhancement along the anterior  aspect of the mid SVC and suggestive of nonocclusive SVC thrombus.   Compared to prior exam overall length of nonenhancement increased.   Additionally there is a focal region of nonenhancement lower margin of  the right internal jugular vein and may represent new thrombus.  Moderate size right pleural effusion and small left pleural effusion  are new.  Cardiomegaly.  Multifocal groundglass regions of airspace density both lungs could be  due to pulmonary edema.    I reviewed the colonoscopy and pathology reports from yesterday.  Surgical Pathology Exam: [id number]  Order: [id number]   Collected 1/15/2025 14:02       Status: Final result       Visible to patient: No (inaccessible in [facility])       Dx: Gastrointestinal hemorrhage, unspecif...    0 Result Notes      Component    FINAL DIAGNOSIS   A. Colon, Transverse, Mass, Biopsy:   -- Invasive moderately differentiated adenocarcinoma. See note.     Note: DNA mismatch repair protein immunohistochemical stains are pending and the results will be reported in an addendum.        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  Prior to being started on anticoagulation and antiplatelet therapy the patient was asymptomatic.  Her GI bleeding has stopped since discontinuing the medications.  I discussed laparoscopic transverse colectomy with possible open operation with the patient.  I discussed the  procedure and risks with the patient. The risks include bleeding, infection, injury to surrounding structures such as intestines/blood vessel/ureter/nerves/spleen, anastomotic leak, cardiac/pulmonary/renal complications.  I also discussed the possibility of requiring conversion to an open operation.  The patient is at increased risk due to her multiple comorbid conditions including her recent MI and CHF episodes within the past month.  Cardiology consultation for risk assessment is pending.  Await their evaluation.  The patient is going to consider her options and discuss with her family and notify me of her decision.  Order CEA level.  If she decides to have a colectomy, I would recommend a preoperative barium enema to assess the right colon.      Devan Vazquez MD

## 2025-01-16 NOTE — PROGRESS NOTES
"Yesenia Milner is a 89 y.o. female on day 3 of admission presenting with Gastrointestinal hemorrhage, unspecified gastrointestinal hemorrhage type.    Subjective   Doing well,        Objective     Physical Exam  Constitutional:       Appearance: Normal appearance.   HENT:      Head: Normocephalic and atraumatic.      Right Ear: Tympanic membrane and ear canal normal.      Left Ear: Tympanic membrane and ear canal normal.      Mouth/Throat:      Mouth: Mucous membranes are moist.      Pharynx: Oropharynx is clear.   Eyes:      Extraocular Movements: Extraocular movements intact.      Conjunctiva/sclera: Conjunctivae normal.      Pupils: Pupils are equal, round, and reactive to light.   Cardiovascular:      Rate and Rhythm: Normal rate and regular rhythm.      Pulses: Normal pulses.      Heart sounds: Normal heart sounds.   Pulmonary:      Effort: Pulmonary effort is normal.      Breath sounds: Normal breath sounds.   Abdominal:      General: Abdomen is flat. Bowel sounds are normal.      Palpations: Abdomen is soft.   Musculoskeletal:         General: Normal range of motion.      Cervical back: Normal range of motion and neck supple.   Skin:     General: Skin is warm and dry.      Capillary Refill: Capillary refill takes 2 to 3 seconds.   Neurological:      General: No focal deficit present.      Mental Status: She is alert and oriented to person, place, and time. Mental status is at baseline.   Psychiatric:         Mood and Affect: Mood normal.         Behavior: Behavior normal.         Thought Content: Thought content normal.         Judgment: Judgment normal.         Last Recorded Vitals  Blood pressure 115/55, pulse 65, temperature 36.6 °C (97.9 °F), resp. rate 18, height 1.62 m (5' 3.78\"), weight 74.8 kg (164 lb 14.5 oz), SpO2 93%.  Intake/Output last 3 Shifts:  I/O last 3 completed shifts:  In: 1425 (19.1 mL/kg) [P.O.:1425]  Out: - (0 mL/kg)   Weight: 74.8 kg     Relevant Results                        "       Assessment/Plan   Assessment & Plan  Gastrointestinal hemorrhage, unspecified gastrointestinal hemorrhage type    89-year-old female with past medical history of COPD, HTN, CVA, sick sinus syndrome s/p pacemaker, HFpEF, recent NSTEMI, and recent hospitalization for CHF exacerbation with acute pulmonary edema, imaging at that time was concerning for SVC thrombus and patient was evaluated by vascular who recommended DOAC for 6 months and repeat CT.  She presents with red blood per rectum and possible epistaxis. Hospitalized for further evaluation and management.     #GI bleed, suspect lower -> likely ischemic colitis (wall thickening at distal transverse colon, associated abdominal discomfort and dizziness, brown stool with red blood reported).  #Epistaxis ?- no reported vomiting.  Blood noted with clearing of her nose and when rinsing her mouth with fluids.   #SVC thrombus     Presentation is concerning for ischemic colitis as noted above.  Avoid hypotension  Consult gastroenterology, appreciate input  Will hold aspirin, Plavix, and Eliquis for the time being  Trend H&H, stable on presentation.  Patient agreeable to blood transfusions as needed.  N.p.o. after midnight  Low suspicion for upper GI source, however will continue with Protonix 40 mg IV twice daily until evaluated by GI.  Consult to vascular surgery regarding ongoing need for Eliquis.   Dr Smith's last note on 1/6/2025 “I have reviewed the DVT scan performed today.  There is no evidence of IJ thrombus.  Waveforms are normal in the IJ as well as subclavian, suggestive that even if there is a thrombus in the SVC, it is not hemodynamically significant.”   Check Orthostatic vitals     #Acute hypoxic respiratory failure  #Acute on chronic diastolic congestive heart failure  #History sick sinus syndrome s/p pacemaker  #History NSTEMI  #Valvular hear disease  #pleural effusions     CT angio A/P was suggestive of CHF with interstitial edema and small pleural  effusions.   Low-sodium diet  Consult cardiology, patient follows with Dr Jiang and Ramicone for EP  Supplemental oxygen as needed  Continue oral diuretics, will defer to cardiology IV diuresis .  DuoNebs as needed  RT to evaluate     #DVT Ppx  SCD  Holding ac        1/13: doing ok, informed about CT results, gi on board,     1/14: CLD. Plans for colonscopy to get definitive dx, ok to stop eliquis per vascular/cardio    1/15: plans for scope tomorrow    1/16: scope confirmed cancer, she is unsure if she wants surgery or not, will consult surgery to give her more information       I spent 35 minutes in the professional and overall care of this patient.      Iglesia Gabriel, DO

## 2025-01-16 NOTE — CARE PLAN
The patient's goals for the shift include pt. Safety and free from falls.    The clinical goals for the shift include Maintain patient safety

## 2025-01-16 NOTE — CONSULTS
Reason For Consult  New colon cancer    History Of Present Illness  Yesenia Milner is a 89 y.o. female who has been hospitalized at [facility] since 1/12 with GI bleed. General surgery was consulted by primary medical team today for a newly diagnosed adenocarcinoma of transverse colon.     Patient has an extensive medical history and has been hospitalized 3 times in the past month and a half. Hospital events outlined below:    Admitted 12/20/24-12/22/24 with NSTEMI. Underwent LHC 12/20. No evidence of obstructive CAD; however, had stress-induced Takotsubo cardiomyopathy with LVEF 35% based on LV gram.  Repeat Echo that admission with recovery of LVEF to normal (60-65%) but with WMA (evidence of moderate apical and anteroapical MI).  Discharged on ASA/Plavix    Admitted 1/2/25-1/7/25 with ADHF and SOB. Her symptoms improved with diuresis. Imaging during that admission concerning for nonocclusive SVC thrombus despite no SVC syndrome. Vascular surgery consulted and recommended DOAC. Discharged on Eliquis. Per patient, she was still taking all 3 blood thinning agents for a few days. Was never told to stop ASA or Plavix.     Admitted 1/12 - present with GI bleed. Has not required transfusion. Tolerated bowel prep. Colonoscopy 1/15 demonstrated malignant-appearing mass (not traversable) in the distal transverse colon, covering the whole circumference.     While not specifically looking for metastatic disease, patient has had CTA Chest and CTA A/P within the past week. No significant lymphadenopathies were noted.      Past Medical History  She has a past medical history of Other conditions influencing health status, Personal history of other medical treatment, and Personal history of other medical treatment.    Surgical History  She has a past surgical history that includes Appendectomy (11/22/2017); Hysterectomy (11/22/2017); Tonsillectomy (11/22/2017); Other surgical history (11/22/2017); Other surgical history  "(11/22/2017); Cardiac pacemaker placement (03/11/2021); IR angiogram renal bilateral (Bilateral, 12/14/2020); IR angiogram inferior epigastric pelvic (12/14/2020); IR angiogram inferior epigastric pelvic (12/14/2020); and Cardiac catheterization (N/A, 12/20/2024).   Appendectomy with Dr. Apoorva Oneill (2017) - laparoscopic   Vaginal hysterectomy      Social History  She reports that she quit smoking about 52 years ago. Her smoking use included cigarettes. She has been exposed to tobacco smoke. She has never used smokeless tobacco. She reports that she does not drink alcohol and does not use drugs.    Family History  Family History   Problem Relation Name Age of Onset    No Known Problems Mother          Allergies  Iodine, Shrimp, Hydrocodone, and Adhesive tape-silicones    Review of Systems  Noted in H&P.  No significant change in caliber of stool over past few months, but she does describe \"mushy piles\" of stool as opposed to more formed pieces of stool. Only recently noticed blood in stool- blood was coloring the water of toilet bowl at home, but patient didn't necessarily notice the stool itself was frankly bloody.    Has been experiencing some SOB/fatigue. No significant weight loss. No abdominal bloating or distention. No fever or chills. No recent leg swelling or pain.     Physical Exam:  Constitutional:       Elderly, somewhat ill-appearing adult female resting on side in hospital bed. She is able to reposition herself, but moves slowly.       HENT:     MMM, 1L NC in nares- no breakdown to mucous membranes noted  Eyes:      sclera white  Cardiovascular:      A-V paced rhythm noted on telemetry, HR 69. Left chest wall PPM in place. No Tenderness to palpation of chest wall. Pulses palpable  Pulmonary:     1L NC. SOB during movement or lengthy conversation. Upper airway congestion with raspy voice and non productive, moist cough. Lungs diminished in bases. Fair inspiratory effort  Abdominal:      Abdomen obese, " "very soft to palpation, non tender, no scars noted, no hernia. No palpable masses at present. Hyperactive bowel sounds     Skin:     No overt pallor, normal for race/ethnicity   Extremities:     No edema. Nail beds slightly cool and cap refill ~3 seconds  Neurological:      A&O x3  Psychiatric:        Calm, appropriately tearful at times during conversation        Last Recorded Vitals  Blood pressure 116/54, pulse 66, temperature 36.3 °C (97.3 °F), temperature source Temporal, resp. rate 18, height 1.62 m (5' 3.78\"), weight 74.8 kg (164 lb 14.5 oz), SpO2 93%.    Relevant Results  Results for orders placed or performed during the hospital encounter of 01/12/25 (from the past 24 hours)   Protime-INR   Result Value Ref Range    Protime 16.5 (H) 9.8 - 12.8 seconds    INR 1.5 (H) 0.9 - 1.1   CBC   Result Value Ref Range    WBC 7.7 4.4 - 11.3 x10*3/uL    nRBC 0.0 0.0 - 0.0 /100 WBCs    RBC 2.91 (L) 4.00 - 5.20 x10*6/uL    Hemoglobin 8.2 (L) 12.0 - 16.0 g/dL    Hematocrit 26.0 (L) 36.0 - 46.0 %    MCV 89 80 - 100 fL    MCH 28.2 26.0 - 34.0 pg    MCHC 31.5 (L) 32.0 - 36.0 g/dL    RDW 13.4 11.5 - 14.5 %    Platelets 228 150 - 450 x10*3/uL   Basic Metabolic Panel   Result Value Ref Range    Glucose 111 (H) 74 - 99 mg/dL    Sodium 135 (L) 136 - 145 mmol/L    Potassium 3.4 (L) 3.5 - 5.3 mmol/L    Chloride 99 98 - 107 mmol/L    Bicarbonate 29 21 - 32 mmol/L    Anion Gap 10 10 - 20 mmol/L    Urea Nitrogen 22 6 - 23 mg/dL    Creatinine 1.04 0.50 - 1.05 mg/dL    eGFR 51 (L) >60 mL/min/1.73m*2    Calcium 8.6 8.6 - 10.3 mg/dL          Assessment/Plan     89 year old female we are seeing for new colon cancer.  She has a history significant for COPD (not on O2), HTN, CVA (on Plavix), SSS s/p PPM, HFpEF (60-65%, but with WMA), recent NSTEMI previously on DAPT (12/2024), concern for SVC thrombus previously on Eliquis, and recent hospitalization for ADHF. Was admitted from home on 1/12 with concern for GI bleed. "     Impression:  Adenocarcinoma of Transverse colon   > patient is not completely obstructed, but is high risk for developing colon obstruction 2/2 circumferential tumor burden. Still passing gas. Has not had BM since bowel prep, but tolerated prep just fine with adequate prep based on C-scope images    Recommendations:  > Patient desires surgery for definitive management of colon cancer.  - cardiology consult for preoperative cardiac clearance  - awaiting final surgeon impression regarding this complex medical patient; ideally an open transverse colon tumor resection with anastomosis could be performed this admission; however, would need to tolerate another round of bowel prep as she has already been given a solid diet.    Please await final surgeon impression regarding timing of surgery.  - DVT chemoprophylaxis should be initiated; would use Lovenox for now given new cancer dx and unclear timing of OR  - HOLD ASA, Plavix, and Eliquis. Per cardiology notes, patient should no longer need Eliquis; however, will need to clarify if she needs DAPT postop    The patient will be seen and discussed with the attending surgeon Dr. Vazquez    I spent 60 minutes in the professional and overall care of this patient.  Greater than 50% of this time was spent counseling patient, reviewing plan of care, and in coordination of care.        Cecilia Hobson, APRN-CNP

## 2025-01-17 LAB
ANION GAP SERPL CALC-SCNC: 10 MMOL/L (ref 10–20)
BUN SERPL-MCNC: 23 MG/DL (ref 6–23)
CALCIUM SERPL-MCNC: 8.5 MG/DL (ref 8.6–10.3)
CEA SERPL-MCNC: 45.2 UG/L
CHLORIDE SERPL-SCNC: 97 MMOL/L (ref 98–107)
CO2 SERPL-SCNC: 30 MMOL/L (ref 21–32)
CREAT SERPL-MCNC: 0.96 MG/DL (ref 0.5–1.05)
EGFRCR SERPLBLD CKD-EPI 2021: 56 ML/MIN/1.73M*2
ERYTHROCYTE [DISTWIDTH] IN BLOOD BY AUTOMATED COUNT: 13.4 % (ref 11.5–14.5)
GLUCOSE SERPL-MCNC: 99 MG/DL (ref 74–99)
HCT VFR BLD AUTO: 26.6 % (ref 36–46)
HGB BLD-MCNC: 8.6 G/DL (ref 12–16)
MCH RBC QN AUTO: 28.7 PG (ref 26–34)
MCHC RBC AUTO-ENTMCNC: 32.3 G/DL (ref 32–36)
MCV RBC AUTO: 89 FL (ref 80–100)
NRBC BLD-RTO: 0 /100 WBCS (ref 0–0)
PLATELET # BLD AUTO: 235 X10*3/UL (ref 150–450)
POTASSIUM SERPL-SCNC: 3.2 MMOL/L (ref 3.5–5.3)
RBC # BLD AUTO: 3 X10*6/UL (ref 4–5.2)
SODIUM SERPL-SCNC: 134 MMOL/L (ref 136–145)
WBC # BLD AUTO: 6.2 X10*3/UL (ref 4.4–11.3)

## 2025-01-17 PROCEDURE — 2500000004 HC RX 250 GENERAL PHARMACY W/ HCPCS (ALT 636 FOR OP/ED): Performed by: REGISTERED NURSE

## 2025-01-17 PROCEDURE — 2500000001 HC RX 250 WO HCPCS SELF ADMINISTERED DRUGS (ALT 637 FOR MEDICARE OP): Performed by: NURSE PRACTITIONER

## 2025-01-17 PROCEDURE — 2500000004 HC RX 250 GENERAL PHARMACY W/ HCPCS (ALT 636 FOR OP/ED)

## 2025-01-17 PROCEDURE — 99232 SBSQ HOSP IP/OBS MODERATE 35: CPT

## 2025-01-17 PROCEDURE — 82378 CARCINOEMBRYONIC ANTIGEN: CPT | Mod: PARLAB

## 2025-01-17 PROCEDURE — 97165 OT EVAL LOW COMPLEX 30 MIN: CPT | Mod: GO

## 2025-01-17 PROCEDURE — 85027 COMPLETE CBC AUTOMATED: CPT | Performed by: STUDENT IN AN ORGANIZED HEALTH CARE EDUCATION/TRAINING PROGRAM

## 2025-01-17 PROCEDURE — 99232 SBSQ HOSP IP/OBS MODERATE 35: CPT | Performed by: SURGERY

## 2025-01-17 PROCEDURE — 82374 ASSAY BLOOD CARBON DIOXIDE: CPT | Performed by: STUDENT IN AN ORGANIZED HEALTH CARE EDUCATION/TRAINING PROGRAM

## 2025-01-17 PROCEDURE — 99232 SBSQ HOSP IP/OBS MODERATE 35: CPT | Performed by: STUDENT IN AN ORGANIZED HEALTH CARE EDUCATION/TRAINING PROGRAM

## 2025-01-17 PROCEDURE — 97161 PT EVAL LOW COMPLEX 20 MIN: CPT | Mod: GP

## 2025-01-17 PROCEDURE — 1200000002 HC GENERAL ROOM WITH TELEMETRY DAILY

## 2025-01-17 PROCEDURE — 36415 COLL VENOUS BLD VENIPUNCTURE: CPT

## 2025-01-17 RX ORDER — ENOXAPARIN SODIUM 100 MG/ML
40 INJECTION SUBCUTANEOUS EVERY 24 HOURS
Status: DISCONTINUED | OUTPATIENT
Start: 2025-01-17 | End: 2025-01-23 | Stop reason: HOSPADM

## 2025-01-17 RX ADMIN — Medication 1 TABLET: at 09:08

## 2025-01-17 RX ADMIN — METOPROLOL TARTRATE 12.5 MG: 25 TABLET, FILM COATED ORAL at 09:08

## 2025-01-17 RX ADMIN — TORSEMIDE 20 MG: 20 TABLET ORAL at 09:08

## 2025-01-17 RX ADMIN — ENOXAPARIN SODIUM 40 MG: 40 INJECTION SUBCUTANEOUS at 18:15

## 2025-01-17 RX ADMIN — METOPROLOL TARTRATE 12.5 MG: 25 TABLET, FILM COATED ORAL at 20:13

## 2025-01-17 RX ADMIN — POLYETHYLENE GLYCOL 3350 17 G: 17 POWDER, FOR SOLUTION ORAL at 20:14

## 2025-01-17 ASSESSMENT — COGNITIVE AND FUNCTIONAL STATUS - GENERAL
HELP NEEDED FOR BATHING: A LITTLE
DRESSING REGULAR UPPER BODY CLOTHING: A LITTLE
DRESSING REGULAR LOWER BODY CLOTHING: A LITTLE
TURNING FROM BACK TO SIDE WHILE IN FLAT BAD: A LITTLE
DRESSING REGULAR UPPER BODY CLOTHING: A LITTLE
EATING MEALS: A LITTLE
DRESSING REGULAR LOWER BODY CLOTHING: A LITTLE
PERSONAL GROOMING: A LITTLE
TOILETING: A LITTLE
STANDING UP FROM CHAIR USING ARMS: A LITTLE
STANDING UP FROM CHAIR USING ARMS: A LITTLE
TOILETING: A LITTLE
MOBILITY SCORE: 17
PERSONAL GROOMING: A LITTLE
DAILY ACTIVITIY SCORE: 19
HELP NEEDED FOR BATHING: A LITTLE
CLIMB 3 TO 5 STEPS WITH RAILING: TOTAL
MOVING TO AND FROM BED TO CHAIR: A LITTLE
WALKING IN HOSPITAL ROOM: A LITTLE
DAILY ACTIVITIY SCORE: 18
WALKING IN HOSPITAL ROOM: A LITTLE
MOBILITY SCORE: 21
CLIMB 3 TO 5 STEPS WITH RAILING: A LITTLE

## 2025-01-17 ASSESSMENT — PAIN SCALES - GENERAL
PAINLEVEL_OUTOF10: 0 - NO PAIN

## 2025-01-17 ASSESSMENT — PAIN - FUNCTIONAL ASSESSMENT: PAIN_FUNCTIONAL_ASSESSMENT: 0-10

## 2025-01-17 NOTE — PROGRESS NOTES
Physical Therapy    Physical Therapy Evaluation    Patient Name: Yesenia Milner  MRN: [id number]  Today's Date: 1/17/2025   Time Calculation  Start Time: 1105  Stop Time: 1117  Time Calculation (min): 12 min  310/310-A    Assessment/Plan   PT Assessment  PT Assessment Results: Decreased strength, Impaired balance, Decreased mobility  Rehab Prognosis: Good  Evaluation/Treatment Tolerance: Patient tolerated treatment well  Medical Staff Made Aware: Yes  Strengths: Attitude of self  End of Session Communication: Bedside nurse  Assessment Comment: pt tolerated session. pt required assist during functional mobility to maintain safety. pt presented with decreased functional mobility, strength, and balance. pt would benefit from low int therapy and 24/7 supervision in order to return to [facility].  End of Session Patient Position: Bed, 2 rail up, Alarm off, not on at start of session (call light in reach)  IP OR SWING BED PT PLAN  Inpatient or Swing Bed: Inpatient  PT Plan  Treatment/Interventions: Bed mobility, Transfer training, Gait training, Balance training, Strengthening, Therapeutic exercise, Therapeutic activity  PT Plan: Ongoing PT  PT Frequency: 3 times per week  PT Discharge Recommendations: Low intensity level of continued care (24/7 supervision)  PT Recommended Transfer Status: Assist x1  PT - OK to Discharge: Yes (once medically cleared)    Subjective     Current Problem:  1. Gastrointestinal hemorrhage, unspecified gastrointestinal hemorrhage type  Colonoscopy Diagnostic    Colonoscopy Diagnostic    Surgical Pathology Exam    Surgical Pathology Exam    Referral to Healthy at Home Program      2. Hypoxia  Surgical Pathology Exam    Surgical Pathology Exam      3. Colonic mass  Surgical Pathology Exam    Surgical Pathology Exam    Referral to Healthy at Home Program      4. Superior vena cava thrombosis (Multi)  Surgical Pathology Exam    Surgical Pathology Exam    Referral to Healthy at Home Program      5.  Anemia, unspecified type  Surgical Pathology Exam    Surgical Pathology Exam        Patient Active Problem List   Diagnosis    Aortic stenosis, mild    Arthritis    Chronic diastolic congestive heart failure    Complete heart block    COPD (chronic obstructive pulmonary disease) (Multi)    History of paroxysmal supraventricular tachycardia    HTN (hypertension)    Paresthesia    Presence of permanent cardiac pacemaker    Raynauds disease    Sensorineural hearing loss (SNHL) of both ears    Dyspnea on minimal exertion    Systolic CHF (Multi)    Varicose veins of lower extremities with inflammation    Vertigo    Ischemic cerebrovascular accident (CVA) (Multi)    Acquired deformity of toe    Benign neoplasm of skin of foot    Benign paroxysmal positional vertigo    Dermatophytosis of nail    Gross hematuria    Leg swelling    Macular degeneration    Mitral valve insufficiency    Moderate mitral valve regurgitation    Overweight with body mass index (BMI) 25.0-29.9    Plantar wart of left foot    Ulcer of toe of left foot (Multi)    Ulcer of toe of right foot (Multi)    Venous insufficiency    Dysarthria following cerebral infarction    Post-menopausal    Impacted cerumen    Urinary tract infection    Hyperglycemia    Hand paresthesia    Sick sinus syndrome (Multi)    Injury of kidney    NSTEMI (non-ST elevated myocardial infarction) (Multi)    Acute superior vena cava thrombosis (Multi)    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)    Gastrointestinal hemorrhage, unspecified gastrointestinal hemorrhage type       General Visit Information:  General  Reason for Referral: PT eval  Referred By: Nery  Past Medical History Relevant to Rehab: COPD, HTN, SSS s/p pacemaker, HEpEF, NSTEMI  Co-Treatment: OT  Co-Treatment Reason: to maximize pt safety and mobility.  Prior to Session Communication: Bedside nurse  Patient Position Received: Bed, 2 rail up, Alarm off, not on at start of session  General Comment: pt  agreeable to therapy    Home Living:  Home Living  Home Living Comments: pt lives alone in house with stair lift to enter. stair lift to bed and bath on 2nd floor. pt has tub, chair, GBs, and HHS. pt has elevated toilet with GBs. ind in amb. owns w/w and cane. pt IND in ADLs/ iADLs. dtr drives.    Prior Level of Function:  Prior Function Per Pt/Caregiver Report  Level of [place]: Independent with ADLs and functional transfers, Independent with homemaking with ambulation    Precautions:  Precautions  Precautions Comment: OOB with assist      Objective     Pain:  Pain Assessment  Pain Assessment: 0-10  0-10 (Numeric) Pain Score: 0 - No pain    Cognition:  Cognition  Overall Cognitive Status: Within Functional Limits    General Assessments:         Sensation  Light Touch: No apparent deficits  Strength  Strength Comments: BLE strength 4-/5 grossly. BLE ROM WFL for pts age        Functional Assessments:     Bed Mobility  Bed Mobility: Yes  Bed Mobility 1  Bed Mobility 1: Supine to sitting, Sitting to supine  Bed Mobility Comments 1: SBA    Transfers  Transfer: Yes  Transfer 1  Transfer From 1: Bed to, Sit to, Stand to  Transfer Device 1: Walker  Trials/Comments 1: SBA    Ambulation/Gait Training  Ambulation/Gait Training Performed: Yes  Ambulation/Gait Training 1  Surface 1: Level tile  Device 1: Rolling walker  Comments/Distance (ft) 1: completed steps to HOB with CGA          Outcome Measures:     [facility] Basic Mobility  Turning from your back to your side while in a flat bed without using bedrails: None  Moving from lying on your back to sitting on the side of a flat bed without using bedrails: A little  Moving to and from bed to chair (including a wheelchair): A little  Standing up from a chair using your arms (e.g. wheelchair or bedside chair): A little  To walk in hospital room: A little  Climbing 3-5 steps with railing: Total  Basic Mobility - Total Score: 17        Goals:  Encounter Problems       Encounter  Problems (Active)       PT Problem       STG - Pt will perform a B LE ther ex program of 2-3 sets of 10  (Progressing)       Start:  01/17/25    Expected End:  01/31/25            STG - Pt will transition supine <> sitting IND (Progressing)       Start:  01/17/25    Expected End:  01/31/25            STG - Pt will transfer STS IND (Progressing)       Start:  01/17/25    Expected End:  01/31/25            STG - Pt will amb 100' using w/w Randa  (Progressing)       Start:  01/17/25    Expected End:  01/31/25               Pain - Adult            Education Documentation  Mobility Training, taught by Raissa France, PT at 1/17/2025  1:53 PM.  Learner: Patient  Readiness: Acceptance  Method: Explanation  Response: Verbalizes Understanding    Education Comments  No comments found.

## 2025-01-17 NOTE — PROGRESS NOTES
Occupational Therapy    Evaluation    Patient Name: Yesenia Milner  MRN: [id number]  Department: [facility]  Room: [address] [place] [address]  Today's Date: 1/17/2025  Time Calculation  Start Time: 1104  Stop Time: 1116  Time Calculation (min): 12 min        Assessment:  End of Session Communication: Bedside nurse  End of Session Patient Position: Bed, 2 rail up, Alarm off, not on at start of session (call light in reach)  OT Assessment Results: Decreased ADL status, Decreased upper extremity strength, Decreased endurance, Decreased functional mobility  Strengths: Living arrangement secure, Support of extended family/friends  Barriers to Participation: Comorbidities  Plan:  Treatment Interventions: ADL retraining, Functional transfer training, UE strengthening/ROM, Endurance training, Equipment evaluation/education, Compensatory technique education  OT Frequency: 3 times per week  OT Discharge Recommendations: Low intensity level of continued care (with 24 hr support)  OT - OK to Discharge: Yes (once medically appropriate to next level of care)  Treatment Interventions: ADL retraining, Functional transfer training, UE strengthening/ROM, Endurance training, Equipment evaluation/education, Compensatory technique education    Subjective   Current Problem:  1. Gastrointestinal hemorrhage, unspecified gastrointestinal hemorrhage type  Colonoscopy Diagnostic    Colonoscopy Diagnostic    Surgical Pathology Exam    Surgical Pathology Exam    Referral to Healthy at Home Program      2. Hypoxia  Surgical Pathology Exam    Surgical Pathology Exam      3. Colonic mass  Surgical Pathology Exam    Surgical Pathology Exam    Referral to Healthy at Home Program      4. Superior vena cava thrombosis (Multi)  Surgical Pathology Exam    Surgical Pathology Exam    Referral to Healthy at Home Program      5. Anemia, unspecified type  Surgical Pathology Exam    Surgical Pathology Exam        General:  General  Reason for Referral: OT eval and tx; ADLs. dx;  Gastrointestinal hemorrhage, GI bleed, suspect lower -> likely ischemic colitis, Epistaxis ? , SVC thrombus. 1/15: Colonscopy: malignant appearing mass in distal transverse colon, covering whole circumference, biopsied, tattooed. diverticulosis signmoid colon. general surgerty consult: Definitive management would be transverse colectomy. Patient still considering. needs cardio consult. CT A&P: Suggestive of CHF with interstitial edema and small pleural effusion  Referred By: Nery  Past Medical History Relevant to Rehab: 89-year-old female with past medical history of COPD, HTN, CVA, sick sinus syndrome s/p pacemaker, HFpEF, recent NSTEMI, and recent hospitalization for CHF exacerbation with acute pulmonary edema, imaging at that time was concerning for SVC thrombus and patient was evaluated by vascular who recommended DOAC for 6 months and repeat CT. Patient presents from home with 1 bloody bowel movement yesterday evening and small amount of blood in her mouth and when she blew her nose this morning.  Her bowel movement she describes as brown stool mixed with red blood, she was unable to identify clots.  States this morning she rinsed out her mouth and spit out the water, noted red blood.  Then she cleared her nose and there was small clots of blood.  Denies however overt epistaxis.  She denies any black stools or prior history of GI bleed.  Her last colonoscopy was many years ago and no longer gets screenings due to age.  She reports that she was not feeling very well yesterday, has felt dizzy with ambulation and also reports associated upper abdominal pain/discomfort that feels like bloating.  She denies nausea or vomiting.  No recent history of diarrhea constipation. Review of systems additionally positive for intermittent lower extremity edema (none currently), shortness of breath with wheezing and cough.  Denies chest pains or palpitations.  Currently patient is on Plavix, aspirin, and Elquis.  Co-Treatment:  PT  Co-Treatment Reason: for safety and to maximize therapeutic performance  Prior to Session Communication: Bedside nurse  Patient Position Received: Bed, 2 rail up, Alarm off, not on at start of session  General Comment: patient pleasant and cooperative; RN clearing patient for therapy  Precautions:  Medical Precautions: Fall precautions (purewick, tele, OOB with assist)      Pain:  Pain Assessment  Pain Assessment:  (denies pain)    Objective   Cognition:  Overall Cognitive Status: Within Functional Limits           Home Living:  Type of Home:  (per patient report, lives alone, stairlift to enter house. stairlift to second floor with bed/bathroom)  Bathroom Shower/Tub:  (tub shower with TTB,  grab bars, HHS)  Bathroom Toilet:  (high rise toilet with GB)  Prior Function:  Level of [place]:  (reports independent in ADLs/IADLs PLOF. no AD use. owns WW and cane. drives. sleeps in standard bed,. reports has 2 daughters that can assist)       ADL:  ADL Comments: anticipate SBA-CGA for ADLs based on performance with transfers and mobility this date  Activity Tolerance:  Endurance: Decreased tolerance for upright activites  Bed Mobility/Transfers: Bed Mobility  Bed Mobility:  (completed supine <-->sit with SBA)    Transfers  Transfer:  (completed STS with SBA with WW from EOB)      Functional Mobility:  Functional Mobility  Functional Mobility Performed:  (engaged in side steps with CGA with WW)     Sensation:  Light Touch: No apparent deficits  Strength:  Strength Comments: BUE ROM/MMT WFL for patient age as seen through transfers and mobility this date      Outcome Measures:[facility] Daily Activity  Putting on and taking off regular lower body clothing: A little  Bathing (including washing, rinsing, drying): A little  Putting on and taking off regular upper body clothing: A little  Toileting, which includes using toilet, bedpan or urinal: A little  Taking care of personal grooming such as brushing teeth: A  little  Eating Meals: None  Daily Activity - Total Score: 19        Education Documentation  Body Mechanics, taught by Lexi Burleson OT at 1/17/2025  1:56 PM.  Learner: Patient  Readiness: Acceptance  Method: Explanation  Response: Verbalizes Understanding, Needs Reinforcement    ADL Training, taught by Lexi Burleson OT at 1/17/2025  1:56 PM.  Learner: Patient  Readiness: Acceptance  Method: Explanation  Response: Verbalizes Understanding, Needs Reinforcement    Education Comments  No comments found.        OP EDUCATION:       Goals:  Encounter Problems       Encounter Problems (Active)       OT Goals       STG- patient will complete LB dressing at MOD I with use of ae/ad/dme prn (Progressing)       Start:  01/17/25    Expected End:  01/31/25            STG- patient will complete toileting at MOD I with use of ae/ad/dme prn  (Progressing)       Start:  01/17/25    Expected End:  01/31/25            STG- patient will complete transfers to/from bed, chair, commode at MOD I with use of ae/ad/dme prn (Progressing)       Start:  01/17/25    Expected End:  01/31/25            STG- patient will complete simple mobility at MOD I with use of ae/ad/dme prn (Progressing)       Start:  01/17/25    Expected End:  01/31/25            STG- patient will complete grooming at MOD I with use of ae/ad/dme prn (Progressing)       Start:  01/17/25    Expected End:  01/31/25

## 2025-01-17 NOTE — CARE PLAN
Problem: Pain - Adult  Goal: Verbalizes/displays adequate comfort level or baseline comfort level  Outcome: Progressing     Problem: Safety - Adult  Goal: Free from fall injury  Outcome: Progressing     Problem: Fall/Injury  Goal: Not fall by end of shift  Outcome: Progressing  Goal: Be free from injury by end of the shift  Outcome: Progressing  Goal: Verbalize understanding of personal risk factors for fall in the hospital  Outcome: Progressing  Goal: Verbalize understanding of risk factor reduction measures to prevent injury from fall in the home  Outcome: Progressing   The patient's goals for the shift include sleep    The clinical goals for the shift include Patient will remain safe during the shift

## 2025-01-17 NOTE — CARE PLAN
Problem: Pain - Adult  Goal: Verbalizes/displays adequate comfort level or baseline comfort level  Outcome: Progressing     Problem: Safety - Adult  Goal: Free from fall injury  Outcome: Progressing     Problem: Chronic Conditions and Co-morbidities  Goal: Patient's chronic conditions and co-morbidity symptoms are monitored and maintained or improved  Outcome: Progressing     Problem: Fall/Injury  Goal: Not fall by end of shift  Outcome: Progressing     Problem: Skin  Goal: Participates in plan/prevention/treatment measures  Outcome: Progressing     Problem: Discharge Planning  Goal: Discharge to home or other facility with appropriate resources  Outcome: Progressing

## 2025-01-17 NOTE — PROGRESS NOTES
"Yesenia Milner is a 89 y.o. female on day 4 of admission presenting with Gastrointestinal hemorrhage, unspecified gastrointestinal hemorrhage type.    Subjective   Pt appears depressed today, she became tearful when discussing cancer diagnosis. She doesn't really want surgery, but thinks she will probably go through with it. Her daughter is coming in today to talk with the treatment team.     Pt is tolerating solid food, denies abd pain or N/V. Hasn't had a BM since colonoscopy, but is passing flatus. Hgb stable.       Objective     Physical Exam  Constitutional:       Appearance: She is not ill-appearing.   HENT:      Mouth/Throat:      Mouth: Mucous membranes are moist.   Cardiovascular:      Rate and Rhythm: Normal rate and regular rhythm.      Heart sounds: Normal heart sounds.   Pulmonary:      Effort: Pulmonary effort is normal. No respiratory distress.      Breath sounds: Normal breath sounds.   Abdominal:      General: Bowel sounds are normal. There is no distension.      Palpations: Abdomen is soft.      Tenderness: There is no abdominal tenderness.   Skin:     General: Skin is warm and dry.   Neurological:      Mental Status: She is alert and oriented to person, place, and time.   Psychiatric:         Mood and Affect: Mood normal.         Behavior: Behavior normal.         Last Recorded Vitals  Blood pressure 117/55, pulse 71, temperature 36 °C (96.8 °F), resp. rate 18, height 1.62 m (5' 3.78\"), weight 74.8 kg (164 lb 14.5 oz), SpO2 95%.  Intake/Output last 3 Shifts:  I/O last 3 completed shifts:  In: 240 (3.2 mL/kg) [P.O.:240]  Out: 2000 (26.7 mL/kg) [Urine:2000 (0.7 mL/kg/hr)]  Weight: 74.8 kg     Relevant Results  Results for orders placed or performed during the hospital encounter of 01/12/25 (from the past 24 hours)   CBC   Result Value Ref Range    WBC 6.2 4.4 - 11.3 x10*3/uL    nRBC 0.0 0.0 - 0.0 /100 WBCs    RBC 3.00 (L) 4.00 - 5.20 x10*6/uL    Hemoglobin 8.6 (L) 12.0 - 16.0 g/dL    Hematocrit " 26.6 (L) 36.0 - 46.0 %    MCV 89 80 - 100 fL    MCH 28.7 26.0 - 34.0 pg    MCHC 32.3 32.0 - 36.0 g/dL    RDW 13.4 11.5 - 14.5 %    Platelets 235 150 - 450 x10*3/uL   Basic Metabolic Panel   Result Value Ref Range    Glucose 99 74 - 99 mg/dL    Sodium 134 (L) 136 - 145 mmol/L    Potassium 3.2 (L) 3.5 - 5.3 mmol/L    Chloride 97 (L) 98 - 107 mmol/L    Bicarbonate 30 21 - 32 mmol/L    Anion Gap 10 10 - 20 mmol/L    Urea Nitrogen 23 6 - 23 mg/dL    Creatinine 0.96 0.50 - 1.05 mg/dL    eGFR 56 (L) >60 mL/min/1.73m*2    Calcium 8.5 (L) 8.6 - 10.3 mg/dL     *Note: Due to a large number of results and/or encounters for the requested time period, some results have not been displayed. A complete set of results can be found in Results Review.           Assessment/Plan   89 year old female we are seeing for new colon cancer.  She has a history significant for COPD (not on O2), HTN, CVA (on Plavix), SSS s/p PPM, HFpEF (60-65%, but with WMA), recent NSTEMI previously on DAPT (12/2024), concern for SVC thrombus previously on Eliquis, and recent hospitalization for ADHF. Was admitted from home on 1/12 with concern for GI bleed.      Impression:  Adenocarcinoma of Transverse colon   > patient is not completely obstructed, but is high risk for developing colon obstruction 2/2 circumferential tumor burden. Still passing gas. Has not had BM since bowel prep, but tolerated prep just fine with adequate prep based on C-scope images     Recommendations:  - Definitive management would be transverse colectomy    > Patient still considering, but thinks she will agree to surgery for colon cancer management  - CEA level sent this AM  - Cardiology consult for preoperative cardiac clearance    > Awaiting recs  - DVT chemoprophylaxis should be initiated; would use Lovenox for now given new cancer dx and unclear timing of OR  - HOLD ASA, Plavix, and Eliquis. Per cardiology notes, patient should no longer need Eliquis; however, will need to clarify  if she needs DAPT postop     The patient will be seen and discussed with the attending surgeon Dr. Taylor Lorenzo PA-C

## 2025-01-17 NOTE — PROGRESS NOTES
"Yesenia Milner is a 89 y.o. female on day 4 of admission presenting with Gastrointestinal hemorrhage, unspecified gastrointestinal hemorrhage type.    Subjective   Doing well,        Objective     Physical Exam  Constitutional:       Appearance: Normal appearance.   HENT:      Head: Normocephalic and atraumatic.      Right Ear: Tympanic membrane and ear canal normal.      Left Ear: Tympanic membrane and ear canal normal.      Mouth/Throat:      Mouth: Mucous membranes are moist.      Pharynx: Oropharynx is clear.   Eyes:      Extraocular Movements: Extraocular movements intact.      Conjunctiva/sclera: Conjunctivae normal.      Pupils: Pupils are equal, round, and reactive to light.   Cardiovascular:      Rate and Rhythm: Normal rate and regular rhythm.      Pulses: Normal pulses.      Heart sounds: Normal heart sounds.   Pulmonary:      Effort: Pulmonary effort is normal.      Breath sounds: Normal breath sounds.   Abdominal:      General: Abdomen is flat. Bowel sounds are normal.      Palpations: Abdomen is soft.   Musculoskeletal:         General: Normal range of motion.      Cervical back: Normal range of motion and neck supple.   Skin:     General: Skin is warm and dry.      Capillary Refill: Capillary refill takes 2 to 3 seconds.   Neurological:      General: No focal deficit present.      Mental Status: She is alert and oriented to person, place, and time. Mental status is at baseline.   Psychiatric:         Mood and Affect: Mood normal.         Behavior: Behavior normal.         Thought Content: Thought content normal.         Judgment: Judgment normal.         Last Recorded Vitals  Blood pressure 117/55, pulse 71, temperature 36 °C (96.8 °F), resp. rate 18, height 1.62 m (5' 3.78\"), weight 74.8 kg (164 lb 14.5 oz), SpO2 95%.  Intake/Output last 3 Shifts:  I/O last 3 completed shifts:  In: 240 (3.2 mL/kg) [P.O.:240]  Out: 2000 (26.7 mL/kg) [Urine:2000 (0.7 mL/kg/hr)]  Weight: 74.8 kg     Relevant " Results                              Assessment/Plan   Assessment & Plan  Gastrointestinal hemorrhage, unspecified gastrointestinal hemorrhage type    89-year-old female with past medical history of COPD, HTN, CVA, sick sinus syndrome s/p pacemaker, HFpEF, recent NSTEMI, and recent hospitalization for CHF exacerbation with acute pulmonary edema, imaging at that time was concerning for SVC thrombus and patient was evaluated by vascular who recommended DOAC for 6 months and repeat CT.  She presents with red blood per rectum and possible epistaxis. Hospitalized for further evaluation and management.     #GI bleed, suspect lower -> likely ischemic colitis (wall thickening at distal transverse colon, associated abdominal discomfort and dizziness, brown stool with red blood reported).  #Epistaxis ?- no reported vomiting.  Blood noted with clearing of her nose and when rinsing her mouth with fluids.   #SVC thrombus     Presentation is concerning for ischemic colitis as noted above.  Avoid hypotension  Consult gastroenterology, appreciate input  Will hold aspirin, Plavix, and Eliquis for the time being  Trend H&H, stable on presentation.  Patient agreeable to blood transfusions as needed.  N.p.o. after midnight  Low suspicion for upper GI source, however will continue with Protonix 40 mg IV twice daily until evaluated by GI.  Consult to vascular surgery regarding ongoing need for Eliquis.   Dr Smith's last note on 1/6/2025 “I have reviewed the DVT scan performed today.  There is no evidence of IJ thrombus.  Waveforms are normal in the IJ as well as subclavian, suggestive that even if there is a thrombus in the SVC, it is not hemodynamically significant.”   Check Orthostatic vitals     #Acute hypoxic respiratory failure  #Acute on chronic diastolic congestive heart failure  #History sick sinus syndrome s/p pacemaker  #History NSTEMI  #Valvular hear disease  #pleural effusions     CT angio A/P was suggestive of CHF with  interstitial edema and small pleural effusions.   Low-sodium diet  Consult cardiology, patient follows with Dr Jiang and Ramicone for EP  Supplemental oxygen as needed  Continue oral diuretics, will defer to cardiology IV diuresis .  DuoNebs as needed  RT to evaluate     #DVT Ppx  SCD  Holding ac        1/13: doing ok, informed about CT results, gi on board,     1/14: CLD. Plans for colonscopy to get definitive dx, ok to stop eliquis per vascular/cardio    1/15: plans for scope tomorrow    1/16: scope confirmed cancer, she is unsure if she wants surgery or not, will consult surgery to give her more information     1/17: contemplating surgyer, will need cardiac clearance will talk to family as well      I spent 35 minutes in the professional and overall care of this patient.      Iglesia Gabriel, DO

## 2025-01-18 ENCOUNTER — APPOINTMENT (OUTPATIENT)
Dept: RADIOLOGY | Facility: HOSPITAL | Age: OVER 89
End: 2025-01-18
Payer: MEDICARE

## 2025-01-18 LAB
ALBUMIN SERPL BCP-MCNC: 2.9 G/DL (ref 3.4–5)
ALP SERPL-CCNC: 76 U/L (ref 33–136)
ALT SERPL W P-5'-P-CCNC: 16 U/L (ref 7–45)
ANION GAP SERPL CALC-SCNC: 12 MMOL/L (ref 10–20)
AST SERPL W P-5'-P-CCNC: 15 U/L (ref 9–39)
BILIRUB SERPL-MCNC: 0.4 MG/DL (ref 0–1.2)
BUN SERPL-MCNC: 23 MG/DL (ref 6–23)
CALCIUM SERPL-MCNC: 8.5 MG/DL (ref 8.6–10.3)
CHLORIDE SERPL-SCNC: 97 MMOL/L (ref 98–107)
CO2 SERPL-SCNC: 30 MMOL/L (ref 21–32)
CREAT SERPL-MCNC: 0.95 MG/DL (ref 0.5–1.05)
EGFRCR SERPLBLD CKD-EPI 2021: 57 ML/MIN/1.73M*2
ERYTHROCYTE [DISTWIDTH] IN BLOOD BY AUTOMATED COUNT: 13.1 % (ref 11.5–14.5)
GLUCOSE SERPL-MCNC: 100 MG/DL (ref 74–99)
HCT VFR BLD AUTO: 26.3 % (ref 36–46)
HGB BLD-MCNC: 8.7 G/DL (ref 12–16)
MCH RBC QN AUTO: 28.9 PG (ref 26–34)
MCHC RBC AUTO-ENTMCNC: 33.1 G/DL (ref 32–36)
MCV RBC AUTO: 87 FL (ref 80–100)
NRBC BLD-RTO: 0 /100 WBCS (ref 0–0)
PLATELET # BLD AUTO: 246 X10*3/UL (ref 150–450)
POTASSIUM SERPL-SCNC: 3.2 MMOL/L (ref 3.5–5.3)
PROT SERPL-MCNC: 5.3 G/DL (ref 6.4–8.2)
RBC # BLD AUTO: 3.01 X10*6/UL (ref 4–5.2)
SODIUM SERPL-SCNC: 136 MMOL/L (ref 136–145)
WBC # BLD AUTO: 5.4 X10*3/UL (ref 4.4–11.3)

## 2025-01-18 PROCEDURE — 2500000004 HC RX 250 GENERAL PHARMACY W/ HCPCS (ALT 636 FOR OP/ED): Performed by: REGISTERED NURSE

## 2025-01-18 PROCEDURE — 1200000002 HC GENERAL ROOM WITH TELEMETRY DAILY

## 2025-01-18 PROCEDURE — 99222 1ST HOSP IP/OBS MODERATE 55: CPT | Performed by: SURGERY

## 2025-01-18 PROCEDURE — 99231 SBSQ HOSP IP/OBS SF/LOW 25: CPT

## 2025-01-18 PROCEDURE — 2500000002 HC RX 250 W HCPCS SELF ADMINISTERED DRUGS (ALT 637 FOR MEDICARE OP, ALT 636 FOR OP/ED): Performed by: NURSE PRACTITIONER

## 2025-01-18 PROCEDURE — 99223 1ST HOSP IP/OBS HIGH 75: CPT | Performed by: INTERNAL MEDICINE

## 2025-01-18 PROCEDURE — 71046 X-RAY EXAM CHEST 2 VIEWS: CPT | Performed by: RADIOLOGY

## 2025-01-18 PROCEDURE — 85027 COMPLETE CBC AUTOMATED: CPT | Performed by: NURSE PRACTITIONER

## 2025-01-18 PROCEDURE — 99232 SBSQ HOSP IP/OBS MODERATE 35: CPT | Performed by: STUDENT IN AN ORGANIZED HEALTH CARE EDUCATION/TRAINING PROGRAM

## 2025-01-18 PROCEDURE — 2500000001 HC RX 250 WO HCPCS SELF ADMINISTERED DRUGS (ALT 637 FOR MEDICARE OP): Performed by: NURSE PRACTITIONER

## 2025-01-18 PROCEDURE — 94640 AIRWAY INHALATION TREATMENT: CPT

## 2025-01-18 PROCEDURE — 80053 COMPREHEN METABOLIC PANEL: CPT | Performed by: NURSE PRACTITIONER

## 2025-01-18 PROCEDURE — 36415 COLL VENOUS BLD VENIPUNCTURE: CPT | Performed by: NURSE PRACTITIONER

## 2025-01-18 PROCEDURE — 71046 X-RAY EXAM CHEST 2 VIEWS: CPT

## 2025-01-18 PROCEDURE — 2500000004 HC RX 250 GENERAL PHARMACY W/ HCPCS (ALT 636 FOR OP/ED)

## 2025-01-18 RX ORDER — POTASSIUM CHLORIDE 20 MEQ/1
40 TABLET, EXTENDED RELEASE ORAL ONCE
Status: COMPLETED | OUTPATIENT
Start: 2025-01-18 | End: 2025-01-18

## 2025-01-18 RX ORDER — FLUTICASONE PROPIONATE AND SALMETEROL XINAFOATE 115; 21 UG/1; UG/1
2 AEROSOL, METERED RESPIRATORY (INHALATION)
Status: DISCONTINUED | OUTPATIENT
Start: 2025-01-18 | End: 2025-01-23 | Stop reason: HOSPADM

## 2025-01-18 RX ADMIN — ENOXAPARIN SODIUM 40 MG: 40 INJECTION SUBCUTANEOUS at 16:38

## 2025-01-18 RX ADMIN — POTASSIUM CHLORIDE 40 MEQ: 1500 TABLET, EXTENDED RELEASE ORAL at 11:37

## 2025-01-18 RX ADMIN — IPRATROPIUM BROMIDE AND ALBUTEROL SULFATE 3 ML: .5; 3 SOLUTION RESPIRATORY (INHALATION) at 11:16

## 2025-01-18 RX ADMIN — TORSEMIDE 20 MG: 20 TABLET ORAL at 09:01

## 2025-01-18 RX ADMIN — FLUTICASONE PROPIONATE AND SALMETEROL XINAFOATE 2 PUFF: 115; 21 AEROSOL, METERED RESPIRATORY (INHALATION) at 19:10

## 2025-01-18 RX ADMIN — POLYETHYLENE GLYCOL 3350 17 G: 17 POWDER, FOR SOLUTION ORAL at 20:20

## 2025-01-18 RX ADMIN — Medication 1 TABLET: at 09:03

## 2025-01-18 RX ADMIN — METOPROLOL TARTRATE 12.5 MG: 25 TABLET, FILM COATED ORAL at 09:01

## 2025-01-18 RX ADMIN — POLYETHYLENE GLYCOL 3350 17 G: 17 POWDER, FOR SOLUTION ORAL at 09:01

## 2025-01-18 RX ADMIN — METOPROLOL TARTRATE 12.5 MG: 25 TABLET, FILM COATED ORAL at 20:20

## 2025-01-18 ASSESSMENT — COGNITIVE AND FUNCTIONAL STATUS - GENERAL
TOILETING: A LITTLE
DAILY ACTIVITIY SCORE: 23
TOILETING: A LITTLE
WALKING IN HOSPITAL ROOM: A LITTLE
CLIMB 3 TO 5 STEPS WITH RAILING: A LITTLE
MOBILITY SCORE: 22
CLIMB 3 TO 5 STEPS WITH RAILING: A LITTLE
WALKING IN HOSPITAL ROOM: A LITTLE
DAILY ACTIVITIY SCORE: 23
MOBILITY SCORE: 22

## 2025-01-18 ASSESSMENT — PAIN SCALES - GENERAL
PAINLEVEL_OUTOF10: 0 - NO PAIN
PAINLEVEL_OUTOF10: 0 - NO PAIN

## 2025-01-18 NOTE — ASSESSMENT & PLAN NOTE
Near obstructing transverse colon carcinoma and 89-year-old female.  Patient still has not decided whether to proceed with surgery.  I have indicated to the patient that the goal of surgery is for the obstructive symptoms at this point.  Patient states she prefers not to have a stoma but I have indicated that the option is to defer surgery at this time and to return when she is obstructed and to consider the option of surgery.  Also indicated that surgery at the time of obstruction is at higher risk.  Patient has been on blood thinners which have contributed to bleeding and anemia.  If patient opts against surgery would consider sending home on aspirin alone.  However defer these recommendations to cardiology.  Advised patient to come to a decision regarding surgery.  Dr. Vazquez requesting barium enema to evaluate the right colon prior to surgery which will then require additional prep.

## 2025-01-18 NOTE — CARE PLAN
18-Jan-2025 @ 0708 Notified consult a 2nd time to office of recent stress-induced Takotsubo cardiomyopathy; cardiac clearance for possible OR/colectomy, comment on need for DAPT postop, spoke to Carol.   Requested by: CLIFF Jordan

## 2025-01-18 NOTE — PROGRESS NOTES
Yesenia Milner is a 89 y.o. female on day 5 of admission presenting with Gastrointestinal hemorrhage, unspecified gastrointestinal hemorrhage type.      Subjective   Patient states she had bowel movement this morning with no blood noted.  She has expiratory wheezing on exam.  Pulse ox is stable.  Will send for chest x-ray continue with nebulizers as ordered.  Patient is still undecided about having surgery.  She has been seen by cardiology this morning          Objective     Last Recorded Vitals  /56   Pulse 67   Temp 36.4 °C (97.5 °F)   Resp 16   Wt 72.4 kg (159 lb 9.6 oz)   SpO2 (!) 89%   Intake/Output last 3 Shifts:    Intake/Output Summary (Last 24 hours) at 1/18/2025 1110  Last data filed at 1/18/2025 0850  Gross per 24 hour   Intake 240 ml   Output 1300 ml   Net -1060 ml       Admission Weight  Weight: 74.8 kg (165 lb) (01/12/25 1108)    Daily Weight  01/18/25 : 72.4 kg (159 lb 9.6 oz)    Image Results  Colonoscopy Diagnostic  Table formatting from the original result was not included.  Impression  Malignant-appearing mass (not traversable) in the distal transverse colon,   covering the whole circumference; performed cold forceps biopsy; tattooed   distal to the finding  Diverticulosis in the sigmoid colon  The exam was otherwise normal on both forward and retroflexed views.    Findings  Malignant-appearing mass (not traversable) in the distal transverse colon,   covering the whole circumference; no bleeding was observed; performed cold   forceps biopsy; tattooed distal to the finding with 2 mL of carbon black.   Biopsied x9.  Multiple small and large diverticula in the sigmoid colon  The exam was otherwise normal on both forward and retroflexed views.       Recommendation  Await pathology results   Consult surgery and oncology  Hold antiplatelets and anticoagulation  Further recommendations per GI consult team       Indication  Gastrointestinal hemorrhage, unspecified gastrointestinal hemorrhage  type    Staff  Staff Role   Kareem Robertson MD Proceduralist     Medications  See Anesthesia Record.     Preprocedure  A history and physical has been performed, and patient medication   allergies have been reviewed. The patient's tolerance of previous   anesthesia has been reviewed. The risks and benefits of the procedure and   the sedation options and risks were discussed with the patient. All   questions were answered and informed consent obtained.    Details of the Procedure  The patient underwent monitored anesthesia care, which was administered by   an anesthesia professional. The patient's blood pressure, ECG, ETCO2,   heart rate, level of consciousness, oxygen and respirations were monitored   throughout the procedure. A digital rectal exam was performed. The scope   was introduced through the anus and advanced to the transverse colon.   Retroflexion was performed in the rectum. Bowel prep was adequate. The   patient's estimated blood loss was minimal (<5 mL). The procedure was   moderately difficult due to fixation. The patient tolerated the procedure   well. There were no apparent adverse events. Last colonoscopy > 10 years   ago.     Events  Procedure Events   Event Event Time   ENDO SCOPE IN TIME 1/15/2025  1:51 PM   ENDO SCOPE OUT TIME 1/15/2025  2:08 PM     Specimens  ID Type Source Tests Collected by Time   1 : transverse colon mass cold biopsy Tissue COLON - TRANSVERSE BIOPSY   SURGICAL PATHOLOGY EXAM Kareem Robertson MD 1/15/2025 1402     Procedure Location  [facility] [address]  [address] [place] [id number]  [phone]    Referring Provider  Narcisa Vuong, APRN-CNP    Procedure Provider  MD Narcisa Johnson      Physical Exam  Vitals and nursing note reviewed.   Constitutional:       Appearance: Normal appearance. She is normal weight.   HENT:      Head: Normocephalic.      Nose: Nose normal.      Mouth/Throat:      Mouth: Mucous  membranes are moist.   Eyes:      Extraocular Movements: Extraocular movements intact.      Conjunctiva/sclera: Conjunctivae normal.      Pupils: Pupils are equal, round, and reactive to light.   Cardiovascular:      Rate and Rhythm: Normal rate and regular rhythm.      Pulses: Normal pulses.      Heart sounds: Normal heart sounds.   Pulmonary:      Breath sounds: Wheezing present.      Comments: Short of breath   Abdominal:      General: Abdomen is flat. Bowel sounds are normal. There is distension.      Palpations: Abdomen is soft.   Musculoskeletal:         General: Normal range of motion.      Cervical back: Normal range of motion.   Skin:     General: Skin is warm and dry.   Neurological:      General: No focal deficit present.      Mental Status: She is alert and oriented to person, place, and time.   Psychiatric:         Mood and Affect: Mood normal.         Behavior: Behavior normal.         Relevant Results  Results for orders placed or performed during the hospital encounter of 01/12/25 (from the past 96 hours)   CBC   Result Value Ref Range    WBC 8.7 4.4 - 11.3 x10*3/uL    nRBC 0.0 0.0 - 0.0 /100 WBCs    RBC 2.98 (L) 4.00 - 5.20 x10*6/uL    Hemoglobin 8.5 (L) 12.0 - 16.0 g/dL    Hematocrit 26.9 (L) 36.0 - 46.0 %    MCV 90 80 - 100 fL    MCH 28.5 26.0 - 34.0 pg    MCHC 31.6 (L) 32.0 - 36.0 g/dL    RDW 13.6 11.5 - 14.5 %    Platelets 243 150 - 450 x10*3/uL   Basic Metabolic Panel   Result Value Ref Range    Glucose 107 (H) 74 - 99 mg/dL    Sodium 139 136 - 145 mmol/L    Potassium 3.4 (L) 3.5 - 5.3 mmol/L    Chloride 103 98 - 107 mmol/L    Bicarbonate 28 21 - 32 mmol/L    Anion Gap 11 10 - 20 mmol/L    Urea Nitrogen 23 6 - 23 mg/dL    Creatinine 0.94 0.50 - 1.05 mg/dL    eGFR 57 (L) >60 mL/min/1.73m*2    Calcium 8.8 8.6 - 10.3 mg/dL   Surgical Pathology Exam   Result Value Ref Range    Case Report       Surgical Pathology                                Case: [id number]                                 "  Authorizing Provider:  Kareem Robertson MD          Collected:           01/15/2025 1402              Ordering Location:     [facility]  Received:            01/15/2025 8534              Pathologist:           Prudencio Fuentes MD PhD                                                          Specimen:    COLON - TRANSVERSE BIOPSY, transverse colon mass cold biopsy                               FINAL DIAGNOSIS       A. Colon, Transverse, Mass, Biopsy:   -- Invasive moderately differentiated adenocarcinoma. See note.    Note: DNA mismatch repair protein immunohistochemical stains are pending and the results will be reported in an addendum.    The diagnosis was communicated with Dr. Kareem Robertson at 9:28 AM on 01/16/25 via Epic secure message.              By the signature on this report, the individual or group listed as making the Final Interpretation/Diagnosis certifies that they have reviewed this case.       Clinical History       K92.2 - Gastrointestinal hemorrhage, unspecified gastrointestinal hemorrhage type [ICD-10-CM]      Gross Description       A : Received in formalin, labeled with the patient's name and hospital number and \"1, transverse colon mass\", are multiple fragments of tan, soft tissue aggregating to 2.1 x 0.2 x 0.2 cm. The specimen is submitted in toto in one cassette.  MRS      Disclaimer       One or more of the reagents used to perform assays on this specimen MAY have contained components considered to be analyte specific reagents (ASR's).  ASR's have not been cleared or approved by the U.S. Food and Drug Administration.  These assays were developed and their performance characteristics determined by the Department of Pathology at [place]. The FDA does not require this test to go through premarket FDA review. This test is used for clinical purposes. It should not be regarded as investigational or for research. This laboratory is certified under the " Clinical Laboratory Improvement Amendments (CLIA) as qualified to perform high complexity clinical laboratory testing.  The assays were performed with appropriate positive and negative controls which stained appropriately.     Protime-INR   Result Value Ref Range    Protime 16.5 (H) 9.8 - 12.8 seconds    INR 1.5 (H) 0.9 - 1.1   CBC   Result Value Ref Range    WBC 7.7 4.4 - 11.3 x10*3/uL    nRBC 0.0 0.0 - 0.0 /100 WBCs    RBC 2.91 (L) 4.00 - 5.20 x10*6/uL    Hemoglobin 8.2 (L) 12.0 - 16.0 g/dL    Hematocrit 26.0 (L) 36.0 - 46.0 %    MCV 89 80 - 100 fL    MCH 28.2 26.0 - 34.0 pg    MCHC 31.5 (L) 32.0 - 36.0 g/dL    RDW 13.4 11.5 - 14.5 %    Platelets 228 150 - 450 x10*3/uL   Basic Metabolic Panel   Result Value Ref Range    Glucose 111 (H) 74 - 99 mg/dL    Sodium 135 (L) 136 - 145 mmol/L    Potassium 3.4 (L) 3.5 - 5.3 mmol/L    Chloride 99 98 - 107 mmol/L    Bicarbonate 29 21 - 32 mmol/L    Anion Gap 10 10 - 20 mmol/L    Urea Nitrogen 22 6 - 23 mg/dL    Creatinine 1.04 0.50 - 1.05 mg/dL    eGFR 51 (L) >60 mL/min/1.73m*2    Calcium 8.6 8.6 - 10.3 mg/dL   CBC   Result Value Ref Range    WBC 6.2 4.4 - 11.3 x10*3/uL    nRBC 0.0 0.0 - 0.0 /100 WBCs    RBC 3.00 (L) 4.00 - 5.20 x10*6/uL    Hemoglobin 8.6 (L) 12.0 - 16.0 g/dL    Hematocrit 26.6 (L) 36.0 - 46.0 %    MCV 89 80 - 100 fL    MCH 28.7 26.0 - 34.0 pg    MCHC 32.3 32.0 - 36.0 g/dL    RDW 13.4 11.5 - 14.5 %    Platelets 235 150 - 450 x10*3/uL   Basic Metabolic Panel   Result Value Ref Range    Glucose 99 74 - 99 mg/dL    Sodium 134 (L) 136 - 145 mmol/L    Potassium 3.2 (L) 3.5 - 5.3 mmol/L    Chloride 97 (L) 98 - 107 mmol/L    Bicarbonate 30 21 - 32 mmol/L    Anion Gap 10 10 - 20 mmol/L    Urea Nitrogen 23 6 - 23 mg/dL    Creatinine 0.96 0.50 - 1.05 mg/dL    eGFR 56 (L) >60 mL/min/1.73m*2    Calcium 8.5 (L) 8.6 - 10.3 mg/dL   Carcinoembryonic Antigen   Result Value Ref Range    Carcinoembryonic AG 45.2 ug/L   CBC   Result Value Ref Range    WBC 5.4 4.4 - 11.3 x10*3/uL     nRBC 0.0 0.0 - 0.0 /100 WBCs    RBC 3.01 (L) 4.00 - 5.20 x10*6/uL    Hemoglobin 8.7 (L) 12.0 - 16.0 g/dL    Hematocrit 26.3 (L) 36.0 - 46.0 %    MCV 87 80 - 100 fL    MCH 28.9 26.0 - 34.0 pg    MCHC 33.1 32.0 - 36.0 g/dL    RDW 13.1 11.5 - 14.5 %    Platelets 246 150 - 450 x10*3/uL   Comprehensive Metabolic Panel   Result Value Ref Range    Glucose 100 (H) 74 - 99 mg/dL    Sodium 136 136 - 145 mmol/L    Potassium 3.2 (L) 3.5 - 5.3 mmol/L    Chloride 97 (L) 98 - 107 mmol/L    Bicarbonate 30 21 - 32 mmol/L    Anion Gap 12 10 - 20 mmol/L    Urea Nitrogen 23 6 - 23 mg/dL    Creatinine 0.95 0.50 - 1.05 mg/dL    eGFR 57 (L) >60 mL/min/1.73m*2    Calcium 8.5 (L) 8.6 - 10.3 mg/dL    Albumin 2.9 (L) 3.4 - 5.0 g/dL    Alkaline Phosphatase 76 33 - 136 U/L    Total Protein 5.3 (L) 6.4 - 8.2 g/dL    AST 15 9 - 39 U/L    Bilirubin, Total 0.4 0.0 - 1.2 mg/dL    ALT 16 7 - 45 U/L      No current facility-administered medications on file prior to encounter.     Current Outpatient Medications on File Prior to Encounter   Medication Sig Dispense Refill    apixaban (Eliquis) 5 mg tablet Take 1 tablet (5 mg) by mouth 2 times a day. 180 tablet 3    aspirin 81 mg chewable tablet Chew 1 tablet (81 mg) once daily.      calcium carbonate-vitamin D3 (Calcium 600 + D,3,) 600 mg-5 mcg (200 unit) tablet Take 1 tablet by mouth once daily.      clopidogrel (Plavix) 75 mg tablet Take 1 tablet (75 mg) by mouth once daily. 90 tablet 1    fluticasone propion-salmeteroL (Advair) 115-21 mcg/actuation inhaler Inhale 2 puffs 2 times a day. Rinse mouth with water after use to reduce aftertaste and incidence of candidiasis. Do not swallow. 12 g 11    lubricating eye drops ophthalmic solution Administer 1 drop into both eyes if needed for dry eyes.      metoprolol tartrate (Lopressor) 25 mg tablet Take 0.5 tablets (12.5 mg) by mouth 2 times a day. 60 tablet 0    torsemide (Demadex) 20 mg tablet Take 1 tablet (20 mg) by mouth once daily. 30 tablet 0    vit  C,C-Jo-kdqdg-lutein-zeaxan (PreserVision AREDS-2) 250-90-40-1 mg capsule Take 2 capsules by mouth once daily.           Assessment/Plan                  Assessment & Plan  Gastrointestinal hemorrhage, unspecified gastrointestinal hemorrhage type  89-year-old female with past medical history of COPD, HTN, CVA, sick sinus syndrome s/p pacemaker, HFpEF, recent NSTEMI, and recent hospitalization for CHF exacerbation with acute pulmonary edema, imaging at that time was concerning for SVC thrombus and patient was evaluated by vascular who recommended DOAC for 6 months and repeat CT.  She presents with red blood per rectum and possible epistaxis. Hospitalized for further evaluation and management.     #GI bleed, suspect lower -> likely ischemic colitis (wall thickening at distal transverse colon, associated abdominal discomfort and dizziness, brown stool with red blood reported).  #Epistaxis ?- no reported vomiting.  Blood noted with clearing of her nose and when rinsing her mouth with fluids.   #SVC thrombus     Presentation is concerning for ischemic colitis as noted above.  Avoid hypotension  Consult gastroenterology, appreciate input  Will hold aspirin, Plavix, and Eliquis for the time being  Trend H&H, stable on presentation.  Patient agreeable to blood transfusions as needed.  N.p.o. after midnight  Low suspicion for upper GI source, however will continue with Protonix 40 mg IV twice daily until evaluated by GI.  Consult to vascular surgery regarding ongoing need for Eliquis.   Dr Smith's last note on 1/6/2025 “I have reviewed the DVT scan performed today.  There is no evidence of IJ thrombus.  Waveforms are normal in the IJ as well as subclavian, suggestive that even if there is a thrombus in the SVC, it is not hemodynamically significant.”   Check Orthostatic vitals     #Acute hypoxic respiratory failure  #Acute on chronic diastolic congestive heart failure  #History sick sinus syndrome s/p pacemaker  #History  NSTEMI  #Valvular hear disease  #pleural effusions     CT angio A/P was suggestive of CHF with interstitial edema and small pleural effusions.   Low-sodium diet  Consult cardiology, patient follows with Dr Jiang and Ramicone for EP  Supplemental oxygen as needed  Continue oral diuretics, will defer to cardiology IV diuresis .  DuoNebs as needed  RT to evaluate     #DVT Ppx  SCD  Holding ac        1/13: doing ok, informed about CT results, gi on board,      1/14: CLD. Plans for colonscopy to get definitive dx, ok to stop eliquis per vascular/cardio     1/15: plans for scope tomorrow     1/16: scope confirmed cancer, she is unsure if she wants surgery or not, will consult surgery to give her more information  1/17: contemplating surgyer, will need cardiac clearance will talk to family as well     1/18: patient seen by cardiology this am ; continue to hold aspirin and Plavix at this time.;  Patient noted with expiratory wheezes on exam.  Pulse ox is stable.  We will order chest x-rays and ensure that she is getting her nebulizers as ordered.    Awaiting Vascular surgery in put as well  Patient is still contemplating surgery  Continue to trend labs  Plan of care discussed with attending Dr. Iglesia mart.          I spent 35 minutes in the professional and overall care of this patient.                Fabiola Andrade, APRN-CNP

## 2025-01-18 NOTE — PROGRESS NOTES
"Yesenia Milner is a 89 y.o. female on day 5 of admission presenting with Gastrointestinal hemorrhage, unspecified gastrointestinal hemorrhage type.    Subjective   Patient did have a bowel movement this morning.  She is tolerating her diet and passing flatus.  Denies abdominal pain       Objective 10 review of systems negative    Physical Exam appears stated age, tired appearing  GENERAL  MENTAL STATUS - Alert. GENERAL APPEARANCE - Cooperative and well groomed. ORIENTATION - Oriented X4. BUILD & NUTRITION - Well nourished and well developed. HYDRATION - Well hydrated.    INTEGUMENTARY  GENERAL CHARACTERISTICS - Overall examination of the patient's skin reveals no rashes. COLOR - not icteric. SKIN MOISTURE - normal skin moisture. TEMPERATURE - normal worth is noted.    HEAD AND NECK  HEAD  HEAD SHAPE - Atraumatic and normocephalic.  TRACHEA - midline.  THYROID  GLAND CHARACTERISTICS - normal size and consistency and no palpable nodules.    EYE  SCLERA/CONJUNCTIVA - BILATERAL - Anicteric.    CHEST AND LUNG EXAM  AUSCULTATION -  BREATH SOUNDS - Clear.    BREAST - Did not examine.    CARDIOVASCULAR  AUSCULTATION: RHYTHM - Regular. HEART SOUNDS - Normal heart sounds.  MURMURS & OTHER HEART SOUNDS - Auscultation of the heart reveals regular rate and no murmurs.    ABDOMEN  PALPATION/PERCUSSION - Palpation and percussion of the abdomen reveal soft, non tender, no mass and no hepatosplenomegaly.    LYMPHATIC  GENERAL LYMPHATICS  BREAST LYMPHATIC EXAM - No cervical adenopathy, supraclavicular adenopathy or axilliary adenopathy.     Last Recorded Vitals  Blood pressure 112/56, pulse 67, temperature 36.4 °C (97.5 °F), resp. rate 16, height 1.62 m (5' 3.78\"), weight 72.4 kg (159 lb 9.6 oz), SpO2 90%.  Intake/Output last 3 Shifts:  I/O last 3 completed shifts:  In: 832 (11.5 mL/kg) [P.O.:832]  Out: 2300 (31.8 mL/kg) [Urine:2300 (0.9 mL/kg/hr)]  Weight: 72.4 kg     Relevant Results                   I have reviewed the " cardiology consult which indicates the patient is high but acceptable risk for the proposed procedure.  Patient still considering options.           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  Patient still has not decided whether to proceed with surgery.  I have indicated to the patient that the goal of surgery is for the obstructive symptoms at this point.  Patient states she prefers not to have a stoma but I have indicated that the option is to defer surgery at this time and to return when she is obstructed and to consider the option of surgery.  Also indicated that surgery at the time of obstruction is at higher risk.  Patient has been on blood thinners which have contributed to bleeding and anemia.  If patient opts against surgery would consider sending home on aspirin alone.  However defer these recommendations to cardiology.  Advised patient to come to a decision regarding surgery.  Dr. Vazquez requesting barium enema to evaluate the right colon prior to surgery which will then require additional prep.         I spent 30 minutes in the professional and overall care of this patient.      Henry Katz MD

## 2025-01-18 NOTE — CARE PLAN
T  Problem: Pain - Adult  Goal: Verbalizes/displays adequate comfort level or baseline comfort level  Outcome: Progressing     Problem: Pain - Adult  Goal: Verbalizes/displays adequate comfort level or baseline comfort level  Outcome: Progressing     Problem: Safety - Adult  Goal: Free from fall injury  Outcome: Progressing   he patient's goals for the shift include sleep    The clinical goals for the shift include Safety    Over the shift, the patient did make progress toward the following goals.

## 2025-01-18 NOTE — CONSULTS
Consults  History Of Present Illness:    Yesenia Milner is a 89 y.o. female presenting with bloody bowel movement.    Patient is a 89-year-old female with a history of COPD, hypertension, sick sinus syndrome status post pacemaker implantation, HFpEF, previous NSTEMI found to have Takotsubo cardiomyopathy, prior history of stroke who presents with blood in the bowels.  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  She was also diagnosed with a possible SVC thrombus and was started on anticoagulation.  She had been on triple therapy for a period of time.  Her anticoagulation was stopped.  Cardiology is consulted for further recommendations concerning aspirin and clopidogrel.  Also consultation requested for preop evaluation prior to possible bowel surgery.    Twelve-lead ECG demonstrates AV paced rhythm.  12/21/2024 echo: Ejection fraction 60 to 65%.  Cardiac catheterization in December 2024 with mild CAD.     Last Recorded Vitals:  Vitals:    01/17/25 2025 01/18/25 0146 01/18/25 0553 01/18/25 1036   BP: 112/56 118/55 101/51 112/56   BP Location: Right arm Left arm Left arm    Patient Position: Lying Lying Lying    Pulse: 61 64 68 67   Resp: 16 16 16    Temp: 36.3 °C (97.3 °F) 36.1 °C (97 °F) 36.1 °C (97 °F) 36.4 °C (97.5 °F)   TempSrc: Temporal Temporal Temporal    SpO2: 91% 91% 93% (!) 89%   Weight:   72.4 kg (159 lb 9.6 oz)    Height:           Last Labs:  CBC - 1/18/2025:  7:40 AM  5.4 8.7 246    26.3      CMP - 1/18/2025:  7:40 AM  8.5 5.3 15 --- 0.4   _ 2.9 16 76      PTT - 1/2/2025:  7:42 PM  1.5   16.5 34     Troponin I, High Sensitivity   Date/Time Value Ref Range Status   01/02/2025 02:20 PM 38 (H) 0 - 13 ng/L Final   01/02/2025 12:27 PM 36 (H) 0 - 13 ng/L Final   12/20/2024 04:39 AM 8,220 (HH) 0 - 13 ng/L Final     Comment:     Previous result verified on 12/20/2024 0332 on specimen/case [id number] called with component [facility] for procedure Troponin, High Sensitivity, 1  Hour with value 1,426 ng/L.     BNP   Date/Time Value Ref Range Status   01/02/2025 12:27  (H) 0 - 99 pg/mL Final   12/20/2024 02:51  (H) 0 - 99 pg/mL Final     Hemoglobin A1C   Date/Time Value Ref Range Status   12/20/2024 02:04 AM 5.5 See comment % Final   01/24/2024 10:49 AM 5.4 see below % Final     LDL Calculated   Date/Time Value Ref Range Status   12/20/2024 02:04 AM 68 <=99 mg/dL Final     Comment:                                 Near   Borderline      AGE      Desirable  Optimal    High     High     Very High     0-19 Y     0 - 109     ---    110-129   >/= 130     ----    20-24 Y     0 - 119     ---    120-159   >/= 160     ----      >24 Y     0 -  99   100-129  130-159   160-189     >/=190     01/24/2024 10:49 AM 73 <=99 mg/dL Final     Comment:                                 Near   Borderline      AGE      Desirable  Optimal    High     High     Very High     0-19 Y     0 - 109     ---    110-129   >/= 130     ----    20-24 Y     0 - 119     ---    120-159   >/= 160     ----      >24 Y     0 -  99   100-129  130-159   160-189     >/=190       VLDL   Date/Time Value Ref Range Status   12/20/2024 02:04 AM 14 0 - 40 mg/dL Final   01/24/2024 10:49 AM 13 0 - 40 mg/dL Final   08/21/2023 12:56 AM 17 0 - 40 mg/dL Final   01/17/2023 09:43 AM 14 0 - 40 mg/dL Final   03/16/2021 10:15 AM 16 0 - 40 mg/dL Final      Last I/O:  I/O last 3 completed shifts:  In: 832 (11.5 mL/kg) [P.O.:832]  Out: 2300 (31.8 mL/kg) [Urine:2300 (0.9 mL/kg/hr)]  Weight: 72.4 kg     Past Cardiology Tests (Last 3 Years):  EKG:  ECG 12 lead 01/12/2025 (Preliminary)      ECG 12 lead 01/02/2025      ECG 12 lead 12/21/2024      ECG 12 lead 12/20/2024      ECG 12 lead 12/20/2024    Echo:  Transthoracic echo (TTE) complete 12/21/2024    Ejection Fractions:  EF   Date/Time Value Ref Range Status   12/21/2024 09:03 AM 63 %      Cath:  Cardiac Catheterization Procedure 12/20/2024    Stress Test:  No results found for this or any previous  visit from the past 1095 days.    Cardiac Imaging:  No results found for this or any previous visit from the past 1095 days.      Past Medical History:  She has a past medical history of Other conditions influencing health status, Personal history of other medical treatment, and Personal history of other medical treatment.    Past Surgical History:  She has a past surgical history that includes Appendectomy (11/22/2017); Hysterectomy (11/22/2017); Tonsillectomy (11/22/2017); Other surgical history (11/22/2017); Other surgical history (11/22/2017); Cardiac pacemaker placement (03/11/2021); IR angiogram renal bilateral (Bilateral, 12/14/2020); IR angiogram inferior epigastric pelvic (12/14/2020); IR angiogram inferior epigastric pelvic (12/14/2020); and Cardiac catheterization (N/A, 12/20/2024).      Social History:  She reports that she quit smoking about 52 years ago. Her smoking use included cigarettes. She has been exposed to tobacco smoke. She has never used smokeless tobacco. She reports that she does not drink alcohol and does not use drugs.    Family History:  Family History   Problem Relation Name Age of Onset    No Known Problems Mother          Allergies:  Iodine, Shrimp, Hydrocodone, and Adhesive tape-silicones    Inpatient Medications:  Scheduled medications   Medication Dose Route Frequency    calcium carbonate-vitamin D3  1 tablet oral Daily    enoxaparin  40 mg subcutaneous q24h    fluticasone furoate-vilanteroL  1 puff inhalation Daily    metoprolol tartrate  12.5 mg oral BID    polyethylene glycol  17 g oral BID    torsemide  20 mg oral Daily     PRN medications   Medication    acetaminophen    Or    acetaminophen    Or    acetaminophen    ipratropium-albuteroL    lubricating eye drops    oxygen    oxygen     Continuous Medications   Medication Dose Last Rate     Outpatient Medications:  Current Outpatient Medications   Medication Instructions    aspirin 81 mg, oral, Daily    calcium  carbonate-vitamin D3 (Calcium 600 + D,3,) 600 mg-5 mcg (200 unit) tablet 1 tablet, Daily    clopidogrel (PLAVIX) 75 mg, oral, Daily    Eliquis 5 mg, oral, 2 times daily    fluticasone propion-salmeteroL (Advair) 115-21 mcg/actuation inhaler 2 puffs, inhalation, 2 times daily, Rinse mouth with water after use to reduce aftertaste and incidence of candidiasis. Do not swallow.    lubricating eye drops ophthalmic solution 1 drop, As needed    metoprolol tartrate (LOPRESSOR) 12.5 mg, oral, 2 times daily    torsemide (DEMADEX) 20 mg, oral, Daily    vit C,R-Qy-cmfya-lutein-zeaxan (PreserVision AREDS-2) 250-90-40-1 mg capsule 2 capsules, Daily       Physical Exam:  Physical Exam  Vitals reviewed.   Constitutional:       Appearance: Normal appearance.   HENT:      Head: Normocephalic and atraumatic.      Mouth/Throat:      Mouth: Mucous membranes are moist.      Pharynx: Oropharynx is clear.   Eyes:      Extraocular Movements: Extraocular movements intact.      Conjunctiva/sclera: Conjunctivae normal.   Cardiovascular:      Rate and Rhythm: Normal rate and regular rhythm.      Pulses: Normal pulses.      Heart sounds: Normal heart sounds.   Pulmonary:      Effort: Pulmonary effort is normal.      Breath sounds: Normal breath sounds.   Abdominal:      General: Bowel sounds are normal.      Palpations: Abdomen is soft.   Musculoskeletal:         General: No swelling.      Cervical back: Neck supple.   Skin:     General: Skin is warm and dry.   Neurological:      General: No focal deficit present.      Mental Status: She is alert.   Psychiatric:         Mood and Affect: Mood normal.         Behavior: Behavior normal.           Assessment/Plan   1/18/2025  1.  Previous Takotsubo cardiomyopathy with recovered LV function: Okay to hold aspirin and clopidogrel for surgery.  After surgery, would consider resuming aspirin therapy alone given the patient's bleeding risk.  Continue beta-blockade as tolerated.  2.  Preoperative  evaluation: Patient's had an increased but acceptable risk bowel surgery from a cardiac standpoint.  3.  Sick sinus syndrome status post pacemaker implantation.    Peripheral IV 18 G Left Antecubital (Active)   Site Assessment Clean;Dry;Intact 01/18/25 0852   Dressing Status Clean;Dry 01/18/25 0852   Number of days: 6       Code Status:  Full Code    Bryce Bautista MD

## 2025-01-18 NOTE — PROGRESS NOTES
"Yesenia Milner is a 89 y.o. female on day 5 of admission presenting with Gastrointestinal hemorrhage, unspecified gastrointestinal hemorrhage type.    Subjective   Pt with no new complaints. Still tolerating diet and passing gas, no BM.        Objective     Physical Exam  Constitutional:       Appearance: She is not ill-appearing.   HENT:      Mouth/Throat:      Mouth: Mucous membranes are moist.   Cardiovascular:      Rate and Rhythm: Normal rate and regular rhythm.      Heart sounds: Normal heart sounds.   Pulmonary:      Effort: Pulmonary effort is normal. No respiratory distress.      Breath sounds: Normal breath sounds.   Abdominal:      General: Bowel sounds are normal. There is no distension.      Palpations: Abdomen is soft.      Tenderness: There is no abdominal tenderness.   Skin:     General: Skin is warm and dry.   Neurological:      Mental Status: She is alert and oriented to person, place, and time.   Psychiatric:         Mood and Affect: Mood normal.         Behavior: Behavior normal.         Last Recorded Vitals  Blood pressure 112/56, pulse 67, temperature 36.4 °C (97.5 °F), resp. rate 16, height 1.62 m (5' 3.78\"), weight 72.4 kg (159 lb 9.6 oz), SpO2 (!) 89%.  Intake/Output last 3 Shifts:  I/O last 3 completed shifts:  In: 832 (11.5 mL/kg) [P.O.:832]  Out: 2300 (31.8 mL/kg) [Urine:2300 (0.9 mL/kg/hr)]  Weight: 72.4 kg     Relevant Results  Results for orders placed or performed during the hospital encounter of 01/12/25 (from the past 24 hours)   CBC   Result Value Ref Range    WBC 5.4 4.4 - 11.3 x10*3/uL    nRBC 0.0 0.0 - 0.0 /100 WBCs    RBC 3.01 (L) 4.00 - 5.20 x10*6/uL    Hemoglobin 8.7 (L) 12.0 - 16.0 g/dL    Hematocrit 26.3 (L) 36.0 - 46.0 %    MCV 87 80 - 100 fL    MCH 28.9 26.0 - 34.0 pg    MCHC 33.1 32.0 - 36.0 g/dL    RDW 13.1 11.5 - 14.5 %    Platelets 246 150 - 450 x10*3/uL   Comprehensive Metabolic Panel   Result Value Ref Range    Glucose 100 (H) 74 - 99 mg/dL    Sodium 136 136 - 145 " mmol/L    Potassium 3.2 (L) 3.5 - 5.3 mmol/L    Chloride 97 (L) 98 - 107 mmol/L    Bicarbonate 30 21 - 32 mmol/L    Anion Gap 12 10 - 20 mmol/L    Urea Nitrogen 23 6 - 23 mg/dL    Creatinine 0.95 0.50 - 1.05 mg/dL    eGFR 57 (L) >60 mL/min/1.73m*2    Calcium 8.5 (L) 8.6 - 10.3 mg/dL    Albumin 2.9 (L) 3.4 - 5.0 g/dL    Alkaline Phosphatase 76 33 - 136 U/L    Total Protein 5.3 (L) 6.4 - 8.2 g/dL    AST 15 9 - 39 U/L    Bilirubin, Total 0.4 0.0 - 1.2 mg/dL    ALT 16 7 - 45 U/L           Assessment/Plan   89 year old female we are seeing for new colon cancer.  She has a history significant for COPD (not on O2), HTN, CVA (on Plavix), SSS s/p PPM, HFpEF (60-65%, but with WMA), recent NSTEMI previously on DAPT (12/2024), concern for SVC thrombus previously on Eliquis, and recent hospitalization for ADHF. Was admitted from home on 1/12 with concern for GI bleed.      Impression:  Adenocarcinoma of Transverse colon   > patient is not completely obstructed, but is high risk for developing colon obstruction 2/2 circumferential tumor burden   > CEA level quite elevated, concerning for metastatic disease, but no evidence on stagin CTs     Recommendations:  - Definitive management would be transverse colectomy    > Patient still considering, but thinks she will agree to surgery for colon cancer management  - If pt agrees to surgery, will need barium enema pre-op  - Cardiology consult for preoperative cardiac clearance    > Awaiting recs  - HOLD ASA, Plavix, and Eliquis. Per cardiology notes, patient should no longer need Eliquis; however, will need to clarify if she needs DAPT postop  - DVT ppx with lovenox and SCDs     The patient will be seen and discussed with the attending surgeon on call. Dr. Katz.      Juanis Lorenzo PA-C

## 2025-01-18 NOTE — ASSESSMENT & PLAN NOTE
89-year-old female with past medical history of COPD, HTN, CVA, sick sinus syndrome s/p pacemaker, HFpEF, recent NSTEMI, and recent hospitalization for CHF exacerbation with acute pulmonary edema, imaging at that time was concerning for SVC thrombus and patient was evaluated by vascular who recommended DOAC for 6 months and repeat CT.  She presents with red blood per rectum and possible epistaxis. Hospitalized for further evaluation and management.     #GI bleed, suspect lower -> likely ischemic colitis (wall thickening at distal transverse colon, associated abdominal discomfort and dizziness, brown stool with red blood reported).  #Epistaxis ?- no reported vomiting.  Blood noted with clearing of her nose and when rinsing her mouth with fluids.   #SVC thrombus     Presentation is concerning for ischemic colitis as noted above.  Avoid hypotension  Consult gastroenterology, appreciate input  Will hold aspirin, Plavix, and Eliquis for the time being  Trend H&H, stable on presentation.  Patient agreeable to blood transfusions as needed.  N.p.o. after midnight  Low suspicion for upper GI source, however will continue with Protonix 40 mg IV twice daily until evaluated by GI.  Consult to vascular surgery regarding ongoing need for Eliquis.   Dr Smith's last note on 1/6/2025 “I have reviewed the DVT scan performed today.  There is no evidence of IJ thrombus.  Waveforms are normal in the IJ as well as subclavian, suggestive that even if there is a thrombus in the SVC, it is not hemodynamically significant.”   Check Orthostatic vitals     #Acute hypoxic respiratory failure  #Acute on chronic diastolic congestive heart failure  #History sick sinus syndrome s/p pacemaker  #History NSTEMI  #Valvular hear disease  #pleural effusions     CT angio A/P was suggestive of CHF with interstitial edema and small pleural effusions.   Low-sodium diet  Consult cardiology, patient follows with Dr Jiang and Ramicone for EP  Supplemental  oxygen as needed  Continue oral diuretics, will defer to cardiology IV diuresis .  DuoNebs as needed  RT to evaluate     #DVT Ppx  SCD  Holding ac        1/13: doing ok, informed about CT results, gi on board,      1/14: CLD. Plans for colonscopy to get definitive dx, ok to stop eliquis per vascular/cardio     1/15: plans for scope tomorrow     1/16: scope confirmed cancer, she is unsure if she wants surgery or not, will consult surgery to give her more information  1/17: contemplating surgyer, will need cardiac clearance will talk to family as well     1/18: patient seen by cardiology this am ; continue to hold aspirin and Plavix at this time.;  Patient noted with expiratory wheezes on exam.  Pulse ox is stable.  We will order chest x-rays and ensure that she is getting her nebulizers as ordered.    Awaiting Vascular surgery in put as well  Patient is still contemplating surgery  Continue to trend labs  Plan of care discussed with attending Dr. Iglesia mart.          I spent 35 minutes in the professional and overall care of this patient.

## 2025-01-19 VITALS
TEMPERATURE: 97.9 F | RESPIRATION RATE: 16 BRPM | WEIGHT: 165 LBS | BODY MASS INDEX: 28.17 KG/M2 | HEART RATE: 75 BPM | DIASTOLIC BLOOD PRESSURE: 51 MMHG | SYSTOLIC BLOOD PRESSURE: 119 MMHG | HEIGHT: 64 IN | OXYGEN SATURATION: 91 %

## 2025-01-19 LAB
ALBUMIN SERPL BCP-MCNC: 3.2 G/DL (ref 3.4–5)
ALP SERPL-CCNC: 84 U/L (ref 33–136)
ALT SERPL W P-5'-P-CCNC: 17 U/L (ref 7–45)
ANION GAP SERPL CALC-SCNC: 12 MMOL/L (ref 10–20)
AST SERPL W P-5'-P-CCNC: 17 U/L (ref 9–39)
BILIRUB SERPL-MCNC: 0.3 MG/DL (ref 0–1.2)
BUN SERPL-MCNC: 22 MG/DL (ref 6–23)
CALCIUM SERPL-MCNC: 8.8 MG/DL (ref 8.6–10.3)
CHLORIDE SERPL-SCNC: 98 MMOL/L (ref 98–107)
CO2 SERPL-SCNC: 32 MMOL/L (ref 21–32)
CREAT SERPL-MCNC: 0.93 MG/DL (ref 0.5–1.05)
EGFRCR SERPLBLD CKD-EPI 2021: 58 ML/MIN/1.73M*2
ERYTHROCYTE [DISTWIDTH] IN BLOOD BY AUTOMATED COUNT: 13.2 % (ref 11.5–14.5)
GLUCOSE SERPL-MCNC: 92 MG/DL (ref 74–99)
HCT VFR BLD AUTO: 28.4 % (ref 36–46)
HGB BLD-MCNC: 9.1 G/DL (ref 12–16)
MCH RBC QN AUTO: 28.4 PG (ref 26–34)
MCHC RBC AUTO-ENTMCNC: 32 G/DL (ref 32–36)
MCV RBC AUTO: 89 FL (ref 80–100)
NRBC BLD-RTO: 0 /100 WBCS (ref 0–0)
PLATELET # BLD AUTO: 280 X10*3/UL (ref 150–450)
POTASSIUM SERPL-SCNC: 3.5 MMOL/L (ref 3.5–5.3)
PROT SERPL-MCNC: 5.8 G/DL (ref 6.4–8.2)
RBC # BLD AUTO: 3.2 X10*6/UL (ref 4–5.2)
SODIUM SERPL-SCNC: 138 MMOL/L (ref 136–145)
WBC # BLD AUTO: 5.5 X10*3/UL (ref 4.4–11.3)

## 2025-01-19 PROCEDURE — 1200000002 HC GENERAL ROOM WITH TELEMETRY DAILY

## 2025-01-19 PROCEDURE — 2500000002 HC RX 250 W HCPCS SELF ADMINISTERED DRUGS (ALT 637 FOR MEDICARE OP, ALT 636 FOR OP/ED): Performed by: NURSE PRACTITIONER

## 2025-01-19 PROCEDURE — 85027 COMPLETE CBC AUTOMATED: CPT | Performed by: NURSE PRACTITIONER

## 2025-01-19 PROCEDURE — 2500000004 HC RX 250 GENERAL PHARMACY W/ HCPCS (ALT 636 FOR OP/ED): Performed by: NURSE PRACTITIONER

## 2025-01-19 PROCEDURE — 36415 COLL VENOUS BLD VENIPUNCTURE: CPT | Performed by: NURSE PRACTITIONER

## 2025-01-19 PROCEDURE — 99232 SBSQ HOSP IP/OBS MODERATE 35: CPT | Performed by: SURGERY

## 2025-01-19 PROCEDURE — 2500000004 HC RX 250 GENERAL PHARMACY W/ HCPCS (ALT 636 FOR OP/ED)

## 2025-01-19 PROCEDURE — 99232 SBSQ HOSP IP/OBS MODERATE 35: CPT

## 2025-01-19 PROCEDURE — 80053 COMPREHEN METABOLIC PANEL: CPT | Performed by: NURSE PRACTITIONER

## 2025-01-19 PROCEDURE — 99232 SBSQ HOSP IP/OBS MODERATE 35: CPT | Performed by: STUDENT IN AN ORGANIZED HEALTH CARE EDUCATION/TRAINING PROGRAM

## 2025-01-19 PROCEDURE — 94640 AIRWAY INHALATION TREATMENT: CPT

## 2025-01-19 PROCEDURE — 2500000001 HC RX 250 WO HCPCS SELF ADMINISTERED DRUGS (ALT 637 FOR MEDICARE OP): Performed by: NURSE PRACTITIONER

## 2025-01-19 PROCEDURE — 2500000004 HC RX 250 GENERAL PHARMACY W/ HCPCS (ALT 636 FOR OP/ED): Performed by: REGISTERED NURSE

## 2025-01-19 RX ORDER — POLYETHYLENE GLYCOL 3350 17 G/17G
17 POWDER, FOR SOLUTION ORAL EVERY 6 HOURS
Status: COMPLETED | OUTPATIENT
Start: 2025-01-19 | End: 2025-01-19

## 2025-01-19 RX ORDER — FLUTICASONE PROPIONATE 50 MCG
2 SPRAY, SUSPENSION (ML) NASAL DAILY
Status: DISCONTINUED | OUTPATIENT
Start: 2025-01-19 | End: 2025-01-23 | Stop reason: HOSPADM

## 2025-01-19 RX ORDER — BENZONATATE 100 MG/1
100 CAPSULE ORAL 3 TIMES DAILY PRN
Status: DISCONTINUED | OUTPATIENT
Start: 2025-01-19 | End: 2025-01-23 | Stop reason: HOSPADM

## 2025-01-19 RX ORDER — GUAIFENESIN 600 MG/1
600 TABLET, EXTENDED RELEASE ORAL 2 TIMES DAILY
Status: DISCONTINUED | OUTPATIENT
Start: 2025-01-19 | End: 2025-01-23 | Stop reason: HOSPADM

## 2025-01-19 RX ADMIN — ENOXAPARIN SODIUM 40 MG: 40 INJECTION SUBCUTANEOUS at 16:43

## 2025-01-19 RX ADMIN — POLYETHYLENE GLYCOL 3350 17 G: 17 POWDER, FOR SOLUTION ORAL at 20:01

## 2025-01-19 RX ADMIN — METOPROLOL TARTRATE 12.5 MG: 25 TABLET, FILM COATED ORAL at 09:51

## 2025-01-19 RX ADMIN — POLYETHYLENE GLYCOL 3350 17 G: 17 POWDER, FOR SOLUTION ORAL at 12:08

## 2025-01-19 RX ADMIN — FLUTICASONE PROPIONATE AND SALMETEROL XINAFOATE 2 PUFF: 115; 21 AEROSOL, METERED RESPIRATORY (INHALATION) at 06:45

## 2025-01-19 RX ADMIN — POLYETHYLENE GLYCOL 3350 17 G: 17 POWDER, FOR SOLUTION ORAL at 16:43

## 2025-01-19 RX ADMIN — METOPROLOL TARTRATE 12.5 MG: 25 TABLET, FILM COATED ORAL at 20:01

## 2025-01-19 RX ADMIN — FLUTICASONE PROPIONATE 2 SPRAY: 50 SPRAY, METERED NASAL at 12:08

## 2025-01-19 RX ADMIN — GUAIFENESIN 600 MG: 600 TABLET, EXTENDED RELEASE ORAL at 20:01

## 2025-01-19 RX ADMIN — FLUTICASONE PROPIONATE AND SALMETEROL XINAFOATE 2 PUFF: 115; 21 AEROSOL, METERED RESPIRATORY (INHALATION) at 18:45

## 2025-01-19 RX ADMIN — GUAIFENESIN 600 MG: 600 TABLET, EXTENDED RELEASE ORAL at 09:55

## 2025-01-19 RX ADMIN — TORSEMIDE 20 MG: 20 TABLET ORAL at 09:51

## 2025-01-19 RX ADMIN — Medication 1 TABLET: at 09:51

## 2025-01-19 ASSESSMENT — COGNITIVE AND FUNCTIONAL STATUS - GENERAL
MOVING TO AND FROM BED TO CHAIR: A LITTLE
CLIMB 3 TO 5 STEPS WITH RAILING: A LITTLE
TOILETING: A LITTLE
TOILETING: A LITTLE
DAILY ACTIVITIY SCORE: 23
MOBILITY SCORE: 22
STANDING UP FROM CHAIR USING ARMS: A LITTLE
WALKING IN HOSPITAL ROOM: A LITTLE
DAILY ACTIVITIY SCORE: 23
MOBILITY SCORE: 20
CLIMB 3 TO 5 STEPS WITH RAILING: A LITTLE
WALKING IN HOSPITAL ROOM: A LITTLE

## 2025-01-19 ASSESSMENT — PAIN - FUNCTIONAL ASSESSMENT: PAIN_FUNCTIONAL_ASSESSMENT: 0-10

## 2025-01-19 ASSESSMENT — PAIN SCALES - GENERAL
PAINLEVEL_OUTOF10: 0 - NO PAIN
PAINLEVEL_OUTOF10: 0 - NO PAIN

## 2025-01-19 NOTE — ASSESSMENT & PLAN NOTE
89-year-old female with past medical history of COPD, HTN, CVA, sick sinus syndrome s/p pacemaker, HFpEF, recent NSTEMI, and recent hospitalization for CHF exacerbation with acute pulmonary edema, imaging at that time was concerning for SVC thrombus and patient was evaluated by vascular who recommended DOAC for 6 months and repeat CT.  She presents with red blood per rectum and possible epistaxis. Hospitalized for further evaluation and management.     #GI bleed, suspect lower -> likely ischemic colitis (wall thickening at distal transverse colon, associated abdominal discomfort and dizziness, brown stool with red blood reported).  #Epistaxis ?- no reported vomiting.  Blood noted with clearing of her nose and when rinsing her mouth with fluids.   #SVC thrombus     Presentation is concerning for ischemic colitis as noted above.  Avoid hypotension  Consult gastroenterology, appreciate input  Will hold aspirin, Plavix, and Eliquis for the time being  Trend H&H, stable on presentation.  Patient agreeable to blood transfusions as needed.  N.p.o. after midnight  Low suspicion for upper GI source, however will continue with Protonix 40 mg IV twice daily until evaluated by GI.  Consult to vascular surgery regarding ongoing need for Eliquis.   Dr Smith's last note on 1/6/2025 “I have reviewed the DVT scan performed today.  There is no evidence of IJ thrombus.  Waveforms are normal in the IJ as well as subclavian, suggestive that even if there is a thrombus in the SVC, it is not hemodynamically significant.”   Check Orthostatic vitals     #Acute hypoxic respiratory failure  #Acute on chronic diastolic congestive heart failure  #History sick sinus syndrome s/p pacemaker  #History NSTEMI  #Valvular hear disease  #pleural effusions     CT angio A/P was suggestive of CHF with interstitial edema and small pleural effusions.   Low-sodium diet  Consult cardiology, patient follows with Dr Jiang and Ramicone for EP  Supplemental  oxygen as needed  Continue oral diuretics, will defer to cardiology IV diuresis .  DuoNebs as needed  RT to evaluate     #DVT Ppx  SCD  Holding ac        1/13: doing ok, informed about CT results, gi on board,      1/14: CLD. Plans for colonscopy to get definitive dx, ok to stop eliquis per vascular/cardio     1/15: plans for scope tomorrow     1/16: scope confirmed cancer, she is unsure if she wants surgery or not, will consult surgery to give her more information  1/17: contemplating surgyer, will need cardiac clearance will talk to family as well     1/18: patient seen by cardiology this am ; continue to hold aspirin and Plavix at this time.;  Patient noted with expiratory wheezes on exam.  Pulse ox is stable.  We will order chest x-rays and ensure that she is getting her nebulizers as ordered.    Awaiting Vascular surgery in put as well  Patient is still contemplating surgery  Continue to trend labs    1/19: lab work stable.  We will start Mucinex and Tessalon Perles for cough.  Patient appears agreeable for surgery we will continue discussion with surgical team.  Aspirin Plavix are on hold, seen by cardiology at discharge patient should only resume aspirin  Continue to monitor labs.  Plan of care discussed with attending   Dr. Iglesia mart.        I spent 35 minutes in the professional and overall care of this patient.

## 2025-01-19 NOTE — CARE PLAN
The patient's goals for the shift include sleep    The clinical goals for the shift include patient will remain free from rectal bleed    Over the shift, the patient did not make progress toward the following goals. Barriers to progression include weakness. Recommendations to address these barriers include rest and comfort.

## 2025-01-19 NOTE — PROGRESS NOTES
"Yesenia Milner is a 89 y.o. female on day 6 of admission presenting with Gastrointestinal hemorrhage, unspecified gastrointestinal hemorrhage type.    Subjective   Pt with no new complaints. Still tolerating diet and passing gas, Bmx1 yesterday - of note, pt has been refusing AM miralax. Pt spoke with family yesterday and has decided to proceed with surgery.         Objective     Physical Exam  Constitutional:       Appearance: She is not ill-appearing.   HENT:      Mouth/Throat:      Mouth: Mucous membranes are moist.   Cardiovascular:      Rate and Rhythm: Normal rate and regular rhythm.      Heart sounds: Normal heart sounds.   Pulmonary:      Effort: Pulmonary effort is normal. No respiratory distress.      Breath sounds: Normal breath sounds.   Abdominal:      General: Bowel sounds are normal. There is no distension.      Palpations: Abdomen is soft.      Tenderness: There is no abdominal tenderness.   Skin:     General: Skin is warm and dry.   Neurological:      Mental Status: She is alert and oriented to person, place, and time.   Psychiatric:         Mood and Affect: Mood normal.         Behavior: Behavior normal.         Last Recorded Vitals  Blood pressure 112/55, pulse 66, temperature 36.3 °C (97.3 °F), temperature source Temporal, resp. rate 16, height 1.62 m (5' 3.78\"), weight 74.8 kg (165 lb), SpO2 94%.  Intake/Output last 3 Shifts:  I/O last 3 completed shifts:  In: 480 (6.4 mL/kg) [P.O.:480]  Out: 1600 (21.4 mL/kg) [Urine:1600 (0.6 mL/kg/hr)]  Weight: 74.8 kg     Relevant Results  Results for orders placed or performed during the hospital encounter of 01/12/25 (from the past 24 hours)   CBC   Result Value Ref Range    WBC 5.5 4.4 - 11.3 x10*3/uL    nRBC 0.0 0.0 - 0.0 /100 WBCs    RBC 3.20 (L) 4.00 - 5.20 x10*6/uL    Hemoglobin 9.1 (L) 12.0 - 16.0 g/dL    Hematocrit 28.4 (L) 36.0 - 46.0 %    MCV 89 80 - 100 fL    MCH 28.4 26.0 - 34.0 pg    MCHC 32.0 32.0 - 36.0 g/dL    RDW 13.2 11.5 - 14.5 %    " Platelets 280 150 - 450 x10*3/uL   Comprehensive Metabolic Panel   Result Value Ref Range    Glucose 92 74 - 99 mg/dL    Sodium 138 136 - 145 mmol/L    Potassium 3.5 3.5 - 5.3 mmol/L    Chloride 98 98 - 107 mmol/L    Bicarbonate 32 21 - 32 mmol/L    Anion Gap 12 10 - 20 mmol/L    Urea Nitrogen 22 6 - 23 mg/dL    Creatinine 0.93 0.50 - 1.05 mg/dL    eGFR 58 (L) >60 mL/min/1.73m*2    Calcium 8.8 8.6 - 10.3 mg/dL    Albumin 3.2 (L) 3.4 - 5.0 g/dL    Alkaline Phosphatase 84 33 - 136 U/L    Total Protein 5.8 (L) 6.4 - 8.2 g/dL    AST 17 9 - 39 U/L    Bilirubin, Total 0.3 0.0 - 1.2 mg/dL    ALT 17 7 - 45 U/L           Assessment/Plan   89 year old female we are seeing for new colon cancer.  She has a history significant for COPD (not on O2), HTN, CVA (on Plavix), SSS s/p PPM, HFpEF (60-65%, but with WMA), recent NSTEMI previously on DAPT (12/2024), concern for SVC thrombus previously on Eliquis, and recent hospitalization for ADHF. Was admitted from home on 1/12 with concern for GI bleed.      Impression:  Adenocarcinoma of Transverse colon   > patient is not completely obstructed, but is high risk for developing colon obstruction 2/2 circumferential tumor burden   > CEA level quite elevated, concerning for metastatic disease, but no evidence on staging CTs     Recommendations:  - Patient agreeable to transverse colectomy, timing TBD  - Will order barium enema for tomorrow    > Bowel prep for enema: miralax TID today, tap water enema tonight and tomorrow morning, NPO after midnight until after barium enema  - Cardiology consult for preoperative cardiac clearance    > Increased but acceptable risk    > Hold DAPT pre-op, resume ASA only post-op  - Per endovascular, okay to discontinue AC  - DVT ppx with lovenox and SCDs     The patient was seen and discussed with the attending surgeon on call. Dr. Katz.      Juanis Lorenzo PA-C

## 2025-01-19 NOTE — PROGRESS NOTES
"Yesenia Milner is a 89 y.o. female on day 6 of admission presenting with Gastrointestinal hemorrhage, unspecified gastrointestinal hemorrhage type.      Subjective   No BM today, cough is less   CXR no acute issues noted.   Patient states \" stuck in a rock and hard place if I dont have surgery. \"  Blood counts stable          Objective     Last Recorded Vitals  /50 (BP Location: Right arm, Patient Position: Lying)   Pulse 66   Temp 36.1 °C (97 °F) (Temporal)   Resp 16   Wt 74.8 kg (165 lb)   SpO2 95%   Intake/Output last 3 Shifts:    Intake/Output Summary (Last 24 hours) at 1/19/2025 0946  Last data filed at 1/19/2025 0556  Gross per 24 hour   Intake 240 ml   Output 1100 ml   Net -860 ml       Admission Weight  Weight: 74.8 kg (165 lb) (01/12/25 1108)    Daily Weight  01/19/25 : 74.8 kg (165 lb)    Image Results  XR chest 2 views  Narrative: Interpreted By:  Rupert Parekh,   STUDY:  XR CHEST 2 VIEWS; 1/18/2025 12:10 pm      INDICATION:  Signs/Symptoms:sob with wheezing      COMPARISON:  01/04/2025      ACCESSION NUMBER(S):  [id number]      ORDERING CLINICIAN:  HERIBERTO HAWKINS      TECHNIQUE:  PA and LAT views of the chest were obtained.      FINDINGS:  Left chest wall pacemaker and leads appear in good position. The  cardiomediastinal silhouette is unremarkable. Previously visualized  bilateral lower lobe airspace opacities appear markedly improved.  There are minimal dependent changes but no significant consolidation  is seen. No pleural effusion. Pulmonary vasculature and interstitium  appear within normal limits.      The osseous structures are intact.      Impression: No definitive acute cardiopulmonary process. Previously visualized  bilateral lower lobe airspace opacities appear markedly improved.  There are minimal dependent changes but no significant consolidation  is seen.          Signed by: Rupert Parekh 1/18/2025 12:31 PM  Dictation workstation:   [id number]      Physical " Exam  Vitals and nursing note reviewed.   Constitutional:       Appearance: Normal appearance. She is normal weight.   HENT:      Head: Normocephalic.      Nose: Nose normal.      Mouth/Throat:      Mouth: Mucous membranes are moist.   Eyes:      Extraocular Movements: Extraocular movements intact.      Conjunctiva/sclera: Conjunctivae normal.      Pupils: Pupils are equal, round, and reactive to light.   Cardiovascular:      Rate and Rhythm: Normal rate and regular rhythm.      Pulses: Normal pulses.      Heart sounds: Normal heart sounds.   Pulmonary:      Breath sounds: No wheezing.      Comments: Non productive cough   Abdominal:      General: Bowel sounds are normal. There is distension.      Palpations: Abdomen is soft.      Tenderness: There is no abdominal tenderness.   Musculoskeletal:         General: Normal range of motion.      Cervical back: Normal range of motion.   Skin:     General: Skin is warm and dry.   Neurological:      General: No focal deficit present.      Mental Status: She is alert and oriented to person, place, and time.   Psychiatric:         Mood and Affect: Mood normal.         Behavior: Behavior normal.         Relevant Results  Results for orders placed or performed during the hospital encounter of 01/12/25 (from the past 96 hours)   Surgical Pathology Exam   Result Value Ref Range    Case Report       Surgical Pathology                                Case: [id number]                                  Authorizing Provider:  Kareem Robertson MD          Collected:           01/15/2025 1405              Ordering Location:     [facility]  Received:            01/15/2025 0247              Pathologist:           Prudencio Fuentes MD PhD                                                          Specimen:    COLON - TRANSVERSE BIOPSY, transverse colon mass cold biopsy                               FINAL DIAGNOSIS       A. Colon, Transverse, Mass, Biopsy:   -- Invasive moderately  "differentiated adenocarcinoma. See note.    Note: DNA mismatch repair protein immunohistochemical stains are pending and the results will be reported in an addendum.    The diagnosis was communicated with Dr. Kareem Robertson at 9:28 AM on 01/16/25 via Epic secure message.              By the signature on this report, the individual or group listed as making the Final Interpretation/Diagnosis certifies that they have reviewed this case.       Clinical History       K92.2 - Gastrointestinal hemorrhage, unspecified gastrointestinal hemorrhage type [ICD-10-CM]      Gross Description       A : Received in formalin, labeled with the patient's name and hospital number and \"1, transverse colon mass\", are multiple fragments of tan, soft tissue aggregating to 2.1 x 0.2 x 0.2 cm. The specimen is submitted in toto in one cassette.  MRS      Disclaimer       One or more of the reagents used to perform assays on this specimen MAY have contained components considered to be analyte specific reagents (ASR's).  ASR's have not been cleared or approved by the U.S. Food and Drug Administration.  These assays were developed and their performance characteristics determined by the Department of Pathology at [facility]. The FDA does not require this test to go through premarket FDA review. This test is used for clinical purposes. It should not be regarded as investigational or for research. This laboratory is certified under the Clinical Laboratory Improvement Amendments (CLIA) as qualified to perform high complexity clinical laboratory testing.  The assays were performed with appropriate positive and negative controls which stained appropriately.     Protime-INR   Result Value Ref Range    Protime 16.5 (H) 9.8 - 12.8 seconds    INR 1.5 (H) 0.9 - 1.1   CBC   Result Value Ref Range    WBC 7.7 4.4 - 11.3 x10*3/uL    nRBC 0.0 0.0 - 0.0 /100 WBCs    RBC 2.91 (L) 4.00 - 5.20 x10*6/uL    Hemoglobin 8.2 (L) 12.0 - " 16.0 g/dL    Hematocrit 26.0 (L) 36.0 - 46.0 %    MCV 89 80 - 100 fL    MCH 28.2 26.0 - 34.0 pg    MCHC 31.5 (L) 32.0 - 36.0 g/dL    RDW 13.4 11.5 - 14.5 %    Platelets 228 150 - 450 x10*3/uL   Basic Metabolic Panel   Result Value Ref Range    Glucose 111 (H) 74 - 99 mg/dL    Sodium 135 (L) 136 - 145 mmol/L    Potassium 3.4 (L) 3.5 - 5.3 mmol/L    Chloride 99 98 - 107 mmol/L    Bicarbonate 29 21 - 32 mmol/L    Anion Gap 10 10 - 20 mmol/L    Urea Nitrogen 22 6 - 23 mg/dL    Creatinine 1.04 0.50 - 1.05 mg/dL    eGFR 51 (L) >60 mL/min/1.73m*2    Calcium 8.6 8.6 - 10.3 mg/dL   CBC   Result Value Ref Range    WBC 6.2 4.4 - 11.3 x10*3/uL    nRBC 0.0 0.0 - 0.0 /100 WBCs    RBC 3.00 (L) 4.00 - 5.20 x10*6/uL    Hemoglobin 8.6 (L) 12.0 - 16.0 g/dL    Hematocrit 26.6 (L) 36.0 - 46.0 %    MCV 89 80 - 100 fL    MCH 28.7 26.0 - 34.0 pg    MCHC 32.3 32.0 - 36.0 g/dL    RDW 13.4 11.5 - 14.5 %    Platelets 235 150 - 450 x10*3/uL   Basic Metabolic Panel   Result Value Ref Range    Glucose 99 74 - 99 mg/dL    Sodium 134 (L) 136 - 145 mmol/L    Potassium 3.2 (L) 3.5 - 5.3 mmol/L    Chloride 97 (L) 98 - 107 mmol/L    Bicarbonate 30 21 - 32 mmol/L    Anion Gap 10 10 - 20 mmol/L    Urea Nitrogen 23 6 - 23 mg/dL    Creatinine 0.96 0.50 - 1.05 mg/dL    eGFR 56 (L) >60 mL/min/1.73m*2    Calcium 8.5 (L) 8.6 - 10.3 mg/dL   Carcinoembryonic Antigen   Result Value Ref Range    Carcinoembryonic AG 45.2 ug/L   CBC   Result Value Ref Range    WBC 5.4 4.4 - 11.3 x10*3/uL    nRBC 0.0 0.0 - 0.0 /100 WBCs    RBC 3.01 (L) 4.00 - 5.20 x10*6/uL    Hemoglobin 8.7 (L) 12.0 - 16.0 g/dL    Hematocrit 26.3 (L) 36.0 - 46.0 %    MCV 87 80 - 100 fL    MCH 28.9 26.0 - 34.0 pg    MCHC 33.1 32.0 - 36.0 g/dL    RDW 13.1 11.5 - 14.5 %    Platelets 246 150 - 450 x10*3/uL   Comprehensive Metabolic Panel   Result Value Ref Range    Glucose 100 (H) 74 - 99 mg/dL    Sodium 136 136 - 145 mmol/L    Potassium 3.2 (L) 3.5 - 5.3 mmol/L    Chloride 97 (L) 98 - 107 mmol/L     Bicarbonate 30 21 - 32 mmol/L    Anion Gap 12 10 - 20 mmol/L    Urea Nitrogen 23 6 - 23 mg/dL    Creatinine 0.95 0.50 - 1.05 mg/dL    eGFR 57 (L) >60 mL/min/1.73m*2    Calcium 8.5 (L) 8.6 - 10.3 mg/dL    Albumin 2.9 (L) 3.4 - 5.0 g/dL    Alkaline Phosphatase 76 33 - 136 U/L    Total Protein 5.3 (L) 6.4 - 8.2 g/dL    AST 15 9 - 39 U/L    Bilirubin, Total 0.4 0.0 - 1.2 mg/dL    ALT 16 7 - 45 U/L   CBC   Result Value Ref Range    WBC 5.5 4.4 - 11.3 x10*3/uL    nRBC 0.0 0.0 - 0.0 /100 WBCs    RBC 3.20 (L) 4.00 - 5.20 x10*6/uL    Hemoglobin 9.1 (L) 12.0 - 16.0 g/dL    Hematocrit 28.4 (L) 36.0 - 46.0 %    MCV 89 80 - 100 fL    MCH 28.4 26.0 - 34.0 pg    MCHC 32.0 32.0 - 36.0 g/dL    RDW 13.2 11.5 - 14.5 %    Platelets 280 150 - 450 x10*3/uL   Comprehensive Metabolic Panel   Result Value Ref Range    Glucose 92 74 - 99 mg/dL    Sodium 138 136 - 145 mmol/L    Potassium 3.5 3.5 - 5.3 mmol/L    Chloride 98 98 - 107 mmol/L    Bicarbonate 32 21 - 32 mmol/L    Anion Gap 12 10 - 20 mmol/L    Urea Nitrogen 22 6 - 23 mg/dL    Creatinine 0.93 0.50 - 1.05 mg/dL    eGFR 58 (L) >60 mL/min/1.73m*2    Calcium 8.8 8.6 - 10.3 mg/dL    Albumin 3.2 (L) 3.4 - 5.0 g/dL    Alkaline Phosphatase 84 33 - 136 U/L    Total Protein 5.8 (L) 6.4 - 8.2 g/dL    AST 17 9 - 39 U/L    Bilirubin, Total 0.3 0.0 - 1.2 mg/dL    ALT 17 7 - 45 U/L      No current facility-administered medications on file prior to encounter.     Current Outpatient Medications on File Prior to Encounter   Medication Sig Dispense Refill    apixaban (Eliquis) 5 mg tablet Take 1 tablet (5 mg) by mouth 2 times a day. 180 tablet 3    aspirin 81 mg chewable tablet Chew 1 tablet (81 mg) once daily.      calcium carbonate-vitamin D3 (Calcium 600 + D,3,) 600 mg-5 mcg (200 unit) tablet Take 1 tablet by mouth once daily.      clopidogrel (Plavix) 75 mg tablet Take 1 tablet (75 mg) by mouth once daily. 90 tablet 1    fluticasone propion-salmeteroL (Advair) 115-21 mcg/actuation inhaler Inhale 2  puffs 2 times a day. Rinse mouth with water after use to reduce aftertaste and incidence of candidiasis. Do not swallow. 12 g 11    lubricating eye drops ophthalmic solution Administer 1 drop into both eyes if needed for dry eyes.      metoprolol tartrate (Lopressor) 25 mg tablet Take 0.5 tablets (12.5 mg) by mouth 2 times a day. 60 tablet 0    torsemide (Demadex) 20 mg tablet Take 1 tablet (20 mg) by mouth once daily. 30 tablet 0    vit C,D-Ev-pcqbs-lutein-zeaxan (PreserVision AREDS-2) 250-90-40-1 mg capsule Take 2 capsules by mouth once daily.           Assessment/Plan                  Assessment & Plan  Gastrointestinal hemorrhage, unspecified gastrointestinal hemorrhage type  89-year-old female with past medical history of COPD, HTN, CVA, sick sinus syndrome s/p pacemaker, HFpEF, recent NSTEMI, and recent hospitalization for CHF exacerbation with acute pulmonary edema, imaging at that time was concerning for SVC thrombus and patient was evaluated by vascular who recommended DOAC for 6 months and repeat CT.  She presents with red blood per rectum and possible epistaxis. Hospitalized for further evaluation and management.     #GI bleed, suspect lower -> likely ischemic colitis (wall thickening at distal transverse colon, associated abdominal discomfort and dizziness, brown stool with red blood reported).  #Epistaxis ?- no reported vomiting.  Blood noted with clearing of her nose and when rinsing her mouth with fluids.   #SVC thrombus     Presentation is concerning for ischemic colitis as noted above.  Avoid hypotension  Consult gastroenterology, appreciate input  Will hold aspirin, Plavix, and Eliquis for the time being  Trend H&H, stable on presentation.  Patient agreeable to blood transfusions as needed.  N.p.o. after midnight  Low suspicion for upper GI source, however will continue with Protonix 40 mg IV twice daily until evaluated by GI.  Consult to vascular surgery regarding ongoing need for Eliquis.     Sarah's last note on 1/6/2025 “I have reviewed the DVT scan performed today.  There is no evidence of IJ thrombus.  Waveforms are normal in the IJ as well as subclavian, suggestive that even if there is a thrombus in the SVC, it is not hemodynamically significant.”   Check Orthostatic vitals     #Acute hypoxic respiratory failure  #Acute on chronic diastolic congestive heart failure  #History sick sinus syndrome s/p pacemaker  #History NSTEMI  #Valvular hear disease  #pleural effusions     CT angio A/P was suggestive of CHF with interstitial edema and small pleural effusions.   Low-sodium diet  Consult cardiology, patient follows with Dr Jiang and Ramicone for EP  Supplemental oxygen as needed  Continue oral diuretics, will defer to cardiology IV diuresis .  DuoNebs as needed  RT to evaluate     #DVT Ppx  SCD  Holding ac        1/13: doing ok, informed about CT results, gi on board,      1/14: CLD. Plans for colonscopy to get definitive dx, ok to stop eliquis per vascular/cardio     1/15: plans for scope tomorrow     1/16: scope confirmed cancer, she is unsure if she wants surgery or not, will consult surgery to give her more information  1/17: contemplating surgyer, will need cardiac clearance will talk to family as well     1/18: patient seen by cardiology this am ; continue to hold aspirin and Plavix at this time.;  Patient noted with expiratory wheezes on exam.  Pulse ox is stable.  We will order chest x-rays and ensure that she is getting her nebulizers as ordered.    Awaiting Vascular surgery in put as well  Patient is still contemplating surgery  Continue to trend labs    1/19: lab work stable.  We will start Mucinex and Tessalon Perles for cough.  Patient appears agreeable for surgery we will continue discussion with surgical team.  Aspirin Plavix are on hold, seen by cardiology at discharge patient should only resume aspirin  Continue to monitor labs.  Plan of care discussed with attending   Dr. Parekh  ice.        I spent 35 minutes in the professional and overall care of this patient.                Fabiola Andrade, APRN-CNP

## 2025-01-20 LAB
ALBUMIN SERPL BCP-MCNC: 3.2 G/DL (ref 3.4–5)
ALP SERPL-CCNC: 81 U/L (ref 33–136)
ALT SERPL W P-5'-P-CCNC: 16 U/L (ref 7–45)
ANION GAP SERPL CALC-SCNC: 10 MMOL/L (ref 10–20)
AST SERPL W P-5'-P-CCNC: 16 U/L (ref 9–39)
BILIRUB SERPL-MCNC: 0.3 MG/DL (ref 0–1.2)
BUN SERPL-MCNC: 21 MG/DL (ref 6–23)
CALCIUM SERPL-MCNC: 9.1 MG/DL (ref 8.6–10.3)
CHLORIDE SERPL-SCNC: 98 MMOL/L (ref 98–107)
CO2 SERPL-SCNC: 32 MMOL/L (ref 21–32)
CREAT SERPL-MCNC: 0.95 MG/DL (ref 0.5–1.05)
EGFRCR SERPLBLD CKD-EPI 2021: 57 ML/MIN/1.73M*2
ERYTHROCYTE [DISTWIDTH] IN BLOOD BY AUTOMATED COUNT: 13.3 % (ref 11.5–14.5)
GLUCOSE SERPL-MCNC: 95 MG/DL (ref 74–99)
HCT VFR BLD AUTO: 28.1 % (ref 36–46)
HGB BLD-MCNC: 9.1 G/DL (ref 12–16)
LAB AP ASR DISCLAIMER: NORMAL
LABORATORY COMMENT REPORT: NORMAL
MCH RBC QN AUTO: 28.5 PG (ref 26–34)
MCHC RBC AUTO-ENTMCNC: 32.4 G/DL (ref 32–36)
MCV RBC AUTO: 88 FL (ref 80–100)
NRBC BLD-RTO: 0 /100 WBCS (ref 0–0)
PATH REPORT.ADDENDUM SPEC: NORMAL
PATH REPORT.FINAL DX SPEC: NORMAL
PATH REPORT.GROSS SPEC: NORMAL
PATH REPORT.RELEVANT HX SPEC: NORMAL
PATH REPORT.TOTAL CANCER: NORMAL
PLATELET # BLD AUTO: 293 X10*3/UL (ref 150–450)
POTASSIUM SERPL-SCNC: 4 MMOL/L (ref 3.5–5.3)
PROT SERPL-MCNC: 5.8 G/DL (ref 6.4–8.2)
RBC # BLD AUTO: 3.19 X10*6/UL (ref 4–5.2)
SODIUM SERPL-SCNC: 136 MMOL/L (ref 136–145)
WBC # BLD AUTO: 5.1 X10*3/UL (ref 4.4–11.3)

## 2025-01-20 PROCEDURE — 2500000004 HC RX 250 GENERAL PHARMACY W/ HCPCS (ALT 636 FOR OP/ED): Performed by: REGISTERED NURSE

## 2025-01-20 PROCEDURE — 2500000001 HC RX 250 WO HCPCS SELF ADMINISTERED DRUGS (ALT 637 FOR MEDICARE OP): Performed by: NURSE PRACTITIONER

## 2025-01-20 PROCEDURE — 94640 AIRWAY INHALATION TREATMENT: CPT

## 2025-01-20 PROCEDURE — 2500000004 HC RX 250 GENERAL PHARMACY W/ HCPCS (ALT 636 FOR OP/ED): Performed by: NURSE PRACTITIONER

## 2025-01-20 PROCEDURE — 2500000004 HC RX 250 GENERAL PHARMACY W/ HCPCS (ALT 636 FOR OP/ED)

## 2025-01-20 PROCEDURE — 1200000002 HC GENERAL ROOM WITH TELEMETRY DAILY

## 2025-01-20 PROCEDURE — 99232 SBSQ HOSP IP/OBS MODERATE 35: CPT | Performed by: STUDENT IN AN ORGANIZED HEALTH CARE EDUCATION/TRAINING PROGRAM

## 2025-01-20 PROCEDURE — 84075 ASSAY ALKALINE PHOSPHATASE: CPT | Performed by: NURSE PRACTITIONER

## 2025-01-20 PROCEDURE — 85027 COMPLETE CBC AUTOMATED: CPT | Performed by: NURSE PRACTITIONER

## 2025-01-20 PROCEDURE — 36415 COLL VENOUS BLD VENIPUNCTURE: CPT | Performed by: NURSE PRACTITIONER

## 2025-01-20 PROCEDURE — 99231 SBSQ HOSP IP/OBS SF/LOW 25: CPT | Performed by: SURGERY

## 2025-01-20 PROCEDURE — 99231 SBSQ HOSP IP/OBS SF/LOW 25: CPT | Performed by: REGISTERED NURSE

## 2025-01-20 RX ADMIN — GUAIFENESIN 600 MG: 600 TABLET, EXTENDED RELEASE ORAL at 20:39

## 2025-01-20 RX ADMIN — FLUTICASONE PROPIONATE 2 SPRAY: 50 SPRAY, METERED NASAL at 09:14

## 2025-01-20 RX ADMIN — TORSEMIDE 20 MG: 20 TABLET ORAL at 09:14

## 2025-01-20 RX ADMIN — METOPROLOL TARTRATE 12.5 MG: 25 TABLET, FILM COATED ORAL at 20:39

## 2025-01-20 RX ADMIN — ENOXAPARIN SODIUM 40 MG: 40 INJECTION SUBCUTANEOUS at 17:14

## 2025-01-20 RX ADMIN — METOPROLOL TARTRATE 12.5 MG: 25 TABLET, FILM COATED ORAL at 09:14

## 2025-01-20 RX ADMIN — FLUTICASONE PROPIONATE AND SALMETEROL XINAFOATE 2 PUFF: 115; 21 AEROSOL, METERED RESPIRATORY (INHALATION) at 20:06

## 2025-01-20 RX ADMIN — Medication 1 TABLET: at 09:14

## 2025-01-20 RX ADMIN — GUAIFENESIN 600 MG: 600 TABLET, EXTENDED RELEASE ORAL at 09:14

## 2025-01-20 RX ADMIN — POLYETHYLENE GLYCOL 3350 17 G: 17 POWDER, FOR SOLUTION ORAL at 20:39

## 2025-01-20 ASSESSMENT — COGNITIVE AND FUNCTIONAL STATUS - GENERAL
DAILY ACTIVITIY SCORE: 21
WALKING IN HOSPITAL ROOM: A LITTLE
MOVING TO AND FROM BED TO CHAIR: A LITTLE
MOBILITY SCORE: 19
CLIMB 3 TO 5 STEPS WITH RAILING: A LOT
TOILETING: A LITTLE
STANDING UP FROM CHAIR USING ARMS: A LITTLE
DRESSING REGULAR LOWER BODY CLOTHING: A LITTLE
HELP NEEDED FOR BATHING: A LITTLE

## 2025-01-20 ASSESSMENT — PAIN SCALES - GENERAL
PAINLEVEL_OUTOF10: 0 - NO PAIN
PAINLEVEL_OUTOF10: 0 - NO PAIN

## 2025-01-20 ASSESSMENT — PAIN - FUNCTIONAL ASSESSMENT
PAIN_FUNCTIONAL_ASSESSMENT: 0-10
PAIN_FUNCTIONAL_ASSESSMENT: 0-10

## 2025-01-20 NOTE — ASSESSMENT & PLAN NOTE
89-year-old female with past medical history of COPD, HTN, CVA, sick sinus syndrome s/p pacemaker, HFpEF, recent NSTEMI, and recent hospitalization for CHF exacerbation with acute pulmonary edema, imaging at that time was concerning for SVC thrombus and patient was evaluated by vascular who recommended DOAC for 6 months and repeat CT.  She presents with red blood per rectum and possible epistaxis. Hospitalized for further evaluation and management.     #GI bleed, suspect lower -> likely ischemic colitis (wall thickening at distal transverse colon, associated abdominal discomfort and dizziness, brown stool with red blood reported).  #Epistaxis ?- no reported vomiting.  Blood noted with clearing of her nose and when rinsing her mouth with fluids.   #SVC thrombus     Presentation is concerning for ischemic colitis as noted above.  Avoid hypotension  Consult gastroenterology, appreciate input  Will hold aspirin, Plavix, and Eliquis for the time being  Trend H&H, stable on presentation.  Patient agreeable to blood transfusions as needed.  N.p.o. after midnight  Low suspicion for upper GI source, however will continue with Protonix 40 mg IV twice daily until evaluated by GI.  Consult to vascular surgery regarding ongoing need for Eliquis.   Dr Smith's last note on 1/6/2025 “I have reviewed the DVT scan performed today.  There is no evidence of IJ thrombus.  Waveforms are normal in the IJ as well as subclavian, suggestive that even if there is a thrombus in the SVC, it is not hemodynamically significant.”   Check Orthostatic vitals     #Acute hypoxic respiratory failure  #Acute on chronic diastolic congestive heart failure  #History sick sinus syndrome s/p pacemaker  #History NSTEMI  #Valvular hear disease  #pleural effusions     CT angio A/P was suggestive of CHF with interstitial edema and small pleural effusions.   Low-sodium diet  Consult cardiology, patient follows with Dr Jiang and Ramicone for EP  Supplemental  oxygen as needed  Continue oral diuretics, will defer to cardiology IV diuresis .  DuoNebs as needed  RT to evaluate     #DVT Ppx  SCD  Holding ac        1/13: doing ok, informed about CT results, gi on board,      1/14: CLD. Plans for colonscopy to get definitive dx, ok to stop eliquis per vascular/cardio     1/15: plans for scope tomorrow     1/16: scope confirmed cancer, she is unsure if she wants surgery or not, will consult surgery to give her more information  1/17: contemplating surgyer, will need cardiac clearance will talk to family as well     1/18: patient seen by cardiology this am ; continue to hold aspirin and Plavix at this time.;  Patient noted with expiratory wheezes on exam.  Pulse ox is stable.  We will order chest x-rays and ensure that she is getting her nebulizers as ordered.    Awaiting Vascular surgery in put as well  Patient is still contemplating surgery  Continue to trend labs    1/19: lab work stable.  We will start Mucinex and Tessalon Perles for cough.  Patient appears agreeable for surgery we will continue discussion with surgical team.  Aspirin Plavix are on hold, seen by cardiology at discharge patient should only resume aspirin  Continue to monitor labs.  Plan of care discussed with attending   Dr. Iglesia mart.      1/20: plans to undergo surgery, date pending    I spent 35 minutes in the professional and overall care of this patient.

## 2025-01-20 NOTE — PROGRESS NOTES
Yesenia Milner is a 89 y.o. female on day 7 of admission presenting with Gastrointestinal hemorrhage, unspecified gastrointestinal hemorrhage type.      Subjective   Unable to get barium today, tech is out         Objective     Last Recorded Vitals  /56 (BP Location: Left arm, Patient Position: Sitting)   Pulse 73   Temp 36.4 °C (97.5 °F) (Temporal)   Resp 16   Wt 77 kg (169 lb 12.1 oz)   SpO2 94%   Intake/Output last 3 Shifts:    Intake/Output Summary (Last 24 hours) at 1/20/2025 1055  Last data filed at 1/19/2025 1800  Gross per 24 hour   Intake 730 ml   Output 550 ml   Net 180 ml       Admission Weight  Weight: 74.8 kg (165 lb) (01/12/25 1108)    Daily Weight  01/20/25 : 77 kg (169 lb 12.1 oz)    Image Results  XR chest 2 views  Narrative: Interpreted By:  Rupert Parekh,   STUDY:  XR CHEST 2 VIEWS; 1/18/2025 12:10 pm      INDICATION:  Signs/Symptoms:sob with wheezing      COMPARISON:  01/04/2025      ACCESSION NUMBER(S):  [id number]      ORDERING CLINICIAN:  HERIBERTO HAWKINS      TECHNIQUE:  PA and LAT views of the chest were obtained.      FINDINGS:  Left chest wall pacemaker and leads appear in good position. The  cardiomediastinal silhouette is unremarkable. Previously visualized  bilateral lower lobe airspace opacities appear markedly improved.  There are minimal dependent changes but no significant consolidation  is seen. No pleural effusion. Pulmonary vasculature and interstitium  appear within normal limits.      The osseous structures are intact.      Impression: No definitive acute cardiopulmonary process. Previously visualized  bilateral lower lobe airspace opacities appear markedly improved.  There are minimal dependent changes but no significant consolidation  is seen.          Signed by: Rupert Parekh 1/18/2025 12:31 PM  Dictation workstation:   [id number]      Physical Exam  Vitals and nursing note reviewed.   Constitutional:       Appearance: Normal appearance. She is normal  weight.   HENT:      Head: Normocephalic.      Nose: Nose normal.      Mouth/Throat:      Mouth: Mucous membranes are moist.   Eyes:      Extraocular Movements: Extraocular movements intact.      Conjunctiva/sclera: Conjunctivae normal.      Pupils: Pupils are equal, round, and reactive to light.   Cardiovascular:      Rate and Rhythm: Normal rate and regular rhythm.      Pulses: Normal pulses.      Heart sounds: Normal heart sounds.   Pulmonary:      Breath sounds: No wheezing.      Comments: Non productive cough   Abdominal:      General: Bowel sounds are normal. There is distension.      Palpations: Abdomen is soft.      Tenderness: There is no abdominal tenderness.   Musculoskeletal:         General: Normal range of motion.      Cervical back: Normal range of motion.   Skin:     General: Skin is warm and dry.   Neurological:      General: No focal deficit present.      Mental Status: She is alert and oriented to person, place, and time.   Psychiatric:         Mood and Affect: Mood normal.         Behavior: Behavior normal.         Relevant Results  Results for orders placed or performed during the hospital encounter of 01/12/25 (from the past 96 hours)   CBC   Result Value Ref Range    WBC 6.2 4.4 - 11.3 x10*3/uL    nRBC 0.0 0.0 - 0.0 /100 WBCs    RBC 3.00 (L) 4.00 - 5.20 x10*6/uL    Hemoglobin 8.6 (L) 12.0 - 16.0 g/dL    Hematocrit 26.6 (L) 36.0 - 46.0 %    MCV 89 80 - 100 fL    MCH 28.7 26.0 - 34.0 pg    MCHC 32.3 32.0 - 36.0 g/dL    RDW 13.4 11.5 - 14.5 %    Platelets 235 150 - 450 x10*3/uL   Basic Metabolic Panel   Result Value Ref Range    Glucose 99 74 - 99 mg/dL    Sodium 134 (L) 136 - 145 mmol/L    Potassium 3.2 (L) 3.5 - 5.3 mmol/L    Chloride 97 (L) 98 - 107 mmol/L    Bicarbonate 30 21 - 32 mmol/L    Anion Gap 10 10 - 20 mmol/L    Urea Nitrogen 23 6 - 23 mg/dL    Creatinine 0.96 0.50 - 1.05 mg/dL    eGFR 56 (L) >60 mL/min/1.73m*2    Calcium 8.5 (L) 8.6 - 10.3 mg/dL   Carcinoembryonic Antigen   Result  Value Ref Range    Carcinoembryonic AG 45.2 ug/L   CBC   Result Value Ref Range    WBC 5.4 4.4 - 11.3 x10*3/uL    nRBC 0.0 0.0 - 0.0 /100 WBCs    RBC 3.01 (L) 4.00 - 5.20 x10*6/uL    Hemoglobin 8.7 (L) 12.0 - 16.0 g/dL    Hematocrit 26.3 (L) 36.0 - 46.0 %    MCV 87 80 - 100 fL    MCH 28.9 26.0 - 34.0 pg    MCHC 33.1 32.0 - 36.0 g/dL    RDW 13.1 11.5 - 14.5 %    Platelets 246 150 - 450 x10*3/uL   Comprehensive Metabolic Panel   Result Value Ref Range    Glucose 100 (H) 74 - 99 mg/dL    Sodium 136 136 - 145 mmol/L    Potassium 3.2 (L) 3.5 - 5.3 mmol/L    Chloride 97 (L) 98 - 107 mmol/L    Bicarbonate 30 21 - 32 mmol/L    Anion Gap 12 10 - 20 mmol/L    Urea Nitrogen 23 6 - 23 mg/dL    Creatinine 0.95 0.50 - 1.05 mg/dL    eGFR 57 (L) >60 mL/min/1.73m*2    Calcium 8.5 (L) 8.6 - 10.3 mg/dL    Albumin 2.9 (L) 3.4 - 5.0 g/dL    Alkaline Phosphatase 76 33 - 136 U/L    Total Protein 5.3 (L) 6.4 - 8.2 g/dL    AST 15 9 - 39 U/L    Bilirubin, Total 0.4 0.0 - 1.2 mg/dL    ALT 16 7 - 45 U/L   CBC   Result Value Ref Range    WBC 5.5 4.4 - 11.3 x10*3/uL    nRBC 0.0 0.0 - 0.0 /100 WBCs    RBC 3.20 (L) 4.00 - 5.20 x10*6/uL    Hemoglobin 9.1 (L) 12.0 - 16.0 g/dL    Hematocrit 28.4 (L) 36.0 - 46.0 %    MCV 89 80 - 100 fL    MCH 28.4 26.0 - 34.0 pg    MCHC 32.0 32.0 - 36.0 g/dL    RDW 13.2 11.5 - 14.5 %    Platelets 280 150 - 450 x10*3/uL   Comprehensive Metabolic Panel   Result Value Ref Range    Glucose 92 74 - 99 mg/dL    Sodium 138 136 - 145 mmol/L    Potassium 3.5 3.5 - 5.3 mmol/L    Chloride 98 98 - 107 mmol/L    Bicarbonate 32 21 - 32 mmol/L    Anion Gap 12 10 - 20 mmol/L    Urea Nitrogen 22 6 - 23 mg/dL    Creatinine 0.93 0.50 - 1.05 mg/dL    eGFR 58 (L) >60 mL/min/1.73m*2    Calcium 8.8 8.6 - 10.3 mg/dL    Albumin 3.2 (L) 3.4 - 5.0 g/dL    Alkaline Phosphatase 84 33 - 136 U/L    Total Protein 5.8 (L) 6.4 - 8.2 g/dL    AST 17 9 - 39 U/L    Bilirubin, Total 0.3 0.0 - 1.2 mg/dL    ALT 17 7 - 45 U/L   CBC   Result Value Ref Range     WBC 5.1 4.4 - 11.3 x10*3/uL    nRBC 0.0 0.0 - 0.0 /100 WBCs    RBC 3.19 (L) 4.00 - 5.20 x10*6/uL    Hemoglobin 9.1 (L) 12.0 - 16.0 g/dL    Hematocrit 28.1 (L) 36.0 - 46.0 %    MCV 88 80 - 100 fL    MCH 28.5 26.0 - 34.0 pg    MCHC 32.4 32.0 - 36.0 g/dL    RDW 13.3 11.5 - 14.5 %    Platelets 293 150 - 450 x10*3/uL   Comprehensive Metabolic Panel   Result Value Ref Range    Glucose 95 74 - 99 mg/dL    Sodium 136 136 - 145 mmol/L    Potassium 4.0 3.5 - 5.3 mmol/L    Chloride 98 98 - 107 mmol/L    Bicarbonate 32 21 - 32 mmol/L    Anion Gap 10 10 - 20 mmol/L    Urea Nitrogen 21 6 - 23 mg/dL    Creatinine 0.95 0.50 - 1.05 mg/dL    eGFR 57 (L) >60 mL/min/1.73m*2    Calcium 9.1 8.6 - 10.3 mg/dL    Albumin 3.2 (L) 3.4 - 5.0 g/dL    Alkaline Phosphatase 81 33 - 136 U/L    Total Protein 5.8 (L) 6.4 - 8.2 g/dL    AST 16 9 - 39 U/L    Bilirubin, Total 0.3 0.0 - 1.2 mg/dL    ALT 16 7 - 45 U/L      No current facility-administered medications on file prior to encounter.     Current Outpatient Medications on File Prior to Encounter   Medication Sig Dispense Refill    apixaban (Eliquis) 5 mg tablet Take 1 tablet (5 mg) by mouth 2 times a day. 180 tablet 3    aspirin 81 mg chewable tablet Chew 1 tablet (81 mg) once daily.      calcium carbonate-vitamin D3 (Calcium 600 + D,3,) 600 mg-5 mcg (200 unit) tablet Take 1 tablet by mouth once daily.      clopidogrel (Plavix) 75 mg tablet Take 1 tablet (75 mg) by mouth once daily. 90 tablet 1    fluticasone propion-salmeteroL (Advair) 115-21 mcg/actuation inhaler Inhale 2 puffs 2 times a day. Rinse mouth with water after use to reduce aftertaste and incidence of candidiasis. Do not swallow. 12 g 11    lubricating eye drops ophthalmic solution Administer 1 drop into both eyes if needed for dry eyes.      metoprolol tartrate (Lopressor) 25 mg tablet Take 0.5 tablets (12.5 mg) by mouth 2 times a day. 60 tablet 0    torsemide (Demadex) 20 mg tablet Take 1 tablet (20 mg) by mouth once daily. 30  tablet 0    vit C,P-Rx-pmqoj-lutein-zeaxan (PreserVision AREDS-2) 250-90-40-1 mg capsule Take 2 capsules by mouth once daily.           Assessment/Plan                  Assessment & Plan  Gastrointestinal hemorrhage, unspecified gastrointestinal hemorrhage type  89-year-old female with past medical history of COPD, HTN, CVA, sick sinus syndrome s/p pacemaker, HFpEF, recent NSTEMI, and recent hospitalization for CHF exacerbation with acute pulmonary edema, imaging at that time was concerning for SVC thrombus and patient was evaluated by vascular who recommended DOAC for 6 months and repeat CT.  She presents with red blood per rectum and possible epistaxis. Hospitalized for further evaluation and management.     #GI bleed, suspect lower -> likely ischemic colitis (wall thickening at distal transverse colon, associated abdominal discomfort and dizziness, brown stool with red blood reported).  #Epistaxis ?- no reported vomiting.  Blood noted with clearing of her nose and when rinsing her mouth with fluids.   #SVC thrombus     Presentation is concerning for ischemic colitis as noted above.  Avoid hypotension  Consult gastroenterology, appreciate input  Will hold aspirin, Plavix, and Eliquis for the time being  Trend H&H, stable on presentation.  Patient agreeable to blood transfusions as needed.  N.p.o. after midnight  Low suspicion for upper GI source, however will continue with Protonix 40 mg IV twice daily until evaluated by GI.  Consult to vascular surgery regarding ongoing need for Eliquis.   Dr Smith's last note on 1/6/2025 “I have reviewed the DVT scan performed today.  There is no evidence of IJ thrombus.  Waveforms are normal in the IJ as well as subclavian, suggestive that even if there is a thrombus in the SVC, it is not hemodynamically significant.”   Check Orthostatic vitals     #Acute hypoxic respiratory failure  #Acute on chronic diastolic congestive heart failure  #History sick sinus syndrome s/p  pacemaker  #History NSTEMI  #Valvular hear disease  #pleural effusions     CT angio A/P was suggestive of CHF with interstitial edema and small pleural effusions.   Low-sodium diet  Consult cardiology, patient follows with Dr Jiang and Ramicone for EP  Supplemental oxygen as needed  Continue oral diuretics, will defer to cardiology IV diuresis .  DuoNebs as needed  RT to evaluate     #DVT Ppx  SCD  Holding ac        1/13: doing ok, informed about CT results, gi on board,      1/14: CLD. Plans for colonscopy to get definitive dx, ok to stop eliquis per vascular/cardio     1/15: plans for scope tomorrow     1/16: scope confirmed cancer, she is unsure if she wants surgery or not, will consult surgery to give her more information  1/17: contemplating surgyer, will need cardiac clearance will talk to family as well     1/18: patient seen by cardiology this am ; continue to hold aspirin and Plavix at this time.;  Patient noted with expiratory wheezes on exam.  Pulse ox is stable.  We will order chest x-rays and ensure that she is getting her nebulizers as ordered.    Awaiting Vascular surgery in put as well  Patient is still contemplating surgery  Continue to trend labs    1/19: lab work stable.  We will start Mucinex and Tessalon Perles for cough.  Patient appears agreeable for surgery we will continue discussion with surgical team.  Aspirin Plavix are on hold, seen by cardiology at discharge patient should only resume aspirin  Continue to monitor labs.  Plan of care discussed with attending   Dr. Iglesia mart.      1/20: plans to undergo surgery, date pending    I spent 35 minutes in the professional and overall care of this patient.                Iglesia Mart, DO

## 2025-01-20 NOTE — CARE PLAN
The patient's goals for the shift include remain comfortable.    The clinical goals for the shift include Patient will remain free of injury    Over the shift, the patient did not make progress toward the following goals. Barriers to progression include discomfort/ weakness. Recommendations to address these barriers include hourly rounding.

## 2025-01-20 NOTE — PROGRESS NOTES
"Yesenia Milner is a 89 y.o. female on day 7 of admission presenting with Gastrointestinal hemorrhage, unspecified gastrointestinal hemorrhage type.    Subjective   Follow-up potentially obstructing carcinoma of the colon.  No new symptoms.  Awaiting barium enema planned for tomorrow.       Objective   10 review of systems otherwise negative  Physical Exam appears stated age  GENERAL  MENTAL STATUS - Alert. GENERAL APPEARANCE - Cooperative and well groomed. ORIENTATION - Oriented X4. BUILD & NUTRITION - Well nourished and well developed. HYDRATION - Well hydrated.    INTEGUMENTARY  GENERAL CHARACTERISTICS - Overall examination of the patient's skin reveals no rashes. COLOR - not icteric. SKIN MOISTURE - normal skin moisture. TEMPERATURE - normal worth is noted.    HEAD AND NECK  HEAD  HEAD SHAPE - Atraumatic and normocephalic.  TRACHEA - midline.  THYROID  GLAND CHARACTERISTICS - normal size and consistency and no palpable nodules.    EYE  SCLERA/CONJUNCTIVA - BILATERAL - Anicteric.    CHEST AND LUNG EXAM  AUSCULTATION -  BREATH SOUNDS - Clear.    BREAST - Did not examine.    CARDIOVASCULAR  AUSCULTATION: RHYTHM - Regular. HEART SOUNDS - Normal heart sounds.  MURMURS & OTHER HEART SOUNDS - Auscultation of the heart reveals regular rate and no murmurs.    ABDOMEN  PALPATION/PERCUSSION - Palpation and percussion of the abdomen reveal soft, non tender, no mass and no hepatosplenomegaly.    LYMPHATIC  GENERAL LYMPHATICS  BREAST LYMPHATIC EXAM - No cervical adenopathy, supraclavicular adenopathy or axilliary adenopathy.       Last Recorded Vitals  Blood pressure 127/59, pulse 66, temperature 35.9 °C (96.6 °F), temperature source Temporal, resp. rate 18, height 1.62 m (5' 3.78\"), weight 77 kg (169 lb 12.1 oz), SpO2 94%.  Intake/Output last 3 Shifts:  I/O last 3 completed shifts:  In: 1090 (14.2 mL/kg) [P.O.:1090]  Out: 1350 (17.5 mL/kg) [Urine:1350 (0.5 mL/kg/hr)]  Weight: 77 kg     Relevant Results             "                  Assessment/Plan   Assessment & Plan    I reviewed the findings of the advanced practitioner in the documentation.  Her findings are the same as mine and I agree.  Awaiting imaging and will require additional prep.  Dr. Vazquez to return in a.m.       I spent 20 minutes in the professional and overall care of this patient.      Henry Katz MD

## 2025-01-20 NOTE — PROGRESS NOTES
01/20/25 1345   Discharge Planning   Living Arrangements Alone   Support Systems Children   Assistance Needed none   Type of Residence Private residence   Number of Stairs to Enter Residence 0   Home or Post Acute Services In home services   Type of Home Care Services Home health aide   Expected Discharge Disposition Home H   Does the patient need discharge transport arranged? No     TCC Note: Pt Was seen by other TCC on 1/16. Attempted to meet with pt however, pt was sleeeping. Will attempt again later. Mitali Pratt, MSN, RN, TCC.

## 2025-01-20 NOTE — CARE PLAN
The patient's goals for the shift include sleep    The clinical goals for the shift include Patient will remain free of injury

## 2025-01-20 NOTE — PROGRESS NOTES
"Yesenia Milner is a 89 y.o. female on day 7 of admission presenting with Gastrointestinal hemorrhage, unspecified gastrointestinal hemorrhage type.    Subjective   Patient denies any abdominal pain, nausea, or vomiting. Had tap water enema last night and this AM. Describes output as light tan in color. She is uncertain if there were any pieces of stool, but does not think so.       Objective     Physical Exam  Vitals reviewed.   Constitutional:       Appearance: She is not ill-appearing.      Comments: Elderly female resting in hospital bed- eyes closed. No acute complaints when woken    HENT:      Mouth/Throat:      Mouth: Mucous membranes are moist.   Cardiovascular:      Rate and Rhythm: Normal rate. Rhythm irregular.      Heart sounds: Normal heart sounds.      Comments: Left chest wall PPM, irregular to auscultation   Pulmonary:      Effort: Pulmonary effort is normal. No respiratory distress.      Breath sounds: Normal breath sounds.   Abdominal:      General: Bowel sounds are normal. There is no distension.      Palpations: Abdomen is soft.      Tenderness: There is no abdominal tenderness.   Skin:     General: Skin is warm and dry.   Neurological:      Mental Status: She is alert and oriented to person, place, and time.   Psychiatric:         Mood and Affect: Mood normal.         Behavior: Behavior normal.         Last Recorded Vitals  Blood pressure 116/56, pulse 73, temperature 36.4 °C (97.5 °F), temperature source Temporal, resp. rate 16, height 1.62 m (5' 3.78\"), weight 77 kg (169 lb 12.1 oz), SpO2 94%.  Intake/Output last 3 Shifts:  I/O last 3 completed shifts:  In: 1090 (14.2 mL/kg) [P.O.:1090]  Out: 1350 (17.5 mL/kg) [Urine:1350 (0.5 mL/kg/hr)]  Weight: 77 kg     Relevant Results  Results for orders placed or performed during the hospital encounter of 01/12/25 (from the past 24 hours)   CBC   Result Value Ref Range    WBC 5.1 4.4 - 11.3 x10*3/uL    nRBC 0.0 0.0 - 0.0 /100 WBCs    RBC 3.19 (L) 4.00 - " 5.20 x10*6/uL    Hemoglobin 9.1 (L) 12.0 - 16.0 g/dL    Hematocrit 28.1 (L) 36.0 - 46.0 %    MCV 88 80 - 100 fL    MCH 28.5 26.0 - 34.0 pg    MCHC 32.4 32.0 - 36.0 g/dL    RDW 13.3 11.5 - 14.5 %    Platelets 293 150 - 450 x10*3/uL   Comprehensive Metabolic Panel   Result Value Ref Range    Glucose 95 74 - 99 mg/dL    Sodium 136 136 - 145 mmol/L    Potassium 4.0 3.5 - 5.3 mmol/L    Chloride 98 98 - 107 mmol/L    Bicarbonate 32 21 - 32 mmol/L    Anion Gap 10 10 - 20 mmol/L    Urea Nitrogen 21 6 - 23 mg/dL    Creatinine 0.95 0.50 - 1.05 mg/dL    eGFR 57 (L) >60 mL/min/1.73m*2    Calcium 9.1 8.6 - 10.3 mg/dL    Albumin 3.2 (L) 3.4 - 5.0 g/dL    Alkaline Phosphatase 81 33 - 136 U/L    Total Protein 5.8 (L) 6.4 - 8.2 g/dL    AST 16 9 - 39 U/L    Bilirubin, Total 0.3 0.0 - 1.2 mg/dL    ALT 16 7 - 45 U/L     *Note: Due to a large number of results and/or encounters for the requested time period, some results have not been displayed. A complete set of results can be found in Results Review.         Assessment/Plan   Assessment & Plan      89 year old female we are seeing for new colon cancer.  She has a history significant for COPD (not on O2), HTN, CVA (on Plavix), SSS s/p PPM, HFpEF (60-65%, but with WMA), recent NSTEMI previously on DAPT (12/2024), concern for SVC thrombus previously on Eliquis, and recent hospitalization for ADHF. Was admitted from home on 1/12 with concern for GI bleed.      Impression:  Adenocarcinoma of Transverse colon   > patient is not completely obstructed, but is high risk for developing colon obstruction 2/2 circumferential tumor burden   > CEA level quite elevated, concerning for metastatic disease, but no evidence on staging CTs     Recommendations:  - Patient agreeable to transverse colectomy, timing TBD  - Awaiting barium enema study (apparently it cannot be done today)    > NPO after midnight until after barium enema  - Cardiology consult for preoperative cardiac clearance    > Increased  but acceptable risk    > Hold DAPT pre-op, resume ASA only post-op  - Per endovascular, okay to discontinue AC  - DVT ppx with lovenox and SCDs     The patient will be seen and discussed with the attending surgeon on call. Dr. Katz. Dr. Vazquez to resume care tomorrow     I spent 15 minutes in the professional and overall care of this patient.      Cecilia Hobson, APRN-CNP

## 2025-01-21 ENCOUNTER — APPOINTMENT (OUTPATIENT)
Dept: RADIOLOGY | Facility: HOSPITAL | Age: OVER 89
End: 2025-01-21
Payer: MEDICARE

## 2025-01-21 PROBLEM — C18.4 MALIGNANT NEOPLASM OF TRANSVERSE COLON (MULTI): Status: ACTIVE | Noted: 2025-01-21

## 2025-01-21 PROCEDURE — 2500000004 HC RX 250 GENERAL PHARMACY W/ HCPCS (ALT 636 FOR OP/ED)

## 2025-01-21 PROCEDURE — 1200000002 HC GENERAL ROOM WITH TELEMETRY DAILY

## 2025-01-21 PROCEDURE — 99232 SBSQ HOSP IP/OBS MODERATE 35: CPT | Performed by: STUDENT IN AN ORGANIZED HEALTH CARE EDUCATION/TRAINING PROGRAM

## 2025-01-21 PROCEDURE — 74270 X-RAY XM COLON 1CNTRST STD: CPT | Performed by: STUDENT IN AN ORGANIZED HEALTH CARE EDUCATION/TRAINING PROGRAM

## 2025-01-21 PROCEDURE — 99232 SBSQ HOSP IP/OBS MODERATE 35: CPT | Performed by: SURGERY

## 2025-01-21 PROCEDURE — 99231 SBSQ HOSP IP/OBS SF/LOW 25: CPT | Performed by: REGISTERED NURSE

## 2025-01-21 PROCEDURE — 74270 X-RAY XM COLON 1CNTRST STD: CPT

## 2025-01-21 PROCEDURE — 2500000004 HC RX 250 GENERAL PHARMACY W/ HCPCS (ALT 636 FOR OP/ED): Performed by: NURSE PRACTITIONER

## 2025-01-21 PROCEDURE — 2500000001 HC RX 250 WO HCPCS SELF ADMINISTERED DRUGS (ALT 637 FOR MEDICARE OP): Performed by: NURSE PRACTITIONER

## 2025-01-21 RX ORDER — DIATRIZOATE MEGLUMINE AND DIATRIZOATE SODIUM 660; 100 MG/ML; MG/ML
60 SOLUTION ORAL; RECTAL ONCE
Status: COMPLETED | OUTPATIENT
Start: 2025-01-21 | End: 2025-01-21

## 2025-01-21 RX ADMIN — FLUTICASONE PROPIONATE 2 SPRAY: 50 SPRAY, METERED NASAL at 09:08

## 2025-01-21 RX ADMIN — Medication 1 TABLET: at 09:06

## 2025-01-21 RX ADMIN — GUAIFENESIN 600 MG: 600 TABLET, EXTENDED RELEASE ORAL at 09:06

## 2025-01-21 RX ADMIN — TORSEMIDE 20 MG: 20 TABLET ORAL at 09:06

## 2025-01-21 RX ADMIN — METOPROLOL TARTRATE 12.5 MG: 25 TABLET, FILM COATED ORAL at 09:06

## 2025-01-21 RX ADMIN — ENOXAPARIN SODIUM 40 MG: 40 INJECTION SUBCUTANEOUS at 18:11

## 2025-01-21 RX ADMIN — DIATRIZOATE MEGLUMINE AND DIATRIZOATE SODIUM 510 ML: 660; 100 SOLUTION ORAL; RECTAL at 14:27

## 2025-01-21 RX ADMIN — GUAIFENESIN 600 MG: 600 TABLET, EXTENDED RELEASE ORAL at 20:33

## 2025-01-21 RX ADMIN — METOPROLOL TARTRATE 12.5 MG: 25 TABLET, FILM COATED ORAL at 20:33

## 2025-01-21 ASSESSMENT — COGNITIVE AND FUNCTIONAL STATUS - GENERAL
TOILETING: A LITTLE
WALKING IN HOSPITAL ROOM: A LITTLE
DAILY ACTIVITIY SCORE: 21
STANDING UP FROM CHAIR USING ARMS: A LITTLE
CLIMB 3 TO 5 STEPS WITH RAILING: A LOT
DRESSING REGULAR LOWER BODY CLOTHING: A LITTLE
MOVING TO AND FROM BED TO CHAIR: A LITTLE
MOBILITY SCORE: 19
HELP NEEDED FOR BATHING: A LITTLE

## 2025-01-21 ASSESSMENT — PAIN SCALES - GENERAL
PAINLEVEL_OUTOF10: 0 - NO PAIN
PAINLEVEL_OUTOF10: 0 - NO PAIN

## 2025-01-21 ASSESSMENT — PAIN - FUNCTIONAL ASSESSMENT: PAIN_FUNCTIONAL_ASSESSMENT: 0-10

## 2025-01-21 NOTE — CARE PLAN
The patient's goals for the shift include sleep    The clinical goals for the shift include Patient will remain safe and free from falls throughout the shift.

## 2025-01-21 NOTE — CARE PLAN
The patient's goals for the shift include sleep    The clinical goals for the shift include pt will remain safe throughout the shift.

## 2025-01-21 NOTE — ASSESSMENT & PLAN NOTE
89-year-old female with past medical history of COPD, HTN, CVA, sick sinus syndrome s/p pacemaker, HFpEF, recent NSTEMI, and recent hospitalization for CHF exacerbation with acute pulmonary edema, imaging at that time was concerning for SVC thrombus and patient was evaluated by vascular who recommended DOAC for 6 months and repeat CT.  She presents with red blood per rectum and possible epistaxis. Hospitalized for further evaluation and management.     #GI bleed, suspect lower -> likely ischemic colitis (wall thickening at distal transverse colon, associated abdominal discomfort and dizziness, brown stool with red blood reported).  #Epistaxis ?- no reported vomiting.  Blood noted with clearing of her nose and when rinsing her mouth with fluids.   #SVC thrombus     Presentation is concerning for ischemic colitis as noted above.  Avoid hypotension  Consult gastroenterology, appreciate input  Will hold aspirin, Plavix, and Eliquis for the time being  Trend H&H, stable on presentation.  Patient agreeable to blood transfusions as needed.  N.p.o. after midnight  Low suspicion for upper GI source, however will continue with Protonix 40 mg IV twice daily until evaluated by GI.  Consult to vascular surgery regarding ongoing need for Eliquis.   Dr Smith's last note on 1/6/2025 “I have reviewed the DVT scan performed today.  There is no evidence of IJ thrombus.  Waveforms are normal in the IJ as well as subclavian, suggestive that even if there is a thrombus in the SVC, it is not hemodynamically significant.”   Check Orthostatic vitals     #Acute hypoxic respiratory failure  #Acute on chronic diastolic congestive heart failure  #History sick sinus syndrome s/p pacemaker  #History NSTEMI  #Valvular hear disease  #pleural effusions     CT angio A/P was suggestive of CHF with interstitial edema and small pleural effusions.   Low-sodium diet  Consult cardiology, patient follows with Dr Jiang and Ramicone for EP  Supplemental  oxygen as needed  Continue oral diuretics, will defer to cardiology IV diuresis .  DuoNebs as needed  RT to evaluate     #DVT Ppx  SCD  Holding ac        1/13: doing ok, informed about CT results, gi on board,      1/14: CLD. Plans for colonscopy to get definitive dx, ok to stop eliquis per vascular/cardio     1/15: plans for scope tomorrow     1/16: scope confirmed cancer, she is unsure if she wants surgery or not, will consult surgery to give her more information  1/17: contemplating surgyer, will need cardiac clearance will talk to family as well     1/18: patient seen by cardiology this am ; continue to hold aspirin and Plavix at this time.;  Patient noted with expiratory wheezes on exam.  Pulse ox is stable.  We will order chest x-rays and ensure that she is getting her nebulizers as ordered.    Awaiting Vascular surgery in put as well  Patient is still contemplating surgery  Continue to trend labs    1/19: lab work stable.  We will start Mucinex and Tessalon Perles for cough.  Patient appears agreeable for surgery we will continue discussion with surgical team.  Aspirin Plavix are on hold, seen by cardiology at discharge patient should only resume aspirin  Continue to monitor labs.  Plan of care discussed with attending   Dr. Iglesia mart.      1/20: plans to undergo surgery, date pending    1/21: continue pre op preparation    I spent 35 minutes in the professional and overall care of this patient.

## 2025-01-21 NOTE — PROGRESS NOTES
"Yesenia Milner is a 89 y.o. female on day 8 of admission presenting with Gastrointestinal hemorrhage, unspecified gastrointestinal hemorrhage type.  I reviewed the progress notes from the past 3 days.    Subjective   The patient has no abdominal pain.  She is tolerating a diet without nausea or vomiting.  She is having bowel function.    Objective     Physical Exam  Well-developed, well-nourished, no acute distress, alert and oriented  Abdomen: Nondistended, soft, nontender    Last Recorded Vitals  Blood pressure 106/50, pulse 73, temperature 35.9 °C (96.6 °F), temperature source Temporal, resp. rate 18, height 1.62 m (5' 3.78\"), weight 77 kg (169 lb 12.1 oz), SpO2 93%.  Intake/Output last 3 Shifts:  I/O last 3 completed shifts:  In: - (0 mL/kg)   Out: 550 (7.1 mL/kg) [Urine:550 (0.2 mL/kg/hr)]  Weight: 77 kg     Relevant Results  Labs from yesterday: WBC 5.1, hemoglobin 9.1.  Electrolytes normal.  BUN/creatinine normal.  Liver function tests normal.    Patient is scheduled for barium enema today which is pending.    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  Prior to being started on anticoagulation and antiplatelet therapy the patient was asymptomatic.  Her GI bleeding has stopped since discontinuing the medications.  Her CEA level is significantly elevated, but no metastatic disease was identified on imaging.  I have discussed laparoscopic transverse colectomy with possible open operation with the patient and her daughter including the procedure and risks.  The risks include bleeding, infection, injury to surrounding structures such as intestines/blood vessel/ureter/nerves/spleen, anastomotic leak, cardiac/pulmonary/renal complications.  I also discussed the possibility of requiring conversion to an open operation.  The patient initially declined an operation, but now states " that she is agreeable to having an operation.  The patient is at increased risk due to her multiple comorbid conditions including her recent MI and CHF episodes within the past month.    Cardiology evaluated the patient.  They feel patient is at increased but acceptable risk for bowel surgery from a cardiac standpoint.  They would consider resuming aspirin therapy alone postoperatively.  Continue beta-blocker.  Await barium enema results.    Devan Vazquez MD

## 2025-01-21 NOTE — PROGRESS NOTES
"Yesenia Milner is a 89 y.o. female on day 8 of admission presenting with Gastrointestinal hemorrhage, unspecified gastrointestinal hemorrhage type.    Subjective   Awaiting final results from Gastrografin enema study today (apparently Barium can no longer be given as enema per radiology department).  Patient Tolerated CLD yesterday. Denies abdominal pain, nausea, or vomiting.        Objective     Physical Exam  Vitals reviewed.   Constitutional:       Appearance: She is not ill-appearing.      Comments: Elderly female resting in hospital bed- eyes closed. No acute complaints when woken    HENT:      Mouth/Throat:      Mouth: Mucous membranes are moist.   Cardiovascular:      Rate and Rhythm: Normal rate. Rhythm irregular.      Heart sounds: Normal heart sounds.      Comments: Left chest wall PPM, irregular to auscultation   Pulmonary:      Effort: Pulmonary effort is normal. No respiratory distress.      Breath sounds: Normal breath sounds.   Abdominal:      General: Bowel sounds are normal. There is no distension.      Palpations: Abdomen is soft.      Tenderness: There is no abdominal tenderness.   Skin:     General: Skin is warm and dry.   Neurological:      Mental Status: She is alert and oriented to person, place, and time.   Psychiatric:         Mood and Affect: Mood normal.         Behavior: Behavior normal.         Last Recorded Vitals  Blood pressure 106/50, pulse 73, temperature 35.9 °C (96.6 °F), temperature source Temporal, resp. rate 18, height 1.62 m (5' 3.78\"), weight 77 kg (169 lb 12.1 oz), SpO2 93%.  Intake/Output last 3 Shifts:  I/O last 3 completed shifts:  In: - (0 mL/kg)   Out: 550 (7.1 mL/kg) [Urine:550 (0.2 mL/kg/hr)]  Weight: 77 kg       Assessment/Plan   Assessment & Plan  Malignant neoplasm of transverse colon (Multi)    89 year old female we are seeing for new colon cancer.  She has a history significant for COPD (not on O2), HTN, CVA (on Plavix), SSS s/p PPM, HFpEF (60-65%, but with " WMA), recent NSTEMI previously on DAPT (12/2024), concern for SVC thrombus previously on Eliquis, and recent hospitalization for ADHF. Was admitted from home on 1/12 with concern for GI bleed.      Impression:  Adenocarcinoma of Transverse colon   > Apple-core lesion distal transverse colon per radiologist impression of gastrografin enema images    > patient is not completely obstructed, but is high risk for developing colon obstruction 2/2 circumferential tumor burden   > CEA level quite elevated, concerning for metastatic disease, but no evidence on staging CTs     Recommendations:  - Patient agreeable to transverse colectomy, timing TBD   > repeat bowel prep is needed prior to laparoscopic colectomy; awaiting final decision regarding inpatient versus early outpatient   - FLD after enema study today. If decision is for early outpatient surgery, will place patient on solid food; however, will do low fiber and encourage more liquid-diet (with PO supplements) to minimize risk of obstructing   - Cardiology consult for preoperative cardiac clearance    > Increased but acceptable risk    > Hold DAPT pre-op, resume ASA only post-op  - Per endovascular, okay to discontinue AC  - DVT ppx with lovenox and SCDs     Dispo:  Not yet appropriate for hospital discharge    The patient was seen and discussed with the attending surgeon Dr. Vazquez    I spent 20 minutes in the professional and overall care of this patient.      Cecilia Hobson, APRN-CNP

## 2025-01-21 NOTE — PROGRESS NOTES
Yesenia Milner is a 89 y.o. female on day 8 of admission presenting with Gastrointestinal hemorrhage, unspecified gastrointestinal hemorrhage type.      Subjective   Barium today,          Objective     Last Recorded Vitals  /60   Pulse 72   Temp 35.7 °C (96.3 °F) (Temporal)   Resp 16   Wt 77 kg (169 lb 12.1 oz)   SpO2 93%   Intake/Output last 3 Shifts:    Intake/Output Summary (Last 24 hours) at 1/21/2025 0915  Last data filed at 1/21/2025 0554  Gross per 24 hour   Intake --   Output 550 ml   Net -550 ml       Admission Weight  Weight: 74.8 kg (165 lb) (01/12/25 1108)    Daily Weight  01/20/25 : 77 kg (169 lb 12.1 oz)    Image Results  XR chest 2 views  Narrative: Interpreted By:  Rupert Parekh,   STUDY:  XR CHEST 2 VIEWS; 1/18/2025 12:10 pm      INDICATION:  Signs/Symptoms:sob with wheezing      COMPARISON:  01/04/2025      ACCESSION NUMBER(S):  [id number]      ORDERING CLINICIAN:  HERIBERTO HAWKINS      TECHNIQUE:  PA and LAT views of the chest were obtained.      FINDINGS:  Left chest wall pacemaker and leads appear in good position. The  cardiomediastinal silhouette is unremarkable. Previously visualized  bilateral lower lobe airspace opacities appear markedly improved.  There are minimal dependent changes but no significant consolidation  is seen. No pleural effusion. Pulmonary vasculature and interstitium  appear within normal limits.      The osseous structures are intact.      Impression: No definitive acute cardiopulmonary process. Previously visualized  bilateral lower lobe airspace opacities appear markedly improved.  There are minimal dependent changes but no significant consolidation  is seen.          Signed by: Rupert Parekh 1/18/2025 12:31 PM  Dictation workstation:   [id number]      Physical Exam  Vitals and nursing note reviewed.   Constitutional:       Appearance: Normal appearance. She is normal weight.   HENT:      Head: Normocephalic.      Nose: Nose normal.       Mouth/Throat:      Mouth: Mucous membranes are moist.   Eyes:      Extraocular Movements: Extraocular movements intact.      Conjunctiva/sclera: Conjunctivae normal.      Pupils: Pupils are equal, round, and reactive to light.   Cardiovascular:      Rate and Rhythm: Normal rate and regular rhythm.      Pulses: Normal pulses.      Heart sounds: Normal heart sounds.   Pulmonary:      Breath sounds: No wheezing.      Comments: Non productive cough   Abdominal:      General: Bowel sounds are normal. There is distension.      Palpations: Abdomen is soft.      Tenderness: There is no abdominal tenderness.   Musculoskeletal:         General: Normal range of motion.      Cervical back: Normal range of motion.   Skin:     General: Skin is warm and dry.   Neurological:      General: No focal deficit present.      Mental Status: She is alert and oriented to person, place, and time.   Psychiatric:         Mood and Affect: Mood normal.         Behavior: Behavior normal.         Relevant Results  Results for orders placed or performed during the hospital encounter of 01/12/25 (from the past 96 hours)   CBC   Result Value Ref Range    WBC 5.4 4.4 - 11.3 x10*3/uL    nRBC 0.0 0.0 - 0.0 /100 WBCs    RBC 3.01 (L) 4.00 - 5.20 x10*6/uL    Hemoglobin 8.7 (L) 12.0 - 16.0 g/dL    Hematocrit 26.3 (L) 36.0 - 46.0 %    MCV 87 80 - 100 fL    MCH 28.9 26.0 - 34.0 pg    MCHC 33.1 32.0 - 36.0 g/dL    RDW 13.1 11.5 - 14.5 %    Platelets 246 150 - 450 x10*3/uL   Comprehensive Metabolic Panel   Result Value Ref Range    Glucose 100 (H) 74 - 99 mg/dL    Sodium 136 136 - 145 mmol/L    Potassium 3.2 (L) 3.5 - 5.3 mmol/L    Chloride 97 (L) 98 - 107 mmol/L    Bicarbonate 30 21 - 32 mmol/L    Anion Gap 12 10 - 20 mmol/L    Urea Nitrogen 23 6 - 23 mg/dL    Creatinine 0.95 0.50 - 1.05 mg/dL    eGFR 57 (L) >60 mL/min/1.73m*2    Calcium 8.5 (L) 8.6 - 10.3 mg/dL    Albumin 2.9 (L) 3.4 - 5.0 g/dL    Alkaline Phosphatase 76 33 - 136 U/L    Total Protein 5.3 (L)  6.4 - 8.2 g/dL    AST 15 9 - 39 U/L    Bilirubin, Total 0.4 0.0 - 1.2 mg/dL    ALT 16 7 - 45 U/L   CBC   Result Value Ref Range    WBC 5.5 4.4 - 11.3 x10*3/uL    nRBC 0.0 0.0 - 0.0 /100 WBCs    RBC 3.20 (L) 4.00 - 5.20 x10*6/uL    Hemoglobin 9.1 (L) 12.0 - 16.0 g/dL    Hematocrit 28.4 (L) 36.0 - 46.0 %    MCV 89 80 - 100 fL    MCH 28.4 26.0 - 34.0 pg    MCHC 32.0 32.0 - 36.0 g/dL    RDW 13.2 11.5 - 14.5 %    Platelets 280 150 - 450 x10*3/uL   Comprehensive Metabolic Panel   Result Value Ref Range    Glucose 92 74 - 99 mg/dL    Sodium 138 136 - 145 mmol/L    Potassium 3.5 3.5 - 5.3 mmol/L    Chloride 98 98 - 107 mmol/L    Bicarbonate 32 21 - 32 mmol/L    Anion Gap 12 10 - 20 mmol/L    Urea Nitrogen 22 6 - 23 mg/dL    Creatinine 0.93 0.50 - 1.05 mg/dL    eGFR 58 (L) >60 mL/min/1.73m*2    Calcium 8.8 8.6 - 10.3 mg/dL    Albumin 3.2 (L) 3.4 - 5.0 g/dL    Alkaline Phosphatase 84 33 - 136 U/L    Total Protein 5.8 (L) 6.4 - 8.2 g/dL    AST 17 9 - 39 U/L    Bilirubin, Total 0.3 0.0 - 1.2 mg/dL    ALT 17 7 - 45 U/L   CBC   Result Value Ref Range    WBC 5.1 4.4 - 11.3 x10*3/uL    nRBC 0.0 0.0 - 0.0 /100 WBCs    RBC 3.19 (L) 4.00 - 5.20 x10*6/uL    Hemoglobin 9.1 (L) 12.0 - 16.0 g/dL    Hematocrit 28.1 (L) 36.0 - 46.0 %    MCV 88 80 - 100 fL    MCH 28.5 26.0 - 34.0 pg    MCHC 32.4 32.0 - 36.0 g/dL    RDW 13.3 11.5 - 14.5 %    Platelets 293 150 - 450 x10*3/uL   Comprehensive Metabolic Panel   Result Value Ref Range    Glucose 95 74 - 99 mg/dL    Sodium 136 136 - 145 mmol/L    Potassium 4.0 3.5 - 5.3 mmol/L    Chloride 98 98 - 107 mmol/L    Bicarbonate 32 21 - 32 mmol/L    Anion Gap 10 10 - 20 mmol/L    Urea Nitrogen 21 6 - 23 mg/dL    Creatinine 0.95 0.50 - 1.05 mg/dL    eGFR 57 (L) >60 mL/min/1.73m*2    Calcium 9.1 8.6 - 10.3 mg/dL    Albumin 3.2 (L) 3.4 - 5.0 g/dL    Alkaline Phosphatase 81 33 - 136 U/L    Total Protein 5.8 (L) 6.4 - 8.2 g/dL    AST 16 9 - 39 U/L    Bilirubin, Total 0.3 0.0 - 1.2 mg/dL    ALT 16 7 - 45 U/L       No current facility-administered medications on file prior to encounter.     Current Outpatient Medications on File Prior to Encounter   Medication Sig Dispense Refill    apixaban (Eliquis) 5 mg tablet Take 1 tablet (5 mg) by mouth 2 times a day. 180 tablet 3    aspirin 81 mg chewable tablet Chew 1 tablet (81 mg) once daily.      calcium carbonate-vitamin D3 (Calcium 600 + D,3,) 600 mg-5 mcg (200 unit) tablet Take 1 tablet by mouth once daily.      clopidogrel (Plavix) 75 mg tablet Take 1 tablet (75 mg) by mouth once daily. 90 tablet 1    fluticasone propion-salmeteroL (Advair) 115-21 mcg/actuation inhaler Inhale 2 puffs 2 times a day. Rinse mouth with water after use to reduce aftertaste and incidence of candidiasis. Do not swallow. 12 g 11    lubricating eye drops ophthalmic solution Administer 1 drop into both eyes if needed for dry eyes.      metoprolol tartrate (Lopressor) 25 mg tablet Take 0.5 tablets (12.5 mg) by mouth 2 times a day. 60 tablet 0    torsemide (Demadex) 20 mg tablet Take 1 tablet (20 mg) by mouth once daily. 30 tablet 0    vit C,F-Sh-nbzvv-lutein-zeaxan (PreserVision AREDS-2) 250-90-40-1 mg capsule Take 2 capsules by mouth once daily.           Assessment/Plan                  Assessment & Plan  Gastrointestinal hemorrhage, unspecified gastrointestinal hemorrhage type  89-year-old female with past medical history of COPD, HTN, CVA, sick sinus syndrome s/p pacemaker, HFpEF, recent NSTEMI, and recent hospitalization for CHF exacerbation with acute pulmonary edema, imaging at that time was concerning for SVC thrombus and patient was evaluated by vascular who recommended DOAC for 6 months and repeat CT.  She presents with red blood per rectum and possible epistaxis. Hospitalized for further evaluation and management.     #GI bleed, suspect lower -> likely ischemic colitis (wall thickening at distal transverse colon, associated abdominal discomfort and dizziness, brown stool with red blood  reported).  #Epistaxis ?- no reported vomiting.  Blood noted with clearing of her nose and when rinsing her mouth with fluids.   #SVC thrombus     Presentation is concerning for ischemic colitis as noted above.  Avoid hypotension  Consult gastroenterology, appreciate input  Will hold aspirin, Plavix, and Eliquis for the time being  Trend H&H, stable on presentation.  Patient agreeable to blood transfusions as needed.  N.p.o. after midnight  Low suspicion for upper GI source, however will continue with Protonix 40 mg IV twice daily until evaluated by GI.  Consult to vascular surgery regarding ongoing need for Eliquis.   Dr Smith's last note on 1/6/2025 “I have reviewed the DVT scan performed today.  There is no evidence of IJ thrombus.  Waveforms are normal in the IJ as well as subclavian, suggestive that even if there is a thrombus in the SVC, it is not hemodynamically significant.”   Check Orthostatic vitals     #Acute hypoxic respiratory failure  #Acute on chronic diastolic congestive heart failure  #History sick sinus syndrome s/p pacemaker  #History NSTEMI  #Valvular hear disease  #pleural effusions     CT angio A/P was suggestive of CHF with interstitial edema and small pleural effusions.   Low-sodium diet  Consult cardiology, patient follows with Dr Jiang and Ramicone for EP  Supplemental oxygen as needed  Continue oral diuretics, will defer to cardiology IV diuresis .  DuoNebs as needed  RT to evaluate     #DVT Ppx  SCD  Holding ac        1/13: doing ok, informed about CT results, gi on board,      1/14: CLD. Plans for colonscopy to get definitive dx, ok to stop eliquis per vascular/cardio     1/15: plans for scope tomorrow     1/16: scope confirmed cancer, she is unsure if she wants surgery or not, will consult surgery to give her more information  1/17: contemplating surgyer, will need cardiac clearance will talk to family as well     1/18: patient seen by cardiology this am ; continue to hold aspirin and  Plavix at this time.;  Patient noted with expiratory wheezes on exam.  Pulse ox is stable.  We will order chest x-rays and ensure that she is getting her nebulizers as ordered.    Awaiting Vascular surgery in put as well  Patient is still contemplating surgery  Continue to trend labs    1/19: lab work stable.  We will start Mucinex and Tessalon Perles for cough.  Patient appears agreeable for surgery we will continue discussion with surgical team.  Aspirin Plavix are on hold, seen by cardiology at discharge patient should only resume aspirin  Continue to monitor labs.  Plan of care discussed with attending   Dr. Iglesia mart.      1/20: plans to undergo surgery, date pending    1/21: continue pre op preparation    I spent 35 minutes in the professional and overall care of this patient.                Iglesia Mart, DO

## 2025-01-22 ENCOUNTER — APPOINTMENT (OUTPATIENT)
Dept: RADIOLOGY | Facility: HOSPITAL | Age: OVER 89
End: 2025-01-22
Payer: MEDICARE

## 2025-01-22 LAB
ANION GAP SERPL CALC-SCNC: 11 MMOL/L (ref 10–20)
BUN SERPL-MCNC: 17 MG/DL (ref 6–23)
CALCIUM SERPL-MCNC: 9 MG/DL (ref 8.6–10.3)
CHLORIDE SERPL-SCNC: 100 MMOL/L (ref 98–107)
CO2 SERPL-SCNC: 30 MMOL/L (ref 21–32)
CREAT SERPL-MCNC: 0.95 MG/DL (ref 0.5–1.05)
EGFRCR SERPLBLD CKD-EPI 2021: 57 ML/MIN/1.73M*2
ERYTHROCYTE [DISTWIDTH] IN BLOOD BY AUTOMATED COUNT: 13.2 % (ref 11.5–14.5)
GLUCOSE SERPL-MCNC: 96 MG/DL (ref 74–99)
HCT VFR BLD AUTO: 30.4 % (ref 36–46)
HGB BLD-MCNC: 9.5 G/DL (ref 12–16)
MCH RBC QN AUTO: 27.7 PG (ref 26–34)
MCHC RBC AUTO-ENTMCNC: 31.3 G/DL (ref 32–36)
MCV RBC AUTO: 89 FL (ref 80–100)
NRBC BLD-RTO: 0 /100 WBCS (ref 0–0)
PLATELET # BLD AUTO: 309 X10*3/UL (ref 150–450)
POTASSIUM SERPL-SCNC: 3.9 MMOL/L (ref 3.5–5.3)
RBC # BLD AUTO: 3.43 X10*6/UL (ref 4–5.2)
SODIUM SERPL-SCNC: 137 MMOL/L (ref 136–145)
WBC # BLD AUTO: 6 X10*3/UL (ref 4.4–11.3)

## 2025-01-22 PROCEDURE — 1100000001 HC PRIVATE ROOM DAILY

## 2025-01-22 PROCEDURE — 2500000004 HC RX 250 GENERAL PHARMACY W/ HCPCS (ALT 636 FOR OP/ED): Performed by: STUDENT IN AN ORGANIZED HEALTH CARE EDUCATION/TRAINING PROGRAM

## 2025-01-22 PROCEDURE — 99232 SBSQ HOSP IP/OBS MODERATE 35: CPT | Performed by: STUDENT IN AN ORGANIZED HEALTH CARE EDUCATION/TRAINING PROGRAM

## 2025-01-22 PROCEDURE — 74018 RADEX ABDOMEN 1 VIEW: CPT | Performed by: STUDENT IN AN ORGANIZED HEALTH CARE EDUCATION/TRAINING PROGRAM

## 2025-01-22 PROCEDURE — 36415 COLL VENOUS BLD VENIPUNCTURE: CPT | Performed by: STUDENT IN AN ORGANIZED HEALTH CARE EDUCATION/TRAINING PROGRAM

## 2025-01-22 PROCEDURE — 94640 AIRWAY INHALATION TREATMENT: CPT

## 2025-01-22 PROCEDURE — 2500000001 HC RX 250 WO HCPCS SELF ADMINISTERED DRUGS (ALT 637 FOR MEDICARE OP): Performed by: NURSE PRACTITIONER

## 2025-01-22 PROCEDURE — 99231 SBSQ HOSP IP/OBS SF/LOW 25: CPT

## 2025-01-22 PROCEDURE — 82374 ASSAY BLOOD CARBON DIOXIDE: CPT | Performed by: STUDENT IN AN ORGANIZED HEALTH CARE EDUCATION/TRAINING PROGRAM

## 2025-01-22 PROCEDURE — 99232 SBSQ HOSP IP/OBS MODERATE 35: CPT | Performed by: SURGERY

## 2025-01-22 PROCEDURE — 2500000004 HC RX 250 GENERAL PHARMACY W/ HCPCS (ALT 636 FOR OP/ED): Performed by: NURSE PRACTITIONER

## 2025-01-22 PROCEDURE — 74018 RADEX ABDOMEN 1 VIEW: CPT

## 2025-01-22 PROCEDURE — 2500000004 HC RX 250 GENERAL PHARMACY W/ HCPCS (ALT 636 FOR OP/ED)

## 2025-01-22 PROCEDURE — 2500000004 HC RX 250 GENERAL PHARMACY W/ HCPCS (ALT 636 FOR OP/ED): Performed by: REGISTERED NURSE

## 2025-01-22 PROCEDURE — 85027 COMPLETE CBC AUTOMATED: CPT | Performed by: STUDENT IN AN ORGANIZED HEALTH CARE EDUCATION/TRAINING PROGRAM

## 2025-01-22 RX ORDER — SODIUM CHLORIDE, SODIUM LACTATE, POTASSIUM CHLORIDE, CALCIUM CHLORIDE 600; 310; 30; 20 MG/100ML; MG/100ML; MG/100ML; MG/100ML
75 INJECTION, SOLUTION INTRAVENOUS CONTINUOUS
Status: DISCONTINUED | OUTPATIENT
Start: 2025-01-22 | End: 2025-01-23

## 2025-01-22 RX ADMIN — GUAIFENESIN 600 MG: 600 TABLET, EXTENDED RELEASE ORAL at 20:32

## 2025-01-22 RX ADMIN — TORSEMIDE 20 MG: 20 TABLET ORAL at 08:49

## 2025-01-22 RX ADMIN — SODIUM CHLORIDE, POTASSIUM CHLORIDE, SODIUM LACTATE AND CALCIUM CHLORIDE 75 ML/HR: 600; 310; 30; 20 INJECTION, SOLUTION INTRAVENOUS at 14:27

## 2025-01-22 RX ADMIN — POLYETHYLENE GLYCOL 3350 17 G: 17 POWDER, FOR SOLUTION ORAL at 20:32

## 2025-01-22 RX ADMIN — ENOXAPARIN SODIUM 40 MG: 40 INJECTION SUBCUTANEOUS at 17:16

## 2025-01-22 RX ADMIN — Medication 1 TABLET: at 08:49

## 2025-01-22 RX ADMIN — FLUTICASONE PROPIONATE AND SALMETEROL XINAFOATE 2 PUFF: 115; 21 AEROSOL, METERED RESPIRATORY (INHALATION) at 07:40

## 2025-01-22 RX ADMIN — GUAIFENESIN 600 MG: 600 TABLET, EXTENDED RELEASE ORAL at 08:49

## 2025-01-22 RX ADMIN — FLUTICASONE PROPIONATE AND SALMETEROL XINAFOATE 2 PUFF: 115; 21 AEROSOL, METERED RESPIRATORY (INHALATION) at 19:46

## 2025-01-22 RX ADMIN — FLUTICASONE PROPIONATE 2 SPRAY: 50 SPRAY, METERED NASAL at 08:49

## 2025-01-22 ASSESSMENT — COGNITIVE AND FUNCTIONAL STATUS - GENERAL
MOBILITY SCORE: 19
WALKING IN HOSPITAL ROOM: A LITTLE
MOVING TO AND FROM BED TO CHAIR: A LITTLE
STANDING UP FROM CHAIR USING ARMS: A LITTLE
DAILY ACTIVITIY SCORE: 21
TOILETING: A LITTLE
DRESSING REGULAR LOWER BODY CLOTHING: A LITTLE
HELP NEEDED FOR BATHING: A LITTLE
CLIMB 3 TO 5 STEPS WITH RAILING: A LOT

## 2025-01-22 ASSESSMENT — PAIN SCALES - GENERAL
PAINLEVEL_OUTOF10: 0 - NO PAIN
PAINLEVEL_OUTOF10: 0 - NO PAIN

## 2025-01-22 NOTE — PROGRESS NOTES
"Yesenia Milner is a 89 y.o. female on day 9 of admission presenting with Gastrointestinal hemorrhage, unspecified gastrointestinal hemorrhage type.    Subjective   Pt continues to have no complaints and tolerates diet. Had large BM after gastrografin enema yesterday, but no further BM since.       Objective     Physical Exam  Vitals reviewed.   Constitutional:       Appearance: She is not ill-appearing.   HENT:      Mouth/Throat:      Mouth: Mucous membranes are moist.   Pulmonary:      Effort: Pulmonary effort is normal. No respiratory distress.   Abdominal:      General: Bowel sounds are normal. There is no distension.      Palpations: Abdomen is soft.      Tenderness: There is no abdominal tenderness.   Skin:     General: Skin is warm and dry.   Neurological:      Mental Status: She is alert and oriented to person, place, and time.   Psychiatric:         Mood and Affect: Mood normal.         Behavior: Behavior normal.         Last Recorded Vitals  Blood pressure (!) 98/46, pulse 60, temperature 35.8 °C (96.4 °F), temperature source Temporal, resp. rate 18, height 1.62 m (5' 3.78\"), weight 77 kg (169 lb 12.1 oz), SpO2 97%.  Intake/Output last 3 Shifts:  I/O last 3 completed shifts:  In: 592 (7.7 mL/kg) [P.O.:592]  Out: 1950 (25.3 mL/kg) [Urine:1950 (0.7 mL/kg/hr)]  Weight: 77 kg       Assessment/Plan   89 year old female we are seeing for new colon cancer.  She has a history significant for COPD (not on O2), HTN, CVA (on Plavix), SSS s/p PPM, HFpEF (60-65%, but with WMA), recent NSTEMI previously on DAPT (12/2024), concern for SVC thrombus previously on Eliquis, and recent hospitalization for ADHF. Was admitted from home on 1/12 with concern for GI bleed.      Impression:  Adenocarcinoma of Transverse colon   > Apple-core lesion distal transverse colon per radiologist impression of gastrografin enema images    > patient is not completely obstructed, but is high risk for developing colon obstruction 2/2 " circumferential tumor burden   > CEA level quite elevated, concerning for metastatic disease, but no evidence on staging CTs     Recommendations:  - Patient agreeable to transverse colectomy, will plan for elective outpatient surgery   - Start low fiber and continue at discharge to minimize risk of obstructing   - Nutritional supplements, continue at discharge  - Miralax daily, continue at discharge  - Cardiology consult for preoperative cardiac clearance    > Increased but acceptable risk    > Hold DAPT pre-op, resume ASA only post-op  - Per endovascular, okay to discontinue AC  - DVT ppx with lovenox and SCDs  - Follow-up with Dr. Vazquez in 1-2 weeks for surgical planning.      Dispo: Okay for discharge once tolerating solid food, likely tomorrow    The patient was seen and discussed with the attending surgeon Dr. Taylor Lorenzo PA-C

## 2025-01-22 NOTE — PROGRESS NOTES
1/22/2025  Followed up with pt in room, introduced self and explained role.  Discussed how pt did in therapy and recommendation. Discussed HHC.  Pt unsure.  Stated she does exercises at home every day.  Said she will think about it.  Ct Team will follow up.   Salena Talavera RN [facility]

## 2025-01-22 NOTE — CONSULTS
"Nutrition Initial Assessment:   Nutrition Assessment    Reason for Assessment: Length of stay    Patient is a 89 y.o. female presenting with bloody bowel movements       Nutrition History:  Energy Intake: Fair 50-75 %  Pain affecting nutrition status: N/A  Food and Nutrient History: Pt's diet has been changed 13 times since admit due to sometimes being solid foods,  sometimes clear liquids, sometimes NPO and it was full liquid over the past 2 days.    She had not had a BM, and after an enima yesterday she finally had one.  She was started on a fiber restricted diet for dinner today.  She is to be sent home on a fiber restricted diet and is to have outpatient surgery for colon cancer.  Food Allergy: Shellfish (shrimp)       Anthropometrics:  Height: 162 cm (5' 3.78\")   Weight: 77 kg (169 lb 12.1 oz)   BMI (Calculated): 29.34             Weight History:   Wt Readings from Last 10 Encounters:   01/20/25 77 kg (169 lb 12.1 oz)   01/03/25 75.8 kg (167 lb)   12/27/24 74.8 kg (165 lb)   12/20/24 73.5 kg (162 lb)   06/26/24 74.3 kg (163 lb 12.8 oz)   02/26/24 74.8 kg (165 lb)   01/03/24 73 kg (161 lb)   12/11/23 74.7 kg (164 lb 9.6 oz)   09/29/23 76.3 kg (168 lb 3.2 oz)   09/11/23 75.7 kg (166 lb 12.8 oz)         Weight Change %:       Nutrition Focused Physical Exam Findings:    Subcutaneous Fat Loss:   Defer Subcutaneous Fat Loss Assessment: Defer all  Defer All Reason: patient was not available  Muscle Wasting:     Edema:  none  Physical Findings:  Digestive System Findings: Constipation    Nutrition Significant Labs:  BMP Trend:   Results from last 7 days   Lab Units 01/22/25  0658 01/20/25  0709 01/19/25  0641 01/18/25  0740   GLUCOSE mg/dL 96 95 92 100*   CALCIUM mg/dL 9.0 9.1 8.8 8.5*   SODIUM mmol/L 137 136 138 136   POTASSIUM mmol/L 3.9 4.0 3.5 3.2*   CO2 mmol/L 30 32 32 30   CHLORIDE mmol/L 100 98 98 97*   BUN mg/dL 17 21 22 23   CREATININE mg/dL 0.95 0.95 0.93 0.95        Nutrition Specific Medications:  D3; " lovenox; lopressor; miralax; demadex     I/O:   Last BM Date: 01/21/25; Stool Appearance: Formed, Loose (01/18/25 0900)    Dietary Orders (From admission, onward)       Start     Ordered    01/22/25 1502  Adult diet Fiber restricted  Diet effective now        Question:  Diet type  Answer:  Fiber restricted    01/22/25 1501    01/17/25 1313  Oral nutritional supplements  Until discontinued        Question Answer Comment   Deliver with All meals    Select supplement: Ensure Plus High Protein        01/17/25 1312    01/13/25 0040  May Participate in Room Service  ( ROOM SERVICE MAY PARTICIPATE)  Once        Question:  .  Answer:  Yes    01/13/25 0039                     Estimated Needs:      Method for Estimating Needs: 1993-2956   30-35 lenny kg of IBW with cancer Dx     Method for Estimating 24 Hour Protein Needs: 65-76  1.2-1.4 gm kg of IBW with cancer Dx     Method for Estimating 24 Hour Fluid Needs: [id number]  20-30 ml kg of IBW as medically indicated        Nutrition Diagnosis        Nutrition Diagnosis  Patient has Nutrition Diagnosis: Yes  Diagnosis Status (1): New  Nutrition Diagnosis 1: Increased nutrient needs  Related to (1): physiological causes  As Evidenced by (1): cancer Dx       Nutrition Interventions/Recommendations   Nutrition prescription for oral nutrition    Nutrition Recommendations:  Individualized Nutrition Prescription Provided for : Continue fiber restricted diet as ordered by gabby,  continue ensure plus high protein X 3 to help meet nutritional needs    Nutrition Interventions/Goals:   Interventions: Meals and snacks, Medical food supplement  Meals and Snacks: Fiber-modified diet  Goal: >75% of meals  Medical Food Supplement: Commercial beverage medical food supplement therapy  Goal: >75% of supplements  Coordination of Care with Providers: Nursing, Provider      Education Documentation  No documentation found.     Provided pt with recommendations for a fiber restricted diet        Nutrition Monitoring and Evaluation   Food/Nutrient Related History Monitoring  Monitoring and Evaluation Plan: Estimated Energy Intake, Fluid intake, Intake / amount of food  Estimated Energy Intake: Energy intake 50 -75% of estimated energy needs  Fluid Intake:  (adequate fluid intake without fluid overload)  Intake / Amount of food: Consumes at least 75% or more of meals/snacks/supplements    Anthropometric Measurements  Monitoring and Evaluation Plan: Body weight    Biochemical Data, Medical Tests and Procedures  Monitoring and Evaluation Plan: Electrolyte/renal panel, Glucose/endocrine profile  Criteria: improved BMP  Criteria: glucose within desired range         Goal Status: New goal(s) identified    Time Spent (min): 45 minutes

## 2025-01-22 NOTE — CARE PLAN
The patient's goals for the shift include sleep    The clinical goals for the shift include Patient will remain free of pain throughout the shift.

## 2025-01-23 ENCOUNTER — PHARMACY VISIT (OUTPATIENT)
Dept: PHARMACY | Facility: CLINIC | Age: OVER 89
End: 2025-01-23
Payer: COMMERCIAL

## 2025-01-23 ENCOUNTER — TELEPHONE (OUTPATIENT)
Dept: CARDIOLOGY | Facility: CLINIC | Age: OVER 89
End: 2025-01-23
Payer: MEDICARE

## 2025-01-23 VITALS
TEMPERATURE: 96.1 F | SYSTOLIC BLOOD PRESSURE: 115 MMHG | DIASTOLIC BLOOD PRESSURE: 53 MMHG | BODY MASS INDEX: 28.42 KG/M2 | WEIGHT: 166.5 LBS | HEIGHT: 64 IN | HEART RATE: 62 BPM | RESPIRATION RATE: 16 BRPM | OXYGEN SATURATION: 95 %

## 2025-01-23 LAB
ANION GAP SERPL CALC-SCNC: 12 MMOL/L (ref 10–20)
BUN SERPL-MCNC: 25 MG/DL (ref 6–23)
CALCIUM SERPL-MCNC: 8.9 MG/DL (ref 8.6–10.3)
CHLORIDE SERPL-SCNC: 101 MMOL/L (ref 98–107)
CO2 SERPL-SCNC: 27 MMOL/L (ref 21–32)
CREAT SERPL-MCNC: 0.96 MG/DL (ref 0.5–1.05)
EGFRCR SERPLBLD CKD-EPI 2021: 56 ML/MIN/1.73M*2
ERYTHROCYTE [DISTWIDTH] IN BLOOD BY AUTOMATED COUNT: 13.3 % (ref 11.5–14.5)
GLUCOSE SERPL-MCNC: 108 MG/DL (ref 74–99)
HCT VFR BLD AUTO: 28 % (ref 36–46)
HGB BLD-MCNC: 9.1 G/DL (ref 12–16)
MCH RBC QN AUTO: 28.2 PG (ref 26–34)
MCHC RBC AUTO-ENTMCNC: 32.5 G/DL (ref 32–36)
MCV RBC AUTO: 87 FL (ref 80–100)
NRBC BLD-RTO: 0 /100 WBCS (ref 0–0)
PLATELET # BLD AUTO: 300 X10*3/UL (ref 150–450)
POTASSIUM SERPL-SCNC: 3.9 MMOL/L (ref 3.5–5.3)
RBC # BLD AUTO: 3.23 X10*6/UL (ref 4–5.2)
SODIUM SERPL-SCNC: 136 MMOL/L (ref 136–145)
WBC # BLD AUTO: 5.8 X10*3/UL (ref 4.4–11.3)

## 2025-01-23 PROCEDURE — 99238 HOSP IP/OBS DSCHRG MGMT 30/<: CPT | Performed by: STUDENT IN AN ORGANIZED HEALTH CARE EDUCATION/TRAINING PROGRAM

## 2025-01-23 PROCEDURE — 80048 BASIC METABOLIC PNL TOTAL CA: CPT | Performed by: STUDENT IN AN ORGANIZED HEALTH CARE EDUCATION/TRAINING PROGRAM

## 2025-01-23 PROCEDURE — 2500000004 HC RX 250 GENERAL PHARMACY W/ HCPCS (ALT 636 FOR OP/ED): Performed by: STUDENT IN AN ORGANIZED HEALTH CARE EDUCATION/TRAINING PROGRAM

## 2025-01-23 PROCEDURE — 2500000004 HC RX 250 GENERAL PHARMACY W/ HCPCS (ALT 636 FOR OP/ED): Performed by: NURSE PRACTITIONER

## 2025-01-23 PROCEDURE — 2500000004 HC RX 250 GENERAL PHARMACY W/ HCPCS (ALT 636 FOR OP/ED): Performed by: REGISTERED NURSE

## 2025-01-23 PROCEDURE — 97530 THERAPEUTIC ACTIVITIES: CPT | Mod: GO

## 2025-01-23 PROCEDURE — 2500000001 HC RX 250 WO HCPCS SELF ADMINISTERED DRUGS (ALT 637 FOR MEDICARE OP): Performed by: STUDENT IN AN ORGANIZED HEALTH CARE EDUCATION/TRAINING PROGRAM

## 2025-01-23 PROCEDURE — 36415 COLL VENOUS BLD VENIPUNCTURE: CPT | Performed by: STUDENT IN AN ORGANIZED HEALTH CARE EDUCATION/TRAINING PROGRAM

## 2025-01-23 PROCEDURE — 2500000001 HC RX 250 WO HCPCS SELF ADMINISTERED DRUGS (ALT 637 FOR MEDICARE OP): Performed by: NURSE PRACTITIONER

## 2025-01-23 PROCEDURE — 85027 COMPLETE CBC AUTOMATED: CPT | Performed by: STUDENT IN AN ORGANIZED HEALTH CARE EDUCATION/TRAINING PROGRAM

## 2025-01-23 PROCEDURE — 97116 GAIT TRAINING THERAPY: CPT | Mod: GP,CQ

## 2025-01-23 PROCEDURE — 99232 SBSQ HOSP IP/OBS MODERATE 35: CPT

## 2025-01-23 PROCEDURE — RXMED WILLOW AMBULATORY MEDICATION CHARGE

## 2025-01-23 PROCEDURE — 97535 SELF CARE MNGMENT TRAINING: CPT | Mod: GO

## 2025-01-23 RX ORDER — POLYETHYLENE GLYCOL 3350 17 G/17G
17 POWDER, FOR SOLUTION ORAL 2 TIMES DAILY
Qty: 1010 G | Refills: 1 | Status: ON HOLD | OUTPATIENT
Start: 2025-01-23

## 2025-01-23 RX ADMIN — POLYETHYLENE GLYCOL 3350 17 G: 17 POWDER, FOR SOLUTION ORAL at 08:43

## 2025-01-23 RX ADMIN — GUAIFENESIN 600 MG: 600 TABLET, EXTENDED RELEASE ORAL at 08:43

## 2025-01-23 RX ADMIN — SODIUM CHLORIDE, POTASSIUM CHLORIDE, SODIUM LACTATE AND CALCIUM CHLORIDE 75 ML/HR: 600; 310; 30; 20 INJECTION, SOLUTION INTRAVENOUS at 02:59

## 2025-01-23 RX ADMIN — METOPROLOL TARTRATE 12.5 MG: 25 TABLET, FILM COATED ORAL at 08:43

## 2025-01-23 RX ADMIN — Medication 1 TABLET: at 08:43

## 2025-01-23 RX ADMIN — FLUTICASONE PROPIONATE 2 SPRAY: 50 SPRAY, METERED NASAL at 08:43

## 2025-01-23 ASSESSMENT — COGNITIVE AND FUNCTIONAL STATUS - GENERAL
TURNING FROM BACK TO SIDE WHILE IN FLAT BAD: A LITTLE
DRESSING REGULAR LOWER BODY CLOTHING: A LITTLE
HELP NEEDED FOR BATHING: A LITTLE
TOILETING: A LITTLE
DAILY ACTIVITIY SCORE: 21
MOVING TO AND FROM BED TO CHAIR: A LITTLE
STANDING UP FROM CHAIR USING ARMS: A LITTLE
MOBILITY SCORE: 18
CLIMB 3 TO 5 STEPS WITH RAILING: A LOT
WALKING IN HOSPITAL ROOM: A LITTLE

## 2025-01-23 ASSESSMENT — PAIN SCALES - GENERAL
PAINLEVEL_OUTOF10: 0 - NO PAIN

## 2025-01-23 ASSESSMENT — PAIN - FUNCTIONAL ASSESSMENT
PAIN_FUNCTIONAL_ASSESSMENT: 0-10
PAIN_FUNCTIONAL_ASSESSMENT: 0-10

## 2025-01-23 ASSESSMENT — ACTIVITIES OF DAILY LIVING (ADL): HOME_MANAGEMENT_TIME_ENTRY: 14

## 2025-01-23 NOTE — CARE PLAN
The patient's goals for the shift include sleep    The clinical goals for the shift include Patient will tolerate a regular low fiber diet throughout the shift.      Problem: Pain - Adult  Goal: Verbalizes/displays adequate comfort level or baseline comfort level  Outcome: Progressing     Problem: Safety - Adult  Goal: Free from fall injury  Outcome: Progressing     Problem: Discharge Planning  Goal: Discharge to home or other facility with appropriate resources  Outcome: Progressing     Problem: Chronic Conditions and Co-morbidities  Goal: Patient's chronic conditions and co-morbidity symptoms are monitored and maintained or improved  Outcome: Progressing     Problem: Fall/Injury  Goal: Not fall by end of shift  Outcome: Progressing  Goal: Be free from injury by end of the shift  Outcome: Progressing  Goal: Verbalize understanding of personal risk factors for fall in the hospital  Outcome: Progressing  Goal: Verbalize understanding of risk factor reduction measures to prevent injury from fall in the home  Outcome: Progressing  Goal: Use assistive devices by end of the shift  Outcome: Progressing  Goal: Pace activities to prevent fatigue by end of the shift  Outcome: Progressing     Problem: Skin  Goal: Decreased wound size/increased tissue granulation at next dressing change  Outcome: Progressing  Goal: Participates in plan/prevention/treatment measures  Outcome: Progressing  Goal: Prevent/manage excess moisture  Outcome: Progressing  Goal: Prevent/minimize sheer/friction injuries  Outcome: Progressing  Goal: Promote/optimize nutrition  1/23/2025 0736 by Marlon Fulton LPN  Outcome: Progressing  1/23/2025 0736 by Marlon Fulton LPN  Flowsheets (Taken 1/23/2025 0736)  Promote/optimize nutrition:   Offer water/supplements/favorite foods   Consume > 50% meals/supplements  Goal: Promote skin healing  Outcome: Progressing

## 2025-01-23 NOTE — ASSESSMENT & PLAN NOTE
89-year-old female with past medical history of COPD, HTN, CVA, sick sinus syndrome s/p pacemaker, HFpEF, recent NSTEMI, and recent hospitalization for CHF exacerbation with acute pulmonary edema, imaging at that time was concerning for SVC thrombus and patient was evaluated by vascular who recommended DOAC for 6 months and repeat CT.  She presents with red blood per rectum and possible epistaxis. Hospitalized for further evaluation and management.     #GI bleed, suspect lower -> likely ischemic colitis (wall thickening at distal transverse colon, associated abdominal discomfort and dizziness, brown stool with red blood reported).  #Epistaxis ?- no reported vomiting.  Blood noted with clearing of her nose and when rinsing her mouth with fluids.   #SVC thrombus     Presentation is concerning for ischemic colitis as noted above.  Avoid hypotension  Consult gastroenterology, appreciate input  Will hold aspirin, Plavix, and Eliquis for the time being  Trend H&H, stable on presentation.  Patient agreeable to blood transfusions as needed.  N.p.o. after midnight  Low suspicion for upper GI source, however will continue with Protonix 40 mg IV twice daily until evaluated by GI.  Consult to vascular surgery regarding ongoing need for Eliquis.   Dr Smith's last note on 1/6/2025 “I have reviewed the DVT scan performed today.  There is no evidence of IJ thrombus.  Waveforms are normal in the IJ as well as subclavian, suggestive that even if there is a thrombus in the SVC, it is not hemodynamically significant.”   Check Orthostatic vitals     #Acute hypoxic respiratory failure  #Acute on chronic diastolic congestive heart failure  #History sick sinus syndrome s/p pacemaker  #History NSTEMI  #Valvular hear disease  #pleural effusions     CT angio A/P was suggestive of CHF with interstitial edema and small pleural effusions.   Low-sodium diet  Consult cardiology, patient follows with Dr Jiang and Ramicone for EP  Supplemental  oxygen as needed  Continue oral diuretics, will defer to cardiology IV diuresis .  DuoNebs as needed  RT to evaluate     #DVT Ppx  SCD  Holding ac        1/13: doing ok, informed about CT results, gi on board,      1/14: CLD. Plans for colonscopy to get definitive dx, ok to stop eliquis per vascular/cardio     1/15: plans for scope tomorrow     1/16: scope confirmed cancer, she is unsure if she wants surgery or not, will consult surgery to give her more information  1/17: contemplating surgyer, will need cardiac clearance will talk to family as well     1/18: patient seen by cardiology this am ; continue to hold aspirin and Plavix at this time.;  Patient noted with expiratory wheezes on exam.  Pulse ox is stable.  We will order chest x-rays and ensure that she is getting her nebulizers as ordered.    Awaiting Vascular surgery in put as well  Patient is still contemplating surgery  Continue to trend labs    1/19: lab work stable.  We will start Mucinex and Tessalon Perles for cough.  Patient appears agreeable for surgery we will continue discussion with surgical team.  Aspirin Plavix are on hold, seen by cardiology at discharge patient should only resume aspirin  Continue to monitor labs.  Plan of care discussed with attending   Dr. Iglesia mart.      1/20: plans to undergo surgery, date pending    1/21: continue pre op preparation  1/22: surgery cleared plans to go to OR outpatient to help her recover a bit pior to surgery    I spent 35 minutes in the professional and overall care of this patient.

## 2025-01-23 NOTE — TELEPHONE ENCOUNTER
Pt was discharged from [facility] today and was told she would get an order for home health care. Unfortunately it was documented that pt denied so they didn't give her the order. Her daughter kelly called the hospital and they said at this point she needs to get the order from one of her  doctors. She has not seen her PCP and hoping Dr. Ramicone will give the order.

## 2025-01-23 NOTE — PROGRESS NOTES
Physical Therapy    Physical Therapy Treatment    Patient Name: Yesenia Milner  MRN: [id number]  Department: [facility]  Room: [address] [place] [address]  Today's Date: 1/23/2025  Time Calculation  Start Time: 0915  Stop Time: 0938  Time Calculation (min): 23 min         Assessment/Plan   PT Assessment  Evaluation/Treatment Tolerance: Patient tolerated treatment well  End of Session Communication: Bedside nurse  End of Session Patient Position: Bed, 3 rail up, Alarm off, not on at start of session     PT Plan  Treatment/Interventions: Bed mobility, Transfer training, Gait training, Balance training, Strengthening, Therapeutic exercise, Therapeutic activity  PT Plan: Ongoing PT  PT Frequency: 3 times per week  PT Discharge Recommendations: Low intensity level of continued care (24/7 supervision)  PT Recommended Transfer Status: Assist x1  PT - OK to Discharge: Yes (once medically cleared)      General Visit Information:   PT  Visit  PT Received On: 01/23/25  General  Prior to Session Communication: Bedside nurse  Patient Position Received: Bed, 3 rail up, Alarm off, not on at start of session (pt seated EOB)  General Comment:  (pt pleasant and agreeable to participate in therapy.  per RN, pt to discharge home today and physician asking for pt to be seen by PT beforehand.)    Subjective   Precautions:  Precautions  Medical Precautions: Fall precautions  Precautions Comment: bakari          Objective   Pain:  Pain Assessment  Pain Assessment: 0-10  0-10 (Numeric) Pain Score: 0 - No pain     Treatments:  Bed Mobility  Bed Mobility:  (sit > sup with SBA)    Ambulation/Gait Training  Ambulation/Gait Training Performed:  (pt ambulates >125 ft x 2 with FWW and SBA/CGA.  slow, steady pace.)    Transfers  Transfer:  (sit <> stand with FWW and SBA)    Outcome Measures:  [facility] Basic Mobility  Turning from your back to your side while in a flat bed without using bedrails: None  Moving from lying on your back to sitting on the side of a flat bed  without using bedrails: A little  Moving to and from bed to chair (including a wheelchair): A little  Standing up from a chair using your arms (e.g. wheelchair or bedside chair): A little  To walk in hospital room: A little  Climbing 3-5 steps with railing: A lot  Basic Mobility - Total Score: 18    Education Documentation  Mobility Training, taught by Inez Jalloh PTA at 1/23/2025 12:46 PM.  Learner: Patient  Readiness: Acceptance  Method: Explanation  Response: Verbalizes Understanding, Demonstrated Understanding    Education Comments  No comments found.        EDUCATION:       Encounter Problems       Encounter Problems (Active)       PT Problem       STG - Pt will perform a B LE ther ex program of 2-3 sets of 10  (Progressing)       Start:  01/17/25    Expected End:  01/31/25            STG - Pt will transition supine <> sitting IND (Progressing)       Start:  01/17/25    Expected End:  01/31/25            STG - Pt will transfer STS IND (Progressing)       Start:  01/17/25    Expected End:  01/31/25            STG - Pt will amb 100' using w/w Randa  (Progressing)       Start:  01/17/25    Expected End:  01/31/25

## 2025-01-23 NOTE — CARE PLAN
The patient's goals for the shift include sleep    The clinical goals for the shift include patient will remain safe throughout the shift    Over the shift, the patient did not make progress toward the following goals. Barriers to progression include weakness. Recommendations to address these barriers include rest and hydration.

## 2025-01-23 NOTE — CARE PLAN
I was asked by Dr. Gabriel to complete discharge orders on this patient. Patient seen and examined by myself this morning, 1/23. Patient states she is ready for discharge. Patient's RN states patient is tolerating solid food. Patient is aware she needs to be on low fiber diet with ensure plus high protein supplements. Miralax daily. She is to follow up in 1-2 weeks with Dr. Vazquez to plan elective outpatient transverse colectomy. Per endovascular, patient is to discontinue Eliquis.

## 2025-01-23 NOTE — PROGRESS NOTES
Occupational Therapy    OT Treatment    Patient Name: Yesenia Milner  MRN: [id number]  Department: [facility]  Room: [address] [place] [address]  Today's Date: 1/23/2025     Assessment:  Evaluation/Treatment Tolerance: Patient tolerated treatment well  End of Session Communication: Bedside nurse  End of Session Patient Position: Up in chair  OT Assessment Results: Decreased endurance  Evaluation/Treatment Tolerance: Patient tolerated treatment well  Plan:  Treatment Interventions: ADL retraining, Functional transfer training, UE strengthening/ROM, Endurance training, Equipment evaluation/education, Compensatory technique education  OT Frequency: 3 times per week  OT Discharge Recommendations: Low intensity level of continued care (with 24 hr support)  OT - OK to Discharge: Yes  Treatment Interventions: ADL retraining, Functional transfer training, UE strengthening/ROM, Endurance training, Equipment evaluation/education, Compensatory technique education    Subjective   Previous Visit Info:     General:  General  Prior to Session Communication: Bedside nurse  Patient Position Received: Bed, 2 rail up  General Comment: pt agreeable to working with therapy. Pt reported she maybe going home today and is looking forward to getting a shower and washing her hair.    Pain:  Pain Assessment  Pain Assessment: 0-10  0-10 (Numeric) Pain Score: 0 - No pain    Objective      Activities of Daily Living:      Grooming  Grooming Level of Assistance: Close supervision (standing up sink side with walker)  Grooming Comments: pt able to complete oral hygiene at sink x 4minutes with sba with no lob    Bed Mobility/Transfers: Bed Mobility  Bed Mobility:  (supine to sit with supervision)    Transfers  Transfer:  (sit to stand from bed surface with with supervision.)    Functional Mobility:  Functional Mobility  Functional Mobility Performed:  (functional mobility in room with ww with cga x 10 x 2 .)  Sitting Balance:  Dynamic Sitting Balance  Dynamic  Sitting-Level of Assistance:  (pt able to complete core exercises at edge of bed with good dyn sit balance x 15 minutes  with no lob.)  Standing Balance:     Therapy/Activity:      Therapeutic Activity  Therapeutic Activity Performed:  (pt able to complete core exercises to improved dyn standing for adls and functional mobilty . x 15 minutes with knee raises, elbow to knee, knee ext and ankle pumps at edge of bed 2 sets x 10)    Outcome Measures:[facility] Daily Activity  Putting on and taking off regular lower body clothing: A little  Bathing (including washing, rinsing, drying): A little  Putting on and taking off regular upper body clothing: None  Toileting, which includes using toilet, bedpan or urinal: A little  Taking care of personal grooming such as brushing teeth: None  Eating Meals: None  Daily Activity - Total Score: 21        Education Documentation  Body Mechanics, taught by Jessica Alvarez OT at 1/23/2025 12:27 PM.  Learner: Patient  Readiness: Acceptance  Method: Explanation  Response: Verbalizes Understanding    ADL Training, taught by Jessica Alvarez OT at 1/23/2025 12:27 PM.  Learner: Patient  Readiness: Acceptance  Method: Explanation  Response: Verbalizes Understanding    Education Comments  No comments found.      Goals:  Encounter Problems       Encounter Problems (Active)       OT Goals       STG- patient will complete LB dressing at MOD I with use of ae/ad/dme prn (Progressing)       Start:  01/17/25    Expected End:  01/31/25            STG- patient will complete toileting at MOD I with use of ae/ad/dme prn  (Progressing)       Start:  01/17/25    Expected End:  01/31/25            STG- patient will complete transfers to/from bed, chair, commode at MOD I with use of ae/ad/dme prn (Progressing)       Start:  01/17/25    Expected End:  01/31/25            STG- patient will complete simple mobility at MOD I with use of ae/ad/dme prn (Progressing)       Start:  01/17/25    Expected End:  01/31/25             STG- patient will complete grooming at MOD I with use of ae/ad/dme prn (Progressing)       Start:  01/17/25    Expected End:  01/31/25

## 2025-01-23 NOTE — NURSING NOTE
Met with patient and daughter at the bedside. Discharge Planning discussed. AVS updated with follow-ups, education, and medication information. Verified/ entered a PCP and pharmacy. New medications and side effects discussed. Extensive discussion on low fiber diet. All questions answered.  Updated nurse on this info. AVS printed and hi-lighted. IV removed.

## 2025-01-23 NOTE — PROGRESS NOTES
"Yesenia Milner is a 89 y.o. female on day 10 of admission presenting with Gastrointestinal hemorrhage, unspecified gastrointestinal hemorrhage type.    Subjective   Patient is sitting up in bed eating breakfast. Tolerating it well, denies N/V. Last BM was after the gastrografin enema but she states it was very large. Has been passing flatus.        Objective     Physical Exam  Vitals reviewed.   Constitutional:       Appearance: She is not ill-appearing.   HENT:      Mouth/Throat:      Mouth: Mucous membranes are moist.   Cardiovascular:      Rate and Rhythm: Normal rate and regular rhythm.   Pulmonary:      Effort: Pulmonary effort is normal. No respiratory distress.   Abdominal:      General: Bowel sounds are normal. There is no distension.      Palpations: Abdomen is soft.      Tenderness: There is no abdominal tenderness.   Skin:     General: Skin is warm and dry.   Neurological:      Mental Status: She is alert and oriented to person, place, and time.   Psychiatric:         Mood and Affect: Mood normal.         Behavior: Behavior normal.         Last Recorded Vitals  Blood pressure 156/66, pulse 65, temperature 36.4 °C (97.5 °F), resp. rate 16, height 1.62 m (5' 3.78\"), weight 75.5 kg (166 lb 8 oz), SpO2 92%.  Intake/Output last 3 Shifts:  I/O last 3 completed shifts:  In: 901.5 (11.7 mL/kg) [P.O.:849; I.V.:52.5 (0.7 mL/kg)]  Out: 900 (11.7 mL/kg) [Urine:900 (0.3 mL/kg/hr)]  Weight: 77 kg       Assessment/Plan   89 year old female we are seeing for new colon cancer.  She has a history significant for COPD (not on O2), HTN, CVA (on Plavix), SSS s/p PPM, HFpEF (60-65%, but with WMA), recent NSTEMI previously on DAPT (12/2024), concern for SVC thrombus previously on Eliquis, and recent hospitalization for ADHF. Was admitted from home on 1/12 with concern for GI bleed.      Impression:  Adenocarcinoma of Transverse colon   > Apple-core lesion distal transverse colon per radiologist impression of gastrografin " enema images    > patient is not completely obstructed, but is high risk for developing colon obstruction 2/2 circumferential tumor burden   > CEA level quite elevated, concerning for metastatic disease, but no evidence on staging CTs     Recommendations:  - Patient agreeable to transverse colectomy, will plan for elective outpatient surgery   - On low fiber, continue at discharge to minimize risk of obstructing   - Nutritional supplements, continue at discharge  - Miralax daily, continue at discharge  - Cardiology consult for preoperative cardiac clearance    > Increased but acceptable risk    > Hold DAPT pre-op, resume ASA only post-op  - Per endovascular, okay to discontinue AC  - DVT ppx with lovenox and SCDs  - Follow-up with Dr. Vazquez in 1-2 weeks for surgical planning.      Dispo: Okay for discharge from general surgery perspective     The patient will be seen and discussed with the attending surgeon Dr. Taylor Kamara PA-C

## 2025-01-23 NOTE — TELEPHONE ENCOUNTER
Called pt and notified her that Dr Ramicone (cardiology) doesn't order home health care. Defer to PCP.

## 2025-01-23 NOTE — PROGRESS NOTES
Yesenia Milner is a 89 y.o. female on day 10 of admission presenting with Gastrointestinal hemorrhage, unspecified gastrointestinal hemorrhage type.      Subjective   No issues OR planning         Objective     Last Recorded Vitals  /66   Pulse 65   Temp 36.4 °C (97.5 °F)   Resp 16   Wt 75.5 kg (166 lb 8 oz)   SpO2 92%   Intake/Output last 3 Shifts:    Intake/Output Summary (Last 24 hours) at 1/23/2025 0940  Last data filed at 1/23/2025 0928  Gross per 24 hour   Intake 1619.5 ml   Output 2250 ml   Net -630.5 ml       Admission Weight  Weight: 74.8 kg (165 lb) (01/12/25 1108)    Daily Weight  01/23/25 : 75.5 kg (166 lb 8 oz)    Image Results  XR abdomen 1 view  Narrative: Interpreted By:  Frederic Granados,   STUDY:  XR ABDOMEN 1 VIEW;  1/22/2025 9:27 am      INDICATION:  Signs/Symptoms:To evaluate any residual colon contrast - transverse  colon lesion.          COMPARISON:  None.      ACCESSION NUMBER(S):  [id number]      ORDERING CLINICIAN:  NASREEN HERRING      FINDINGS:  Nonobstructive bowel gas pattern. Limited evaluation of  pneumoperitoneum on supine imaging, however no gross evidence of free  air is noted.      Visualized lungs are clear.      Osseous structures demonstrate no acute bony changes.      Impression: 1. No gross residual colonic contrast could be visualized.      MACRO:  None      Signed by: Frederic Granadso 1/22/2025 10:23 AM  Dictation workstation:   [id number]      Physical Exam  Vitals and nursing note reviewed.   Constitutional:       Appearance: Normal appearance. She is normal weight.   HENT:      Head: Normocephalic.      Nose: Nose normal.      Mouth/Throat:      Mouth: Mucous membranes are moist.   Eyes:      Extraocular Movements: Extraocular movements intact.      Conjunctiva/sclera: Conjunctivae normal.      Pupils: Pupils are equal, round, and reactive to light.   Cardiovascular:      Rate and Rhythm: Normal rate and regular rhythm.      Pulses: Normal pulses.      Heart  sounds: Normal heart sounds.   Pulmonary:      Breath sounds: No wheezing.      Comments: Non productive cough   Abdominal:      General: Bowel sounds are normal. There is distension.      Palpations: Abdomen is soft.      Tenderness: There is no abdominal tenderness.   Musculoskeletal:         General: Normal range of motion.      Cervical back: Normal range of motion.   Skin:     General: Skin is warm and dry.   Neurological:      General: No focal deficit present.      Mental Status: She is alert and oriented to person, place, and time.   Psychiatric:         Mood and Affect: Mood normal.         Behavior: Behavior normal.         Relevant Results  Results for orders placed or performed during the hospital encounter of 01/12/25 (from the past 96 hours)   CBC   Result Value Ref Range    WBC 5.1 4.4 - 11.3 x10*3/uL    nRBC 0.0 0.0 - 0.0 /100 WBCs    RBC 3.19 (L) 4.00 - 5.20 x10*6/uL    Hemoglobin 9.1 (L) 12.0 - 16.0 g/dL    Hematocrit 28.1 (L) 36.0 - 46.0 %    MCV 88 80 - 100 fL    MCH 28.5 26.0 - 34.0 pg    MCHC 32.4 32.0 - 36.0 g/dL    RDW 13.3 11.5 - 14.5 %    Platelets 293 150 - 450 x10*3/uL   Comprehensive Metabolic Panel   Result Value Ref Range    Glucose 95 74 - 99 mg/dL    Sodium 136 136 - 145 mmol/L    Potassium 4.0 3.5 - 5.3 mmol/L    Chloride 98 98 - 107 mmol/L    Bicarbonate 32 21 - 32 mmol/L    Anion Gap 10 10 - 20 mmol/L    Urea Nitrogen 21 6 - 23 mg/dL    Creatinine 0.95 0.50 - 1.05 mg/dL    eGFR 57 (L) >60 mL/min/1.73m*2    Calcium 9.1 8.6 - 10.3 mg/dL    Albumin 3.2 (L) 3.4 - 5.0 g/dL    Alkaline Phosphatase 81 33 - 136 U/L    Total Protein 5.8 (L) 6.4 - 8.2 g/dL    AST 16 9 - 39 U/L    Bilirubin, Total 0.3 0.0 - 1.2 mg/dL    ALT 16 7 - 45 U/L   CBC   Result Value Ref Range    WBC 6.0 4.4 - 11.3 x10*3/uL    nRBC 0.0 0.0 - 0.0 /100 WBCs    RBC 3.43 (L) 4.00 - 5.20 x10*6/uL    Hemoglobin 9.5 (L) 12.0 - 16.0 g/dL    Hematocrit 30.4 (L) 36.0 - 46.0 %    MCV 89 80 - 100 fL    MCH 27.7 26.0 - 34.0 pg     MCHC 31.3 (L) 32.0 - 36.0 g/dL    RDW 13.2 11.5 - 14.5 %    Platelets 309 150 - 450 x10*3/uL   Basic Metabolic Panel   Result Value Ref Range    Glucose 96 74 - 99 mg/dL    Sodium 137 136 - 145 mmol/L    Potassium 3.9 3.5 - 5.3 mmol/L    Chloride 100 98 - 107 mmol/L    Bicarbonate 30 21 - 32 mmol/L    Anion Gap 11 10 - 20 mmol/L    Urea Nitrogen 17 6 - 23 mg/dL    Creatinine 0.95 0.50 - 1.05 mg/dL    eGFR 57 (L) >60 mL/min/1.73m*2    Calcium 9.0 8.6 - 10.3 mg/dL   CBC   Result Value Ref Range    WBC 5.8 4.4 - 11.3 x10*3/uL    nRBC 0.0 0.0 - 0.0 /100 WBCs    RBC 3.23 (L) 4.00 - 5.20 x10*6/uL    Hemoglobin 9.1 (L) 12.0 - 16.0 g/dL    Hematocrit 28.0 (L) 36.0 - 46.0 %    MCV 87 80 - 100 fL    MCH 28.2 26.0 - 34.0 pg    MCHC 32.5 32.0 - 36.0 g/dL    RDW 13.3 11.5 - 14.5 %    Platelets 300 150 - 450 x10*3/uL   Basic Metabolic Panel   Result Value Ref Range    Glucose 108 (H) 74 - 99 mg/dL    Sodium 136 136 - 145 mmol/L    Potassium 3.9 3.5 - 5.3 mmol/L    Chloride 101 98 - 107 mmol/L    Bicarbonate 27 21 - 32 mmol/L    Anion Gap 12 10 - 20 mmol/L    Urea Nitrogen 25 (H) 6 - 23 mg/dL    Creatinine 0.96 0.50 - 1.05 mg/dL    eGFR 56 (L) >60 mL/min/1.73m*2    Calcium 8.9 8.6 - 10.3 mg/dL      No current facility-administered medications on file prior to encounter.     Current Outpatient Medications on File Prior to Encounter   Medication Sig Dispense Refill    apixaban (Eliquis) 5 mg tablet Take 1 tablet (5 mg) by mouth 2 times a day. 180 tablet 3    aspirin 81 mg chewable tablet Chew 1 tablet (81 mg) once daily.      calcium carbonate-vitamin D3 (Calcium 600 + D,3,) 600 mg-5 mcg (200 unit) tablet Take 1 tablet by mouth once daily.      clopidogrel (Plavix) 75 mg tablet Take 1 tablet (75 mg) by mouth once daily. 90 tablet 1    fluticasone propion-salmeteroL (Advair) 115-21 mcg/actuation inhaler Inhale 2 puffs 2 times a day. Rinse mouth with water after use to reduce aftertaste and incidence of candidiasis. Do not swallow. 12  g 11    lubricating eye drops ophthalmic solution Administer 1 drop into both eyes if needed for dry eyes.      metoprolol tartrate (Lopressor) 25 mg tablet Take 0.5 tablets (12.5 mg) by mouth 2 times a day. 60 tablet 0    torsemide (Demadex) 20 mg tablet Take 1 tablet (20 mg) by mouth once daily. 30 tablet 0    vit C,M-Xx-lpuzp-lutein-zeaxan (PreserVision AREDS-2) 250-90-40-1 mg capsule Take 2 capsules by mouth once daily.           Assessment/Plan                  Assessment & Plan  Gastrointestinal hemorrhage, unspecified gastrointestinal hemorrhage type  89-year-old female with past medical history of COPD, HTN, CVA, sick sinus syndrome s/p pacemaker, HFpEF, recent NSTEMI, and recent hospitalization for CHF exacerbation with acute pulmonary edema, imaging at that time was concerning for SVC thrombus and patient was evaluated by vascular who recommended DOAC for 6 months and repeat CT.  She presents with red blood per rectum and possible epistaxis. Hospitalized for further evaluation and management.     #GI bleed, suspect lower -> likely ischemic colitis (wall thickening at distal transverse colon, associated abdominal discomfort and dizziness, brown stool with red blood reported).  #Epistaxis ?- no reported vomiting.  Blood noted with clearing of her nose and when rinsing her mouth with fluids.   #SVC thrombus     Presentation is concerning for ischemic colitis as noted above.  Avoid hypotension  Consult gastroenterology, appreciate input  Will hold aspirin, Plavix, and Eliquis for the time being  Trend H&H, stable on presentation.  Patient agreeable to blood transfusions as needed.  N.p.o. after midnight  Low suspicion for upper GI source, however will continue with Protonix 40 mg IV twice daily until evaluated by GI.  Consult to vascular surgery regarding ongoing need for Eliquis.   Dr Smith's last note on 1/6/2025 “I have reviewed the DVT scan performed today.  There is no evidence of IJ thrombus.  Waveforms are  normal in the IJ as well as subclavian, suggestive that even if there is a thrombus in the SVC, it is not hemodynamically significant.”   Check Orthostatic vitals     #Acute hypoxic respiratory failure  #Acute on chronic diastolic congestive heart failure  #History sick sinus syndrome s/p pacemaker  #History NSTEMI  #Valvular hear disease  #pleural effusions     CT angio A/P was suggestive of CHF with interstitial edema and small pleural effusions.   Low-sodium diet  Consult cardiology, patient follows with Dr Jiang and Ramicone for EP  Supplemental oxygen as needed  Continue oral diuretics, will defer to cardiology IV diuresis .  DuoNebs as needed  RT to evaluate     #DVT Ppx  SCD  Holding ac        1/13: doing ok, informed about CT results, gi on board,      1/14: CLD. Plans for colonscopy to get definitive dx, ok to stop eliquis per vascular/cardio     1/15: plans for scope tomorrow     1/16: scope confirmed cancer, she is unsure if she wants surgery or not, will consult surgery to give her more information  1/17: contemplating surgyer, will need cardiac clearance will talk to family as well     1/18: patient seen by cardiology this am ; continue to hold aspirin and Plavix at this time.;  Patient noted with expiratory wheezes on exam.  Pulse ox is stable.  We will order chest x-rays and ensure that she is getting her nebulizers as ordered.    Awaiting Vascular surgery in put as well  Patient is still contemplating surgery  Continue to trend labs    1/19: lab work stable.  We will start Mucinex and Tessalon Perles for cough.  Patient appears agreeable for surgery we will continue discussion with surgical team.  Aspirin Plavix are on hold, seen by cardiology at discharge patient should only resume aspirin  Continue to monitor labs.  Plan of care discussed with attending   Dr. Iglesia mart.      1/20: plans to undergo surgery, date pending    1/21: continue pre op preparation  1/22: surgery cleared plans to go to  OR outpatient to help her recover a bit pior to surgery    I spent 35 minutes in the professional and overall care of this patient.  Malignant neoplasm of transverse colon (Multi)                  Iglesia Gabriel, DO

## 2025-01-24 ENCOUNTER — PATIENT OUTREACH (OUTPATIENT)
Dept: PRIMARY CARE | Facility: CLINIC | Age: OVER 89
End: 2025-01-24
Payer: MEDICARE

## 2025-01-24 NOTE — PROGRESS NOTES
Discharge Facility:[facility]  Discharge Diagnosis:Gastrointestinal Hemorrhage  Admission Date:1/12/25  Discharge Date: 1/23/25    PCP Appointment Date:1/31/25  Specialist Appointment Date: General Surgery 2/5/25  Hospital Encounter and Summary Linked: Yes  See discharge assessment below for further details    Medications  Medications reviewed with patient/caregiver?: Yes (1/24/2025 10:42 AM)  Is the patient having any side effects they believe may be caused by any medication additions or changes?: No (1/24/2025 10:42 AM)  Does the patient have all medications ordered at discharge?: Yes (1/24/2025 10:42 AM)  Care Management Interventions: Provided patient education (1/24/2025 10:42 AM)  Prescription Comments: Miralax (1/24/2025 10:42 AM)  Is the patient taking all medications as directed (includes completed medication regime)?: Yes (1/24/2025 10:42 AM)  Care Management Interventions: Provided patient education (1/24/2025 10:42 AM)  Medication Comments: Daughter states Lopressor was increased from 12.5mg daily to 12.5 BID. Daughter questions if change was necessary. Message sent to care team. Patient ok to decreased back to 12.5 mg daily due to decrease in BP. (1/24/2025 10:42 AM)    Appointments  Does the patient have a primary care provider?: Yes (1/24/2025 10:42 AM)  Care Management Interventions: Verified appointment date/time/provider (1/24/2025 10:42 AM)  Has the patient kept scheduled appointments due by today?: Yes (1/24/2025 10:42 AM)  Care Management Interventions: Advised patient to keep appointment; Educated on importance of keeping appointment (1/24/2025 10:42 AM)    Self Management  Has home health visited the patient within 72 hours of discharge?: Not applicable (1/24/2025 10:42 AM)  What Durable Medical Equipment (DME) was ordered?: N/A (1/24/2025 10:42 AM)    Patient Teaching  Does the patient have access to their discharge instructions?: Yes (1/24/2025 10:42 AM)  Care Management Interventions:  Reviewed instructions with patient (1/24/2025 10:42 AM)  What is the patient's perception of their health status since discharge?: Improving (1/24/2025 10:42 AM)  Is the patient/caregiver able to teach back the hierarchy of who to call/visit for symptoms/problems? PCP, Specialist, Home Health nurse, Urgent Care, ED, 911: Yes (1/24/2025 10:42 AM)    Wrap Up  Wrap Up Additional Comments: Patient is doing well today. She has her discharge medication. Daughter states Lopressor was increased from 12.5mg daily to 12.5 BID. Daughter questions if change was necessary. Message sent to care team. Patient ok to decreased back to 12.5 mg daily due to decrease in BP. Daughter/Patient notified.Patient has a pcp appointment scheduled on 1/31/25 and is scheduled to see general surgery on 2/5/25. No further questions or concerns at this time. (1/24/2025 10:42 AM)

## 2025-01-24 NOTE — DISCHARGE SUMMARY
Discharge Diagnosis  Gastrointestinal hemorrhage, unspecified gastrointestinal hemorrhage type    Issues Requiring Follow-Up  Colon cancer    Test Results Pending At Discharge  Pending Labs       No current pending labs.            Hospital Course       Pertinent Physical Exam At Time of Discharge  Physical Exam  Constitutional:       Appearance: Normal appearance.   HENT:      Head: Normocephalic and atraumatic.      Right Ear: Tympanic membrane and ear canal normal.      Left Ear: Tympanic membrane and ear canal normal.      Mouth/Throat:      Mouth: Mucous membranes are moist.      Pharynx: Oropharynx is clear.   Eyes:      Extraocular Movements: Extraocular movements intact.      Conjunctiva/sclera: Conjunctivae normal.      Pupils: Pupils are equal, round, and reactive to light.   Cardiovascular:      Rate and Rhythm: Normal rate and regular rhythm.      Pulses: Normal pulses.      Heart sounds: Normal heart sounds.   Pulmonary:      Effort: Pulmonary effort is normal.      Breath sounds: Normal breath sounds.   Abdominal:      General: Abdomen is flat. Bowel sounds are normal.      Palpations: Abdomen is soft.   Musculoskeletal:         General: Normal range of motion.      Cervical back: Normal range of motion and neck supple.   Skin:     General: Skin is warm and dry.      Capillary Refill: Capillary refill takes 2 to 3 seconds.   Neurological:      General: No focal deficit present.      Mental Status: She is alert and oriented to person, place, and time. Mental status is at baseline.   Psychiatric:         Mood and Affect: Mood normal.         Behavior: Behavior normal.         Thought Content: Thought content normal.         Judgment: Judgment normal.         Home89-year-old female with past medical history of COPD, HTN, CVA, sick sinus syndrome s/p pacemaker, HFpEF, recent NSTEMI, and recent hospitalization for CHF exacerbation with acute pulmonary edema, imaging at that time was concerning for SVC  thrombus and patient was evaluated by vascular who recommended DOAC for 6 months and repeat CT.  She presents with red blood per rectum and possible epistaxis. Hospitalized for further evaluation and management.     #GI bleed, suspect lower -> likely ischemic colitis (wall thickening at distal transverse colon, associated abdominal discomfort and dizziness, brown stool with red blood reported).  #Epistaxis ?- no reported vomiting.  Blood noted with clearing of her nose and when rinsing her mouth with fluids.   #SVC thrombus     Presentation is concerning for ischemic colitis as noted above.  Avoid hypotension  Consult gastroenterology, appreciate input  Will hold aspirin, Plavix, and Eliquis for the time being  Trend H&H, stable on presentation.  Patient agreeable to blood transfusions as needed.  N.p.o. after midnight  Low suspicion for upper GI source, however will continue with Protonix 40 mg IV twice daily until evaluated by GI.  Consult to vascular surgery regarding ongoing need for Eliquis.   Dr Smith's last note on 1/6/2025 “I have reviewed the DVT scan performed today.  There is no evidence of IJ thrombus.  Waveforms are normal in the IJ as well as subclavian, suggestive that even if there is a thrombus in the SVC, it is not hemodynamically significant.”   Check Orthostatic vitals     #Acute hypoxic respiratory failure  #Acute on chronic diastolic congestive heart failure  #History sick sinus syndrome s/p pacemaker  #History NSTEMI  #Valvular hear disease  #pleural effusions     CT angio A/P was suggestive of CHF with interstitial edema and small pleural effusions.   Low-sodium diet  Consult cardiology, patient follows with Dr Jiang and Ramicone for EP  Supplemental oxygen as needed  Continue oral diuretics, will defer to cardiology IV diuresis .  DuoNebs as needed  RT to evaluate     #DVT Ppx  SCD  Holding ac        1/13: doing ok, informed about CT results, gi on board,      1/14: CLD. Plans for  colonscopy to get definitive dx, ok to stop eliquis per vascular/cardio     1/15: plans for scope tomorrow     1/16: scope confirmed cancer, she is unsure if she wants surgery or not, will consult surgery to give her more information  1/17: contemplating surgyer, will need cardiac clearance will talk to family as well     1/18: patient seen by cardiology this am ; continue to hold aspirin and Plavix at this time.;  Patient noted with expiratory wheezes on exam.  Pulse ox is stable.  We will order chest x-rays and ensure that she is getting her nebulizers as ordered.    Awaiting Vascular surgery in put as well  Patient is still contemplating surgery  Continue to trend labs     1/19: lab work stable.  We will start Mucinex and Tessalon Perles for cough.  Patient appears agreeable for surgery we will continue discussion with surgical team.  Aspirin Plavix are on hold, seen by cardiology at discharge patient should only resume aspirin  Continue to monitor labs.  Plan of care discussed with attending   Dr. Iglesia mart.        1/20: plans to undergo surgery, date pending     1/21: continue pre op preparation  1/22: surgery cleared plans to go to OR outpatient to help her recover a bit pior to surgery     I spent 35 minutes in the professional and overall care of this patient. Medications     Medication List      START taking these medications     polyethylene glycol 17 gram/dose powder; Commonly known as: Glycolax,   Miralax; Mix 1 capful (17 g) of powder with 4 to 8 ounces of water or   juice and drink 2 times a day.     CONTINUE taking these medications     aspirin 81 mg chewable tablet; Chew 1 tablet (81 mg) once daily.   Calcium 600 + D(3) 600 mg-5 mcg (200 unit) tablet; Generic drug: calcium   carbonate-vitamin D3   clopidogrel 75 mg tablet; Commonly known as: Plavix; Take 1 tablet (75   mg) by mouth once daily.   fluticasone propion-salmeteroL 115-21 mcg/actuation inhaler; Commonly   known as: Advair; Inhale 2 puffs 2  times a day. Rinse mouth with water   after use to reduce aftertaste and incidence of candidiasis. Do not   swallow.   lubricating eye drops ophthalmic solution   metoprolol tartrate 25 mg tablet; Commonly known as: Lopressor; Take 0.5   tablets (12.5 mg) by mouth 2 times a day.   PreserVision AREDS-2 250-90-40-1 mg capsule; Generic drug: vit   C,W-Si-hegrc-lutein-zeaxan   torsemide 20 mg tablet; Commonly known as: Demadex; Take 1 tablet (20   mg) by mouth once daily.     STOP taking these medications     Eliquis 5 mg tablet; Generic drug: apixaban       Outpatient Follow-Up  Future Appointments   Date Time Provider Department Center   1/31/2025  2:45 PM Naima Keane MD [id number] [place]   2/5/2025 11:30 AM Devan Vazquez MD [id number] [place]   2/20/2025  9:30 AM Lang Smith MD [id number] [place]   2/24/2025  2:00 PM James C Ramicone, DO [id number] [place]   9/17/2025  2:30 PM PAR[facility]ONE [facility] [id number] [id number]       Iglesia Gabriel DO

## 2025-01-25 ENCOUNTER — PATIENT OUTREACH (OUTPATIENT)
Dept: CARE COORDINATION | Age: OVER 89
End: 2025-01-25
Payer: MEDICARE

## 2025-01-25 SDOH — SOCIAL STABILITY: SOCIAL INSECURITY
WITHIN THE LAST YEAR, HAVE YOU BEEN HUMILIATED OR EMOTIONALLY ABUSED IN OTHER WAYS BY YOUR PARTNER OR EX-PARTNER?: PATIENT UNABLE TO ANSWER

## 2025-01-25 SDOH — HEALTH STABILITY: MENTAL HEALTH
DO YOU FEEL STRESS - TENSE, RESTLESS, NERVOUS, OR ANXIOUS, OR UNABLE TO SLEEP AT NIGHT BECAUSE YOUR MIND IS TROUBLED ALL THE TIME - THESE DAYS?: PATIENT UNABLE TO ANSWER

## 2025-01-25 SDOH — HEALTH STABILITY: MENTAL HEALTH: HOW OFTEN DO YOU HAVE SIX OR MORE DRINKS ON ONE OCCASION?: NEVER

## 2025-01-25 SDOH — SOCIAL STABILITY: SOCIAL INSECURITY: WITHIN THE LAST YEAR, HAVE YOU BEEN AFRAID OF YOUR PARTNER OR EX-PARTNER?: PATIENT UNABLE TO ANSWER

## 2025-01-25 SDOH — SOCIAL STABILITY: SOCIAL INSECURITY
WITHIN THE LAST YEAR, HAVE YOU BEEN RAPED OR FORCED TO HAVE ANY KIND OF SEXUAL ACTIVITY BY YOUR PARTNER OR EX-PARTNER?: PATIENT UNABLE TO ANSWER

## 2025-01-25 SDOH — ECONOMIC STABILITY: FOOD INSECURITY: WITHIN THE PAST 12 MONTHS, YOU WORRIED THAT YOUR FOOD WOULD RUN OUT BEFORE YOU GOT THE MONEY TO BUY MORE.: NEVER TRUE

## 2025-01-25 SDOH — SOCIAL STABILITY: SOCIAL NETWORK: HOW OFTEN DO YOU ATTEND MEETINGS OF THE CLUBS OR ORGANIZATIONS YOU BELONG TO?: NEVER

## 2025-01-25 SDOH — SOCIAL STABILITY: SOCIAL INSECURITY: ARE YOU MARRIED, WIDOWED, DIVORCED, SEPARATED, NEVER MARRIED, OR LIVING WITH A PARTNER?: WIDOWED

## 2025-01-25 SDOH — HEALTH STABILITY: PHYSICAL HEALTH
HOW OFTEN DO YOU NEED TO HAVE SOMEONE HELP YOU WHEN YOU READ INSTRUCTIONS, PAMPHLETS, OR OTHER WRITTEN MATERIAL FROM YOUR DOCTOR OR PHARMACY?: RARELY

## 2025-01-25 SDOH — ECONOMIC STABILITY: HOUSING INSECURITY: AT ANY TIME IN THE PAST 12 MONTHS, WERE YOU HOMELESS OR LIVING IN A SHELTER (INCLUDING NOW)?: NO

## 2025-01-25 SDOH — SOCIAL STABILITY: SOCIAL NETWORK: HOW OFTEN DO YOU GET TOGETHER WITH FRIENDS OR RELATIVES?: NEVER

## 2025-01-25 SDOH — ECONOMIC STABILITY: INCOME INSECURITY: IN THE PAST 12 MONTHS HAS THE ELECTRIC, GAS, OIL, OR WATER COMPANY THREATENED TO SHUT OFF SERVICES IN YOUR HOME?: NO

## 2025-01-25 SDOH — HEALTH STABILITY: MENTAL HEALTH: HOW OFTEN DO YOU HAVE A DRINK CONTAINING ALCOHOL?: NEVER

## 2025-01-25 SDOH — SOCIAL STABILITY: SOCIAL NETWORK: HOW OFTEN DO YOU ATTEND CHURCH OR RELIGIOUS SERVICES?: NEVER

## 2025-01-25 SDOH — HEALTH STABILITY: MENTAL HEALTH: HOW MANY DRINKS CONTAINING ALCOHOL DO YOU HAVE ON A TYPICAL DAY WHEN YOU ARE DRINKING?: PATIENT DOES NOT DRINK

## 2025-01-25 SDOH — ECONOMIC STABILITY: FOOD INSECURITY: HOW HARD IS IT FOR YOU TO PAY FOR THE VERY BASICS LIKE FOOD, HOUSING, MEDICAL CARE, AND HEATING?: NOT HARD AT ALL

## 2025-01-25 SDOH — ECONOMIC STABILITY: FOOD INSECURITY: WITHIN THE PAST 12 MONTHS, THE FOOD YOU BOUGHT JUST DIDN'T LAST AND YOU DIDN'T HAVE MONEY TO GET MORE.: NEVER TRUE

## 2025-01-25 SDOH — ECONOMIC STABILITY: TRANSPORTATION INSECURITY: IN THE PAST 12 MONTHS, HAS LACK OF TRANSPORTATION KEPT YOU FROM MEDICAL APPOINTMENTS OR FROM GETTING MEDICATIONS?: NO

## 2025-01-25 SDOH — SOCIAL STABILITY: SOCIAL NETWORK
DO YOU BELONG TO ANY CLUBS OR ORGANIZATIONS SUCH AS CHURCH GROUPS, UNIONS, FRATERNAL OR ATHLETIC GROUPS, OR SCHOOL GROUPS?: NO

## 2025-01-25 SDOH — ECONOMIC STABILITY: HOUSING INSECURITY: IN THE LAST 12 MONTHS, WAS THERE A TIME WHEN YOU WERE NOT ABLE TO PAY THE MORTGAGE OR RENT ON TIME?: NO

## 2025-01-25 SDOH — SOCIAL STABILITY: SOCIAL NETWORK: IN A TYPICAL WEEK, HOW MANY TIMES DO YOU TALK ON THE PHONE WITH FAMILY, FRIENDS, OR NEIGHBORS?: ONCE A WEEK

## 2025-01-25 SDOH — SOCIAL STABILITY: SOCIAL INSECURITY
WITHIN THE LAST YEAR, HAVE YOU BEEN KICKED, HIT, SLAPPED, OR OTHERWISE PHYSICALLY HURT BY YOUR PARTNER OR EX-PARTNER?: PATIENT UNABLE TO ANSWER

## 2025-01-25 SDOH — ECONOMIC STABILITY: HOUSING INSECURITY: IN THE PAST 12 MONTHS, HOW MANY TIMES HAVE YOU MOVED WHERE YOU WERE LIVING?: 0

## 2025-01-25 SDOH — HEALTH STABILITY: PHYSICAL HEALTH

## 2025-01-25 ASSESSMENT — LIFESTYLE VARIABLES
AUDIT-C TOTAL SCORE: 0
SKIP TO QUESTIONS 9-10: 1

## 2025-01-25 ASSESSMENT — ACTIVITIES OF DAILY LIVING (ADL): LACK_OF_TRANSPORTATION: NO

## 2025-01-25 NOTE — PROGRESS NOTES
Patient and daughter are agreeable to [facility] ([facility]). Enrollment call completed with daughter, as patient is [place], and program overview and expectations were explained to patient, with appropriate numbers/info given. Initial visit is 1/26 1230 and patient does not have capabilities to do video calls.    Pt's current PCP is Dr. Keane, replacement for Dr. Chavez (still listed in chart too).   Pt recently discharged from [facility] admitted 1/12-1/23 for GIB -- colon ca. Pmhx COPD, HTN, CVA, sick sinus s/p ppm, CHF, recent NSTEMI and recent hospitalizations (CHF and SVC thrombus, requiring oral anticoagulation for 6 months). Pt then presented with BRPBR and possible nosebleeds and hospitalzed for workup. Pt had colonoscopy and was confirmed cancer - surgery to be completed outpatient. Pt started on miralax and taken off of blood thinners.     No concerns noted during enrollment call other than pt [place] but does converse on her landline - preferred # in chart.

## 2025-01-26 ENCOUNTER — APPOINTMENT (OUTPATIENT)
Dept: CARE COORDINATION | Age: OVER 89
End: 2025-01-26
Payer: MEDICARE

## 2025-01-27 ENCOUNTER — PATIENT OUTREACH (OUTPATIENT)
Dept: CARE COORDINATION | Age: OVER 89
End: 2025-01-27
Payer: MEDICARE

## 2025-01-27 NOTE — PROGRESS NOTES
Daily call completed. Pt states she is doing okay today. Denies any new symptoms or concerns. Denies need for med refills at this time. States she has not been checking her weight, was not aware that she was supposed to be doing so. Pt advised to begin checking her weight first thing in the morning after using the restroom and before she eats. Verbalizes understanding. Denies any further questions/concerns/needs at this time. Aware to call in with any that may develop. Aware of upcoming appts. [facility] rescheduled 1/29 @ 8220.

## 2025-01-28 ENCOUNTER — PATIENT OUTREACH (OUTPATIENT)
Dept: CARE COORDINATION | Age: OVER 89
End: 2025-01-28
Payer: MEDICARE

## 2025-01-28 NOTE — PROGRESS NOTES
Daily Call Note: [facility] daily call complete.  Denies CP/SOB/ edema  Good po intake  All questions/ concerns addressed  Verified upcoming weekly [facility] call    Pt Education:  POC  Barriers:   Topics for Daily Review:  weight  Pt demonstrates clear understanding: Yes    Daily Weight:  156 lbs  There were no vitals filed for this visit.   Last 3 Weights:  Wt Readings from Last 7 Encounters:   01/23/25 75.5 kg (166 lb 8 oz)   01/28/25 70.9 kg (156 lb 4.9 oz)   01/03/25 75.8 kg (167 lb)   12/27/24 74.8 kg (165 lb)   12/20/24 73.5 kg (162 lb)   06/26/24 74.3 kg (163 lb 12.8 oz)   02/26/24 74.8 kg (165 lb)       Masimo Device: No   Masimo Clinical Impression:     Virtual Visits--Scheduled (Most Recent Date at Top)  Follow up Appointments  Recent Visits  No visits were found meeting these conditions.  Showing recent visits within past 30 days and meeting all other requirements  Future Appointments  Date Type Provider Dept   01/31/25 Appointment Naima Keane MD Do [id number]   Showing future appointments within next 90 days and meeting all other requirements       Frequency of RN Calls & Virtual Visits per Team Agreement: [facility] Frequency: Daily    Medication issues Addressed (what was done):     Follow up appointments scheduled by [facility] Staff:   Referrals made by [facility] staff:

## 2025-01-29 ENCOUNTER — PATIENT OUTREACH (OUTPATIENT)
Dept: HOME HEALTH SERVICES | Age: OVER 89
End: 2025-01-29

## 2025-01-29 ENCOUNTER — TELEMEDICINE (OUTPATIENT)
Dept: PHARMACY | Facility: HOSPITAL | Age: OVER 89
End: 2025-01-29
Payer: MEDICARE

## 2025-01-29 DIAGNOSIS — I50.32 CHRONIC DIASTOLIC CONGESTIVE HEART FAILURE: ICD-10-CM

## 2025-01-29 DIAGNOSIS — J44.9 CHRONIC OBSTRUCTIVE PULMONARY DISEASE, UNSPECIFIED COPD TYPE (MULTI): ICD-10-CM

## 2025-01-29 DIAGNOSIS — I50.32 CHRONIC DIASTOLIC CONGESTIVE HEART FAILURE: Primary | ICD-10-CM

## 2025-01-29 LAB
ATRIAL RATE: 0 BPM
P AXIS: 0 DEGREES
PR INTERVAL: 36 MS
Q ONSET: 251 MS
QRS COUNT: 12 BEATS
QRS DURATION: 154 MS
QT INTERVAL: 474 MS
QTC CALCULATION(BAZETT): 551 MS
QTC FREDERICIA: 523 MS
R AXIS: -56 DEGREES
T AXIS: 154 DEGREES
T OFFSET: 488 MS
VENTRICULAR RATE: 81 BPM

## 2025-01-29 NOTE — PROGRESS NOTES
[facility]    Yesenia Milner is a 89 y.o. female was referred to Clinical Pharmacy Team to complete a post-discharge medication optimization and monitoring visit.  The patient was referred for multiple concerns while in [facility]. Today was our initial visit.     Admission Date: 1/22  Discharge Date: 1/23    Referring Provider: Terra Julian APRN-*  PCP: Naima Keane MD - next visit: 1/31      Subjective   Allergies   Allergen Reactions    Iodine Rash    Shrimp Diarrhea and Nausea/vomiting    Hydrocodone Unknown     Pt does not remember    Adhesive Tape-Silicones Itching       [facility] [address] [place], [place] - [address] [facility] [facility]  [address] [facility] [address]  Phone: [phone] Fax: [phone]    [facility] - [place], [place] - [facility] [address]  [address]  [address]  [facility] [address]  Phone: [phone] Fax: [phone]    [facility]  [address] [place] [address]  Phone: [phone] Fax: [phone]      Medication System Management:  Affordability/Accessibility: no concerns currently   Adherence/Organization: no issues       Social History     Social History Narrative    Not on file        Notable Medication changes following discharge:  Start: miralax   Stop: eliquis   Change: none    HPI      Review of Systems        Objective     There were no vitals taken for this visit.   BP Readings from Last 4 Encounters:   01/23/25 115/53   01/07/25 101/51   12/27/24 110/52   12/23/24 100/56      There were no vitals filed for this visit.     LAB  Lab Results   Component Value Date    BILITOT 0.3 01/20/2025    CALCIUM 8.9 01/23/2025    CO2 27 01/23/2025     01/23/2025    CREATININE 0.96 01/23/2025    GLUCOSE 108 (H) 01/23/2025    ALKPHOS 81 01/20/2025    K 3.9 01/23/2025    PROT 5.8 (L) 01/20/2025     01/23/2025    AST 16 01/20/2025    ALT 16 01/20/2025    BUN 25 (H) 01/23/2025    ANIONGAP 12 01/23/2025    MG  2.21 01/12/2025    ALBUMIN 3.2 (L) 01/20/2025    LIPASE 23 01/12/2025    GFRF 57 (A) 09/29/2023     Lab Results   Component Value Date    TRIG 70 12/20/2024    CHOL 145 12/20/2024    LDLCALC 68 12/20/2024    HDL 63.1 12/20/2024     Lab Results   Component Value Date    HGBA1C 5.5 12/20/2024         Current Outpatient Medications   Medication Instructions    aspirin 81 mg, oral, Daily    calcium carbonate-vitamin D3 (Calcium 600 + D,3,) 600 mg-5 mcg (200 unit) tablet 1 tablet, Daily    clopidogrel (PLAVIX) 75 mg, oral, Daily    fluticasone propion-salmeteroL (Advair) 115-21 mcg/actuation inhaler 2 puffs, inhalation, 2 times daily, Rinse mouth with water after use to reduce aftertaste and incidence of candidiasis. Do not swallow.    lubricating eye drops ophthalmic solution 1 drop, As needed    metoprolol tartrate (LOPRESSOR) 12.5 mg, oral, 2 times daily    polyethylene glycol (Glycolax, Miralax) 17 gram/dose powder Mix 1 capful (17 g) of powder with 4 to 8 ounces of water or juice and drink 2 times a day.    torsemide (DEMADEX) 20 mg, oral, Daily    vit C,Q-Xl-mifgq-lutein-zeaxan (PreserVision AREDS-2) 250-90-40-1 mg capsule 2 capsules, Daily        HISTORICAL PHARMACOTHERAPY  N/a    DRUG INTERACTIONS  None at time of review      Assessment/Plan   Problem List Items Addressed This Visit       Chronic diastolic congestive heart failure    COPD (chronic obstructive pulmonary disease) (Multi)     -has been doing okay since being home  -not as hungry though   -having normal BM's  -has not had any bleeding since being home     -most recent EF was 60-65% from 12/21/24  -no chest pain or SOB   -weight today 158 lbs    -no trouble with transportation     Medications Changes/Concerns:  -d/c meds:  1. miralax 17g bid   -m2b from [facility]   -stop --> eliquis       Refills Needed:  -none needed today       Plan/To Do:  -daily vitals if able to - weights and BP's   -seeing pcp this Friday   -seeing general surgeon next week    -nursing will continue with daily calls       [facility] PAP Status:  -does not need       Time Spent with Patient and [facility] Team - Terra Julian NP and Tegan JORGE RN via phone call: 15 minutes      Follow Up: 1 week     Continue all meds under the continuation of care with the referring provider and clinical pharmacy team.    Kishan Moreira, PharmD     Verbal consent to manage patient's drug therapy was obtained from the patient and an individual authorized to act on behalf of a patient. They were informed they may decline to participate or withdraw from participation in pharmacy services at any time.

## 2025-01-29 NOTE — PROGRESS NOTES
Daily Call Note:     Initial [facility] call with patient, Tammi NP, Kishan, and the Nurse.  Pt is hard of hearing.  Pt states that she is feeling okay.  She doesn't have much of an appetite.  Denies any N/V or bleeding.  She states that she sometimes feels out of breath.  Provider reviewed meds with the patient.  She states her daughter came home and she gave her the phone.  States pt doesn't have any questions or concerns.  No issues affording meds and not in need of any refills.  Verified upcoming appts with the daughter.  Has no food insecurities.  Next [facility] 2/5 @ 1005.    Pt Education:   Barriers:  Topics for Daily Review:   Pt demonstrates clear understanding:    Daily Weight:  There were no vitals filed for this visit.   Last 3 Weights:  Wt Readings from Last 7 Encounters:   01/23/25 75.5 kg (166 lb 8 oz)   01/28/25 70.9 kg (156 lb 4.9 oz)   01/03/25 75.8 kg (167 lb)   12/27/24 74.8 kg (165 lb)   12/20/24 73.5 kg (162 lb)   06/26/24 74.3 kg (163 lb 12.8 oz)   02/26/24 74.8 kg (165 lb)       Masimo Device:    Masimo Clinical Impression:     Virtual Visits--Scheduled (Most Recent Date at Top)  Follow up Appointments  Recent Visits  No visits were found meeting these conditions.  Showing recent visits within past 30 days and meeting all other requirements  Future Appointments  Date Type Provider Dept   01/31/25 Appointment [id number]   Showing future appointments within next 90 days and meeting all other requirements       Frequency of RN Calls & Virtual Visits per Team Agreement:     Medication issues Addressed (what was done):     Follow up appointments scheduled by [facility] Staff:   Referrals made by [facility] staff:

## 2025-01-30 ENCOUNTER — PATIENT OUTREACH (OUTPATIENT)
Dept: CARE COORDINATION | Age: OVER 89
End: 2025-01-30
Payer: MEDICARE

## 2025-01-30 VITALS
WEIGHT: 157 LBS | SYSTOLIC BLOOD PRESSURE: 125 MMHG | DIASTOLIC BLOOD PRESSURE: 63 MMHG | HEART RATE: 69 BPM | BODY MASS INDEX: 27.14 KG/M2

## 2025-01-30 NOTE — PROGRESS NOTES
"Daily Call Note:   Pt stated she has some intermittent SOB, on exertion, and sometimes when she is \"just sitting\", not worsening t this time, pt does not wear oxygen or check a pulse ox at this time. Stated it is not worsening. Pt still has complaints of \"numbness\" in right hand, but see's her PCP tomorrow and will discuss further. Will call pt back to get her VS. No further concerns today. [facility] 2/5 @100.    Topics for Daily Review: VS, SOB.  Pt demonstrates clear understanding: Yes    Daily VS:  /63   Pulse 69   Wt 71.2 kg (157 lb)   BMI 27.14 kg/m²       Last 3 Weights:  Wt Readings from Last 7 Encounters:   01/23/25 75.5 kg (166 lb 8 oz)   01/28/25 70.9 kg (156 lb 4.9 oz)   01/03/25 75.8 kg (167 lb)   12/27/24 74.8 kg (165 lb)   12/20/24 73.5 kg (162 lb)   06/26/24 74.3 kg (163 lb 12.8 oz)   02/26/24 74.8 kg (165 lb)       Masimo Device: No       Virtual Visits--Scheduled (Most Recent Date at Top)  Follow up Appointments  Recent Visits  No visits were found meeting these conditions.  Showing recent visits within past 30 days and meeting all other requirements  Future Appointments  Date Type Provider Dept   01/31/25 Appointment Naima Keane MD Do [id number]   Showing future appointments within next 90 days and meeting all other requirements       Frequency of RN Calls & Virtual Visits per Team Agreement: [facility] Frequency: Daily    Medication issues Addressed (what was done):     Follow up appointments scheduled by [facility] Staff: 2/5 @1000           "

## 2025-01-31 ENCOUNTER — PATIENT OUTREACH (OUTPATIENT)
Dept: CARE COORDINATION | Age: OVER 89
End: 2025-01-31

## 2025-01-31 ENCOUNTER — APPOINTMENT (OUTPATIENT)
Dept: PRIMARY CARE | Facility: CLINIC | Age: OVER 89
End: 2025-01-31
Payer: MEDICARE

## 2025-01-31 VITALS
TEMPERATURE: 97.2 F | HEART RATE: 75 BPM | BODY MASS INDEX: 28.97 KG/M2 | DIASTOLIC BLOOD PRESSURE: 62 MMHG | WEIGHT: 167.6 LBS | SYSTOLIC BLOOD PRESSURE: 118 MMHG | OXYGEN SATURATION: 97 %

## 2025-01-31 DIAGNOSIS — C18.9 ADENOCARCINOMA, COLON (MULTI): Primary | ICD-10-CM

## 2025-01-31 DIAGNOSIS — C18.4 MALIGNANT NEOPLASM OF TRANSVERSE COLON (MULTI): ICD-10-CM

## 2025-01-31 DIAGNOSIS — I11.0 HYPERTENSIVE HEART DISEASE WITH CHRONIC DIASTOLIC CONGESTIVE HEART FAILURE: ICD-10-CM

## 2025-01-31 DIAGNOSIS — I13.0 HYPERTENSIVE HEART AND KIDNEY DISEASE WITH CHRONIC DIASTOLIC CONGESTIVE HEART FAILURE AND STAGE 3 CHRONIC KIDNEY DISEASE, UNSPECIFIED WHETHER STAGE 3A OR 3B CKD: ICD-10-CM

## 2025-01-31 DIAGNOSIS — I50.32 HYPERTENSIVE HEART AND KIDNEY DISEASE WITH CHRONIC DIASTOLIC CONGESTIVE HEART FAILURE AND STAGE 3 CHRONIC KIDNEY DISEASE, UNSPECIFIED WHETHER STAGE 3A OR 3B CKD: ICD-10-CM

## 2025-01-31 DIAGNOSIS — D50.0 IRON DEFICIENCY ANEMIA DUE TO CHRONIC BLOOD LOSS: ICD-10-CM

## 2025-01-31 DIAGNOSIS — Z95.0 PRESENCE OF PERMANENT CARDIAC PACEMAKER: ICD-10-CM

## 2025-01-31 DIAGNOSIS — N18.30 HYPERTENSIVE HEART AND KIDNEY DISEASE WITH CHRONIC DIASTOLIC CONGESTIVE HEART FAILURE AND STAGE 3 CHRONIC KIDNEY DISEASE, UNSPECIFIED WHETHER STAGE 3A OR 3B CKD: ICD-10-CM

## 2025-01-31 DIAGNOSIS — N18.30 STAGE 3 CHRONIC KIDNEY DISEASE, UNSPECIFIED WHETHER STAGE 3A OR 3B CKD (MULTI): ICD-10-CM

## 2025-01-31 DIAGNOSIS — I50.32 CHRONIC DIASTOLIC CONGESTIVE HEART FAILURE: ICD-10-CM

## 2025-01-31 DIAGNOSIS — I50.32 HYPERTENSIVE HEART DISEASE WITH CHRONIC DIASTOLIC CONGESTIVE HEART FAILURE: ICD-10-CM

## 2025-01-31 PROBLEM — N39.0 URINARY TRACT INFECTION: Status: RESOLVED | Noted: 2024-02-22 | Resolved: 2025-01-31

## 2025-01-31 PROBLEM — Z78.0 POST-MENOPAUSAL: Status: RESOLVED | Noted: 2023-09-20 | Resolved: 2025-01-31

## 2025-01-31 PROBLEM — I50.20 SYSTOLIC CHF (MULTI): Status: RESOLVED | Noted: 2023-05-18 | Resolved: 2025-01-31

## 2025-01-31 PROBLEM — R31.0 GROSS HEMATURIA: Status: RESOLVED | Noted: 2020-10-07 | Resolved: 2025-01-31

## 2025-01-31 PROBLEM — H61.20 IMPACTED CERUMEN: Status: RESOLVED | Noted: 2024-02-22 | Resolved: 2025-01-31

## 2025-01-31 ASSESSMENT — PAIN SCALES - GENERAL: PAINLEVEL_OUTOF10: 0-NO PAIN

## 2025-01-31 ASSESSMENT — ENCOUNTER SYMPTOMS
WEAKNESS: 0
FLANK PAIN: 0
VOMITING: 0
HEADACHES: 0
TREMORS: 0
PALPITATIONS: 0
SEIZURES: 0
EYE PAIN: 0
TROUBLE SWALLOWING: 0
CONFUSION: 0
UNEXPECTED WEIGHT CHANGE: 0
BACK PAIN: 0
NUMBNESS: 0
SHORTNESS OF BREATH: 1
FEVER: 0
CHILLS: 0
DIZZINESS: 0
WHEEZING: 0
WOUND: 0
COUGH: 0
SLEEP DISTURBANCE: 0
EYE DISCHARGE: 0
NERVOUS/ANXIOUS: 0
DIARRHEA: 0
APPETITE CHANGE: 1
JOINT SWELLING: 0
DEPRESSION: 0
BLOOD IN STOOL: 1
DYSURIA: 0
NAUSEA: 0
HEMATURIA: 0
FREQUENCY: 0
SORE THROAT: 0
ABDOMINAL PAIN: 0
CONSTIPATION: 0

## 2025-01-31 ASSESSMENT — PATIENT HEALTH QUESTIONNAIRE - PHQ9
1. LITTLE INTEREST OR PLEASURE IN DOING THINGS: NOT AT ALL
2. FEELING DOWN, DEPRESSED OR HOPELESS: NOT AT ALL
SUM OF ALL RESPONSES TO PHQ9 QUESTIONS 1 AND 2: 0

## 2025-01-31 NOTE — ASSESSMENT & PLAN NOTE
-Current pacemaker is her second 1  -Appointment with EP specialist February 24       RTC 3 months  No refills needed per pt/CG ( julieta brought her here)

## 2025-01-31 NOTE — PROGRESS NOTES
"Patient: Yesenia Milner  : 1933  PCP: Naima Keane MD  MRN: [id number]  Program: [facility] Home  Status: Enrolled  Effective Dates: 2025 - present  Responsible Staff: [facility]  Social Drivers to be Addressed: No information to display    Transitional Care Management  Status: Enrolled  Effective Dates: 2025 - present  Responsible Staff: Kami Carroll LPN  Social Drivers to be Addressed: Physical Activity, Social Connections, Stress, Tobacco Use        Hospital Name:   Admission Date: 2025 - Initial admission   Admission Type: Emergency   Discharge Date: 2025      Reason for Admission     Gastrointestinal hemorrhage, unspecified gastrointestinal hemorrhage type     Hypoxia     Colonic mass     Superior vena cava thrombosis (Multi)     Anemia, unspecified type         PT IS A NEW PT- HERE TO ESTABLISH AND TCM VISIT  I updated  problem list  FORMER PCP PALOMO.    89-year-old female with past medical history of COPD, HTN, CVA, sick sinus syndrome s/p pacemaker, HFpEF, recent NSTEMI, and recent hospitalization for CHF exacerbation with acute pulmonary edema, imaging at that time was concerning for SVC thrombus and patient was evaluated by vascular who recommended DOAC for 6 months and repeat CT. She presents with red blood per rectum and possible epistaxis. Hospitalized for further evaluation and management.     2025: A. Colon, Transverse, Mass, Biopsy:   -- Invasive moderately differentiated adenocarcinoma. She has seen surgery and is discussing with her family.     Yesenia Milner is a 89 y.o. female presenting today for follow-up after being discharged from the hospital 7 days ago. The main problem requiring admission was GI bleed. The discharge summary and/or Transitional Care Management documentation was reviewed. Medication reconciliation was performed as indicated via the \"Ayan as Reviewed\" timestamp.     Yesenia Milner was contacted by Transitional Care Management " services two days after her discharge. This encounter and supporting documentation was reviewed.    ECHO 2024:  CONCLUSIONS:   1. Left ventricular ejection fraction is normal, by visual estimate at 60-65%.   2. Abnormal left venticular wall motion.   3. Left ventricular diastolic filling is indeterminate.   4. There is a moderate apical and anteroapical myocardial infarction.   5. There is normal right ventricular global systolic function.   6. There is moderate mitral annular calcification.   7. Moderate mitral valve regurgitation.   8. Moderate to severe tricuspid regurgitation visualized.   9. Moderately elevated right ventricular systolic pressure.  10. Aortic valve sclerosis.    Review of Systems   Constitutional:  Positive for appetite change. Negative for chills, fever and unexpected weight change.   HENT:  Negative for congestion, ear pain, sneezing, sore throat and trouble swallowing.    Eyes:  Negative for pain, discharge and visual disturbance.   Respiratory:  Positive for shortness of breath (VARGAS). Negative for cough and wheezing.    Cardiovascular:  Negative for chest pain, palpitations and leg swelling.   Gastrointestinal:  Positive for blood in stool. Negative for abdominal pain, constipation, diarrhea, nausea and vomiting.   Genitourinary:  Negative for dysuria, flank pain, frequency, hematuria and urgency.   Musculoskeletal:  Positive for gait problem (chronic). Negative for back pain and joint swelling.   Skin:  Negative for rash and wound.   Neurological:  Negative for dizziness, tremors, seizures, syncope, weakness, numbness and headaches.   Psychiatric/Behavioral:  Negative for confusion, sleep disturbance and suicidal ideas. The patient is not nervous/anxious.        /62 (BP Location: Left arm, Patient Position: Sitting)   Pulse 75   Temp 36.2 °C (97.2 °F)   Wt 76 kg (167 lb 9.6 oz)   SpO2 97%   BMI 28.97 kg/m²   Patient Health Questionnaire-2 Score: 0      Physical Exam  Vitals and  nursing note reviewed.   Constitutional:       General: She is not in acute distress.     Appearance: Normal appearance.   HENT:      Head: Normocephalic and atraumatic.      Nose: Nose normal.      Mouth/Throat:      Mouth: Mucous membranes are moist.      Pharynx: Oropharynx is clear.   Eyes:      Extraocular Movements: Extraocular movements intact.      Conjunctiva/sclera: Conjunctivae normal.      Pupils: Pupils are equal, round, and reactive to light.   Cardiovascular:      Rate and Rhythm: Normal rate and regular rhythm.      Heart sounds: No murmur heard.     Comments: BL knee high  zippered compression stockings   Pulmonary:      Effort: Pulmonary effort is normal. No respiratory distress.      Breath sounds: Normal breath sounds. No wheezing or rhonchi.   Abdominal:      General: Bowel sounds are normal.      Palpations: Abdomen is soft.      Tenderness: There is no abdominal tenderness.   Musculoskeletal:         General: Normal range of motion.      Cervical back: Neck supple.      Thoracic back: Scoliosis (kyphosis) present.      Right lower leg: No edema.      Left lower leg: No edema.   Skin:     General: Skin is warm and dry.      Findings: No bruising or rash.   Neurological:      General: No focal deficit present.      Mental Status: She is alert and oriented to person, place, and time.      Motor: No weakness.      Gait: Gait abnormal (rollator).   Psychiatric:         Mood and Affect: Mood normal. Affect is tearful (AT END OF VISIT).         Behavior: Behavior normal.         The complexity of medical decision making for this patient's transitional care is high.    Assessment/Plan   Assessment & Plan  Adenocarcinoma, colon (Multi)  -New diagnosis, patient  would like to get surgery done-has appointment with surgeon Dr. Vazquez on February 5, and cardiology appointment months of February with Dr. Smith and Dr. 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, unspecified whether stage 3a or 3b CKD  -Chronic, her medicines have been adjusted and she takes short acting  metoprolol half a tablet once a day, along with torsemide once a day       Hypertensive heart disease with chronic diastolic congestive heart failure         Chronic diastolic congestive heart failure  -Most recent ejection fraction has been 60%       Stage 3 chronic kidney disease, unspecified whether stage 3a or 3b CKD (Multi)  -Her GFR has been stable around 55, she does not see a kidney specialist       Iron deficiency anemia due to chronic blood loss  -Chronic, does not take iron supplement, hemoglobin around 9.0-stable  -In view of chronic blood loss, recent acute GI bleed, and possible future surgery, I do recommend she would take a over-the-counter iron pill every other day       Presence of permanent cardiac pacemaker  -Current pacemaker is her second 1  -Appointment with EP specialist February 24       RTC 3 months  No refills needed per pt/CG ( grandson brought her here)

## 2025-01-31 NOTE — PROGRESS NOTES
Daily Call Note: [facility] daily call complete.  Denies CP/SOB/ edema  Reports feeling tired today.  Scant amount of medium colored blood in stool this am.  Pt states has not happened again. No ab pain, N/V. Joshua recently had food that was red, she was unsure if it was the food or blood.    Reminded pt to call [facility] w any issues/ concerns.  Pt in agreement.  No VS/weight provided   All questions/ concerns addressed  Verified upcoming weekly [facility] call    Pt Education: POC   Barriers:   Topics for Daily Review:   Pt demonstrates clear understanding: Yes    Daily Weight:  There were no vitals filed for this visit.   Last 3 Weights:  Wt Readings from Last 7 Encounters:   01/30/25 71.2 kg (157 lb)   01/23/25 75.5 kg (166 lb 8 oz)   01/28/25 70.9 kg (156 lb 4.9 oz)   01/03/25 75.8 kg (167 lb)   12/27/24 74.8 kg (165 lb)   12/20/24 73.5 kg (162 lb)   06/26/24 74.3 kg (163 lb 12.8 oz)       Masimo Device: No   Masimo Clinical Impression:     Virtual Visits--Scheduled (Most Recent Date at Top)  Follow up Appointments  Recent Visits  No visits were found meeting these conditions.  Showing recent visits within past 30 days and meeting all other requirements  Today's Visits  Date Type Provider Dept   01/31/25 Appointment Naima Keane MD Do [id number]   Showing today's visits and meeting all other requirements  Future Appointments  No visits were found meeting these conditions.  Showing future appointments within next 90 days and meeting all other requirements       Frequency of RN Calls & Virtual Visits per Team Agreement: [facility] Frequency: Daily    Medication issues Addressed (what was done):     Follow up appointments scheduled by [facility] Staff:   Referrals made by [facility] staff:

## 2025-02-01 ENCOUNTER — PATIENT OUTREACH (OUTPATIENT)
Dept: CARE COORDINATION | Age: OVER 89
End: 2025-02-01
Payer: MEDICARE

## 2025-02-01 NOTE — PROGRESS NOTES
Daily Call Note: [facility] daily call complete.  Denies CP/SOB/   Reports small amount of blood w BM this am.  Denies lightheadedness, dizziness  Also reports BLE edema , denies SOB.  Pt is elevating legs.    All questions/ concerns addressed  Verified upcoming weekly [facility] call    Aure NP aware of above.  No new orders at this time     Pt Education:  POC   Barriers:   Topics for Daily Review:   Pt demonstrates clear understanding: Yes    Daily Weight:  1/31 at Md 76.0 kg  1/30 at home 71.2 kg  Pt using different scale, and was fully dressed at Md.  Aure NP aware of weight change, no new orders   There were no vitals filed for this visit.   Last 3 Weights:  Wt Readings from Last 7 Encounters:   01/31/25 76 kg (167 lb 9.6 oz)   01/30/25 71.2 kg (157 lb)   01/23/25 75.5 kg (166 lb 8 oz)   01/28/25 70.9 kg (156 lb 4.9 oz)   01/03/25 75.8 kg (167 lb)   12/27/24 74.8 kg (165 lb)   12/20/24 73.5 kg (162 lb)       Masimo Device: No   Masimo Clinical Impression:     Virtual Visits--Scheduled (Most Recent Date at Top)  Follow up Appointments  Recent Visits  Date Type Provider Dept   01/31/25 Office Visit Naima Keane MD Do [id number]   Showing recent visits within past 30 days and meeting all other requirements  Future Appointments  Date Type Provider Dept   04/30/25 Appointment Naima Keane MD Do [id number]   Showing future appointments within next 90 days and meeting all other requirements       Frequency of RN Calls & Virtual Visits per Team Agreement: [facility] Frequency: Daily    Medication issues Addressed (what was done):     Follow up appointments scheduled by [facility] Staff:   Referrals made by [facility] staff:

## 2025-02-02 ENCOUNTER — APPOINTMENT (OUTPATIENT)
Dept: RADIOLOGY | Facility: HOSPITAL | Age: OVER 89
End: 2025-02-02
Payer: MEDICARE

## 2025-02-02 ENCOUNTER — HOSPITAL ENCOUNTER (OUTPATIENT)
Facility: HOSPITAL | Age: OVER 89
Setting detail: OBSERVATION
End: 2025-02-02
Attending: STUDENT IN AN ORGANIZED HEALTH CARE EDUCATION/TRAINING PROGRAM | Admitting: STUDENT IN AN ORGANIZED HEALTH CARE EDUCATION/TRAINING PROGRAM
Payer: MEDICARE

## 2025-02-02 ENCOUNTER — PATIENT OUTREACH (OUTPATIENT)
Dept: CARE COORDINATION | Age: OVER 89
End: 2025-02-02
Payer: MEDICARE

## 2025-02-02 ENCOUNTER — APPOINTMENT (OUTPATIENT)
Dept: CARDIOLOGY | Facility: HOSPITAL | Age: OVER 89
DRG: 291 | End: 2025-02-02
Payer: MEDICARE

## 2025-02-02 VITALS
OXYGEN SATURATION: 95 % | HEART RATE: 65 BPM | WEIGHT: 167 LBS | HEIGHT: 63 IN | TEMPERATURE: 97.5 F | SYSTOLIC BLOOD PRESSURE: 143 MMHG | RESPIRATION RATE: 18 BRPM | DIASTOLIC BLOOD PRESSURE: 61 MMHG | BODY MASS INDEX: 29.59 KG/M2

## 2025-02-02 DIAGNOSIS — J44.9 CHRONIC OBSTRUCTIVE PULMONARY DISEASE, UNSPECIFIED COPD TYPE (MULTI): ICD-10-CM

## 2025-02-02 DIAGNOSIS — I50.9 ACUTE DECOMPENSATED HEART FAILURE: Primary | ICD-10-CM

## 2025-02-02 LAB
ANION GAP SERPL CALC-SCNC: 13 MMOL/L (ref 10–20)
APPEARANCE UR: CLEAR
BASOPHILS # BLD AUTO: 0.02 X10*3/UL (ref 0–0.1)
BASOPHILS NFR BLD AUTO: 0.4 %
BILIRUB UR STRIP.AUTO-MCNC: NEGATIVE MG/DL
BNP SERPL-MCNC: 462 PG/ML (ref 0–99)
BUN SERPL-MCNC: 28 MG/DL (ref 6–23)
CALCIUM SERPL-MCNC: 8.9 MG/DL (ref 8.6–10.3)
CARDIAC TROPONIN I PNL SERPL HS: 15 NG/L (ref 0–13)
CARDIAC TROPONIN I PNL SERPL HS: 17 NG/L (ref 0–13)
CHLORIDE SERPL-SCNC: 100 MMOL/L (ref 98–107)
CO2 SERPL-SCNC: 24 MMOL/L (ref 21–32)
COLOR UR: NORMAL
CREAT SERPL-MCNC: 0.97 MG/DL (ref 0.5–1.05)
EGFRCR SERPLBLD CKD-EPI 2021: 55 ML/MIN/1.73M*2
EOSINOPHIL # BLD AUTO: 0.15 X10*3/UL (ref 0–0.4)
EOSINOPHIL NFR BLD AUTO: 2.7 %
ERYTHROCYTE [DISTWIDTH] IN BLOOD BY AUTOMATED COUNT: 14.6 % (ref 11.5–14.5)
GLUCOSE SERPL-MCNC: 135 MG/DL (ref 74–99)
GLUCOSE UR STRIP.AUTO-MCNC: NORMAL MG/DL
HCT VFR BLD AUTO: 24.5 % (ref 36–46)
HGB BLD-MCNC: 7.8 G/DL (ref 12–16)
IMM GRANULOCYTES # BLD AUTO: 0.02 X10*3/UL (ref 0–0.5)
IMM GRANULOCYTES NFR BLD AUTO: 0.4 % (ref 0–0.9)
KETONES UR STRIP.AUTO-MCNC: NEGATIVE MG/DL
LEUKOCYTE ESTERASE UR QL STRIP.AUTO: NEGATIVE
LYMPHOCYTES # BLD AUTO: 0.95 X10*3/UL (ref 0.8–3)
LYMPHOCYTES NFR BLD AUTO: 17.2 %
MAGNESIUM SERPL-MCNC: 2.25 MG/DL (ref 1.6–2.4)
MCH RBC QN AUTO: 28.2 PG (ref 26–34)
MCHC RBC AUTO-ENTMCNC: 31.8 G/DL (ref 32–36)
MCV RBC AUTO: 88 FL (ref 80–100)
MONOCYTES # BLD AUTO: 0.73 X10*3/UL (ref 0.05–0.8)
MONOCYTES NFR BLD AUTO: 13.2 %
NEUTROPHILS # BLD AUTO: 3.65 X10*3/UL (ref 1.6–5.5)
NEUTROPHILS NFR BLD AUTO: 66.1 %
NITRITE UR QL STRIP.AUTO: NEGATIVE
NRBC BLD-RTO: 0 /100 WBCS (ref 0–0)
PH UR STRIP.AUTO: 5.5 [PH]
PLATELET # BLD AUTO: 283 X10*3/UL (ref 150–450)
POTASSIUM SERPL-SCNC: 4 MMOL/L (ref 3.5–5.3)
PROT UR STRIP.AUTO-MCNC: NEGATIVE MG/DL
RBC # BLD AUTO: 2.77 X10*6/UL (ref 4–5.2)
RBC # UR STRIP.AUTO: NEGATIVE /UL
SODIUM SERPL-SCNC: 133 MMOL/L (ref 136–145)
SP GR UR STRIP.AUTO: 1.01
UROBILINOGEN UR STRIP.AUTO-MCNC: NORMAL MG/DL
WBC # BLD AUTO: 5.5 X10*3/UL (ref 4.4–11.3)

## 2025-02-02 PROCEDURE — 71045 X-RAY EXAM CHEST 1 VIEW: CPT | Performed by: RADIOLOGY

## 2025-02-02 PROCEDURE — G0378 HOSPITAL OBSERVATION PER HR: HCPCS

## 2025-02-02 PROCEDURE — 2500000004 HC RX 250 GENERAL PHARMACY W/ HCPCS (ALT 636 FOR OP/ED)

## 2025-02-02 PROCEDURE — 93005 ELECTROCARDIOGRAM TRACING: CPT

## 2025-02-02 PROCEDURE — 83880 ASSAY OF NATRIURETIC PEPTIDE: CPT | Performed by: STUDENT IN AN ORGANIZED HEALTH CARE EDUCATION/TRAINING PROGRAM

## 2025-02-02 PROCEDURE — 99223 1ST HOSP IP/OBS HIGH 75: CPT

## 2025-02-02 PROCEDURE — 80048 BASIC METABOLIC PNL TOTAL CA: CPT | Performed by: STUDENT IN AN ORGANIZED HEALTH CARE EDUCATION/TRAINING PROGRAM

## 2025-02-02 PROCEDURE — 84484 ASSAY OF TROPONIN QUANT: CPT | Performed by: STUDENT IN AN ORGANIZED HEALTH CARE EDUCATION/TRAINING PROGRAM

## 2025-02-02 PROCEDURE — 84484 ASSAY OF TROPONIN QUANT: CPT

## 2025-02-02 PROCEDURE — 2500000002 HC RX 250 W HCPCS SELF ADMINISTERED DRUGS (ALT 637 FOR MEDICARE OP, ALT 636 FOR OP/ED)

## 2025-02-02 PROCEDURE — 96372 THER/PROPH/DIAG INJ SC/IM: CPT

## 2025-02-02 PROCEDURE — 85025 COMPLETE CBC W/AUTO DIFF WBC: CPT | Performed by: STUDENT IN AN ORGANIZED HEALTH CARE EDUCATION/TRAINING PROGRAM

## 2025-02-02 PROCEDURE — 36415 COLL VENOUS BLD VENIPUNCTURE: CPT

## 2025-02-02 PROCEDURE — 99285 EMERGENCY DEPT VISIT HI MDM: CPT | Performed by: STUDENT IN AN ORGANIZED HEALTH CARE EDUCATION/TRAINING PROGRAM

## 2025-02-02 PROCEDURE — 71045 X-RAY EXAM CHEST 1 VIEW: CPT

## 2025-02-02 PROCEDURE — 2500000001 HC RX 250 WO HCPCS SELF ADMINISTERED DRUGS (ALT 637 FOR MEDICARE OP)

## 2025-02-02 PROCEDURE — 36415 COLL VENOUS BLD VENIPUNCTURE: CPT | Performed by: STUDENT IN AN ORGANIZED HEALTH CARE EDUCATION/TRAINING PROGRAM

## 2025-02-02 PROCEDURE — 94640 AIRWAY INHALATION TREATMENT: CPT

## 2025-02-02 PROCEDURE — 83735 ASSAY OF MAGNESIUM: CPT

## 2025-02-02 PROCEDURE — 81003 URINALYSIS AUTO W/O SCOPE: CPT

## 2025-02-02 RX ORDER — CALCIUM CARBONATE/VITAMIN D3 600MG-5MCG
1 TABLET ORAL DAILY
Status: DISCONTINUED | OUTPATIENT
Start: 2025-02-02 | End: 2025-02-02

## 2025-02-02 RX ORDER — TALC
3 POWDER (GRAM) TOPICAL NIGHTLY PRN
Status: DISCONTINUED | OUTPATIENT
Start: 2025-02-02 | End: 2025-02-11 | Stop reason: HOSPADM

## 2025-02-02 RX ORDER — METOPROLOL TARTRATE 25 MG/1
12.5 TABLET, FILM COATED ORAL DAILY
Status: DISCONTINUED | OUTPATIENT
Start: 2025-02-02 | End: 2025-02-07

## 2025-02-02 RX ORDER — HEPARIN SODIUM 5000 [USP'U]/ML
5000 INJECTION, SOLUTION INTRAVENOUS; SUBCUTANEOUS EVERY 8 HOURS
Status: DISCONTINUED | OUTPATIENT
Start: 2025-02-02 | End: 2025-02-11 | Stop reason: HOSPADM

## 2025-02-02 RX ORDER — CLOPIDOGREL BISULFATE 75 MG/1
75 TABLET ORAL DAILY
Status: DISCONTINUED | OUTPATIENT
Start: 2025-02-02 | End: 2025-02-11 | Stop reason: HOSPADM

## 2025-02-02 RX ORDER — ACETAMINOPHEN 325 MG/1
650 TABLET ORAL EVERY 6 HOURS PRN
Status: DISCONTINUED | OUTPATIENT
Start: 2025-02-02 | End: 2025-02-11 | Stop reason: HOSPADM

## 2025-02-02 RX ORDER — GUAIFENESIN/DEXTROMETHORPHAN 100-10MG/5
5 SYRUP ORAL EVERY 4 HOURS PRN
Status: DISCONTINUED | OUTPATIENT
Start: 2025-02-02 | End: 2025-02-11 | Stop reason: HOSPADM

## 2025-02-02 RX ORDER — TORSEMIDE 20 MG/1
30 TABLET ORAL ONCE
Status: COMPLETED | OUTPATIENT
Start: 2025-02-02 | End: 2025-02-02

## 2025-02-02 RX ORDER — PSYLLIUM HUSK 0.4 G
1 CAPSULE ORAL DAILY
Status: DISCONTINUED | OUTPATIENT
Start: 2025-02-02 | End: 2025-02-11 | Stop reason: HOSPADM

## 2025-02-02 RX ORDER — TORSEMIDE 20 MG/1
20 TABLET ORAL DAILY
Status: DISCONTINUED | OUTPATIENT
Start: 2025-02-03 | End: 2025-02-10

## 2025-02-02 RX ORDER — NAPROXEN SODIUM 220 MG/1
81 TABLET, FILM COATED ORAL DAILY
Status: DISCONTINUED | OUTPATIENT
Start: 2025-02-02 | End: 2025-02-11 | Stop reason: HOSPADM

## 2025-02-02 RX ORDER — POLYETHYLENE GLYCOL 3350 17 G/17G
17 POWDER, FOR SOLUTION ORAL 2 TIMES DAILY
Status: DISCONTINUED | OUTPATIENT
Start: 2025-02-02 | End: 2025-02-11 | Stop reason: HOSPADM

## 2025-02-02 RX ORDER — ALBUTEROL SULFATE 0.83 MG/ML
2.5 SOLUTION RESPIRATORY (INHALATION) EVERY 6 HOURS PRN
Status: DISCONTINUED | OUTPATIENT
Start: 2025-02-02 | End: 2025-02-06

## 2025-02-02 RX ADMIN — TORSEMIDE 30 MG: 20 TABLET ORAL at 23:00

## 2025-02-02 RX ADMIN — ALBUTEROL SULFATE 2.5 MG: 2.5 SOLUTION RESPIRATORY (INHALATION) at 23:07

## 2025-02-02 RX ADMIN — HEPARIN SODIUM 5000 UNITS: 5000 INJECTION INTRAVENOUS; SUBCUTANEOUS at 23:00

## 2025-02-02 ASSESSMENT — COGNITIVE AND FUNCTIONAL STATUS - GENERAL
HELP NEEDED FOR BATHING: A LITTLE
DRESSING REGULAR LOWER BODY CLOTHING: A LITTLE
WALKING IN HOSPITAL ROOM: A LITTLE
MOVING TO AND FROM BED TO CHAIR: A LITTLE
DRESSING REGULAR UPPER BODY CLOTHING: A LITTLE
CLIMB 3 TO 5 STEPS WITH RAILING: A LOT
TURNING FROM BACK TO SIDE WHILE IN FLAT BAD: A LITTLE
PERSONAL GROOMING: A LITTLE
TOILETING: A LITTLE
MOBILITY SCORE: 18
DAILY ACTIVITIY SCORE: 19
STANDING UP FROM CHAIR USING ARMS: A LITTLE

## 2025-02-02 ASSESSMENT — PAIN SCALES - GENERAL
PAINLEVEL_OUTOF10: 0 - NO PAIN
PAINLEVEL_OUTOF10: 0 - NO PAIN

## 2025-02-02 ASSESSMENT — PAIN - FUNCTIONAL ASSESSMENT: PAIN_FUNCTIONAL_ASSESSMENT: 0-10

## 2025-02-02 NOTE — ED TRIAGE NOTES
Pt came in by squad from home c/o leg swelling and sob x10 days. Pt noticed lg swelling before she left the hospitals and it got progressively worse. Pt denies cp and sob, and arm, back and jaw pain. Hx of CHF.

## 2025-02-02 NOTE — PROGRESS NOTES
This RN called pt to check on her daily weight.  Pt did not weigh herself.  She states her daughters would like her to be seen at ER.  She stated slight edema to BLE.  I encourage her to be evaluated at ER    Looney NP notified of recent weight gain on 2/1.   Hard to determine weight gain as pt is using different scales.  Pt poor historian.

## 2025-02-03 ENCOUNTER — DOCUMENTATION (OUTPATIENT)
Dept: CARE COORDINATION | Age: OVER 89
End: 2025-02-03
Payer: MEDICARE

## 2025-02-03 ENCOUNTER — APPOINTMENT (OUTPATIENT)
Dept: RADIOLOGY | Facility: HOSPITAL | Age: OVER 89
End: 2025-02-03
Payer: MEDICARE

## 2025-02-03 ENCOUNTER — PATIENT OUTREACH (OUTPATIENT)
Dept: CARE COORDINATION | Age: OVER 89
End: 2025-02-03

## 2025-02-03 LAB
ANION GAP SERPL CALC-SCNC: 11 MMOL/L (ref 10–20)
ATRIAL RATE: 96 BPM
BUN SERPL-MCNC: 22 MG/DL (ref 6–23)
CALCIUM SERPL-MCNC: 8.9 MG/DL (ref 8.6–10.3)
CARDIAC TROPONIN I PNL SERPL HS: 17 NG/L (ref 0–13)
CHLORIDE SERPL-SCNC: 101 MMOL/L (ref 98–107)
CO2 SERPL-SCNC: 28 MMOL/L (ref 21–32)
CREAT SERPL-MCNC: 0.89 MG/DL (ref 0.5–1.05)
EGFRCR SERPLBLD CKD-EPI 2021: 61 ML/MIN/1.73M*2
ERYTHROCYTE [DISTWIDTH] IN BLOOD BY AUTOMATED COUNT: 14.7 % (ref 11.5–14.5)
GLUCOSE BLD MANUAL STRIP-MCNC: 184 MG/DL (ref 74–99)
GLUCOSE SERPL-MCNC: 106 MG/DL (ref 74–99)
HCT VFR BLD AUTO: 24.2 % (ref 36–46)
HGB BLD-MCNC: 7.7 G/DL (ref 12–16)
HOLD SPECIMEN: NORMAL
MCH RBC QN AUTO: 28.2 PG (ref 26–34)
MCHC RBC AUTO-ENTMCNC: 31.8 G/DL (ref 32–36)
MCV RBC AUTO: 89 FL (ref 80–100)
NRBC BLD-RTO: 0 /100 WBCS (ref 0–0)
P AXIS: 0 DEGREES
PLATELET # BLD AUTO: 274 X10*3/UL (ref 150–450)
POTASSIUM SERPL-SCNC: 3.5 MMOL/L (ref 3.5–5.3)
PR INTERVAL: 48 MS
Q ONSET: 249 MS
QRS COUNT: 11 BEATS
QRS DURATION: 156 MS
QT INTERVAL: 458 MS
QTC CALCULATION(BAZETT): 512 MS
QTC FREDERICIA: 493 MS
R AXIS: -62 DEGREES
RBC # BLD AUTO: 2.73 X10*6/UL (ref 4–5.2)
SODIUM SERPL-SCNC: 136 MMOL/L (ref 136–145)
T AXIS: 167 DEGREES
T OFFSET: 478 MS
VENTRICULAR RATE: 75 BPM
WBC # BLD AUTO: 4.7 X10*3/UL (ref 4.4–11.3)

## 2025-02-03 PROCEDURE — 2500000001 HC RX 250 WO HCPCS SELF ADMINISTERED DRUGS (ALT 637 FOR MEDICARE OP)

## 2025-02-03 PROCEDURE — 36415 COLL VENOUS BLD VENIPUNCTURE: CPT

## 2025-02-03 PROCEDURE — 97161 PT EVAL LOW COMPLEX 20 MIN: CPT | Mod: GP

## 2025-02-03 PROCEDURE — 99223 1ST HOSP IP/OBS HIGH 75: CPT | Performed by: STUDENT IN AN ORGANIZED HEALTH CARE EDUCATION/TRAINING PROGRAM

## 2025-02-03 PROCEDURE — 71045 X-RAY EXAM CHEST 1 VIEW: CPT

## 2025-02-03 PROCEDURE — 99223 1ST HOSP IP/OBS HIGH 75: CPT | Performed by: INTERNAL MEDICINE

## 2025-02-03 PROCEDURE — 85027 COMPLETE CBC AUTOMATED: CPT

## 2025-02-03 PROCEDURE — 71045 X-RAY EXAM CHEST 1 VIEW: CPT | Performed by: STUDENT IN AN ORGANIZED HEALTH CARE EDUCATION/TRAINING PROGRAM

## 2025-02-03 PROCEDURE — 1200000002 HC GENERAL ROOM WITH TELEMETRY DAILY

## 2025-02-03 PROCEDURE — 96372 THER/PROPH/DIAG INJ SC/IM: CPT

## 2025-02-03 PROCEDURE — 84484 ASSAY OF TROPONIN QUANT: CPT

## 2025-02-03 PROCEDURE — 97165 OT EVAL LOW COMPLEX 30 MIN: CPT | Mod: GO

## 2025-02-03 PROCEDURE — 2500000004 HC RX 250 GENERAL PHARMACY W/ HCPCS (ALT 636 FOR OP/ED)

## 2025-02-03 PROCEDURE — 82947 ASSAY GLUCOSE BLOOD QUANT: CPT

## 2025-02-03 PROCEDURE — 82374 ASSAY BLOOD CARBON DIOXIDE: CPT

## 2025-02-03 RX ADMIN — POLYETHYLENE GLYCOL 3350 17 G: 17 POWDER, FOR SOLUTION ORAL at 09:38

## 2025-02-03 RX ADMIN — CLOPIDOGREL BISULFATE 75 MG: 75 TABLET ORAL at 09:38

## 2025-02-03 RX ADMIN — POLYETHYLENE GLYCOL 3350 17 G: 17 POWDER, FOR SOLUTION ORAL at 21:18

## 2025-02-03 RX ADMIN — TORSEMIDE 20 MG: 20 TABLET ORAL at 09:37

## 2025-02-03 RX ADMIN — HEPARIN SODIUM 5000 UNITS: 5000 INJECTION INTRAVENOUS; SUBCUTANEOUS at 06:20

## 2025-02-03 RX ADMIN — HEPARIN SODIUM 5000 UNITS: 5000 INJECTION INTRAVENOUS; SUBCUTANEOUS at 12:53

## 2025-02-03 RX ADMIN — METOPROLOL TARTRATE 12.5 MG: 25 TABLET, FILM COATED ORAL at 09:38

## 2025-02-03 RX ADMIN — HEPARIN SODIUM 5000 UNITS: 5000 INJECTION INTRAVENOUS; SUBCUTANEOUS at 21:18

## 2025-02-03 RX ADMIN — Medication 1 TABLET: at 09:37

## 2025-02-03 RX ADMIN — ASPIRIN 81 MG CHEWABLE TABLET 81 MG: 81 TABLET CHEWABLE at 09:43

## 2025-02-03 SDOH — ECONOMIC STABILITY: FOOD INSECURITY: WITHIN THE PAST 12 MONTHS, THE FOOD YOU BOUGHT JUST DIDN'T LAST AND YOU DIDN'T HAVE MONEY TO GET MORE.: NEVER TRUE

## 2025-02-03 SDOH — SOCIAL STABILITY: SOCIAL INSECURITY: WITHIN THE LAST YEAR, HAVE YOU BEEN AFRAID OF YOUR PARTNER OR EX-PARTNER?: NO

## 2025-02-03 SDOH — ECONOMIC STABILITY: FOOD INSECURITY: WITHIN THE PAST 12 MONTHS, YOU WORRIED THAT YOUR FOOD WOULD RUN OUT BEFORE YOU GOT THE MONEY TO BUY MORE.: NEVER TRUE

## 2025-02-03 SDOH — SOCIAL STABILITY: SOCIAL INSECURITY: HAVE YOU HAD THOUGHTS OF HARMING ANYONE ELSE?: NO

## 2025-02-03 SDOH — ECONOMIC STABILITY: INCOME INSECURITY: IN THE PAST 12 MONTHS HAS THE ELECTRIC, GAS, OIL, OR WATER COMPANY THREATENED TO SHUT OFF SERVICES IN YOUR HOME?: NO

## 2025-02-03 SDOH — SOCIAL STABILITY: SOCIAL INSECURITY: WERE YOU ABLE TO COMPLETE ALL THE BEHAVIORAL HEALTH SCREENINGS?: YES

## 2025-02-03 SDOH — SOCIAL STABILITY: SOCIAL INSECURITY: WITHIN THE LAST YEAR, HAVE YOU BEEN HUMILIATED OR EMOTIONALLY ABUSED IN OTHER WAYS BY YOUR PARTNER OR EX-PARTNER?: NO

## 2025-02-03 SDOH — HEALTH STABILITY: MENTAL HEALTH
DO YOU FEEL STRESS - TENSE, RESTLESS, NERVOUS, OR ANXIOUS, OR UNABLE TO SLEEP AT NIGHT BECAUSE YOUR MIND IS TROUBLED ALL THE TIME - THESE DAYS?: NOT AT ALL

## 2025-02-03 SDOH — SOCIAL STABILITY: SOCIAL INSECURITY: HAVE YOU HAD ANY THOUGHTS OF HARMING ANYONE ELSE?: NO

## 2025-02-03 ASSESSMENT — COGNITIVE AND FUNCTIONAL STATUS - GENERAL
STANDING UP FROM CHAIR USING ARMS: A LITTLE
TURNING FROM BACK TO SIDE WHILE IN FLAT BAD: A LITTLE
PATIENT BASELINE BEDBOUND: NO
MOVING TO AND FROM BED TO CHAIR: A LITTLE
DRESSING REGULAR UPPER BODY CLOTHING: A LITTLE
DAILY ACTIVITIY SCORE: 19
TURNING FROM BACK TO SIDE WHILE IN FLAT BAD: A LITTLE
PERSONAL GROOMING: A LITTLE
HELP NEEDED FOR BATHING: A LOT
CLIMB 3 TO 5 STEPS WITH RAILING: A LOT
MOBILITY SCORE: 18
HELP NEEDED FOR BATHING: A LITTLE
DRESSING REGULAR LOWER BODY CLOTHING: A LITTLE
DRESSING REGULAR LOWER BODY CLOTHING: A LITTLE
WALKING IN HOSPITAL ROOM: A LITTLE
MOBILITY SCORE: 19
MOVING TO AND FROM BED TO CHAIR: A LITTLE
STANDING UP FROM CHAIR USING ARMS: A LITTLE
TOILETING: A LITTLE
CLIMB 3 TO 5 STEPS WITH RAILING: A LITTLE
WALKING IN HOSPITAL ROOM: A LITTLE
TOILETING: A LOT
STANDING UP FROM CHAIR USING ARMS: A LITTLE
MOBILITY SCORE: 18
MOVING TO AND FROM BED TO CHAIR: A LITTLE
CLIMB 3 TO 5 STEPS WITH RAILING: A LOT
WALKING IN HOSPITAL ROOM: A LITTLE
DRESSING REGULAR UPPER BODY CLOTHING: A LITTLE
DAILY ACTIVITIY SCORE: 18
TURNING FROM BACK TO SIDE WHILE IN FLAT BAD: A LITTLE

## 2025-02-03 ASSESSMENT — LIFESTYLE VARIABLES
AUDIT-C TOTAL SCORE: 0
HOW OFTEN DO YOU HAVE 6 OR MORE DRINKS ON ONE OCCASION: NEVER
AUDIT-C TOTAL SCORE: 0
HOW MANY STANDARD DRINKS CONTAINING ALCOHOL DO YOU HAVE ON A TYPICAL DAY: PATIENT DOES NOT DRINK
SUBSTANCE_ABUSE_PAST_12_MONTHS: NO
PRESCIPTION_ABUSE_PAST_12_MONTHS: NO
SKIP TO QUESTIONS 9-10: 1
HOW OFTEN DO YOU HAVE A DRINK CONTAINING ALCOHOL: NEVER

## 2025-02-03 ASSESSMENT — ACTIVITIES OF DAILY LIVING (ADL)
DRESSING YOURSELF: INDEPENDENT
LACK_OF_TRANSPORTATION: NO
JUDGMENT_ADEQUATE_SAFELY_COMPLETE_DAILY_ACTIVITIES: YES
HEARING - RIGHT EAR: HEARING AID
HEARING - LEFT EAR: HEARING AID
GROOMING: INDEPENDENT
BATHING: INDEPENDENT
FEEDING YOURSELF: INDEPENDENT
WALKS IN HOME: NEEDS ASSISTANCE
PATIENT'S MEMORY ADEQUATE TO SAFELY COMPLETE DAILY ACTIVITIES?: YES
TOILETING: INDEPENDENT
ADEQUATE_TO_COMPLETE_ADL: YES
ASSISTIVE_DEVICE: WALKER;HEARING AID - RIGHT

## 2025-02-03 ASSESSMENT — PAIN SCALES - GENERAL
PAINLEVEL_OUTOF10: 0 - NO PAIN

## 2025-02-03 ASSESSMENT — PAIN - FUNCTIONAL ASSESSMENT: PAIN_FUNCTIONAL_ASSESSMENT: 0-10

## 2025-02-03 NOTE — H&P
History Of Present Illness    Yesenia Milner is a 89-year-old female who presents to the ED with shortness of breath and bilateral lower extremity swelling.  Patient has a past medical history of HFpEF, MI, SSS s/p Medtronic dual-chamber pacemaker 2012 with generator change on pacemaker 7/13/2023, HTN, COPD, CVA with TNK 8/21/23, arthritis, recent diagnosis of colon CA, and venous insufficiency.  Patient reports increasing shortness of breath, abdominal swelling, and bilateral lower extremity swelling over the past 10 days.  Patient states her legs have swollen to the point where she was unable to bend her knees to place her MISA hose on.  Patient has also had gradual weight gain that has been reported and recorded by the healthy [facility] home virtual nurse.  Patient states she has also had increasing moist cough and difficulty with ambulation due to shortness of breath.  Visitor at bedside notes when patient changes positions patient is unable to catch her breath.  Patient also states and visitor confirms that patient has been having difficulty with urination and this is new for patient, where she has only been able to urinate small amounts at a time.  She denies any burning, hematuria, or flank pain.  Patient denies chest pain, palpitations, nausea, vomiting, abdominal pain, dizziness, sleep changes, or open skin sores.     ED Course      Hemodynamically Stable.    Vitals: Temp. 36.3, HR 67, Resp.  24, /56, Pulse ox 94% RA       Labs: Glucose 135, Na+ 133, K+ 4.0, Bun 28, Creat.  0.97, GFR 55, Ca 8.9, , troponin 15, WBC 5.5,  Hgb.  7.8, Hct.  24.5,      Medications: Torsemide 30 mg     Imaging: interpreted by radiologist.  Chest x-ray: Moderate vascular congestion with small bilateral pleural effusions       EKG: Not available for my review.          Past Medical History  Past Medical History:   Diagnosis Date    Gross hematuria 10/07/2020    Impacted cerumen 02/22/2024    Other conditions influencing health  status     Normal stress echocardiogram    Personal history of other medical treatment     History of echocardiogram    Personal history of other medical treatment     H/O Doppler ultrasound    Systolic CHF (Multi) 05/18/2023       Surgical History  Past Surgical History:   Procedure Laterality Date    APPENDECTOMY  11/22/2017    Appendectomy    CARDIAC CATHETERIZATION N/A 12/20/2024    Procedure: Left Heart Cath, With LV;  Surgeon: Tahir Ruelas MD;  Location: [facility] Cardiac Cath Lab;  Service: Cardiovascular;  Laterality: N/A;    CARDIAC PACEMAKER PLACEMENT  03/11/2021    Pacemaker Placement    HYSTERECTOMY  11/22/2017    Hysterectomy    IR ANGIOGRAM INFERIOR EPIGASTRIC PELVIC  12/14/2020    IR ANGIOGRAM INFERIOR EPIGASTRIC PELVIC 12/14/2020 [facility]    IR ANGIOGRAM INFERIOR EPIGASTRIC PELVIC  12/14/2020    IR ANGIOGRAM INFERIOR EPIGASTRIC PELVIC 12/14/2020 [facility]    IR ANGIOGRAM RENAL BILATERAL Bilateral 12/14/2020    IR ANGIOGRAM RENAL BILATERAL 12/14/2020 [facility]    OTHER SURGICAL HISTORY  11/22/2017    Stab Phlebectomy Of Varicose Veins    OTHER SURGICAL HISTORY  11/22/2017    Venous Ligation With Stripping    TONSILLECTOMY  11/22/2017    Tonsillectomy        Social History  She reports that she quit smoking about 52 years ago. Her smoking use included cigarettes. She has been exposed to tobacco smoke. She has never used smokeless tobacco. She reports that she does not drink alcohol and does not use drugs.    Family History  Family History   Problem Relation Name Age of Onset    No Known Problems Mother          Allergies  Iodine, Shrimp, Hydrocodone, and Adhesive tape-silicones    Review of Systems    10 point ROS systems completed and is negative except for what is stated in HPI.     Physical Exam  Constitutional:       General: She is awake. She is not in acute distress.     Appearance: Normal appearance. She is normal weight. She is not toxic-appearing.   HENT:      Head: Normocephalic  "and atraumatic.      Nose: Nose normal. No rhinorrhea.      Mouth/Throat:      Mouth: Mucous membranes are dry.   Eyes:      General:         Right eye: No discharge.         Left eye: No discharge.      Conjunctiva/sclera: Conjunctivae normal.      Pupils: Pupils are equal, round, and reactive to light.   Cardiovascular:      Rate and Rhythm: Normal rate and regular rhythm.      Pulses: Normal pulses.      Comments: Right worse than left  Pulmonary:      Effort: Pulmonary effort is normal. No respiratory distress.      Breath sounds: Examination of the right-middle field reveals decreased breath sounds. Decreased breath sounds present. No wheezing.   Abdominal:      General: Bowel sounds are normal. There is no distension.      Palpations: Abdomen is soft.      Tenderness: There is no abdominal tenderness. There is no guarding.   Musculoskeletal:         General: Normal range of motion.      Cervical back: Normal range of motion and neck supple.      Right lower le+ Edema present.      Left lower le+ Edema present.   Skin:     General: Skin is warm and dry.   Neurological:      General: No focal deficit present.      Mental Status: She is alert and oriented to person, place, and time. Mental status is at baseline.      Motor: Weakness present.   Psychiatric:         Attention and Perception: Attention normal.         Mood and Affect: Mood normal.         Speech: Speech normal.         Behavior: Behavior normal. Behavior is cooperative.         Cognition and Memory: Cognition normal.          Last Recorded Vitals  Blood pressure 129/64, pulse 64, temperature 36.3 °C (97.3 °F), resp. rate (!) 23, height 1.6 m (5' 3\"), weight 75.8 kg (167 lb), SpO2 (!) 93%.    Relevant Results    Results for orders placed or performed during the hospital encounter of 25 (from the past 24 hours)   CBC and Auto Differential   Result Value Ref Range    WBC 5.5 4.4 - 11.3 x10*3/uL    nRBC 0.0 0.0 - 0.0 /100 WBCs    RBC 2.77 " (L) 4.00 - 5.20 x10*6/uL    Hemoglobin 7.8 (L) 12.0 - 16.0 g/dL    Hematocrit 24.5 (L) 36.0 - 46.0 %    MCV 88 80 - 100 fL    MCH 28.2 26.0 - 34.0 pg    MCHC 31.8 (L) 32.0 - 36.0 g/dL    RDW 14.6 (H) 11.5 - 14.5 %    Platelets 283 150 - 450 x10*3/uL    Neutrophils % 66.1 40.0 - 80.0 %    Immature Granulocytes %, Automated 0.4 0.0 - 0.9 %    Lymphocytes % 17.2 13.0 - 44.0 %    Monocytes % 13.2 2.0 - 10.0 %    Eosinophils % 2.7 0.0 - 6.0 %    Basophils % 0.4 0.0 - 2.0 %    Neutrophils Absolute 3.65 1.60 - 5.50 x10*3/uL    Immature Granulocytes Absolute, Automated 0.02 0.00 - 0.50 x10*3/uL    Lymphocytes Absolute 0.95 0.80 - 3.00 x10*3/uL    Monocytes Absolute 0.73 0.05 - 0.80 x10*3/uL    Eosinophils Absolute 0.15 0.00 - 0.40 x10*3/uL    Basophils Absolute 0.02 0.00 - 0.10 x10*3/uL   Basic metabolic panel   Result Value Ref Range    Glucose 135 (H) 74 - 99 mg/dL    Sodium 133 (L) 136 - 145 mmol/L    Potassium 4.0 3.5 - 5.3 mmol/L    Chloride 100 98 - 107 mmol/L    Bicarbonate 24 21 - 32 mmol/L    Anion Gap 13 10 - 20 mmol/L    Urea Nitrogen 28 (H) 6 - 23 mg/dL    Creatinine 0.97 0.50 - 1.05 mg/dL    eGFR 55 (L) >60 mL/min/1.73m*2    Calcium 8.9 8.6 - 10.3 mg/dL   Troponin I, High Sensitivity   Result Value Ref Range    Troponin I, High Sensitivity 15 (H) 0 - 13 ng/L   B-Type Natriuretic Peptide   Result Value Ref Range     (H) 0 - 99 pg/mL   Troponin I, High Sensitivity   Result Value Ref Range    Troponin I, High Sensitivity 17 (H) 0 - 13 ng/L     XR chest 1 view    Result Date: 2/2/2025  Interpreted By:  Marianne Randall, STUDY: XR CHEST 1 VIEW;  2/2/2025 5:25 pm   INDICATION: Signs/Symptoms:sob.   COMPARISON: 01/18/2025   ACCESSION NUMBER(S): [id number]   ORDERING CLINICIAN: NOHEMI ALVES   FINDINGS: CARDIOMEDIASTINAL SILHOUETTE: Cardiomediastinal silhouette is normal in size and configuration. There is a left subclavian bichamber pacemaker with a single tip in the right atrium and a single tip in the right  ventricle. There is a prominent right pericardial fat pad.   LUNGS: There is moderate vascular congestion. There are small bilateral pleural effusions.   ABDOMEN: No remarkable upper abdominal findings.     BONES: No acute osseous changes.       Moderate vascular congestion with small bilateral pleural effusions.   MACRO: None   Signed by: Marianne Randall 2/2/2025 5:45 PM Dictation workstation:   [id number]     Assessment & Plan  Acute decompensated heart failure      Patient arrived today after recently being discharged from the hospital.  She reports she has been in the hospital multiple times since [place] after having an MI.  Today she came in for shortness of breath and bilateral lower extremity edema.  Patient states she has been having trouble with ambulation and was unable to put her MISA hose on because her knees were so swollen she could not bend them.  Chest x-ray revealed vascular congestion with small bilateral pleural effusions.  Patient follows with Dr. Ramicone and has a significant cardiac history.  Cardiology consult placed, appreciate recs.  Patient recently had echo on 12/21/2024.  Patient received additional dose of torsemide 30 mg in ED for additional diuresis, home daily torsemide continued in morning.  Patient also reported difficulty with urination, denied any burning but reports frequency/dribbling/small amounts at a time.  UA ordered.     Patient admitted to Dr. Iglesia mart for further medical management.      # CHF exacerbation  # Vascular congestion/pleural effusions  # Shortness of breath  # Elevated troponin  # Hyponatremia  # BLE swelling  # Hx CHF  -Cardiology consult, follows with Dr. Ramicone  -Last echo 12/21/2024 EF 60 to 65%, abnormal left ventricular wall motion, MI, mitral annular calcification, MVR, TVR, elevated right ventricular systolic pressure.  -Fluid restriction  -Strict I's and O's  -Daily weight  -Continue home torsemide, was given extra dose in ED  -As needed  oxygen  -As needed EKG, coronary symptoms  -PT/OT/SW    -Cardiac low-salt diet   -admitted to observation with telemetry    -q4 vitals    -morning labs, trend troponin, Na+,      Chronic Conditions   # CVA  # MI, pacemaker, SSS  # Hypertension  # COPD  -albuterol prn  # Arthritis  # Venous insufficiency     Continue home medications as ordered when nursing completes home med rec.    Full Code      #DVT Prophylaxis   Scd's as tolerated   DVT Prophylaxis Heparin   On Plavix     Thorough record review performed of previous labs and notes from prior encounters.       SAQIB Gordon-CNP

## 2025-02-03 NOTE — CARE PLAN
The patient's goals for the shift include      The clinical goals for the shift include Pt to be ess SOB by end of shift      Problem: Heart Failure  Goal: Improved urinary output this shift  Outcome: Progressing  Goal: Reduction in peripheral edema within 24 hours  Outcome: Progressing  Goal: Report improvement of dyspnea/breathlessness this shift  Outcome: Progressing  Goal: Weight from fluid excess reduced over 2-3 days, then stabilize  Outcome: Progressing     Problem: Pain - Adult  Goal: Verbalizes/displays adequate comfort level or baseline comfort level  Outcome: Progressing     Problem: Safety - Adult  Goal: Free from fall injury  Outcome: Progressing     Problem: Discharge Planning  Goal: Discharge to home or other facility with appropriate resources  Outcome: Progressing     Problem: Chronic Conditions and Co-morbidities  Goal: Patient's chronic conditions and co-morbidity symptoms are monitored and maintained or improved  Outcome: Progressing

## 2025-02-03 NOTE — PROGRESS NOTES
Occupational Therapy    Evaluation    Patient Name: Yesenia Milner  MRN: [id number]  Today's Date: 2/3/2025  Time Calculation  Start Time: 0856  Stop Time: 0926  Time Calculation (min): 30 min  320/320-A    Assessment  IP OT Assessment  OT Assessment: This hospitalization 2/2/2025 due to shortness of breath, abdominal swelling, and bilateral lower extremity swelling over the past 10 days.  Patient would benefit from further OT to address ADL's and functional transfers/mobility due to high risk for falls and decline in baseline function.  Prognosis: Good  End of Session Communication: Bedside nurse  End of Session Patient Position: Up in chair, Alarm on; call-light within reach    Plan:  Treatment Interventions: ADL retraining, Functional transfer training, Patient/family training, Equipment evaluation/education, Compensatory technique education, Endurance training (energy conservation / diaphragmatic breathing techniques training)  OT Frequency: 3 times per week  OT Discharge Recommendations: Low intensity level of continued care  OT - OK to Discharge: Yes to next level of care when medically cleared by physician/medical team    Subjective   Current Problem:  1. Acute decompensated heart failure          General:  General  Reason for Referral: ADL, safety assessment  Referred By: SELVIN Gordon  Past Medical History Relevant to Rehab: HFpEF, MI, SSS s/p Medtronic dual-chamber pacemaker 2012 with generator change on pacemaker 7/13/2023, HTN, COPD, CVA with TNK 8/21/23, arthritis, recent diagnosis of colon CA, venous insufficiency  Co-Treatment: PT Co-eval to maximize safety during mobility tasks  Prior to Session Communication: Bedside nurse who confirmed that patient is medically stable to participate in this OT session  Patient Position Received: Bed, 2 rail up, Alarm off, not on at start of session  General Comment: Patient seen bedside; cooperative, motivated    Precautions:  Hearing/Visual Limitations:  eyeglasses for reading  Medical Precautions: Fall precautions  Precautions Comment: external catheter    Pain:  Pain Assessment  Pain Assessment: 0-10  0-10 (Numeric) Pain Score: 0 - No pain    Objective   Cognition:  Overall Cognitive Status: Within Functional Limits  Orientation Level: Oriented X4     Home Living:  Type of Home: House  Lives With: Alone (however has supportive daughter who works)  Home Adaptive Equipment: Cane, Walker rolling or standard, Reacher  Home Layout: Multi-level (split-level)  Home Access:  (chair lifts from garage and throughout house)  Bathroom Shower/Tub: Tub/shower unit  Bathroom Toilet:  (higher height)  Bathroom Equipment: Grab bars in shower, Tub transfer bench, Hand-held shower hose, Raised toilet seat without rails (also sink next to toilet)  Home Living Comments: sleeps in regular bed     Prior Function:  Level of [place]: Independent with ADLs and functional transfers, Independent with homemaking with ambulation (including don/doff compression stocking which has zipper for ease; daughter does laundry)  Ambulatory Assistance: Independent (using wheeled walker or rollator)  Hand Dominance: Right  Prior Function Comments: drives, shops; no history of falls    ADL:  LE Dressing Assistance: Minimal (estimated)  ADL Comments: To further address in OT sessions using assistive techniques/adaptive equipment to promote indep. and ease in performance    Bed Mobility/Transfers:   Bed Mobility  Bed Mobility: No (since sitting EOB)  Transfers  Transfer: Yes  Transfer 1  Transfer From 1: Sit to, Stand to  Transfer to 1: Bed  Transfer Device 1: Walker  Transfer Level of Assistance 1: Contact guard  Trials/Comments 1: bed height elevated    Ambulation/Gait Training:  Functional Mobility  Functional Mobility Performed: Yes  Functional Mobility 1  Device 1: Rolling walker  Functional Mobility Support Devices: Gait belt  Assistance 1: Contact guard (then progressed to SBA using wheeled  walker; stated BLE stiffness which is chronic)    Sitting Balance:  Static Sitting Balance  Static Sitting-Level of Assistance: Close supervision    Standing Balance:  Static Standing Balance  Static Standing-Level of Assistance: Contact guard  Static Standing-Comment/Number of Minutes: fair (+)/good (-)    Sensation:  Light Touch: No apparent deficits    Extremities: RUE   RUE : Within Functional Limits (hand  4-/5 with noted arthritic changes) and LUE   LUE: Within Functional Limits (hand  4/5)    Outcome Measures: [facility] Daily Activity  Putting on and taking off regular lower body clothing: A little  Bathing (including washing, rinsing, drying): A lot  Putting on and taking off regular upper body clothing: A little  Toileting, which includes using toilet, bedpan or urinal: A lot  Taking care of personal grooming such as brushing teeth: None  Eating Meals: None  Daily Activity - Total Score: 18        Goals:   Encounter Problems       Encounter Problems (Active)       OT Goals       Patient will complete upper and lower body bathing/dressing; toileting with modified independence using assistive techniques/adaptive equipment as needed  (Progressing)       Start:  02/03/25    Expected End:  02/10/25            Patient will perform bed mobility and functional transfers safely and independently: bed, chair, commode using DME as needed  (Progressing)       Start:  02/03/25    Expected End:  02/10/25            Patient will tolerate standing for 5 mins. and show overall good (-) standing balance during ADL's and functional transfers/mobility  (Progressing)       Start:  02/03/25    Expected End:  02/10/25            Patient will apply energy conservation/diaphragmatic breathing techniques to ADL's and functional transfers with minimal cues  (Progressing)       Start:  02/03/25    Expected End:  02/10/25

## 2025-02-03 NOTE — PROGRESS NOTES
02/03/25 1236   Discharge Planning   Living Arrangements Alone   Support Systems Children   Assistance Needed walker   Type of Residence Private residence   Expected Discharge Disposition Home H   Intensity of Service   Intensity of Service 0-30 min     2/3/2025  Met with pt in room, introduced self and explained role.  Verified insurance, address, phone.   PCP- Diya  Pharmacy- [facility]       Able to obtain and afford medications, takes as prescribed.   Pt is from home, lives alone. Uses a walker.  Performs own ADL's.  Has grab bars and a transfer seat in bathroom.  Daughter and grand son help with driving.  Was active with [facility] prior to admission and would like them again- referral made.  Discussed HHC, pt stated she would like it if needed.  Therapies ordered.  Ct Team will follow for needs.   Salena Talavera Rn TCC    [address]  [facility]- 18, low recommended.  Followed up with pt in room, dicussed [facility] with her. She would like [facility], discussed freedom of choice. List from [facility] provided.   Salena Talavera Rn TCC

## 2025-02-03 NOTE — CARE PLAN
The patient's goals for the shift include      The clinical goals for the shift include Pt will have less SOB throughout shift    Over the shift, the patient did not make progress toward the following goals. Barriers to progression include acute illness. Recommendations to address these barriers include communication.

## 2025-02-03 NOTE — CARE PLAN
The patient's goals for the shift include      The clinical goals for the shift include Pt to be ess SOB by end of shift      Problem: Skin  Goal: Prevent/minimize sheer/friction injuries  Flowsheets (Taken 2/3/2025 0059)  Prevent/minimize sheer/friction injuries:   Use pull sheet   HOB 30 degrees or less

## 2025-02-03 NOTE — H&P
History Of Present Illness    Yesenia Milner is a 89-year-old female who presents to the ED with shortness of breath and bilateral lower extremity swelling.  Patient has a past medical history of HFpEF, MI, SSS s/p Medtronic dual-chamber pacemaker 2012 with generator change on pacemaker 7/13/2023, HTN, COPD, CVA with TNK 8/21/23, arthritis, recent diagnosis of colon CA, and venous insufficiency.  Patient reports increasing shortness of breath, abdominal swelling, and bilateral lower extremity swelling over the past 10 days.  Patient states her legs have swollen to the point where she was unable to bend her knees to place her MISA hose on.  Patient has also had gradual weight gain that has been reported and recorded by the healthy at home virtual nurse.  Patient states she has also had increasing moist cough and difficulty with ambulation due to shortness of breath.  Visitor at bedside notes when patient changes positions patient is unable to catch her breath.  Patient also states and visitor confirms that patient has been having difficulty with urination and this is new for patient, where she has only been able to urinate small amounts at a time.  She denies any burning, hematuria, or flank pain.  Patient denies chest pain, palpitations, nausea, vomiting, abdominal pain, dizziness, sleep changes, or open skin sores.     ED Course      Hemodynamically Stable.    Vitals: Temp. 36.3, HR 67, Resp.  24, /56, Pulse ox 94% RA       Labs: Glucose 135, Na+ 133, K+ 4.0, Bun 28, Creat.  0.97, GFR 55, Ca 8.9, , troponin 15, WBC 5.5,  Hgb.  7.8, Hct.  24.5,      Medications: Torsemide 30 mg     Imaging: interpreted by radiologist.  Chest x-ray: Moderate vascular congestion with small bilateral pleural effusions       EKG: Not available for my review.          Past Medical History  Past Medical History:   Diagnosis Date    Gross hematuria 10/07/2020    Impacted cerumen 02/22/2024    Other conditions influencing health  status     Normal stress echocardiogram    Personal history of other medical treatment     History of echocardiogram    Personal history of other medical treatment     H/O Doppler ultrasound    Systolic CHF (Multi) 05/18/2023       Surgical History  Past Surgical History:   Procedure Laterality Date    APPENDECTOMY  11/22/2017    Appendectomy    CARDIAC CATHETERIZATION N/A 12/20/2024    Procedure: Left Heart Cath, With LV;  Surgeon: Tahir Ruelas MD;  Location: [facility]iac [facility];  Service: Cardiovascular;  Laterality: N/A;    CARDIAC PACEMAKER PLACEMENT  03/11/2021    Pacemaker Placement    HYSTERECTOMY  11/22/2017    Hysterectomy    IR ANGIOGRAM INFERIOR EPIGASTRIC PELVIC  12/14/2020    IR ANGIOGRAM INFERIOR EPIGASTRIC PELVIC 12/14/2020 [facility]    IR ANGIOGRAM INFERIOR EPIGASTRIC PELVIC  12/14/2020    IR ANGIOGRAM INFERIOR EPIGASTRIC PELVIC 12/14/2020 [facility]    IR ANGIOGRAM RENAL BILATERAL Bilateral 12/14/2020    IR ANGIOGRAM RENAL BILATERAL 12/14/2020 [facility]    OTHER SURGICAL HISTORY  11/22/2017    Stab Phlebectomy Of Varicose Veins    OTHER SURGICAL HISTORY  11/22/2017    Venous Ligation With Stripping    TONSILLECTOMY  11/22/2017    Tonsillectomy        Social History  She reports that she quit smoking about 52 years ago. Her smoking use included cigarettes. She has been exposed to tobacco smoke. She has never used smokeless tobacco. She reports that she does not drink alcohol and does not use drugs.    Family History  Family History   Problem Relation Name Age of Onset    No Known Problems Mother          Allergies  Iodine, Shrimp, Hydrocodone, and Adhesive tape-silicones    Review of Systems    10 point ROS systems completed and is negative except for what is stated in HPI.     Physical Exam  Constitutional:       General: She is awake. She is not in acute distress.     Appearance: Normal appearance. She is normal weight. She is not toxic-appearing.   HENT:      Head: Normocephalic  "and atraumatic.      Nose: Nose normal. No rhinorrhea.      Mouth/Throat:      Mouth: Mucous membranes are dry.   Eyes:      General:         Right eye: No discharge.         Left eye: No discharge.      Conjunctiva/sclera: Conjunctivae normal.      Pupils: Pupils are equal, round, and reactive to light.   Cardiovascular:      Rate and Rhythm: Normal rate and regular rhythm.      Pulses: Normal pulses.      Comments: Right worse than left  Pulmonary:      Effort: Pulmonary effort is normal. No respiratory distress.      Breath sounds: Examination of the right-middle field reveals decreased breath sounds. Decreased breath sounds present. No wheezing.   Abdominal:      General: Bowel sounds are normal. There is no distension.      Palpations: Abdomen is soft.      Tenderness: There is no abdominal tenderness. There is no guarding.   Musculoskeletal:         General: Normal range of motion.      Cervical back: Normal range of motion and neck supple.      Right lower le+ Edema present.      Left lower le+ Edema present.   Skin:     General: Skin is warm and dry.   Neurological:      General: No focal deficit present.      Mental Status: She is alert and oriented to person, place, and time. Mental status is at baseline.      Motor: Weakness present.   Psychiatric:         Attention and Perception: Attention normal.         Mood and Affect: Mood normal.         Speech: Speech normal.         Behavior: Behavior normal. Behavior is cooperative.         Cognition and Memory: Cognition normal.          Last Recorded Vitals  Blood pressure 110/55, pulse 72, temperature 36.1 °C (97 °F), temperature source Temporal, resp. rate 16, height 1.6 m (5' 3\"), weight 76.1 kg (167 lb 12.8 oz), SpO2 95%.    Relevant Results    Results for orders placed or performed during the hospital encounter of 25 (from the past 24 hours)   CBC and Auto Differential   Result Value Ref Range    WBC 5.5 4.4 - 11.3 x10*3/uL    nRBC 0.0 0.0 - " 0.0 /100 WBCs    RBC 2.77 (L) 4.00 - 5.20 x10*6/uL    Hemoglobin 7.8 (L) 12.0 - 16.0 g/dL    Hematocrit 24.5 (L) 36.0 - 46.0 %    MCV 88 80 - 100 fL    MCH 28.2 26.0 - 34.0 pg    MCHC 31.8 (L) 32.0 - 36.0 g/dL    RDW 14.6 (H) 11.5 - 14.5 %    Platelets 283 150 - 450 x10*3/uL    Neutrophils % 66.1 40.0 - 80.0 %    Immature Granulocytes %, Automated 0.4 0.0 - 0.9 %    Lymphocytes % 17.2 13.0 - 44.0 %    Monocytes % 13.2 2.0 - 10.0 %    Eosinophils % 2.7 0.0 - 6.0 %    Basophils % 0.4 0.0 - 2.0 %    Neutrophils Absolute 3.65 1.60 - 5.50 x10*3/uL    Immature Granulocytes Absolute, Automated 0.02 0.00 - 0.50 x10*3/uL    Lymphocytes Absolute 0.95 0.80 - 3.00 x10*3/uL    Monocytes Absolute 0.73 0.05 - 0.80 x10*3/uL    Eosinophils Absolute 0.15 0.00 - 0.40 x10*3/uL    Basophils Absolute 0.02 0.00 - 0.10 x10*3/uL   Basic metabolic panel   Result Value Ref Range    Glucose 135 (H) 74 - 99 mg/dL    Sodium 133 (L) 136 - 145 mmol/L    Potassium 4.0 3.5 - 5.3 mmol/L    Chloride 100 98 - 107 mmol/L    Bicarbonate 24 21 - 32 mmol/L    Anion Gap 13 10 - 20 mmol/L    Urea Nitrogen 28 (H) 6 - 23 mg/dL    Creatinine 0.97 0.50 - 1.05 mg/dL    eGFR 55 (L) >60 mL/min/1.73m*2    Calcium 8.9 8.6 - 10.3 mg/dL   Troponin I, High Sensitivity   Result Value Ref Range    Troponin I, High Sensitivity 15 (H) 0 - 13 ng/L   B-Type Natriuretic Peptide   Result Value Ref Range     (H) 0 - 99 pg/mL   Magnesium   Result Value Ref Range    Magnesium 2.25 1.60 - 2.40 mg/dL   Troponin I, High Sensitivity   Result Value Ref Range    Troponin I, High Sensitivity 17 (H) 0 - 13 ng/L   Urinalysis with Reflex Culture and Microscopic   Result Value Ref Range    Color, Urine Light-Yellow Light-Yellow, Yellow, Dark-Yellow    Appearance, Urine Clear Clear    Specific Gravity, Urine 1.015 1.005 - 1.035    pH, Urine 5.5 5.0, 5.5, 6.0, 6.5, 7.0, 7.5, 8.0    Protein, Urine NEGATIVE NEGATIVE, 10 (TRACE), 20 (TRACE) mg/dL    Glucose, Urine Normal Normal mg/dL     Blood, Urine NEGATIVE NEGATIVE    Ketones, Urine NEGATIVE NEGATIVE mg/dL    Bilirubin, Urine NEGATIVE NEGATIVE    Urobilinogen, Urine Normal Normal mg/dL    Nitrite, Urine NEGATIVE NEGATIVE    Leukocyte Esterase, Urine NEGATIVE NEGATIVE   Extra Urine Gray Tube   Result Value Ref Range    Extra Tube Hold for add-ons.    CBC   Result Value Ref Range    WBC 4.7 4.4 - 11.3 x10*3/uL    nRBC 0.0 0.0 - 0.0 /100 WBCs    RBC 2.73 (L) 4.00 - 5.20 x10*6/uL    Hemoglobin 7.7 (L) 12.0 - 16.0 g/dL    Hematocrit 24.2 (L) 36.0 - 46.0 %    MCV 89 80 - 100 fL    MCH 28.2 26.0 - 34.0 pg    MCHC 31.8 (L) 32.0 - 36.0 g/dL    RDW 14.7 (H) 11.5 - 14.5 %    Platelets 274 150 - 450 x10*3/uL   Basic metabolic panel   Result Value Ref Range    Glucose 106 (H) 74 - 99 mg/dL    Sodium 136 136 - 145 mmol/L    Potassium 3.5 3.5 - 5.3 mmol/L    Chloride 101 98 - 107 mmol/L    Bicarbonate 28 21 - 32 mmol/L    Anion Gap 11 10 - 20 mmol/L    Urea Nitrogen 22 6 - 23 mg/dL    Creatinine 0.89 0.50 - 1.05 mg/dL    eGFR 61 >60 mL/min/1.73m*2    Calcium 8.9 8.6 - 10.3 mg/dL   Troponin I, High Sensitivity   Result Value Ref Range    Troponin I, High Sensitivity 17 (H) 0 - 13 ng/L     XR chest 1 view    Result Date: 2/2/2025  Interpreted By:  Marianne Randall, STUDY: XR CHEST 1 VIEW;  2/2/2025 5:25 pm   INDICATION: Signs/Symptoms:sob.   COMPARISON: 01/18/2025   ACCESSION NUMBER(S): [id number]   ORDERING CLINICIAN: NOHEMI ALVES   FINDINGS: CARDIOMEDIASTINAL SILHOUETTE: Cardiomediastinal silhouette is normal in size and configuration. There is a left subclavian bichamber pacemaker with a single tip in the right atrium and a single tip in the right ventricle. There is a prominent right pericardial fat pad.   LUNGS: There is moderate vascular congestion. There are small bilateral pleural effusions.   ABDOMEN: No remarkable upper abdominal findings.     BONES: No acute osseous changes.       Moderate vascular congestion with small bilateral pleural effusions.    MACRO: None   Signed by: Marianne Tonia 2/2/2025 5:45 PM Dictation workstation:   [id number]     Assessment & Plan  Acute decompensated heart failure      Patient arrived today after recently being discharged from the hospital.  She reports she has been in the hospital multiple times since [place] after having an MI.  Today she came in for shortness of breath and bilateral lower extremity edema.  Patient states she has been having trouble with ambulation and was unable to put her MISA hose on because her knees were so swollen she could not bend them.  Chest x-ray revealed vascular congestion with small bilateral pleural effusions.  Patient follows with Dr. Ramicone and has a significant cardiac history.  Cardiology consult placed, appreciate recs.  Patient recently had echo on 12/21/2024.  Patient received additional dose of torsemide 30 mg in ED for additional diuresis, home daily torsemide continued in morning.  Patient also reported difficulty with urination, denied any burning but reports frequency/dribbling/small amounts at a time.  UA ordered.     Patient admitted to Dr. Iglesia mart for further medical management.      # CHF exacerbation  # Vascular congestion/pleural effusions  # Shortness of breath  # Elevated troponin  # Hyponatremia  # BLE swelling  # Hx CHF  -Cardiology consult, follows with Dr. Ramicone  -Last echo 12/21/2024 EF 60 to 65%, abnormal left ventricular wall motion, MI, mitral annular calcification, MVR, TVR, elevated right ventricular systolic pressure.  -Fluid restriction  -Strict I's and O's  -Daily weight  -Continue home torsemide, was given extra dose in ED  -As needed oxygen  -As needed EKG, coronary symptoms  -PT/OT/SW    -Cardiac low-salt diet   -admitted to observation with telemetry    -q4 vitals    -morning labs, trend troponin, Na+,      Chronic Conditions   # CVA  # MI, pacemaker, SSS  # Hypertension  # COPD  -albuterol prn  # Arthritis  # Venous insufficiency     Continue home  medications as ordered when nursing completes home med rec.    Full Code      #DVT Prophylaxis   Scd's as tolerated   DVT Prophylaxis Heparin   On Plavix     Thorough record review performed of previous labs and notes from prior encounters.     2/3: mild swelling, on 3 liters doing well, cardio consult    Iglesia Gabriel, DO

## 2025-02-03 NOTE — CONSULTS
Consults  History Of Present Illness:    Yesenia Milner is a 89 y.o. female presenting with acute on chronic HFpEF.  PMH of Takotsubo ardiomyopathy with normal coronaries 12/20/24 cardiac catheterization, SSS S/P PPM, HTN, COPD, CVA with TNK 8/21/23. Arthrtiris noted  dyspnea and BLE edema.  She noted improvement since admitsson.  No chest pain, no palpitations  Last Recorded Vitals:  Vitals:    02/03/25 0155 02/03/25 0606 02/03/25 1025 02/03/25 1340   BP: 113/56 110/55 110/56 (!) 95/48   BP Location: Right arm Right arm Right arm Right arm   Patient Position: Lying Lying Lying Sitting   Pulse: 65 72 70 76   Resp: 16 16 18 20   Temp: 36.3 °C (97.3 °F) 36.1 °C (97 °F) 36.3 °C (97.3 °F) 36.1 °C (97 °F)   TempSrc: Temporal Temporal Temporal Temporal   SpO2: 95% 95% 95% 97%   Weight:  76.1 kg (167 lb 12.8 oz)     Height:           Last Labs:  CBC - 2/3/2025:  6:44 AM  4.7 7.7 274    24.2      CMP - 2/3/2025:  6:44 AM  8.9 5.8 16 --- 0.3   _ 3.2 16 81      PTT - 1/2/2025:  7:42 PM  1.5   16.5 34     Troponin I, High Sensitivity   Date/Time Value Ref Range Status   02/03/2025 06:44 AM 17 (H) 0 - 13 ng/L Final   02/02/2025 07:16 PM 17 (H) 0 - 13 ng/L Final   02/02/2025 05:04 PM 15 (H) 0 - 13 ng/L Final     BNP   Date/Time Value Ref Range Status   02/02/2025 05:04  (H) 0 - 99 pg/mL Final   01/02/2025 12:27  (H) 0 - 99 pg/mL Final     Hemoglobin A1C   Date/Time Value Ref Range Status   12/20/2024 02:04 AM 5.5 See comment % Final   01/24/2024 10:49 AM 5.4 see below % Final     LDL Calculated   Date/Time Value Ref Range Status   12/20/2024 02:04 AM 68 <=99 mg/dL Final     Comment:                                 Near   Borderline      AGE      Desirable  Optimal    High     High     Very High     0-19 Y     0 - 109     ---    110-129   >/= 130     ----    20-24 Y     0 - 119     ---    120-159   >/= 160     ----      >24 Y     0 -  99   100-129  130-159   160-189     >/=190     01/24/2024 10:49 AM 73 <=99 mg/dL  Final     Comment:                                 Near   Borderline      AGE      Desirable  Optimal    High     High     Very High     0-19 Y     0 - 109     ---    110-129   >/= 130     ----    20-24 Y     0 - 119     ---    120-159   >/= 160     ----      >24 Y     0 -  99   100-129  130-159   160-189     >/=190       VLDL   Date/Time Value Ref Range Status   12/20/2024 02:04 AM 14 0 - 40 mg/dL Final   01/24/2024 10:49 AM 13 0 - 40 mg/dL Final   08/21/2023 12:56 AM 17 0 - 40 mg/dL Final   01/17/2023 09:43 AM 14 0 - 40 mg/dL Final   03/16/2021 10:15 AM 16 0 - 40 mg/dL Final      Last I/O:  I/O last 3 completed shifts:  In: 1300 (17.1 mL/kg) [P.O.:1300]  Out: 2100 (27.6 mL/kg) [Urine:2100 (0.8 mL/kg/hr)]  Weight: 76.1 kg     Past Cardiology Tests (Last 3 Years):  EKG:  ECG 12 lead 02/02/2025 (Preliminary)      ECG 12 lead 01/12/2025      ECG 12 lead 01/02/2025      ECG 12 lead 12/21/2024      ECG 12 lead 12/20/2024      ECG 12 lead 12/20/2024    Echo:  Transthoracic echo (TTE) complete 12/21/2024    Ejection Fractions:  EF   Date/Time Value Ref Range Status   12/21/2024 09:03 AM 63 %      Cath:  Cardiac Catheterization Procedure 12/20/2024    Stress Test:  No results found for this or any previous visit from the past 1095 days.    Cardiac Imaging:  No results found for this or any previous visit from the past 1095 days.      Past Medical History:  She has a past medical history of Gross hematuria (10/07/2020), Impacted cerumen (02/22/2024), Other conditions influencing health status, Personal history of other medical treatment, Personal history of other medical treatment, and Systolic CHF (Multi) (05/18/2023).    Past Surgical History:  She has a past surgical history that includes Appendectomy (11/22/2017); Hysterectomy (11/22/2017); Tonsillectomy (11/22/2017); Other surgical history (11/22/2017); Other surgical history (11/22/2017); Cardiac pacemaker placement (03/11/2021); IR angiogram renal bilateral  (Bilateral, 12/14/2020); IR angiogram inferior epigastric pelvic (12/14/2020); IR angiogram inferior epigastric pelvic (12/14/2020); and Cardiac catheterization (N/A, 12/20/2024).      Social History:  She reports that she quit smoking about 52 years ago. Her smoking use included cigarettes. She has been exposed to tobacco smoke. She has never used smokeless tobacco. She reports that she does not drink alcohol and does not use drugs.    Family History:  Family History   Problem Relation Name Age of Onset    No Known Problems Mother          Allergies:  Iodine, Shrimp, Hydrocodone, and Adhesive tape-silicones    Inpatient Medications:  Scheduled medications   Medication Dose Route Frequency    aspirin  81 mg oral Daily    calcium carbonate-vitamin D3  1 tablet oral Daily    clopidogrel  75 mg oral Daily    heparin (porcine)  5,000 Units subcutaneous q8h    metoprolol tartrate  12.5 mg oral Daily    polyethylene glycol  17 g oral BID    torsemide  20 mg oral Daily     PRN medications   Medication    acetaminophen    albuterol    dextromethorphan-guaifenesin    lubricating eye drops    melatonin    oxygen     Continuous Medications   Medication Dose Last Rate     Outpatient Medications:  Current Outpatient Medications   Medication Instructions    aspirin 81 mg, oral, Daily    calcium carbonate-vitamin D3 (Calcium 600 + D,3,) 600 mg-5 mcg (200 unit) tablet 1 tablet, Daily    clopidogrel (PLAVIX) 75 mg, oral, Daily    fluticasone propion-salmeteroL (Advair) 115-21 mcg/actuation inhaler 2 puffs, inhalation, 2 times daily, Rinse mouth with water after use to reduce aftertaste and incidence of candidiasis. Do not swallow.    lubricating eye drops ophthalmic solution 1 drop, As needed    metoprolol tartrate (LOPRESSOR) 12.5 mg, oral, 2 times daily    polyethylene glycol (Glycolax, Miralax) 17 gram/dose powder Mix 1 capful (17 g) of powder with 4 to 8 ounces of water or juice and drink 2 times a day.    torsemide (DEMADEX) 20  mg, oral, Daily       Physical Exam:  GEN: Wd WN 89 yo wf sitting comfortably in the bedside chair  HEENT: Atraumatic, normocephalic  COR: Reg  LUNGS: Clear  EXT: Tr. edema     Assessment/Plan   1.) Acute on chronic HFpEf diuresing well on po Demadex as per Nurse Mishel  2.)  Takotsubo cardiomyopathy with normal coronary angiograms 12/20/24  3.) S/P Medtronic dual chamber pacemaker  4.) HTN  5.) COPD  6.) CVA  REC:  1.) Continue po Demadex  2.) Continue tele monitor  Dr. Ramicone to follow from tomorrow                                       [facility]    Code Status:  Full Code    I spent 30 minutes in the professional and overall care of this patient.        Magnus Hess MD

## 2025-02-03 NOTE — PROGRESS NOTES
Physical Therapy    Physical Therapy Evaluation    Patient Name: Yesenia Milner  MRN: [id number]  Today's Date: 2/3/2025   Time Calculation  Start Time: 0856  Stop Time: 0921  Time Calculation (min): 25 min  320/320-A    Assessment/Plan   PT Assessment  PT Assessment Results: Decreased strength, Decreased endurance, Impaired balance, Decreased mobility, Decreased safety awareness  Rehab Prognosis: Good  Barriers to Discharge Home: Caregiver assistance  Caregiver Assistance: Patient lives alone and/or does not have reliable caregiver assistance  Evaluation/Treatment Tolerance: Patient limited by fatigue  End of Session Communication: Bedside nurse  Assessment Comment: Continued skilled PT intervention indicated to facilitate increased strength, balance & gait stability  End of Session Patient Position: Up in chair, Alarm on (call light in reach, le's elevated)  IP OR SWING BED PT PLAN  Inpatient or Swing Bed: Inpatient  PT Plan  Treatment/Interventions: Bed mobility, Transfer training, Gait training, Balance training, Endurance training, Therapeutic exercise, Therapeutic activity  PT Plan: Ongoing PT  PT Frequency: 3 times per week  PT Discharge Recommendations: Low intensity level of continued care (w/ initial 24hr support for safe transition home due to medical status)  PT Recommended Transfer Status: Assist x1  PT - OK to Discharge: Yes (when cleared by medical team)    Subjective     Current Problem:  1. Acute decompensated heart failure          Patient Active Problem List   Diagnosis    Aortic stenosis, mild    Arthritis    Chronic diastolic congestive heart failure    Complete heart block    COPD (chronic obstructive pulmonary disease) (Multi)    History of paroxysmal supraventricular tachycardia    HTN (hypertension)    Paresthesia    Presence of permanent cardiac pacemaker    Raynauds disease    Sensorineural hearing loss (SNHL) of both ears    Dyspnea on minimal exertion    Varicose veins of lower  extremities with inflammation    Vertigo    Ischemic cerebrovascular accident (CVA) (Multi)    Acquired deformity of toe    Benign neoplasm of skin of foot    Benign paroxysmal positional vertigo    Dermatophytosis of nail    Leg swelling    Macular degeneration    Mitral valve insufficiency    Moderate mitral valve regurgitation    Overweight with body mass index (BMI) 25.0-29.9    Plantar wart of left foot    Venous insufficiency    Dysarthria following cerebral infarction    Hyperglycemia    Hand paresthesia    Sick sinus syndrome (Multi)    Injury of kidney    NSTEMI (non-ST elevated myocardial infarction) (Multi)    Acute superior vena cava thrombosis (Multi)    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)    Gastrointestinal hemorrhage, unspecified gastrointestinal hemorrhage type    Malignant neoplasm of transverse colon (Multi)    Stage 3 chronic kidney disease, unspecified whether stage 3a or 3b CKD (Multi)    Acute decompensated heart failure       General Visit Information:  General  Reason for Referral: PT eval & treat/impaired mobility DX: acute decompensated heart failure; 2/2/25 sob, ble edema, abdominal swelling, difficulty urinating [id number]; cxr: vascular congestion, small bilat pleural effusions  Referred By: Dahiana Saucedo CNP  Caregiver Feedback: Per conference w/ RN patient stable to participate in therapy  Co-Treatment: OT  Co-Treatment Reason: to maximize pt safety  &mobility  Patient Position Received: Bed, 3 rail up, Alarm off, not on at start of session (seated edge of bed)  General Comment: Pleasant & cooperative, receptive to mobility& instructions; reports poor sleep/fatigue, no dizziness of sob w/ activity during functional mobility, does report decreased endurance    Home Living:  Home Living  Home Living Comments: Pt lives alone in a split level house with no stairs to enter, then a chair lift to main living level. Then another stair lift up to second floor with bedroom and  "bathroom. 4 steps down to basement with rail to laundry. Tub shower with grab bar, hand held shower hose, transfer bench. Toilet elevated positioned near sink. Pt owns cane, WW, rollator, reacher. Pt reports daughter will stay w/ her initially upon d/c home    Prior Level of Function:  Prior Function Per Pt/Caregiver Report  Hand Dominance: Right  Prior Function Comments: independent mobility w/ use of rollator on 1st fl , ww on 2nd fl of home, denies h/o falls in at least past 6months; independent adl; dtr manages laundry & has been managing driving for the past week; pt manages cooking & cleaning    Precautions:  Precautions  Precautions Comment: fall, alarm, purewick, strict I&O, fluid 1800mL, reading glasses  Pain:  Pain Assessment  0-10 (Numeric) Pain Score: 0 - No pain    Cognition:  Cognition  Overall Cognitive Status: Within Functional Limits    General Assessments:       Sensation  Light Touch: No apparent deficits (none at time of eval, pt does note a few wk h/o intermittent rt hand numbenss)      Postural Control  Posture Comment: kyphotic   Functional Assessments:     Bed Mobility  Bed Mobility:  (seated edge of bed upon arrival to room)  Transfers  Transfer:  (cga sit>stand from bed of elevated height, sba stand>sit to chair w/ armrests)  Ambulation/Gait Training  Ambulation/Gait Training Performed:  (initially cga progressed to sba w/ ww 20ft, flexed posture, report of decreased endurance limiting further distance)     Extremity/Trunk Assessments:        RLE   RLE :  (end rom tightness heelcord/hamstrings but grossly wfl arom; strength: hip flexion 3+/5 knee ext 3+5 ankle df 4/5 w/ pt reporting limiting ability to resist due to \"tightness\")  LLE   LLE :  (end rom tightness heelcord/hamstrings but grossly wfl arom; strength: hip flexion 3+/5 knee ext 3+5 ankle df 4/5 w/ pt reporting limiting ability to resist due to \"tightness\")    Outcome Measures:     [facility] Basic Mobility  Turning from your back to " your side while in a flat bed without using bedrails: None  Moving from lying on your back to sitting on the side of a flat bed without using bedrails: A little  Moving to and from bed to chair (including a wheelchair): A little  Standing up from a chair using your arms (e.g. wheelchair or bedside chair): A little  To walk in hospital room: A little  Climbing 3-5 steps with railing: A lot  Basic Mobility - Total Score: 18   Goals:  Encounter Problems       Encounter Problems (Active)       PT Problem       STG - Pt will transition supine <> sitting independently  (Progressing)       Start:  02/03/25    Expected End:  02/17/25            STG - Pt will transfer STS with modified independence  (Progressing)       Start:  02/03/25    Expected End:  02/17/25            STG - Pt will amb >=50' using ww with modified independence  (Progressing)       Start:  02/03/25    Expected End:  02/17/25            STG - Pt will perform a B LE ther ex program of 2-3 sets of 10  (Progressing)       Start:  02/03/25    Expected End:  02/17/25            STG - Pt will maintain supported dynamic standing  balance with no LOB noted   (Progressing)       Start:  02/03/25    Expected End:  02/17/25               Pain - Adult            Education Documentation  Mobility Training, taught by Stephanie Palomino, PT at 2/3/2025 11:08 AM.  Learner: Patient  Readiness: Acceptance  Method: Explanation  Response: Verbalizes Understanding  Comment: safety, activity progression, use of ww

## 2025-02-04 LAB
ANION GAP SERPL CALC-SCNC: 11 MMOL/L (ref 10–20)
BUN SERPL-MCNC: 22 MG/DL (ref 6–23)
CALCIUM SERPL-MCNC: 9 MG/DL (ref 8.6–10.3)
CHLORIDE SERPL-SCNC: 99 MMOL/L (ref 98–107)
CO2 SERPL-SCNC: 30 MMOL/L (ref 21–32)
CREAT SERPL-MCNC: 0.95 MG/DL (ref 0.5–1.05)
EGFRCR SERPLBLD CKD-EPI 2021: 57 ML/MIN/1.73M*2
ERYTHROCYTE [DISTWIDTH] IN BLOOD BY AUTOMATED COUNT: 14.6 % (ref 11.5–14.5)
GLUCOSE BLD MANUAL STRIP-MCNC: 90 MG/DL (ref 74–99)
GLUCOSE BLD MANUAL STRIP-MCNC: 95 MG/DL (ref 74–99)
GLUCOSE SERPL-MCNC: 88 MG/DL (ref 74–99)
HCT VFR BLD AUTO: 25.3 % (ref 36–46)
HGB BLD-MCNC: 7.9 G/DL (ref 12–16)
MCH RBC QN AUTO: 27.7 PG (ref 26–34)
MCHC RBC AUTO-ENTMCNC: 31.2 G/DL (ref 32–36)
MCV RBC AUTO: 89 FL (ref 80–100)
NRBC BLD-RTO: 0 /100 WBCS (ref 0–0)
PLATELET # BLD AUTO: 284 X10*3/UL (ref 150–450)
POTASSIUM SERPL-SCNC: 3.8 MMOL/L (ref 3.5–5.3)
RBC # BLD AUTO: 2.85 X10*6/UL (ref 4–5.2)
SODIUM SERPL-SCNC: 136 MMOL/L (ref 136–145)
WBC # BLD AUTO: 4.8 X10*3/UL (ref 4.4–11.3)

## 2025-02-04 PROCEDURE — 2500000004 HC RX 250 GENERAL PHARMACY W/ HCPCS (ALT 636 FOR OP/ED)

## 2025-02-04 PROCEDURE — 85027 COMPLETE CBC AUTOMATED: CPT | Performed by: STUDENT IN AN ORGANIZED HEALTH CARE EDUCATION/TRAINING PROGRAM

## 2025-02-04 PROCEDURE — 2500000001 HC RX 250 WO HCPCS SELF ADMINISTERED DRUGS (ALT 637 FOR MEDICARE OP)

## 2025-02-04 PROCEDURE — 1200000002 HC GENERAL ROOM WITH TELEMETRY DAILY

## 2025-02-04 PROCEDURE — 99232 SBSQ HOSP IP/OBS MODERATE 35: CPT | Performed by: STUDENT IN AN ORGANIZED HEALTH CARE EDUCATION/TRAINING PROGRAM

## 2025-02-04 PROCEDURE — 82947 ASSAY GLUCOSE BLOOD QUANT: CPT

## 2025-02-04 PROCEDURE — 36415 COLL VENOUS BLD VENIPUNCTURE: CPT | Performed by: STUDENT IN AN ORGANIZED HEALTH CARE EDUCATION/TRAINING PROGRAM

## 2025-02-04 PROCEDURE — 99232 SBSQ HOSP IP/OBS MODERATE 35: CPT | Performed by: INTERNAL MEDICINE

## 2025-02-04 PROCEDURE — 82374 ASSAY BLOOD CARBON DIOXIDE: CPT | Performed by: STUDENT IN AN ORGANIZED HEALTH CARE EDUCATION/TRAINING PROGRAM

## 2025-02-04 RX ORDER — FUROSEMIDE 10 MG/ML
20 INJECTION INTRAMUSCULAR; INTRAVENOUS ONCE
Status: DISCONTINUED | OUTPATIENT
Start: 2025-02-04 | End: 2025-02-10 | Stop reason: SDUPTHER

## 2025-02-04 RX ADMIN — POLYETHYLENE GLYCOL 3350 17 G: 17 POWDER, FOR SOLUTION ORAL at 21:54

## 2025-02-04 RX ADMIN — HEPARIN SODIUM 5000 UNITS: 5000 INJECTION INTRAVENOUS; SUBCUTANEOUS at 04:20

## 2025-02-04 RX ADMIN — HEPARIN SODIUM 5000 UNITS: 5000 INJECTION INTRAVENOUS; SUBCUTANEOUS at 21:54

## 2025-02-04 RX ADMIN — ASPIRIN 81 MG CHEWABLE TABLET 81 MG: 81 TABLET CHEWABLE at 10:05

## 2025-02-04 RX ADMIN — CLOPIDOGREL BISULFATE 75 MG: 75 TABLET ORAL at 10:05

## 2025-02-04 RX ADMIN — Medication 1 TABLET: at 10:05

## 2025-02-04 RX ADMIN — POLYETHYLENE GLYCOL 3350 17 G: 17 POWDER, FOR SOLUTION ORAL at 10:06

## 2025-02-04 RX ADMIN — HEPARIN SODIUM 5000 UNITS: 5000 INJECTION INTRAVENOUS; SUBCUTANEOUS at 12:46

## 2025-02-04 RX ADMIN — METOPROLOL TARTRATE 12.5 MG: 25 TABLET, FILM COATED ORAL at 10:05

## 2025-02-04 RX ADMIN — TORSEMIDE 20 MG: 20 TABLET ORAL at 10:05

## 2025-02-04 ASSESSMENT — COGNITIVE AND FUNCTIONAL STATUS - GENERAL
STANDING UP FROM CHAIR USING ARMS: A LITTLE
DRESSING REGULAR UPPER BODY CLOTHING: A LITTLE
MOBILITY SCORE: 18
DAILY ACTIVITIY SCORE: 19
PERSONAL GROOMING: A LITTLE
WALKING IN HOSPITAL ROOM: A LITTLE
MOVING TO AND FROM BED TO CHAIR: A LITTLE
HELP NEEDED FOR BATHING: A LITTLE
TOILETING: A LITTLE
TURNING FROM BACK TO SIDE WHILE IN FLAT BAD: A LITTLE
CLIMB 3 TO 5 STEPS WITH RAILING: A LOT
DRESSING REGULAR LOWER BODY CLOTHING: A LITTLE

## 2025-02-04 ASSESSMENT — PAIN SCALES - GENERAL
PAINLEVEL_OUTOF10: 0 - NO PAIN
PAINLEVEL_OUTOF10: 0 - NO PAIN

## 2025-02-04 NOTE — PROGRESS NOTES
"Yesenia Milner is a 89 y.o. female on day 1 of admission presenting with Acute decompensated heart failure.    Subjective   Doing well, breathign is better walking better       Objective     Physical Exam  Constitutional:       Appearance: Normal appearance.   HENT:      Head: Normocephalic and atraumatic.      Right Ear: Tympanic membrane and ear canal normal.      Left Ear: Tympanic membrane and ear canal normal.      Mouth/Throat:      Mouth: Mucous membranes are moist.      Pharynx: Oropharynx is clear.   Eyes:      Extraocular Movements: Extraocular movements intact.      Conjunctiva/sclera: Conjunctivae normal.      Pupils: Pupils are equal, round, and reactive to light.   Cardiovascular:      Rate and Rhythm: Normal rate and regular rhythm.      Pulses: Normal pulses.      Heart sounds: Normal heart sounds.   Pulmonary:      Effort: Pulmonary effort is normal.      Breath sounds: Normal breath sounds.   Abdominal:      General: Abdomen is flat. Bowel sounds are normal.      Palpations: Abdomen is soft.   Musculoskeletal:         General: Normal range of motion.      Cervical back: Normal range of motion and neck supple.   Skin:     General: Skin is warm and dry.      Capillary Refill: Capillary refill takes 2 to 3 seconds.   Neurological:      General: No focal deficit present.      Mental Status: She is alert and oriented to person, place, and time. Mental status is at baseline.   Psychiatric:         Mood and Affect: Mood normal.         Behavior: Behavior normal.         Thought Content: Thought content normal.         Judgment: Judgment normal.         Last Recorded Vitals  Blood pressure 115/50, pulse 61, temperature 36.4 °C (97.5 °F), temperature source Temporal, resp. rate 16, height 1.6 m (5' 3\"), weight 77.2 kg (170 lb 3.1 oz), SpO2 97%.  Intake/Output last 3 Shifts:  I/O last 3 completed shifts:  In: 2245 (29.1 mL/kg) [P.O.:2245]  Out: 3750 (48.6 mL/kg) [Urine:3750 (1.3 mL/kg/hr)]  Weight: 77.2 kg "     Relevant Results                              Assessment/Plan   Assessment & Plan  Acute decompensated heart failure    Patient arrived today after recently being discharged from the hospital.  She reports she has been in the hospital multiple times since [place] after having an MI.  Today she came in for shortness of breath and bilateral lower extremity edema.  Patient states she has been having trouble with ambulation and was unable to put her MISA hose on because her knees were so swollen she could not bend them.  Chest x-ray revealed vascular congestion with small bilateral pleural effusions.  Patient follows with Dr. Ramicone and has a significant cardiac history.  Cardiology consult placed, appreciate recs.  Patient recently had echo on 12/21/2024.  Patient received additional dose of torsemide 30 mg in ED for additional diuresis, home daily torsemide continued in morning.  Patient also reported difficulty with urination, denied any burning but reports frequency/dribbling/small amounts at a time.  UA ordered.     Patient admitted to Dr. Iglesia mart for further medical management.      # CHF exacerbation  # Vascular congestion/pleural effusions  # Shortness of breath  # Elevated troponin  # Hyponatremia  # BLE swelling  # Hx CHF  -Cardiology consult, follows with Dr. Ramicone  -Last echo 12/21/2024 EF 60 to 65%, abnormal left ventricular wall motion, MI, mitral annular calcification, MVR, TVR, elevated right ventricular systolic pressure.  -Fluid restriction  -Strict I's and O's  -Daily weight  -Continue home torsemide, was given extra dose in ED  -As needed oxygen  -As needed EKG, coronary symptoms  -PT/OT/SW    -Cardiac low-salt diet   -admitted to observation with telemetry    -q4 vitals    -morning labs, trend troponin, Na+,      Chronic Conditions   # CVA  # MI, pacemaker, SSS  # Hypertension  # COPD  -albuterol prn  # Arthritis  # Venous insufficiency     Continue home medications as ordered when  nursing completes home med rec.    Full Code      #DVT Prophylaxis   Scd's as tolerated   DVT Prophylaxis Heparin   On Plavix     Thorough record review performed of previous labs and notes from prior encounters.      2/3: mild swelling, on 3 liters doing well, cardio consult    2/4: doing well no issues contineu current care, offerd dc but wanted another dc, potential dc tomorrow       I spent 35 minutes in the professional and overall care of this patient.      Iglesia Gabriel, DO

## 2025-02-04 NOTE — PROGRESS NOTES
Patient readmitted to hospital 2/3/25 for CHF exacerbation. [facility] Dr. Iglesia Gabriel has already ordered [facility] to be resumed when patient is discharged from hospital. Sent inbasket notification to PCP Dr. Naima Mills MD.

## 2025-02-04 NOTE — PROGRESS NOTES
2/4/2025  Followed up with pt in room regarding HHC choice.  Pt stated she has not made a choice yet.  Questions answered and support provided. Ct Team will follow up.   Salena Talavera RN TCC

## 2025-02-04 NOTE — CARE PLAN
The patient's goals for the shift include pt. Safety and free from injury during shift.    The clinical goals for the shift include Patient will be free from injury during shift

## 2025-02-04 NOTE — PROGRESS NOTES
"Yesenia Milner is a 89 y.o. female on day 1 of admission presenting with Acute decompensated heart failure.            HPI:    The patient is less short of breath today.  No other cardiac complaints at this time.    Past medical history:    HFpEF  Takotsubo cardiomyopathy, with normal coronary arteries by cardiac catheterization December 20, 2024  History of dual-chamber pacemaker insertion  Hypertension  History of a stroke treated with thrombolytic therapy August 21, 2023  Physical Exam:    General Appearance:  Alert, oriented, no distress  Skin:  Warm and dry  Head and Neck:  No elevation of JVP, no carotid bruits  Cardiac Exam:  Rhythm is regular, S1 and S2 are normal, grade 2/6 holosystolic murmur at the lower left sternal border and apex  Lungs: Basilar rales bilateral  Extremities:  1-2+ edema  Neurologic:  No focal deficits  Psychiatric:  Appropriate mood and behavior     Last Recorded Vitals  Blood pressure (!) 102/49, pulse 64, temperature 36.9 °C (98.4 °F), temperature source Temporal, resp. rate 16, height 1.6 m (5' 3\"), weight 77.2 kg (170 lb 3.1 oz), SpO2 96%.  Intake/Output last 3 Shifts:  I/O last 3 completed shifts:  In: 2245 (29.1 mL/kg) [P.O.:2245]  Out: 3750 (48.6 mL/kg) [Urine:3750 (1.3 mL/kg/hr)]  Weight: 77.2 kg     Medications:  Scheduled medications  aspirin, 81 mg, oral, Daily  calcium carbonate-vitamin D3, 1 tablet, oral, Daily  clopidogrel, 75 mg, oral, Daily  heparin (porcine), 5,000 Units, subcutaneous, q8h  metoprolol tartrate, 12.5 mg, oral, Daily  polyethylene glycol, 17 g, oral, BID  torsemide, 20 mg, oral, Daily        Labs:    CMP:  Recent Labs     02/04/25  0643 02/03/25  0644 02/02/25  1704 01/23/25  0558 01/22/25  0658 01/20/25  0709 01/19/25  0641 01/18/25  0740 01/13/25  0626 01/12/25  1122 01/03/25  0344 01/02/25  1227 12/21/24  0538 12/20/24  0204 08/23/23  0523 08/22/23  0539    136 133* 136 137 136 138 136   < > 134*   < > 135*   < > 135*   < > 139   K 3.8 3.5 4.0 " 3.9 3.9 4.0 3.5 3.2*   < > 3.9   < > 4.3   < > 4.2   < > 4.8   CL 99 101 100 101 100 98 98 97*   < > 101   < > 105   < > 104   < > 107   CO2 30 28 24 27 30 32 32 30   < > 23   < > 22   < > 20*   < > 27   ANIONGAP 11 11 13 12 11 10 12 12   < > 14   < > 12   < > 15   < > 10   BUN 22 22 28* 25* 17 21 22 23   < > 24*   < > 12   < > 32*   < > 19   CREATININE 0.95 0.89 0.97 0.96 0.95 0.95 0.93 0.95   < > 0.92   < > 0.88   < > 1.19*   < > 1.05   EGFR 57* 61 55* 56* 57* 57* 58* 57*   < > 59*   < > 62   < > 43*   < >  --    MG  --   --  2.25  --   --   --   --   --   --  2.21  --  2.05  --  2.19  --  2.31    < > = values in this interval not displayed.     Recent Labs     01/20/25  0709 01/19/25  0641 01/18/25  0740 01/13/25  0626 01/12/25  1122   ALBUMIN 3.2* 3.2* 2.9* 3.6 3.9   ALKPHOS 81 84 76 78 83   ALT 16 17 16 16 15   AST 16 17 15 16 17   BILITOT 0.3 0.3 0.4 0.5 0.5   LIPASE  --   --   --   --  23     CBC:  Recent Labs     02/04/25  0643 02/03/25  0644 02/02/25  1704 01/23/25  0558 01/22/25  0657 01/20/25  0709 01/19/25  0641 01/18/25  0740   WBC 4.8 4.7 5.5 5.8 6.0 5.1 5.5 5.4   HGB 7.9* 7.7* 7.8* 9.1* 9.5* 9.1* 9.1* 8.7*   HCT 25.3* 24.2* 24.5* 28.0* 30.4* 28.1* 28.4* 26.3*    274 283 300 309 293 280 246   MCV 89 89 88 87 89 88 89 87     COAG:   Recent Labs     01/16/25  0709 01/12/25  1122 01/05/25  1412 01/05/25  1028 01/04/25  0612 01/03/25  1539 01/03/25  0900 01/03/25  0344 01/02/25  2334 12/21/24  0538 12/20/24  1013 12/20/24  0251 08/21/23  0056 07/13/23  1335 01/08/23  1412 12/14/20  0731   INR 1.5* 1.7*  --   --   --   --   --   --   --  1.1  --  1.1 1.1 1.1 1.2* 1.1   HAUF  --   --  1.8* 0.4 0.4 0.5 0.7 0.9 1.0  --  1.1*  --   --   --   --   --      ABO:   Recent Labs     01/12/25  1205   ABO A     HEME/ENDO:  Recent Labs     01/13/25  0626 12/20/24  0204 01/24/24  1049 08/21/23  0056 05/18/23  1346 01/17/23  0943 04/11/19  0930   FERRITIN 28  --   --   --  72  --   --    IRONSAT 5*  --   --   --   28  --  30   TSH  --   --   --   --   --  2.25  --    HGBA1C  --  5.5 5.4 5.9*  --   --   --       CARDIAC:   Recent Labs     02/03/25  0644 02/02/25  1916 02/02/25  1704 01/02/25  1420 01/02/25  1227 12/20/24  0439 12/20/24  0251 12/20/24  0204 01/08/23  1412 03/30/22  1611 04/15/20  1403 04/19/19  2035   TROPHS 17* 17* 15* 38* 36* 8,220* 1,426* 112*   < >  --   --   --    BNP  --   --  462*  --  485*  --  379*  --   --  212* 138* 112*    < > = values in this interval not displayed.     Recent Labs     12/20/24  0204 01/24/24  1049 08/21/23  0056 01/17/23  0943 03/16/21  1015   CHOL 145 152 162 155 180   LDLF  --   --  81 75 106*   HDL 63.1 65.4 64.3 65.5 57.8   TRIG 70 66 85 72 82     MICRO:   Recent Labs     01/17/23  0943   CRP 1.76*     No results found for the last 90 days.      Test Results:    Echocardiogram December 21, 2023: Ejection fraction 60 to 65%, moderate mitral valve regurgitation, moderate to severe tricuspid regurgitation    Assessment/Plan:    1.  Acute on chronic diastolic CHF: The patient is on oral torsemide, but IV furosemide will be given this afternoon.    2.  Sick sinus syndrome: History of a [employer] dual chamber pacemaker insertion.  The pacemaker pulse generator was replaced in July 2023.  The device shows appropriate pacemaker function and the estimated battery longevity is 6 years.    James C Ramicone, DO

## 2025-02-04 NOTE — CARE PLAN
Problem: Heart Failure  Goal: Improved urinary output this shift  Outcome: Progressing  Goal: Reduction in peripheral edema within 24 hours  Outcome: Progressing     Problem: Heart Failure  Goal: Reduction in peripheral edema within 24 hours  Outcome: Progressing     Problem: Pain - Adult  Goal: Verbalizes/displays adequate comfort level or baseline comfort level  Outcome: Progressing     Problem: Safety - Adult  Goal: Free from fall injury  Outcome: Progressing     Problem: Chronic Conditions and Co-morbidities  Goal: Patient's chronic conditions and co-morbidity symptoms are monitored and maintained or improved  Outcome: Progressing     Problem: Nutrition  Goal: Nutrient intake appropriate for maintaining nutritional needs  Outcome: Progressing   The patient's goals for the shift include  less sob      The clinical goals for the shift include Patient will be free from injury during shift    Over the shift, the patient did not make progress toward the following goals. Barriers to progression include Sob. Recommendations to address these barriers include O2 and diuretics.

## 2025-02-05 ENCOUNTER — APPOINTMENT (OUTPATIENT)
Dept: RADIOLOGY | Facility: HOSPITAL | Age: OVER 89
DRG: 291 | End: 2025-02-05
Payer: MEDICARE

## 2025-02-05 ENCOUNTER — APPOINTMENT (OUTPATIENT)
Dept: CARE COORDINATION | Age: OVER 89
End: 2025-02-05
Payer: MEDICARE

## 2025-02-05 ENCOUNTER — APPOINTMENT (OUTPATIENT)
Dept: SURGERY | Facility: CLINIC | Age: OVER 89
End: 2025-02-05
Payer: MEDICARE

## 2025-02-05 LAB
ANION GAP SERPL CALC-SCNC: 12 MMOL/L (ref 10–20)
BUN SERPL-MCNC: 24 MG/DL (ref 6–23)
CALCIUM SERPL-MCNC: 9 MG/DL (ref 8.6–10.3)
CHLORIDE SERPL-SCNC: 100 MMOL/L (ref 98–107)
CO2 SERPL-SCNC: 28 MMOL/L (ref 21–32)
CREAT SERPL-MCNC: 0.97 MG/DL (ref 0.5–1.05)
EGFRCR SERPLBLD CKD-EPI 2021: 55 ML/MIN/1.73M*2
ERYTHROCYTE [DISTWIDTH] IN BLOOD BY AUTOMATED COUNT: 14.6 % (ref 11.5–14.5)
GLUCOSE SERPL-MCNC: 88 MG/DL (ref 74–99)
HCT VFR BLD AUTO: 25.9 % (ref 36–46)
HGB BLD-MCNC: 8.1 G/DL (ref 12–16)
MCH RBC QN AUTO: 28 PG (ref 26–34)
MCHC RBC AUTO-ENTMCNC: 31.3 G/DL (ref 32–36)
MCV RBC AUTO: 90 FL (ref 80–100)
NRBC BLD-RTO: 0 /100 WBCS (ref 0–0)
PLATELET # BLD AUTO: 286 X10*3/UL (ref 150–450)
POTASSIUM SERPL-SCNC: 4 MMOL/L (ref 3.5–5.3)
RBC # BLD AUTO: 2.89 X10*6/UL (ref 4–5.2)
SODIUM SERPL-SCNC: 136 MMOL/L (ref 136–145)
WBC # BLD AUTO: 4.4 X10*3/UL (ref 4.4–11.3)

## 2025-02-05 PROCEDURE — 1200000002 HC GENERAL ROOM WITH TELEMETRY DAILY

## 2025-02-05 PROCEDURE — 2500000001 HC RX 250 WO HCPCS SELF ADMINISTERED DRUGS (ALT 637 FOR MEDICARE OP)

## 2025-02-05 PROCEDURE — 71045 X-RAY EXAM CHEST 1 VIEW: CPT

## 2025-02-05 PROCEDURE — 85027 COMPLETE CBC AUTOMATED: CPT | Performed by: STUDENT IN AN ORGANIZED HEALTH CARE EDUCATION/TRAINING PROGRAM

## 2025-02-05 PROCEDURE — 71045 X-RAY EXAM CHEST 1 VIEW: CPT | Performed by: RADIOLOGY

## 2025-02-05 PROCEDURE — 2500000004 HC RX 250 GENERAL PHARMACY W/ HCPCS (ALT 636 FOR OP/ED)

## 2025-02-05 PROCEDURE — 99232 SBSQ HOSP IP/OBS MODERATE 35: CPT | Performed by: STUDENT IN AN ORGANIZED HEALTH CARE EDUCATION/TRAINING PROGRAM

## 2025-02-05 PROCEDURE — 99232 SBSQ HOSP IP/OBS MODERATE 35: CPT | Performed by: INTERNAL MEDICINE

## 2025-02-05 PROCEDURE — 82310 ASSAY OF CALCIUM: CPT | Performed by: STUDENT IN AN ORGANIZED HEALTH CARE EDUCATION/TRAINING PROGRAM

## 2025-02-05 PROCEDURE — 36415 COLL VENOUS BLD VENIPUNCTURE: CPT | Performed by: STUDENT IN AN ORGANIZED HEALTH CARE EDUCATION/TRAINING PROGRAM

## 2025-02-05 RX ADMIN — HEPARIN SODIUM 5000 UNITS: 5000 INJECTION INTRAVENOUS; SUBCUTANEOUS at 12:35

## 2025-02-05 RX ADMIN — TORSEMIDE 20 MG: 20 TABLET ORAL at 09:34

## 2025-02-05 RX ADMIN — METOPROLOL TARTRATE 12.5 MG: 25 TABLET, FILM COATED ORAL at 09:35

## 2025-02-05 RX ADMIN — Medication 1 TABLET: at 09:34

## 2025-02-05 RX ADMIN — ASPIRIN 81 MG CHEWABLE TABLET 81 MG: 81 TABLET CHEWABLE at 09:34

## 2025-02-05 RX ADMIN — POLYETHYLENE GLYCOL 3350 17 G: 17 POWDER, FOR SOLUTION ORAL at 09:34

## 2025-02-05 RX ADMIN — HEPARIN SODIUM 5000 UNITS: 5000 INJECTION INTRAVENOUS; SUBCUTANEOUS at 05:54

## 2025-02-05 RX ADMIN — CLOPIDOGREL BISULFATE 75 MG: 75 TABLET ORAL at 09:35

## 2025-02-05 RX ADMIN — POLYETHYLENE GLYCOL 3350 17 G: 17 POWDER, FOR SOLUTION ORAL at 20:08

## 2025-02-05 RX ADMIN — HEPARIN SODIUM 5000 UNITS: 5000 INJECTION INTRAVENOUS; SUBCUTANEOUS at 20:07

## 2025-02-05 ASSESSMENT — COGNITIVE AND FUNCTIONAL STATUS - GENERAL
STANDING UP FROM CHAIR USING ARMS: A LITTLE
MOVING TO AND FROM BED TO CHAIR: A LITTLE
DAILY ACTIVITIY SCORE: 19
HELP NEEDED FOR BATHING: A LITTLE
WALKING IN HOSPITAL ROOM: A LITTLE
DRESSING REGULAR LOWER BODY CLOTHING: A LITTLE
MOBILITY SCORE: 18
CLIMB 3 TO 5 STEPS WITH RAILING: A LOT
TOILETING: A LITTLE
PERSONAL GROOMING: A LITTLE
TURNING FROM BACK TO SIDE WHILE IN FLAT BAD: A LITTLE
DRESSING REGULAR UPPER BODY CLOTHING: A LITTLE

## 2025-02-05 ASSESSMENT — PAIN SCALES - WONG BAKER: WONGBAKER_NUMERICALRESPONSE: NO HURT

## 2025-02-05 ASSESSMENT — PAIN - FUNCTIONAL ASSESSMENT: PAIN_FUNCTIONAL_ASSESSMENT: 0-10

## 2025-02-05 ASSESSMENT — PAIN SCALES - GENERAL
PAINLEVEL_OUTOF10: 0 - NO PAIN

## 2025-02-05 NOTE — PROGRESS NOTES
Subjective Data:  Resting comofrtably    Overnight Events:    None reported     Objective Data:  Last Recorded Vitals:  Vitals:    02/05/25 0551 02/05/25 0700 02/05/25 0913 02/05/25 1415   BP: 129/57  113/51 (!) 106/49   BP Location: Right arm  Right arm Right arm   Patient Position: Lying  Sitting Sitting   Pulse: 66  65 65   Resp: 18  17 17   Temp: 36.6 °C (97.9 °F)  36 °C (96.8 °F) 36 °C (96.8 °F)   TempSrc: Temporal  Tympanic Tympanic   SpO2: 96%  95% 94%   Weight:  75 kg (165 lb 4.8 oz)     Height:           Last Labs:  CBC - 2/5/2025:  6:35 AM  4.4 8.1 286    25.9      CMP - 2/5/2025:  6:35 AM  9.0 5.8 16 --- 0.3   _ 3.2 16 81      PTT - 1/2/2025:  7:42 PM  1.5   16.5 34     TROPHS   Date/Time Value Ref Range Status   02/03/2025 06:44 AM 17 0 - 13 ng/L Final   02/02/2025 07:16 PM 17 0 - 13 ng/L Final   02/02/2025 05:04 PM 15 0 - 13 ng/L Final     BNP   Date/Time Value Ref Range Status   02/02/2025 05:04  0 - 99 pg/mL Final   01/02/2025 12:27  0 - 99 pg/mL Final     HGBA1C   Date/Time Value Ref Range Status   12/20/2024 02:04 AM 5.5 See comment % Final   01/24/2024 10:49 AM 5.4 see below % Final     LDLCALC   Date/Time Value Ref Range Status   12/20/2024 02:04 AM 68 <=99 mg/dL Final     Comment:                                 Near   Borderline      AGE      Desirable  Optimal    High     High     Very High     0-19 Y     0 - 109     ---    110-129   >/= 130     ----    20-24 Y     0 - 119     ---    120-159   >/= 160     ----      >24 Y     0 -  99   100-129  130-159   160-189     >/=190     01/24/2024 10:49 AM 73 <=99 mg/dL Final     Comment:                                 Near   Borderline      AGE      Desirable  Optimal    High     High     Very High     0-19 Y     0 - 109     ---    110-129   >/= 130     ----    20-24 Y     0 - 119     ---    120-159   >/= 160     ----      >24 Y     0 -  99   100-129  130-159   160-189     >/=190       VLDL   Date/Time Value Ref Range Status   12/20/2024  02:04 AM 14 0 - 40 mg/dL Final   01/24/2024 10:49 AM 13 0 - 40 mg/dL Final   08/21/2023 12:56 AM 17 0 - 40 mg/dL Final   01/17/2023 09:43 AM 14 0 - 40 mg/dL Final   03/16/2021 10:15 AM 16 0 - 40 mg/dL Final      Last I/O:  I/O last 3 completed shifts:  In: 877 (11.4 mL/kg) [P.O.:877]  Out: 2150 (27.9 mL/kg) [Urine:2150 (0.8 mL/kg/hr)]  Weight: 77.2 kg     Past Cardiology Tests (Last 3 Years):  EKG:  ECG 12 lead 02/02/2025 (Preliminary)      ECG 12 lead 01/12/2025      ECG 12 lead 01/02/2025      ECG 12 lead 12/21/2024      ECG 12 lead 12/20/2024      ECG 12 lead 12/20/2024    Echo:  Transthoracic echo (TTE) complete 12/21/2024    Ejection Fractions:  EF   Date/Time Value Ref Range Status   12/21/2024 09:03 AM 63 %      Cath:  Cardiac Catheterization Procedure 12/20/2024    Stress Test:  No results found for this or any previous visit from the past 1095 days.    Cardiac Imaging:  No results found for this or any previous visit from the past 1095 days.      Inpatient Medications:  Scheduled medications   Medication Dose Route Frequency    aspirin  81 mg oral Daily    calcium carbonate-vitamin D3  1 tablet oral Daily    clopidogrel  75 mg oral Daily    furosemide  20 mg intravenous Once    heparin (porcine)  5,000 Units subcutaneous q8h    metoprolol tartrate  12.5 mg oral Daily    polyethylene glycol  17 g oral BID    torsemide  20 mg oral Daily     PRN medications   Medication    acetaminophen    albuterol    dextromethorphan-guaifenesin    lubricating eye drops    melatonin    oxygen     Continuous Medications   Medication Dose Last Rate       Physical Exam:  GEN: Frail 89 yo wf sitting 45 degrees in the bed  HEENT: Atraumatic  COR: Reg     Assessment/Plan   1.) Acute on chronic CHF with BP too soft for more Lasix  2.) SSS S/P pacer  Peripheral IV 02/03/25 22 G Left Forearm (Active)   Site Assessment Clean;Dry;Intact 02/05/25 0800   Dressing Status Clean;Dry 02/05/25 0800   Number of days: 2       Code Status:  Full  Code    I spent 20 minutes in the professional and overall care of this patient.        Magnus Hess MD

## 2025-02-05 NOTE — PROGRESS NOTES
02/05/25 1357   Discharge Planning   Home or Post Acute Services In home services   Type of Home Care Services Home nursing visits;Home OT;Home PT   Expected Discharge Disposition Home H     This TCC spoke with patient, she is interested in using [facility] after discharge.  Care transition to follow.

## 2025-02-05 NOTE — PROGRESS NOTES
"Yesenia Milner is a 89 y.o. female on day 2 of admission presenting with Acute decompensated heart failure.    Subjective   Doing well, breathign is better walking better       Objective     Physical Exam  Constitutional:       Appearance: Normal appearance.   HENT:      Head: Normocephalic and atraumatic.      Right Ear: Tympanic membrane and ear canal normal.      Left Ear: Tympanic membrane and ear canal normal.      Mouth/Throat:      Mouth: Mucous membranes are moist.      Pharynx: Oropharynx is clear.   Eyes:      Extraocular Movements: Extraocular movements intact.      Conjunctiva/sclera: Conjunctivae normal.      Pupils: Pupils are equal, round, and reactive to light.   Cardiovascular:      Rate and Rhythm: Normal rate and regular rhythm.      Pulses: Normal pulses.      Heart sounds: Normal heart sounds.   Pulmonary:      Effort: Pulmonary effort is normal.      Breath sounds: Normal breath sounds.   Abdominal:      General: Abdomen is flat. Bowel sounds are normal.      Palpations: Abdomen is soft.   Musculoskeletal:         General: Normal range of motion.      Cervical back: Normal range of motion and neck supple.   Skin:     General: Skin is warm and dry.      Capillary Refill: Capillary refill takes 2 to 3 seconds.   Neurological:      General: No focal deficit present.      Mental Status: She is alert and oriented to person, place, and time. Mental status is at baseline.   Psychiatric:         Mood and Affect: Mood normal.         Behavior: Behavior normal.         Thought Content: Thought content normal.         Judgment: Judgment normal.         Last Recorded Vitals  Blood pressure 129/57, pulse 66, temperature 36.6 °C (97.9 °F), temperature source Temporal, resp. rate 18, height 1.6 m (5' 3\"), weight 75 kg (165 lb 4.8 oz), SpO2 96%.  Intake/Output last 3 Shifts:  I/O last 3 completed shifts:  In: 877 (11.4 mL/kg) [P.O.:877]  Out: 2150 (27.9 mL/kg) [Urine:2150 (0.8 mL/kg/hr)]  Weight: 77.2 kg "     Relevant Results                              Assessment/Plan   Assessment & Plan  Acute decompensated heart failure    Patient arrived today after recently being discharged from the hospital.  She reports she has been in the hospital multiple times since [place] after having an MI.  Today she came in for shortness of breath and bilateral lower extremity edema.  Patient states she has been having trouble with ambulation and was unable to put her MISA hose on because her knees were so swollen she could not bend them.  Chest x-ray revealed vascular congestion with small bilateral pleural effusions.  Patient follows with Dr. Ramicone and has a significant cardiac history.  Cardiology consult placed, appreciate recs.  Patient recently had echo on 12/21/2024.  Patient received additional dose of torsemide 30 mg in ED for additional diuresis, home daily torsemide continued in morning.  Patient also reported difficulty with urination, denied any burning but reports frequency/dribbling/small amounts at a time.  UA ordered.     Patient admitted to Dr. Iglesia mart for further medical management.      # CHF exacerbation  # Vascular congestion/pleural effusions  # Shortness of breath  # Elevated troponin  # Hyponatremia  # BLE swelling  # Hx CHF  -Cardiology consult, follows with Dr. Ramicone  -Last echo 12/21/2024 EF 60 to 65%, abnormal left ventricular wall motion, MI, mitral annular calcification, MVR, TVR, elevated right ventricular systolic pressure.  -Fluid restriction  -Strict I's and O's  -Daily weight  -Continue home torsemide, was given extra dose in ED  -As needed oxygen  -As needed EKG, coronary symptoms  -PT/OT/SW    -Cardiac low-salt diet   -admitted to observation with telemetry    -q4 vitals    -morning labs, trend troponin, Na+,      Chronic Conditions   # CVA  # MI, pacemaker, SSS  # Hypertension  # COPD  -albuterol prn  # Arthritis  # Venous insufficiency     Continue home medications as ordered when  nursing completes home med rec.    Full Code      #DVT Prophylaxis   Scd's as tolerated   DVT Prophylaxis Heparin   On Plavix     Thorough record review performed of previous labs and notes from prior encounters.      2/3: mild swelling, on 3 liters doing well, cardio consult    2/4: doing well no issues contineu current care, offerd dc but wanted another dc, potential dc tomorrow     2/5: BP is better today, continue diuretics, repeat cxr to rule out pna, no signs of pna suspect atelectasis    I spent 35 minutes in the professional and overall care of this patient.      Iglesia Gabriel, DO

## 2025-02-05 NOTE — CARE PLAN
The clinical goals for the shift include Pt. will be free from injury during shift.    Over the shift, the patient did make progress toward the following goals.       Problem: Safety - Adult  Goal: Free from fall injury  Outcome: Progressing

## 2025-02-06 LAB
ANION GAP SERPL CALC-SCNC: 10 MMOL/L (ref 10–20)
BUN SERPL-MCNC: 24 MG/DL (ref 6–23)
CALCIUM SERPL-MCNC: 9 MG/DL (ref 8.6–10.3)
CHLORIDE SERPL-SCNC: 101 MMOL/L (ref 98–107)
CO2 SERPL-SCNC: 28 MMOL/L (ref 21–32)
CREAT SERPL-MCNC: 0.96 MG/DL (ref 0.5–1.05)
EGFRCR SERPLBLD CKD-EPI 2021: 56 ML/MIN/1.73M*2
ERYTHROCYTE [DISTWIDTH] IN BLOOD BY AUTOMATED COUNT: 14.3 % (ref 11.5–14.5)
GLUCOSE SERPL-MCNC: 87 MG/DL (ref 74–99)
HCT VFR BLD AUTO: 26.2 % (ref 36–46)
HGB BLD-MCNC: 8.2 G/DL (ref 12–16)
MCH RBC QN AUTO: 27.8 PG (ref 26–34)
MCHC RBC AUTO-ENTMCNC: 31.3 G/DL (ref 32–36)
MCV RBC AUTO: 89 FL (ref 80–100)
NRBC BLD-RTO: 0 /100 WBCS (ref 0–0)
PLATELET # BLD AUTO: 296 X10*3/UL (ref 150–450)
POTASSIUM SERPL-SCNC: 3.9 MMOL/L (ref 3.5–5.3)
RBC # BLD AUTO: 2.95 X10*6/UL (ref 4–5.2)
SODIUM SERPL-SCNC: 135 MMOL/L (ref 136–145)
WBC # BLD AUTO: 4.2 X10*3/UL (ref 4.4–11.3)

## 2025-02-06 PROCEDURE — 94640 AIRWAY INHALATION TREATMENT: CPT

## 2025-02-06 PROCEDURE — 80048 BASIC METABOLIC PNL TOTAL CA: CPT | Performed by: STUDENT IN AN ORGANIZED HEALTH CARE EDUCATION/TRAINING PROGRAM

## 2025-02-06 PROCEDURE — 36415 COLL VENOUS BLD VENIPUNCTURE: CPT | Performed by: STUDENT IN AN ORGANIZED HEALTH CARE EDUCATION/TRAINING PROGRAM

## 2025-02-06 PROCEDURE — 2500000004 HC RX 250 GENERAL PHARMACY W/ HCPCS (ALT 636 FOR OP/ED)

## 2025-02-06 PROCEDURE — 2500000004 HC RX 250 GENERAL PHARMACY W/ HCPCS (ALT 636 FOR OP/ED): Mod: JZ

## 2025-02-06 PROCEDURE — 97530 THERAPEUTIC ACTIVITIES: CPT | Mod: GP

## 2025-02-06 PROCEDURE — 85027 COMPLETE CBC AUTOMATED: CPT | Performed by: STUDENT IN AN ORGANIZED HEALTH CARE EDUCATION/TRAINING PROGRAM

## 2025-02-06 PROCEDURE — 99232 SBSQ HOSP IP/OBS MODERATE 35: CPT | Performed by: STUDENT IN AN ORGANIZED HEALTH CARE EDUCATION/TRAINING PROGRAM

## 2025-02-06 PROCEDURE — 2500000002 HC RX 250 W HCPCS SELF ADMINISTERED DRUGS (ALT 637 FOR MEDICARE OP, ALT 636 FOR OP/ED): Performed by: STUDENT IN AN ORGANIZED HEALTH CARE EDUCATION/TRAINING PROGRAM

## 2025-02-06 PROCEDURE — 1100000001 HC PRIVATE ROOM DAILY

## 2025-02-06 PROCEDURE — 2500000001 HC RX 250 WO HCPCS SELF ADMINISTERED DRUGS (ALT 637 FOR MEDICARE OP)

## 2025-02-06 RX ORDER — IPRATROPIUM BROMIDE AND ALBUTEROL SULFATE 2.5; .5 MG/3ML; MG/3ML
3 SOLUTION RESPIRATORY (INHALATION) EVERY 2 HOUR PRN
Status: DISCONTINUED | OUTPATIENT
Start: 2025-02-06 | End: 2025-02-11 | Stop reason: HOSPADM

## 2025-02-06 RX ORDER — IPRATROPIUM BROMIDE AND ALBUTEROL SULFATE 2.5; .5 MG/3ML; MG/3ML
3 SOLUTION RESPIRATORY (INHALATION)
Status: DISCONTINUED | OUTPATIENT
Start: 2025-02-06 | End: 2025-02-06

## 2025-02-06 RX ORDER — IPRATROPIUM BROMIDE AND ALBUTEROL SULFATE 2.5; .5 MG/3ML; MG/3ML
3 SOLUTION RESPIRATORY (INHALATION)
Status: DISCONTINUED | OUTPATIENT
Start: 2025-02-06 | End: 2025-02-11

## 2025-02-06 RX ADMIN — HEPARIN SODIUM 5000 UNITS: 5000 INJECTION INTRAVENOUS; SUBCUTANEOUS at 13:41

## 2025-02-06 RX ADMIN — HEPARIN SODIUM 5000 UNITS: 5000 INJECTION INTRAVENOUS; SUBCUTANEOUS at 22:08

## 2025-02-06 RX ADMIN — CLOPIDOGREL BISULFATE 75 MG: 75 TABLET ORAL at 09:49

## 2025-02-06 RX ADMIN — IPRATROPIUM BROMIDE AND ALBUTEROL SULFATE 3 ML: .5; 3 SOLUTION RESPIRATORY (INHALATION) at 18:40

## 2025-02-06 RX ADMIN — METHYLPREDNISOLONE SODIUM SUCCINATE 40 MG: 40 INJECTION, POWDER, FOR SOLUTION INTRAMUSCULAR; INTRAVENOUS at 23:12

## 2025-02-06 RX ADMIN — HEPARIN SODIUM 5000 UNITS: 5000 INJECTION INTRAVENOUS; SUBCUTANEOUS at 04:52

## 2025-02-06 RX ADMIN — ASPIRIN 81 MG CHEWABLE TABLET 81 MG: 81 TABLET CHEWABLE at 09:49

## 2025-02-06 RX ADMIN — TORSEMIDE 20 MG: 20 TABLET ORAL at 09:49

## 2025-02-06 RX ADMIN — METHYLPREDNISOLONE SODIUM SUCCINATE 40 MG: 40 INJECTION, POWDER, FOR SOLUTION INTRAMUSCULAR; INTRAVENOUS at 16:42

## 2025-02-06 RX ADMIN — POLYETHYLENE GLYCOL 3350 17 G: 17 POWDER, FOR SOLUTION ORAL at 09:49

## 2025-02-06 RX ADMIN — METOPROLOL TARTRATE 12.5 MG: 25 TABLET, FILM COATED ORAL at 09:49

## 2025-02-06 RX ADMIN — IPRATROPIUM BROMIDE AND ALBUTEROL SULFATE 3 ML: .5; 3 SOLUTION RESPIRATORY (INHALATION) at 12:20

## 2025-02-06 RX ADMIN — POLYETHYLENE GLYCOL 3350 17 G: 17 POWDER, FOR SOLUTION ORAL at 22:08

## 2025-02-06 RX ADMIN — Medication 1 TABLET: at 09:49

## 2025-02-06 ASSESSMENT — COGNITIVE AND FUNCTIONAL STATUS - GENERAL
TURNING FROM BACK TO SIDE WHILE IN FLAT BAD: A LITTLE
WALKING IN HOSPITAL ROOM: A LITTLE
DAILY ACTIVITIY SCORE: 19
DRESSING REGULAR LOWER BODY CLOTHING: A LITTLE
DRESSING REGULAR UPPER BODY CLOTHING: A LITTLE
TOILETING: A LITTLE
MOBILITY SCORE: 18
TURNING FROM BACK TO SIDE WHILE IN FLAT BAD: A LITTLE
WALKING IN HOSPITAL ROOM: A LITTLE
PERSONAL GROOMING: A LITTLE
TURNING FROM BACK TO SIDE WHILE IN FLAT BAD: A LITTLE
CLIMB 3 TO 5 STEPS WITH RAILING: A LOT
MOBILITY SCORE: 18
STANDING UP FROM CHAIR USING ARMS: A LITTLE
PERSONAL GROOMING: A LITTLE
STANDING UP FROM CHAIR USING ARMS: A LITTLE
MOVING TO AND FROM BED TO CHAIR: A LITTLE
CLIMB 3 TO 5 STEPS WITH RAILING: A LOT
MOBILITY SCORE: 18
CLIMB 3 TO 5 STEPS WITH RAILING: A LOT
TOILETING: A LITTLE
DRESSING REGULAR UPPER BODY CLOTHING: A LITTLE
MOVING TO AND FROM BED TO CHAIR: A LITTLE
WALKING IN HOSPITAL ROOM: A LITTLE
HELP NEEDED FOR BATHING: A LITTLE
DAILY ACTIVITIY SCORE: 19
MOVING TO AND FROM BED TO CHAIR: A LITTLE
HELP NEEDED FOR BATHING: A LITTLE
DRESSING REGULAR LOWER BODY CLOTHING: A LITTLE
STANDING UP FROM CHAIR USING ARMS: A LITTLE

## 2025-02-06 ASSESSMENT — PAIN SCALES - GENERAL
PAINLEVEL_OUTOF10: 0 - NO PAIN

## 2025-02-06 ASSESSMENT — PAIN - FUNCTIONAL ASSESSMENT
PAIN_FUNCTIONAL_ASSESSMENT: 0-10
PAIN_FUNCTIONAL_ASSESSMENT: 0-10

## 2025-02-06 NOTE — PROGRESS NOTES
2/6/2025  Followed up with pt in room. Support provided, questions answered.  DC plan remains [facility] and [facility].  Currently on 2L o2. (Does not have home o2).  CT Team will continue to follow.   Salena Talavera RN [facility]

## 2025-02-06 NOTE — PROGRESS NOTES
"Yesenia Milner is a 89 y.o. female on day 3 of admission presenting with Acute decompensated heart failure.    Subjective   Doing well, breathign is better walking better       Objective     Physical Exam  Constitutional:       Appearance: Normal appearance.   HENT:      Head: Normocephalic and atraumatic.      Right Ear: Tympanic membrane and ear canal normal.      Left Ear: Tympanic membrane and ear canal normal.      Mouth/Throat:      Mouth: Mucous membranes are moist.      Pharynx: Oropharynx is clear.   Eyes:      Extraocular Movements: Extraocular movements intact.      Conjunctiva/sclera: Conjunctivae normal.      Pupils: Pupils are equal, round, and reactive to light.   Cardiovascular:      Rate and Rhythm: Normal rate and regular rhythm.      Pulses: Normal pulses.      Heart sounds: Normal heart sounds.   Pulmonary:      Effort: Pulmonary effort is normal.      Breath sounds: Normal breath sounds.   Abdominal:      General: Abdomen is flat. Bowel sounds are normal.      Palpations: Abdomen is soft.   Musculoskeletal:         General: Normal range of motion.      Cervical back: Normal range of motion and neck supple.   Skin:     General: Skin is warm and dry.      Capillary Refill: Capillary refill takes 2 to 3 seconds.   Neurological:      General: No focal deficit present.      Mental Status: She is alert and oriented to person, place, and time. Mental status is at baseline.   Psychiatric:         Mood and Affect: Mood normal.         Behavior: Behavior normal.         Thought Content: Thought content normal.         Judgment: Judgment normal.         Last Recorded Vitals  Blood pressure 129/58, pulse 65, temperature 36.1 °C (97 °F), temperature source Temporal, resp. rate 17, height 1.6 m (5' 3\"), weight 75 kg (165 lb 4.8 oz), SpO2 95%.  Intake/Output last 3 Shifts:  I/O last 3 completed shifts:  In: 1280 (17.1 mL/kg) [P.O.:1280]  Out: 3400 (45.3 mL/kg) [Urine:3400 (1.3 mL/kg/hr)]  Weight: 75 kg "     Relevant Results                              Assessment/Plan   Assessment & Plan  Acute decompensated heart failure    Patient arrived today after recently being discharged from the hospital.  She reports she has been in the hospital multiple times since [place] after having an MI.  Today she came in for shortness of breath and bilateral lower extremity edema.  Patient states she has been having trouble with ambulation and was unable to put her MISA hose on because her knees were so swollen she could not bend them.  Chest x-ray revealed vascular congestion with small bilateral pleural effusions.  Patient follows with Dr. Ramicone and has a significant cardiac history.  Cardiology consult placed, appreciate recs.  Patient recently had echo on 12/21/2024.  Patient received additional dose of torsemide 30 mg in ED for additional diuresis, home daily torsemide continued in morning.  Patient also reported difficulty with urination, denied any burning but reports frequency/dribbling/small amounts at a time.  UA ordered.     Patient admitted to Dr. Iglesia mart for further medical management.      # CHF exacerbation  # Vascular congestion/pleural effusions  # Shortness of breath  # Elevated troponin  # Hyponatremia  # BLE swelling  # Hx CHF  -Cardiology consult, follows with Dr. Ramicone  -Last echo 12/21/2024 EF 60 to 65%, abnormal left ventricular wall motion, MI, mitral annular calcification, MVR, TVR, elevated right ventricular systolic pressure.  -Fluid restriction  -Strict I's and O's  -Daily weight  -Continue home torsemide, was given extra dose in ED  -As needed oxygen  -As needed EKG, coronary symptoms  -PT/OT/SW    -Cardiac low-salt diet   -admitted to observation with telemetry    -q4 vitals    -morning labs, trend troponin, Na+,      Chronic Conditions   # CVA  # MI, pacemaker, SSS  # Hypertension  # COPD  -albuterol prn  # Arthritis  # Venous insufficiency     Continue home medications as ordered when  nursing completes home med rec.    Full Code      #DVT Prophylaxis   Scd's as tolerated   DVT Prophylaxis Heparin   On Plavix     Thorough record review performed of previous labs and notes from prior encounters.      2/3: mild swelling, on 3 liters doing well, cardio consult    2/4: doing well no issues contineu current care, offerd dc but wanted another dc, potential dc tomorrow     2/5: BP is better today, continue diuretics, repeat cxr to rule out pna, no signs of pna suspect atelectasis    2/6; BP better conintue diuresis, still sob, cxr shows improvement, consult pulm      I spent 35 minutes in the professional and overall care of this patient.      Iglesia Gabriel, DO

## 2025-02-06 NOTE — PROGRESS NOTES
Physical Therapy    Physical Therapy Treatment    Patient Name: Yesenia Milner  MRN: [id number]  Today's Date: 2/6/2025  Time Calculation  Start Time: 1000  Stop Time: 1025  Time Calculation (min): 25 min     320/320-A    Assessment/Plan   PT Assessment  PT Assessment Results: Decreased strength, Decreased endurance, Impaired balance, Decreased mobility, Decreased safety awareness  Rehab Prognosis: Good  Barriers to Discharge Home: Caregiver assistance  Caregiver Assistance: Patient lives alone and/or does not have reliable caregiver assistance  Evaluation/Treatment Tolerance: Patient limited by fatigue  End of Session Communication: Bedside nurse  Assessment Comment: Continued skilled PT intervention indicated to facilitate increased strength, balance & gait stability  End of Session Patient Position: Alarm on, Up in chair (call light in reach)     PT Plan  Treatment/Interventions: Bed mobility, Transfer training, Gait training, Balance training, Endurance training, Therapeutic exercise, Therapeutic activity  PT Plan: Ongoing PT  PT Frequency: 3 times per week  PT Discharge Recommendations: Low intensity level of continued care (w/ initial 24hr support for safe transition home due to medical status)  PT Recommended Transfer Status: Assist x1  PT - OK to Discharge: Yes (when cleared by medical team)    Current Problem:  Patient Active Problem List   Diagnosis    Aortic stenosis, mild    Arthritis    Chronic diastolic congestive heart failure    Complete heart block    COPD (chronic obstructive pulmonary disease) (Multi)    History of paroxysmal supraventricular tachycardia    HTN (hypertension)    Paresthesia    Presence of permanent cardiac pacemaker    Raynauds disease    Sensorineural hearing loss (SNHL) of both ears    Dyspnea on minimal exertion    Varicose veins of lower extremities with inflammation    Vertigo    Ischemic cerebrovascular accident (CVA) (Multi)    Acquired deformity of toe    Benign neoplasm  of skin of foot    Benign paroxysmal positional vertigo    Dermatophytosis of nail    Leg swelling    Macular degeneration    Mitral valve insufficiency    Moderate mitral valve regurgitation    Overweight with body mass index (BMI) 25.0-29.9    Plantar wart of left foot    Venous insufficiency    Dysarthria following cerebral infarction    Hyperglycemia    Hand paresthesia    Sick sinus syndrome (Multi)    Injury of kidney    NSTEMI (non-ST elevated myocardial infarction) (Multi)    Acute superior vena cava thrombosis (Multi)    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)    Gastrointestinal hemorrhage, unspecified gastrointestinal hemorrhage type    Malignant neoplasm of transverse colon (Multi)    Stage 3 chronic kidney disease, unspecified whether stage 3a or 3b CKD (Multi)    Acute decompensated heart failure       General Visit Information:   PT  Visit  PT Received On: 02/06/25  Response to Previous Treatment: Patient with no complaints from previous session.  General  Prior to Session Communication: Bedside nurse  Patient Position Received: Up in chair, Alarm on  General Comment: Pleasant & cooperative  Subjective     Precautions:  Precautions  Precautions Comment: fall, alarm, purewick, strict I&O, fluid 1800mL, reading glasses         Objective     Pain:  Pain Assessment  Pain Assessment: 0-10  0-10 (Numeric) Pain Score: 0 - No pain    Cognition:  Cognition  Orientation Level: Oriented X4        Treatments:              Ambulation/Gait Training  Ambulation/Gait Training Performed:  (complered amb of 45 ft with w/w)  Transfers  Transfer:  (completed STS with SBA; dynamic standing of 5 mins with SBA)          Outcome Measures:     [facility] Basic Mobility  Turning from your back to your side while in a flat bed without using bedrails: None  Moving from lying on your back to sitting on the side of a flat bed without using bedrails: A little  Moving to and from bed to chair (including a wheelchair): A  little  Standing up from a chair using your arms (e.g. wheelchair or bedside chair): A little  To walk in hospital room: A little  Climbing 3-5 steps with railing: A lot  Basic Mobility - Total Score: 18                                      Education Documentation  Mobility Training, taught by Raissa France PT at 2/6/2025  3:29 PM.  Learner: Patient  Readiness: Acceptance  Method: Explanation  Response: Verbalizes Understanding    Education Comments  No comments found.           EDUCATION:     Encounter Problems       Encounter Problems (Active)       PT Problem       STG - Pt will transition supine <> sitting independently  (Progressing)       Start:  02/03/25    Expected End:  02/17/25            STG - Pt will transfer STS with modified independence  (Progressing)       Start:  02/03/25    Expected End:  02/17/25            STG - Pt will amb >=50' using ww with modified independence  (Progressing)       Start:  02/03/25    Expected End:  02/17/25            STG - Pt will perform a B LE ther ex program of 2-3 sets of 10  (Progressing)       Start:  02/03/25    Expected End:  02/17/25            STG - Pt will maintain supported dynamic standing  balance with no LOB noted   (Progressing)       Start:  02/03/25    Expected End:  02/17/25               Pain - Adult

## 2025-02-06 NOTE — CONSULTS
"Pulmonary Critical Care note    Patient Name :Yesenia Milner  Date of service : 02/06/25  MRN: [id number]  YOB: 1933    History of Present Illness:    89 y.o. female  on day  3 of admission presenting with Acute decompensated heart failure.    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 Her symptoms had been gradually worsening over 10 days prior to presentation to the ED. She has also experienced gradual weight gain. She has had increased cough described as \"moist\" and has had difficulty with ambulation due to shortness of breath. Her shortness of breath also bothers her with changes of position. Her urine output was reported to have decreased. She also admits to abdominal swelling.    In the ED, vitals were /56, HR 67, RR 24, SpO2 94% on room air, temp 36.3 deg F. Initial labs were significant for hyponatremia at 133; ; troponin 15 -> 17; CBC with anemia (hemoglobin 7.8, baseline around 9); UA unremarkable. CXR showed moderate vascular congestion with small bilateral pleural effusions. Patient was admitted to the general medical service and treated with diuretics. CXR 2/3 showed an increased RLL airspace opacity with stability of left-sided pleural effusion. CXR was again repeated yesterday as patient continued to be short of breath and showed improvement in infiltrates and left-sided effusion. " Pulmonology was consulted for shortness of breath and wheezing.    Patient was seen and examined at bedside this morning. She states that she continues to be short of breath especially with getting up and moving around with physical therapy this morning. She reports that she came in to the hospital due to bleeding problems. Her cough has improved since she was recently discharged. She states she had some testing done a while ago that showed COPD, and that she has been on Advair which her PCP was trying to wean off.     Past Medical/Surgical History:    has a past medical history of Gross hematuria (10/07/2020), Impacted cerumen (02/22/2024), Other conditions influencing health status, Personal history of other medical treatment, Personal history of other medical treatment, and Systolic CHF (Multi) (05/18/2023).   has a past surgical history that includes Appendectomy (11/22/2017); Hysterectomy (11/22/2017); Tonsillectomy (11/22/2017); Other surgical history (11/22/2017); Other surgical history (11/22/2017); Cardiac pacemaker placement (03/11/2021); IR angiogram renal bilateral (Bilateral, 12/14/2020); IR angiogram inferior epigastric pelvic (12/14/2020); IR angiogram inferior epigastric pelvic (12/14/2020); and Cardiac catheterization (N/A, 12/20/2024).   reports that she quit smoking about 52 years ago. Her smoking use included cigarettes. She has been exposed to tobacco smoke. She has never used smokeless tobacco. She reports that she does not drink alcohol and does not use drugs.  Family History:   No relevant family history has been documented for this patient.    Allergies:     Iodine, Shrimp, Hydrocodone, and Adhesive tape-silicones      Social history:   reports that she quit smoking about 52 years ago. Her smoking use included cigarettes. She has been exposed to tobacco smoke. She has never used smokeless tobacco. She reports that she does not drink alcohol and does not use drugs.      Review of Systems:   8  point review of systems was completed and negative except as noted above in HPI      Physical Exam:   Vital Signs:   Vitals:    02/06/25 0555   BP: 129/58   Pulse: 65   Resp: 17   Temp: 36.1 °C (97 °F)   SpO2: 95%     Vitals:    02/05/25 0700   Weight: 75 kg (165 lb 4.8 oz)       Input/Output:    Intake/Output Summary (Last 24 hours) at 2/6/2025 0957  Last data filed at 2/6/2025 0900  Gross per 24 hour   Intake 960 ml   Output 2600 ml   Net -1640 ml       Oxygen requirements:    Ventilator Information:     Oxygen Therapy/Pulse Ox  Medical Gas Therapy: Supplemental oxygen  Medical Gas Delivery Method: Nasal cannula  SpO2: 95 %  Patient Activity During SpO2 Measurement: At rest  Temp: 36.1 °C (97 °F)    Constitutional: Well developed, A&Ox3, no acute distress, alert and cooperative  Eyes: EOMI, clear sclera  Respiratory/Thorax: bilateral expiratory wheezing, good chest expansion  Cardiovascular: Regular rate, regular rhythm  Gastrointestinal: Nondistended, soft, non-tender  Extremities: No cyanosis or edema. Peripheral pulses intact  Skin: Warm and dry    Current Medications:   Scheduled medications  aspirin, 81 mg, oral, Daily  calcium carbonate-vitamin D3, 1 tablet, oral, Daily  clopidogrel, 75 mg, oral, Daily  furosemide, 20 mg, intravenous, Once  heparin (porcine), 5,000 Units, subcutaneous, q8h  ipratropium-albuteroL, 3 mL, nebulization, TID  metoprolol tartrate, 12.5 mg, oral, Daily  polyethylene glycol, 17 g, oral, BID  torsemide, 20 mg, oral, Daily      Continuous medications     PRN medications  PRN medications: acetaminophen, dextromethorphan-guaifenesin, ipratropium-albuteroL, lubricating eye drops, melatonin, oxygen      Investigations:  Labs, radiological imaging and cardiac work up were personally reviewed    Assessment  Yesenia Milner IS 89 y.o. female  on day  3 of admission presenting with Acute decompensated heart failure. Actively being treated for the  following medical Problems:    Acute hypoxic  respiratory failure  AE HFpEF  Pleural effusions secondary to above with associated atelectasis, improved from admission  Underlying COPD, no PFTs in system, previously on Advair  Suspected pulmonary hypertension    Recommendation:  Continue home PO torsemide per Cardiology recommendations  Added methylprednisolone 40mg q8hr for wheezing  Continue scheduled and PRN DuoNebs, PRN Robitussin  Recommend patient continue Advair inhaler after discharge.  Continue incentive spirometry  Continue oxygen supplementation, wean as tolerated  DVT prophylaxis with subcutaneous heparin      Mathew Castano DO, PGY-1, Internal Medicine   This is a preliminary note; please await attending attestation for final assessment and plan  02/06/25  9:57 AM       STAFF PHYSICIAN NOTE OF PERSONAL INVOLVEMENT IN CARE  As the attending physician, I certify that I personally reviewed the patient's history and personally examined the patient to confirm the physical findings described above, and that I reviewed the relevant imaging studies and available reports.  I also discussed the differential diagnosis and all of the proposed management plans with the patient and individuals accompanying the patient to this visit.  They had the opportunity to ask questions about the proposed management plans and to have those questions answered.

## 2025-02-06 NOTE — CARE PLAN
The patient's goals for the shift include rest     The clinical goals for the shift include Patient will remain free from falls throughout shift

## 2025-02-07 PROCEDURE — 97530 THERAPEUTIC ACTIVITIES: CPT | Mod: GP

## 2025-02-07 PROCEDURE — 97535 SELF CARE MNGMENT TRAINING: CPT | Mod: GO

## 2025-02-07 PROCEDURE — 1100000001 HC PRIVATE ROOM DAILY

## 2025-02-07 PROCEDURE — 2500000001 HC RX 250 WO HCPCS SELF ADMINISTERED DRUGS (ALT 637 FOR MEDICARE OP): Performed by: INTERNAL MEDICINE

## 2025-02-07 PROCEDURE — 2500000002 HC RX 250 W HCPCS SELF ADMINISTERED DRUGS (ALT 637 FOR MEDICARE OP, ALT 636 FOR OP/ED): Performed by: STUDENT IN AN ORGANIZED HEALTH CARE EDUCATION/TRAINING PROGRAM

## 2025-02-07 PROCEDURE — 99232 SBSQ HOSP IP/OBS MODERATE 35: CPT | Performed by: INTERNAL MEDICINE

## 2025-02-07 PROCEDURE — 2500000004 HC RX 250 GENERAL PHARMACY W/ HCPCS (ALT 636 FOR OP/ED)

## 2025-02-07 PROCEDURE — 94640 AIRWAY INHALATION TREATMENT: CPT

## 2025-02-07 PROCEDURE — 99232 SBSQ HOSP IP/OBS MODERATE 35: CPT | Performed by: STUDENT IN AN ORGANIZED HEALTH CARE EDUCATION/TRAINING PROGRAM

## 2025-02-07 PROCEDURE — 2500000005 HC RX 250 GENERAL PHARMACY W/O HCPCS

## 2025-02-07 PROCEDURE — 2500000001 HC RX 250 WO HCPCS SELF ADMINISTERED DRUGS (ALT 637 FOR MEDICARE OP)

## 2025-02-07 PROCEDURE — 2500000004 HC RX 250 GENERAL PHARMACY W/ HCPCS (ALT 636 FOR OP/ED): Mod: JZ

## 2025-02-07 RX ORDER — METOPROLOL TARTRATE 25 MG/1
25 TABLET, FILM COATED ORAL DAILY
Status: DISCONTINUED | OUTPATIENT
Start: 2025-02-07 | End: 2025-02-11 | Stop reason: HOSPADM

## 2025-02-07 RX ADMIN — ASPIRIN 81 MG CHEWABLE TABLET 81 MG: 81 TABLET CHEWABLE at 09:08

## 2025-02-07 RX ADMIN — HEPARIN SODIUM 5000 UNITS: 5000 INJECTION INTRAVENOUS; SUBCUTANEOUS at 20:50

## 2025-02-07 RX ADMIN — TORSEMIDE 20 MG: 20 TABLET ORAL at 09:08

## 2025-02-07 RX ADMIN — IPRATROPIUM BROMIDE AND ALBUTEROL SULFATE 3 ML: .5; 3 SOLUTION RESPIRATORY (INHALATION) at 20:15

## 2025-02-07 RX ADMIN — IPRATROPIUM BROMIDE AND ALBUTEROL SULFATE 3 ML: .5; 3 SOLUTION RESPIRATORY (INHALATION) at 11:18

## 2025-02-07 RX ADMIN — IPRATROPIUM BROMIDE AND ALBUTEROL SULFATE 3 ML: .5; 3 SOLUTION RESPIRATORY (INHALATION) at 06:59

## 2025-02-07 RX ADMIN — METHYLPREDNISOLONE SODIUM SUCCINATE 40 MG: 40 INJECTION, POWDER, FOR SOLUTION INTRAMUSCULAR; INTRAVENOUS at 15:58

## 2025-02-07 RX ADMIN — Medication 1 TABLET: at 09:08

## 2025-02-07 RX ADMIN — POLYETHYLENE GLYCOL 3350 17 G: 17 POWDER, FOR SOLUTION ORAL at 09:08

## 2025-02-07 RX ADMIN — HEPARIN SODIUM 5000 UNITS: 5000 INJECTION INTRAVENOUS; SUBCUTANEOUS at 13:00

## 2025-02-07 RX ADMIN — METOPROLOL TARTRATE 25 MG: 25 TABLET, FILM COATED ORAL at 09:08

## 2025-02-07 RX ADMIN — HEPARIN SODIUM 5000 UNITS: 5000 INJECTION INTRAVENOUS; SUBCUTANEOUS at 06:10

## 2025-02-07 RX ADMIN — CLOPIDOGREL BISULFATE 75 MG: 75 TABLET ORAL at 09:08

## 2025-02-07 RX ADMIN — POLYETHYLENE GLYCOL 3350 17 G: 17 POWDER, FOR SOLUTION ORAL at 20:50

## 2025-02-07 RX ADMIN — Medication 2 L/MIN: at 06:59

## 2025-02-07 RX ADMIN — METHYLPREDNISOLONE SODIUM SUCCINATE 40 MG: 40 INJECTION, POWDER, FOR SOLUTION INTRAMUSCULAR; INTRAVENOUS at 06:10

## 2025-02-07 ASSESSMENT — COGNITIVE AND FUNCTIONAL STATUS - GENERAL
MOBILITY SCORE: 18
PERSONAL GROOMING: A LITTLE
TURNING FROM BACK TO SIDE WHILE IN FLAT BAD: A LITTLE
DAILY ACTIVITIY SCORE: 19
STANDING UP FROM CHAIR USING ARMS: A LITTLE
TOILETING: A LITTLE
MOVING TO AND FROM BED TO CHAIR: A LITTLE
CLIMB 3 TO 5 STEPS WITH RAILING: A LOT
PERSONAL GROOMING: A LITTLE
DRESSING REGULAR UPPER BODY CLOTHING: A LITTLE
STANDING UP FROM CHAIR USING ARMS: A LITTLE
TURNING FROM BACK TO SIDE WHILE IN FLAT BAD: A LITTLE
WALKING IN HOSPITAL ROOM: A LITTLE
HELP NEEDED FOR BATHING: A LITTLE
CLIMB 3 TO 5 STEPS WITH RAILING: A LOT
CLIMB 3 TO 5 STEPS WITH RAILING: A LOT
TOILETING: A LOT
WALKING IN HOSPITAL ROOM: A LITTLE
MOBILITY SCORE: 18
MOVING TO AND FROM BED TO CHAIR: A LITTLE
DRESSING REGULAR LOWER BODY CLOTHING: A LITTLE
DRESSING REGULAR UPPER BODY CLOTHING: A LITTLE
STANDING UP FROM CHAIR USING ARMS: A LITTLE
DRESSING REGULAR UPPER BODY CLOTHING: A LITTLE
MOBILITY SCORE: 18
TURNING FROM BACK TO SIDE WHILE IN FLAT BAD: A LITTLE
HELP NEEDED FOR BATHING: A LITTLE
HELP NEEDED FOR BATHING: A LOT
DAILY ACTIVITIY SCORE: 19
DAILY ACTIVITIY SCORE: 18
MOVING TO AND FROM BED TO CHAIR: A LITTLE
DRESSING REGULAR LOWER BODY CLOTHING: A LITTLE
DRESSING REGULAR LOWER BODY CLOTHING: A LITTLE
WALKING IN HOSPITAL ROOM: A LITTLE
TOILETING: A LITTLE

## 2025-02-07 ASSESSMENT — PAIN - FUNCTIONAL ASSESSMENT
PAIN_FUNCTIONAL_ASSESSMENT: 0-10

## 2025-02-07 ASSESSMENT — PAIN SCALES - GENERAL
PAINLEVEL_OUTOF10: 0 - NO PAIN

## 2025-02-07 ASSESSMENT — ACTIVITIES OF DAILY LIVING (ADL): HOME_MANAGEMENT_TIME_ENTRY: 12

## 2025-02-07 NOTE — PROGRESS NOTES
"Yesenia Milner is a 89 y.o. female on day 4 of admission presenting with Acute decompensated heart failure.            HPI:    The patient is feeling very weak.  Less short of breath.    Past medical history:    HFpEF  Takotsubo cardiomyopathy, with normal coronary arteries by cardiac catheterization December 20, 2024  History of dual-chamber pacemaker insertion  Hypertension  History of a stroke treated with thrombolytic therapy August 21, 2023  Physical Exam:    General Appearance:  Alert, oriented, no distress  Skin:  Warm and dry  Head and Neck:  No elevation of JVP, no carotid bruits  Cardiac Exam:  Rhythm is regular, S1 and S2 are normal, grade 2/6 holosystolic murmur at the lower left sternal border and apex  Lungs: Diminished in the bases  Extremities: No edema  Neurologic:  No focal deficits  Psychiatric:  Appropriate mood and behavior     Last Recorded Vitals  Blood pressure 162/65, pulse 67, temperature 35.5 °C (95.9 °F), temperature source Temporal, resp. rate 18, height 1.6 m (5' 3\"), weight 75 kg (165 lb 4.8 oz), SpO2 96%.  Intake/Output last 3 Shifts:  I/O last 3 completed shifts:  In: 837 (11.2 mL/kg) [P.O.:837]  Out: 3100 (41.3 mL/kg) [Urine:3100 (1.1 mL/kg/hr)]  Weight: 75 kg     Medications:  Scheduled medications  aspirin, 81 mg, oral, Daily  calcium carbonate-vitamin D3, 1 tablet, oral, Daily  clopidogrel, 75 mg, oral, Daily  furosemide, 20 mg, intravenous, Once  heparin (porcine), 5,000 Units, subcutaneous, q8h  ipratropium-albuteroL, 3 mL, nebulization, TID  methylPREDNISolone sodium succinate (PF), 40 mg, intravenous, q8h  metoprolol tartrate, 12.5 mg, oral, Daily  polyethylene glycol, 17 g, oral, BID  torsemide, 20 mg, oral, Daily        Labs:    CMP:  Recent Labs     02/06/25  0536 02/05/25  0635 02/04/25  0643 02/03/25  0644 02/02/25  1704 01/23/25  0558 01/22/25  0658 01/20/25  0709 01/13/25  0626 01/12/25  1122 01/03/25  0344 01/02/25  1227 12/21/24  0538 12/20/24  0204 08/23/23  0523 " 08/22/23  0539   * 136 136 136 133* 136 137 136   < > 134*   < > 135*   < > 135*   < > 139   K 3.9 4.0 3.8 3.5 4.0 3.9 3.9 4.0   < > 3.9   < > 4.3   < > 4.2   < > 4.8    100 99 101 100 101 100 98   < > 101   < > 105   < > 104   < > 107   CO2 28 28 30 28 24 27 30 32   < > 23   < > 22   < > 20*   < > 27   ANIONGAP 10 12 11 11 13 12 11 10   < > 14   < > 12   < > 15   < > 10   BUN 24* 24* 22 22 28* 25* 17 21   < > 24*   < > 12   < > 32*   < > 19   CREATININE 0.96 0.97 0.95 0.89 0.97 0.96 0.95 0.95   < > 0.92   < > 0.88   < > 1.19*   < > 1.05   EGFR 56* 55* 57* 61 55* 56* 57* 57*   < > 59*   < > 62   < > 43*   < >  --    MG  --   --   --   --  2.25  --   --   --   --  2.21  --  2.05  --  2.19  --  2.31    < > = values in this interval not displayed.     Recent Labs     01/20/25  0709 01/19/25  0641 01/18/25  0740 01/13/25  0626 01/12/25  1122   ALBUMIN 3.2* 3.2* 2.9* 3.6 3.9   ALKPHOS 81 84 76 78 83   ALT 16 17 16 16 15   AST 16 17 15 16 17   BILITOT 0.3 0.3 0.4 0.5 0.5   LIPASE  --   --   --   --  23     CBC:  Recent Labs     02/06/25  0536 02/05/25  0635 02/04/25  0643 02/03/25  0644 02/02/25  1704 01/23/25  0558 01/22/25  0657 01/20/25  0709   WBC 4.2* 4.4 4.8 4.7 5.5 5.8 6.0 5.1   HGB 8.2* 8.1* 7.9* 7.7* 7.8* 9.1* 9.5* 9.1*   HCT 26.2* 25.9* 25.3* 24.2* 24.5* 28.0* 30.4* 28.1*    286 284 274 283 300 309 293   MCV 89 90 89 89 88 87 89 88     COAG:   Recent Labs     01/16/25  0709 01/12/25  1122 01/05/25  1412 01/05/25  1028 01/04/25  0612 01/03/25  1539 01/03/25  0900 01/03/25  0344 01/02/25  2334 12/21/24  0538 12/20/24  1013 12/20/24  0251 08/21/23  0056 07/13/23  1335 01/08/23  1412 12/14/20  0731   INR 1.5* 1.7*  --   --   --   --   --   --   --  1.1  --  1.1 1.1 1.1 1.2* 1.1   HAUF  --   --  1.8* 0.4 0.4 0.5 0.7 0.9 1.0  --  1.1*  --   --   --   --   --      ABO:   Recent Labs     01/12/25  1205   ABO A     HEME/ENDO:  Recent Labs     01/13/25  0626 12/20/24  0204 01/24/24  1049 08/21/23  0056  05/18/23  1346 01/17/23  0943 04/11/19  0930   FERRITIN 28  --   --   --  72  --   --    IRONSAT 5*  --   --   --  28  --  30   TSH  --   --   --   --   --  2.25  --    HGBA1C  --  5.5 5.4 5.9*  --   --   --       CARDIAC:   Recent Labs     02/03/25  0644 02/02/25  1916 02/02/25  1704 01/02/25  1420 01/02/25  1227 12/20/24  0439 12/20/24  0251 12/20/24  0204 01/08/23  1412 03/30/22  1611 04/15/20  1403 04/19/19  2035   TROPHS 17* 17* 15* 38* 36* 8,220* 1,426* 112*   < >  --   --   --    BNP  --   --  462*  --  485*  --  379*  --   --  212* 138* 112*    < > = values in this interval not displayed.     Recent Labs     12/20/24  0204 01/24/24  1049 08/21/23  0056 01/17/23  0943 03/16/21  1015   CHOL 145 152 162 155 180   LDLF  --   --  81 75 106*   HDL 63.1 65.4 64.3 65.5 57.8   TRIG 70 66 85 72 82     MICRO:   Recent Labs     01/17/23  0943   CRP 1.76*     No results found for the last 90 days.      Test Results:    Echocardiogram December 21, 2023: Ejection fraction 60 to 65%, moderate mitral valve regurgitation, moderate to severe tricuspid regurgitation    Assessment/Plan:    1.  Acute on chronic diastolic CHF: The peripheral edema has resolved.  The patient is less short of breath, but overall feeling extremely weak.  Metoprolol will be changed to 25 mg twice daily.    2.  Sick sinus syndrome: History of a [employer] dual chamber pacemaker insertion.  The pacemaker pulse generator was replaced in July 2023.  The device shows appropriate pacemaker function and the estimated battery longevity is 6 years.    James C Ramicone, DO

## 2025-02-07 NOTE — CARE PLAN
The patient's goals for the shift include      The clinical goals for the shift include pt will report dec SOB throughout the shift    Over the shift, the patient did make progress toward the following goals.

## 2025-02-07 NOTE — CARE PLAN
Problem: Heart Failure  Goal: Improved gas exchange this shift  Outcome: Progressing  Goal: Improved urinary output this shift  Outcome: Progressing     Problem: Skin  Goal: Decreased wound size/increased tissue granulation at next dressing change  Outcome: Progressing  Goal: Participates in plan/prevention/treatment measures  Outcome: Progressing  Goal: Prevent/manage excess moisture  Outcome: Progressing  Goal: Prevent/minimize sheer/friction injuries  Outcome: Progressing   The patient's goals for the shift include      The clinical goals for the shift include pt will not c/o sob during the shift    Over the shift, the patient did not make progress toward the following goals. Barriers to progression include . Recommendations to address these barriers include .

## 2025-02-07 NOTE — PROGRESS NOTES
"Pulmonary Critical Care note    Patient Name :Yesenia Milner  Date of service : 02/07/25  MRN: [id number]  YOB: 1933    Interval History:    2/7/2025: patient seen and examined at bedside this morning. Currently on 2L NC without any signs of respiratory distress. Wheezing much improved today as compared to yesterday.     History of Present Illness:    89 y.o. female  on day  4 of admission presenting with Acute decompensated heart failure.    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 Her symptoms had been gradually worsening over 10 days prior to presentation to the ED. She has also experienced gradual weight gain. She has had increased cough described as \"moist\" and has had difficulty with ambulation due to shortness of breath. Her shortness of breath also bothers her with changes of position. Her urine output was reported to have decreased. She also admits to abdominal swelling.    In the ED, vitals were /56, HR 67, RR 24, SpO2 94% on room air, temp 36.3 deg F. Initial labs were significant for hyponatremia at 133; ; troponin 15 -> 17; CBC with anemia (hemoglobin 7.8, baseline around 9); UA unremarkable. CXR showed moderate vascular congestion with small bilateral pleural effusions. Patient was admitted to the general medical service and treated with diuretics. CXR 2/3 showed an increased RLL " airspace opacity with stability of left-sided pleural effusion. CXR was again repeated yesterday as patient continued to be short of breath and showed improvement in infiltrates and left-sided effusion. Pulmonology was consulted for shortness of breath and wheezing.    Patient was seen and examined at bedside this morning. She states that she continues to be short of breath especially with getting up and moving around with physical therapy this morning. She reports that she came in to the hospital due to bleeding problems. Her cough has improved since she was recently discharged. She states she had some testing done a while ago that showed COPD, and that she has been on Advair which her PCP was trying to wean off.     Past Medical/Surgical History:    has a past medical history of Gross hematuria (10/07/2020), Impacted cerumen (02/22/2024), Other conditions influencing health status, Personal history of other medical treatment, Personal history of other medical treatment, and Systolic CHF (Multi) (05/18/2023).   has a past surgical history that includes Appendectomy (11/22/2017); Hysterectomy (11/22/2017); Tonsillectomy (11/22/2017); Other surgical history (11/22/2017); Other surgical history (11/22/2017); Cardiac pacemaker placement (03/11/2021); IR angiogram renal bilateral (Bilateral, 12/14/2020); IR angiogram inferior epigastric pelvic (12/14/2020); IR angiogram inferior epigastric pelvic (12/14/2020); and Cardiac catheterization (N/A, 12/20/2024).   reports that she quit smoking about 52 years ago. Her smoking use included cigarettes. She has been exposed to tobacco smoke. She has never used smokeless tobacco. She reports that she does not drink alcohol and does not use drugs.  Family History:   No relevant family history has been documented for this patient.    Allergies:     Iodine, Shrimp, Hydrocodone, and Adhesive tape-silicones      Social history:   reports that she quit smoking about 52 years ago. Her  smoking use included cigarettes. She has been exposed to tobacco smoke. She has never used smokeless tobacco. She reports that she does not drink alcohol and does not use drugs.      Review of Systems:   8 point review of systems was completed and negative except as noted above in HPI      Physical Exam:   Vital Signs:   Vitals:    02/07/25 0659   BP:    Pulse:    Resp:    Temp:    SpO2: 96%     Vitals:    02/05/25 0700   Weight: 75 kg (165 lb 4.8 oz)       Input/Output:    Intake/Output Summary (Last 24 hours) at 2/7/2025 0805  Last data filed at 2/7/2025 0621  Gross per 24 hour   Intake 837 ml   Output 2300 ml   Net -1463 ml       Oxygen requirements:    Ventilator Information:     Oxygen Therapy/Pulse Ox  Medical Gas Therapy: Supplemental oxygen  Medical Gas Delivery Method: Nasal cannula  SpO2: 96 %  Patient Activity During SpO2 Measurement: At rest  Temp: 35.5 °C (95.9 °F)    Constitutional: Well developed, A&Ox3, no acute distress, alert and cooperative  Eyes: EOMI, clear sclera  Respiratory/Thorax: improvement in bilateral expiratory wheezing, good chest expansion  Cardiovascular: Regular rate, regular rhythm  Gastrointestinal: Nondistended, soft, non-tender  Extremities: No cyanosis or edema. Peripheral pulses intact  Skin: Warm and dry    Current Medications:   Scheduled medications  aspirin, 81 mg, oral, Daily  calcium carbonate-vitamin D3, 1 tablet, oral, Daily  clopidogrel, 75 mg, oral, Daily  furosemide, 20 mg, intravenous, Once  heparin (porcine), 5,000 Units, subcutaneous, q8h  ipratropium-albuteroL, 3 mL, nebulization, TID  methylPREDNISolone sodium succinate (PF), 40 mg, intravenous, q8h  metoprolol tartrate, 25 mg, oral, Daily  polyethylene glycol, 17 g, oral, BID  torsemide, 20 mg, oral, Daily      Continuous medications     PRN medications  PRN medications: acetaminophen, dextromethorphan-guaifenesin, ipratropium-albuteroL, lubricating eye drops, melatonin, oxygen      Investigations:  Labs,  radiological imaging and cardiac work up were personally reviewed    Assessment  Yesenia Milner IS 89 y.o. female  on day  4 of admission presenting with Acute decompensated heart failure. Actively being treated for the  following medical Problems:    Acute hypoxic respiratory failure  AE HFpEF  Pleural effusions secondary to above with associated atelectasis, improved from admission  Underlying COPD, no PFTs in system, previously on Advair  Suspected pulmonary hypertension    Recommendation:  Continue home PO torsemide per Cardiology recommendations  Transitioned to PO prednisone 30mg daily as wheezing has resolved  Continue scheduled and PRN DuoNebs, PRN Robitussin  Recommend patient continue Advair inhaler after discharge vs [id number] therapy  Patient should follow-up with Pulmonology in outpatient setting for PFTs and further management  Continue incentive spirometry  Continue oxygen supplementation, wean as tolerated  DVT prophylaxis with subcutaneous heparin      Mathew Castano DO, PGY-1, Internal Medicine   This is a preliminary note; please await attending attestation for final assessment and plan  02/07/25  8:05 AM

## 2025-02-07 NOTE — PROGRESS NOTES
Occupational Therapy    Occupational Therapy Treatment    Name: Yesenia Milner  MRN: [id number]  Department: [facility]  Room: [address] [place] [address]  Date: 02/07/25  Time Calculation  Start Time: 1128  Stop Time: 1152  Time Calculation (min): 24 min    Assessment:  OT Assessment: Patient showing good progress toward goals in activity tolerance and balance for self-care and mobility tasks  Prognosis: Excellent  End of Session Communication: Bedside nurse  End of Session Patient Position: Up in chair, Alarm on; call-light within reach    Plan:  Treatment Interventions: ADL retraining, Functional transfer training, Patient/family training, Equipment evaluation/education, Compensatory technique education, Endurance training (energy conservation / diaphragmatic breathing techniques training)  OT Frequency: 3 times per week  OT Discharge Recommendations: Low intensity level of continued care  OT - OK to Discharge: Yes    Subjective   Previous Visit Info:  OT Last Visit  OT Received On: 02/07/25    General:  General  Co-Treatment: PT Co-eval to maximize safety during mobility tasks  Co-Treatment Reason: to maximize safety during mobility tasks  Prior to Session Communication: Bedside nurse who confirmed that patient is medically stable to participate in this OT session  Patient Position Received: Up in chair, Alarm on  General Comment: Patient seen for bedside treatment in room; cooperative, pleasant    Precautions:  Hearing/Visual Limitations: eyeglasses for reading  Medical Precautions: Fall precautions  Precautions Comment: external catheter   Date/Time Vitals Session Patient Position Pulse Resp SpO2 BP MAP (mmHg)    02/07/25 1119 --  --  --  --  95 %  --  --     02/07/25 1128 Post OT  --  --  --  97 %  --  --         self-recovery in sitting with nasal cannula repositioned; after ambulation 92% room air     Pain Assessment:  Pain Assessment  Pain Assessment: 0-10  0-10 (Numeric) Pain Score: 0 - No pain     Objective    Cognition:  Overall Cognitive Status: Within Functional Limits  Orientation Level: Oriented X4    Activities of Daily Living: LE Dressing  LE Dressing: Yes  Sock Level of Assistance: Minimum assistance, Minimal verbal cues (while sitting in recliner chair (highter height compared to home))    Bed Mobility/Transfers:    Transfers  Transfer: Yes  Transfer 1  Transfer From 1: Sit to, Stand to  Transfer to 1: Chair with arms  Transfer Device 1: Walker  Transfer Level of Assistance 1:  (SBA)    Functional Mobility:  Functional Mobility  Functional Mobility Performed: Yes  Functional Mobility 1  Device 1: Rolling walker    Sitting Balance:  Static Sitting Balance  Static Sitting-Level of Assistance: Independent  Dynamic Sitting Balance  Dynamic Sitting-Level of Assistance:  (SBA)  Dynamic Sitting-Comments: brush teeth, wash hands at sink --> stood approx. 10 mins  Standing Balance:  Static Standing Balance  Static Standing-Level of Assistance:  (SBA)    Outcome Measures:  [facility] Daily Activity  Putting on and taking off regular lower body clothing: A little  Bathing (including washing, rinsing, drying): A lot  Putting on and taking off regular upper body clothing: A little  Toileting, which includes using toilet, bedpan or urinal: A lot  Taking care of personal grooming such as brushing teeth: None  Eating Meals: None  Daily Activity - Total Score: 18    Education Documentation  Mobility Training, taught by Irina Martino OT at 2/7/2025 12:22 PM.  Learner: Patient  Readiness: Acceptance  Method: Explanation, Handout  Response: Demonstrated Understanding, Needs Reinforcement  Comment: brief instruction in energy conservation / diaphragmatic breathing techniques with handouts issued which patient agreed to read thoroughly on own; patient practiced proper breathing techniques with minimal cues    Handouts, taught by Irina Martino OT at 2/7/2025 12:22 PM.  Learner: Patient  Readiness: Acceptance  Method: Explanation,  Handout  Response: Demonstrated Understanding, Needs Reinforcement  Comment: brief instruction in energy conservation / diaphragmatic breathing techniques with handouts issued which patient agreed to read thoroughly on own; patient practiced proper breathing techniques with minimal cues    Goals:  Encounter Problems       Encounter Problems (Active)       OT Goals       Patient will complete upper and lower body bathing/dressing; toileting with modified independence using assistive techniques/adaptive equipment as needed  (Progressing)       Start:  02/03/25    Expected End:  02/10/25            Patient will perform bed mobility and functional transfers safely and independently: bed, chair, commode using DME as needed  (Progressing)       Start:  02/03/25    Expected End:  02/10/25            Patient will tolerate standing for 5 mins. and show overall good (-) standing balance during ADL's and functional transfers/mobility  (Met)       Start:  02/03/25    Expected End:  02/10/25    Resolved:  02/07/25         Patient will apply energy conservation/diaphragmatic breathing techniques to ADL's and functional transfers with minimal cues  (Progressing)       Start:  02/03/25    Expected End:  02/10/25

## 2025-02-07 NOTE — PROGRESS NOTES
Physical Therapy    Physical Therapy Treatment    Patient Name: Yesenia Milner  MRN: [id number]  Today's Date: 2/7/2025  Time Calculation  Start Time: 1127  Stop Time: 1152  Time Calculation (min): 25 min     320/320-A    Assessment/Plan   PT Assessment  PT Assessment Results: Decreased strength, Decreased endurance, Impaired balance, Decreased mobility, Decreased safety awareness  Rehab Prognosis: Good  Barriers to Discharge Home: Caregiver assistance  Caregiver Assistance: Patient lives alone and/or does not have reliable caregiver assistance  Evaluation/Treatment Tolerance: Patient limited by fatigue  End of Session Communication: Bedside nurse  Assessment Comment: Continued skilled PT intervention indicated to facilitate increased strength, balance & gait stability  End of Session Patient Position: Alarm on, Up in chair (call light in reach)     PT Plan  Treatment/Interventions: Bed mobility, Transfer training, Gait training, Balance training, Endurance training, Therapeutic exercise, Therapeutic activity  PT Plan: Ongoing PT  PT Frequency: 3 times per week  PT Discharge Recommendations: Low intensity level of continued care (w/ initial 24hr support for safe transition home due to medical status)  PT Recommended Transfer Status: Assist x1  PT - OK to Discharge: Yes (when cleared by medical team)    Current Problem:  Patient Active Problem List   Diagnosis    Aortic stenosis, mild    Arthritis    Chronic diastolic congestive heart failure    Complete heart block    COPD (chronic obstructive pulmonary disease) (Multi)    History of paroxysmal supraventricular tachycardia    HTN (hypertension)    Paresthesia    Presence of permanent cardiac pacemaker    Raynauds disease    Sensorineural hearing loss (SNHL) of both ears    Dyspnea on minimal exertion    Varicose veins of lower extremities with inflammation    Vertigo    Ischemic cerebrovascular accident (CVA) (Multi)    Acquired deformity of toe    Benign neoplasm  of skin of foot    Benign paroxysmal positional vertigo    Dermatophytosis of nail    Leg swelling    Macular degeneration    Mitral valve insufficiency    Moderate mitral valve regurgitation    Overweight with body mass index (BMI) 25.0-29.9    Plantar wart of left foot    Venous insufficiency    Dysarthria following cerebral infarction    Hyperglycemia    Hand paresthesia    Sick sinus syndrome (Multi)    Injury of kidney    NSTEMI (non-ST elevated myocardial infarction) (Multi)    Acute superior vena cava thrombosis (Multi)    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)    Gastrointestinal hemorrhage, unspecified gastrointestinal hemorrhage type    Malignant neoplasm of transverse colon (Multi)    Stage 3 chronic kidney disease, unspecified whether stage 3a or 3b CKD (Multi)    Acute decompensated heart failure       General Visit Information:   PT  Visit  PT Received On: 02/07/25  Response to Previous Treatment: Patient with no complaints from previous session.  General  Prior to Session Communication: Bedside nurse  Patient Position Received: Up in chair, Alarm on  General Comment: Pleasant & cooperative  Subjective     Precautions:  Precautions  Hearing/Visual Limitations: eyeglasses for reading  Medical Precautions: Fall precautions  Precautions Comment: fall, alarm, purewick, strict I&O, fluid 1800mL, reading glasses       Objective     Pain:  Pain Assessment  Pain Assessment: 0-10  0-10 (Numeric) Pain Score: 0 - No pain    Cognition:  Cognition  Overall Cognitive Status: Within Functional Limits  Orientation Level: Oriented X4         Treatments:  Therapeutic Exercise  Therapeutic Exercise Performed:  (ankle pumps x 5)        Bed Mobility  Bed Mobility:  (n/a; pt seated in chair upon arrival)  Ambulation/Gait Training  Ambulation/Gait Training Performed:  (completed amb of 100ft with w/w and SBA)  Transfers  Transfer:  (completed STS from chair with SBA and w/w)          Outcome Measures:      [facility] Basic Mobility  Turning from your back to your side while in a flat bed without using bedrails: None  Moving from lying on your back to sitting on the side of a flat bed without using bedrails: A little  Moving to and from bed to chair (including a wheelchair): A little  Standing up from a chair using your arms (e.g. wheelchair or bedside chair): A little  To walk in hospital room: A little  Climbing 3-5 steps with railing: A lot  Basic Mobility - Total Score: 18              Education Documentation  Mobility Training, taught by Raissa France PT at 2/7/2025 12:26 PM.  Learner: Patient  Readiness: Acceptance  Method: Explanation  Response: Verbalizes Understanding    Mobility Training, taught by Raissa France PT at 2/6/2025  3:29 PM.  Learner: Patient  Readiness: Acceptance  Method: Explanation  Response: Verbalizes Understanding    Education Comments  No comments found.           EDUCATION:     Encounter Problems       Encounter Problems (Active)       PT Problem       STG - Pt will transition supine <> sitting independently  (Progressing)       Start:  02/03/25    Expected End:  02/17/25            STG - Pt will transfer STS with modified independence  (Progressing)       Start:  02/03/25    Expected End:  02/17/25            STG - Pt will amb >=50' using ww with modified independence  (Progressing)       Start:  02/03/25    Expected End:  02/17/25            STG - Pt will perform a B LE ther ex program of 2-3 sets of 10  (Progressing)       Start:  02/03/25    Expected End:  02/17/25            STG - Pt will maintain supported dynamic standing  balance with no LOB noted   (Progressing)       Start:  02/03/25    Expected End:  02/17/25               Pain - Adult

## 2025-02-08 LAB
ANION GAP SERPL CALC-SCNC: 12 MMOL/L (ref 10–20)
BUN SERPL-MCNC: 31 MG/DL (ref 6–23)
CALCIUM SERPL-MCNC: 9.4 MG/DL (ref 8.6–10.3)
CHLORIDE SERPL-SCNC: 98 MMOL/L (ref 98–107)
CO2 SERPL-SCNC: 29 MMOL/L (ref 21–32)
CREAT SERPL-MCNC: 0.91 MG/DL (ref 0.5–1.05)
EGFRCR SERPLBLD CKD-EPI 2021: 60 ML/MIN/1.73M*2
ERYTHROCYTE [DISTWIDTH] IN BLOOD BY AUTOMATED COUNT: 14.3 % (ref 11.5–14.5)
GLUCOSE SERPL-MCNC: 117 MG/DL (ref 74–99)
HCT VFR BLD AUTO: 25.8 % (ref 36–46)
HGB BLD-MCNC: 8.2 G/DL (ref 12–16)
MCH RBC QN AUTO: 27.5 PG (ref 26–34)
MCHC RBC AUTO-ENTMCNC: 31.8 G/DL (ref 32–36)
MCV RBC AUTO: 87 FL (ref 80–100)
NRBC BLD-RTO: 0 /100 WBCS (ref 0–0)
PLATELET # BLD AUTO: 289 X10*3/UL (ref 150–450)
POTASSIUM SERPL-SCNC: 4.2 MMOL/L (ref 3.5–5.3)
RBC # BLD AUTO: 2.98 X10*6/UL (ref 4–5.2)
SODIUM SERPL-SCNC: 135 MMOL/L (ref 136–145)
WBC # BLD AUTO: 6.7 X10*3/UL (ref 4.4–11.3)

## 2025-02-08 PROCEDURE — 2500000001 HC RX 250 WO HCPCS SELF ADMINISTERED DRUGS (ALT 637 FOR MEDICARE OP): Performed by: INTERNAL MEDICINE

## 2025-02-08 PROCEDURE — 80048 BASIC METABOLIC PNL TOTAL CA: CPT | Performed by: STUDENT IN AN ORGANIZED HEALTH CARE EDUCATION/TRAINING PROGRAM

## 2025-02-08 PROCEDURE — 94640 AIRWAY INHALATION TREATMENT: CPT

## 2025-02-08 PROCEDURE — 99232 SBSQ HOSP IP/OBS MODERATE 35: CPT | Performed by: STUDENT IN AN ORGANIZED HEALTH CARE EDUCATION/TRAINING PROGRAM

## 2025-02-08 PROCEDURE — 36415 COLL VENOUS BLD VENIPUNCTURE: CPT | Performed by: STUDENT IN AN ORGANIZED HEALTH CARE EDUCATION/TRAINING PROGRAM

## 2025-02-08 PROCEDURE — 2500000004 HC RX 250 GENERAL PHARMACY W/ HCPCS (ALT 636 FOR OP/ED)

## 2025-02-08 PROCEDURE — 2500000005 HC RX 250 GENERAL PHARMACY W/O HCPCS

## 2025-02-08 PROCEDURE — 2500000002 HC RX 250 W HCPCS SELF ADMINISTERED DRUGS (ALT 637 FOR MEDICARE OP, ALT 636 FOR OP/ED): Performed by: STUDENT IN AN ORGANIZED HEALTH CARE EDUCATION/TRAINING PROGRAM

## 2025-02-08 PROCEDURE — 85027 COMPLETE CBC AUTOMATED: CPT | Performed by: STUDENT IN AN ORGANIZED HEALTH CARE EDUCATION/TRAINING PROGRAM

## 2025-02-08 PROCEDURE — 1100000001 HC PRIVATE ROOM DAILY

## 2025-02-08 PROCEDURE — 2500000001 HC RX 250 WO HCPCS SELF ADMINISTERED DRUGS (ALT 637 FOR MEDICARE OP)

## 2025-02-08 PROCEDURE — 99232 SBSQ HOSP IP/OBS MODERATE 35: CPT | Performed by: INTERNAL MEDICINE

## 2025-02-08 RX ADMIN — HEPARIN SODIUM 5000 UNITS: 5000 INJECTION INTRAVENOUS; SUBCUTANEOUS at 12:23

## 2025-02-08 RX ADMIN — CLOPIDOGREL BISULFATE 75 MG: 75 TABLET ORAL at 08:07

## 2025-02-08 RX ADMIN — IPRATROPIUM BROMIDE AND ALBUTEROL SULFATE 3 ML: .5; 3 SOLUTION RESPIRATORY (INHALATION) at 11:40

## 2025-02-08 RX ADMIN — IPRATROPIUM BROMIDE AND ALBUTEROL SULFATE 3 ML: .5; 3 SOLUTION RESPIRATORY (INHALATION) at 20:22

## 2025-02-08 RX ADMIN — Medication 1 TABLET: at 08:07

## 2025-02-08 RX ADMIN — HEPARIN SODIUM 5000 UNITS: 5000 INJECTION INTRAVENOUS; SUBCUTANEOUS at 05:28

## 2025-02-08 RX ADMIN — ASPIRIN 81 MG CHEWABLE TABLET 81 MG: 81 TABLET CHEWABLE at 08:07

## 2025-02-08 RX ADMIN — PREDNISONE 30 MG: 20 TABLET ORAL at 09:08

## 2025-02-08 RX ADMIN — TORSEMIDE 20 MG: 20 TABLET ORAL at 08:07

## 2025-02-08 RX ADMIN — POLYETHYLENE GLYCOL 3350 17 G: 17 POWDER, FOR SOLUTION ORAL at 08:11

## 2025-02-08 RX ADMIN — Medication 28 PERCENT: at 06:35

## 2025-02-08 RX ADMIN — HEPARIN SODIUM 5000 UNITS: 5000 INJECTION INTRAVENOUS; SUBCUTANEOUS at 20:27

## 2025-02-08 RX ADMIN — POLYETHYLENE GLYCOL 3350 17 G: 17 POWDER, FOR SOLUTION ORAL at 20:27

## 2025-02-08 RX ADMIN — METOPROLOL TARTRATE 25 MG: 25 TABLET, FILM COATED ORAL at 08:08

## 2025-02-08 RX ADMIN — IPRATROPIUM BROMIDE AND ALBUTEROL SULFATE 3 ML: .5; 3 SOLUTION RESPIRATORY (INHALATION) at 06:34

## 2025-02-08 ASSESSMENT — COGNITIVE AND FUNCTIONAL STATUS - GENERAL
DAILY ACTIVITIY SCORE: 18
CLIMB 3 TO 5 STEPS WITH RAILING: A LOT
DRESSING REGULAR UPPER BODY CLOTHING: A LITTLE
EATING MEALS: A LITTLE
PERSONAL GROOMING: A LITTLE
CLIMB 3 TO 5 STEPS WITH RAILING: A LOT
MOBILITY SCORE: 18
STANDING UP FROM CHAIR USING ARMS: A LITTLE
DRESSING REGULAR LOWER BODY CLOTHING: A LITTLE
HELP NEEDED FOR BATHING: A LITTLE
WALKING IN HOSPITAL ROOM: A LITTLE
DAILY ACTIVITIY SCORE: 19
TURNING FROM BACK TO SIDE WHILE IN FLAT BAD: A LITTLE
TOILETING: A LITTLE
STANDING UP FROM CHAIR USING ARMS: A LITTLE
DRESSING REGULAR LOWER BODY CLOTHING: A LITTLE
MOVING TO AND FROM BED TO CHAIR: A LITTLE
TURNING FROM BACK TO SIDE WHILE IN FLAT BAD: A LITTLE
WALKING IN HOSPITAL ROOM: A LITTLE
TOILETING: A LITTLE
DRESSING REGULAR UPPER BODY CLOTHING: A LITTLE
MOVING TO AND FROM BED TO CHAIR: A LITTLE
PERSONAL GROOMING: A LITTLE
HELP NEEDED FOR BATHING: A LITTLE
MOBILITY SCORE: 18

## 2025-02-08 ASSESSMENT — PAIN SCALES - GENERAL
PAINLEVEL_OUTOF10: 0 - NO PAIN
PAINLEVEL_OUTOF10: 0 - NO PAIN

## 2025-02-08 NOTE — PROGRESS NOTES
"Yesenia Milner is a 89 y.o. female on day 5 of admission presenting with Acute decompensated heart failure.            HPI:    The patient is up in a chair today.  Less shortness of breath.    Past medical history:    HFpEF  Takotsubo cardiomyopathy, with normal coronary arteries by cardiac catheterization December 20, 2024  History of dual-chamber pacemaker insertion  Hypertension  History of a stroke treated with thrombolytic therapy August 21, 2023  Physical Exam:    General Appearance:  Alert, oriented, no distress  Skin:  Warm and dry  Head and Neck:  No elevation of JVP, no carotid bruits  Cardiac Exam:  Rhythm is regular, S1 and S2 are normal, grade 2/6 holosystolic murmur at the lower left sternal border and apex  Lungs: Diminished in the bases left greater than right with end expiratory wheezes  Extremities: No edema  Neurologic:  No focal deficits  Psychiatric:  Appropriate mood and behavior     Last Recorded Vitals  Blood pressure 106/52, pulse 64, temperature 35.8 °C (96.4 °F), temperature source Temporal, resp. rate 18, height 1.6 m (5' 3\"), weight 76.2 kg (167 lb 15.9 oz), SpO2 98%.  Intake/Output last 3 Shifts:  I/O last 3 completed shifts:  In: 897 (11.8 mL/kg) [P.O.:897]  Out: 2050 (26.9 mL/kg) [Urine:2050 (0.7 mL/kg/hr)]  Weight: 76.2 kg     Medications:  Scheduled medications  aspirin, 81 mg, oral, Daily  calcium carbonate-vitamin D3, 1 tablet, oral, Daily  clopidogrel, 75 mg, oral, Daily  furosemide, 20 mg, intravenous, Once  heparin (porcine), 5,000 Units, subcutaneous, q8h  ipratropium-albuteroL, 3 mL, nebulization, TID  metoprolol tartrate, 25 mg, oral, Daily  polyethylene glycol, 17 g, oral, BID  predniSONE, 30 mg, oral, Daily  torsemide, 20 mg, oral, Daily        Labs:    CMP:  Recent Labs     02/08/25  0745 02/06/25  0536 02/05/25  0635 02/04/25  0643 02/03/25  0644 02/02/25  1704 01/23/25  0558 01/22/25  0658 01/13/25  0626 01/12/25  1122 01/03/25  0344 01/02/25  1227 12/21/24  0538 " 12/20/24  0204 08/23/23  0523 08/22/23  0539   * 135* 136 136 136 133* 136 137   < > 134*   < > 135*   < > 135*   < > 139   K 4.2 3.9 4.0 3.8 3.5 4.0 3.9 3.9   < > 3.9   < > 4.3   < > 4.2   < > 4.8   CL 98 101 100 99 101 100 101 100   < > 101   < > 105   < > 104   < > 107   CO2 29 28 28 30 28 24 27 30   < > 23   < > 22   < > 20*   < > 27   ANIONGAP 12 10 12 11 11 13 12 11   < > 14   < > 12   < > 15   < > 10   BUN 31* 24* 24* 22 22 28* 25* 17   < > 24*   < > 12   < > 32*   < > 19   CREATININE 0.91 0.96 0.97 0.95 0.89 0.97 0.96 0.95   < > 0.92   < > 0.88   < > 1.19*   < > 1.05   EGFR 60* 56* 55* 57* 61 55* 56* 57*   < > 59*   < > 62   < > 43*   < >  --    MG  --   --   --   --   --  2.25  --   --   --  2.21  --  2.05  --  2.19  --  2.31    < > = values in this interval not displayed.     Recent Labs     01/20/25  0709 01/19/25  0641 01/18/25  0740 01/13/25  0626 01/12/25  1122   ALBUMIN 3.2* 3.2* 2.9* 3.6 3.9   ALKPHOS 81 84 76 78 83   ALT 16 17 16 16 15   AST 16 17 15 16 17   BILITOT 0.3 0.3 0.4 0.5 0.5   LIPASE  --   --   --   --  23     CBC:  Recent Labs     02/08/25  0745 02/06/25  0536 02/05/25  0635 02/04/25  0643 02/03/25  0644 02/02/25  1704 01/23/25  0558 01/22/25  0657   WBC 6.7 4.2* 4.4 4.8 4.7 5.5 5.8 6.0   HGB 8.2* 8.2* 8.1* 7.9* 7.7* 7.8* 9.1* 9.5*   HCT 25.8* 26.2* 25.9* 25.3* 24.2* 24.5* 28.0* 30.4*    296 286 284 274 283 300 309   MCV 87 89 90 89 89 88 87 89     COAG:   Recent Labs     01/16/25  0709 01/12/25  1122 01/05/25  1412 01/05/25  1028 01/04/25  0612 01/03/25  1539 01/03/25  0900 01/03/25  0344 01/02/25  2334 12/21/24  0538 12/20/24  1013 12/20/24  0251 08/21/23  0056 07/13/23  1335 01/08/23  1412 12/14/20  0731   INR 1.5* 1.7*  --   --   --   --   --   --   --  1.1  --  1.1 1.1 1.1 1.2* 1.1   HAUF  --   --  1.8* 0.4 0.4 0.5 0.7 0.9 1.0  --  1.1*  --   --   --   --   --      ABO:   Recent Labs     01/12/25  1205   ABO A     HEME/ENDO:  Recent Labs     01/13/25  0626  12/20/24  0204 01/24/24  1049 08/21/23  0056 05/18/23  1346 01/17/23  0943 04/11/19  0930   FERRITIN 28  --   --   --  72  --   --    IRONSAT 5*  --   --   --  28  --  30   TSH  --   --   --   --   --  2.25  --    HGBA1C  --  5.5 5.4 5.9*  --   --   --       CARDIAC:   Recent Labs     02/03/25  0644 02/02/25  1916 02/02/25  1704 01/02/25  1420 01/02/25  1227 12/20/24  0439 12/20/24  0251 12/20/24  0204 01/08/23  1412 03/30/22  1611 04/15/20  1403 04/19/19  2035   TROPHS 17* 17* 15* 38* 36* 8,220* 1,426* 112*   < >  --   --   --    BNP  --   --  462*  --  485*  --  379*  --   --  212* 138* 112*    < > = values in this interval not displayed.     Recent Labs     12/20/24  0204 01/24/24  1049 08/21/23  0056 01/17/23  0943 03/16/21  1015   CHOL 145 152 162 155 180   LDLF  --   --  81 75 106*   HDL 63.1 65.4 64.3 65.5 57.8   TRIG 70 66 85 72 82     MICRO:   Recent Labs     01/17/23  0943   CRP 1.76*     No results found for the last 90 days.      Test Results:    Echocardiogram December 21, 2023: Ejection fraction 60 to 65%, moderate mitral valve regurgitation, moderate to severe tricuspid regurgitation    Assessment/Plan:    1.  Acute on chronic diastolic CHF: The peripheral edema has resolved.  The patient is less short of breath, but overall feeling extremely weak.  Metoprolol will be changed to 25 mg twice daily.  Continue torsemide 20 mg p.o. daily.  Will monitor renal function as BUN is rising.  Volume status stable from heart failure perspective.    2.  Sick sinus syndrome: History of a [employer] dual chamber pacemaker insertion.  The pacemaker pulse generator was replaced in July 2023.  The device shows appropriate pacemaker function and the estimated battery longevity is 6 years.    James C Ramicone, DO

## 2025-02-08 NOTE — CARE PLAN
The patient's goals for the shift include  to ambulate in room    The clinical goals for the shift include pt will not be short of breath during shift and remain comfortable.

## 2025-02-08 NOTE — PROGRESS NOTES
"Yesenia Milner is a 89 y.o. female on day 5 of admission presenting with Acute decompensated heart failure.    Subjective   Doing well, breathign is better walking better still a bit wheezing       Objective     Physical Exam  Constitutional:       Appearance: Normal appearance.   HENT:      Head: Normocephalic and atraumatic.      Right Ear: Tympanic membrane and ear canal normal.      Left Ear: Tympanic membrane and ear canal normal.      Mouth/Throat:      Mouth: Mucous membranes are moist.      Pharynx: Oropharynx is clear.   Eyes:      Extraocular Movements: Extraocular movements intact.      Conjunctiva/sclera: Conjunctivae normal.      Pupils: Pupils are equal, round, and reactive to light.   Cardiovascular:      Rate and Rhythm: Normal rate and regular rhythm.      Pulses: Normal pulses.      Heart sounds: Normal heart sounds.   Pulmonary:      Effort: Pulmonary effort is normal.      Breath sounds: Normal breath sounds.   Abdominal:      General: Abdomen is flat. Bowel sounds are normal.      Palpations: Abdomen is soft.   Musculoskeletal:         General: Normal range of motion.      Cervical back: Normal range of motion and neck supple.   Skin:     General: Skin is warm and dry.      Capillary Refill: Capillary refill takes 2 to 3 seconds.   Neurological:      General: No focal deficit present.      Mental Status: She is alert and oriented to person, place, and time. Mental status is at baseline.   Psychiatric:         Mood and Affect: Mood normal.         Behavior: Behavior normal.         Thought Content: Thought content normal.         Judgment: Judgment normal.         Last Recorded Vitals  Blood pressure 123/57, pulse 70, temperature 35.4 °C (95.7 °F), temperature source Temporal, resp. rate 15, height 1.6 m (5' 3\"), weight 76.2 kg (167 lb 15.9 oz), SpO2 98%.  Intake/Output last 3 Shifts:  I/O last 3 completed shifts:  In: 897 (11.8 mL/kg) [P.O.:897]  Out: 2050 (26.9 mL/kg) [Urine:2050 (0.7 " mL/kg/hr)]  Weight: 76.2 kg     Relevant Results                              Assessment/Plan   Assessment & Plan  Acute decompensated heart failure    Patient arrived today after recently being discharged from the hospital.  She reports she has been in the hospital multiple times since [place] after having an MI.  Today she came in for shortness of breath and bilateral lower extremity edema.  Patient states she has been having trouble with ambulation and was unable to put her MISA hose on because her knees were so swollen she could not bend them.  Chest x-ray revealed vascular congestion with small bilateral pleural effusions.  Patient follows with Dr. Ramicone and has a significant cardiac history.  Cardiology consult placed, appreciate recs.  Patient recently had echo on 12/21/2024.  Patient received additional dose of torsemide 30 mg in ED for additional diuresis, home daily torsemide continued in morning.  Patient also reported difficulty with urination, denied any burning but reports frequency/dribbling/small amounts at a time.  UA ordered.     Patient admitted to Dr. Iglesia mart for further medical management.      # CHF exacerbation  # Vascular congestion/pleural effusions  # Shortness of breath  # Elevated troponin  # Hyponatremia  # BLE swelling  # Hx CHF  -Cardiology consult, follows with Dr. Ramicone  -Last echo 12/21/2024 EF 60 to 65%, abnormal left ventricular wall motion, MI, mitral annular calcification, MVR, TVR, elevated right ventricular systolic pressure.  -Fluid restriction  -Strict I's and O's  -Daily weight  -Continue home torsemide, was given extra dose in ED  -As needed oxygen  -As needed EKG, coronary symptoms  -PT/OT/SW    -Cardiac low-salt diet   -admitted to observation with telemetry    -q4 vitals    -morning labs, trend troponin, Na+,      Chronic Conditions   # CVA  # MI, pacemaker, SSS  # Hypertension  # COPD  -albuterol prn  # Arthritis  # Venous insufficiency     Continue home  medications as ordered when nursing completes home med rec.    Full Code      #DVT Prophylaxis   Scd's as tolerated   DVT Prophylaxis Heparin   On Plavix     Thorough record review performed of previous labs and notes from prior encounters.      2/3: mild swelling, on 3 liters doing well, cardio consult    2/4: doing well no issues contineu current care, offerd dc but wanted another dc, potential dc tomorrow     2/5: BP is better today, continue diuretics, repeat cxr to rule out pna, no signs of pna suspect atelectasis    2/6; BP better conintue diuresis, still sob, cxr shows improvement, consult pulm    2/7: contineu steroids monitor labs, wean oxygewn    2/8; still wheezign contineu course, dc once wheezing and sob is regine  r    I spent 35 minutes in the professional and overall care of this patient.      Iglesia Gabriel, DO

## 2025-02-08 NOTE — PROGRESS NOTES
"Pulmonary Critical Care note    Patient Name :Yesenia Milner  Date of service : 02/08/25  MRN: [id number]  YOB: 1933    Interval History:    2/7/2025: patient seen and examined at bedside this morning. Currently on 2L NC without any signs of respiratory distress. Wheezing much improved today as compared to yesterday.     History of Present Illness:    89 y.o. female  on day  5 of admission presenting with Acute decompensated heart failure.    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 Her symptoms had been gradually worsening over 10 days prior to presentation to the ED. She has also experienced gradual weight gain. She has had increased cough described as \"moist\" and has had difficulty with ambulation due to shortness of breath. Her shortness of breath also bothers her with changes of position. Her urine output was reported to have decreased. She also admits to abdominal swelling.    In the ED, vitals were /56, HR 67, RR 24, SpO2 94% on room air, temp 36.3 deg F. Initial labs were significant for hyponatremia at 133; ; troponin 15 -> 17; CBC with anemia (hemoglobin 7.8, baseline around 9); UA unremarkable. CXR showed moderate vascular congestion with small bilateral pleural effusions. Patient was admitted to the general medical service and treated with diuretics. CXR 2/3 showed an increased RLL " airspace opacity with stability of left-sided pleural effusion. CXR was again repeated yesterday as patient continued to be short of breath and showed improvement in infiltrates and left-sided effusion. Pulmonology was consulted for shortness of breath and wheezing.    Patient was seen and examined at bedside this morning. She states that she continues to be short of breath especially with getting up and moving around with physical therapy this morning. She reports that she came in to the hospital due to bleeding problems. Her cough has improved since she was recently discharged. She states she had some testing done a while ago that showed COPD, and that she has been on Advair which her PCP was trying to wean off.     Past Medical/Surgical History:    has a past medical history of Gross hematuria (10/07/2020), Impacted cerumen (02/22/2024), Other conditions influencing health status, Personal history of other medical treatment, Personal history of other medical treatment, and Systolic CHF (Multi) (05/18/2023).   has a past surgical history that includes Appendectomy (11/22/2017); Hysterectomy (11/22/2017); Tonsillectomy (11/22/2017); Other surgical history (11/22/2017); Other surgical history (11/22/2017); Cardiac pacemaker placement (03/11/2021); IR angiogram renal bilateral (Bilateral, 12/14/2020); IR angiogram inferior epigastric pelvic (12/14/2020); IR angiogram inferior epigastric pelvic (12/14/2020); and Cardiac catheterization (N/A, 12/20/2024).   reports that she quit smoking about 52 years ago. Her smoking use included cigarettes. She has been exposed to tobacco smoke. She has never used smokeless tobacco. She reports that she does not drink alcohol and does not use drugs.  Family History:   No relevant family history has been documented for this patient.    Allergies:     Iodine, Shrimp, Hydrocodone, and Adhesive tape-silicones      Social history:   reports that she quit smoking about 52 years ago. Her  smoking use included cigarettes. She has been exposed to tobacco smoke. She has never used smokeless tobacco. She reports that she does not drink alcohol and does not use drugs.      Review of Systems:   8 point review of systems was completed and negative except as noted above in HPI      Physical Exam:   Vital Signs:   Vitals:    02/08/25 0900   BP: 123/57   Pulse: 70   Resp: 15   Temp: 35.4 °C (95.7 °F)   SpO2: 98%     Vitals:    02/08/25 0558   Weight: 76.2 kg (167 lb 15.9 oz)       Input/Output:    Intake/Output Summary (Last 24 hours) at 2/8/2025 1212  Last data filed at 2/8/2025 0930  Gross per 24 hour   Intake 654 ml   Output 950 ml   Net -296 ml       Oxygen requirements:    Ventilator Information:     Oxygen Therapy/Pulse Ox  Medical Gas Therapy: Supplemental oxygen  Medical Gas Delivery Method: Nasal cannula  SpO2: 98 %  Patient Activity During SpO2 Measurement: At rest  Temp: 35.4 °C (95.7 °F)    Constitutional: Well developed, A&Ox3, no acute distress, alert and cooperative  Eyes: EOMI, clear sclera  Respiratory/Thorax: improvement in bilateral expiratory wheezing, good chest expansion  Cardiovascular: Regular rate, regular rhythm  Gastrointestinal: Nondistended, soft, non-tender  Extremities: No cyanosis or edema. Peripheral pulses intact  Skin: Warm and dry    Current Medications:   Scheduled medications  aspirin, 81 mg, oral, Daily  calcium carbonate-vitamin D3, 1 tablet, oral, Daily  clopidogrel, 75 mg, oral, Daily  furosemide, 20 mg, intravenous, Once  heparin (porcine), 5,000 Units, subcutaneous, q8h  ipratropium-albuteroL, 3 mL, nebulization, TID  metoprolol tartrate, 25 mg, oral, Daily  polyethylene glycol, 17 g, oral, BID  predniSONE, 30 mg, oral, Daily  torsemide, 20 mg, oral, Daily      Continuous medications     PRN medications  PRN medications: acetaminophen, dextromethorphan-guaifenesin, ipratropium-albuteroL, lubricating eye drops, melatonin, oxygen      Investigations:  Labs,  radiological imaging and cardiac work up were personally reviewed    Assessment  Yesenia Milner IS 89 y.o. female  on day  5 of admission presenting with Acute decompensated heart failure. Actively being treated for the  following medical Problems:    Acute hypoxic respiratory failure  AE HFpEF  Pleural effusions secondary to above with associated atelectasis, improved from admission  Underlying COPD, no PFTs in system, previously on Advair  Suspected pulmonary hypertension    Recommendation:  Continue home PO torsemide per Cardiology recommendations  Transitioned to PO prednisone 30mg daily as wheezing had improved  Continue scheduled and PRN Deshaun, MISSY Nam  Recommend patient continue Advair inhaler after discharge vs [id number] therapy  Patient should follow-up with Pulmonology in outpatient setting for PFTs and further management  Continue incentive spirometry  Continue oxygen supplementation, wean as tolerated  DVT prophylaxis with subcutaneous heparin      Emil Parker MD  Pulmonary critical care consultant  02/08/25  12:12 PM

## 2025-02-08 NOTE — CARE PLAN
Problem: Heart Failure  Goal: Improved gas exchange this shift  Outcome: Progressing  Goal: Improved urinary output this shift  Outcome: Progressing     Problem: Pain - Adult  Goal: Verbalizes/displays adequate comfort level or baseline comfort level  Outcome: Progressing   The patient's goals for the shift include      The clinical goals for the shift include pt will remain HDS throughout the shift    Over the shift, the patient did not make progress toward the following goals. Barriers to progression include . Recommendations to address these barriers include .

## 2025-02-09 ENCOUNTER — APPOINTMENT (OUTPATIENT)
Dept: RADIOLOGY | Facility: HOSPITAL | Age: OVER 89
DRG: 291 | End: 2025-02-09
Payer: MEDICARE

## 2025-02-09 VITALS
OXYGEN SATURATION: 96 % | BODY MASS INDEX: 29.77 KG/M2 | SYSTOLIC BLOOD PRESSURE: 107 MMHG | HEART RATE: 83 BPM | HEIGHT: 63 IN | RESPIRATION RATE: 16 BRPM | WEIGHT: 167.99 LBS | DIASTOLIC BLOOD PRESSURE: 51 MMHG | TEMPERATURE: 97.7 F

## 2025-02-09 LAB
ANION GAP SERPL CALC-SCNC: 11 MMOL/L (ref 10–20)
BUN SERPL-MCNC: 36 MG/DL (ref 6–23)
CALCIUM SERPL-MCNC: 9.1 MG/DL (ref 8.6–10.3)
CHLORIDE SERPL-SCNC: 100 MMOL/L (ref 98–107)
CO2 SERPL-SCNC: 31 MMOL/L (ref 21–32)
CREAT SERPL-MCNC: 0.92 MG/DL (ref 0.5–1.05)
EGFRCR SERPLBLD CKD-EPI 2021: 59 ML/MIN/1.73M*2
ERYTHROCYTE [DISTWIDTH] IN BLOOD BY AUTOMATED COUNT: 14.4 % (ref 11.5–14.5)
GLUCOSE SERPL-MCNC: 92 MG/DL (ref 74–99)
HCT VFR BLD AUTO: 26 % (ref 36–46)
HGB BLD-MCNC: 8.1 G/DL (ref 12–16)
MCH RBC QN AUTO: 27.5 PG (ref 26–34)
MCHC RBC AUTO-ENTMCNC: 31.2 G/DL (ref 32–36)
MCV RBC AUTO: 88 FL (ref 80–100)
NRBC BLD-RTO: 0 /100 WBCS (ref 0–0)
PLATELET # BLD AUTO: 272 X10*3/UL (ref 150–450)
POTASSIUM SERPL-SCNC: 4.7 MMOL/L (ref 3.5–5.3)
RBC # BLD AUTO: 2.95 X10*6/UL (ref 4–5.2)
SODIUM SERPL-SCNC: 137 MMOL/L (ref 136–145)
WBC # BLD AUTO: 8.3 X10*3/UL (ref 4.4–11.3)

## 2025-02-09 PROCEDURE — 94640 AIRWAY INHALATION TREATMENT: CPT

## 2025-02-09 PROCEDURE — 2500000004 HC RX 250 GENERAL PHARMACY W/ HCPCS (ALT 636 FOR OP/ED)

## 2025-02-09 PROCEDURE — 99232 SBSQ HOSP IP/OBS MODERATE 35: CPT | Performed by: INTERNAL MEDICINE

## 2025-02-09 PROCEDURE — 2500000002 HC RX 250 W HCPCS SELF ADMINISTERED DRUGS (ALT 637 FOR MEDICARE OP, ALT 636 FOR OP/ED): Performed by: STUDENT IN AN ORGANIZED HEALTH CARE EDUCATION/TRAINING PROGRAM

## 2025-02-09 PROCEDURE — 36415 COLL VENOUS BLD VENIPUNCTURE: CPT | Performed by: STUDENT IN AN ORGANIZED HEALTH CARE EDUCATION/TRAINING PROGRAM

## 2025-02-09 PROCEDURE — 85027 COMPLETE CBC AUTOMATED: CPT | Performed by: STUDENT IN AN ORGANIZED HEALTH CARE EDUCATION/TRAINING PROGRAM

## 2025-02-09 PROCEDURE — 1100000001 HC PRIVATE ROOM DAILY

## 2025-02-09 PROCEDURE — 82374 ASSAY BLOOD CARBON DIOXIDE: CPT | Performed by: STUDENT IN AN ORGANIZED HEALTH CARE EDUCATION/TRAINING PROGRAM

## 2025-02-09 PROCEDURE — 71045 X-RAY EXAM CHEST 1 VIEW: CPT | Performed by: RADIOLOGY

## 2025-02-09 PROCEDURE — 99232 SBSQ HOSP IP/OBS MODERATE 35: CPT | Performed by: STUDENT IN AN ORGANIZED HEALTH CARE EDUCATION/TRAINING PROGRAM

## 2025-02-09 PROCEDURE — 2500000001 HC RX 250 WO HCPCS SELF ADMINISTERED DRUGS (ALT 637 FOR MEDICARE OP)

## 2025-02-09 PROCEDURE — 2500000005 HC RX 250 GENERAL PHARMACY W/O HCPCS

## 2025-02-09 PROCEDURE — 2500000001 HC RX 250 WO HCPCS SELF ADMINISTERED DRUGS (ALT 637 FOR MEDICARE OP): Performed by: INTERNAL MEDICINE

## 2025-02-09 PROCEDURE — 71045 X-RAY EXAM CHEST 1 VIEW: CPT

## 2025-02-09 RX ADMIN — IPRATROPIUM BROMIDE AND ALBUTEROL SULFATE 3 ML: .5; 3 SOLUTION RESPIRATORY (INHALATION) at 12:04

## 2025-02-09 RX ADMIN — CLOPIDOGREL BISULFATE 75 MG: 75 TABLET ORAL at 08:55

## 2025-02-09 RX ADMIN — PREDNISONE 30 MG: 20 TABLET ORAL at 08:56

## 2025-02-09 RX ADMIN — TORSEMIDE 20 MG: 20 TABLET ORAL at 08:54

## 2025-02-09 RX ADMIN — ASPIRIN 81 MG CHEWABLE TABLET 81 MG: 81 TABLET CHEWABLE at 08:57

## 2025-02-09 RX ADMIN — HEPARIN SODIUM 5000 UNITS: 5000 INJECTION INTRAVENOUS; SUBCUTANEOUS at 11:59

## 2025-02-09 RX ADMIN — POLYETHYLENE GLYCOL 3350 17 G: 17 POWDER, FOR SOLUTION ORAL at 21:12

## 2025-02-09 RX ADMIN — IPRATROPIUM BROMIDE AND ALBUTEROL SULFATE 3 ML: .5; 3 SOLUTION RESPIRATORY (INHALATION) at 19:42

## 2025-02-09 RX ADMIN — METOPROLOL TARTRATE 25 MG: 25 TABLET, FILM COATED ORAL at 08:57

## 2025-02-09 RX ADMIN — HEPARIN SODIUM 5000 UNITS: 5000 INJECTION INTRAVENOUS; SUBCUTANEOUS at 04:38

## 2025-02-09 RX ADMIN — HEPARIN SODIUM 5000 UNITS: 5000 INJECTION INTRAVENOUS; SUBCUTANEOUS at 21:12

## 2025-02-09 RX ADMIN — Medication 2 L/MIN: at 13:17

## 2025-02-09 RX ADMIN — POLYETHYLENE GLYCOL 3350 17 G: 17 POWDER, FOR SOLUTION ORAL at 08:57

## 2025-02-09 RX ADMIN — Medication 1 TABLET: at 08:56

## 2025-02-09 ASSESSMENT — COGNITIVE AND FUNCTIONAL STATUS - GENERAL
TOILETING: A LITTLE
DRESSING REGULAR UPPER BODY CLOTHING: A LITTLE
DAILY ACTIVITIY SCORE: 18
MOVING TO AND FROM BED TO CHAIR: A LITTLE
EATING MEALS: A LITTLE
MOBILITY SCORE: 17
HELP NEEDED FOR BATHING: A LITTLE
TOILETING: A LITTLE
WALKING IN HOSPITAL ROOM: A LITTLE
EATING MEALS: A LITTLE
DAILY ACTIVITIY SCORE: 18
DRESSING REGULAR LOWER BODY CLOTHING: A LITTLE
CLIMB 3 TO 5 STEPS WITH RAILING: A LOT
HELP NEEDED FOR BATHING: A LITTLE
DRESSING REGULAR UPPER BODY CLOTHING: A LITTLE
STANDING UP FROM CHAIR USING ARMS: A LITTLE
TURNING FROM BACK TO SIDE WHILE IN FLAT BAD: A LITTLE
CLIMB 3 TO 5 STEPS WITH RAILING: A LOT
MOBILITY SCORE: 18
PERSONAL GROOMING: A LITTLE
STANDING UP FROM CHAIR USING ARMS: A LITTLE
MOVING TO AND FROM BED TO CHAIR: A LITTLE
PERSONAL GROOMING: A LITTLE
WALKING IN HOSPITAL ROOM: A LITTLE
DRESSING REGULAR LOWER BODY CLOTHING: A LITTLE
TURNING FROM BACK TO SIDE WHILE IN FLAT BAD: A LITTLE
MOVING FROM LYING ON BACK TO SITTING ON SIDE OF FLAT BED WITH BEDRAILS: A LITTLE

## 2025-02-09 ASSESSMENT — PAIN - FUNCTIONAL ASSESSMENT: PAIN_FUNCTIONAL_ASSESSMENT: 0-10

## 2025-02-09 ASSESSMENT — PAIN SCALES - GENERAL
PAINLEVEL_OUTOF10: 0 - NO PAIN
PAINLEVEL_OUTOF10: 0 - NO PAIN

## 2025-02-09 NOTE — PROGRESS NOTES
"Yesenia Milner is a 89 y.o. female on day 6 of admission presenting with Acute decompensated heart failure.            HPI:    The patient is sitting up in a chair this morning.  She still is feeling short of breath.    Past medical history:    HFpEF  Takotsubo cardiomyopathy, with normal coronary arteries by cardiac catheterization December 20, 2024  History of dual-chamber pacemaker insertion  Hypertension  History of a stroke treated with thrombolytic therapy August 21, 2023  Physical Exam:    General Appearance:  Alert, oriented, no distress  Skin:  Warm and dry  Head and Neck:  No elevation of JVP, no carotid bruits  Cardiac Exam:  Rhythm is regular, S1 and S2 are normal, grade 2/6 holosystolic murmur at the lower left sternal border and apex  Lungs: Diminished in the bases left greater than right with end expiratory wheezes bilaterally  Extremities: No edema  Neurologic:  No focal deficits  Psychiatric:  Appropriate mood and behavior     Last Recorded Vitals  Blood pressure 140/63, pulse 70, temperature 36.5 °C (97.7 °F), temperature source Temporal, resp. rate 16, height 1.6 m (5' 3\"), weight 76.2 kg (167 lb 15.9 oz), SpO2 98%.  Intake/Output last 3 Shifts:  I/O last 3 completed shifts:  In: 237 (3.1 mL/kg) [P.O.:237]  Out: 1500 (19.7 mL/kg) [Urine:1500 (0.5 mL/kg/hr)]  Weight: 76.2 kg     Medications:  Scheduled medications  aspirin, 81 mg, oral, Daily  calcium carbonate-vitamin D3, 1 tablet, oral, Daily  clopidogrel, 75 mg, oral, Daily  furosemide, 20 mg, intravenous, Once  heparin (porcine), 5,000 Units, subcutaneous, q8h  ipratropium-albuteroL, 3 mL, nebulization, TID  metoprolol tartrate, 25 mg, oral, Daily  polyethylene glycol, 17 g, oral, BID  predniSONE, 30 mg, oral, Daily  torsemide, 20 mg, oral, Daily        Labs:    CMP:  Recent Labs     02/09/25  0708 02/08/25  0745 02/06/25  0536 02/05/25  0635 02/04/25  0643 02/03/25  0644 02/02/25  1704 01/23/25  0558 01/13/25  0626 01/12/25  1122 " 01/03/25  0344 01/02/25  1227 12/21/24  0538 12/20/24  0204 08/23/23  0523 08/22/23  0539    135* 135* 136 136 136 133* 136   < > 134*   < > 135*   < > 135*   < > 139   K 4.7 4.2 3.9 4.0 3.8 3.5 4.0 3.9   < > 3.9   < > 4.3   < > 4.2   < > 4.8    98 101 100 99 101 100 101   < > 101   < > 105   < > 104   < > 107   CO2 31 29 28 28 30 28 24 27   < > 23   < > 22   < > 20*   < > 27   ANIONGAP 11 12 10 12 11 11 13 12   < > 14   < > 12   < > 15   < > 10   BUN 36* 31* 24* 24* 22 22 28* 25*   < > 24*   < > 12   < > 32*   < > 19   CREATININE 0.92 0.91 0.96 0.97 0.95 0.89 0.97 0.96   < > 0.92   < > 0.88   < > 1.19*   < > 1.05   EGFR 59* 60* 56* 55* 57* 61 55* 56*   < > 59*   < > 62   < > 43*   < >  --    MG  --   --   --   --   --   --  2.25  --   --  2.21  --  2.05  --  2.19  --  2.31    < > = values in this interval not displayed.     Recent Labs     01/20/25  0709 01/19/25  0641 01/18/25  0740 01/13/25  0626 01/12/25  1122   ALBUMIN 3.2* 3.2* 2.9* 3.6 3.9   ALKPHOS 81 84 76 78 83   ALT 16 17 16 16 15   AST 16 17 15 16 17   BILITOT 0.3 0.3 0.4 0.5 0.5   LIPASE  --   --   --   --  23     CBC:  Recent Labs     02/09/25  0708 02/08/25  0745 02/06/25  0536 02/05/25  0635 02/04/25  0643 02/03/25  0644 02/02/25  1704 01/23/25  0558   WBC 8.3 6.7 4.2* 4.4 4.8 4.7 5.5 5.8   HGB 8.1* 8.2* 8.2* 8.1* 7.9* 7.7* 7.8* 9.1*   HCT 26.0* 25.8* 26.2* 25.9* 25.3* 24.2* 24.5* 28.0*    289 296 286 284 274 283 300   MCV 88 87 89 90 89 89 88 87     COAG:   Recent Labs     01/16/25  0709 01/12/25  1122 01/05/25  1412 01/05/25  1028 01/04/25  0612 01/03/25  1539 01/03/25  0900 01/03/25  0344 01/02/25  2334 12/21/24  0538 12/20/24  1013 12/20/24  0251 08/21/23  0056 07/13/23  1335 01/08/23  1412 12/14/20  0731   INR 1.5* 1.7*  --   --   --   --   --   --   --  1.1  --  1.1 1.1 1.1 1.2* 1.1   HAUF  --   --  1.8* 0.4 0.4 0.5 0.7 0.9 1.0  --  1.1*  --   --   --   --   --      ABO:   Recent Labs     01/12/25  1205   ABO A      HEME/ENDO:  Recent Labs     01/13/25  0626 12/20/24  0204 01/24/24  1049 08/21/23  0056 05/18/23  1346 01/17/23  0943 04/11/19  0930   FERRITIN 28  --   --   --  72  --   --    IRONSAT 5*  --   --   --  28  --  30   TSH  --   --   --   --   --  2.25  --    HGBA1C  --  5.5 5.4 5.9*  --   --   --       CARDIAC:   Recent Labs     02/03/25  0644 02/02/25  1916 02/02/25  1704 01/02/25  1420 01/02/25  1227 12/20/24  0439 12/20/24  0251 12/20/24  0204 01/08/23  1412 03/30/22  1611 04/15/20  1403 04/19/19  2035   TROPHS 17* 17* 15* 38* 36* 8,220* 1,426* 112*   < >  --   --   --    BNP  --   --  462*  --  485*  --  379*  --   --  212* 138* 112*    < > = values in this interval not displayed.     Recent Labs     12/20/24  0204 01/24/24  1049 08/21/23  0056 01/17/23  0943 03/16/21  1015   CHOL 145 152 162 155 180   LDLF  --   --  81 75 106*   HDL 63.1 65.4 64.3 65.5 57.8   TRIG 70 66 85 72 82     MICRO:   Recent Labs     01/17/23  0943   CRP 1.76*     No results found for the last 90 days.      Test Results:    Echocardiogram December 21, 2023: Ejection fraction 60 to 65%, moderate mitral valve regurgitation, moderate to severe tricuspid regurgitation    Assessment/Plan:    1.  Acute on chronic diastolic CHF: The peripheral edema has resolved.  The patient is less short of breath, but overall feeling extremely weak.  Continue torsemide at the same dosage.  A portable chest x-ray will be obtained today.    2.  Sick sinus syndrome: History of a [employer]onic dual chamber pacemaker insertion.  The pacemaker pulse generator was replaced in July 2023.  The device shows appropriate pacemaker function and the estimated battery longevity is 6 years.    James C Ramicone, DO

## 2025-02-09 NOTE — PROGRESS NOTES
"Pulmonary Critical Care note    Patient Name :Yesenia Milner  Date of service : 02/09/25  MRN: [id number]  YOB: 1933    Interval History:    2/7/2025: patient seen and examined at bedside this morning. Currently on 2L NC without any signs of respiratory distress. Wheezing much improved today as compared to yesterday.     History of Present Illness:    89 y.o. female  on day  6 of admission presenting with Acute decompensated heart failure.    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 Her symptoms had been gradually worsening over 10 days prior to presentation to the ED. She has also experienced gradual weight gain. She has had increased cough described as \"moist\" and has had difficulty with ambulation due to shortness of breath. Her shortness of breath also bothers her with changes of position. Her urine output was reported to have decreased. She also admits to abdominal swelling.    In the ED, vitals were /56, HR 67, RR 24, SpO2 94% on room air, temp 36.3 deg F. Initial labs were significant for hyponatremia at 133; ; troponin 15 -> 17; CBC with anemia (hemoglobin 7.8, baseline around 9); UA unremarkable. CXR showed moderate vascular congestion with small bilateral pleural effusions. Patient was admitted to the general medical service and treated with diuretics. CXR 2/3 showed an increased RLL " airspace opacity with stability of left-sided pleural effusion. CXR was again repeated yesterday as patient continued to be short of breath and showed improvement in infiltrates and left-sided effusion. Pulmonology was consulted for shortness of breath and wheezing.    Patient was seen and examined at bedside this morning. She states that she continues to be short of breath especially with getting up and moving around with physical therapy this morning. She reports that she came in to the hospital due to bleeding problems. Her cough has improved since she was recently discharged. She states she had some testing done a while ago that showed COPD, and that she has been on Advair which her PCP was trying to wean off.     Past Medical/Surgical History:    has a past medical history of Gross hematuria (10/07/2020), Impacted cerumen (02/22/2024), Other conditions influencing health status, Personal history of other medical treatment, Personal history of other medical treatment, and Systolic CHF (Multi) (05/18/2023).   has a past surgical history that includes Appendectomy (11/22/2017); Hysterectomy (11/22/2017); Tonsillectomy (11/22/2017); Other surgical history (11/22/2017); Other surgical history (11/22/2017); Cardiac pacemaker placement (03/11/2021); IR angiogram renal bilateral (Bilateral, 12/14/2020); IR angiogram inferior epigastric pelvic (12/14/2020); IR angiogram inferior epigastric pelvic (12/14/2020); and Cardiac catheterization (N/A, 12/20/2024).   reports that she quit smoking about 52 years ago. Her smoking use included cigarettes. She has been exposed to tobacco smoke. She has never used smokeless tobacco. She reports that she does not drink alcohol and does not use drugs.  Family History:   No relevant family history has been documented for this patient.    Allergies:     Iodine, Shrimp, Hydrocodone, and Adhesive tape-silicones      Social history:   reports that she quit smoking about 52 years ago. Her  smoking use included cigarettes. She has been exposed to tobacco smoke. She has never used smokeless tobacco. She reports that she does not drink alcohol and does not use drugs.      Review of Systems:   8 point review of systems was completed and negative except as noted above in HPI      Physical Exam:   Vital Signs:   Vitals:    02/09/25 1204   BP:    Pulse:    Resp:    Temp:    SpO2: 98%     Vitals:    02/08/25 0558   Weight: 76.2 kg (167 lb 15.9 oz)       Input/Output:    Intake/Output Summary (Last 24 hours) at 2/9/2025 1251  Last data filed at 2/9/2025 0604  Gross per 24 hour   Intake --   Output 1100 ml   Net -1100 ml       Oxygen requirements:    Ventilator Information:  FiO2 (%):  [28 %] 28 %  Oxygen Therapy/Pulse Ox  Medical Gas Therapy: Supplemental oxygen  Medical Gas Delivery Method: Nasal cannula  FiO2 (%): 28 % (2 L/min)  SpO2: 98 %  Patient Activity During SpO2 Measurement: At rest  Temp: 36.5 °C (97.7 °F)    Constitutional: Well developed, A&Ox3, no acute distress, alert and cooperative  Eyes: EOMI, clear sclera  Respiratory/Thorax: improvement in bilateral expiratory wheezing, good chest expansion  Cardiovascular: Regular rate, regular rhythm  Gastrointestinal: Nondistended, soft, non-tender  Extremities: No cyanosis or edema. Peripheral pulses intact  Skin: Warm and dry    Current Medications:   Scheduled medications  aspirin, 81 mg, oral, Daily  calcium carbonate-vitamin D3, 1 tablet, oral, Daily  clopidogrel, 75 mg, oral, Daily  furosemide, 20 mg, intravenous, Once  heparin (porcine), 5,000 Units, subcutaneous, q8h  ipratropium-albuteroL, 3 mL, nebulization, TID  metoprolol tartrate, 25 mg, oral, Daily  polyethylene glycol, 17 g, oral, BID  predniSONE, 30 mg, oral, Daily  torsemide, 20 mg, oral, Daily      Continuous medications     PRN medications  PRN medications: acetaminophen, dextromethorphan-guaifenesin, ipratropium-albuteroL, lubricating eye drops, melatonin,  oxygen      Investigations:  Labs, radiological imaging and cardiac work up were personally reviewed    Assessment  Yesenia Milner IS 89 y.o. female  on day  6 of admission presenting with Acute decompensated heart failure. Actively being treated for the  following medical Problems:    Acute hypoxic respiratory failure  AE HFpEF  Pleural effusions secondary to above with associated atelectasis, improved from admission  Underlying COPD, no PFTs in system, previously on Advair  Suspected pulmonary hypertension    Recommendation:  Improving shortness of breath and wheezing but still have dyspnea on exertion    Continue home PO torsemide per Cardiology recommendations  Transitioned to PO prednisone 30mg daily as wheezing had improved  Continue scheduled and PRN DuoNebs, PRN Robitussin  Recommend patient continue Advair inhaler after discharge vs [id number] therapy  Patient should follow-up with Pulmonology in outpatient setting for PFTs and further management  Continue incentive spirometry  Continue oxygen supplementation, wean as tolerated  DVT prophylaxis with subcutaneous heparin      Emil Parker MD  Pulmonary critical care consultant  02/09/25  12:51 PM

## 2025-02-09 NOTE — CARE PLAN
Problem: Heart Failure  Goal: Improved gas exchange this shift  Outcome: Progressing  Goal: Improved urinary output this shift  Outcome: Progressing  Goal: Reduction in peripheral edema within 24 hours  Outcome: Progressing     Problem: Safety - Adult  Goal: Free from fall injury  Outcome: Progressing   The patient's goals for the shift include      The clinical goals for the shift include pt will not have any increased sob during the shift    Over the shift, the patient did not make progress toward the following goals. Barriers to progression include . Recommendations to address these barriers include .

## 2025-02-09 NOTE — CARE PLAN
The patient's goals for the shift include  safety and decreased swelling.    The clinical goals for the shift include patient will have decreased swelling during shift.    Over the shift, the patient did not make progress toward the following goals. Barriers to progression include chf history. Recommendations to address these barriers include medicine and consults.

## 2025-02-09 NOTE — PROGRESS NOTES
"Yesenia Milner is a 89 y.o. female on day 6 of admission presenting with Acute decompensated heart failure.    Subjective   Doing well, breathign is better walking better still a bit wheezing       Objective     Physical Exam  Constitutional:       Appearance: Normal appearance.   HENT:      Head: Normocephalic and atraumatic.      Right Ear: Tympanic membrane and ear canal normal.      Left Ear: Tympanic membrane and ear canal normal.      Mouth/Throat:      Mouth: Mucous membranes are moist.      Pharynx: Oropharynx is clear.   Eyes:      Extraocular Movements: Extraocular movements intact.      Conjunctiva/sclera: Conjunctivae normal.      Pupils: Pupils are equal, round, and reactive to light.   Cardiovascular:      Rate and Rhythm: Normal rate and regular rhythm.      Pulses: Normal pulses.      Heart sounds: Normal heart sounds.   Pulmonary:      Effort: Pulmonary effort is normal.      Breath sounds: Normal breath sounds.   Abdominal:      General: Abdomen is flat. Bowel sounds are normal.      Palpations: Abdomen is soft.   Musculoskeletal:         General: Normal range of motion.      Cervical back: Normal range of motion and neck supple.   Skin:     General: Skin is warm and dry.      Capillary Refill: Capillary refill takes 2 to 3 seconds.   Neurological:      General: No focal deficit present.      Mental Status: She is alert and oriented to person, place, and time. Mental status is at baseline.   Psychiatric:         Mood and Affect: Mood normal.         Behavior: Behavior normal.         Thought Content: Thought content normal.         Judgment: Judgment normal.         Last Recorded Vitals  Blood pressure 140/63, pulse 70, temperature 36.5 °C (97.7 °F), temperature source Temporal, resp. rate 16, height 1.6 m (5' 3\"), weight 76.2 kg (167 lb 15.9 oz), SpO2 98%.  Intake/Output last 3 Shifts:  I/O last 3 completed shifts:  In: 237 (3.1 mL/kg) [P.O.:237]  Out: 1500 (19.7 mL/kg) [Urine:1500 (0.5 " mL/kg/hr)]  Weight: 76.2 kg     Relevant Results                              Assessment/Plan   Assessment & Plan  Acute decompensated heart failure    Patient arrived today after recently being discharged from the hospital.  She reports she has been in the hospital multiple times since [place] after having an MI.  Today she came in for shortness of breath and bilateral lower extremity edema.  Patient states she has been having trouble with ambulation and was unable to put her MISA hose on because her knees were so swollen she could not bend them.  Chest x-ray revealed vascular congestion with small bilateral pleural effusions.  Patient follows with Dr. Ramicone and has a significant cardiac history.  Cardiology consult placed, appreciate recs.  Patient recently had echo on 12/21/2024.  Patient received additional dose of torsemide 30 mg in ED for additional diuresis, home daily torsemide continued in morning.  Patient also reported difficulty with urination, denied any burning but reports frequency/dribbling/small amounts at a time.  UA ordered.     Patient admitted to Dr. Iglesia mart for further medical management.      # CHF exacerbation  # Vascular congestion/pleural effusions  # Shortness of breath  # Elevated troponin  # Hyponatremia  # BLE swelling  # Hx CHF  -Cardiology consult, follows with Dr. Ramicone  -Last echo 12/21/2024 EF 60 to 65%, abnormal left ventricular wall motion, MI, mitral annular calcification, MVR, TVR, elevated right ventricular systolic pressure.  -Fluid restriction  -Strict I's and O's  -Daily weight  -Continue home torsemide, was given extra dose in ED  -As needed oxygen  -As needed EKG, coronary symptoms  -PT/OT/SW    -Cardiac low-salt diet   -admitted to observation with telemetry    -q4 vitals    -morning labs, trend troponin, Na+,      Chronic Conditions   # CVA  # MI, pacemaker, SSS  # Hypertension  # COPD  -albuterol prn  # Arthritis  # Venous insufficiency     Continue home  medications as ordered when nursing completes home med rec.    Full Code      #DVT Prophylaxis   Scd's as tolerated   DVT Prophylaxis Heparin   On Plavix     Thorough record review performed of previous labs and notes from prior encounters.      2/3: mild swelling, on 3 liters doing well, cardio consult    2/4: doing well no issues contineu current care, offerd dc but wanted another dc, potential dc tomorrow     2/5: BP is better today, continue diuretics, repeat cxr to rule out pna, no signs of pna suspect atelectasis    2/6; BP better conintue diuresis, still sob, cxr shows improvement, consult pulm    2/7: contineu steroids monitor labs, wean oxygewn    2/8; still wheezign contineu course, dc once wheezing and sob is regine    2/9: doing well still sob      I spent 35 minutes in the professional and overall care of this patient.      Iglesia Gabriel, DO

## 2025-02-10 DIAGNOSIS — I63.9 CEREBROVASCULAR ACCIDENT (CVA), UNSPECIFIED MECHANISM (MULTI): ICD-10-CM

## 2025-02-10 LAB
ANION GAP SERPL CALC-SCNC: 11 MMOL/L (ref 10–20)
BUN SERPL-MCNC: 32 MG/DL (ref 6–23)
CALCIUM SERPL-MCNC: 8.9 MG/DL (ref 8.6–10.3)
CHLORIDE SERPL-SCNC: 101 MMOL/L (ref 98–107)
CO2 SERPL-SCNC: 29 MMOL/L (ref 21–32)
CREAT SERPL-MCNC: 0.84 MG/DL (ref 0.5–1.05)
EGFRCR SERPLBLD CKD-EPI 2021: 66 ML/MIN/1.73M*2
ERYTHROCYTE [DISTWIDTH] IN BLOOD BY AUTOMATED COUNT: 14.3 % (ref 11.5–14.5)
GLUCOSE SERPL-MCNC: 82 MG/DL (ref 74–99)
HCT VFR BLD AUTO: 25.7 % (ref 36–46)
HGB BLD-MCNC: 7.9 G/DL (ref 12–16)
MCH RBC QN AUTO: 27.1 PG (ref 26–34)
MCHC RBC AUTO-ENTMCNC: 30.7 G/DL (ref 32–36)
MCV RBC AUTO: 88 FL (ref 80–100)
NRBC BLD-RTO: 0 /100 WBCS (ref 0–0)
PLATELET # BLD AUTO: 253 X10*3/UL (ref 150–450)
POTASSIUM SERPL-SCNC: 4.3 MMOL/L (ref 3.5–5.3)
RBC # BLD AUTO: 2.91 X10*6/UL (ref 4–5.2)
SODIUM SERPL-SCNC: 137 MMOL/L (ref 136–145)
WBC # BLD AUTO: 5.5 X10*3/UL (ref 4.4–11.3)

## 2025-02-10 PROCEDURE — 82374 ASSAY BLOOD CARBON DIOXIDE: CPT | Performed by: STUDENT IN AN ORGANIZED HEALTH CARE EDUCATION/TRAINING PROGRAM

## 2025-02-10 PROCEDURE — 99232 SBSQ HOSP IP/OBS MODERATE 35: CPT | Performed by: STUDENT IN AN ORGANIZED HEALTH CARE EDUCATION/TRAINING PROGRAM

## 2025-02-10 PROCEDURE — 2500000004 HC RX 250 GENERAL PHARMACY W/ HCPCS (ALT 636 FOR OP/ED)

## 2025-02-10 PROCEDURE — 36415 COLL VENOUS BLD VENIPUNCTURE: CPT | Performed by: STUDENT IN AN ORGANIZED HEALTH CARE EDUCATION/TRAINING PROGRAM

## 2025-02-10 PROCEDURE — 2500000001 HC RX 250 WO HCPCS SELF ADMINISTERED DRUGS (ALT 637 FOR MEDICARE OP): Performed by: INTERNAL MEDICINE

## 2025-02-10 PROCEDURE — 2500000002 HC RX 250 W HCPCS SELF ADMINISTERED DRUGS (ALT 637 FOR MEDICARE OP, ALT 636 FOR OP/ED): Performed by: STUDENT IN AN ORGANIZED HEALTH CARE EDUCATION/TRAINING PROGRAM

## 2025-02-10 PROCEDURE — 1100000001 HC PRIVATE ROOM DAILY

## 2025-02-10 PROCEDURE — 2500000001 HC RX 250 WO HCPCS SELF ADMINISTERED DRUGS (ALT 637 FOR MEDICARE OP)

## 2025-02-10 PROCEDURE — 99232 SBSQ HOSP IP/OBS MODERATE 35: CPT | Performed by: INTERNAL MEDICINE

## 2025-02-10 PROCEDURE — 85027 COMPLETE CBC AUTOMATED: CPT | Performed by: STUDENT IN AN ORGANIZED HEALTH CARE EDUCATION/TRAINING PROGRAM

## 2025-02-10 PROCEDURE — 94640 AIRWAY INHALATION TREATMENT: CPT

## 2025-02-10 RX ORDER — CLOPIDOGREL BISULFATE 75 MG/1
75 TABLET ORAL DAILY
Qty: 90 TABLET | Refills: 0 | Status: SHIPPED | OUTPATIENT
Start: 2025-02-10

## 2025-02-10 RX ORDER — TORSEMIDE 20 MG/1
10 TABLET ORAL DAILY
Status: DISCONTINUED | OUTPATIENT
Start: 2025-02-10 | End: 2025-02-11 | Stop reason: HOSPADM

## 2025-02-10 RX ADMIN — POLYETHYLENE GLYCOL 3350 17 G: 17 POWDER, FOR SOLUTION ORAL at 20:43

## 2025-02-10 RX ADMIN — HEPARIN SODIUM 5000 UNITS: 5000 INJECTION INTRAVENOUS; SUBCUTANEOUS at 04:55

## 2025-02-10 RX ADMIN — TORSEMIDE 10 MG: 20 TABLET ORAL at 10:02

## 2025-02-10 RX ADMIN — POLYETHYLENE GLYCOL 3350 17 G: 17 POWDER, FOR SOLUTION ORAL at 10:02

## 2025-02-10 RX ADMIN — METOPROLOL TARTRATE 25 MG: 25 TABLET, FILM COATED ORAL at 10:02

## 2025-02-10 RX ADMIN — ASPIRIN 81 MG CHEWABLE TABLET 81 MG: 81 TABLET CHEWABLE at 10:02

## 2025-02-10 RX ADMIN — IPRATROPIUM BROMIDE AND ALBUTEROL SULFATE 3 ML: .5; 3 SOLUTION RESPIRATORY (INHALATION) at 12:24

## 2025-02-10 RX ADMIN — IPRATROPIUM BROMIDE AND ALBUTEROL SULFATE 3 ML: .5; 3 SOLUTION RESPIRATORY (INHALATION) at 19:31

## 2025-02-10 RX ADMIN — CLOPIDOGREL BISULFATE 75 MG: 75 TABLET ORAL at 10:02

## 2025-02-10 RX ADMIN — IPRATROPIUM BROMIDE AND ALBUTEROL SULFATE 3 ML: .5; 3 SOLUTION RESPIRATORY (INHALATION) at 06:39

## 2025-02-10 RX ADMIN — HEPARIN SODIUM 5000 UNITS: 5000 INJECTION INTRAVENOUS; SUBCUTANEOUS at 14:23

## 2025-02-10 RX ADMIN — HEPARIN SODIUM 5000 UNITS: 5000 INJECTION INTRAVENOUS; SUBCUTANEOUS at 20:43

## 2025-02-10 RX ADMIN — Medication 1 TABLET: at 10:02

## 2025-02-10 RX ADMIN — PREDNISONE 30 MG: 20 TABLET ORAL at 10:02

## 2025-02-10 ASSESSMENT — COGNITIVE AND FUNCTIONAL STATUS - GENERAL
MOVING FROM LYING ON BACK TO SITTING ON SIDE OF FLAT BED WITH BEDRAILS: A LITTLE
TURNING FROM BACK TO SIDE WHILE IN FLAT BAD: A LITTLE
MOVING FROM LYING ON BACK TO SITTING ON SIDE OF FLAT BED WITH BEDRAILS: A LITTLE
WALKING IN HOSPITAL ROOM: A LITTLE
DRESSING REGULAR UPPER BODY CLOTHING: A LITTLE
PERSONAL GROOMING: A LITTLE
HELP NEEDED FOR BATHING: A LITTLE
MOVING TO AND FROM BED TO CHAIR: A LITTLE
TOILETING: A LITTLE
EATING MEALS: A LITTLE
MOBILITY SCORE: 18
PERSONAL GROOMING: A LITTLE
STANDING UP FROM CHAIR USING ARMS: A LITTLE
DRESSING REGULAR LOWER BODY CLOTHING: A LITTLE
DAILY ACTIVITIY SCORE: 18
DRESSING REGULAR LOWER BODY CLOTHING: A LITTLE
MOVING TO AND FROM BED TO CHAIR: A LITTLE
STANDING UP FROM CHAIR USING ARMS: A LITTLE
CLIMB 3 TO 5 STEPS WITH RAILING: A LITTLE
MOBILITY SCORE: 18
DRESSING REGULAR UPPER BODY CLOTHING: A LITTLE
EATING MEALS: A LITTLE
WALKING IN HOSPITAL ROOM: A LITTLE
HELP NEEDED FOR BATHING: A LITTLE
DAILY ACTIVITIY SCORE: 18
CLIMB 3 TO 5 STEPS WITH RAILING: A LITTLE
TURNING FROM BACK TO SIDE WHILE IN FLAT BAD: A LITTLE
TOILETING: A LITTLE

## 2025-02-10 ASSESSMENT — PAIN SCALES - GENERAL
PAINLEVEL_OUTOF10: 0 - NO PAIN
PAINLEVEL_OUTOF10: 0 - NO PAIN

## 2025-02-10 ASSESSMENT — PAIN - FUNCTIONAL ASSESSMENT: PAIN_FUNCTIONAL_ASSESSMENT: 0-10

## 2025-02-10 NOTE — ED PROVIDER NOTES
HPI   Chief Complaint   Patient presents with    Shortness of Breath       The patient is a 89-year-old female with past medical history as below presents today for evaluation of shortness of breath.  Patient has been multiple admissions in the past month issues with heart failure changes occasional diagnosed cancer.  States weight gain.  Difficulty breathing when lying flat.  No chest pain abdominal pain              Patient History   Past Medical History:   Diagnosis Date    Gross hematuria 10/07/2020    Impacted cerumen 02/22/2024    Other conditions influencing health status     Normal stress echocardiogram    Personal history of other medical treatment     History of echocardiogram    Personal history of other medical treatment     H/O Doppler ultrasound    Systolic CHF (Multi) 05/18/2023     Past Surgical History:   Procedure Laterality Date    APPENDECTOMY  11/22/2017    Appendectomy    CARDIAC CATHETERIZATION N/A 12/20/2024    Procedure: Left Heart Cath, With LV;  Surgeon: Tahir Ruelas MD;  Location: [facility] Cardiac Cath Lab;  Service: Cardiovascular;  Laterality: N/A;    CARDIAC PACEMAKER PLACEMENT  03/11/2021    Pacemaker Placement    HYSTERECTOMY  11/22/2017    Hysterectomy    IR ANGIOGRAM INFERIOR EPIGASTRIC PELVIC  12/14/2020    IR ANGIOGRAM INFERIOR EPIGASTRIC PELVIC 12/14/2020 [facility]    IR ANGIOGRAM INFERIOR EPIGASTRIC PELVIC  12/14/2020    IR ANGIOGRAM INFERIOR EPIGASTRIC PELVIC 12/14/2020 [facility]    IR ANGIOGRAM RENAL BILATERAL Bilateral 12/14/2020    IR ANGIOGRAM RENAL BILATERAL 12/14/2020 [facility]    OTHER SURGICAL HISTORY  11/22/2017    Stab Phlebectomy Of Varicose Veins    OTHER SURGICAL HISTORY  11/22/2017    Venous Ligation With Stripping    TONSILLECTOMY  11/22/2017    Tonsillectomy     Family History   Problem Relation Name Age of Onset    No Known Problems Mother       Social History     Tobacco Use    Smoking status: Former     Current packs/day: 0.00     Types:  Cigarettes     Quit date:      Years since quittin.1     Passive exposure: Past    Smokeless tobacco: Never   Vaping Use    Vaping status: Never Used   Substance Use Topics    Alcohol use: Never    Drug use: Never       Physical Exam   ED Triage Vitals   Temp Heart Rate Resp BP   25 1633 25 1633 25 1633 25 1633   36.3 °C (97.3 °F) 67 (!) 24 116/56      SpO2 Temp Source Heart Rate Source Patient Position   25 1633 25 2226 25 2226 25 1633   94 % Temporal Monitor Lying      BP Location FiO2 (%)     25 1633 25 1204     Right arm 28 %       Physical Exam  Vitals and nursing note reviewed.   Constitutional:       Appearance: Normal appearance.   HENT:      Head: Normocephalic and atraumatic.      Nose: Nose normal.      Mouth/Throat:      Mouth: Mucous membranes are moist.   Eyes:      Conjunctiva/sclera: Conjunctivae normal.   Cardiovascular:      Rate and Rhythm: Normal rate and regular rhythm.      Pulses: Normal pulses.      Heart sounds: Normal heart sounds.   Pulmonary:      Effort: Pulmonary effort is normal.      Breath sounds: Normal breath sounds.   Abdominal:      General: Abdomen is flat.      Palpations: Abdomen is soft.      Tenderness: There is no abdominal tenderness. There is no guarding or rebound.   Musculoskeletal:         General: No deformity.   Neurological:      General: No focal deficit present.      Mental Status: She is alert and oriented to person, place, and time. Mental status is at baseline.   Psychiatric:         Mood and Affect: Mood normal.         Behavior: Behavior normal.           ED Course & MDM   ED Course as of 02/10/25 1127   Sun 2025   1652 ECG 12 lead  EKG shows paced rhythm rate 75 [SE]      ED Course User Index  [SE] Car Pierre MD         Diagnoses as of 02/10/25 1127   Acute decompensated heart failure                 No data recorded     [place] Coma Scale Score: 15 (02/10/25 0700 : Zoe  Delilah Flower, SHELIA)                           Medical Decision Making  Patient presents for evaluation of shortness of breath found to be in acutely decompensated heart failure.  Discussed outpatient versus inpatient management with the patient and her daughter.  Patient strongly prefers inpatient management called discussed the case with her primary doctor Dr. Gabriel who accepted the patient for admission for further evaluation and management of acute decompensated heart failure    Amount and/or Complexity of Data Reviewed  Independent Historian: caregiver  External Data Reviewed: labs.  Labs: ordered.  Radiology: ordered.  ECG/medicine tests: ordered and independent interpretation performed. Decision-making details documented in ED Course.    Risk  Prescription drug management.  Decision regarding hospitalization.        Procedure  Procedures     Car Pierre MD  02/10/25 1121

## 2025-02-10 NOTE — NURSING NOTE
1100  Patient up walking the solorio on room air saturation 90%-93%.  Back to the room resting in chair 96%  1400  94% Room air up to chair.

## 2025-02-10 NOTE — CARE PLAN
The patient's goals for the shift include  safety decreased swelling.    The clinical goals for the shift include patient will have decreased swelling during shift.    Over the shift, the patient did not make progress toward the following goals. Barriers to progression include short of breath, chf. Recommendations to address these barriers include medication and consults.

## 2025-02-10 NOTE — CARE PLAN
Problem: Heart Failure  Goal: Improved gas exchange this shift  Outcome: Progressing     Problem: Pain - Adult  Goal: Verbalizes/displays adequate comfort level or baseline comfort level  Outcome: Progressing     Problem: Safety - Adult  Goal: Free from fall injury  Outcome: Progressing   The patient's goals for the shift include      The clinical goals for the shift include pt will have decreased swelling by the end of shift    Over the shift, the patient did not make progress toward the following goals. Barriers to progression include . Recommendations to address these barriers include .

## 2025-02-10 NOTE — PROGRESS NOTES
"Yesenia Milner is a 89 y.o. female on day 7 of admission presenting with Acute decompensated heart failure.    Subjective   Doing well, breathign is better walking better still a bit wheezing       Objective     Physical Exam  Constitutional:       Appearance: Normal appearance.   HENT:      Head: Normocephalic and atraumatic.      Right Ear: Tympanic membrane and ear canal normal.      Left Ear: Tympanic membrane and ear canal normal.      Mouth/Throat:      Mouth: Mucous membranes are moist.      Pharynx: Oropharynx is clear.   Eyes:      Extraocular Movements: Extraocular movements intact.      Conjunctiva/sclera: Conjunctivae normal.      Pupils: Pupils are equal, round, and reactive to light.   Cardiovascular:      Rate and Rhythm: Normal rate and regular rhythm.      Pulses: Normal pulses.      Heart sounds: Normal heart sounds.   Pulmonary:      Effort: Pulmonary effort is normal.      Breath sounds: Normal breath sounds.   Abdominal:      General: Abdomen is flat. Bowel sounds are normal.      Palpations: Abdomen is soft.   Musculoskeletal:         General: Normal range of motion.      Cervical back: Normal range of motion and neck supple.   Skin:     General: Skin is warm and dry.      Capillary Refill: Capillary refill takes 2 to 3 seconds.   Neurological:      General: No focal deficit present.      Mental Status: She is alert and oriented to person, place, and time. Mental status is at baseline.   Psychiatric:         Mood and Affect: Mood normal.         Behavior: Behavior normal.         Thought Content: Thought content normal.         Judgment: Judgment normal.         Last Recorded Vitals  Blood pressure (!) 95/48, pulse 66, temperature 35.9 °C (96.6 °F), temperature source Temporal, resp. rate 16, height 1.6 m (5' 3\"), weight 75.6 kg (166 lb 11.2 oz), SpO2 94%.  Intake/Output last 3 Shifts:  I/O last 3 completed shifts:  In: 590 (7.8 mL/kg) [P.O.:590]  Out: 2600 (34.4 mL/kg) [Urine:2600 (1 " mL/kg/hr)]  Weight: 75.6 kg     Relevant Results                              Assessment/Plan   Assessment & Plan  Acute decompensated heart failure    Patient arrived today after recently being discharged from the hospital.  She reports she has been in the hospital multiple times since [place] after having an MI.  Today she came in for shortness of breath and bilateral lower extremity edema.  Patient states she has been having trouble with ambulation and was unable to put her MISA hose on because her knees were so swollen she could not bend them.  Chest x-ray revealed vascular congestion with small bilateral pleural effusions.  Patient follows with Dr. Ramicone and has a significant cardiac history.  Cardiology consult placed, appreciate recs.  Patient recently had echo on 12/21/2024.  Patient received additional dose of torsemide 30 mg in ED for additional diuresis, home daily torsemide continued in morning.  Patient also reported difficulty with urination, denied any burning but reports frequency/dribbling/small amounts at a time.  UA ordered.     Patient admitted to Dr. Iglesia mart for further medical management.      # CHF exacerbation  # Vascular congestion/pleural effusions  # Shortness of breath  # Elevated troponin  # Hyponatremia  # BLE swelling  # Hx CHF  -Cardiology consult, follows with Dr. Ramicone  -Last echo 12/21/2024 EF 60 to 65%, abnormal left ventricular wall motion, MI, mitral annular calcification, MVR, TVR, elevated right ventricular systolic pressure.  -Fluid restriction  -Strict I's and O's  -Daily weight  -Continue home torsemide, was given extra dose in ED  -As needed oxygen  -As needed EKG, coronary symptoms  -PT/OT/SW    -Cardiac low-salt diet   -admitted to observation with telemetry    -q4 vitals    -morning labs, trend troponin, Na+,      Chronic Conditions   # CVA  # MI, pacemaker, SSS  # Hypertension  # COPD  -albuterol prn  # Arthritis  # Venous insufficiency     Continue home  medications as ordered when nursing completes home med rec.    Full Code      #DVT Prophylaxis   Scd's as tolerated   DVT Prophylaxis Heparin   On Plavix     Thorough record review performed of previous labs and notes from prior encounters.      2/3: mild swelling, on 3 liters doing well, cardio consult    2/4: doing well no issues contineu current care, offerd dc but wanted another dc, potential dc tomorrow     2/5: BP is better today, continue diuretics, repeat cxr to rule out pna, no signs of pna suspect atelectasis    2/6; BP better conintue diuresis, still sob, cxr shows improvement, consult pulm    2/7: contineu steroids monitor labs, wean oxygewn    2/8; still wheezign contineu course, dc once wheezing and sob is regine    2/9: doing well still sob    2/10: able to wean off oxygen, walk and ambulate, potential dc tomorrow      I spent 35 minutes in the professional and overall care of this patient.      Iglesia Gabriel, DO

## 2025-02-10 NOTE — PROGRESS NOTES
2/10/2025  Followed up with pt in room, introduced self.  Reviewed plan upon discharge- plan is for [facility].  Questions answered and support provided.    Salena Talavera RN [facility]

## 2025-02-10 NOTE — PROGRESS NOTES
"Yesenia Milner is a 89 y.o. female on day 7 of admission presenting with Acute decompensated heart failure.            HPI:    The patient has no new cardiac complaints at this time.  She denies orthopnea.  No chest pain or palpitations.    Past medical history:    HFpEF  Takotsubo cardiomyopathy, with normal coronary arteries by cardiac catheterization December 20, 2024  History of dual-chamber pacemaker insertion  Hypertension  History of a stroke treated with thrombolytic therapy August 21, 2023    Physical Exam:    General Appearance:  Alert, oriented, no distress  Skin:  Warm and dry  Head and Neck:  No elevation of JVP, no carotid bruits  Cardiac Exam:  Rhythm is regular, S1 and S2 are normal, grade 2/6 holosystolic murmur at the lower left sternal border and apex  Lungs: Diminished in the bases left greater than right  Extremities: No edema  Neurologic:  No focal deficits  Psychiatric:  Appropriate mood and behavior     Last Recorded Vitals  Blood pressure 124/57, pulse 68, temperature 36.1 °C (97 °F), resp. rate 16, height 1.6 m (5' 3\"), weight 75.6 kg (166 lb 11.2 oz), SpO2 95%.  Intake/Output last 3 Shifts:  I/O last 3 completed shifts:  In: 590 (7.8 mL/kg) [P.O.:590]  Out: 2600 (34.4 mL/kg) [Urine:2600 (1 mL/kg/hr)]  Weight: 75.6 kg     Medications:  Scheduled medications  aspirin, 81 mg, oral, Daily  calcium carbonate-vitamin D3, 1 tablet, oral, Daily  clopidogrel, 75 mg, oral, Daily  furosemide, 20 mg, intravenous, Once  heparin (porcine), 5,000 Units, subcutaneous, q8h  ipratropium-albuteroL, 3 mL, nebulization, TID  metoprolol tartrate, 25 mg, oral, Daily  polyethylene glycol, 17 g, oral, BID  predniSONE, 30 mg, oral, Daily  torsemide, 20 mg, oral, Daily        Labs:    CMP:  Recent Labs     02/09/25  0708 02/08/25  0745 02/06/25  0536 02/05/25  0635 02/04/25  0643 02/03/25  0644 02/02/25  1704 01/23/25  0558 01/13/25  0626 01/12/25  1122 01/03/25  0344 01/02/25  1227 12/21/24  0538 12/20/24  0204 " 08/23/23  0523 08/22/23  0539    135* 135* 136 136 136 133* 136   < > 134*   < > 135*   < > 135*   < > 139   K 4.7 4.2 3.9 4.0 3.8 3.5 4.0 3.9   < > 3.9   < > 4.3   < > 4.2   < > 4.8    98 101 100 99 101 100 101   < > 101   < > 105   < > 104   < > 107   CO2 31 29 28 28 30 28 24 27   < > 23   < > 22   < > 20*   < > 27   ANIONGAP 11 12 10 12 11 11 13 12   < > 14   < > 12   < > 15   < > 10   BUN 36* 31* 24* 24* 22 22 28* 25*   < > 24*   < > 12   < > 32*   < > 19   CREATININE 0.92 0.91 0.96 0.97 0.95 0.89 0.97 0.96   < > 0.92   < > 0.88   < > 1.19*   < > 1.05   EGFR 59* 60* 56* 55* 57* 61 55* 56*   < > 59*   < > 62   < > 43*   < >  --    MG  --   --   --   --   --   --  2.25  --   --  2.21  --  2.05  --  2.19  --  2.31    < > = values in this interval not displayed.     Recent Labs     01/20/25  0709 01/19/25  0641 01/18/25  0740 01/13/25  0626 01/12/25  1122   ALBUMIN 3.2* 3.2* 2.9* 3.6 3.9   ALKPHOS 81 84 76 78 83   ALT 16 17 16 16 15   AST 16 17 15 16 17   BILITOT 0.3 0.3 0.4 0.5 0.5   LIPASE  --   --   --   --  23     CBC:  Recent Labs     02/10/25  0649 02/09/25  0708 02/08/25  0745 02/06/25  0536 02/05/25  0635 02/04/25  0643 02/03/25  0644 02/02/25  1704   WBC 5.5 8.3 6.7 4.2* 4.4 4.8 4.7 5.5   HGB 7.9* 8.1* 8.2* 8.2* 8.1* 7.9* 7.7* 7.8*   HCT 25.7* 26.0* 25.8* 26.2* 25.9* 25.3* 24.2* 24.5*    272 289 296 286 284 274 283   MCV 88 88 87 89 90 89 89 88     COAG:   Recent Labs     01/16/25  0709 01/12/25  1122 01/05/25  1412 01/05/25  1028 01/04/25  0612 01/03/25  1539 01/03/25  0900 01/03/25  0344 01/02/25  2334 12/21/24  0538 12/20/24  1013 12/20/24  0251 08/21/23  0056 07/13/23  1335 01/08/23  1412 12/14/20  0731   INR 1.5* 1.7*  --   --   --   --   --   --   --  1.1  --  1.1 1.1 1.1 1.2* 1.1   HAUF  --   --  1.8* 0.4 0.4 0.5 0.7 0.9 1.0  --  1.1*  --   --   --   --   --      ABO:   Recent Labs     01/12/25  1205   ABO A     HEME/ENDO:  Recent Labs     01/13/25  0626 12/20/24  0204  01/24/24  1049 08/21/23  0056 05/18/23  1346 01/17/23  0943 04/11/19  0930   FERRITIN 28  --   --   --  72  --   --    IRONSAT 5*  --   --   --  28  --  30   TSH  --   --   --   --   --  2.25  --    HGBA1C  --  5.5 5.4 5.9*  --   --   --       CARDIAC:   Recent Labs     02/03/25  0644 02/02/25  1916 02/02/25  1704 01/02/25  1420 01/02/25  1227 12/20/24  0439 12/20/24  0251 12/20/24  0204 01/08/23  1412 03/30/22  1611 04/15/20  1403 04/19/19  2035   TROPHS 17* 17* 15* 38* 36* 8,220* 1,426* 112*   < >  --   --   --    BNP  --   --  462*  --  485*  --  379*  --   --  212* 138* 112*    < > = values in this interval not displayed.     Recent Labs     12/20/24  0204 01/24/24  1049 08/21/23  0056 01/17/23  0943 03/16/21  1015   CHOL 145 152 162 155 180   LDLF  --   --  81 75 106*   HDL 63.1 65.4 64.3 65.5 57.8   TRIG 70 66 85 72 82     MICRO:   Recent Labs     01/17/23  0943   CRP 1.76*     No results found for the last 90 days.      Test Results:    Echocardiogram December 21, 2023: Ejection fraction 60 to 65%, moderate mitral valve regurgitation, moderate to severe tricuspid regurgitation    Assessment/Plan:    1.  Acute on chronic diastolic CHF: The peripheral edema has resolved.  The patient is less short of breath, but overall feeling extremely weak.  The chest x-ray does not show pulmonary vascular congestion at this time.  BUN is rising.  Therefore torsemide will be decreased to 10 mg daily.    2.  Sick sinus syndrome: History of a [employer] dual chamber pacemaker insertion.  The pacemaker pulse generator was replaced in July 2023.  The device shows appropriate pacemaker function and the estimated battery longevity is 6 years.    James C Ramicone, DO

## 2025-02-10 NOTE — PROGRESS NOTES
"Pulmonary Critical Care note    Patient Name :Yesenia Milner  Date of service : 02/10/25  MRN: [id number]  YOB: 1933    Interval History:  2/10/2025: Patient was seen and examined she is down to room air, may improved wheezing  2/7/2025: patient seen and examined at bedside this morning. Currently on 2L NC without any signs of respiratory distress. Wheezing much improved today as compared to yesterday.     History of Present Illness:    89 y.o. female  on day  7 of admission presenting with Acute decompensated heart failure.    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 Her symptoms had been gradually worsening over 10 days prior to presentation to the ED. She has also experienced gradual weight gain. She has had increased cough described as \"moist\" and has had difficulty with ambulation due to shortness of breath. Her shortness of breath also bothers her with changes of position. Her urine output was reported to have decreased. She also admits to abdominal swelling.    In the ED, vitals were /56, HR 67, RR 24, SpO2 94% on room air, temp 36.3 deg F. Initial labs were significant for hyponatremia at 133; ; troponin 15 -> 17; CBC with anemia (hemoglobin 7.8, baseline around 9); UA unremarkable. CXR showed moderate vascular congestion with small bilateral pleural effusions. Patient was admitted to " the general medical service and treated with diuretics. CXR 2/3 showed an increased RLL airspace opacity with stability of left-sided pleural effusion. CXR was again repeated yesterday as patient continued to be short of breath and showed improvement in infiltrates and left-sided effusion. Pulmonology was consulted for shortness of breath and wheezing.    Patient was seen and examined at bedside this morning. She states that she continues to be short of breath especially with getting up and moving around with physical therapy this morning. She reports that she came in to the hospital due to bleeding problems. Her cough has improved since she was recently discharged. She states she had some testing done a while ago that showed COPD, and that she has been on Advair which her PCP was trying to wean off.     Past Medical/Surgical History:    has a past medical history of Gross hematuria (10/07/2020), Impacted cerumen (02/22/2024), Other conditions influencing health status, Personal history of other medical treatment, Personal history of other medical treatment, and Systolic CHF (Multi) (05/18/2023).   has a past surgical history that includes Appendectomy (11/22/2017); Hysterectomy (11/22/2017); Tonsillectomy (11/22/2017); Other surgical history (11/22/2017); Other surgical history (11/22/2017); Cardiac pacemaker placement (03/11/2021); IR angiogram renal bilateral (Bilateral, 12/14/2020); IR angiogram inferior epigastric pelvic (12/14/2020); IR angiogram inferior epigastric pelvic (12/14/2020); and Cardiac catheterization (N/A, 12/20/2024).   reports that she quit smoking about 52 years ago. Her smoking use included cigarettes. She has been exposed to tobacco smoke. She has never used smokeless tobacco. She reports that she does not drink alcohol and does not use drugs.  Family History:   No relevant family history has been documented for this patient.    Allergies:     Iodine, Shrimp, Hydrocodone, and Adhesive  tape-silicones      Social history:   reports that she quit smoking about 52 years ago. Her smoking use included cigarettes. She has been exposed to tobacco smoke. She has never used smokeless tobacco. She reports that she does not drink alcohol and does not use drugs.      Review of Systems:   8 point review of systems was completed and negative except as noted above in HPI      Physical Exam:   Vital Signs:   Vitals:    02/10/25 1356   BP: (!) 95/48   Pulse: 66   Resp: 16   Temp: 35.9 °C (96.6 °F)   SpO2: 94%     Vitals:    02/10/25 0531   Weight: 75.6 kg (166 lb 11.2 oz)       Input/Output:    Intake/Output Summary (Last 24 hours) at 2/10/2025 1636  Last data filed at 2/10/2025 0639  Gross per 24 hour   Intake 590 ml   Output 350 ml   Net 240 ml       Oxygen requirements:    Ventilator Information:  FiO2 (%):  [28 %] 28 %  Oxygen Therapy/Pulse Ox  Medical Gas Therapy: None (Room air)  Medical Gas Delivery Method: Nasal cannula  FiO2 (%): 28 %  SpO2: 94 %  Patient Activity During SpO2 Measurement: At rest  Temp: 35.9 °C (96.6 °F)    Constitutional: Well developed, A&Ox3, no acute distress, alert and cooperative  Eyes: EOMI, clear sclera  Respiratory/Thorax: improvement in bilateral expiratory wheezing, good chest expansion  Cardiovascular: Regular rate, regular rhythm  Gastrointestinal: Nondistended, soft, non-tender  Extremities: No cyanosis or edema. Peripheral pulses intact  Skin: Warm and dry    Current Medications:   Scheduled medications  aspirin, 81 mg, oral, Daily  calcium carbonate-vitamin D3, 1 tablet, oral, Daily  clopidogrel, 75 mg, oral, Daily  heparin (porcine), 5,000 Units, subcutaneous, q8h  ipratropium-albuteroL, 3 mL, nebulization, TID  metoprolol tartrate, 25 mg, oral, Daily  polyethylene glycol, 17 g, oral, BID  predniSONE, 30 mg, oral, Daily  torsemide, 10 mg, oral, Daily      Continuous medications     PRN medications  PRN medications: acetaminophen, dextromethorphan-guaifenesin,  ipratropium-albuteroL, lubricating eye drops, melatonin, oxygen      Investigations:  Labs, radiological imaging and cardiac work up were personally reviewed    Assessment  Yesenia Milner IS 89 y.o. female  on day  7 of admission presenting with Acute decompensated heart failure. Actively being treated for the  following medical Problems:    Acute hypoxic respiratory failure  AE HFpEF  Pleural effusions secondary to above with associated atelectasis, improved from admission  Underlying COPD, no PFTs in system, previously on Advair  Suspected pulmonary hypertension    Recommendation:  Patient was seen and examined she is down to room air, improved wheezing  Okay to discharge from pulmonary standpoint  Continue home PO torsemide per Cardiology recommendations  Transitioned to PO prednisone 30mg daily as wheezing had improved  Continue scheduled and PRN DuoNebs, PRN Robitussin  Recommend patient continue Advair inhaler after discharge vs [id number] therapy  Patient should follow-up with Pulmonology in outpatient setting for PFTs and further management  Continue incentive spirometry  Continue oxygen supplementation, wean as tolerated  DVT prophylaxis with subcutaneous heparin      Emil Parker MD  Pulmonary critical care consultant  02/10/25  4:36 PM

## 2025-02-11 ENCOUNTER — DOCUMENTATION (OUTPATIENT)
Dept: HOME HEALTH SERVICES | Facility: HOME HEALTH | Age: OVER 89
End: 2025-02-11
Payer: MEDICARE

## 2025-02-11 ENCOUNTER — HOME HEALTH ADMISSION (OUTPATIENT)
Dept: HOME HEALTH SERVICES | Facility: HOME HEALTH | Age: OVER 89
End: 2025-02-11
Payer: MEDICARE

## 2025-02-11 VITALS
OXYGEN SATURATION: 95 % | BODY MASS INDEX: 29.54 KG/M2 | RESPIRATION RATE: 18 BRPM | HEART RATE: 77 BPM | DIASTOLIC BLOOD PRESSURE: 68 MMHG | TEMPERATURE: 97.7 F | WEIGHT: 166.7 LBS | HEIGHT: 63 IN | SYSTOLIC BLOOD PRESSURE: 128 MMHG

## 2025-02-11 PROCEDURE — 2500000001 HC RX 250 WO HCPCS SELF ADMINISTERED DRUGS (ALT 637 FOR MEDICARE OP): Performed by: INTERNAL MEDICINE

## 2025-02-11 PROCEDURE — 2500000001 HC RX 250 WO HCPCS SELF ADMINISTERED DRUGS (ALT 637 FOR MEDICARE OP)

## 2025-02-11 PROCEDURE — 2500000002 HC RX 250 W HCPCS SELF ADMINISTERED DRUGS (ALT 637 FOR MEDICARE OP, ALT 636 FOR OP/ED): Performed by: STUDENT IN AN ORGANIZED HEALTH CARE EDUCATION/TRAINING PROGRAM

## 2025-02-11 PROCEDURE — 97535 SELF CARE MNGMENT TRAINING: CPT | Mod: GO

## 2025-02-11 PROCEDURE — 99238 HOSP IP/OBS DSCHRG MGMT 30/<: CPT | Performed by: STUDENT IN AN ORGANIZED HEALTH CARE EDUCATION/TRAINING PROGRAM

## 2025-02-11 PROCEDURE — 2500000004 HC RX 250 GENERAL PHARMACY W/ HCPCS (ALT 636 FOR OP/ED)

## 2025-02-11 PROCEDURE — 94640 AIRWAY INHALATION TREATMENT: CPT

## 2025-02-11 RX ORDER — METOPROLOL TARTRATE 25 MG/1
25 TABLET, FILM COATED ORAL DAILY
Qty: 30 TABLET | Refills: 0 | Status: SHIPPED | OUTPATIENT
Start: 2025-02-12

## 2025-02-11 RX ORDER — PREDNISONE 10 MG/1
30 TABLET ORAL DAILY
Qty: 15 TABLET | Refills: 0 | Status: SHIPPED | OUTPATIENT
Start: 2025-02-12 | End: 2025-02-17

## 2025-02-11 RX ORDER — TORSEMIDE 10 MG/1
10 TABLET ORAL DAILY
Qty: 30 TABLET | Refills: 0 | Status: SHIPPED | OUTPATIENT
Start: 2025-02-12

## 2025-02-11 RX ADMIN — Medication 1 TABLET: at 08:58

## 2025-02-11 RX ADMIN — HEPARIN SODIUM 5000 UNITS: 5000 INJECTION INTRAVENOUS; SUBCUTANEOUS at 04:42

## 2025-02-11 RX ADMIN — CLOPIDOGREL BISULFATE 75 MG: 75 TABLET ORAL at 08:58

## 2025-02-11 RX ADMIN — PREDNISONE 30 MG: 20 TABLET ORAL at 08:58

## 2025-02-11 RX ADMIN — METOPROLOL TARTRATE 25 MG: 25 TABLET, FILM COATED ORAL at 08:58

## 2025-02-11 RX ADMIN — IPRATROPIUM BROMIDE AND ALBUTEROL SULFATE 3 ML: .5; 3 SOLUTION RESPIRATORY (INHALATION) at 07:19

## 2025-02-11 RX ADMIN — ASPIRIN 81 MG CHEWABLE TABLET 81 MG: 81 TABLET CHEWABLE at 08:58

## 2025-02-11 RX ADMIN — TORSEMIDE 10 MG: 20 TABLET ORAL at 08:58

## 2025-02-11 RX ADMIN — POLYETHYLENE GLYCOL 3350 17 G: 17 POWDER, FOR SOLUTION ORAL at 09:00

## 2025-02-11 ASSESSMENT — PAIN - FUNCTIONAL ASSESSMENT: PAIN_FUNCTIONAL_ASSESSMENT: 0-10

## 2025-02-11 ASSESSMENT — COGNITIVE AND FUNCTIONAL STATUS - GENERAL
DAILY ACTIVITIY SCORE: 18
MOBILITY SCORE: 18
PERSONAL GROOMING: A LITTLE
DRESSING REGULAR UPPER BODY CLOTHING: A LITTLE
DRESSING REGULAR LOWER BODY CLOTHING: A LITTLE
HELP NEEDED FOR BATHING: A LOT
STANDING UP FROM CHAIR USING ARMS: A LITTLE
DRESSING REGULAR UPPER BODY CLOTHING: A LITTLE
DRESSING REGULAR LOWER BODY CLOTHING: A LITTLE
HELP NEEDED FOR BATHING: A LITTLE
TURNING FROM BACK TO SIDE WHILE IN FLAT BAD: A LITTLE
TOILETING: A LITTLE
MOVING TO AND FROM BED TO CHAIR: A LITTLE
CLIMB 3 TO 5 STEPS WITH RAILING: A LITTLE
MOVING FROM LYING ON BACK TO SITTING ON SIDE OF FLAT BED WITH BEDRAILS: A LITTLE
WALKING IN HOSPITAL ROOM: A LITTLE
DAILY ACTIVITIY SCORE: 18
TOILETING: A LITTLE
PERSONAL GROOMING: A LITTLE
EATING MEALS: A LITTLE

## 2025-02-11 ASSESSMENT — PAIN SCALES - GENERAL
PAINLEVEL_OUTOF10: 0 - NO PAIN
PAINLEVEL_OUTOF10: 0 - NO PAIN

## 2025-02-11 ASSESSMENT — ACTIVITIES OF DAILY LIVING (ADL): HOME_MANAGEMENT_TIME_ENTRY: 11

## 2025-02-11 NOTE — PROGRESS NOTES
Occupational Therapy    OT Treatment    Patient Name: Yesenia Milner  MRN: [id number]  Department: [facility]  Room: [address] [place] [address]  Today's Date: 2/11/2025  Time Calculation  Start Time: 0947  Stop Time: 0958  Time Calculation (min): 11 min        Assessment:  End of Session Communication: Bedside nurse  End of Session Patient Position: Up in chair, Alarm off, not on at start of session     Plan:  Treatment Interventions: ADL retraining, Functional transfer training, Patient/family training, Equipment evaluation/education, Compensatory technique education, Endurance training (energy conservation / diaphragmatic breathing techniques training)  OT Frequency: 3 times per week  OT Discharge Recommendations: Low intensity level of continued care  OT - OK to Discharge: Yes  Treatment Interventions: ADL retraining, Functional transfer training, Patient/family training, Equipment evaluation/education, Compensatory technique education, Endurance training (energy conservation / diaphragmatic breathing techniques training)    Subjective   Previous Visit Info:  OT Last Visit  OT Received On: 02/11/25  General:  General  Prior to Session Communication: Bedside nurse  Patient Position Received: Up in chair, Alarm off, not on at start of session  General Comment: patient tolerating OT session well with good engagement throughout session, receptive to recommendations  Precautions:  Precautions Comment: Fall Precautions     Date/Time Vitals Session Patient Position Pulse Resp SpO2 BP MAP (mmHg)    02/11/25 13:50:42 --  --  77  18  95 %  128/68  91                 Pain:  Pain Assessment  Pain Assessment: 0-10  0-10 (Numeric) Pain Score: 0 - No pain    Objective    Cognition:  Cognition  Overall Cognitive Status: Within Functional Limits    Activities of Daily Living:    LE Dressing  LE Dressing:  (patient donning (B) socks with foward reaching while seated in recliner, completing at supervision level)    Toileting  Toileting Comments:  declining as had just completed prior to session    Bed Mobility/Transfers:    Transfers  Transfer: Yes (sit <> stand: completing with FWW assist at supervision/CGA level with good hand placement noted)      Functional Mobility:  Functional Mobility  Functional Mobility Performed: Yes (completing funcitonal mobility for household distances with FWW assist, completing at CGA level for safety and however tolerates well with good safety throughout)    Outcome Measures:[facility] Daily Activity  Putting on and taking off regular lower body clothing: A little  Bathing (including washing, rinsing, drying): A lot  Putting on and taking off regular upper body clothing: A little  Toileting, which includes using toilet, bedpan or urinal: A little  Taking care of personal grooming such as brushing teeth: A little  Eating Meals: None  Daily Activity - Total Score: 18        Education Documentation  Handouts, taught by Clare Shepherd OT at 2/11/2025  1:55 PM.  Learner: Patient  Readiness: Acceptance  Method: Explanation  Response: Verbalizes Understanding    Education Comments  No comments found.      Goals:  Encounter Problems       Encounter Problems (Active)       OT Goals       Patient will complete upper and lower body bathing/dressing; toileting with modified independence using assistive techniques/adaptive equipment as needed  (Progressing)       Start:  02/03/25    Expected End:  02/18/25            Patient will perform bed mobility and functional transfers safely and independently: bed, chair, commode using DME as needed  (Progressing)       Start:  02/03/25    Expected End:  02/18/25            Patient will tolerate standing for 5 mins. and show overall good (-) standing balance during ADL's and functional transfers/mobility  (Met)       Start:  02/03/25    Expected End:  02/10/25    Resolved:  02/07/25         Patient will apply energy conservation/diaphragmatic breathing techniques to ADL's and functional transfers  with minimal cues  (Progressing)       Start:  02/03/25    Expected End:  02/18/25

## 2025-02-11 NOTE — NURSING NOTE
"Heart Failure Nurse Navigator      Assessment    I met with Yesenia Milner at the bedside    Patients Cardiologist(s):Dr Ramicone    Patients Primary Care Provider:    1. Medical Domain  What is the patient's most recent LVEF? 60-65%  HFrEF (LVEF <= 40%) Quadruple therapy recommended  HFmrEF (LVEF 41-49%) Quadruple therapy recommended  HFpEF (LVEF >= 50%) Minimum recommendations: SGLT2i and MRA  Is the patient on OP GDMT for their condition?   ARNI/ACEI/ARB: No None  BB: No None  MRA: No None  SGLT2i: No None  Is the patient prescribed a diuretic? Yes  Torsemide 20 mg daily  Does this patient have an implanted device (ie cardioMEMS, ICD, CRT-D)?  Device type: ICD  Could this patient have advanced heart failure (Stage D heart failure)?: No   If yes, the potential markers of advanced heart failure include: .    REFERENCE: Potential markers of advanced HF   Inotrope (dobutamine or milrinone) used during this admission?   LVEF<=25%?   2.   >=2 hospitalizations for ADHF in the last year?   3.   Severe symptoms of HF (fatigue, dyspnea, confusion, edema) despite medical therapy?   4.  Downtitration of GDMT as compared to home medications?   5.  Discontinuation of GDMT because of hypotension or renal intolerance?   6.  Recurrent arrhythmias (AF, VT with ICD shocks)?   7.  Cardiac cachexia (i.e., unintentional weight loss due to HF)?   8.  High-risk biomarker profile (e.g., hyponatremia [Na<135], very elevated BNP, worsening kidney function)   9.  Escalating doses of diuretics or persistent edema despite escalation     2. Mind and Emotion  Does this patient have possible cognitive impairment?: No (The Mini-Cog score 0/5)  Ask the patient to memorize these 3 words: banana, sunrise, chair  Ask the patient to draw a clock with hands pointing at \"20 minutes after 8\"  Ask the patient to recall the 3 words  Score: Add number of words recalled + clock drawing (0 points for any errors, 2 points if correct)  Interpretation: A score " of 0-2 suggests cognitive impairment is present, a score of 3-5 suggests cognitive impairment is absent  Does this patient have major depression?: N/A (PH-Q2 score ./6)  Over the last 2 weeks: Little interest or pleasure in doing things? (Not at all +0, Several days +1, More than half the days +2, Nearly every day +3)  Over the last 2 weeks: Feeling down, depressed or hopeless? (Not at all +0, Several days +1, More than half the days +2, Nearly every day +3)  Score: Add points  Interpretation: A score of 3 or more suggests that major depression is likely.     3. Physical Function  Could this patient be frail?: Yes   Defined by presence of all of these: slowness, weakness, shrinking, inactivity, exhaustion  Is this patient at risk for falling?: Yes   Defined by having experienced a fall in the last 12 months.    4. Social Determinants of Health  Transportation deficits?: No   Lack of insurance?: No   Living conditions (homelessness, unstable home)?: No   Poor family/social support?: No     I provided the following heart failure education:  - Heart Failure education packet provided.  - HF signs and symptoms, heart failure zones and when to call cardiologist.   - Controlling Heart Failure at Home: medication adherence, following up with cardiologist at least once yearly, staying healthy and active, limiting sodium and fluid intake as directed by cardiologist.  - Daily Weight Education  -Low Sodium Diet Education  -Fluid Restriction Education      *Discharge Appointment Follow-up Plan:        Additional Comments:    Pt lives at home alone. Using walker for security when ambulating. Pt feels better. Pt to be dc'd later today possibly.

## 2025-02-11 NOTE — PROGRESS NOTES
2/11/2025  Followed up with pt in room. Reviewed discharge plan with pt-- [facility] and [facility].   Reviewed IMM with pt, pt signed. Copy to pt.  Questions answered and support provided.   CT Team will continue to follow for any further needs.   Salena Talavera Rn TCC    [id number]  Secure chat to [facility]- informing them of dc today and PCP.   Salena Talavera RN TCC

## 2025-02-11 NOTE — HH CARE COORDINATION
Home Care received a Referral for Physical Therapy and Occupational Therapy. We have processed the referral for a Start of Care on 2/12 - 2/13/25.     If you have any questions or concerns, please feel free to contact us at [phone]. Follow the prompts, enter your five digit zip code, and you will be directed to your care team on [facility].

## 2025-02-11 NOTE — PROGRESS NOTES
Physical Therapy                 Therapy Communication Note    Patient Name: Yesenia Milner  MRN: [id number]  Department: [facility] 3  Room: [address] [place], [place] [address]  Today's Date: 2/11/2025     Discipline: Physical Therapy    PT Missed Visit: Yes     Missed Visit Reason: Missed Visit Reason: Patient refused (Pt eating lunch; will continue to follow-up as able/available.)    Missed Time: Attempt    Comment:

## 2025-02-11 NOTE — CARE PLAN
Problem: Heart Failure  Goal: Improved gas exchange this shift  Outcome: Progressing  Goal: Improved urinary output this shift  Outcome: Progressing  Goal: Reduction in peripheral edema within 24 hours  Outcome: Progressing     Problem: Pain - Adult  Goal: Verbalizes/displays adequate comfort level or baseline comfort level  Outcome: Progressing   The patient's goals for the shift include      The clinical goals for the shift include pt will not have any sob during the shift    Over the shift, the patient did not make progress toward the following goals. Barriers to progression include . Recommendations to address these barriers include .

## 2025-02-11 NOTE — NURSING NOTE
Met with patient at the bedside. Discharge Planning discussed. AVS updated with follow-ups, education, and medication information. Verified/ entered a PCP and pharmacy. New medications and side effects discussed. Discussed follow- ups. All questions answered.  Updated nurse on this info. AVS printed and hi-lighted. IV and tele removed. Waiting on daughter to get out of work.

## 2025-02-11 NOTE — CARE PLAN
The patient's goals for the shift include      The clinical goals for the shift include Pt will not have any sob throughout shift    Over the shift, the patient did not make progress toward the following goals. Barriers to progression include acute illness. Recommendations to address these barriers include communication.

## 2025-02-12 ENCOUNTER — PATIENT OUTREACH (OUTPATIENT)
Dept: PRIMARY CARE | Facility: CLINIC | Age: OVER 89
End: 2025-02-12
Payer: MEDICARE

## 2025-02-12 NOTE — PROGRESS NOTES
Discharge Facility:  [facility]   Discharge Diagnosis:   Acute decompensated heart failure   Admission Date:  2/2/25   Discharge Date:   2/11/25     PCP Appointment Date: TBD- task to office   Specialist Appointment Date:  2/20/25  cardio   2/14/25 surgery   Hospital Encounter and Summary Linked: Yes    ED to Hosp-Admission (Discharged) with Iglesia Gabriel DO; Car Pierre MD (02/02/2025)       Wrap Up  Wrap Up Additional Comments: Successful transition of care outreach with patient. Pt reports doing well at home since discharge. New meds reviewed. Pt denies CP and SOB. Pt lives aone but dtr helps as needed.  Patient denies any further discharge questions/needs at this time. . Pt aware of my availability for non-emergent concerns. Contact info provided to patient (2/12/2025  9:45 AM)    Medications  Medications reviewed with patient/caregiver?: Yes (2/12/2025  9:45 AM)  Is the patient having any side effects they believe may be caused by any medication additions or changes?: No (2/12/2025  9:45 AM)  Does the patient have all medications ordered at discharge?: Yes (2/12/2025  9:45 AM)  Care Management Interventions: No intervention needed (2/12/2025  9:45 AM)  Prescription Comments: New scripts gievn to pt at discharge  clopidogrel metoprolol tartrate predniSONE torsemide (2/12/2025  9:45 AM)  Is the patient taking all medications as directed (includes completed medication regime)?: Yes (2/12/2025  9:45 AM)  Care Management Interventions: Provided patient education (2/12/2025  9:45 AM)  Medication Comments: Pt denies problems obtaining or affording medication  . (2/12/2025  9:45 AM)    Appointments  Does the patient have a primary care provider?: Yes (2/12/2025  9:45 AM)  Care Management Interventions: Verified appointment date/time/provider (2/12/2025  9:45 AM)  Has the patient kept scheduled appointments due by today?: Yes (2/12/2025  9:45 AM)  Care Management Interventions: Advised patient to keep appointment  (2/12/2025  9:45 AM)    Self Management  What is the home health agency?: [facility] (2/12/2025  9:45 AM)  Has home health visited the patient within 72 hours of discharge?: Call prior to 72 hours (2/12/2025  9:45 AM)  What Durable Medical Equipment (DME) was ordered?: lives alone. Uses a walker.  Performs own ADL's.  Has grab bars and a transfer seat in bathroom. (2/12/2025  9:45 AM)    Patient Teaching  Does the patient have access to their discharge instructions?: Yes (2/12/2025  9:45 AM)  Care Management Interventions: Reviewed instructions with patient (2/12/2025  9:45 AM)  What is the patient's perception of their health status since discharge?: Improving (2/12/2025  9:45 AM)  Is the patient/caregiver able to teach back the hierarchy of who to call/visit for symptoms/problems? PCP, Specialist, Home Health nurse, Urgent Care, ED, 911: Yes (2/12/2025  9:45 AM)

## 2025-02-12 NOTE — DISCHARGE SUMMARY
Discharge Diagnosis  Acute decompensated heart failure    Issues Requiring Follow-Up  chf    Test Results Pending At Discharge  Pending Labs       No current pending labs.            Hospital Course   Patient arrived today after recently being discharged from the hospital.  She reports she has been in the hospital multiple times since [place] after having an MI.  Today she came in for shortness of breath and bilateral lower extremity edema.  Patient states she has been having trouble with ambulation and was unable to put her MISA hose on because her knees were so swollen she could not bend them.  Chest x-ray revealed vascular congestion with small bilateral pleural effusions.  Patient follows with Dr. Ramicone and has a significant cardiac history.  Cardiology consult placed, appreciate recs.  Patient recently had echo on 12/21/2024.  Patient received additional dose of torsemide 30 mg in ED for additional diuresis, home daily torsemide continued in morning.  Patient also reported difficulty with urination, denied any burning but reports frequency/dribbling/small amounts at a time.  UA ordered.     Patient admitted to Dr. Iglesia mart for further medical management.      # CHF exacerbation  # Vascular congestion/pleural effusions  # Shortness of breath  # Elevated troponin  # Hyponatremia  # BLE swelling  # Hx CHF  -Cardiology consult, follows with Dr. Ramicone  -Last echo 12/21/2024 EF 60 to 65%, abnormal left ventricular wall motion, MI, mitral annular calcification, MVR, TVR, elevated right ventricular systolic pressure.  -Fluid restriction  -Strict I's and O's  -Daily weight  -Continue home torsemide, was given extra dose in ED  -As needed oxygen  -As needed EKG, coronary symptoms  -PT/OT/SW    -Cardiac low-salt diet   -admitted to observation with telemetry    -q4 vitals    -morning labs, trend troponin, Na+,      Chronic Conditions   # CVA  # MI, pacemaker, SSS  # Hypertension  # COPD  -albuterol prn  #  Arthritis  # Venous insufficiency     Continue home medications as ordered when nursing completes home med rec.    Full Code      #DVT Prophylaxis   Scd's as tolerated   DVT Prophylaxis Heparin   On Plavix     Thorough record review performed of previous labs and notes from prior encounters.      2/3: mild swelling, on 3 liters doing well, cardio consult     2/4: doing well no issues contineu current care, offerd dc but wanted another dc, potential dc tomorrow  2/5: BP is better today, continue diuretics, repeat cxr to rule out pna, no signs of pna suspect atelectasis     2/6; BP better conintue diuresis, still sob, cxr shows improvement, consult pulm     2/7: contineu steroids monitor labs, wean oxygewn     2/8; still wheezign contineu course, dc once wheezing and sob is regine     2/9: doing well still sob     2/10: able to wean off oxygen, walk and ambulate, potential dc tomorrow       Pertinent Physical Exam At Time of Discharge  Physical Exam  Constitutional:       Appearance: Normal appearance.   HENT:      Head: Normocephalic and atraumatic.      Right Ear: Tympanic membrane and ear canal normal.      Left Ear: Tympanic membrane and ear canal normal.      Mouth/Throat:      Mouth: Mucous membranes are moist.      Pharynx: Oropharynx is clear.   Eyes:      Extraocular Movements: Extraocular movements intact.      Conjunctiva/sclera: Conjunctivae normal.      Pupils: Pupils are equal, round, and reactive to light.   Cardiovascular:      Rate and Rhythm: Normal rate and regular rhythm.      Pulses: Normal pulses.      Heart sounds: Normal heart sounds.   Pulmonary:      Effort: Pulmonary effort is normal.      Breath sounds: Normal breath sounds.   Abdominal:      General: Abdomen is flat. Bowel sounds are normal.      Palpations: Abdomen is soft.   Musculoskeletal:         General: Normal range of motion.      Cervical back: Normal range of motion and neck supple.   Skin:     General: Skin is warm and dry.       Capillary Refill: Capillary refill takes 2 to 3 seconds.   Neurological:      General: No focal deficit present.      Mental Status: She is alert and oriented to person, place, and time. Mental status is at baseline.   Psychiatric:         Mood and Affect: Mood normal.         Behavior: Behavior normal.         Thought Content: Thought content normal.         Judgment: Judgment normal.         Home Medications     Medication List      START taking these medications     predniSONE 10 mg tablet; Commonly known as: Deltasone; Take 3 tablets   (30 mg) by mouth once daily for 5 days.     CHANGE how you take these medications     metoprolol tartrate 25 mg tablet; Commonly known as: Lopressor; Take 1   tablet (25 mg) by mouth once daily.; What changed: how much to take, when   to take this   torsemide 10 mg tablet; Commonly known as: Demadex; Take 1 tablet (10   mg) by mouth once daily.; What changed: medication strength, how much to   take     CONTINUE taking these medications     aspirin 81 mg chewable tablet; Chew 1 tablet (81 mg) once daily.   Calcium 600 + D(3) 600 mg-5 mcg (200 unit) tablet; Generic drug: calcium   carbonate-vitamin D3   clopidogrel 75 mg tablet; Commonly known as: Plavix; Take 1 tablet by   mouth once daily   fluticasone propion-salmeteroL 115-21 mcg/actuation inhaler; Commonly   known as: Advair; Inhale 2 puffs 2 times a day. Rinse mouth with water   after use to reduce aftertaste and incidence of candidiasis. Do not   swallow.   lubricating eye drops ophthalmic solution   polyethylene glycol 17 gram/dose powder; Commonly known as: Glycolax,   Miralax; Mix 1 capful (17 g) of powder with 4 to 8 ounces of water or   juice and drink 2 times a day.       Outpatient Follow-Up  Future Appointments   Date Time Provider Department Center   2/14/2025  8:30 AM Devan Vazquez MD [id number] [place]   2/20/2025  9:30 AM Lang Smith MD [id number] [place]   2/24/2025  2:00 PM James C Ramicone, DO [id number] [place]    4/30/2025  1:00 PM Naima Keane MD [id number] [place]   9/17/2025  2:30 PM PAR[facility] RAMICONE [facility] [id number]       Iglesia Gabriel DO

## 2025-02-13 ENCOUNTER — PATIENT OUTREACH (OUTPATIENT)
Dept: CARE COORDINATION | Age: OVER 89
End: 2025-02-13
Payer: MEDICARE

## 2025-02-13 SDOH — ECONOMIC STABILITY: FOOD INSECURITY: WITHIN THE PAST 12 MONTHS, YOU WORRIED THAT YOUR FOOD WOULD RUN OUT BEFORE YOU GOT THE MONEY TO BUY MORE.: NEVER TRUE

## 2025-02-13 SDOH — SOCIAL STABILITY: SOCIAL INSECURITY: WITHIN THE LAST YEAR, HAVE YOU BEEN HUMILIATED OR EMOTIONALLY ABUSED IN OTHER WAYS BY YOUR PARTNER OR EX-PARTNER?: NO

## 2025-02-13 SDOH — ECONOMIC STABILITY: HOUSING INSECURITY: AT ANY TIME IN THE PAST 12 MONTHS, WERE YOU HOMELESS OR LIVING IN A SHELTER (INCLUDING NOW)?: NO

## 2025-02-13 SDOH — SOCIAL STABILITY: SOCIAL NETWORK
IN A TYPICAL WEEK, HOW MANY TIMES DO YOU TALK ON THE PHONE WITH FAMILY, FRIENDS, OR NEIGHBORS?: MORE THAN THREE TIMES A WEEK

## 2025-02-13 SDOH — ECONOMIC STABILITY: HOUSING INSECURITY: IN THE LAST 12 MONTHS, WAS THERE A TIME WHEN YOU WERE NOT ABLE TO PAY THE MORTGAGE OR RENT ON TIME?: NO

## 2025-02-13 SDOH — SOCIAL STABILITY: SOCIAL NETWORK: HOW OFTEN DO YOU GET TOGETHER WITH FRIENDS OR RELATIVES?: TWICE A WEEK

## 2025-02-13 SDOH — SOCIAL STABILITY: SOCIAL INSECURITY: ARE YOU MARRIED, WIDOWED, DIVORCED, SEPARATED, NEVER MARRIED, OR LIVING WITH A PARTNER?: WIDOWED

## 2025-02-13 SDOH — SOCIAL STABILITY: SOCIAL INSECURITY: WITHIN THE LAST YEAR, HAVE YOU BEEN AFRAID OF YOUR PARTNER OR EX-PARTNER?: NO

## 2025-02-13 SDOH — ECONOMIC STABILITY: INCOME INSECURITY: IN THE PAST 12 MONTHS HAS THE ELECTRIC, GAS, OIL, OR WATER COMPANY THREATENED TO SHUT OFF SERVICES IN YOUR HOME?: NO

## 2025-02-13 SDOH — ECONOMIC STABILITY: FOOD INSECURITY: WITHIN THE PAST 12 MONTHS, THE FOOD YOU BOUGHT JUST DIDN'T LAST AND YOU DIDN'T HAVE MONEY TO GET MORE.: NEVER TRUE

## 2025-02-13 SDOH — HEALTH STABILITY: MENTAL HEALTH
DO YOU FEEL STRESS - TENSE, RESTLESS, NERVOUS, OR ANXIOUS, OR UNABLE TO SLEEP AT NIGHT BECAUSE YOUR MIND IS TROUBLED ALL THE TIME - THESE DAYS?: ONLY A LITTLE

## 2025-02-13 SDOH — HEALTH STABILITY: MENTAL HEALTH: HOW MANY DRINKS CONTAINING ALCOHOL DO YOU HAVE ON A TYPICAL DAY WHEN YOU ARE DRINKING?: PATIENT DOES NOT DRINK

## 2025-02-13 SDOH — HEALTH STABILITY: MENTAL HEALTH: HOW OFTEN DO YOU HAVE SIX OR MORE DRINKS ON ONE OCCASION?: NEVER

## 2025-02-13 SDOH — HEALTH STABILITY: PHYSICAL HEALTH: ON AVERAGE, HOW MANY MINUTES DO YOU ENGAGE IN EXERCISE AT THIS LEVEL?: 0 MIN

## 2025-02-13 SDOH — HEALTH STABILITY: MENTAL HEALTH: HOW OFTEN DO YOU HAVE A DRINK CONTAINING ALCOHOL?: NEVER

## 2025-02-13 SDOH — SOCIAL STABILITY: SOCIAL NETWORK: HOW OFTEN DO YOU ATTEND MEETINGS OF THE CLUBS OR ORGANIZATIONS YOU BELONG TO?: NEVER

## 2025-02-13 SDOH — HEALTH STABILITY: PHYSICAL HEALTH: ON AVERAGE, HOW MANY DAYS PER WEEK DO YOU ENGAGE IN MODERATE TO STRENUOUS EXERCISE (LIKE A BRISK WALK)?: 0 DAYS

## 2025-02-13 SDOH — SOCIAL STABILITY: SOCIAL NETWORK: HOW OFTEN DO YOU ATTEND CHURCH OR RELIGIOUS SERVICES?: NEVER

## 2025-02-13 SDOH — ECONOMIC STABILITY: TRANSPORTATION INSECURITY: IN THE PAST 12 MONTHS, HAS LACK OF TRANSPORTATION KEPT YOU FROM MEDICAL APPOINTMENTS OR FROM GETTING MEDICATIONS?: NO

## 2025-02-13 SDOH — ECONOMIC STABILITY: HOUSING INSECURITY: IN THE PAST 12 MONTHS, HOW MANY TIMES HAVE YOU MOVED WHERE YOU WERE LIVING?: 0

## 2025-02-13 SDOH — ECONOMIC STABILITY: FOOD INSECURITY: HOW HARD IS IT FOR YOU TO PAY FOR THE VERY BASICS LIKE FOOD, HOUSING, MEDICAL CARE, AND HEATING?: NOT HARD AT ALL

## 2025-02-13 ASSESSMENT — LIFESTYLE VARIABLES
AUDIT-C TOTAL SCORE: 0
SKIP TO QUESTIONS 9-10: 1

## 2025-02-13 ASSESSMENT — ACTIVITIES OF DAILY LIVING (ADL): LACK_OF_TRANSPORTATION: NO

## 2025-02-13 NOTE — PROGRESS NOTES
Enrollment call  Patient agreeable to [facility] ([facility]). Enrollment call completed and program overview explained to patient, with appropriate numbers/info given. Initial visit is 2/14 @ 1830    RE ENROLL  89 y o past medical history of HFpEF, MI, SSS s/p [employer]onic dual-chamber pacemaker 2012 with generator change on pacemaker 7/13/2023, HTN, COPD, CVA with TNK 8/21/23, arthritis, recent diagnosis of colon CA, and venous insufficiency.   Discharge Diagnosis  Acute decompensated heart failure   Issues Requiring Follow-Up  Chf    PCP Dr KULWINDER Mills 1 week  Pulmonary Dr Emil Parker 4 weeks  Cardiology Dr James Ramicone 02/24 @ 1400    [facility]  PT/OT

## 2025-02-14 ENCOUNTER — PATIENT OUTREACH (OUTPATIENT)
Dept: CARE COORDINATION | Age: OVER 89
End: 2025-02-14

## 2025-02-14 ENCOUNTER — APPOINTMENT (OUTPATIENT)
Dept: SURGERY | Facility: CLINIC | Age: OVER 89
End: 2025-02-14
Payer: MEDICARE

## 2025-02-14 ENCOUNTER — APPOINTMENT (OUTPATIENT)
Dept: CARE COORDINATION | Age: OVER 89
End: 2025-02-14
Payer: MEDICARE

## 2025-02-14 NOTE — PROGRESS NOTES
Called pt for weekly [facility] call.  Pt did not remember scheduling this call or enrolling in [facility].  Pt requested [facility] call 2/16 to discuss.

## 2025-02-15 ENCOUNTER — PATIENT OUTREACH (OUTPATIENT)
Dept: CARE COORDINATION | Age: OVER 89
End: 2025-02-15
Payer: MEDICARE

## 2025-02-15 NOTE — PROGRESS NOTES
"Daily Call Note:     Pt stated she did not think it was neccessary to have an [facility] call after nurse explained what it was about. Pt stated she is fine, she \"does her own cooking, and cleaning.\" Also, she \"has all her doctor visits scheduled already.\"Called daughter Vannessa and daughter stated pt is [place] and maybe cannot hear nurse, she does have hearing aids. Daughter stated she agreed with her mom, not to schedule an [facility] call, but would like nurse to check in daily with pt. Pt does not do any VS at home either daughter stated. She did see pt yesterday and stated the swelling in her legs was down quite a bit. Pt uses a walker to get around. No other concerns today.    Daily Weight:  There were no vitals filed for this visit.   Last 3 Weights:  Wt Readings from Last 7 Encounters:   02/10/25 75.6 kg (166 lb 11.2 oz)   01/31/25 76 kg (167 lb 9.6 oz)   01/30/25 71.2 kg (157 lb)   01/23/25 75.5 kg (166 lb 8 oz)   01/28/25 70.9 kg (156 lb 4.9 oz)   01/03/25 75.8 kg (167 lb)   12/27/24 74.8 kg (165 lb)       [employer] Device: No       Virtual Visits--Scheduled (Most Recent Date at Top)  Follow up Appointments  Recent Visits  Date Type Provider Dept   01/31/25 Office Visit Naima Keane MD Do [id number] [facility]   Showing recent visits within past 30 days and meeting all other requirements  Future Appointments  Date Type Provider Dept   02/25/25 Appointment Naima Keane MD Do [id number]   04/30/25 Appointment MD Jr Curtis [facility]   Showing future appointments within next 90 days and meeting all other requirements       Frequency of RN Calls & Virtual Visits per Team Agreement: [facility] Frequency: Daily            "

## 2025-02-16 ENCOUNTER — HOME CARE VISIT (OUTPATIENT)
Dept: HOME HEALTH SERVICES | Facility: HOME HEALTH | Age: OVER 89
End: 2025-02-16
Payer: MEDICARE

## 2025-02-16 VITALS — DIASTOLIC BLOOD PRESSURE: 60 MMHG | TEMPERATURE: 97.1 F | SYSTOLIC BLOOD PRESSURE: 104 MMHG | HEART RATE: 64 BPM

## 2025-02-16 PROCEDURE — G0151 HHCP-SERV OF PT,EA 15 MIN: HCPCS | Mod: HHH

## 2025-02-16 PROCEDURE — 169592 NO-PAY CLAIM PROCEDURE

## 2025-02-16 SDOH — ECONOMIC STABILITY: HOUSING INSECURITY: HOME SAFETY: SPLIT LEVEL HOME WITH STAIR GLIDES ALL LEVELS

## 2025-02-16 SDOH — HEALTH STABILITY: PHYSICAL HEALTH: EXERCISE COMMENTS: INST IN SEATED AG LE EXERCISES AND WALKING PROGRAM

## 2025-02-16 ASSESSMENT — ACTIVITIES OF DAILY LIVING (ADL)
ENTERING_EXITING_HOME: NEEDS ASSISTANCE
AMBULATION_DISTANCE/DURATION_TOLERATED: 150 FT
OASIS_M1830: 02
AMBULATION ASSISTANCE ON FLAT SURFACES: 1

## 2025-02-16 ASSESSMENT — ENCOUNTER SYMPTOMS
LOSS OF SENSATION IN FEET: 0
DENIES PAIN: 1
DEPRESSION: 0
OCCASIONAL FEELINGS OF UNSTEADINESS: 1

## 2025-02-17 ENCOUNTER — APPOINTMENT (OUTPATIENT)
Dept: SURGERY | Facility: CLINIC | Age: OVER 89
End: 2025-02-17
Payer: MEDICARE

## 2025-02-17 VITALS
DIASTOLIC BLOOD PRESSURE: 60 MMHG | HEART RATE: 95 BPM | SYSTOLIC BLOOD PRESSURE: 108 MMHG | TEMPERATURE: 97.4 F | RESPIRATION RATE: 16 BRPM | HEIGHT: 63 IN | WEIGHT: 161.4 LBS | BODY MASS INDEX: 28.6 KG/M2 | OXYGEN SATURATION: 98 %

## 2025-02-17 DIAGNOSIS — C18.4 MALIGNANT NEOPLASM OF TRANSVERSE COLON (MULTI): Primary | ICD-10-CM

## 2025-02-17 PROCEDURE — 1036F TOBACCO NON-USER: CPT | Performed by: SURGERY

## 2025-02-17 PROCEDURE — 3078F DIAST BP <80 MM HG: CPT | Performed by: SURGERY

## 2025-02-17 PROCEDURE — 99214 OFFICE O/P EST MOD 30 MIN: CPT | Performed by: SURGERY

## 2025-02-17 PROCEDURE — 1159F MED LIST DOCD IN RCRD: CPT | Performed by: SURGERY

## 2025-02-17 PROCEDURE — 1157F ADVNC CARE PLAN IN RCRD: CPT | Performed by: SURGERY

## 2025-02-17 PROCEDURE — 1126F AMNT PAIN NOTED NONE PRSNT: CPT | Performed by: SURGERY

## 2025-02-17 PROCEDURE — 3074F SYST BP LT 130 MM HG: CPT | Performed by: SURGERY

## 2025-02-17 PROCEDURE — 1111F DSCHRG MED/CURRENT MED MERGE: CPT | Performed by: SURGERY

## 2025-02-17 ASSESSMENT — PAIN SCALES - GENERAL: PAINLEVEL_OUTOF10: 0-NO PAIN

## 2025-02-17 NOTE — PROGRESS NOTES
History Of Present Illness  HPI   January 16, 2025  Yesenia Milner is a 89 y.o. female who was admitted to the hospital on January 12, 2025 with a GI bleed.  The patient had colonoscopy yesterday which showed a distal transverse colon circumferential mass which was not traversable by the colonoscope.  The colon was tattooed distal to the mass.  The patient has no abdominal pain.  She has been tolerating a regular diet without nausea or vomiting.  No abdominal bloating.  Her bowel movements have been normal prior to the onset of the lower GI bleed.  She was on Plavix, aspirin and Eliquis prior to admission.    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  Prior to being started on anticoagulation and antiplatelet therapy the patient was asymptomatic.  Her GI bleeding stopped after holding these medications.  She is now on Plavix and aspirin and has had no further bleeding.  She has no abdominal pain.  She has a good appetite without nausea or vomiting.  She currently lives at home.  She has required some assistance with activities of daily living since her last hospitalization.  She ambulates with a walker.  She does have dyspnea on exertion, but not at rest.    Past medical history:  Hospital admission from December 20 to December 23, 2024 for non-STEMI.  Diagnosed with Takotsubo cardiomyopathy.  Normal coronary angiogram December 20, 2024.  Hospital admission from January 2 to January 7, 2025 with acute CHF.  Hospital admission from January 12 to January 23, 2025 with GI bleed.  Hospital admission from February 2 to February 11, 2025 with acute decompensated heart failure resulting in shortness of breath and lower extremity edema.  Hypertension  Sick sinus syndrome status post pacemaker placement in 2012 and 2023  COPD.  Quit smoking 52 years ago  CVA in 2023 with no residual deficits  Possible SVC thrombus recently diagnosed.  Appendectomy in  "2018  Vaginal hysterectomy for benign disease with bladder suspension in 1992  Chronic anemia  Hearing loss    Past Medical History  She has a past medical history of Gross hematuria (10/07/2020), Impacted cerumen (02/22/2024), Other conditions influencing health status, Personal history of other medical treatment, Personal history of other medical treatment, and Systolic CHF (Multi) (05/18/2023).    Surgical History  She has a past surgical history that includes Appendectomy (11/22/2017); Hysterectomy (11/22/2017); Tonsillectomy (11/22/2017); Other surgical history (11/22/2017); Other surgical history (11/22/2017); Cardiac pacemaker placement (03/11/2021); IR angiogram renal bilateral (Bilateral, 12/14/2020); IR angiogram inferior epigastric pelvic (12/14/2020); IR angiogram inferior epigastric pelvic (12/14/2020); and Cardiac catheterization (N/A, 12/20/2024).     Allergies  Iodine, Shrimp, Hydrocodone, and Adhesive tape-silicones    Social History  She reports that she quit smoking about 52 years ago. Her smoking use included cigarettes. She has been exposed to tobacco smoke. She has never used smokeless tobacco. She reports that she does not drink alcohol and does not use drugs.    Family History  Family History   Problem Relation Name Age of Onset    No Known Problems Mother         Last Recorded Vitals  Blood pressure 108/60, pulse 95, temperature 36.3 °C (97.4 °F), temperature source Temporal, resp. rate 16, height 1.6 m (5' 3\"), weight 73.2 kg (161 lb 6.4 oz), SpO2 98%.    Physical Exam  Constitutional: Well-developed, well-nourished, alert and oriented, no acute distress  Skin: Warm and dry, no lesions, no rashes, no jaundice  HEENT: Normocephalic, atraumatic, EOMI, no scleral icterus, eyes have no redness or swelling or discharge, external inspection of ears and nose is normal, mucous membranes moist  Neck: Soft, nontender, no mass or adenopathy  Cardiac: Regular rate and rhythm, no murmur  Chest: Mild " wheezing bilaterally  Abdomen: Nondistended, positive bowel sounds, soft, nontender, no mass.  Ecchymosis from recent heparin injections.  Rectal: Not performed  Extremities: No injury, no lower extremity edema or calf tenderness  Lymphatic: No cervical adenopathy  Musculoskeletal: Range of motion intact, no joint swelling, needed assistance stepping onto and off of examination table.  Neurological: Alert and oriented x3, intact sensory and motor function, no obvious focal neurologic abnormalities, ambulates with a walker  Psychological: Appropriate mood and behavior  Examination chaperoned by Rasheeda Davila    Relevant Results  I reviewed the CT abdomen/pelvis report and images from January 12, 2025.  I reviewed the images with a radiologist.  IMPRESSION:  No aortic aneurysm or dissection.    No evidence of active GI bleed.    Focal circumferential wall thickening of the distal transverse colon.  There are no significant surrounding inflammatory changes, raising  suspicion for malignancy (rather than focal colitis/diverticulitis).  Correlation with presenting history is recommended and follow-up  colonoscopy should be considered.    Findings suggestive of CHF with interstitial edema and small pleural  effusions.     I reviewed the CT angio chest report and images from January 2, 2025.  IMPRESSION:  Negative for pulmonary embolism or thoracic aortic dissection.    Again identified are regions of nonenhancement along the anterior  aspect of the mid SVC and suggestive of nonocclusive SVC thrombus.   Compared to prior exam overall length of nonenhancement increased.   Additionally there is a focal region of nonenhancement lower margin of  the right internal jugular vein and may represent new thrombus.  Moderate size right pleural effusion and small left pleural effusion  are new.  Cardiomegaly.  Multifocal groundglass regions of airspace density both lungs could be  due to pulmonary edema.     I reviewed the colonoscopy and  pathology reports from yesterday.  Surgical Pathology Exam: [id number]  Order: [id number]   Collected 1/15/2025 14:02       Status: Final result       Visible to patient: No (inaccessible in [facility])       Dx: Gastrointestinal hemorrhage, unspecif...    0 Result Notes        Component     FINAL DIAGNOSIS   A. Colon, Transverse, Mass, Biopsy:   -- Invasive moderately differentiated adenocarcinoma. See note.     Note: DNA mismatch repair protein immunohistochemical stains are pending and the results will be reported in an addendum.         Labs from February 10, 2025: WBC 5.5, hemoglobin 7.9, platelet 253.  BUN 32.  BMP otherwise normal.  CEA 45.2 on January 17, 2025.    I reviewed the barium enema report and images from January 21, 2025:  IMPRESSION:  1. Apparent ill-defined is stricture noted at the distal transverse  colon with apple core appearance in keeping with the known neoplasm.  No extravasation of contrast or evidence of complete obstruction.    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  The colon cancer is presently asymptomatic.  No bleeding on Plavix and aspirin.  Her CEA level is significantly elevated, but no metastatic disease was identified on imaging.  The patient is at increased risk for operation due to her multiple comorbid conditions including her recent cardiomyopathy and CHF episodes.  She has been hospitalized 4 times in the past 2 months.  I discussed the patient with her cardiologist, Dr. James Ramicone who feels she is moderate, but not prohibitive, cardiac risk.  She needs to schedule an appointment with her pulmonologist for evaluation and surgical risk assessment.  Follow-up after pulmonary evaluation.  The patient is frail.  I reviewed the ACS NSQIP surgical risk calculator and discussed the results with the patient and her  daughter.  Based on this calculator, her risk of serious complication is 19.4%.  Risk of readmission is 18.2%.  Risk of death is 7.9%.  Risk of being discharged to a SNF is 65.8%.  Based on these results I discussed the option of not operating as long as the cancer remains asymptomatic.  I discussed the risk of eventual colonic obstruction or metastatic disease.    Devan Vazqeuz MD

## 2025-02-17 NOTE — LETTER
February 17, 2025     Naima Keane MD  [address]  [place] [facility] [address]    Patient: Yesenia Milner   YOB: 1933   Date of Visit: 2/17/2025       Dear Dr. Naima Keane MD:    Thank you for referring Yesenia Milner to me for evaluation. Below are my notes for this consultation.  If you have questions, please do not hesitate to call me. I look forward to following your patient along with you.       Sincerely,     Devan Vazquez MD      CC: No Recipients  ______________________________________________________________________________________    History Of Present Illness  HPI   January 16, 2025  Yesenia Milner is a 89 y.o. female who was admitted to the hospital on January 12, 2025 with a GI bleed.  The patient had colonoscopy yesterday which showed a distal transverse colon circumferential mass which was not traversable by the colonoscope.  The colon was tattooed distal to the mass.  The patient has no abdominal pain.  She has been tolerating a regular diet without nausea or vomiting.  No abdominal bloating.  Her bowel movements have been normal prior to the onset of the lower GI bleed.  She was on Plavix, aspirin and Eliquis prior to admission.    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  Prior to being started on anticoagulation and antiplatelet therapy the patient was asymptomatic.  Her GI bleeding stopped after holding these medications.  She is now on Plavix and aspirin and has had no further bleeding.  She has no abdominal pain.  She has a good appetite without nausea or vomiting.  She currently lives at home.  She has required some assistance with activities of daily living since her last hospitalization.  She ambulates with a walker.  She does have dyspnea on exertion, but not at rest.    Past medical history:  Hospital admission from December 20 to December 23, 2024 for non-STEMI.  Diagnosed with Takotsubo  cardiomyopathy.  Normal coronary angiogram December 20, 2024.  Hospital admission from January 2 to January 7, 2025 with acute CHF.  Hospital admission from January 12 to January 23, 2025 with GI bleed.  Hospital admission from February 2 to February 11, 2025 with acute decompensated heart failure resulting in shortness of breath and lower extremity edema.  Hypertension  Sick sinus syndrome status post pacemaker placement in 2012 and 2023  COPD.  Quit smoking 52 years ago  CVA in 2023 with no residual deficits  Possible SVC thrombus recently diagnosed.  Appendectomy in 2018  Vaginal hysterectomy for benign disease with bladder suspension in 1992  Chronic anemia  Hearing loss    Past Medical History  She has a past medical history of Gross hematuria (10/07/2020), Impacted cerumen (02/22/2024), Other conditions influencing health status, Personal history of other medical treatment, Personal history of other medical treatment, and Systolic CHF (Multi) (05/18/2023).    Surgical History  She has a past surgical history that includes Appendectomy (11/22/2017); Hysterectomy (11/22/2017); Tonsillectomy (11/22/2017); Other surgical history (11/22/2017); Other surgical history (11/22/2017); Cardiac pacemaker placement (03/11/2021); IR angiogram renal bilateral (Bilateral, 12/14/2020); IR angiogram inferior epigastric pelvic (12/14/2020); IR angiogram inferior epigastric pelvic (12/14/2020); and Cardiac catheterization (N/A, 12/20/2024).     Allergies  Iodine, Shrimp, Hydrocodone, and Adhesive tape-silicones    Social History  She reports that she quit smoking about 52 years ago. Her smoking use included cigarettes. She has been exposed to tobacco smoke. She has never used smokeless tobacco. She reports that she does not drink alcohol and does not use drugs.    Family History  Family History   Problem Relation Name Age of Onset   • No Known Problems Mother         Last Recorded Vitals  Blood pressure 108/60, pulse 95,  "temperature 36.3 °C (97.4 °F), temperature source Temporal, resp. rate 16, height 1.6 m (5' 3\"), weight 73.2 kg (161 lb 6.4 oz), SpO2 98%.    Physical Exam  Constitutional: Well-developed, well-nourished, alert and oriented, no acute distress  Skin: Warm and dry, no lesions, no rashes, no jaundice  HEENT: Normocephalic, atraumatic, EOMI, no scleral icterus, eyes have no redness or swelling or discharge, external inspection of ears and nose is normal, mucous membranes moist  Neck: Soft, nontender, no mass or adenopathy  Cardiac: Regular rate and rhythm, no murmur  Chest: Mild wheezing bilaterally  Abdomen: Nondistended, positive bowel sounds, soft, nontender, no mass.  Ecchymosis from recent heparin injections.  Rectal: Not performed  Extremities: No injury, no lower extremity edema or calf tenderness  Lymphatic: No cervical adenopathy  Musculoskeletal: Range of motion intact, no joint swelling, needed assistance stepping onto and off of examination table.  Neurological: Alert and oriented x3, intact sensory and motor function, no obvious focal neurologic abnormalities, ambulates with a walker  Psychological: Appropriate mood and behavior  Examination chaperoned by Rasheeda Davila    Relevant Results  I reviewed the CT abdomen/pelvis report and images from January 12, 2025.  I reviewed the images with a radiologist.  IMPRESSION:  No aortic aneurysm or dissection.    No evidence of active GI bleed.    Focal circumferential wall thickening of the distal transverse colon.  There are no significant surrounding inflammatory changes, raising  suspicion for malignancy (rather than focal colitis/diverticulitis).  Correlation with presenting history is recommended and follow-up  colonoscopy should be considered.    Findings suggestive of CHF with interstitial edema and small pleural  effusions.     I reviewed the CT angio chest report and images from January 2, 2025.  IMPRESSION:  Negative for pulmonary embolism or thoracic aortic " dissection.    Again identified are regions of nonenhancement along the anterior  aspect of the mid SVC and suggestive of nonocclusive SVC thrombus.   Compared to prior exam overall length of nonenhancement increased.   Additionally there is a focal region of nonenhancement lower margin of  the right internal jugular vein and may represent new thrombus.  Moderate size right pleural effusion and small left pleural effusion  are new.  Cardiomegaly.  Multifocal groundglass regions of airspace density both lungs could be  due to pulmonary edema.     I reviewed the colonoscopy and pathology reports from yesterday.  Surgical Pathology Exam: [id number]  Order: [id number]   Collected 1/15/2025 14:02       Status: Final result       Visible to patient: No (inaccessible in [facility])       Dx: Gastrointestinal hemorrhage, unspecif...    0 Result Notes        Component     FINAL DIAGNOSIS   A. Colon, Transverse, Mass, Biopsy:   -- Invasive moderately differentiated adenocarcinoma. See note.     Note: DNA mismatch repair protein immunohistochemical stains are pending and the results will be reported in an addendum.         Labs from February 10, 2025: WBC 5.5, hemoglobin 7.9, platelet 253.  BUN 32.  BMP otherwise normal.  CEA 45.2 on January 17, 2025.    I reviewed the barium enema report and images from January 21, 2025:  IMPRESSION:  1. Apparent ill-defined is stricture noted at the distal transverse  colon with apple core appearance in keeping with the known neoplasm.  No extravasation of contrast or evidence of complete obstruction.    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  The colon cancer is presently asymptomatic.  No bleeding on Plavix and aspirin.  Her CEA level is significantly elevated, but no metastatic disease was identified on imaging.  The patient is at  increased risk for operation due to her multiple comorbid conditions including her recent cardiomyopathy and CHF episodes.  She has been hospitalized 4 times in the past 2 months.  I discussed the patient with her cardiologist, Dr. James Ramicone who feels she is moderate, but not prohibitive, cardiac risk.  She needs to schedule an appointment with her pulmonologist for evaluation and surgical risk assessment.  Follow-up after pulmonary evaluation.  The patient is frail.  I reviewed the ACS NSQIP surgical risk calculator and discussed the results with the patient and her daughter.  Based on this calculator, her risk of serious complication is 19.4%.  Risk of readmission is 18.2%.  Risk of death is 7.9%.  Risk of being discharged to a SNF is 65.8%.  Based on these results I discussed the option of not operating as long as the cancer remains asymptomatic.  I discussed the risk of eventual colonic obstruction or metastatic disease.    Devan Vazquez MD

## 2025-02-18 ENCOUNTER — HOME CARE VISIT (OUTPATIENT)
Dept: HOME HEALTH SERVICES | Facility: HOME HEALTH | Age: OVER 89
End: 2025-02-18
Payer: MEDICARE

## 2025-02-18 PROCEDURE — G0157 HHC PT ASSISTANT EA 15: HCPCS | Mod: CQ,HHH

## 2025-02-18 ASSESSMENT — ENCOUNTER SYMPTOMS
PERSON REPORTING PAIN: PATIENT
DENIES PAIN: 1

## 2025-02-19 ENCOUNTER — PATIENT OUTREACH (OUTPATIENT)
Dept: CARE COORDINATION | Age: OVER 89
End: 2025-02-19
Payer: MEDICARE

## 2025-02-20 ENCOUNTER — APPOINTMENT (OUTPATIENT)
Dept: CARDIOLOGY | Facility: CLINIC | Age: OVER 89
End: 2025-02-20
Payer: MEDICARE

## 2025-02-21 ENCOUNTER — HOME CARE VISIT (OUTPATIENT)
Dept: HOME HEALTH SERVICES | Facility: HOME HEALTH | Age: OVER 89
End: 2025-02-21
Payer: MEDICARE

## 2025-02-24 ENCOUNTER — APPOINTMENT (OUTPATIENT)
Dept: CARDIOLOGY | Facility: CLINIC | Age: OVER 89
End: 2025-02-24
Payer: MEDICARE

## 2025-02-25 ENCOUNTER — APPOINTMENT (OUTPATIENT)
Dept: PRIMARY CARE | Facility: CLINIC | Age: OVER 89
End: 2025-02-25
Payer: MEDICARE

## 2025-02-25 ENCOUNTER — TELEPHONE (OUTPATIENT)
Dept: CARDIOLOGY | Facility: CLINIC | Age: OVER 89
End: 2025-02-25

## 2025-02-25 ENCOUNTER — OFFICE VISIT (OUTPATIENT)
Dept: PRIMARY CARE | Facility: CLINIC | Age: OVER 89
End: 2025-02-25
Payer: MEDICARE

## 2025-02-25 ENCOUNTER — HOME CARE VISIT (OUTPATIENT)
Dept: HOME HEALTH SERVICES | Facility: HOME HEALTH | Age: OVER 89
End: 2025-02-25
Payer: MEDICARE

## 2025-02-25 VITALS
HEART RATE: 87 BPM | WEIGHT: 165.6 LBS | OXYGEN SATURATION: 96 % | HEIGHT: 63 IN | DIASTOLIC BLOOD PRESSURE: 69 MMHG | SYSTOLIC BLOOD PRESSURE: 130 MMHG | BODY MASS INDEX: 29.34 KG/M2 | TEMPERATURE: 97.9 F

## 2025-02-25 DIAGNOSIS — I50.32 CHRONIC DIASTOLIC CONGESTIVE HEART FAILURE: Primary | ICD-10-CM

## 2025-02-25 DIAGNOSIS — I87.2 STASIS DERMATITIS OF BOTH LEGS: ICD-10-CM

## 2025-02-25 DIAGNOSIS — C18.9 ADENOCARCINOMA, COLON (MULTI): ICD-10-CM

## 2025-02-25 DIAGNOSIS — R60.9 DEPENDENT EDEMA: ICD-10-CM

## 2025-02-25 PROCEDURE — 1126F AMNT PAIN NOTED NONE PRSNT: CPT | Performed by: INTERNAL MEDICINE

## 2025-02-25 PROCEDURE — 1159F MED LIST DOCD IN RCRD: CPT | Performed by: INTERNAL MEDICINE

## 2025-02-25 PROCEDURE — 1111F DSCHRG MED/CURRENT MED MERGE: CPT | Performed by: INTERNAL MEDICINE

## 2025-02-25 PROCEDURE — 3075F SYST BP GE 130 - 139MM HG: CPT | Performed by: INTERNAL MEDICINE

## 2025-02-25 PROCEDURE — 99214 OFFICE O/P EST MOD 30 MIN: CPT | Performed by: INTERNAL MEDICINE

## 2025-02-25 PROCEDURE — 1157F ADVNC CARE PLAN IN RCRD: CPT | Performed by: INTERNAL MEDICINE

## 2025-02-25 PROCEDURE — G0157 HHC PT ASSISTANT EA 15: HCPCS | Mod: CQ,HHH

## 2025-02-25 PROCEDURE — 1036F TOBACCO NON-USER: CPT | Performed by: INTERNAL MEDICINE

## 2025-02-25 PROCEDURE — G2211 COMPLEX E/M VISIT ADD ON: HCPCS | Performed by: INTERNAL MEDICINE

## 2025-02-25 PROCEDURE — 1160F RVW MEDS BY RX/DR IN RCRD: CPT | Performed by: INTERNAL MEDICINE

## 2025-02-25 PROCEDURE — 3078F DIAST BP <80 MM HG: CPT | Performed by: INTERNAL MEDICINE

## 2025-02-25 RX ORDER — HYDROCORTISONE 10 MG/ML
LOTION TOPICAL 2 TIMES DAILY
Qty: 60 ML | Refills: 0 | Status: SHIPPED | OUTPATIENT
Start: 2025-02-25

## 2025-02-25 ASSESSMENT — ENCOUNTER SYMPTOMS
NUMBNESS: 0
VOMITING: 0
DIZZINESS: 0
DYSURIA: 0
COUGH: 0
WOUND: 0
TREMORS: 0
EYE PAIN: 0
CONSTIPATION: 0
NAUSEA: 0
SHORTNESS OF BREATH: 1
APPETITE CHANGE: 1
HEADACHES: 0
BLOOD IN STOOL: 0
DIARRHEA: 1
ABDOMINAL PAIN: 0
HEMATURIA: 0
BACK PAIN: 0
UNEXPECTED WEIGHT CHANGE: 0
CHILLS: 0
FLANK PAIN: 0
WEAKNESS: 0
SLEEP DISTURBANCE: 0
TROUBLE SWALLOWING: 0
PALPITATIONS: 0
JOINT SWELLING: 0
SORE THROAT: 0
CONFUSION: 0
WHEEZING: 0
FEVER: 0
FREQUENCY: 0
DENIES PAIN: 1
EYE DISCHARGE: 0
PERSON REPORTING PAIN: PATIENT
SEIZURES: 0
NERVOUS/ANXIOUS: 0

## 2025-02-25 ASSESSMENT — PAIN SCALES - GENERAL: PAINLEVEL_OUTOF10: 0-NO PAIN

## 2025-02-25 NOTE — ASSESSMENT & PLAN NOTE
-Chronic, BP controlled with beta-blocker and torsemide  -Most recent EF 60%, with diastolic dysfunction  -Follow-up cardiology-ASAP-for diuretic/fluid management-for worsening edema  -pt/cg agreeable with palliative care eval(I am surprised and disappointed-this was not already addressed during her multiple recent hospitals admissions)  Orders:    Referral to Palliative Care; Future

## 2025-02-25 NOTE — TELEPHONE ENCOUNTER
PCP increased her torsemide 10mg to 1.5 tab for 3 days. Was told to clear it with cardio  She is retaining fluid knees down with swelling and has dermatitis   Wants to make sure that is okay and if she needs to have it increased for a longer time period

## 2025-02-25 NOTE — PATIENT INSTRUCTIONS
PLEASE TAKE 1.5 PILL TORSEMIDE FOR NEXT 3-4 DAYS.  Contact cardiology ASAP for legs EDEMA.    PLEASE KEEP LEGS RAISED ABOVE HEART LEVEL TO HELP WITH EDEMA    PLEASE CALL PALLIATIVE CARE SERVICES TO SET UP EVALUATION

## 2025-02-25 NOTE — PROGRESS NOTES
"Subjective   Patient ID: Yesenia Milner is a 89 y.o. female who presents for Leg Swelling (Both legs swollen and blotchy rash ).    HPI   Patient returns with concerns blotchy rash on her lower extremities.  In the past 2 months she has been admitted 4-5 times in the hospital-either for GI bleed /GI cancer, or acute CHF decompensation.  Here with daughter.    Her last admission was from February 2 to February 11-for CHF decompensation  Patient did not schedule a TCM visit upon discharge, and today's appointment falls out of the TCM window.  She has home health care services  Her next cardiology appointment is March 18.    Jan 2025: A. Colon, Transverse, Mass, Biopsy:   -- Invasive moderately differentiated adenocarcinoma. She has seen surgery and is discussing with her family.    Dr Rosado notes:  \"Follow-up after pulmonary evaluation.  The patient is frail.  I reviewed the ACS NSQIP surgical risk calculator and discussed the results with the patient and her daughter.  Based on this calculator, her risk of serious complication is 19.4%.  Risk of readmission is 18.2%.  Risk of death is 7.9%.  Risk of being discharged to a SNF is 65.8%.  Based on these results I discussed the option of not operating as long as the cancer remains asymptomatic.  I discussed the risk of eventual colonic obstruction or metastatic disease.\"      ECHO:  12/24  CONCLUSIONS:   1. Left ventricular ejection fraction is normal, by visual estimate at 60-65%.   2. Abnormal left venticular wall motion.   3. Left ventricular diastolic filling is indeterminate.   4. There is a moderate apical and anteroapical myocardial infarction.   5. There is normal right ventricular global systolic function.   6. There is moderate mitral annular calcification.   7. Moderate mitral valve regurgitation.   8. Moderate to severe tricuspid regurgitation visualized.   9. Moderately elevated right ventricular systolic pressure.  10. Aortic valve sclerosis.  Review of " "Systems   Constitutional:  Positive for appetite change (improved). Negative for chills, fever and unexpected weight change.   HENT:  Negative for congestion, ear pain, sneezing, sore throat and trouble swallowing.    Eyes:  Negative for pain, discharge and visual disturbance.   Respiratory:  Positive for shortness of breath (VARGAS). Negative for cough and wheezing.    Cardiovascular:  Positive for leg swelling. Negative for chest pain and palpitations.   Gastrointestinal:  Positive for diarrhea (today, after Miralax with OJ). Negative for abdominal pain, blood in stool, constipation, nausea and vomiting.   Genitourinary:  Negative for dysuria, flank pain, frequency, hematuria and urgency.   Musculoskeletal:  Positive for gait problem (chronic). Negative for back pain and joint swelling.   Skin:  Positive for rash (acute, BL LE, non itchy). Negative for wound.   Neurological:  Negative for dizziness, tremors, seizures, syncope, weakness, numbness and headaches.   Psychiatric/Behavioral:  Negative for confusion, sleep disturbance and suicidal ideas. The patient is not nervous/anxious.        Objective   /69   Pulse 87   Temp 36.6 °C (97.9 °F)   Ht 1.6 m (5' 3\")   Wt 75.1 kg (165 lb 9.6 oz)   SpO2 96%   BMI 29.33 kg/m²   Patient Health Questionnaire-2 Score: 0      Physical Exam  Vitals and nursing note reviewed.   Constitutional:       General: She is not in acute distress.     Appearance: Normal appearance.   HENT:      Head: Normocephalic and atraumatic.      Nose: Nose normal.      Mouth/Throat:      Mouth: Mucous membranes are moist.      Pharynx: Oropharynx is clear.   Eyes:      Extraocular Movements: Extraocular movements intact.      Conjunctiva/sclera: Conjunctivae normal.      Pupils: Pupils are equal, round, and reactive to light.   Cardiovascular:      Rate and Rhythm: Normal rate and regular rhythm.      Heart sounds: Murmur (AIDAN 2/6) heard.   Pulmonary:      Effort: Pulmonary effort is normal. " No respiratory distress.      Breath sounds: Normal breath sounds. No wheezing or rhonchi.   Abdominal:      General: Bowel sounds are normal.      Palpations: Abdomen is soft.      Tenderness: There is no abdominal tenderness.   Musculoskeletal:         General: Normal range of motion.      Cervical back: Neck supple.      Thoracic back: Scoliosis (kyphosis) present.      Right lower leg: 3+ Edema present.      Left lower leg: 3+ Edema present.   Skin:     General: Skin is warm and dry.      Findings: Rash (MACULAR DIFFUSEHYPERPIGMENTED RASH BL SHINS AND THIGHS) present. No bruising.   Neurological:      General: No focal deficit present.      Mental Status: She is alert and oriented to person, place, and time.      Motor: No weakness.      Gait: Gait abnormal (rollator).   Psychiatric:         Mood and Affect: Mood normal.         Behavior: Behavior normal.       Assessment/Plan   Assessment & Plan  Chronic diastolic congestive heart failure  -Chronic, BP controlled with beta-blocker and torsemide  -Most recent EF 60%, with diastolic dysfunction  -Follow-up cardiology-ASAP-for diuretic/fluid management-for worsening edema  -pt/cg agreeable with palliative care eval(I am surprised and disappointed-this was not already addressed during her multiple recent hospitals admissions)  Orders:    Referral to Palliative Care; Future    Stasis dermatitis of both legs  - blotchy , hyperpigmented macular spots diffuse BL LE ( below and above knees)  - no signs of cellulitis  - EDEMA 3+   Orders:    hydrocortisone 1 % lotion; Apply topically 2 times a day. Apply to both legs , wash hands after applying lotion    Referral to Palliative Care; Future    Dependent edema  - chronic, worsening for past few days  -Currently on 10 mg daily torsemide, off spironolactone-per specialists  -Advised okay to increase torsemide to 1.5 pills daily for the next few days, contact cardiology ASAP for long-term decision for diuretic  "management  Orders:    Referral to Palliative Care; Future    Adenocarcinoma, colon (Multi)  -  high risk surgery per dr Vazquez   \" her risk of serious complication is 19.4%.  Risk of readmission is 18.2%.  Risk of death is 7.9%.  Risk of being discharged to a SNF is 65.8%.  Based on these results I discussed the option of not operating as long as the cancer remains asymptomatic.  I discussed the risk of eventual colonic obstruction or metastatic disease.\"    -Jan 2025: A. Colon, Transverse, Mass, Biopsy:   -- Invasive moderately differentiated adenocarcinoma.  -Long discussion-about benefits of palliative care/for symptom management/keeping the patient comfortable at home/avoiding further hospitalizations etc  -Patient not interested in hospice at this time-as she is pursuing possible aggressive treatment-possible surgery-for colon cancer(pending pulmonary, and cardiology consults and optimization)  Orders:    Referral to Palliative Care; Future    PLEASE take 1.5 PILL TORSEMIDE for next 3-4 days.  PLEASE KEEP LEGS RAISED ABOVE HEART LEVEL TO HELP WITH EDEMA  Contact cardiology ASAP for legs EDEMA.       "

## 2025-02-26 ENCOUNTER — PATIENT OUTREACH (OUTPATIENT)
Dept: PRIMARY CARE | Facility: CLINIC | Age: OVER 89
End: 2025-02-26

## 2025-02-26 LAB
ATRIAL RATE: 96 BPM
P AXIS: 0 DEGREES
PR INTERVAL: 48 MS
Q ONSET: 249 MS
QRS COUNT: 11 BEATS
QRS DURATION: 156 MS
QT INTERVAL: 458 MS
QTC CALCULATION(BAZETT): 512 MS
QTC FREDERICIA: 493 MS
R AXIS: -62 DEGREES
T AXIS: 167 DEGREES
T OFFSET: 478 MS
VENTRICULAR RATE: 75 BPM

## 2025-02-26 NOTE — TELEPHONE ENCOUNTER
Discussed with Dr Ramicone. Ok to take 1.5 tabs of torsemide per day x 3-4 days. Pt to call office after the 3-4 days with update and Dr Ramicone will decide on dosing moving forward. Pt notified.

## 2025-02-26 NOTE — PROGRESS NOTES
Call regarding appt. with PCP on 2/25/25 after hospitalization.  At time of outreach call the patient feels as if their condition has (improved  since last visit.  Reviewed the PCP appointment with the pt and addressed any questions or concerns.  Pt states she and her dtr are trying to get a pulm appt with no luck. We discussed calling central number for first avail. Pt to discuss with dtr and let me know.

## 2025-02-27 ENCOUNTER — APPOINTMENT (OUTPATIENT)
Dept: PRIMARY CARE | Facility: CLINIC | Age: OVER 89
End: 2025-02-27
Payer: MEDICARE

## 2025-02-27 ENCOUNTER — HOME CARE VISIT (OUTPATIENT)
Dept: HOME HEALTH SERVICES | Facility: HOME HEALTH | Age: OVER 89
End: 2025-02-27
Payer: MEDICARE

## 2025-02-27 PROCEDURE — G0157 HHC PT ASSISTANT EA 15: HCPCS | Mod: CQ,HHH

## 2025-02-27 ASSESSMENT — ENCOUNTER SYMPTOMS
PERSON REPORTING PAIN: PATIENT
DENIES PAIN: 1

## 2025-03-03 ENCOUNTER — TELEPHONE (OUTPATIENT)
Dept: CARDIOLOGY | Facility: CLINIC | Age: OVER 89
End: 2025-03-03
Payer: MEDICARE

## 2025-03-03 DIAGNOSIS — I50.9 ACUTE DECOMPENSATED HEART FAILURE: ICD-10-CM

## 2025-03-03 RX ORDER — TORSEMIDE 10 MG/1
20 TABLET ORAL DAILY
Status: ON HOLD | COMMUNITY
Start: 2025-03-03

## 2025-03-03 NOTE — TELEPHONE ENCOUNTER
Per Dr Ramicone, resume 20mg per day dose of torsemide. Pt notified.    Pt was on 20mg daily prior to hospitalization. Med list updated.

## 2025-03-03 NOTE — TELEPHONE ENCOUNTER
Pt called today. She was instructed by PCP to increase her torsemide for 4 days for increased LE swelling. Since recent hospitalization, she has been taking torsemide 10 day, has been taking 15mg daily for the last 4 days and has had little improvement. Breathing is a little worse. Legs are very swollen, can't wear socks, uncomfortable to bend legs. She was hospitalized 2/2-2/11 for CHF. (When she was hospitalized,her BUN flower to 36 and torsemide was decreased to 10mg from 20mg).    Next Dr Ramicone OV is scheduled for 3/28/25.    Message sent to Dr Ramicone.

## 2025-03-04 ENCOUNTER — HOSPITAL ENCOUNTER (INPATIENT)
Facility: HOSPITAL | Age: OVER 89
End: 2025-03-04
Attending: STUDENT IN AN ORGANIZED HEALTH CARE EDUCATION/TRAINING PROGRAM | Admitting: STUDENT IN AN ORGANIZED HEALTH CARE EDUCATION/TRAINING PROGRAM
Payer: MEDICARE

## 2025-03-04 ENCOUNTER — APPOINTMENT (OUTPATIENT)
Dept: RADIOLOGY | Facility: HOSPITAL | Age: OVER 89
End: 2025-03-04
Payer: MEDICARE

## 2025-03-04 ENCOUNTER — APPOINTMENT (OUTPATIENT)
Dept: CARDIOLOGY | Facility: HOSPITAL | Age: OVER 89
End: 2025-03-04
Payer: MEDICARE

## 2025-03-04 ENCOUNTER — APPOINTMENT (OUTPATIENT)
Dept: VASCULAR MEDICINE | Facility: HOSPITAL | Age: OVER 89
End: 2025-03-04
Payer: MEDICARE

## 2025-03-04 DIAGNOSIS — L03.119 CELLULITIS OF LOWER EXTREMITY, UNSPECIFIED LATERALITY: ICD-10-CM

## 2025-03-04 DIAGNOSIS — M79.89 LEG SWELLING: ICD-10-CM

## 2025-03-04 DIAGNOSIS — R60.0 BILATERAL LEG EDEMA: Primary | ICD-10-CM

## 2025-03-04 PROBLEM — J90 PLEURAL EFFUSION: Status: ACTIVE | Noted: 2025-03-04

## 2025-03-04 PROBLEM — C18.9 ADENOCARCINOMA OF COLON (MULTI): Status: ACTIVE | Noted: 2025-03-04

## 2025-03-04 PROBLEM — I50.9 ACUTE EXACERBATION OF CHRONIC HEART FAILURE: Status: ACTIVE | Noted: 2025-03-04

## 2025-03-04 PROBLEM — R09.02 HYPOXIA: Status: ACTIVE | Noted: 2025-03-04

## 2025-03-04 LAB
ALBUMIN SERPL BCP-MCNC: 3.8 G/DL (ref 3.4–5)
ALP SERPL-CCNC: 88 U/L (ref 33–136)
ALT SERPL W P-5'-P-CCNC: 14 U/L (ref 7–45)
ANION GAP SERPL CALC-SCNC: 13 MMOL/L (ref 10–20)
AST SERPL W P-5'-P-CCNC: 15 U/L (ref 9–39)
BASOPHILS # BLD AUTO: 0.03 X10*3/UL (ref 0–0.1)
BASOPHILS NFR BLD AUTO: 0.5 %
BILIRUB SERPL-MCNC: 0.6 MG/DL (ref 0–1.2)
BNP SERPL-MCNC: 535 PG/ML (ref 0–99)
BUN SERPL-MCNC: 22 MG/DL (ref 6–23)
CALCIUM SERPL-MCNC: 9.3 MG/DL (ref 8.6–10.3)
CARDIAC TROPONIN I PNL SERPL HS: 19 NG/L (ref 0–13)
CARDIAC TROPONIN I PNL SERPL HS: 19 NG/L (ref 0–13)
CHLORIDE SERPL-SCNC: 98 MMOL/L (ref 98–107)
CO2 SERPL-SCNC: 26 MMOL/L (ref 21–32)
CREAT SERPL-MCNC: 0.95 MG/DL (ref 0.5–1.05)
EGFRCR SERPLBLD CKD-EPI 2021: 57 ML/MIN/1.73M*2
EOSINOPHIL # BLD AUTO: 0.15 X10*3/UL (ref 0–0.4)
EOSINOPHIL NFR BLD AUTO: 2.5 %
ERYTHROCYTE [DISTWIDTH] IN BLOOD BY AUTOMATED COUNT: 16.1 % (ref 11.5–14.5)
FLUAV RNA RESP QL NAA+PROBE: NOT DETECTED
FLUBV RNA RESP QL NAA+PROBE: NOT DETECTED
GLUCOSE SERPL-MCNC: 112 MG/DL (ref 74–99)
HCT VFR BLD AUTO: 26 % (ref 36–46)
HGB BLD-MCNC: 8.1 G/DL (ref 12–16)
IMM GRANULOCYTES # BLD AUTO: 0.09 X10*3/UL (ref 0–0.5)
IMM GRANULOCYTES NFR BLD AUTO: 1.5 % (ref 0–0.9)
LYMPHOCYTES # BLD AUTO: 1.42 X10*3/UL (ref 0.8–3)
LYMPHOCYTES NFR BLD AUTO: 23.7 %
MAGNESIUM SERPL-MCNC: 2.23 MG/DL (ref 1.6–2.4)
MCH RBC QN AUTO: 27 PG (ref 26–34)
MCHC RBC AUTO-ENTMCNC: 31.2 G/DL (ref 32–36)
MCV RBC AUTO: 87 FL (ref 80–100)
MONOCYTES # BLD AUTO: 0.96 X10*3/UL (ref 0.05–0.8)
MONOCYTES NFR BLD AUTO: 16 %
NEUTROPHILS # BLD AUTO: 3.34 X10*3/UL (ref 1.6–5.5)
NEUTROPHILS NFR BLD AUTO: 55.8 %
NRBC BLD-RTO: 0 /100 WBCS (ref 0–0)
PLATELET # BLD AUTO: 271 X10*3/UL (ref 150–450)
POTASSIUM SERPL-SCNC: 3.6 MMOL/L (ref 3.5–5.3)
PROT SERPL-MCNC: 6.5 G/DL (ref 6.4–8.2)
RBC # BLD AUTO: 3 X10*6/UL (ref 4–5.2)
SARS-COV-2 RNA RESP QL NAA+PROBE: NOT DETECTED
SODIUM SERPL-SCNC: 133 MMOL/L (ref 136–145)
TSH SERPL-ACNC: 2.96 MIU/L (ref 0.44–3.98)
WBC # BLD AUTO: 6 X10*3/UL (ref 4.4–11.3)

## 2025-03-04 PROCEDURE — 93970 EXTREMITY STUDY: CPT

## 2025-03-04 PROCEDURE — 2500000004 HC RX 250 GENERAL PHARMACY W/ HCPCS (ALT 636 FOR OP/ED)

## 2025-03-04 PROCEDURE — 36415 COLL VENOUS BLD VENIPUNCTURE: CPT

## 2025-03-04 PROCEDURE — G0378 HOSPITAL OBSERVATION PER HR: HCPCS

## 2025-03-04 PROCEDURE — 96372 THER/PROPH/DIAG INJ SC/IM: CPT | Performed by: NURSE PRACTITIONER

## 2025-03-04 PROCEDURE — 93970 EXTREMITY STUDY: CPT | Performed by: SURGERY

## 2025-03-04 PROCEDURE — 93005 ELECTROCARDIOGRAM TRACING: CPT

## 2025-03-04 PROCEDURE — 2500000004 HC RX 250 GENERAL PHARMACY W/ HCPCS (ALT 636 FOR OP/ED): Performed by: NURSE PRACTITIONER

## 2025-03-04 PROCEDURE — 80053 COMPREHEN METABOLIC PANEL: CPT

## 2025-03-04 PROCEDURE — 87636 SARSCOV2 & INF A&B AMP PRB: CPT | Performed by: NURSE PRACTITIONER

## 2025-03-04 PROCEDURE — 99285 EMERGENCY DEPT VISIT HI MDM: CPT | Mod: 25 | Performed by: STUDENT IN AN ORGANIZED HEALTH CARE EDUCATION/TRAINING PROGRAM

## 2025-03-04 PROCEDURE — 96375 TX/PRO/DX INJ NEW DRUG ADDON: CPT

## 2025-03-04 PROCEDURE — 85025 COMPLETE CBC W/AUTO DIFF WBC: CPT

## 2025-03-04 PROCEDURE — 83880 ASSAY OF NATRIURETIC PEPTIDE: CPT

## 2025-03-04 PROCEDURE — 96376 TX/PRO/DX INJ SAME DRUG ADON: CPT

## 2025-03-04 PROCEDURE — 84484 ASSAY OF TROPONIN QUANT: CPT

## 2025-03-04 PROCEDURE — 71046 X-RAY EXAM CHEST 2 VIEWS: CPT

## 2025-03-04 PROCEDURE — 71046 X-RAY EXAM CHEST 2 VIEWS: CPT | Mod: FOREIGN READ | Performed by: RADIOLOGY

## 2025-03-04 PROCEDURE — 96365 THER/PROPH/DIAG IV INF INIT: CPT | Mod: 59

## 2025-03-04 PROCEDURE — 99223 1ST HOSP IP/OBS HIGH 75: CPT | Performed by: NURSE PRACTITIONER

## 2025-03-04 PROCEDURE — 83735 ASSAY OF MAGNESIUM: CPT | Performed by: NURSE PRACTITIONER

## 2025-03-04 PROCEDURE — 2500000001 HC RX 250 WO HCPCS SELF ADMINISTERED DRUGS (ALT 637 FOR MEDICARE OP)

## 2025-03-04 PROCEDURE — 84443 ASSAY THYROID STIM HORMONE: CPT | Performed by: NURSE PRACTITIONER

## 2025-03-04 RX ORDER — ONDANSETRON HYDROCHLORIDE 2 MG/ML
4 INJECTION, SOLUTION INTRAVENOUS ONCE
Status: COMPLETED | OUTPATIENT
Start: 2025-03-04 | End: 2025-03-04

## 2025-03-04 RX ORDER — METOPROLOL TARTRATE 25 MG/1
25 TABLET, FILM COATED ORAL DAILY
Status: DISPENSED | OUTPATIENT
Start: 2025-03-05

## 2025-03-04 RX ORDER — NAPROXEN SODIUM 220 MG/1
81 TABLET, FILM COATED ORAL DAILY
Status: DISPENSED | OUTPATIENT
Start: 2025-03-05

## 2025-03-04 RX ORDER — AMOXICILLIN AND CLAVULANATE POTASSIUM 875; 125 MG/1; MG/1
1 TABLET, FILM COATED ORAL ONCE
Status: COMPLETED | OUTPATIENT
Start: 2025-03-04 | End: 2025-03-04

## 2025-03-04 RX ORDER — ENOXAPARIN SODIUM 100 MG/ML
40 INJECTION SUBCUTANEOUS EVERY 24 HOURS
Status: DISPENSED | OUTPATIENT
Start: 2025-03-04

## 2025-03-04 RX ORDER — FLUTICASONE FUROATE AND VILANTEROL 200; 25 UG/1; UG/1
1 POWDER RESPIRATORY (INHALATION)
Status: DISPENSED | OUTPATIENT
Start: 2025-03-05

## 2025-03-04 RX ORDER — TORSEMIDE 20 MG/1
40 TABLET ORAL DAILY
Status: DISCONTINUED | OUTPATIENT
Start: 2025-03-04 | End: 2025-03-04

## 2025-03-04 RX ORDER — ALBUTEROL SULFATE 0.83 MG/ML
2.5 SOLUTION RESPIRATORY (INHALATION) EVERY 4 HOURS PRN
Status: ACTIVE | OUTPATIENT
Start: 2025-03-04

## 2025-03-04 RX ORDER — POLYETHYLENE GLYCOL 3350 17 G/17G
17 POWDER, FOR SOLUTION ORAL 2 TIMES DAILY
Status: DISPENSED | OUTPATIENT
Start: 2025-03-04

## 2025-03-04 RX ORDER — PSYLLIUM HUSK 0.4 G
1 CAPSULE ORAL DAILY
Status: DISPENSED | OUTPATIENT
Start: 2025-03-05

## 2025-03-04 RX ORDER — ONDANSETRON HYDROCHLORIDE 2 MG/ML
4 INJECTION, SOLUTION INTRAVENOUS EVERY 6 HOURS PRN
Status: DISCONTINUED | OUTPATIENT
Start: 2025-03-04 | End: 2025-03-04 | Stop reason: SDUPTHER

## 2025-03-04 RX ORDER — ACETAMINOPHEN 650 MG/1
650 SUPPOSITORY RECTAL EVERY 6 HOURS PRN
Status: ACTIVE | OUTPATIENT
Start: 2025-03-04

## 2025-03-04 RX ORDER — CLOPIDOGREL BISULFATE 75 MG/1
75 TABLET ORAL DAILY
Status: DISPENSED | OUTPATIENT
Start: 2025-03-05

## 2025-03-04 RX ORDER — ACETAMINOPHEN 325 MG/1
650 TABLET ORAL EVERY 6 HOURS PRN
Status: ACTIVE | OUTPATIENT
Start: 2025-03-04

## 2025-03-04 RX ORDER — ACETAMINOPHEN 160 MG/5ML
650 SOLUTION ORAL EVERY 6 HOURS PRN
Status: ACTIVE | OUTPATIENT
Start: 2025-03-04

## 2025-03-04 RX ORDER — ONDANSETRON HYDROCHLORIDE 2 MG/ML
4 INJECTION, SOLUTION INTRAVENOUS EVERY 6 HOURS PRN
Status: DISPENSED | OUTPATIENT
Start: 2025-03-04

## 2025-03-04 RX ADMIN — TORSEMIDE 40 MG: 20 TABLET ORAL at 11:10

## 2025-03-04 RX ADMIN — AMOXICILLIN AND CLAVULANATE POTASSIUM 1 TABLET: 875; 125 TABLET, COATED ORAL at 11:10

## 2025-03-04 RX ADMIN — ONDANSETRON 4 MG: 2 INJECTION INTRAMUSCULAR; INTRAVENOUS at 16:14

## 2025-03-04 RX ADMIN — DOXYCYCLINE 100 MG: 100 INJECTION, POWDER, LYOPHILIZED, FOR SOLUTION INTRAVENOUS at 17:12

## 2025-03-04 RX ADMIN — ONDANSETRON 4 MG: 2 INJECTION INTRAMUSCULAR; INTRAVENOUS at 20:40

## 2025-03-04 RX ADMIN — ENOXAPARIN SODIUM 40 MG: 40 INJECTION SUBCUTANEOUS at 18:00

## 2025-03-04 SDOH — HEALTH STABILITY: MENTAL HEALTH: HOW OFTEN DO YOU HAVE SIX OR MORE DRINKS ON ONE OCCASION?: NEVER

## 2025-03-04 SDOH — SOCIAL STABILITY: SOCIAL INSECURITY: ARE YOU OR HAVE YOU BEEN THREATENED OR ABUSED PHYSICALLY, EMOTIONALLY, OR SEXUALLY BY ANYONE?: NO

## 2025-03-04 SDOH — SOCIAL STABILITY: SOCIAL INSECURITY: HAS ANYONE EVER THREATENED TO HURT YOUR FAMILY OR YOUR PETS?: NO

## 2025-03-04 SDOH — ECONOMIC STABILITY: FOOD INSECURITY: WITHIN THE PAST 12 MONTHS, THE FOOD YOU BOUGHT JUST DIDN'T LAST AND YOU DIDN'T HAVE MONEY TO GET MORE.: NEVER TRUE

## 2025-03-04 SDOH — HEALTH STABILITY: MENTAL HEALTH: HOW OFTEN DO YOU HAVE A DRINK CONTAINING ALCOHOL?: NEVER

## 2025-03-04 SDOH — HEALTH STABILITY: MENTAL HEALTH: HOW MANY DRINKS CONTAINING ALCOHOL DO YOU HAVE ON A TYPICAL DAY WHEN YOU ARE DRINKING?: PATIENT DOES NOT DRINK

## 2025-03-04 SDOH — SOCIAL STABILITY: SOCIAL INSECURITY: DOES ANYONE TRY TO KEEP YOU FROM HAVING/CONTACTING OTHER FRIENDS OR DOING THINGS OUTSIDE YOUR HOME?: NO

## 2025-03-04 SDOH — SOCIAL STABILITY: SOCIAL INSECURITY: WITHIN THE LAST YEAR, HAVE YOU BEEN AFRAID OF YOUR PARTNER OR EX-PARTNER?: NO

## 2025-03-04 SDOH — SOCIAL STABILITY: SOCIAL INSECURITY: WITHIN THE LAST YEAR, HAVE YOU BEEN HUMILIATED OR EMOTIONALLY ABUSED IN OTHER WAYS BY YOUR PARTNER OR EX-PARTNER?: NO

## 2025-03-04 SDOH — SOCIAL STABILITY: SOCIAL INSECURITY: ABUSE: ADULT

## 2025-03-04 SDOH — ECONOMIC STABILITY: FOOD INSECURITY: WITHIN THE PAST 12 MONTHS, YOU WORRIED THAT YOUR FOOD WOULD RUN OUT BEFORE YOU GOT THE MONEY TO BUY MORE.: NEVER TRUE

## 2025-03-04 SDOH — ECONOMIC STABILITY: INCOME INSECURITY: IN THE PAST 12 MONTHS HAS THE ELECTRIC, GAS, OIL, OR WATER COMPANY THREATENED TO SHUT OFF SERVICES IN YOUR HOME?: NO

## 2025-03-04 SDOH — SOCIAL STABILITY: SOCIAL INSECURITY: WERE YOU ABLE TO COMPLETE ALL THE BEHAVIORAL HEALTH SCREENINGS?: YES

## 2025-03-04 SDOH — SOCIAL STABILITY: SOCIAL INSECURITY: DO YOU FEEL UNSAFE GOING BACK TO THE PLACE WHERE YOU ARE LIVING?: NO

## 2025-03-04 SDOH — SOCIAL STABILITY: SOCIAL INSECURITY: HAVE YOU HAD ANY THOUGHTS OF HARMING ANYONE ELSE?: NO

## 2025-03-04 SDOH — SOCIAL STABILITY: SOCIAL INSECURITY: DO YOU FEEL ANYONE HAS EXPLOITED OR TAKEN ADVANTAGE OF YOU FINANCIALLY OR OF YOUR PERSONAL PROPERTY?: NO

## 2025-03-04 SDOH — SOCIAL STABILITY: SOCIAL INSECURITY: ARE THERE ANY APPARENT SIGNS OF INJURIES/BEHAVIORS THAT COULD BE RELATED TO ABUSE/NEGLECT?: NO

## 2025-03-04 SDOH — SOCIAL STABILITY: SOCIAL INSECURITY: HAVE YOU HAD THOUGHTS OF HARMING ANYONE ELSE?: NO

## 2025-03-04 ASSESSMENT — ACTIVITIES OF DAILY LIVING (ADL)
WALKS IN HOME: NEEDS ASSISTANCE
JUDGMENT_ADEQUATE_SAFELY_COMPLETE_DAILY_ACTIVITIES: YES
GROOMING: INDEPENDENT
TOILETING: NEEDS ASSISTANCE
DRESSING YOURSELF: NEEDS ASSISTANCE
BATHING: NEEDS ASSISTANCE
FEEDING YOURSELF: INDEPENDENT
HEARING - RIGHT EAR: FUNCTIONAL
PATIENT'S MEMORY ADEQUATE TO SAFELY COMPLETE DAILY ACTIVITIES?: YES
ASSISTIVE_DEVICE: WALKER
ADEQUATE_TO_COMPLETE_ADL: YES
HEARING - LEFT EAR: FUNCTIONAL
LACK_OF_TRANSPORTATION: NO

## 2025-03-04 ASSESSMENT — LIFESTYLE VARIABLES
HAVE PEOPLE ANNOYED YOU BY CRITICIZING YOUR DRINKING: NO
HOW MANY STANDARD DRINKS CONTAINING ALCOHOL DO YOU HAVE ON A TYPICAL DAY: PATIENT DOES NOT DRINK
TOTAL SCORE: 0
HOW OFTEN DO YOU HAVE A DRINK CONTAINING ALCOHOL: NEVER
HAVE YOU EVER FELT YOU SHOULD CUT DOWN ON YOUR DRINKING: NO
AUDIT-C TOTAL SCORE: 0
SKIP TO QUESTIONS 9-10: 1
EVER FELT BAD OR GUILTY ABOUT YOUR DRINKING: NO
HOW OFTEN DO YOU HAVE 6 OR MORE DRINKS ON ONE OCCASION: NEVER
EVER HAD A DRINK FIRST THING IN THE MORNING TO STEADY YOUR NERVES TO GET RID OF A HANGOVER: NO
PRESCIPTION_ABUSE_PAST_12_MONTHS: NO
AUDIT-C TOTAL SCORE: 0
SKIP TO QUESTIONS 9-10: 1
AUDIT-C TOTAL SCORE: 0
SUBSTANCE_ABUSE_PAST_12_MONTHS: NO

## 2025-03-04 ASSESSMENT — COGNITIVE AND FUNCTIONAL STATUS - GENERAL
MOVING TO AND FROM BED TO CHAIR: A LOT
PATIENT BASELINE BEDBOUND: NO
CLIMB 3 TO 5 STEPS WITH RAILING: A LOT
MOBILITY SCORE: 15
STANDING UP FROM CHAIR USING ARMS: A LOT
TOILETING: A LITTLE
WALKING IN HOSPITAL ROOM: A LOT
HELP NEEDED FOR BATHING: A LITTLE
DAILY ACTIVITIY SCORE: 20
TURNING FROM BACK TO SIDE WHILE IN FLAT BAD: A LITTLE
DRESSING REGULAR LOWER BODY CLOTHING: A LITTLE
DRESSING REGULAR UPPER BODY CLOTHING: A LITTLE

## 2025-03-04 ASSESSMENT — PATIENT HEALTH QUESTIONNAIRE - PHQ9
2. FEELING DOWN, DEPRESSED OR HOPELESS: NOT AT ALL
1. LITTLE INTEREST OR PLEASURE IN DOING THINGS: NOT AT ALL
SUM OF ALL RESPONSES TO PHQ9 QUESTIONS 1 & 2: 0

## 2025-03-04 ASSESSMENT — COLUMBIA-SUICIDE SEVERITY RATING SCALE - C-SSRS
6. HAVE YOU EVER DONE ANYTHING, STARTED TO DO ANYTHING, OR PREPARED TO DO ANYTHING TO END YOUR LIFE?: NO
2. HAVE YOU ACTUALLY HAD ANY THOUGHTS OF KILLING YOURSELF?: NO
2. HAVE YOU ACTUALLY HAD ANY THOUGHTS OF KILLING YOURSELF?: NO
1. IN THE PAST MONTH, HAVE YOU WISHED YOU WERE DEAD OR WISHED YOU COULD GO TO SLEEP AND NOT WAKE UP?: NO
6. HAVE YOU EVER DONE ANYTHING, STARTED TO DO ANYTHING, OR PREPARED TO DO ANYTHING TO END YOUR LIFE?: NO
1. IN THE PAST MONTH, HAVE YOU WISHED YOU WERE DEAD OR WISHED YOU COULD GO TO SLEEP AND NOT WAKE UP?: NO

## 2025-03-04 NOTE — PROGRESS NOTES
Pharmacy Medication History Review    Yesenia Milner is a 89 y.o. female admitted for No Principal Problem: There is no principal problem currently on the Problem List. Please update the Problem List and refresh.. Pharmacy reviewed the patient's [id number] medications and allergies for accuracy.    The list below reflectives the updated PTA list. Please review each medication in order reconciliation for additional clarification and justification.  Prior to Admission medications    Medication Sig Start Date End Date Taking? Authorizing Provider   aspirin 81 mg chewable tablet Chew 1 tablet (81 mg) once daily. 12/27/24  Yes Everton Rubio, DO   calcium carbonate-vitamin D3 (Calcium 600 + D,3,) 600 mg-5 mcg (200 unit) tablet Take 1 tablet by mouth once daily.   Yes Historical Provider, MD   clopidogrel (Plavix) 75 mg tablet Take 1 tablet by mouth once daily 2/10/25  Yes Naima Keane MD   fluticasone propion-salmeteroL (Advair) 115-21 mcg/actuation inhaler Inhale 2 puffs 2 times a day. Rinse mouth with water after use to reduce aftertaste and incidence of candidiasis. Do not swallow. 6/26/24  Yes Everton Rubio, DO   hydrocortisone 1 % lotion Apply topically 2 times a day. Apply to both legs , wash hands after applying lotion 2/25/25  Yes Naima Keane MD   lubricating eye drops ophthalmic solution Administer 1 drop into both eyes if needed for dry eyes.   Yes Historical Provider, MD   metoprolol tartrate (Lopressor) 25 mg tablet Take 1 tablet (25 mg) by mouth once daily. 2/12/25  Yes Iglesia Gabriel, DO   polyethylene glycol (Glycolax, Miralax) 17 gram/dose powder Mix 1 capful (17 g) of powder with 4 to 8 ounces of water or juice and drink 2 times a day. 1/23/25  Yes Kylah Pagan PA-C   torsemide (Demadex) 10 mg tablet Take 2 tablets (20 mg) by mouth once daily. 3/3/25  Not yet Started James C Ramicone,    torsemide (Demadex) 10 mg tablet Take 1 tablet (10 mg) by mouth once daily. 2/12/25 3/3/25   Iglesia Gabriel, DO        The list below reflectives the updated allergy list. Please review each documented allergy for additional clarification and justification.  Allergies  Reviewed by Naima Keane MD on 2/25/2025        Severity Reactions Comments    Iodine Medium Rash     Shrimp Medium Diarrhea, Nausea/vomiting     Hydrocodone Not Specified Unknown Pt does not remember    Adhesive Tape-silicones Low Itching             Below are additional concerns with the patient's PTA list.      Sharonda Montaño

## 2025-03-04 NOTE — H&P
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  Patient has had multiple recent admissions for congestive heart failure exacerbation.  Patient presents with bilateral lower extremity swelling with erythema and warmth over the past several days.  She is reporting pain in her legs and difficulty walking.  Normally ambulates with a walker.  Denies falls.  She did have 1 episode of emesis in the ER.  She has issues with constipation and takes MiraLAX twice daily.  Denies fevers, rigors, acute vision or hearing changes, headache, focal weakness, chest pains, palpitations, cough, SOB, abdominal pains, dysuria, or wounds.  She has an appointment with pulmonology coming up this week for surgical risk evaluation for surgery.   Of note she is on a low residue diet/low fiber 2/2 colonic mass.    ER course: Hemodynamically stable, afebrile.  Patient did drop her oxygenation saturation down to 85% and was placed on 2 L nasal cannula while in the ED.  However, no acute respiratory distress.  WBC 6, hemoglobin 8.1/hematocrit 26, platelets 271.  Glucose 112, sodium 133, otherwise CMP is normal.  .  High-sensitivity troponin 19, repeat 19. CXR small bilateral pleural effusions with minimal bibasilar atelectasis/consolidation largely stable compared to 2/9/2025.  Per ER provider interpretation EKG showed normal sinus rhythm no NSTEMI/STEMI, no long QT and no T wave inversion.  Patient was found to have likely cellulitis of her bilateral lower extremities and is being  hospitalized due to difficulties with ambulation and pain.    Social history: Remote history of smoking on and off for about 18 years.  No alcohol abuse.  Lives alone.  Family close by and assist patient with ADLs.  Family history: Noncontributory to presenting complaint.      Echocardiogram 12/21/2024    CONCLUSIONS:   1. Left ventricular ejection fraction is normal, by visual estimate at 60-65%.   2. Abnormal left venticular wall motion.   3. Left ventricular diastolic filling is indeterminate.   4. There is a moderate apical and anteroapical myocardial infarction.   5. There is normal right ventricular global systolic function.   6. There is moderate mitral annular calcification.   7. Moderate mitral valve regurgitation.   8. Moderate to severe tricuspid regurgitation visualized.   9. Moderately elevated right ventricular systolic pressure.  10. Aortic valve sclerosis.     Past Medical History  Past Medical History:   Diagnosis Date    Gross hematuria 10/07/2020    Impacted cerumen 02/22/2024    Other conditions influencing health status     Normal stress echocardiogram    Personal history of other medical treatment     History of echocardiogram    Personal history of other medical treatment     H/O Doppler ultrasound    Systolic CHF (Multi) 05/18/2023       Surgical History  Past Surgical History:   Procedure Laterality Date    APPENDECTOMY  11/22/2017    Appendectomy    CARDIAC CATHETERIZATION N/A 12/20/2024    Procedure: Left Heart Cath, With LV;  Surgeon: Tahir Ruelas MD;  Location: [facility];  Service: Cardiovascular;  Laterality: N/A;    CARDIAC PACEMAKER PLACEMENT  03/11/2021    Pacemaker Placement    HYSTERECTOMY  11/22/2017    Hysterectomy    IR ANGIOGRAM INFERIOR EPIGASTRIC PELVIC  12/14/2020    IR ANGIOGRAM INFERIOR EPIGASTRIC PELVIC 12/14/2020 [facility]    IR ANGIOGRAM INFERIOR EPIGASTRIC PELVIC  12/14/2020    IR ANGIOGRAM INFERIOR EPIGASTRIC PELVIC 12/14/2020 [facility]    IR  ANGIOGRAM RENAL BILATERAL Bilateral 12/14/2020    IR ANGIOGRAM RENAL BILATERAL 12/14/2020 [facility]    OTHER SURGICAL HISTORY  11/22/2017    Stab Phlebectomy Of Varicose Veins    OTHER SURGICAL HISTORY  11/22/2017    Venous Ligation With Stripping    TONSILLECTOMY  11/22/2017    Tonsillectomy        Social History  She reports that she quit smoking about 52 years ago. Her smoking use included cigarettes. She has been exposed to tobacco smoke. She has never used smokeless tobacco. She reports that she does not drink alcohol and does not use drugs.    Family History  Family History   Problem Relation Name Age of Onset    No Known Problems Mother          Allergies  Iodine, Shrimp, Hydrocodone, and Adhesive tape-silicones    Review of Systems    10 point review of systems are negative except as noted above in HPI     Physical Exam  Constitutional:       General: She is awake. She is not in acute distress.     Appearance: Normal appearance. She is not toxic-appearing.   HENT:      Head: Atraumatic.      Nose: Nose normal.      Mouth/Throat:      Mouth: Mucous membranes are moist.   Eyes:      Conjunctiva/sclera: Conjunctivae normal.   Cardiovascular:      Rate and Rhythm: Normal rate and regular rhythm.      Pulses: Normal pulses.      Heart sounds: No murmur heard.  Pulmonary:      Effort: Pulmonary effort is normal. No respiratory distress.      Breath sounds: Wheezing present.      Comments: Slight wheezing end exhalation   Abdominal:      General: Bowel sounds are normal. There is no distension.      Palpations: Abdomen is soft.      Tenderness: There is no abdominal tenderness. There is no guarding.   Musculoskeletal:         General: No swelling, deformity or signs of injury. Normal range of motion.      Cervical back: Neck supple.      Right lower leg: Edema present.      Left lower leg: Edema present.   Skin:     General: Skin is warm and dry.      Capillary Refill: Capillary refill takes less than 2  "seconds.      Findings: Erythema present. No ecchymosis or wound.      Comments: Bilateral lower extremity erythema and warmth with similar demarcation on both legs just below the knee.   Neurological:      General: No focal deficit present.      Mental Status: She is alert and oriented to person, place, and time.   Psychiatric:         Mood and Affect: Mood normal.         Behavior: Behavior is cooperative.          Last Recorded Vitals  Blood pressure (!) 110/40, pulse 61, temperature 35.9 °C (96.6 °F), resp. rate 18, height 1.626 m (5' 4\"), weight 71.7 kg (158 lb), SpO2 96%.    Relevant Results      Results for orders placed or performed during the hospital encounter of 03/04/25 (from the past 24 hours)   CBC and Auto Differential   Result Value Ref Range    WBC 6.0 4.4 - 11.3 x10*3/uL    nRBC 0.0 0.0 - 0.0 /100 WBCs    RBC 3.00 (L) 4.00 - 5.20 x10*6/uL    Hemoglobin 8.1 (L) 12.0 - 16.0 g/dL    Hematocrit 26.0 (L) 36.0 - 46.0 %    MCV 87 80 - 100 fL    MCH 27.0 26.0 - 34.0 pg    MCHC 31.2 (L) 32.0 - 36.0 g/dL    RDW 16.1 (H) 11.5 - 14.5 %    Platelets 271 150 - 450 x10*3/uL    Neutrophils % 55.8 40.0 - 80.0 %    Immature Granulocytes %, Automated 1.5 (H) 0.0 - 0.9 %    Lymphocytes % 23.7 13.0 - 44.0 %    Monocytes % 16.0 2.0 - 10.0 %    Eosinophils % 2.5 0.0 - 6.0 %    Basophils % 0.5 0.0 - 2.0 %    Neutrophils Absolute 3.34 1.60 - 5.50 x10*3/uL    Immature Granulocytes Absolute, Automated 0.09 0.00 - 0.50 x10*3/uL    Lymphocytes Absolute 1.42 0.80 - 3.00 x10*3/uL    Monocytes Absolute 0.96 (H) 0.05 - 0.80 x10*3/uL    Eosinophils Absolute 0.15 0.00 - 0.40 x10*3/uL    Basophils Absolute 0.03 0.00 - 0.10 x10*3/uL   Comprehensive metabolic panel   Result Value Ref Range    Glucose 112 (H) 74 - 99 mg/dL    Sodium 133 (L) 136 - 145 mmol/L    Potassium 3.6 3.5 - 5.3 mmol/L    Chloride 98 98 - 107 mmol/L    Bicarbonate 26 21 - 32 mmol/L    Anion Gap 13 10 - 20 mmol/L    Urea Nitrogen 22 6 - 23 mg/dL    Creatinine 0.95 " 0.50 - 1.05 mg/dL    eGFR 57 (L) >60 mL/min/1.73m*2    Calcium 9.3 8.6 - 10.3 mg/dL    Albumin 3.8 3.4 - 5.0 g/dL    Alkaline Phosphatase 88 33 - 136 U/L    Total Protein 6.5 6.4 - 8.2 g/dL    AST 15 9 - 39 U/L    Bilirubin, Total 0.6 0.0 - 1.2 mg/dL    ALT 14 7 - 45 U/L   B-Type Natriuretic Peptide   Result Value Ref Range     (H) 0 - 99 pg/mL   Troponin I, High Sensitivity, Initial   Result Value Ref Range    Troponin I, High Sensitivity 19 (H) 0 - 13 ng/L   Troponin, High Sensitivity, 1 Hour   Result Value Ref Range    Troponin I, High Sensitivity 19 (H) 0 - 13 ng/L     *Note: Due to a large number of results and/or encounters for the requested time period, some results have not been displayed. A complete set of results can be found in Results Review.     XR chest 2 views    Result Date: 3/4/2025  STUDY: Chest Radiographs;  3/04/2025 9:45 AM INDICATION: Evaluate for fluid on chest. COMPARISON: CXR 2/9/2025. ACCESSION NUMBER(S): [id number] ORDERING CLINICIAN: SARMAD SAINZ TECHNIQUE:  Frontal and lateral chest. FINDINGS: CARDIOMEDIASTINAL SILHOUETTE: Cardiomediastinal silhouette is normal in size and configuration. Left-sided cardiac pacemaker  LUNGS: Small bilateral pleural effusions with minimal bibasilar areas of atelectasis/consolidation largely stable compared to 2/9/2025.  ABDOMEN: No remarkable upper abdominal findings.  BONES: No acute osseous changes.    Small bilateral pleural effusions with minimal bibasilar areas of atelectasis/consolidation largely stable compared to 2/9/2025. Signed by Kyle Lewis MD    XR chest 1 view    Result Date: 2/9/2025  Interpreted By:  America Calle, STUDY: XR CHEST 1 VIEW 2/9/2025 12:28 pm   INDICATION: Signs/Symptoms:shortness of breath   COMPARISON: 02/05/2025   ACCESSION NUMBER(S): [id number]   ORDERING CLINICIAN: JAMES RAMICONE   TECHNIQUE: AP erect view of the chest at bedside   FINDINGS: There is mild cardiomegaly with bipolar pacemaker leads seen in the  right atrium and right ventricle.   There is a stable small left pleural effusion noted with probable atelectasis at the left lung base as well appearing unchanged.   When compared with the study done 4 days earlier, there is improved and near complete resolution of the areas of infiltrate from the right lung.       Stable small left pleural effusion with no change in the left basilar atelectasis.   The areas of infiltrate noted 4 days earlier within the right lung have nearly completely resolved.   Signed by: America Calle 2/9/2025 1:07 PM Dictation workstation:   [id number]    XR chest 1 view    Result Date: 2/5/2025  Interpreted By:  Darvin Vieira, STUDY: XR CHEST 1 VIEW; 2/5/2025 8:56 am   INDICATION: Signs/Symptoms:rule out pna   COMPARISON: 02/03/2025.   ACCESSION NUMBER(S): [id number]   ORDERING CLINICIAN: RADHA SALOMON   FINDINGS: The study is limited due to  rotation and overlying artifacts obscuring some detail. A pacemaker is again noted, with the leads overlying the right atrium and right ventricle. The cardiac silhouette is within normal limits for the technique. Calcifications involve the aorta. There are improving bilateral interstitial and mild alveolar infiltrates and small left effusion. There is no pneumothorax. The osseous structures are unremarkable.       Improving bilateral infiltrates and small left effusion, most likely due to pulmonary edema; correlate clinically and follow-up as needed.   Signed by: Darvin Vieira 2/5/2025 10:06 AM Dictation workstation:   [id number]    XR chest 1 view    Result Date: 2/3/2025  Interpreted By:  Frederic Granados, STUDY: XR CHEST 1 VIEW; 2/3/2025 2:09 pm   INDICATION: Signs/Symptoms:SOB.   COMPARISON: Chest x-ray 02/02/2025   ACCESSION NUMBER(S): [id number]   ORDERING CLINICIAN: RADHA SLAOMON   TECHNIQUE: 1 view of the chest was performed.   FINDINGS: Slightly more conspicuous right basal infiltrate/atelectasis.. Stable small left-sided pleural effusion. No  pneumothorax.  The cardiomediastinal silhouette is stable. Left chest wall pacemaker noted.       1. Slightly more conspicuous right lower lobe infiltrate/atelectasis. Stable small left-sided pleural effusion.   Signed by: Frederic Granados 2/3/2025 3:00 PM Dictation workstation:   [id number]    XR chest 1 view    Result Date: 2/2/2025  Interpreted By:  Marianne Randall, STUDY: XR CHEST 1 VIEW;  2/2/2025 5:25 pm   INDICATION: Signs/Symptoms:sob.   COMPARISON: 01/18/2025   ACCESSION NUMBER(S): [id number]   ORDERING CLINICIAN: NOHEMI ALVES   FINDINGS: CARDIOMEDIASTINAL SILHOUETTE: Cardiomediastinal silhouette is normal in size and configuration. There is a left subclavian bichamber pacemaker with a single tip in the right atrium and a single tip in the right ventricle. There is a prominent right pericardial fat pad.   LUNGS: There is moderate vascular congestion. There are small bilateral pleural effusions.   ABDOMEN: No remarkable upper abdominal findings.     BONES: No acute osseous changes.       Moderate vascular congestion with small bilateral pleural effusions.   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  Patient has had multiple recent admissions for congestive heart failure exacerbation.  Patient presents with bilateral lower extremity swelling with erythema and warmth over the past several days.  Patient hospitalized for cellulitis and acute on chronic CHF exacerbation, and  generalized weakness.    #Acute on chronic diastolic congestive heart failure  #Pleural effusions  #Hypoxia  #History COPD  #History sick sinus syndrome s/p dual-chamber pacemaker    Telemetry monitoring  Full code  Consult pulmonology and cardiology, appreciate input.  Risk stratification needed from pulmonary.  Daily weights  Patient was given torsemide 40 mg in the ER, will defer to cardiology ongoing diuresis.  Supplemental oxygen as needed  Nebulizers as needed  Low suspicion for pneumonia, suspect findings on CXR likely due to fluid overload.  Pulse ox, continuous   Recent echocardiogram done in December    #Ambulatory dysfunction  #Cellulitis, bilateral lower extremities ?  Contact dermatitis  #Venous insufficiency  Ultrasound bilateral lower extremities to rule out DVT  - at increased risk due to cancer  Will put patient on doxycycline 100 mg twice daily.  Cellulitis possibly due to contact dermatitis, however patient denies any recent change in detergents or lotions  Continue to monitor  PT/OT    #Colon Cancer  Low residue/fiber diet  Recent notes reviewed from Dr. Vazquez, needing pulmonary to evaluate patient for risk stratification prior to scheduling surgery    Chronic issues  # Arthritis  #Hypertension    Continue patient's medications as appropriate    #DVT prophylaxis  Lovenox subcutaneous  Consider SCDs pending bilateral lower extremity duplex.  Assessment & Plan  Acute exacerbation of chronic heart failure    Hypoxia    Pleural effusion    Cellulitis of lower extremity    Adenocarcinoma of colon (Multi)            Lokesh Colindres, APRN-CNP

## 2025-03-04 NOTE — ED TRIAGE NOTES
Patient to ER for leg swelling and redness that has been ongoing for week or so. Reports she was seen here recently for it and sent home. States her PCP prescribed her Torasamide but she has not started it yet .

## 2025-03-04 NOTE — ED PROVIDER NOTES
HPI   Chief Complaint   Patient presents with   • Leg Swelling       HPI  Patient is a 89-year-old past medical history of CHF on torsemide presenting with bilateral leg swelling and pain.  Patient said for the past 2 days she has been having bilateral leg swelling and pain.  She has always had that problem but the past 2 days her leg has turned red.  It is very painful.  Patient did call her primary care doctor and he suggested she is to increase to 10 mg torsemide to 20 mg torsemide.  Patient has not taking any of her torsemide today.  Patient denies any fever, nausea and vomiting.  Patient denies any shortness of breath.      Patient History   Past Medical History:   Diagnosis Date   • Gross hematuria 10/07/2020   • Impacted cerumen 02/22/2024   • Other conditions influencing health status     Normal stress echocardiogram   • Personal history of other medical treatment     History of echocardiogram   • Personal history of other medical treatment     H/O Doppler ultrasound   • Systolic CHF (Multi) 05/18/2023     Past Surgical History:   Procedure Laterality Date   • APPENDECTOMY  11/22/2017    Appendectomy   • CARDIAC CATHETERIZATION N/A 12/20/2024    Procedure: Left Heart Cath, With LV;  Surgeon: Tahir Ruelas MD;  Location: [facility];  Service: Cardiovascular;  Laterality: N/A;   • CARDIAC PACEMAKER PLACEMENT  03/11/2021    Pacemaker Placement   • HYSTERECTOMY  11/22/2017    Hysterectomy   • IR ANGIOGRAM INFERIOR EPIGASTRIC PELVIC  12/14/2020    IR ANGIOGRAM INFERIOR EPIGASTRIC PELVIC 12/14/2020 [facility]   • IR ANGIOGRAM INFERIOR EPIGASTRIC PELVIC  12/14/2020    IR ANGIOGRAM INFERIOR EPIGASTRIC PELVIC 12/14/2020 [facility]   • IR ANGIOGRAM RENAL BILATERAL Bilateral 12/14/2020    IR ANGIOGRAM RENAL BILATERAL 12/14/2020 PAR [facility]   • OTHER SURGICAL HISTORY  11/22/2017    Stab Phlebectomy Of Varicose Veins   • OTHER SURGICAL HISTORY  11/22/2017    Venous Ligation With Stripping   •  TONSILLECTOMY  2017    Tonsillectomy     Family History   Problem Relation Name Age of Onset   • No Known Problems Mother       Social History     Tobacco Use   • Smoking status: Former     Current packs/day: 0.00     Types: Cigarettes     Quit date:      Years since quittin.2     Passive exposure: Past   • Smokeless tobacco: Never   Vaping Use   • Vaping status: Never Used   Substance Use Topics   • Alcohol use: Never   • Drug use: Never       Physical Exam   ED Triage Vitals [25 0925]   Temperature Heart Rate Respirations BP   35.9 °C (96.6 °F) 62 18 (!) 110/92      Pulse Ox Temp src Heart Rate Source Patient Position   96 % -- -- --      BP Location FiO2 (%)     -- --       Physical Exam  Constitutional:       Appearance: Normal appearance.   Cardiovascular:      Rate and Rhythm: Normal rate and regular rhythm.      Pulses: Normal pulses.      Heart sounds: Normal heart sounds.   Pulmonary:      Effort: Pulmonary effort is normal.      Breath sounds: Normal breath sounds.   Abdominal:      General: Abdomen is flat. Bowel sounds are normal.      Palpations: Abdomen is soft.   Musculoskeletal:         General: Swelling and tenderness present.      Right lower leg: Edema present.      Left lower leg: Edema present.      Comments: Left leg swelling.  Cellulitis of the legs   Skin:     Capillary Refill: Capillary refill takes less than 2 seconds.   Neurological:      General: No focal deficit present.      Mental Status: She is alert and oriented to person, place, and time. Mental status is at baseline.           ED Course & MDM   Diagnoses as of 25 1424   Bilateral leg edema   Cellulitis of lower extremity, unspecified laterality                 No data recorded     Mikey Coma Scale Score: 15 (25 0924 : Corin Collins RN)                           Medical Decision Making  Patient is a 89-year-old past medical history of CHF on torsemide presenting with bilateral leg swelling and  pain.  At bedside patient was hemodynamically stable  an reserved.  On physical exam, patient had bilateral leg edema and redness.  The redness looks like cellulitis.  Patient was then started on torsemide 20 mg and Augmentin.  There is no concern for CHF exacerbation.  Patient  is not short of breath.  Patient CMP and CBC were unremarkable except for GFR of 57.  This is around patient baseline.  Patient CBC did not show any leukocytosis.  Patient has anemia with hemoglobin of 8.  Patient hemoglobin is usually between 8-9.  Therefore there is no concern for anemia causing support.  Patient BNP was elevated at 535 which is around patient baseline.  Patient initial troponin was 19 and repeat troponin is 19.  EKG showed normal sinus rhythm no NSTEMI/STEMI, no long QT and no T wave inversion.  The fact that  patient is a 89-year-old, and lives by herself and is having trouble ambulating due to bilateral leg swelling and cellulitis, patient was admitted to the medicine team for diuresis and cellulitis treatment.    Procedure  Procedures     Gio Mendoza MD  Resident  03/04/25 [id number]

## 2025-03-05 ENCOUNTER — HOME CARE VISIT (OUTPATIENT)
Dept: HOME HEALTH SERVICES | Facility: HOME HEALTH | Age: OVER 89
End: 2025-03-05
Payer: MEDICARE

## 2025-03-05 PROBLEM — R60.0 BILATERAL LEG EDEMA: Status: ACTIVE | Noted: 2025-03-05

## 2025-03-05 LAB
ANION GAP SERPL CALC-SCNC: 11 MMOL/L (ref 10–20)
BUN SERPL-MCNC: 27 MG/DL (ref 6–23)
CALCIUM SERPL-MCNC: 8.3 MG/DL (ref 8.6–10.3)
CHLORIDE SERPL-SCNC: 99 MMOL/L (ref 98–107)
CO2 SERPL-SCNC: 28 MMOL/L (ref 21–32)
CREAT SERPL-MCNC: 1.19 MG/DL (ref 0.5–1.05)
EGFRCR SERPLBLD CKD-EPI 2021: 43 ML/MIN/1.73M*2
ERYTHROCYTE [DISTWIDTH] IN BLOOD BY AUTOMATED COUNT: 16.4 % (ref 11.5–14.5)
GLUCOSE SERPL-MCNC: 102 MG/DL (ref 74–99)
HCT VFR BLD AUTO: 23.2 % (ref 36–46)
HGB BLD-MCNC: 7.3 G/DL (ref 12–16)
MCH RBC QN AUTO: 26.8 PG (ref 26–34)
MCHC RBC AUTO-ENTMCNC: 31.5 G/DL (ref 32–36)
MCV RBC AUTO: 85 FL (ref 80–100)
NRBC BLD-RTO: 0 /100 WBCS (ref 0–0)
PLATELET # BLD AUTO: 251 X10*3/UL (ref 150–450)
POTASSIUM SERPL-SCNC: 3.3 MMOL/L (ref 3.5–5.3)
RBC # BLD AUTO: 2.72 X10*6/UL (ref 4–5.2)
SODIUM SERPL-SCNC: 135 MMOL/L (ref 136–145)
WBC # BLD AUTO: 5.5 X10*3/UL (ref 4.4–11.3)

## 2025-03-05 PROCEDURE — 82374 ASSAY BLOOD CARBON DIOXIDE: CPT | Performed by: NURSE PRACTITIONER

## 2025-03-05 PROCEDURE — 80048 BASIC METABOLIC PNL TOTAL CA: CPT | Performed by: NURSE PRACTITIONER

## 2025-03-05 PROCEDURE — 2500000001 HC RX 250 WO HCPCS SELF ADMINISTERED DRUGS (ALT 637 FOR MEDICARE OP): Performed by: NURSE PRACTITIONER

## 2025-03-05 PROCEDURE — 1200000002 HC GENERAL ROOM WITH TELEMETRY DAILY

## 2025-03-05 PROCEDURE — 96366 THER/PROPH/DIAG IV INF ADDON: CPT

## 2025-03-05 PROCEDURE — 97161 PT EVAL LOW COMPLEX 20 MIN: CPT | Mod: GP

## 2025-03-05 PROCEDURE — 2500000004 HC RX 250 GENERAL PHARMACY W/ HCPCS (ALT 636 FOR OP/ED): Performed by: NURSE PRACTITIONER

## 2025-03-05 PROCEDURE — 36415 COLL VENOUS BLD VENIPUNCTURE: CPT | Performed by: NURSE PRACTITIONER

## 2025-03-05 PROCEDURE — 97165 OT EVAL LOW COMPLEX 30 MIN: CPT | Mod: GO

## 2025-03-05 PROCEDURE — 85027 COMPLETE CBC AUTOMATED: CPT | Performed by: NURSE PRACTITIONER

## 2025-03-05 PROCEDURE — 99223 1ST HOSP IP/OBS HIGH 75: CPT | Performed by: STUDENT IN AN ORGANIZED HEALTH CARE EDUCATION/TRAINING PROGRAM

## 2025-03-05 RX ADMIN — POLYETHYLENE GLYCOL 3350 17 G: 17 POWDER, FOR SOLUTION ORAL at 21:03

## 2025-03-05 RX ADMIN — Medication 1 TABLET: at 09:44

## 2025-03-05 RX ADMIN — CLOPIDOGREL BISULFATE 75 MG: 75 TABLET ORAL at 09:43

## 2025-03-05 RX ADMIN — POLYETHYLENE GLYCOL 3350 17 G: 17 POWDER, FOR SOLUTION ORAL at 09:43

## 2025-03-05 RX ADMIN — DOXYCYCLINE 100 MG: 100 INJECTION, POWDER, LYOPHILIZED, FOR SOLUTION INTRAVENOUS at 18:02

## 2025-03-05 RX ADMIN — ASPIRIN 81 MG CHEWABLE TABLET 81 MG: 81 TABLET CHEWABLE at 09:43

## 2025-03-05 RX ADMIN — DOXYCYCLINE 100 MG: 100 INJECTION, POWDER, LYOPHILIZED, FOR SOLUTION INTRAVENOUS at 05:19

## 2025-03-05 RX ADMIN — ENOXAPARIN SODIUM 40 MG: 40 INJECTION SUBCUTANEOUS at 16:17

## 2025-03-05 RX ADMIN — METOPROLOL TARTRATE 25 MG: 25 TABLET, FILM COATED ORAL at 09:44

## 2025-03-05 ASSESSMENT — COGNITIVE AND FUNCTIONAL STATUS - GENERAL
DRESSING REGULAR LOWER BODY CLOTHING: TOTAL
CLIMB 3 TO 5 STEPS WITH RAILING: TOTAL
TURNING FROM BACK TO SIDE WHILE IN FLAT BAD: A LITTLE
DAILY ACTIVITIY SCORE: 20
TURNING FROM BACK TO SIDE WHILE IN FLAT BAD: A LITTLE
STANDING UP FROM CHAIR USING ARMS: A LOT
DRESSING REGULAR LOWER BODY CLOTHING: A LITTLE
MOVING TO AND FROM BED TO CHAIR: A LOT
TURNING FROM BACK TO SIDE WHILE IN FLAT BAD: A LITTLE
WALKING IN HOSPITAL ROOM: A LOT
PERSONAL GROOMING: A LITTLE
DRESSING REGULAR UPPER BODY CLOTHING: A LITTLE
TOILETING: A LITTLE
WALKING IN HOSPITAL ROOM: A LOT
HELP NEEDED FOR BATHING: A LITTLE
TOILETING: A LITTLE
MOVING TO AND FROM BED TO CHAIR: TOTAL
MOVING TO AND FROM BED TO CHAIR: A LOT
DRESSING REGULAR UPPER BODY CLOTHING: A LITTLE
STANDING UP FROM CHAIR USING ARMS: A LOT
WALKING IN HOSPITAL ROOM: TOTAL
MOBILITY SCORE: 15
MOBILITY SCORE: 15
CLIMB 3 TO 5 STEPS WITH RAILING: A LOT
MOVING FROM LYING ON BACK TO SITTING ON SIDE OF FLAT BED WITH BEDRAILS: A LOT
DAILY ACTIVITIY SCORE: 20
STANDING UP FROM CHAIR USING ARMS: A LOT
CLIMB 3 TO 5 STEPS WITH RAILING: A LOT
MOBILITY SCORE: 10
TOILETING: TOTAL
DRESSING REGULAR LOWER BODY CLOTHING: A LITTLE
DRESSING REGULAR UPPER BODY CLOTHING: A LITTLE
HELP NEEDED FOR BATHING: A LITTLE
HELP NEEDED FOR BATHING: TOTAL
DAILY ACTIVITIY SCORE: 13

## 2025-03-05 ASSESSMENT — PAIN - FUNCTIONAL ASSESSMENT: PAIN_FUNCTIONAL_ASSESSMENT: 0-10

## 2025-03-05 ASSESSMENT — PAIN SCALES - GENERAL
PAINLEVEL_OUTOF10: 0 - NO PAIN
PAINLEVEL_OUTOF10: 0 - NO PAIN

## 2025-03-05 NOTE — NURSING NOTE
"Heart Failure Nurse Navigator      Assessment    I met with Yesenia Milner at the bedside    Patients Cardiologist(s):Dr Hess    Patients Primary Care Provider:    1. Medical Domain  What is the patient's most recent LVEF? 60-65%  HFrEF (LVEF <= 40%) Quadruple therapy recommended  HFmrEF (LVEF 41-49%) Quadruple therapy recommended  HFpEF (LVEF >= 50%) Minimum recommendations: SGLT2i and MRA  Is the patient on OP GDMT for their condition?   ARNI/ACEI/ARB: No None  BB: Yes Metoprolol tartrate 25 mg BID  MRA: No None  SGLT2i: No None  Is the patient prescribed a diuretic? Yes  Torsemide 10 mg daily  Does this patient have an implanted device (ie cardioMEMS, ICD, CRT-D)?  Device type: .  Could this patient have advanced heart failure (Stage D heart failure)?: No   If yes, the potential markers of advanced heart failure include: .    REFERENCE: Potential markers of advanced HF   Inotrope (dobutamine or milrinone) used during this admission?   LVEF<=25%?   2.   >=2 hospitalizations for ADHF in the last year?   3.   Severe symptoms of HF (fatigue, dyspnea, confusion, edema) despite medical therapy?   4.  Downtitration of GDMT as compared to home medications?   5.  Discontinuation of GDMT because of hypotension or renal intolerance?   6.  Recurrent arrhythmias (AF, VT with ICD shocks)?   7.  Cardiac cachexia (i.e., unintentional weight loss due to HF)?   8.  High-risk biomarker profile (e.g., hyponatremia [Na<135], very elevated BNP, worsening kidney function)   9.  Escalating doses of diuretics or persistent edema despite escalation     2. Mind and Emotion  Does this patient have possible cognitive impairment?: No (The Mini-Cog score ./5)  Ask the patient to memorize these 3 words: banana, sunrise, chair  Ask the patient to draw a clock with hands pointing at \"20 minutes after 8\"  Ask the patient to recall the 3 words  Score: Add number of words recalled + clock drawing (0 points for any errors, 2 points if " correct)  Interpretation: A score of 0-2 suggests cognitive impairment is present, a score of 3-5 suggests cognitive impairment is absent  Does this patient have major depression?: N/A (PH-Q2 score ./6)  Over the last 2 weeks: Little interest or pleasure in doing things? (Not at all +0, Several days +1, More than half the days +2, Nearly every day +3)  Over the last 2 weeks: Feeling down, depressed or hopeless? (Not at all +0, Several days +1, More than half the days +2, Nearly every day +3)  Score: Add points  Interpretation: A score of 3 or more suggests that major depression is likely.     3. Physical Function  Could this patient be frail?: Yes   Defined by presence of all of these: slowness, weakness, shrinking, inactivity, exhaustion  Is this patient at risk for falling?: Yes   Defined by having experienced a fall in the last 12 months.    4. Social Determinants of Health  Transportation deficits?: No   Lack of insurance?: No   Living conditions (homelessness, unstable home)?: No   Poor family/social support?: No     I provided the following heart failure education:  - Heart Failure education packet provided.  - HF signs and symptoms, heart failure zones and when to call cardiologist.   - Controlling Heart Failure at Home: medication adherence, following up with cardiologist at least once yearly, staying healthy and active, limiting sodium and fluid intake as directed by cardiologist.  - Daily Weight Education  -Low Sodium Diet Education  -Fluid Restriction Education      *Discharge Appointment Follow-up Plan:        Additional Comments:    Pt has hearing aids, pt was unable to use during teaching, they were charging.

## 2025-03-05 NOTE — PROGRESS NOTES
Physical Therapy    Physical Therapy Evaluation    Patient Name: Yesenia Milner  MRN: [id number]  Today's Date: 3/5/2025   Time Calculation  Start Time: 0833  Stop Time: 0842  Time Calculation (min): 9 min  CDU06/CDU06    Assessment/Plan   PT Assessment  PT Assessment Results: Decreased strength, Decreased endurance, Impaired balance, Decreased mobility, Decreased safety awareness, Pain  Rehab Prognosis: Good  Barriers to Discharge Home: Caregiver assistance, Physical needs  Caregiver Assistance: Patient lives alone and/or does not have reliable caregiver assistance  Physical Needs: Ambulating household distances limited by function/safety, 24hr mobility assistance needed  Evaluation/Treatment Tolerance: Patient limited by fatigue, Patient limited by pain  End of Session Communication: Bedside nurse  Assessment Comment: Continued skilled PT intervention indicated to facilitate increased strength, balance & gait stability  End of Session Patient Position: Bed, 2 rail up (hob elevated w/ breakfast tray set up, call light in reach, le's elevated to comfort)  IP OR SWING BED PT PLAN  Inpatient or Swing Bed: Inpatient  PT Plan  Treatment/Interventions: Bed mobility, Transfer training, Gait training, Balance training, Therapeutic activity, Therapeutic exercise  PT Plan: Ongoing PT  PT Frequency: 3 times per week  PT Discharge Recommendations: Moderate intensity level of continued care  PT Recommended Transfer Status: Total assist  PT - OK to Discharge: Yes (to next level of care when cleared by medical team)    Subjective     Current Problem:  1. Bilateral leg edema        2. Cellulitis of lower extremity, unspecified laterality        3. Leg swelling  Vascular US Lower Extremity Venous Duplex Bilateral    Vascular US Lower Extremity Venous Duplex Bilateral        Patient Active Problem List   Diagnosis    Aortic stenosis, mild    Arthritis    Chronic diastolic congestive heart failure    Complete heart block    COPD  (chronic obstructive pulmonary disease) (Multi)    History of paroxysmal supraventricular tachycardia    HTN (hypertension)    Paresthesia    Presence of permanent cardiac pacemaker    Raynauds disease    Sensorineural hearing loss (SNHL) of both ears    Dyspnea on minimal exertion    Varicose veins of lower extremities with inflammation    Vertigo    Ischemic cerebrovascular accident (CVA) (Multi)    Acquired deformity of toe    Benign neoplasm of skin of foot    Benign paroxysmal positional vertigo    Dermatophytosis of nail    Leg swelling    Macular degeneration    Mitral valve insufficiency    Moderate mitral valve regurgitation    Overweight with body mass index (BMI) 25.0-29.9    Plantar wart of left foot    Venous insufficiency    Dysarthria following cerebral infarction    Hyperglycemia    Hand paresthesia    Sick sinus syndrome (Multi)    Injury of kidney    NSTEMI (non-ST elevated myocardial infarction) (Multi)    Acute superior vena cava thrombosis (Multi)    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)    Gastrointestinal hemorrhage, unspecified gastrointestinal hemorrhage type    Malignant neoplasm of transverse colon (Multi)    Stage 3 chronic kidney disease, unspecified whether stage 3a or 3b CKD (Multi)    Acute decompensated heart failure    Acute exacerbation of chronic heart failure    Hypoxia    Pleural effusion    Cellulitis of lower extremity    Adenocarcinoma of colon (Multi)    Bilateral leg edema       General Visit Information:  General  Reason for Referral: PT eval & treat/impaired mobility DX: acute on chronic chf, hypoxia, pleural effusion, le cellulitis; 3/4/25 ble edema, erythema, trouble walking; cxr: infiltrate, atelectasis, small lt pleural effusion,  Referred By: Ice  Caregiver Feedback: Per conference w/ RN patient stable to participate in therapy  Co-Treatment: OT  Co-Treatment Reason: to maximize pt safety& mobility  Patient Position Received: Bed, 3 rail up, Alarm  "off, not on at start of session  General Comment: Pleasant & cooperative, receptive to mobility& instructions,marked ble pain limiting funcitonal mobility tolerance    Home Living:  Home Living  Home Living Comments: Pt lives alone in a split level house with no stairs to enter, then a chair lift to main living level. Then another stair lift up to second floor with bedroom and bathroom. 4 steps down to basement with rail to laundry. Tub shower with grab bar, hand held shower hose, transfer bench. Toilet elevated positioned near sink. Pt owns cane, WW, rollator, reacher. Pt reports 2 daughters lcoal & supportive    Prior Level of Function:  Prior Function Per Pt/Caregiver Report  Prior Function Comments: independent mobility w/ use of rollator on 1st fl , ww on 2nd fl of home, denies h/o falls in at least past 6months; independent adl; dtr manages laundry & has been managing driving for the past week; pt manages cooking & cleaning    Precautions:  Precautions  Precautions Comment: fall, 1.5Lo2, [place], tele, purewick, strict I&O  Pain:  Pain Assessment  Pain Assessment:  (\"discomfort\" le's at rest, not able to rate, severe le pain w/ mobility to dependent position pt crying in pain)    Cognition:  Cognition  Overall Cognitive Status: Within Functional Limits    General Assessments:      Activity Tolerance  Endurance: Decreased tolerance for upright activites  Sensation  Light Touch:  (ble redness/edema but denies numbness/tingling)     Functional Assessments:     Bed Mobility  Bed Mobility:  (cga supine>sit, min assist x1 sit>supine; guarded/le pain)  Transfers  Transfer:  (mod assist x1 sit>stand elevated bed>ww, pt pulled up on stabilized ww, difficulty wbing to perform fw wt shift; min assist x1 stand>sit assist & cues for safe hand plcmt & eccentric control)  Ambulation/Gait Training  Ambulation/Gait Training Performed:  (unable/unsafe, marked pain limit in wbing, could not tolerate further standing to advance le's " for gait activity)   Extremity/Trunk Assessments:        RLE   RLE :  (end rom tightness heelcord/hamstrings but arom grossly wfl, strength pain limited grossly 3+/5 w/ painful giving way)  LLE   LLE :  (end rom tightness heelcord/hamstrings but arom grossly wfl, strength pain limited grossly 3+/5 w/ painful giving way)    Outcome Measures:     [facility] Basic Mobility  Turning from your back to your side while in a flat bed without using bedrails: A lot  Moving from lying on your back to sitting on the side of a flat bed without using bedrails: A little  Moving to and from bed to chair (including a wheelchair): Total  Standing up from a chair using your arms (e.g. wheelchair or bedside chair): A lot  To walk in hospital room: Total  Climbing 3-5 steps with railing: Total  Basic Mobility - Total Score: 10        Goals:  Encounter Problems       Encounter Problems (Active)       PT Problem       STG - Pt will transition supine <> sitting independently  (Progressing)       Start:  03/05/25    Expected End:  03/19/25            STG - Pt will transfer STS with supervision  (Progressing)       Start:  03/05/25    Expected End:  03/19/25            STG - Pt will amb >=40' using ww with sba  (Progressing)       Start:  03/05/25    Expected End:  03/19/25            STG - Pt will maintain supported  dynamic standing  balance with no LOB noted   (Progressing)       Start:  03/05/25    Expected End:  03/19/25                 Education Documentation  Mobility Training, taught by Stephanie Palomino, PT at 3/5/2025  1:37 PM.  Learner: Patient  Readiness: Acceptance  Method: Explanation  Response: Verbalizes Understanding, Needs Reinforcement  Comment: safety,activity progression, le rom & elevation

## 2025-03-05 NOTE — H&P
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  Patient has had multiple recent admissions for congestive heart failure exacerbation.  Patient presents with bilateral lower extremity swelling with erythema and warmth over the past several days.  She is reporting pain in her legs and difficulty walking.  Normally ambulates with a walker.  Denies falls.  She did have 1 episode of emesis in the ER.  She has issues with constipation and takes MiraLAX twice daily.  Denies fevers, rigors, acute vision or hearing changes, headache, focal weakness, chest pains, palpitations, cough, SOB, abdominal pains, dysuria, or wounds.  She has an appointment with pulmonology coming up this week for surgical risk evaluation for surgery.   Of note she is on a low residue diet/low fiber 2/2 colonic mass.    ER course: Hemodynamically stable, afebrile.  Patient did drop her oxygenation saturation down to 85% and was placed on 2 L nasal cannula while in the ED.  However, no acute respiratory distress.  WBC 6, hemoglobin 8.1/hematocrit 26, platelets 271.  Glucose 112, sodium 133, otherwise CMP is normal.  .  High-sensitivity troponin 19, repeat 19. CXR small bilateral pleural effusions with minimal bibasilar atelectasis/consolidation largely stable compared to 2/9/2025.  Per ER provider interpretation EKG showed normal sinus rhythm no NSTEMI/STEMI, no long QT and no T wave inversion.  Patient was found to have likely cellulitis of her bilateral lower extremities and is being  hospitalized due to difficulties with ambulation and pain.    Social history: Remote history of smoking on and off for about 18 years.  No alcohol abuse.  Lives alone.  Family close by and assist patient with ADLs.  Family history: Noncontributory to presenting complaint.      Echocardiogram 12/21/2024    CONCLUSIONS:   1. Left ventricular ejection fraction is normal, by visual estimate at 60-65%.   2. Abnormal left venticular wall motion.   3. Left ventricular diastolic filling is indeterminate.   4. There is a moderate apical and anteroapical myocardial infarction.   5. There is normal right ventricular global systolic function.   6. There is moderate mitral annular calcification.   7. Moderate mitral valve regurgitation.   8. Moderate to severe tricuspid regurgitation visualized.   9. Moderately elevated right ventricular systolic pressure.  10. Aortic valve sclerosis.     Past Medical History  Past Medical History:   Diagnosis Date    Gross hematuria 10/07/2020    Impacted cerumen 02/22/2024    Other conditions influencing health status     Normal stress echocardiogram    Personal history of other medical treatment     History of echocardiogram    Personal history of other medical treatment     H/O Doppler ultrasound    Systolic CHF (Multi) 05/18/2023       Surgical History  Past Surgical History:   Procedure Laterality Date    APPENDECTOMY  11/22/2017    Appendectomy    CARDIAC CATHETERIZATION N/A 12/20/2024    Procedure: Left Heart Cath, With LV;  Surgeon: Tahir Ruelas MD;  Location: [facility] Cardiac Cath Lab;  Service: Cardiovascular;  Laterality: N/A;    CARDIAC PACEMAKER PLACEMENT  03/11/2021    Pacemaker Placement    HYSTERECTOMY  11/22/2017    Hysterectomy    IR ANGIOGRAM INFERIOR EPIGASTRIC PELVIC  12/14/2020    IR ANGIOGRAM INFERIOR EPIGASTRIC PELVIC 12/14/2020 [facility]    IR ANGIOGRAM INFERIOR EPIGASTRIC PELVIC  12/14/2020    IR ANGIOGRAM INFERIOR EPIGASTRIC PELVIC 12/14/2020 [facility]    IR  ANGIOGRAM RENAL BILATERAL Bilateral 12/14/2020    IR ANGIOGRAM RENAL BILATERAL 12/14/2020 [facility]    OTHER SURGICAL HISTORY  11/22/2017    Stab Phlebectomy Of Varicose Veins    OTHER SURGICAL HISTORY  11/22/2017    Venous Ligation With Stripping    TONSILLECTOMY  11/22/2017    Tonsillectomy        Social History  She reports that she quit smoking about 52 years ago. Her smoking use included cigarettes. She has been exposed to tobacco smoke. She has never used smokeless tobacco. She reports that she does not drink alcohol and does not use drugs.    Family History  Family History   Problem Relation Name Age of Onset    No Known Problems Mother          Allergies  Iodine, Shrimp, Hydrocodone, and Adhesive tape-silicones    Review of Systems    10 point review of systems are negative except as noted above in HPI     Physical Exam  Constitutional:       General: She is awake. She is not in acute distress.     Appearance: Normal appearance. She is not toxic-appearing.   HENT:      Head: Atraumatic.      Nose: Nose normal.      Mouth/Throat:      Mouth: Mucous membranes are moist.   Eyes:      Conjunctiva/sclera: Conjunctivae normal.   Cardiovascular:      Rate and Rhythm: Normal rate and regular rhythm.      Pulses: Normal pulses.      Heart sounds: No murmur heard.  Pulmonary:      Effort: Pulmonary effort is normal. No respiratory distress.      Breath sounds: Wheezing present.      Comments: Slight wheezing end exhalation   Abdominal:      General: Bowel sounds are normal. There is no distension.      Palpations: Abdomen is soft.      Tenderness: There is no abdominal tenderness. There is no guarding.   Musculoskeletal:         General: No swelling, deformity or signs of injury. Normal range of motion.      Cervical back: Neck supple.      Right lower leg: Edema present.      Left lower leg: Edema present.   Skin:     General: Skin is warm and dry.      Capillary Refill: Capillary refill takes less than 2  "seconds.      Findings: Erythema present. No ecchymosis or wound.      Comments: Bilateral lower extremity erythema and warmth with similar demarcation on both legs just below the knee.   Neurological:      General: No focal deficit present.      Mental Status: She is alert and oriented to person, place, and time.   Psychiatric:         Mood and Affect: Mood normal.         Behavior: Behavior is cooperative.          Last Recorded Vitals  Blood pressure 124/55, pulse 73, temperature 37 °C (98.6 °F), temperature source Tympanic, resp. rate 18, height 1.626 m (5' 4\"), weight 71.7 kg (158 lb), SpO2 98%.    Relevant Results      Results for orders placed or performed during the hospital encounter of 03/04/25 (from the past 24 hours)   CBC and Auto Differential   Result Value Ref Range    WBC 6.0 4.4 - 11.3 x10*3/uL    nRBC 0.0 0.0 - 0.0 /100 WBCs    RBC 3.00 (L) 4.00 - 5.20 x10*6/uL    Hemoglobin 8.1 (L) 12.0 - 16.0 g/dL    Hematocrit 26.0 (L) 36.0 - 46.0 %    MCV 87 80 - 100 fL    MCH 27.0 26.0 - 34.0 pg    MCHC 31.2 (L) 32.0 - 36.0 g/dL    RDW 16.1 (H) 11.5 - 14.5 %    Platelets 271 150 - 450 x10*3/uL    Neutrophils % 55.8 40.0 - 80.0 %    Immature Granulocytes %, Automated 1.5 (H) 0.0 - 0.9 %    Lymphocytes % 23.7 13.0 - 44.0 %    Monocytes % 16.0 2.0 - 10.0 %    Eosinophils % 2.5 0.0 - 6.0 %    Basophils % 0.5 0.0 - 2.0 %    Neutrophils Absolute 3.34 1.60 - 5.50 x10*3/uL    Immature Granulocytes Absolute, Automated 0.09 0.00 - 0.50 x10*3/uL    Lymphocytes Absolute 1.42 0.80 - 3.00 x10*3/uL    Monocytes Absolute 0.96 (H) 0.05 - 0.80 x10*3/uL    Eosinophils Absolute 0.15 0.00 - 0.40 x10*3/uL    Basophils Absolute 0.03 0.00 - 0.10 x10*3/uL   Comprehensive metabolic panel   Result Value Ref Range    Glucose 112 (H) 74 - 99 mg/dL    Sodium 133 (L) 136 - 145 mmol/L    Potassium 3.6 3.5 - 5.3 mmol/L    Chloride 98 98 - 107 mmol/L    Bicarbonate 26 21 - 32 mmol/L    Anion Gap 13 10 - 20 mmol/L    Urea Nitrogen 22 6 - 23 " mg/dL    Creatinine 0.95 0.50 - 1.05 mg/dL    eGFR 57 (L) >60 mL/min/1.73m*2    Calcium 9.3 8.6 - 10.3 mg/dL    Albumin 3.8 3.4 - 5.0 g/dL    Alkaline Phosphatase 88 33 - 136 U/L    Total Protein 6.5 6.4 - 8.2 g/dL    AST 15 9 - 39 U/L    Bilirubin, Total 0.6 0.0 - 1.2 mg/dL    ALT 14 7 - 45 U/L   B-Type Natriuretic Peptide   Result Value Ref Range     (H) 0 - 99 pg/mL   Troponin I, High Sensitivity, Initial   Result Value Ref Range    Troponin I, High Sensitivity 19 (H) 0 - 13 ng/L   Troponin, High Sensitivity, 1 Hour   Result Value Ref Range    Troponin I, High Sensitivity 19 (H) 0 - 13 ng/L   TSH with reflex to Free T4 if abnormal   Result Value Ref Range    Thyroid Stimulating Hormone 2.96 0.44 - 3.98 mIU/L   Magnesium   Result Value Ref Range    Magnesium 2.23 1.60 - 2.40 mg/dL   Sars-CoV-2 and Influenza A/B PCR   Result Value Ref Range    Flu A Result Not Detected Not Detected    Flu B Result Not Detected Not Detected    Coronavirus 2019, PCR Not Detected Not Detected   CBC   Result Value Ref Range    WBC 5.5 4.4 - 11.3 x10*3/uL    nRBC 0.0 0.0 - 0.0 /100 WBCs    RBC 2.72 (L) 4.00 - 5.20 x10*6/uL    Hemoglobin 7.3 (L) 12.0 - 16.0 g/dL    Hematocrit 23.2 (L) 36.0 - 46.0 %    MCV 85 80 - 100 fL    MCH 26.8 26.0 - 34.0 pg    MCHC 31.5 (L) 32.0 - 36.0 g/dL    RDW 16.4 (H) 11.5 - 14.5 %    Platelets 251 150 - 450 x10*3/uL   Basic Metabolic Panel   Result Value Ref Range    Glucose 102 (H) 74 - 99 mg/dL    Sodium 135 (L) 136 - 145 mmol/L    Potassium 3.3 (L) 3.5 - 5.3 mmol/L    Chloride 99 98 - 107 mmol/L    Bicarbonate 28 21 - 32 mmol/L    Anion Gap 11 10 - 20 mmol/L    Urea Nitrogen 27 (H) 6 - 23 mg/dL    Creatinine 1.19 (H) 0.50 - 1.05 mg/dL    eGFR 43 (L) >60 mL/min/1.73m*2    Calcium 8.3 (L) 8.6 - 10.3 mg/dL     *Note: Due to a large number of results and/or encounters for the requested time period, some results have not been displayed. A complete set of results can be found in Results Review.     XR  chest 2 views    Result Date: 3/4/2025  STUDY: Chest Radiographs;  3/04/2025 9:45 AM INDICATION: Evaluate for fluid on chest. COMPARISON: CXR 2/9/2025. ACCESSION NUMBER(S): [id number] ORDERING CLINICIAN: SARMAD SAINZ TECHNIQUE:  Frontal and lateral chest. FINDINGS: CARDIOMEDIASTINAL SILHOUETTE: Cardiomediastinal silhouette is normal in size and configuration. Left-sided cardiac pacemaker  LUNGS: Small bilateral pleural effusions with minimal bibasilar areas of atelectasis/consolidation largely stable compared to 2/9/2025.  ABDOMEN: No remarkable upper abdominal findings.  BONES: No acute osseous changes.    Small bilateral pleural effusions with minimal bibasilar areas of atelectasis/consolidation largely stable compared to 2/9/2025. Signed by Kyle Lewis MD    XR chest 1 view    Result Date: 2/9/2025  Interpreted By:  America Calle, STUDY: XR CHEST 1 VIEW 2/9/2025 12:28 pm   INDICATION: Signs/Symptoms:shortness of breath   COMPARISON: 02/05/2025   ACCESSION NUMBER(S): [id number]   ORDERING CLINICIAN: JAMES RAMICONE   TECHNIQUE: AP erect view of the chest at bedside   FINDINGS: There is mild cardiomegaly with bipolar pacemaker leads seen in the right atrium and right ventricle.   There is a stable small left pleural effusion noted with probable atelectasis at the left lung base as well appearing unchanged.   When compared with the study done 4 days earlier, there is improved and near complete resolution of the areas of infiltrate from the right lung.       Stable small left pleural effusion with no change in the left basilar atelectasis.   The areas of infiltrate noted 4 days earlier within the right lung have nearly completely resolved.   Signed by: America Calle 2/9/2025 1:07 PM Dictation workstation:   [id number]    XR chest 1 view    Result Date: 2/5/2025  Interpreted By:  Darvin Vieira, STUDY: XR CHEST 1 VIEW; 2/5/2025 8:56 am   INDICATION: Signs/Symptoms:rule out pna   COMPARISON: 02/03/2025.    ACCESSION NUMBER(S): [id number]   ORDERING CLINICIAN: RADHA SALOMON   FINDINGS: The study is limited due to  rotation and overlying artifacts obscuring some detail. A pacemaker is again noted, with the leads overlying the right atrium and right ventricle. The cardiac silhouette is within normal limits for the technique. Calcifications involve the aorta. There are improving bilateral interstitial and mild alveolar infiltrates and small left effusion. There is no pneumothorax. The osseous structures are unremarkable.       Improving bilateral infiltrates and small left effusion, most likely due to pulmonary edema; correlate clinically and follow-up as needed.   Signed by: Darvin Vieira 2/5/2025 10:06 AM Dictation workstation:   [id number]    XR chest 1 view    Result Date: 2/3/2025  Interpreted By:  Frederic Granados, STUDY: XR CHEST 1 VIEW; 2/3/2025 2:09 pm   INDICATION: Signs/Symptoms:SOB.   COMPARISON: Chest x-ray 02/02/2025   ACCESSION NUMBER(S): [id number]   ORDERING CLINICIAN: RADHA SALOMON   TECHNIQUE: 1 view of the chest was performed.   FINDINGS: Slightly more conspicuous right basal infiltrate/atelectasis.. Stable small left-sided pleural effusion. No pneumothorax.  The cardiomediastinal silhouette is stable. Left chest wall pacemaker noted.       1. Slightly more conspicuous right lower lobe infiltrate/atelectasis. Stable small left-sided pleural effusion.   Signed by: Frederic Granados 2/3/2025 3:00 PM Dictation workstation:   [id number]    XR chest 1 view    Result Date: 2/2/2025  Interpreted By:  Marianne Randall, STUDY: XR CHEST 1 VIEW;  2/2/2025 5:25 pm   INDICATION: Signs/Symptoms:sob.   COMPARISON: 01/18/2025   ACCESSION NUMBER(S): [id number]   ORDERING CLINICIAN: NOHEMI ALVES   FINDINGS: CARDIOMEDIASTINAL SILHOUETTE: Cardiomediastinal silhouette is normal in size and configuration. There is a left subclavian bichamber pacemaker with a single tip in the right atrium and a single tip in the right  ventricle. There is a prominent right pericardial fat pad.   LUNGS: There is moderate vascular congestion. There are small bilateral pleural effusions.   ABDOMEN: No remarkable upper abdominal findings.     BONES: No acute osseous changes.       Moderate vascular congestion with small bilateral pleural effusions.   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  Patient has had multiple recent admissions for congestive heart failure exacerbation.  Patient presents with bilateral lower extremity swelling with erythema and warmth over the past several days.  Patient hospitalized for cellulitis and acute on chronic CHF exacerbation, and generalized weakness.    #Acute on chronic diastolic congestive heart failure  #Pleural effusions  #Hypoxia  #History COPD  #History sick sinus syndrome s/p dual-chamber pacemaker    Telemetry monitoring  Full code  Consult pulmonology and cardiology, appreciate input.  Risk stratification needed from pulmonary.  Daily weights  Patient was given torsemide 40 mg in the ER, will defer to cardiology ongoing diuresis.  Supplemental oxygen as needed  Nebulizers as needed  Low suspicion for pneumonia, suspect findings on CXR likely due to fluid overload.  Pulse ox, continuous   Recent echocardiogram done in December    #Ambulatory dysfunction  #Cellulitis, bilateral lower extremities ?  Contact dermatitis  #Venous insufficiency  Ultrasound bilateral lower extremities to  rule out DVT  - at increased risk due to cancer  Will put patient on doxycycline 100 mg twice daily.  Cellulitis possibly due to contact dermatitis, however patient denies any recent change in detergents or lotions  Continue to monitor  PT/OT    #Colon Cancer  Low residue/fiber diet  Recent notes reviewed from Dr. Vazquez, needing pulmonary to evaluate patient for risk stratification prior to scheduling surgery    Chronic issues  # Arthritis  #Hypertension    Continue patient's medications as appropriate    #DVT prophylaxis  Lovenox subcutaneous  Consider SCDs pending   \  bilateral lower extremity duplex.    3/5: b/l cellulitis, red and warm, contineu abx, pain control will montior    Assessment & Plan  Acute exacerbation of chronic heart failure    Hypoxia    Pleural effusion    Cellulitis of lower extremity    Adenocarcinoma of colon (Multi)            Iglesia Gabriel, DO

## 2025-03-05 NOTE — CARE PLAN
The patient's goals for the shift include      The clinical goals for the shift include pt will remain safe and free from falls and injury    Over the shift, the patient did make progress toward the following goals.

## 2025-03-05 NOTE — PROGRESS NOTES
Occupational Therapy    Evaluation    Patient Name: Yesenia Milner  MRN: [id number]  Department: [facility] ED  Room: [place] [address]  Today's Date: 3/5/2025  Time Calculation  Start Time: 0833  Stop Time: 0842  Time Calculation (min): 9 min        Assessment:  End of Session Communication: Bedside nurse  End of Session Patient Position: Bed, 2 rail up, Alarm off, not on at start of session (call light in reach, all needs met)  OT Assessment Results: Decreased ADL status, Decreased upper extremity strength, Decreased endurance, Decreased functional mobility  Strengths: Living arrangement secure  Barriers to Participation: Comorbidities  Plan:  Treatment Interventions: ADL retraining, Functional transfer training, UE strengthening/ROM, Endurance training, Patient/family training, Equipment evaluation/education, Compensatory technique education  OT Frequency: 3 times per week  OT Discharge Recommendations: Moderate intensity level of continued care  OT - OK to Discharge: Yes (once medically appropriate to next level of care)  Treatment Interventions: ADL retraining, Functional transfer training, UE strengthening/ROM, Endurance training, Patient/family training, Equipment evaluation/education, Compensatory technique education    Subjective   Current Problem:  1. Bilateral leg edema        2. Cellulitis of lower extremity, unspecified laterality        3. Leg swelling  Vascular US Lower Extremity Venous Duplex Bilateral    Vascular US Lower Extremity Venous Duplex Bilateral        General:  General  Reason for Referral: OT eval and tx; ADLs. dx; Acute exacerbation of CHF, hypoxia, pleural effusion, LE cellulitis. chest x-ray: infiltrate atelectasis. 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  Patient has had multiple recent admissions for congestive heart failure exacerbation.  Patient presents with bilateral lower extremity swelling with erythema and warmth over the past several days.  She is reporting pain in her legs and difficulty walking.  Normally ambulates with a walker.  Denies falls.  She did have 1 episode of emesis in the ER.  She has issues with constipation and takes MiraLAX twice daily.  Denies fevers, rigors, acute vision or hearing changes, headache, focal weakness, chest pains, palpitations, cough, SOB, abdominal pains, dysuria, or wounds.  She has an appointment with pulmonology coming up this week for surgical risk evaluation for surgery.   Of note she is on a low residue diet/low fiber 2/2 colonic mass.  Co-Treatment: PT  Co-Treatment Reason: for safety and to maximize therapeutic performance  Prior to Session Communication: Bedside nurse  Patient Position Received: Bed, 2 rail up, Alarm off, not on at start of session  General Comment: patient pleasant and cooperative to participate  Precautions:  Medical Precautions: Fall precautions (02 via NC, bakari, strict I &O)        Pain:  Pain Assessment  Pain Assessment:  (patient with significant pain in BLE, unable to tolerate much activity. repositioned EOS for comfort, RN notified)    Objective   Cognition:  Overall Cognitive Status: Within Functional Limits           Home Living:  Type of Home:  (patient lives alone, reports has 2 daughters that are local and supportive. split level home. 0 [address].  stair lift to main level. 2nd stair lift to bed/bathroom. 4 steps down to laundry with HR.)  Bathroom Shower/Tub:  (tub shower, with GB and HHS. owns transfer bench)  Bathroom Toilet:   (raised toilet seat, uses sink to steady)  Prior Function:  Level of [place]:  (independent in ADL. uses rollator on 1st floor. WW on 2nd floor. denies falls. patient does cooking/cleaning. daughter does laundry and driving.)       ADL:  ADL Comments: anticipate MIN-MOD A for ADLs based on performance with transfers/mobility this date  Activity Tolerance:  Endurance: Decreased tolerance for upright activites  Bed Mobility/Transfers: Bed Mobility  Bed Mobility:  (completed supine to sit with CGA and sit to supine with MIN A x 1)    Transfers  Transfer:  (cometed STS with MOD A x 1 with WW)      Functional Mobility:  Functional Mobility  Functional Mobility Performed:  (unable/unsafe to attempt due to pain)     Sensation:  Light Touch: No apparent deficits  Strength:  Strength Comments: BUE ROM/MMT WFL for patient age as seen through transfers/mobility this date      Outcome Measures:[facility] Daily Activity  Putting on and taking off regular lower body clothing: Total  Bathing (including washing, rinsing, drying): Total  Putting on and taking off regular upper body clothing: A little  Toileting, which includes using toilet, bedpan or urinal: Total  Taking care of personal grooming such as brushing teeth: A little  Eating Meals: None  Daily Activity - Total Score: 13        Education Documentation  Precautions, taught by Lexi Burleson OT at 3/5/2025  1:09 PM.  Learner: Patient  Readiness: Acceptance  Method: Explanation  Response: Verbalizes Understanding, Needs Reinforcement    Body Mechanics, taught by Lexi Burleson OT at 3/5/2025  1:09 PM.  Learner: Patient  Readiness: Acceptance  Method: Explanation  Response: Verbalizes Understanding, Needs Reinforcement    ADL Training, taught by Lexi Burleson OT at 3/5/2025  1:09 PM.  Learner: Patient  Readiness: Acceptance  Method: Explanation  Response: Verbalizes Understanding, Needs Reinforcement    Education Comments  No comments found.        OP EDUCATION:        Goals:  Encounter Problems       Encounter Problems (Active)       OT Goals       STG- patient will complete LB dressing with CGA with use of ae/ad/dme prn (Progressing)       Start:  03/05/25    Expected End:  03/19/25            STG- patient will complete toileting at CGA with use of ae/ad/dme prn (Progressing)       Start:  03/05/25    Expected End:  03/19/25            STG- patient will complete transfers to/from bed, chair, commode at CGA with use of ae/ad/dme prn (Progressing)       Start:  03/05/25    Expected End:  03/19/25            STG- patient will complete simple mobility at CGA with use of ae/ad/dme prn (Progressing)       Start:  03/05/25    Expected End:  03/19/25            STG- patient will complete grooming at CGA with use of ae/ad/dme prn (Progressing)       Start:  03/05/25    Expected End:  03/19/25

## 2025-03-06 ENCOUNTER — HOME CARE VISIT (OUTPATIENT)
Dept: HOME HEALTH SERVICES | Facility: HOME HEALTH | Age: OVER 89
End: 2025-03-06
Payer: MEDICARE

## 2025-03-06 PROCEDURE — 2500000004 HC RX 250 GENERAL PHARMACY W/ HCPCS (ALT 636 FOR OP/ED): Performed by: NURSE PRACTITIONER

## 2025-03-06 PROCEDURE — 2500000001 HC RX 250 WO HCPCS SELF ADMINISTERED DRUGS (ALT 637 FOR MEDICARE OP): Performed by: INTERNAL MEDICINE

## 2025-03-06 PROCEDURE — 2500000004 HC RX 250 GENERAL PHARMACY W/ HCPCS (ALT 636 FOR OP/ED): Performed by: INTERNAL MEDICINE

## 2025-03-06 PROCEDURE — 2500000001 HC RX 250 WO HCPCS SELF ADMINISTERED DRUGS (ALT 637 FOR MEDICARE OP): Performed by: NURSE PRACTITIONER

## 2025-03-06 PROCEDURE — 99232 SBSQ HOSP IP/OBS MODERATE 35: CPT | Performed by: STUDENT IN AN ORGANIZED HEALTH CARE EDUCATION/TRAINING PROGRAM

## 2025-03-06 PROCEDURE — 2500000001 HC RX 250 WO HCPCS SELF ADMINISTERED DRUGS (ALT 637 FOR MEDICARE OP): Performed by: STUDENT IN AN ORGANIZED HEALTH CARE EDUCATION/TRAINING PROGRAM

## 2025-03-06 PROCEDURE — 1200000002 HC GENERAL ROOM WITH TELEMETRY DAILY

## 2025-03-06 PROCEDURE — 99221 1ST HOSP IP/OBS SF/LOW 40: CPT | Performed by: INTERNAL MEDICINE

## 2025-03-06 RX ORDER — POTASSIUM CHLORIDE 1.5 G/1.58G
20 POWDER, FOR SOLUTION ORAL ONCE
Status: COMPLETED | OUTPATIENT
Start: 2025-03-06 | End: 2025-03-06

## 2025-03-06 RX ORDER — GABAPENTIN 100 MG/1
100 CAPSULE ORAL 2 TIMES DAILY
Status: DISCONTINUED | OUTPATIENT
Start: 2025-03-06 | End: 2025-03-09

## 2025-03-06 RX ORDER — FUROSEMIDE 10 MG/ML
20 INJECTION INTRAMUSCULAR; INTRAVENOUS ONCE
Status: COMPLETED | OUTPATIENT
Start: 2025-03-06 | End: 2025-03-06

## 2025-03-06 RX ADMIN — DOXYCYCLINE 100 MG: 100 INJECTION, POWDER, LYOPHILIZED, FOR SOLUTION INTRAVENOUS at 04:51

## 2025-03-06 RX ADMIN — ASPIRIN 81 MG CHEWABLE TABLET 81 MG: 81 TABLET CHEWABLE at 08:38

## 2025-03-06 RX ADMIN — ENOXAPARIN SODIUM 40 MG: 40 INJECTION SUBCUTANEOUS at 16:34

## 2025-03-06 RX ADMIN — POLYETHYLENE GLYCOL 3350 17 G: 17 POWDER, FOR SOLUTION ORAL at 19:38

## 2025-03-06 RX ADMIN — CLOPIDOGREL BISULFATE 75 MG: 75 TABLET ORAL at 08:38

## 2025-03-06 RX ADMIN — GABAPENTIN 100 MG: 100 CAPSULE ORAL at 19:38

## 2025-03-06 RX ADMIN — GABAPENTIN 100 MG: 100 CAPSULE ORAL at 08:41

## 2025-03-06 RX ADMIN — DOXYCYCLINE 100 MG: 100 INJECTION, POWDER, LYOPHILIZED, FOR SOLUTION INTRAVENOUS at 16:34

## 2025-03-06 RX ADMIN — POTASSIUM CHLORIDE 20 MEQ: 1.5 POWDER, FOR SOLUTION ORAL at 15:36

## 2025-03-06 RX ADMIN — Medication 1 TABLET: at 08:38

## 2025-03-06 RX ADMIN — METOPROLOL TARTRATE 25 MG: 25 TABLET, FILM COATED ORAL at 08:38

## 2025-03-06 RX ADMIN — POLYETHYLENE GLYCOL 3350 17 G: 17 POWDER, FOR SOLUTION ORAL at 08:38

## 2025-03-06 RX ADMIN — FUROSEMIDE 20 MG: 10 INJECTION, SOLUTION INTRAMUSCULAR; INTRAVENOUS at 15:36

## 2025-03-06 ASSESSMENT — COGNITIVE AND FUNCTIONAL STATUS - GENERAL
TOILETING: A LITTLE
MOBILITY SCORE: 15
DRESSING REGULAR UPPER BODY CLOTHING: A LITTLE
CLIMB 3 TO 5 STEPS WITH RAILING: A LOT
HELP NEEDED FOR BATHING: A LITTLE
TURNING FROM BACK TO SIDE WHILE IN FLAT BAD: A LITTLE
STANDING UP FROM CHAIR USING ARMS: A LOT
DAILY ACTIVITIY SCORE: 20
MOVING TO AND FROM BED TO CHAIR: A LOT
WALKING IN HOSPITAL ROOM: A LOT
DRESSING REGULAR LOWER BODY CLOTHING: A LITTLE

## 2025-03-06 ASSESSMENT — PAIN SCALES - WONG BAKER: WONGBAKER_NUMERICALRESPONSE: NO HURT

## 2025-03-06 ASSESSMENT — PAIN - FUNCTIONAL ASSESSMENT: PAIN_FUNCTIONAL_ASSESSMENT: 0-10

## 2025-03-06 ASSESSMENT — PAIN SCALES - GENERAL
PAINLEVEL_OUTOF10: 0 - NO PAIN
PAINLEVEL_OUTOF10: 0 - NO PAIN

## 2025-03-06 NOTE — CONSULTS
Reason For Consult    Chief complaint-dyspnea and hypoxia    History Of Present Illness  Yesenia Milner is a 89 y.o. female presenting with complaints of dyspnea and lower extremity edema and redness.  Patient has history of colon cancer.  Also she has history of congestive heart failure due to left ventricular diastolic dysfunction.  Within the last year she had several admissions due to decompensated heart failure.  Upon arrival patient was found to be hypoxic and was started on supplemental oxygen 2 L/min.  Diuretics were administered.  Patient was admitted for further management.  Currently she is in her bed.  Patient is awake and alert.  Denies chest pain.  No cough or phlegm production.  No fever or chills.  No nausea vomiting or diarrhea.  Her daughter is present at the bedside and I had discussion with both of them regarding patient's condition, prognosis and goals of care..     Past Medical History  She has a past medical history of Gross hematuria (10/07/2020), Impacted cerumen (02/22/2024), Other conditions influencing health status, Personal history of other medical treatment, Personal history of other medical treatment, and Systolic CHF (Multi) (05/18/2023).    Surgical History  She has a past surgical history that includes Appendectomy (11/22/2017); Hysterectomy (11/22/2017); Tonsillectomy (11/22/2017); Other surgical history (11/22/2017); Other surgical history (11/22/2017); Cardiac pacemaker placement (03/11/2021); IR angiogram renal bilateral (Bilateral, 12/14/2020); IR angiogram inferior epigastric pelvic (12/14/2020); IR angiogram inferior epigastric pelvic (12/14/2020); and Cardiac catheterization (N/A, 12/20/2024).     Social History  She reports that she quit smoking about 52 years ago. Her smoking use included cigarettes. She has been exposed to tobacco smoke. She has never used smokeless tobacco. She reports that she does not drink alcohol and does not use drugs.    Family History  Family  "History   Problem Relation Name Age of Onset    No Known Problems Mother          Allergies  Iodine, Shrimp, Hydrocodone, and Adhesive tape-silicones    Review of Systems    10 system review of systems performed and negative for any complaints outside of the ones mentioned in history of present illness     Physical Exam    Head and face no deformities  Oropharynx normal mucosa  Neck is supple no thyromegaly  Chest is symmetric no crackles  Heart is regular no murmurs  Abdomen is soft and nontender  Skin is intact  Joints are normal, lower extremity 1+ edema below the knees and erythema of the skin.  Neurologically patient is moving all 4 limbs.     Last Recorded Vitals  Blood pressure 120/57, pulse 65, temperature 35.7 °C (96.3 °F), temperature source Temporal, resp. rate 18, height 1.626 m (5' 4\"), weight 71.7 kg (158 lb), SpO2 96%.          Assessment/Plan     Patient presenting with dyspnea and dyspnea and hypoxia.  Most likely due to decompensated congestive heart failure due to left ventricular diastolic dysfunction.  Imaging demonstrates bilateral small pleural effusions and interstitial edema.    Cellulitis of the lower extremities.  History of COPD.  Currently no evidence of exacerbation.  Colon cancer.    Plan:  Wean off oxygen for saturation above 90%.  Judicious diuresis.  Try to maintain a slightly negative fluid balance.  Monitor urine output.  Monitor serum creatinine electrolytes.  Antibiotics for cellulitis.  Monitor white blood cell count.  DVT prophylaxis.  Bronchodilators as needed.      Nadeem Oconnor MD    "

## 2025-03-06 NOTE — CONSULTS
Inpatient consult to Cardiology  Consult performed by: James C Ramicone, DO  Consult ordered by: SELVIN Barber  Reason for consult: Congestive heart failure          Reason for consultation:    Congestive heart failure    History Of Present Illness:      This is a 89-year-old female with a history of sick sinus syndrome and congestive heart failure.  She presented with worsening lower extremity edema, and also states that she has had lower extremity erythema and pain.  She recently had a hospitalization for CHF and was effectively diuresed but on oral diuretics she had a recurrence of the lower extremity edema.  No complaints of chest pain or shortness of breath at this time.        Past medical history:    HFpEF (ejection fraction 60 to 65%)  Sick sinus syndrome with history of Medtronic pacemaker insertion  Hypertension  History of a stroke treated with thrombolytics in August 2023  COPD  Adenocarcinoma of the colon    Social history: Former smoker    Family history: Negative for premature CAD    Review of Systems    Other review of systems negative    Social History:  She reports that she quit smoking about 52 years ago. Her smoking use included cigarettes. She has been exposed to tobacco smoke. She has never used smokeless tobacco. She reports that she does not drink alcohol and does not use drugs.    Family History:  Family History   Problem Relation Name Age of Onset    No Known Problems Mother          Allergies:  Iodine, Shrimp, Hydrocodone, and Adhesive tape-silicones    Medications:  Current Facility-Administered Medications   Medication Dose Route Frequency Provider Last Rate Last Admin    acetaminophen (Tylenol) tablet 650 mg  650 mg oral q6h PRN SELVIN Barber        Or    acetaminophen (Tylenol) oral liquid 650 mg  650 mg nasogastric tube q6h PRN SELVIN Barber        Or    acetaminophen (Tylenol) suppository 650 mg  650 mg rectal q6h PRN SELVIN Barber         albuterol 2.5 mg /3 mL (0.083 %) nebulizer solution 2.5 mg  2.5 mg nebulization q4h PRN SHAMIR BarberCNP        aspirin chewable tablet 81 mg  81 mg oral Daily SAQIB Barber-CNP   81 mg at 03/06/25 0838    calcium carbonate-vitamin D3 500 mg-5 mcg (200 unit) per tablet 1 tablet  1 tablet oral Daily SAQIB Barber-CNP   1 tablet at 03/06/25 0838    clopidogrel (Plavix) tablet 75 mg  75 mg oral Daily Lokesh Colindres APRN-CNP   75 mg at 03/06/25 0838    doxycycline (Vibramycin) 100 mg in dextrose 5%  mL  100 mg intravenous q12h SAQIB Barber-CNP   Stopped at 03/06/25 0624    enoxaparin (Lovenox) syringe 40 mg  40 mg subcutaneous q24h SAQIB Barber-CNP   40 mg at 03/05/25 1617    fluticasone furoate-vilanteroL (Breo Ellipta) 200-25 mcg/dose inhaler 1 puff  1 puff inhalation Daily Lokesh Colindres APRNAnushkaCNP        gabapentin (Neurontin) capsule 100 mg  100 mg oral BID Iglesia Ice, DO   100 mg at 03/06/25 0841    lubricating eye drops ophthalmic solution 1 drop  1 drop Both Eyes PRN SHAMIR BarberCNP        metoprolol tartrate (Lopressor) tablet 25 mg  25 mg oral Daily SAQIB Barber-CNP   25 mg at 03/06/25 0838    ondansetron (Zofran) injection 4 mg  4 mg intravenous q6h PRN SAQIB Barber-CNP   4 mg at 03/04/25 2040    oxygen (O2) therapy   inhalation Continuous PRN - O2/gases Lokesh Colindres APRN-CNP        polyethylene glycol (Glycolax, Miralax) packet 17 g  17 g oral BID SAQIB Barber-CNP   17 g at 03/06/25 0838         Last Recorded Vitals:  Vitals:    03/05/25 2101 03/06/25 0215 03/06/25 0520 03/06/25 1100   BP: 122/58 135/59 125/60 120/57   BP Location:    Left arm   Patient Position:    Lying   Pulse: 63 68 66 65   Resp: 16   18   Temp: 35.9 °C (96.6 °F) 35.4 °C (95.7 °F) 35.6 °C (96.1 °F) 35.7 °C (96.3 °F)   TempSrc: Temporal   Temporal   SpO2: 96% 97% 96% 96%   Weight:       Height:           Physical Exam:    General Appearance:  Alert, oriented, no  distress  Skin:  Warm and dry  Head and Neck:  No elevation of JVP, no carotid bruits  Cardiac Exam:  Rhythm is regular, S1 and S2 are normal, no murmur S3 or S4  Lungs:  Clear to auscultation  Extremities:  no edema  Neurologic:  No focal deficits  Psychiatric:  Appropriate mood and behavior    Laboratory data:    CMP:  Recent Labs     03/05/25  0417 03/04/25  1309 03/04/25  1212 02/10/25  0649 02/09/25  0708 02/08/25  0745 02/06/25  0536 02/05/25  0635 02/04/25  0643 02/03/25  0644 02/02/25  1704 01/13/25  0626 01/12/25  1122 01/03/25  0344 01/02/25  1227 12/21/24  0538 12/20/24  0204 08/23/23  0523 08/22/23  0539   *  --  133* 137 137 135* 135* 136 136   < > 133*   < > 134*   < > 135*   < > 135*   < > 139   K 3.3*  --  3.6 4.3 4.7 4.2 3.9 4.0 3.8   < > 4.0   < > 3.9   < > 4.3   < > 4.2   < > 4.8   CL 99  --  98 101 100 98 101 100 99   < > 100   < > 101   < > 105   < > 104   < > 107   CO2 28  --  26 29 31 29 28 28 30   < > 24   < > 23   < > 22   < > 20*   < > 27   ANIONGAP 11  --  13 11 11 12 10 12 11   < > 13   < > 14   < > 12   < > 15   < > 10   BUN 27*  --  22 32* 36* 31* 24* 24* 22   < > 28*   < > 24*   < > 12   < > 32*   < > 19   CREATININE 1.19*  --  0.95 0.84 0.92 0.91 0.96 0.97 0.95   < > 0.97   < > 0.92   < > 0.88   < > 1.19*   < > 1.05   EGFR 43*  --  57* 66 59* 60* 56* 55* 57*   < > 55*   < > 59*   < > 62   < > 43*   < >  --    MG  --  2.23  --   --   --   --   --   --   --   --  2.25  --  2.21  --  2.05  --  2.19  --  2.31    < > = values in this interval not displayed.     Recent Labs     03/04/25  1212 01/20/25  0709 01/19/25  0641 01/18/25  0740 01/13/25  0626 01/12/25  1122   ALBUMIN 3.8 3.2* 3.2* 2.9* 3.6 3.9   ALKPHOS 88 81 84 76 78 83   ALT 14 16 17 16 16 15   AST 15 16 17 15 16 17   BILITOT 0.6 0.3 0.3 0.4 0.5 0.5   LIPASE  --   --   --   --   --  23     CBC:  Recent Labs     03/05/25  0417 03/04/25  1212 02/10/25  0649 02/09/25  0708 02/08/25  0745 02/06/25  0536 02/05/25  0635  02/04/25  0643   WBC 5.5 6.0 5.5 8.3 6.7 4.2* 4.4 4.8   HGB 7.3* 8.1* 7.9* 8.1* 8.2* 8.2* 8.1* 7.9*   HCT 23.2* 26.0* 25.7* 26.0* 25.8* 26.2* 25.9* 25.3*    271 253 272 289 296 286 284   MCV 85 87 88 88 87 89 90 89     COAG:   Recent Labs     01/16/25  0709 01/12/25  1122 01/05/25  1412 01/05/25  1028 01/04/25  0612 01/03/25  1539 01/03/25  0900 01/03/25  0344 01/02/25  2334 12/21/24  0538 12/20/24  1013 12/20/24  0251 08/21/23  0056 07/13/23  1335 01/08/23  1412 12/14/20  0731   INR 1.5* 1.7*  --   --   --   --   --   --   --  1.1  --  1.1 1.1 1.1 1.2* 1.1   HAUF  --   --  1.8* 0.4 0.4 0.5 0.7 0.9 1.0  --  1.1*  --   --   --   --   --      HEME/ENDO:  Recent Labs     03/04/25  1309 01/13/25  0626 12/20/24  0204 01/24/24  1049 08/21/23  0056 05/18/23  1346 01/17/23  0943 04/11/19  0930   FERRITIN  --  28  --   --   --  72  --   --    IRONSAT  --  5*  --   --   --  28  --  30   TSH 2.96  --   --   --   --   --  2.25  --    HGBA1C  --   --  5.5 5.4 5.9*  --   --   --       CARDIAC:   Recent Labs     03/04/25  1309 03/04/25  1212 02/03/25  0644 02/02/25  1916 02/02/25  1704 01/02/25  1420 01/02/25  1227 12/20/24  0439 12/20/24  0251 01/08/23  1412 03/30/22  1611 04/15/20  1403 04/19/19  2035   TROPHS 19* 19* 17* 17* 15* 38* 36* 8,220* 1,426*   < >  --   --   --    BNP  --  535*  --   --  462*  --  485*  --  379*  --  212* 138* 112*    < > = values in this interval not displayed.     Recent Labs     12/20/24  0204 01/24/24  1049 08/21/23  0056 01/17/23  0943 03/16/21  1015   CHOL 145 152 162 155 180   LDLF  --   --  81 75 106*   HDL 63.1 65.4 64.3 65.5 57.8   TRIG 70 66 85 72 82       Test Review:  I have personally review the diagnostic testing:  Echocardiogram December 21, 2023: Ejection fraction 60 to 65%, moderate mitral valve regurgitation, moderate to severe tricuspid regurgitation  Telemetry: Atrial paced rhythm    Assessment/Plan:    1.  Acute on chronic diastolic CHF: The patient has had a recurrence of  the lower extremity edema.  We will diurese as tolerated by renal function.    2.  Sick sinus syndrome: Normal pacemaker function.    3.  Cellulitis: Receiving antibiotic therapy.       James C Ramicone, DO

## 2025-03-06 NOTE — PROGRESS NOTES
"Yesenia Milner is a 89 y.o. female on day 1 of admission presenting with Acute exacerbation of chronic heart failure.    Subjective   Labs better, swelling redness better as well       Objective     Physical Exam  Constitutional:       Appearance: Normal appearance.   HENT:      Head: Normocephalic and atraumatic.      Right Ear: Tympanic membrane and ear canal normal.      Left Ear: Tympanic membrane and ear canal normal.      Mouth/Throat:      Mouth: Mucous membranes are moist.      Pharynx: Oropharynx is clear.   Eyes:      Extraocular Movements: Extraocular movements intact.      Conjunctiva/sclera: Conjunctivae normal.      Pupils: Pupils are equal, round, and reactive to light.   Cardiovascular:      Rate and Rhythm: Normal rate and regular rhythm.      Pulses: Normal pulses.      Heart sounds: Normal heart sounds.   Pulmonary:      Effort: Pulmonary effort is normal.      Breath sounds: Normal breath sounds.   Abdominal:      General: Abdomen is flat. Bowel sounds are normal.      Palpations: Abdomen is soft.   Musculoskeletal:         General: Normal range of motion.      Cervical back: Normal range of motion and neck supple.   Skin:     General: Skin is warm and dry.      Capillary Refill: Capillary refill takes 2 to 3 seconds.   Neurological:      General: No focal deficit present.      Mental Status: She is alert and oriented to person, place, and time. Mental status is at baseline.   Psychiatric:         Mood and Affect: Mood normal.         Behavior: Behavior normal.         Thought Content: Thought content normal.         Judgment: Judgment normal.         Last Recorded Vitals  Blood pressure 120/57, pulse 65, temperature 35.7 °C (96.3 °F), temperature source Temporal, resp. rate 18, height 1.626 m (5' 4\"), weight 71.7 kg (158 lb), SpO2 96%.  Intake/Output last 3 Shifts:  I/O last 3 completed shifts:  In: 100 (1.4 mL/kg) [IV Piggyback:100]  Out: 800 (11.2 mL/kg) [Urine:800 (0.3 mL/kg/hr)]  Weight: " 71.7 kg     Relevant Results                              Assessment/Plan   Assessment & Plan  Acute exacerbation of chronic heart failure    Hypoxia    Pleural effusion    Cellulitis of lower extremity    Adenocarcinoma of colon (Multi)    Bilateral leg edema    89-year-old female with past medical history of HFpEF (LVEF 60-65% 12/2024), MI, SSS s/p Medtronic dual-chamber pacemaker 2012 with generator change on pacemaker 7/13/2023, HTN, COPD, CVA with TNK 8/21/23, arthritis, venous insufficiency, and  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  Patient has had multiple recent admissions for congestive heart failure exacerbation.  Patient presents with bilateral lower extremity swelling with erythema and warmth over the past several days.  Patient hospitalized for cellulitis and acute on chronic CHF exacerbation, and generalized weakness.     #Acute on chronic diastolic congestive heart failure  #Pleural effusions  #Hypoxia  #History COPD  #History sick sinus syndrome s/p dual-chamber pacemaker     Telemetry monitoring  Full code  Consult pulmonology and cardiology, appreciate input.  Risk stratification needed from pulmonary.  Daily weights  Patient was given torsemide 40 mg in the ER, will defer to cardiology ongoing diuresis.  Supplemental oxygen as needed  Nebulizers as needed  Low suspicion for pneumonia, suspect findings on CXR likely due to fluid overload.  Pulse ox, continuous   Recent echocardiogram done in December     #Ambulatory dysfunction  #Cellulitis, bilateral lower extremities ?  Contact dermatitis  #Venous insufficiency  Ultrasound bilateral lower extremities to rule out DVT  - at increased risk due to cancer  Will put patient on doxycycline 100 mg twice daily.  Cellulitis possibly due to contact dermatitis, however patient denies  any recent change in detergents or lotions  Continue to monitor  PT/OT     #Colon Cancer  Low residue/fiber diet  Recent notes reviewed from Dr. Vazquez, needing pulmonary to evaluate patient for risk stratification prior to scheduling surgery     Chronic issues  # Arthritis  #Hypertension     Continue patient's medications as appropriate     #DVT prophylaxis  Lovenox subcutaneous  Consider SCDs pending   \  bilateral lower extremity duplex.     3/5: b/l cellulitis, red and warm, contineu abx, pain control will montior       3/6: contineu abx, pt/ot, gabapentin       I spent 35 minutes in the professional and overall care of this patient.      Iglesia Gabriel, DO

## 2025-03-06 NOTE — CARE PLAN
The patient's goals for the shift include  no shortness of breath throughout shift    The clinical goals for the shift include Patient will remain free from falls throughout shift

## 2025-03-06 NOTE — PROGRESS NOTES
25 1128   Discharge Planning   Living Arrangements Alone   Support Systems Family members   Assistance Needed ADLS and IADLs   Type of Residence Private residence   Number of Stairs to Enter Residence 0  (Pt has 5 steps but a chair lift)   Who is requesting discharge planning? Provider   Home or Post Acute Services Post acute facilities (Rehab/SNF/etc)   Type of Post Acute Facility Services Skilled nursing   Expected Discharge Disposition SNF   Does the patient need discharge transport arranged? Yes   RoundTrip coordination needed? Yes     TCC Note: Met with pt. at bedside, introduced self and role on the Transition of Care Team.  Verified name, , demographics, insurance, PCP is Dr. Keane (Spelling?) Emergency contact is daughterVannessa Phone: [phone]. Pt. lives alone in a home with 5 ANNAMARIE but has a chair lift. Pt. is Independent with ADLs and stopped driving in December when she had a heart attack. Pt. denies any recent falls and does use a walker as an assistive device for ambulation. Pt. is not diabetic and does not use any home O2 or any other home services/supplies. Preferred pharmacy is [facility] on [facility].  No problems affording or obtaining medications. Pt. Will need SNF at the time of discharge. Discussed Therapy AMPA scores of PT 10 and OT 13. Provided pt with SNF list per [facility], which includes facilities that are within  Post- Acute Quality network as well as meeting patient's medical needs and are in-network for patient's insurance while also in discharge geographic are patient/family prefers. List identifies each facilities CMS star rating. Patient/family to review SNF list and provide choices for SNF preference. Pt stated that her daughter, Vannessa would be by to visit her shortly and would assist her in making choices. I told her that I would return this afternoon for those choices. Transitions of Care will continue to follow until discharge. Mitali  Santa, MSN, RN, TCC.    ADDENDUM: Met with pt and daughter, Vannessa at bedside to obtain SNF choices. Choices in order of preference are: [facility] and [facility]. Requested that [facility] please send referrals. Mitali Pratt, MSN, RN, TCC.

## 2025-03-07 LAB
ANION GAP SERPL CALC-SCNC: 11 MMOL/L (ref 10–20)
BUN SERPL-MCNC: 22 MG/DL (ref 6–23)
CALCIUM SERPL-MCNC: 9 MG/DL (ref 8.6–10.3)
CHLORIDE SERPL-SCNC: 96 MMOL/L (ref 98–107)
CO2 SERPL-SCNC: 28 MMOL/L (ref 21–32)
CREAT SERPL-MCNC: 0.87 MG/DL (ref 0.5–1.05)
EGFRCR SERPLBLD CKD-EPI 2021: 63 ML/MIN/1.73M*2
ERYTHROCYTE [DISTWIDTH] IN BLOOD BY AUTOMATED COUNT: 15.7 % (ref 11.5–14.5)
GLUCOSE SERPL-MCNC: 104 MG/DL (ref 74–99)
HCT VFR BLD AUTO: 24.4 % (ref 36–46)
HGB BLD-MCNC: 7.6 G/DL (ref 12–16)
MCH RBC QN AUTO: 26.4 PG (ref 26–34)
MCHC RBC AUTO-ENTMCNC: 31.1 G/DL (ref 32–36)
MCV RBC AUTO: 85 FL (ref 80–100)
NRBC BLD-RTO: 0 /100 WBCS (ref 0–0)
PLATELET # BLD AUTO: 295 X10*3/UL (ref 150–450)
POTASSIUM SERPL-SCNC: 3.8 MMOL/L (ref 3.5–5.3)
RBC # BLD AUTO: 2.88 X10*6/UL (ref 4–5.2)
SODIUM SERPL-SCNC: 131 MMOL/L (ref 136–145)
URATE SERPL-MCNC: 7.4 MG/DL (ref 2.3–6.7)
WBC # BLD AUTO: 4.4 X10*3/UL (ref 4.4–11.3)

## 2025-03-07 PROCEDURE — 2500000001 HC RX 250 WO HCPCS SELF ADMINISTERED DRUGS (ALT 637 FOR MEDICARE OP): Performed by: NURSE PRACTITIONER

## 2025-03-07 PROCEDURE — 2500000001 HC RX 250 WO HCPCS SELF ADMINISTERED DRUGS (ALT 637 FOR MEDICARE OP): Performed by: STUDENT IN AN ORGANIZED HEALTH CARE EDUCATION/TRAINING PROGRAM

## 2025-03-07 PROCEDURE — 82435 ASSAY OF BLOOD CHLORIDE: CPT | Performed by: STUDENT IN AN ORGANIZED HEALTH CARE EDUCATION/TRAINING PROGRAM

## 2025-03-07 PROCEDURE — 84550 ASSAY OF BLOOD/URIC ACID: CPT | Performed by: STUDENT IN AN ORGANIZED HEALTH CARE EDUCATION/TRAINING PROGRAM

## 2025-03-07 PROCEDURE — 1200000002 HC GENERAL ROOM WITH TELEMETRY DAILY

## 2025-03-07 PROCEDURE — 99232 SBSQ HOSP IP/OBS MODERATE 35: CPT | Performed by: STUDENT IN AN ORGANIZED HEALTH CARE EDUCATION/TRAINING PROGRAM

## 2025-03-07 PROCEDURE — 85027 COMPLETE CBC AUTOMATED: CPT | Performed by: STUDENT IN AN ORGANIZED HEALTH CARE EDUCATION/TRAINING PROGRAM

## 2025-03-07 PROCEDURE — 2500000004 HC RX 250 GENERAL PHARMACY W/ HCPCS (ALT 636 FOR OP/ED): Performed by: NURSE PRACTITIONER

## 2025-03-07 PROCEDURE — 36415 COLL VENOUS BLD VENIPUNCTURE: CPT | Performed by: STUDENT IN AN ORGANIZED HEALTH CARE EDUCATION/TRAINING PROGRAM

## 2025-03-07 RX ORDER — COLCHICINE 0.6 MG/1
0.6 TABLET ORAL DAILY
Status: DISPENSED | OUTPATIENT
Start: 2025-03-07

## 2025-03-07 RX ORDER — ALLOPURINOL 100 MG/1
100 TABLET ORAL DAILY
Status: DISPENSED | OUTPATIENT
Start: 2025-03-07

## 2025-03-07 RX ADMIN — Medication 1 TABLET: at 08:49

## 2025-03-07 RX ADMIN — METOPROLOL TARTRATE 25 MG: 25 TABLET, FILM COATED ORAL at 07:55

## 2025-03-07 RX ADMIN — CLOPIDOGREL BISULFATE 75 MG: 75 TABLET ORAL at 08:49

## 2025-03-07 RX ADMIN — DOXYCYCLINE 100 MG: 100 INJECTION, POWDER, LYOPHILIZED, FOR SOLUTION INTRAVENOUS at 04:27

## 2025-03-07 RX ADMIN — COLCHICINE 0.6 MG: 0.6 TABLET, FILM COATED ORAL at 10:57

## 2025-03-07 RX ADMIN — DOXYCYCLINE 100 MG: 100 INJECTION, POWDER, LYOPHILIZED, FOR SOLUTION INTRAVENOUS at 15:55

## 2025-03-07 RX ADMIN — GABAPENTIN 100 MG: 100 CAPSULE ORAL at 08:49

## 2025-03-07 RX ADMIN — POLYETHYLENE GLYCOL 3350 17 G: 17 POWDER, FOR SOLUTION ORAL at 07:56

## 2025-03-07 RX ADMIN — ALLOPURINOL 100 MG: 100 TABLET ORAL at 10:57

## 2025-03-07 RX ADMIN — ENOXAPARIN SODIUM 40 MG: 40 INJECTION SUBCUTANEOUS at 15:55

## 2025-03-07 RX ADMIN — GABAPENTIN 100 MG: 100 CAPSULE ORAL at 20:24

## 2025-03-07 RX ADMIN — POLYETHYLENE GLYCOL 3350 17 G: 17 POWDER, FOR SOLUTION ORAL at 20:24

## 2025-03-07 RX ADMIN — ASPIRIN 81 MG CHEWABLE TABLET 81 MG: 81 TABLET CHEWABLE at 07:54

## 2025-03-07 ASSESSMENT — COGNITIVE AND FUNCTIONAL STATUS - GENERAL
TOILETING: A LOT
TURNING FROM BACK TO SIDE WHILE IN FLAT BAD: A LITTLE
MOBILITY SCORE: 15
CLIMB 3 TO 5 STEPS WITH RAILING: A LOT
CLIMB 3 TO 5 STEPS WITH RAILING: A LOT
MOBILITY SCORE: 15
TURNING FROM BACK TO SIDE WHILE IN FLAT BAD: A LITTLE
DAILY ACTIVITIY SCORE: 19
DRESSING REGULAR UPPER BODY CLOTHING: A LITTLE
DRESSING REGULAR LOWER BODY CLOTHING: A LITTLE
DRESSING REGULAR LOWER BODY CLOTHING: A LITTLE
HELP NEEDED FOR BATHING: A LITTLE
MOVING TO AND FROM BED TO CHAIR: A LOT
WALKING IN HOSPITAL ROOM: A LOT
STANDING UP FROM CHAIR USING ARMS: A LOT
DAILY ACTIVITIY SCORE: 20
MOVING TO AND FROM BED TO CHAIR: A LOT
TOILETING: A LITTLE
WALKING IN HOSPITAL ROOM: A LOT
DRESSING REGULAR UPPER BODY CLOTHING: A LITTLE
HELP NEEDED FOR BATHING: A LITTLE
STANDING UP FROM CHAIR USING ARMS: A LOT

## 2025-03-07 ASSESSMENT — PAIN SCALES - GENERAL
PAINLEVEL_OUTOF10: 0 - NO PAIN
PAINLEVEL_OUTOF10: 0 - NO PAIN

## 2025-03-07 ASSESSMENT — PAIN SCALES - WONG BAKER: WONGBAKER_NUMERICALRESPONSE: NO HURT

## 2025-03-07 NOTE — PROGRESS NOTES
"Yesenia Milner is a 89 y.o. female on day 2 of admission presenting with Acute exacerbation of chronic heart failure.    Subjective   Labs better, swelling redness better as well, still painful to walk       Objective     Physical Exam  Constitutional:       Appearance: Normal appearance.   HENT:      Head: Normocephalic and atraumatic.      Right Ear: Tympanic membrane and ear canal normal.      Left Ear: Tympanic membrane and ear canal normal.      Mouth/Throat:      Mouth: Mucous membranes are moist.      Pharynx: Oropharynx is clear.   Eyes:      Extraocular Movements: Extraocular movements intact.      Conjunctiva/sclera: Conjunctivae normal.      Pupils: Pupils are equal, round, and reactive to light.   Cardiovascular:      Rate and Rhythm: Normal rate and regular rhythm.      Pulses: Normal pulses.      Heart sounds: Normal heart sounds.   Pulmonary:      Effort: Pulmonary effort is normal.      Breath sounds: Normal breath sounds.   Abdominal:      General: Abdomen is flat. Bowel sounds are normal.      Palpations: Abdomen is soft.   Musculoskeletal:         General: Normal range of motion.      Cervical back: Normal range of motion and neck supple.   Skin:     General: Skin is warm and dry.      Capillary Refill: Capillary refill takes 2 to 3 seconds.   Neurological:      General: No focal deficit present.      Mental Status: She is alert and oriented to person, place, and time. Mental status is at baseline.   Psychiatric:         Mood and Affect: Mood normal.         Behavior: Behavior normal.         Thought Content: Thought content normal.         Judgment: Judgment normal.         Last Recorded Vitals  Blood pressure 113/55, pulse 62, temperature 35.9 °C (96.6 °F), temperature source Temporal, resp. rate 18, height 1.626 m (5' 4\"), weight 72.1 kg (158 lb 15.2 oz), SpO2 96%.  Intake/Output last 3 Shifts:  I/O last 3 completed shifts:  In: 592 (8.2 mL/kg) [P.O.:592]  Out: 2100 (29.1 mL/kg) [Urine:2100 " (0.8 mL/kg/hr)]  Weight: 72.1 kg     Relevant Results                              Assessment/Plan   Assessment & Plan  Acute exacerbation of chronic heart failure    Hypoxia    Pleural effusion    Cellulitis of lower extremity    Adenocarcinoma of colon (Multi)    Bilateral leg edema    89-year-old female with past medical history of HFpEF (LVEF 60-65% 12/2024), MI, SSS s/p Medtronic dual-chamber pacemaker 2012 with generator change on pacemaker 7/13/2023, HTN, COPD, CVA with TNK 8/21/23, arthritis, venous insufficiency, and  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  Patient has had multiple recent admissions for congestive heart failure exacerbation.  Patient presents with bilateral lower extremity swelling with erythema and warmth over the past several days.  Patient hospitalized for cellulitis and acute on chronic CHF exacerbation, and generalized weakness.     #Acute on chronic diastolic congestive heart failure  #Pleural effusions  #Hypoxia  #History COPD  #History sick sinus syndrome s/p dual-chamber pacemaker     Telemetry monitoring  Full code  Consult pulmonology and cardiology, appreciate input.  Risk stratification needed from pulmonary.  Daily weights  Patient was given torsemide 40 mg in the ER, will defer to cardiology ongoing diuresis.  Supplemental oxygen as needed  Nebulizers as needed  Low suspicion for pneumonia, suspect findings on CXR likely due to fluid overload.  Pulse ox, continuous   Recent echocardiogram done in December     #Ambulatory dysfunction  #Cellulitis, bilateral lower extremities ?  Contact dermatitis  #Venous insufficiency  Ultrasound bilateral lower extremities to rule out DVT  - at increased risk due to cancer  Will put patient on doxycycline 100 mg twice daily.  Cellulitis possibly due to contact dermatitis,  however patient denies any recent change in detergents or lotions  Continue to monitor  PT/OT     #Colon Cancer  Low residue/fiber diet  Recent notes reviewed from Dr. Vazquez, needing pulmonary to evaluate patient for risk stratification prior to scheduling surgery     Chronic issues  # Arthritis  #Hypertension     Continue patient's medications as appropriate     #DVT prophylaxis  Lovenox subcutaneous  Consider SCDs pending   \  bilateral lower extremity duplex.     3/5: b/l cellulitis, red and warm, contineu abx, pain control will montior       3/6: contineu abx, pt/ot, gabapentin     3/7: uric acid is high, will start colchcine likely gout flare causing pain if not better consdier steroids    I spent 35 minutes in the professional and overall care of this patient.      Iglesia Gabriel, DO

## 2025-03-07 NOTE — CARE PLAN
The patient's goals for the shift include  rest and comfort    The clinical goals for the shift include pt will remain safe and free of injury

## 2025-03-07 NOTE — CARE PLAN
The patient's goals for the shift include  safety    The clinical goals for the shift include pt will remain safe and free of injury    Over the shift, the patient did not make progress toward the following goals.  Recommendations to address these barriers include continue to monitor.

## 2025-03-08 ENCOUNTER — HOSPITAL ENCOUNTER (OUTPATIENT)
Dept: CARDIOLOGY | Facility: HOSPITAL | Age: OVER 89
Discharge: HOME | End: 2025-03-08
Payer: MEDICARE

## 2025-03-08 LAB
ATRIAL RATE: 0 BPM
PR INTERVAL: 73 MS
Q ONSET: 252 MS
QRS COUNT: 10 BEATS
QRS DURATION: 162 MS
QT INTERVAL: 477 MS
QTC CALCULATION(BAZETT): 500 MS
QTC FREDERICIA: 492 MS
R AXIS: -59 DEGREES
T AXIS: 114 DEGREES
T OFFSET: 491 MS
VENTRICULAR RATE: 66 BPM

## 2025-03-08 PROCEDURE — 2500000001 HC RX 250 WO HCPCS SELF ADMINISTERED DRUGS (ALT 637 FOR MEDICARE OP): Performed by: STUDENT IN AN ORGANIZED HEALTH CARE EDUCATION/TRAINING PROGRAM

## 2025-03-08 PROCEDURE — 93005 ELECTROCARDIOGRAM TRACING: CPT

## 2025-03-08 PROCEDURE — 2500000004 HC RX 250 GENERAL PHARMACY W/ HCPCS (ALT 636 FOR OP/ED): Performed by: NURSE PRACTITIONER

## 2025-03-08 PROCEDURE — 93010 ELECTROCARDIOGRAM REPORT: CPT | Performed by: INTERNAL MEDICINE

## 2025-03-08 PROCEDURE — 99232 SBSQ HOSP IP/OBS MODERATE 35: CPT | Performed by: INTERNAL MEDICINE

## 2025-03-08 PROCEDURE — 2500000001 HC RX 250 WO HCPCS SELF ADMINISTERED DRUGS (ALT 637 FOR MEDICARE OP): Performed by: NURSE PRACTITIONER

## 2025-03-08 PROCEDURE — 1100000001 HC PRIVATE ROOM DAILY

## 2025-03-08 PROCEDURE — 99232 SBSQ HOSP IP/OBS MODERATE 35: CPT | Performed by: STUDENT IN AN ORGANIZED HEALTH CARE EDUCATION/TRAINING PROGRAM

## 2025-03-08 RX ADMIN — CLOPIDOGREL BISULFATE 75 MG: 75 TABLET ORAL at 08:37

## 2025-03-08 RX ADMIN — GABAPENTIN 100 MG: 100 CAPSULE ORAL at 20:02

## 2025-03-08 RX ADMIN — POLYETHYLENE GLYCOL 3350 17 G: 17 POWDER, FOR SOLUTION ORAL at 08:36

## 2025-03-08 RX ADMIN — Medication 1 TABLET: at 08:37

## 2025-03-08 RX ADMIN — ENOXAPARIN SODIUM 40 MG: 40 INJECTION SUBCUTANEOUS at 16:29

## 2025-03-08 RX ADMIN — DOXYCYCLINE 100 MG: 100 INJECTION, POWDER, LYOPHILIZED, FOR SOLUTION INTRAVENOUS at 16:38

## 2025-03-08 RX ADMIN — GABAPENTIN 100 MG: 100 CAPSULE ORAL at 08:37

## 2025-03-08 RX ADMIN — METOPROLOL TARTRATE 25 MG: 25 TABLET, FILM COATED ORAL at 08:37

## 2025-03-08 RX ADMIN — ASPIRIN 81 MG CHEWABLE TABLET 81 MG: 81 TABLET CHEWABLE at 08:37

## 2025-03-08 RX ADMIN — DOXYCYCLINE 100 MG: 100 INJECTION, POWDER, LYOPHILIZED, FOR SOLUTION INTRAVENOUS at 04:33

## 2025-03-08 RX ADMIN — ALLOPURINOL 100 MG: 100 TABLET ORAL at 08:37

## 2025-03-08 RX ADMIN — POLYETHYLENE GLYCOL 3350 17 G: 17 POWDER, FOR SOLUTION ORAL at 20:02

## 2025-03-08 RX ADMIN — COLCHICINE 0.6 MG: 0.6 TABLET, FILM COATED ORAL at 08:37

## 2025-03-08 ASSESSMENT — COGNITIVE AND FUNCTIONAL STATUS - GENERAL
DAILY ACTIVITIY SCORE: 19
TURNING FROM BACK TO SIDE WHILE IN FLAT BAD: A LITTLE
CLIMB 3 TO 5 STEPS WITH RAILING: A LOT
DRESSING REGULAR LOWER BODY CLOTHING: A LITTLE
WALKING IN HOSPITAL ROOM: A LOT
CLIMB 3 TO 5 STEPS WITH RAILING: A LOT
HELP NEEDED FOR BATHING: A LITTLE
STANDING UP FROM CHAIR USING ARMS: A LOT
DRESSING REGULAR UPPER BODY CLOTHING: A LITTLE
STANDING UP FROM CHAIR USING ARMS: A LOT
HELP NEEDED FOR BATHING: A LITTLE
MOVING TO AND FROM BED TO CHAIR: A LOT
DRESSING REGULAR LOWER BODY CLOTHING: A LITTLE
MOVING TO AND FROM BED TO CHAIR: A LOT
TURNING FROM BACK TO SIDE WHILE IN FLAT BAD: A LITTLE
MOBILITY SCORE: 15
TOILETING: A LOT
MOBILITY SCORE: 15
WALKING IN HOSPITAL ROOM: A LOT
DRESSING REGULAR UPPER BODY CLOTHING: A LITTLE
DAILY ACTIVITIY SCORE: 19
TOILETING: A LOT

## 2025-03-08 ASSESSMENT — PAIN SCALES - GENERAL
PAINLEVEL_OUTOF10: 0 - NO PAIN
PAINLEVEL_OUTOF10: 0 - NO PAIN

## 2025-03-08 NOTE — PROGRESS NOTES
"Yesenia Milner is a 89 y.o. female on day 3 of admission presenting with Acute exacerbation of chronic heart failure.            HPI:    The patient is still feeling short of breath.  She is still having discomfort in her legs secondary to cellulitis.      Past medical history:     HFpEF (ejection fraction 60 to 65%)  Sick sinus syndrome with history of Medtronic pacemaker insertion  Hypertension  History of a stroke treated with thrombolytics in August 2023  COPD  Adenocarcinoma of the colon     Social history: Former smoker     Family history: Negative for premature CAD  Physical Exam:    General Appearance:  Alert, oriented, no distress  Skin:  Warm and dry  Head and Neck:  No elevation of JVP, no carotid bruits  Cardiac Exam:  Rhythm is regular, S1 and S2 are normal, no murmur S3 or S4  Lungs: Scattered rales bilaterally  Extremities: 1+ edema with mild erythema  Neurologic:  No focal deficits  Psychiatric:  Appropriate mood and behavior     Last Recorded Vitals  Blood pressure 127/61, pulse 68, temperature 35 °C (95 °F), temperature source Temporal, resp. rate 16, height 1.626 m (5' 4\"), weight 72.1 kg (158 lb 15.2 oz), SpO2 99%.  Intake/Output last 3 Shifts:  I/O last 3 completed shifts:  In: - (0 mL/kg)   Out: 1892 (26.2 mL/kg) [Urine:1892 (0.7 mL/kg/hr)]  Weight: 72.1 kg     Medications:  Scheduled medications  allopurinol, 100 mg, oral, Daily  aspirin, 81 mg, oral, Daily  calcium carbonate-vitamin D3, 1 tablet, oral, Daily  clopidogrel, 75 mg, oral, Daily  colchicine, 0.6 mg, oral, Daily  doxycycline, 100 mg, intravenous, q12h  enoxaparin, 40 mg, subcutaneous, q24h  fluticasone furoate-vilanteroL, 1 puff, inhalation, Daily  gabapentin, 100 mg, oral, BID  metoprolol tartrate, 25 mg, oral, Daily  polyethylene glycol, 17 g, oral, BID        Labs:    CMP:  Recent Labs     03/07/25  0833 03/05/25  0417 03/04/25  1309 03/04/25  1212 02/10/25  0649 02/09/25  0708 02/08/25  0745 02/06/25  0536 02/05/25  0635 " 02/03/25  0644 02/02/25  1704 01/13/25  0626 01/12/25  1122 01/03/25  0344 01/02/25  1227 12/21/24  0538 12/20/24  0204 08/23/23  0523 08/22/23  0539   * 135*  --  133* 137 137 135* 135* 136   < > 133*   < > 134*   < > 135*   < > 135*   < > 139   K 3.8 3.3*  --  3.6 4.3 4.7 4.2 3.9 4.0   < > 4.0   < > 3.9   < > 4.3   < > 4.2   < > 4.8   CL 96* 99  --  98 101 100 98 101 100   < > 100   < > 101   < > 105   < > 104   < > 107   CO2 28 28  --  26 29 31 29 28 28   < > 24   < > 23   < > 22   < > 20*   < > 27   ANIONGAP 11 11  --  13 11 11 12 10 12   < > 13   < > 14   < > 12   < > 15   < > 10   BUN 22 27*  --  22 32* 36* 31* 24* 24*   < > 28*   < > 24*   < > 12   < > 32*   < > 19   CREATININE 0.87 1.19*  --  0.95 0.84 0.92 0.91 0.96 0.97   < > 0.97   < > 0.92   < > 0.88   < > 1.19*   < > 1.05   EGFR 63 43*  --  57* 66 59* 60* 56* 55*   < > 55*   < > 59*   < > 62   < > 43*   < >  --    MG  --   --  2.23  --   --   --   --   --   --   --  2.25  --  2.21  --  2.05  --  2.19  --  2.31    < > = values in this interval not displayed.     Recent Labs     03/04/25  1212 01/20/25  0709 01/19/25  0641 01/18/25  0740 01/13/25  0626 01/12/25  1122   ALBUMIN 3.8 3.2* 3.2* 2.9* 3.6 3.9   ALKPHOS 88 81 84 76 78 83   ALT 14 16 17 16 16 15   AST 15 16 17 15 16 17   BILITOT 0.6 0.3 0.3 0.4 0.5 0.5   LIPASE  --   --   --   --   --  23     CBC:  Recent Labs     03/07/25  0833 03/05/25  0417 03/04/25  1212 02/10/25  0649 02/09/25  0708 02/08/25  0745 02/06/25  0536 02/05/25  0635   WBC 4.4 5.5 6.0 5.5 8.3 6.7 4.2* 4.4   HGB 7.6* 7.3* 8.1* 7.9* 8.1* 8.2* 8.2* 8.1*   HCT 24.4* 23.2* 26.0* 25.7* 26.0* 25.8* 26.2* 25.9*    251 271 253 272 289 296 286   MCV 85 85 87 88 88 87 89 90     COAG:   Recent Labs     01/16/25  0709 01/12/25  1122 01/05/25  1412 01/05/25  1028 01/04/25  0612 01/03/25  1539 01/03/25  0900 01/03/25  0344 01/02/25  2334 12/21/24  0538 12/20/24  1013 12/20/24  0251 08/21/23  0056 07/13/23  1335 01/08/23  1412  12/14/20  0731   INR 1.5* 1.7*  --   --   --   --   --   --   --  1.1  --  1.1 1.1 1.1 1.2* 1.1   HAUF  --   --  1.8* 0.4 0.4 0.5 0.7 0.9 1.0  --  1.1*  --   --   --   --   --      ABO:   Recent Labs     01/12/25  1205   ABO A     HEME/ENDO:  Recent Labs     03/04/25  1309 01/13/25  0626 12/20/24  0204 01/24/24  1049 08/21/23  0056 05/18/23  1346 01/17/23  0943 04/11/19  0930   FERRITIN  --  28  --   --   --  72  --   --    IRONSAT  --  5*  --   --   --  28  --  30   TSH 2.96  --   --   --   --   --  2.25  --    HGBA1C  --   --  5.5 5.4 5.9*  --   --   --       CARDIAC:   Recent Labs     03/04/25  1309 03/04/25  1212 02/03/25  0644 02/02/25  1916 02/02/25  1704 01/02/25  1420 01/02/25  1227 12/20/24  0439 12/20/24  0251 01/08/23  1412 03/30/22  1611 04/15/20  1403 04/19/19  2035   TROPHS 19* 19* 17* 17* 15* 38* 36* 8,220* 1,426*   < >  --   --   --    BNP  --  535*  --   --  462*  --  485*  --  379*  --  212* 138* 112*    < > = values in this interval not displayed.     Recent Labs     12/20/24  0204 01/24/24  1049 08/21/23  0056 01/17/23  0943 03/16/21  1015   CHOL 145 152 162 155 180   LDLF  --   --  81 75 106*   HDL 63.1 65.4 64.3 65.5 57.8   TRIG 70 66 85 72 82     MICRO:   Recent Labs     01/17/23  0943   CRP 1.76*     No results found for the last 90 days.      Test Results:    Echocardiogram December 21, 2023: Ejection fraction 60 to 65%, moderate mitral valve regurgitation, moderate to severe tricuspid regurgitation  Telemetry: Atrial paced rhythm    Assessment/Plan:    1.  Acute on chronic diastolic CHF: The patient still has signs of volume overload.  Will give IV Lasix today.    2.  Sick sinus syndrome: Normal pacemaker function on telemetry.    James C Ramicone, DO

## 2025-03-08 NOTE — CARE PLAN
The patient's goals for the shift include      The clinical goals for the shift include patient will remain injury free    Over the shift, the patient did not make progress toward the following goals. Barriers to progression include calling for assistance. Recommendations to address these barriers include frequent rounds.

## 2025-03-08 NOTE — PROGRESS NOTES
"Yesenia Milner is a 89 y.o. female on day 3 of admission presenting with Acute exacerbation of chronic heart failure.    Subjective   Labs better, swelling redness better as well, still painful to walk       Objective     Physical Exam  Constitutional:       Appearance: Normal appearance.   HENT:      Head: Normocephalic and atraumatic.      Right Ear: Tympanic membrane and ear canal normal.      Left Ear: Tympanic membrane and ear canal normal.      Mouth/Throat:      Mouth: Mucous membranes are moist.      Pharynx: Oropharynx is clear.   Eyes:      Extraocular Movements: Extraocular movements intact.      Conjunctiva/sclera: Conjunctivae normal.      Pupils: Pupils are equal, round, and reactive to light.   Cardiovascular:      Rate and Rhythm: Normal rate and regular rhythm.      Pulses: Normal pulses.      Heart sounds: Normal heart sounds.   Pulmonary:      Effort: Pulmonary effort is normal.      Breath sounds: Normal breath sounds.   Abdominal:      General: Abdomen is flat. Bowel sounds are normal.      Palpations: Abdomen is soft.   Musculoskeletal:         General: Normal range of motion.      Cervical back: Normal range of motion and neck supple.   Skin:     General: Skin is warm and dry.      Capillary Refill: Capillary refill takes 2 to 3 seconds.   Neurological:      General: No focal deficit present.      Mental Status: She is alert and oriented to person, place, and time. Mental status is at baseline.   Psychiatric:         Mood and Affect: Mood normal.         Behavior: Behavior normal.         Thought Content: Thought content normal.         Judgment: Judgment normal.         Last Recorded Vitals  Blood pressure 133/61, pulse 61, temperature 35.9 °C (96.6 °F), temperature source Temporal, resp. rate 18, height 1.626 m (5' 4\"), weight 72.1 kg (158 lb 15.2 oz), SpO2 98%.  Intake/Output last 3 Shifts:  I/O last 3 completed shifts:  In: - (0 mL/kg)   Out: 1892 (26.2 mL/kg) [Urine:1892 (0.7 " mL/kg/hr)]  Weight: 72.1 kg     Relevant Results                              Assessment/Plan   Assessment & Plan  Acute exacerbation of chronic heart failure    Hypoxia    Pleural effusion    Cellulitis of lower extremity    Adenocarcinoma of colon (Multi)    Bilateral leg edema    89-year-old female with past medical history of HFpEF (LVEF 60-65% 12/2024), MI, SSS s/p Medtronic dual-chamber pacemaker 2012 with generator change on pacemaker 7/13/2023, HTN, COPD, CVA with TNK 8/21/23, arthritis, venous insufficiency, and  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  Patient has had multiple recent admissions for congestive heart failure exacerbation.  Patient presents with bilateral lower extremity swelling with erythema and warmth over the past several days.  Patient hospitalized for cellulitis and acute on chronic CHF exacerbation, and generalized weakness.     #Acute on chronic diastolic congestive heart failure  #Pleural effusions  #Hypoxia  #History COPD  #History sick sinus syndrome s/p dual-chamber pacemaker     Telemetry monitoring  Full code  Consult pulmonology and cardiology, appreciate input.  Risk stratification needed from pulmonary.  Daily weights  Patient was given torsemide 40 mg in the ER, will defer to cardiology ongoing diuresis.  Supplemental oxygen as needed  Nebulizers as needed  Low suspicion for pneumonia, suspect findings on CXR likely due to fluid overload.  Pulse ox, continuous   Recent echocardiogram done in December     #Ambulatory dysfunction  #Cellulitis, bilateral lower extremities ?  Contact dermatitis  #Venous insufficiency  Ultrasound bilateral lower extremities to rule out DVT  - at increased risk due to cancer  Will put patient on doxycycline 100 mg twice daily.  Cellulitis possibly due to contact dermatitis,  however patient denies any recent change in detergents or lotions  Continue to monitor  PT/OT     #Colon Cancer  Low residue/fiber diet  Recent notes reviewed from Dr. Vazquez, needing pulmonary to evaluate patient for risk stratification prior to scheduling surgery     Chronic issues  # Arthritis  #Hypertension     Continue patient's medications as appropriate     #DVT prophylaxis  Lovenox subcutaneous  Consider SCDs pending   \  bilateral lower extremity duplex.     3/5: b/l cellulitis, red and warm, contineu abx, pain control will montior       3/6: contineu abx, pt/ot, gabapentin     3/7: uric acid is high, will start colchcine likely gout flare causing pain if not better consdier steroids    3/8: treat for gout, still in pain in toes and knees    I spent 35 minutes in the professional and overall care of this patient.      Iglesia Gabriel, DO

## 2025-03-08 NOTE — CARE PLAN
The patient's goals for the shift include      The clinical goals for the shift include pt will have a decrease in BLE edema    Over the shift, the patient did not make progress toward the following goals. Barriers to progression include pt still has BLE edema. Recommendations to address these barriers include BLE elevated.

## 2025-03-09 ENCOUNTER — APPOINTMENT (OUTPATIENT)
Dept: RADIOLOGY | Facility: HOSPITAL | Age: OVER 89
End: 2025-03-09
Payer: MEDICARE

## 2025-03-09 VITALS
OXYGEN SATURATION: 97 % | SYSTOLIC BLOOD PRESSURE: 121 MMHG | RESPIRATION RATE: 18 BRPM | HEART RATE: 65 BPM | BODY MASS INDEX: 27.14 KG/M2 | TEMPERATURE: 97.2 F | DIASTOLIC BLOOD PRESSURE: 60 MMHG | WEIGHT: 158.95 LBS | HEIGHT: 64 IN

## 2025-03-09 PROCEDURE — 73630 X-RAY EXAM OF FOOT: CPT | Mod: 50

## 2025-03-09 PROCEDURE — 2500000004 HC RX 250 GENERAL PHARMACY W/ HCPCS (ALT 636 FOR OP/ED): Performed by: NURSE PRACTITIONER

## 2025-03-09 PROCEDURE — 2500000001 HC RX 250 WO HCPCS SELF ADMINISTERED DRUGS (ALT 637 FOR MEDICARE OP): Performed by: STUDENT IN AN ORGANIZED HEALTH CARE EDUCATION/TRAINING PROGRAM

## 2025-03-09 PROCEDURE — 1100000001 HC PRIVATE ROOM DAILY

## 2025-03-09 PROCEDURE — 99232 SBSQ HOSP IP/OBS MODERATE 35: CPT | Performed by: STUDENT IN AN ORGANIZED HEALTH CARE EDUCATION/TRAINING PROGRAM

## 2025-03-09 PROCEDURE — 2500000001 HC RX 250 WO HCPCS SELF ADMINISTERED DRUGS (ALT 637 FOR MEDICARE OP): Performed by: NURSE PRACTITIONER

## 2025-03-09 PROCEDURE — 73630 X-RAY EXAM OF FOOT: CPT | Mod: BILATERAL PROCEDURE | Performed by: RADIOLOGY

## 2025-03-09 PROCEDURE — 99232 SBSQ HOSP IP/OBS MODERATE 35: CPT | Performed by: INTERNAL MEDICINE

## 2025-03-09 RX ORDER — GABAPENTIN 300 MG/1
300 CAPSULE ORAL 2 TIMES DAILY
Status: DISPENSED | OUTPATIENT
Start: 2025-03-09

## 2025-03-09 RX ORDER — DOXYCYCLINE HYCLATE 100 MG
100 TABLET ORAL EVERY 12 HOURS SCHEDULED
Status: DISPENSED | OUTPATIENT
Start: 2025-03-09

## 2025-03-09 RX ADMIN — Medication 1 TABLET: at 08:20

## 2025-03-09 RX ADMIN — ENOXAPARIN SODIUM 40 MG: 40 INJECTION SUBCUTANEOUS at 16:17

## 2025-03-09 RX ADMIN — METOPROLOL TARTRATE 25 MG: 25 TABLET, FILM COATED ORAL at 08:20

## 2025-03-09 RX ADMIN — POLYETHYLENE GLYCOL 3350 17 G: 17 POWDER, FOR SOLUTION ORAL at 20:30

## 2025-03-09 RX ADMIN — COLCHICINE 0.6 MG: 0.6 TABLET, FILM COATED ORAL at 08:20

## 2025-03-09 RX ADMIN — CLOPIDOGREL BISULFATE 75 MG: 75 TABLET ORAL at 08:20

## 2025-03-09 RX ADMIN — DOXYCYCLINE HYCLATE 100 MG: 100 TABLET, COATED ORAL at 16:17

## 2025-03-09 RX ADMIN — DOXYCYCLINE 100 MG: 100 INJECTION, POWDER, LYOPHILIZED, FOR SOLUTION INTRAVENOUS at 03:51

## 2025-03-09 RX ADMIN — ASPIRIN 81 MG CHEWABLE TABLET 81 MG: 81 TABLET CHEWABLE at 08:20

## 2025-03-09 RX ADMIN — POLYETHYLENE GLYCOL 3350 17 G: 17 POWDER, FOR SOLUTION ORAL at 08:19

## 2025-03-09 RX ADMIN — ALLOPURINOL 100 MG: 100 TABLET ORAL at 08:20

## 2025-03-09 RX ADMIN — GABAPENTIN 300 MG: 300 CAPSULE ORAL at 20:30

## 2025-03-09 RX ADMIN — GABAPENTIN 100 MG: 100 CAPSULE ORAL at 08:20

## 2025-03-09 ASSESSMENT — COGNITIVE AND FUNCTIONAL STATUS - GENERAL
MOVING TO AND FROM BED TO CHAIR: A LOT
DRESSING REGULAR UPPER BODY CLOTHING: A LITTLE
DRESSING REGULAR LOWER BODY CLOTHING: A LITTLE
HELP NEEDED FOR BATHING: A LITTLE
MOBILITY SCORE: 15
TOILETING: A LOT
DAILY ACTIVITIY SCORE: 19
STANDING UP FROM CHAIR USING ARMS: A LOT
WALKING IN HOSPITAL ROOM: A LOT
TURNING FROM BACK TO SIDE WHILE IN FLAT BAD: A LITTLE
CLIMB 3 TO 5 STEPS WITH RAILING: A LOT

## 2025-03-09 ASSESSMENT — PAIN SCALES - GENERAL
PAINLEVEL_OUTOF10: 0 - NO PAIN
PAINLEVEL_OUTOF10: 0 - NO PAIN

## 2025-03-09 NOTE — PROGRESS NOTES
"Yesenia Milner is a 89 y.o. female on day 4 of admission presenting with Acute exacerbation of chronic heart failure.    Subjective   Switch to [facility]       Objective     Physical Exam  Constitutional:       Appearance: Normal appearance.   HENT:      Head: Normocephalic and atraumatic.      Right Ear: Tympanic membrane and ear canal normal.      Left Ear: Tympanic membrane and ear canal normal.      Mouth/Throat:      Mouth: Mucous membranes are moist.      Pharynx: Oropharynx is clear.   Eyes:      Extraocular Movements: Extraocular movements intact.      Conjunctiva/sclera: Conjunctivae normal.      Pupils: Pupils are equal, round, and reactive to light.   Cardiovascular:      Rate and Rhythm: Normal rate and regular rhythm.      Pulses: Normal pulses.      Heart sounds: Normal heart sounds.   Pulmonary:      Effort: Pulmonary effort is normal.      Breath sounds: Normal breath sounds.   Abdominal:      General: Abdomen is flat. Bowel sounds are normal.      Palpations: Abdomen is soft.   Musculoskeletal:         General: Normal range of motion.      Cervical back: Normal range of motion and neck supple.   Skin:     General: Skin is warm and dry.      Capillary Refill: Capillary refill takes 2 to 3 seconds.   Neurological:      General: No focal deficit present.      Mental Status: She is alert and oriented to person, place, and time. Mental status is at baseline.   Psychiatric:         Mood and Affect: Mood normal.         Behavior: Behavior normal.         Thought Content: Thought content normal.         Judgment: Judgment normal.         Last Recorded Vitals  Blood pressure 113/53, pulse 65, temperature 36.1 °C (97 °F), resp. rate 18, height 1.626 m (5' 4\"), weight 72.1 kg (158 lb 15.2 oz), SpO2 97%.  Intake/Output last 3 Shifts:  I/O last 3 completed shifts:  In: - (0 mL/kg)   Out: 400 (5.5 mL/kg) [Urine:400 (0.2 mL/kg/hr)]  Weight: 72.1 kg     Relevant Results                        "       Assessment/Plan   Assessment & Plan  Acute exacerbation of chronic heart failure    Hypoxia    Pleural effusion    Cellulitis of lower extremity    Adenocarcinoma of colon (Multi)    Bilateral leg edema    89-year-old female with past medical history of HFpEF (LVEF 60-65% 12/2024), MI, SSS s/p Medtronic dual-chamber pacemaker 2012 with generator change on pacemaker 7/13/2023, HTN, COPD, CVA with TNK 8/21/23, arthritis, venous insufficiency, and  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  Patient has had multiple recent admissions for congestive heart failure exacerbation.  Patient presents with bilateral lower extremity swelling with erythema and warmth over the past several days.  Patient hospitalized for cellulitis and acute on chronic CHF exacerbation, and generalized weakness.     #Acute on chronic diastolic congestive heart failure  #Pleural effusions  #Hypoxia  #History COPD  #History sick sinus syndrome s/p dual-chamber pacemaker     Telemetry monitoring  Full code  Consult pulmonology and cardiology, appreciate input.  Risk stratification needed from pulmonary.  Daily weights  Patient was given torsemide 40 mg in the ER, will defer to cardiology ongoing diuresis.  Supplemental oxygen as needed  Nebulizers as needed  Low suspicion for pneumonia, suspect findings on CXR likely due to fluid overload.  Pulse ox, continuous   Recent echocardiogram done in December     #Ambulatory dysfunction  #Cellulitis, bilateral lower extremities ?  Contact dermatitis  #Venous insufficiency  Ultrasound bilateral lower extremities to rule out DVT  - at increased risk due to cancer  Will put patient on doxycycline 100 mg twice daily.  Cellulitis possibly due to contact dermatitis, however patient denies any recent change in detergents or lotions  Continue  to monitor  PT/OT     #Colon Cancer  Low residue/fiber diet  Recent notes reviewed from Dr. Vazquez, needing pulmonary to evaluate patient for risk stratification prior to scheduling surgery     Chronic issues  # Arthritis  #Hypertension     Continue patient's medications as appropriate     #DVT prophylaxis  Lovenox subcutaneous  Consider SCDs pending   \  bilateral lower extremity duplex.     3/5: b/l cellulitis, red and warm, contineu abx, pain control will montior       3/6: contineu abx, pt/ot, gabapentin     3/7: uric acid is high, will start colchcine likely gout flare causing pain if not better consdier steroids    3/8: treat for gout, still in pain in toes and knees    3/9 switch to oral. Up gabapentin      I spent 35 minutes in the professional and overall care of this patient.      Iglesia Gabriel, DO

## 2025-03-09 NOTE — PROGRESS NOTES
"Yesenia Milner is a 89 y.o. female on day 4 of admission presenting with Acute exacerbation of chronic heart failure.            HPI:    The patient is still feeling short of breath.  She is still having discomfort in her legs secondary to cellulitis.      Past medical history:     HFpEF (ejection fraction 60 to 65%)  Sick sinus syndrome with history of Medtronic pacemaker insertion  Hypertension  History of a stroke treated with thrombolytics in August 2023  COPD  Adenocarcinoma of the colon     Social history: Former smoker     Family history: Negative for premature CAD  Physical Exam:    General Appearance:  Alert, oriented, no distress  Skin:  Warm and dry  Head and Neck:  No elevation of JVP, no carotid bruits  Cardiac Exam:  Rhythm is regular, S1 and S2 are normal, no murmur S3 or S4  Lungs: Scattered rales bilaterally  Extremities: 1+ edema with mild erythema  Neurologic:  No focal deficits  Psychiatric:  Appropriate mood and behavior     Last Recorded Vitals  Blood pressure 113/53, pulse 65, temperature 36.1 °C (97 °F), resp. rate 18, height 1.626 m (5' 4\"), weight 72.1 kg (158 lb 15.2 oz), SpO2 97%.  Intake/Output last 3 Shifts:  I/O last 3 completed shifts:  In: - (0 mL/kg)   Out: 400 (5.5 mL/kg) [Urine:400 (0.2 mL/kg/hr)]  Weight: 72.1 kg     Medications:  Scheduled medications  allopurinol, 100 mg, oral, Daily  aspirin, 81 mg, oral, Daily  calcium carbonate-vitamin D3, 1 tablet, oral, Daily  clopidogrel, 75 mg, oral, Daily  colchicine, 0.6 mg, oral, Daily  doxycylcine, 100 mg, oral, q12h QUOC  enoxaparin, 40 mg, subcutaneous, q24h  fluticasone furoate-vilanteroL, 1 puff, inhalation, Daily  gabapentin, 300 mg, oral, BID  metoprolol tartrate, 25 mg, oral, Daily  polyethylene glycol, 17 g, oral, BID        Labs:    CMP:  Recent Labs     03/07/25  0833 03/05/25  0417 03/04/25  1309 03/04/25  1212 02/10/25  0649 02/09/25  0708 02/08/25  0745 02/06/25  0536 02/05/25  0635 02/03/25  0644 02/02/25  1704 " 01/13/25  0626 01/12/25  1122 01/03/25  0344 01/02/25  1227 12/21/24  0538 12/20/24  0204 08/23/23  0523 08/22/23  0539   * 135*  --  133* 137 137 135* 135* 136   < > 133*   < > 134*   < > 135*   < > 135*   < > 139   K 3.8 3.3*  --  3.6 4.3 4.7 4.2 3.9 4.0   < > 4.0   < > 3.9   < > 4.3   < > 4.2   < > 4.8   CL 96* 99  --  98 101 100 98 101 100   < > 100   < > 101   < > 105   < > 104   < > 107   CO2 28 28  --  26 29 31 29 28 28   < > 24   < > 23   < > 22   < > 20*   < > 27   ANIONGAP 11 11  --  13 11 11 12 10 12   < > 13   < > 14   < > 12   < > 15   < > 10   BUN 22 27*  --  22 32* 36* 31* 24* 24*   < > 28*   < > 24*   < > 12   < > 32*   < > 19   CREATININE 0.87 1.19*  --  0.95 0.84 0.92 0.91 0.96 0.97   < > 0.97   < > 0.92   < > 0.88   < > 1.19*   < > 1.05   EGFR 63 43*  --  57* 66 59* 60* 56* 55*   < > 55*   < > 59*   < > 62   < > 43*   < >  --    MG  --   --  2.23  --   --   --   --   --   --   --  2.25  --  2.21  --  2.05  --  2.19  --  2.31    < > = values in this interval not displayed.     Recent Labs     03/04/25  1212 01/20/25  0709 01/19/25  0641 01/18/25  0740 01/13/25  0626 01/12/25  1122   ALBUMIN 3.8 3.2* 3.2* 2.9* 3.6 3.9   ALKPHOS 88 81 84 76 78 83   ALT 14 16 17 16 16 15   AST 15 16 17 15 16 17   BILITOT 0.6 0.3 0.3 0.4 0.5 0.5   LIPASE  --   --   --   --   --  23     CBC:  Recent Labs     03/07/25  0833 03/05/25  0417 03/04/25  1212 02/10/25  0649 02/09/25  0708 02/08/25  0745 02/06/25  0536 02/05/25  0635   WBC 4.4 5.5 6.0 5.5 8.3 6.7 4.2* 4.4   HGB 7.6* 7.3* 8.1* 7.9* 8.1* 8.2* 8.2* 8.1*   HCT 24.4* 23.2* 26.0* 25.7* 26.0* 25.8* 26.2* 25.9*    251 271 253 272 289 296 286   MCV 85 85 87 88 88 87 89 90     COAG:   Recent Labs     01/16/25  0709 01/12/25  1122 01/05/25  1412 01/05/25  1028 01/04/25  0612 01/03/25  1539 01/03/25  0900 01/03/25  0344 01/02/25  2334 12/21/24  0538 12/20/24  1013 12/20/24  0251 08/21/23  0056 07/13/23  1335 01/08/23  1412 12/14/20  0731   INR 1.5* 1.7*  --    --   --   --   --   --   --  1.1  --  1.1 1.1 1.1 1.2* 1.1   HAUF  --   --  1.8* 0.4 0.4 0.5 0.7 0.9 1.0  --  1.1*  --   --   --   --   --      ABO:   Recent Labs     01/12/25  1205   ABO A     HEME/ENDO:  Recent Labs     03/04/25  1309 01/13/25  0626 12/20/24  0204 01/24/24  1049 08/21/23  0056 05/18/23  1346 01/17/23  0943 04/11/19  0930   FERRITIN  --  28  --   --   --  72  --   --    IRONSAT  --  5*  --   --   --  28  --  30   TSH 2.96  --   --   --   --   --  2.25  --    HGBA1C  --   --  5.5 5.4 5.9*  --   --   --       CARDIAC:   Recent Labs     03/04/25  1309 03/04/25  1212 02/03/25  0644 02/02/25  1916 02/02/25  1704 01/02/25  1420 01/02/25  1227 12/20/24  0439 12/20/24  0251 01/08/23  1412 03/30/22  1611 04/15/20  1403 04/19/19  2035   TROPHS 19* 19* 17* 17* 15* 38* 36* 8,220* 1,426*   < >  --   --   --    BNP  --  535*  --   --  462*  --  485*  --  379*  --  212* 138* 112*    < > = values in this interval not displayed.     Recent Labs     12/20/24  0204 01/24/24  1049 08/21/23  0056 01/17/23  0943 03/16/21  1015   CHOL 145 152 162 155 180   LDLF  --   --  81 75 106*   HDL 63.1 65.4 64.3 65.5 57.8   TRIG 70 66 85 72 82     MICRO:   Recent Labs     01/17/23  0943   CRP 1.76*     No results found for the last 90 days.      Test Results:    Echocardiogram December 21, 2023: Ejection fraction 60 to 65%, moderate mitral valve regurgitation, moderate to severe tricuspid regurgitation  Telemetry: Atrial paced rhythm    Assessment/Plan:    1.  Acute on chronic diastolic CHF: The patient still has signs of volume overload.  Will give IV Lasix again again today.    2.  Sick sinus syndrome: Normal pacemaker function on telemetry.    James C Ramicone, DO

## 2025-03-10 ENCOUNTER — HOME CARE VISIT (OUTPATIENT)
Dept: HOME HEALTH SERVICES | Facility: HOME HEALTH | Age: OVER 89
End: 2025-03-10
Payer: MEDICARE

## 2025-03-10 LAB
ANION GAP SERPL CALC-SCNC: 7 MMOL/L (ref 10–20)
BUN SERPL-MCNC: 19 MG/DL (ref 6–23)
CALCIUM SERPL-MCNC: 8.7 MG/DL (ref 8.6–10.3)
CHLORIDE SERPL-SCNC: 95 MMOL/L (ref 98–107)
CO2 SERPL-SCNC: 31 MMOL/L (ref 21–32)
CREAT SERPL-MCNC: 0.71 MG/DL (ref 0.5–1.05)
EGFRCR SERPLBLD CKD-EPI 2021: 80 ML/MIN/1.73M*2
ERYTHROCYTE [DISTWIDTH] IN BLOOD BY AUTOMATED COUNT: 15.8 % (ref 11.5–14.5)
GLUCOSE SERPL-MCNC: 125 MG/DL (ref 74–99)
HCT VFR BLD AUTO: 24.8 % (ref 36–46)
HGB BLD-MCNC: 7.5 G/DL (ref 12–16)
MCH RBC QN AUTO: 25.9 PG (ref 26–34)
MCHC RBC AUTO-ENTMCNC: 30.2 G/DL (ref 32–36)
MCV RBC AUTO: 86 FL (ref 80–100)
NRBC BLD-RTO: 0 /100 WBCS (ref 0–0)
PLATELET # BLD AUTO: 332 X10*3/UL (ref 150–450)
POTASSIUM SERPL-SCNC: 4.1 MMOL/L (ref 3.5–5.3)
RBC # BLD AUTO: 2.9 X10*6/UL (ref 4–5.2)
SODIUM SERPL-SCNC: 129 MMOL/L (ref 136–145)
WBC # BLD AUTO: 4.5 X10*3/UL (ref 4.4–11.3)

## 2025-03-10 PROCEDURE — 99232 SBSQ HOSP IP/OBS MODERATE 35: CPT | Performed by: STUDENT IN AN ORGANIZED HEALTH CARE EDUCATION/TRAINING PROGRAM

## 2025-03-10 PROCEDURE — 36415 COLL VENOUS BLD VENIPUNCTURE: CPT | Performed by: STUDENT IN AN ORGANIZED HEALTH CARE EDUCATION/TRAINING PROGRAM

## 2025-03-10 PROCEDURE — 85027 COMPLETE CBC AUTOMATED: CPT | Performed by: STUDENT IN AN ORGANIZED HEALTH CARE EDUCATION/TRAINING PROGRAM

## 2025-03-10 PROCEDURE — 2500000001 HC RX 250 WO HCPCS SELF ADMINISTERED DRUGS (ALT 637 FOR MEDICARE OP): Performed by: STUDENT IN AN ORGANIZED HEALTH CARE EDUCATION/TRAINING PROGRAM

## 2025-03-10 PROCEDURE — 2500000004 HC RX 250 GENERAL PHARMACY W/ HCPCS (ALT 636 FOR OP/ED): Performed by: STUDENT IN AN ORGANIZED HEALTH CARE EDUCATION/TRAINING PROGRAM

## 2025-03-10 PROCEDURE — 2500000001 HC RX 250 WO HCPCS SELF ADMINISTERED DRUGS (ALT 637 FOR MEDICARE OP): Performed by: NURSE PRACTITIONER

## 2025-03-10 PROCEDURE — 82374 ASSAY BLOOD CARBON DIOXIDE: CPT | Performed by: STUDENT IN AN ORGANIZED HEALTH CARE EDUCATION/TRAINING PROGRAM

## 2025-03-10 PROCEDURE — 1100000001 HC PRIVATE ROOM DAILY

## 2025-03-10 PROCEDURE — 2500000004 HC RX 250 GENERAL PHARMACY W/ HCPCS (ALT 636 FOR OP/ED): Performed by: NURSE PRACTITIONER

## 2025-03-10 RX ORDER — METHYLPREDNISOLONE 4 MG/1
12 TABLET ORAL ONCE
Status: DISCONTINUED | OUTPATIENT
Start: 2025-03-13 | End: 2025-03-12 | Stop reason: HOSPADM

## 2025-03-10 RX ORDER — METHYLPREDNISOLONE 4 MG/1
16 TABLET ORAL ONCE
Status: DISCONTINUED | OUTPATIENT
Start: 2025-03-12 | End: 2025-03-12 | Stop reason: HOSPADM

## 2025-03-10 RX ORDER — METHYLPREDNISOLONE 4 MG/1
24 TABLET ORAL ONCE
Status: COMPLETED | OUTPATIENT
Start: 2025-03-10 | End: 2025-03-10

## 2025-03-10 RX ORDER — METHYLPREDNISOLONE 4 MG/1
8 TABLET ORAL ONCE
Status: DISCONTINUED | OUTPATIENT
Start: 2025-03-14 | End: 2025-03-12 | Stop reason: HOSPADM

## 2025-03-10 RX ORDER — METHYLPREDNISOLONE 4 MG/1
20 TABLET ORAL ONCE
Status: COMPLETED | OUTPATIENT
Start: 2025-03-11 | End: 2025-03-11

## 2025-03-10 RX ORDER — METHYLPREDNISOLONE 4 MG/1
4 TABLET ORAL ONCE
Status: DISCONTINUED | OUTPATIENT
Start: 2025-03-15 | End: 2025-03-12 | Stop reason: HOSPADM

## 2025-03-10 RX ADMIN — POLYETHYLENE GLYCOL 3350 17 G: 17 POWDER, FOR SOLUTION ORAL at 20:34

## 2025-03-10 RX ADMIN — DOXYCYCLINE HYCLATE 100 MG: 100 TABLET, COATED ORAL at 09:55

## 2025-03-10 RX ADMIN — COLCHICINE 0.6 MG: 0.6 TABLET, FILM COATED ORAL at 09:54

## 2025-03-10 RX ADMIN — DOXYCYCLINE HYCLATE 100 MG: 100 TABLET, COATED ORAL at 20:35

## 2025-03-10 RX ADMIN — METHYLPREDNISOLONE 24 MG: 4 TABLET ORAL at 11:22

## 2025-03-10 RX ADMIN — METOPROLOL TARTRATE 25 MG: 25 TABLET, FILM COATED ORAL at 09:55

## 2025-03-10 RX ADMIN — Medication 1 TABLET: at 09:55

## 2025-03-10 RX ADMIN — POLYETHYLENE GLYCOL 3350 17 G: 17 POWDER, FOR SOLUTION ORAL at 09:55

## 2025-03-10 RX ADMIN — GABAPENTIN 300 MG: 300 CAPSULE ORAL at 09:55

## 2025-03-10 RX ADMIN — ENOXAPARIN SODIUM 40 MG: 40 INJECTION SUBCUTANEOUS at 16:43

## 2025-03-10 RX ADMIN — CLOPIDOGREL BISULFATE 75 MG: 75 TABLET ORAL at 09:54

## 2025-03-10 RX ADMIN — ALLOPURINOL 100 MG: 100 TABLET ORAL at 09:55

## 2025-03-10 RX ADMIN — GABAPENTIN 300 MG: 300 CAPSULE ORAL at 20:35

## 2025-03-10 RX ADMIN — ASPIRIN 81 MG CHEWABLE TABLET 81 MG: 81 TABLET CHEWABLE at 09:55

## 2025-03-10 ASSESSMENT — COGNITIVE AND FUNCTIONAL STATUS - GENERAL
TURNING FROM BACK TO SIDE WHILE IN FLAT BAD: A LITTLE
HELP NEEDED FOR BATHING: A LITTLE
DAILY ACTIVITIY SCORE: 19
MOBILITY SCORE: 15
DRESSING REGULAR LOWER BODY CLOTHING: A LITTLE
CLIMB 3 TO 5 STEPS WITH RAILING: A LOT
MOVING TO AND FROM BED TO CHAIR: A LOT
TOILETING: A LOT
STANDING UP FROM CHAIR USING ARMS: A LOT
DRESSING REGULAR UPPER BODY CLOTHING: A LITTLE
WALKING IN HOSPITAL ROOM: A LOT

## 2025-03-10 ASSESSMENT — PAIN - FUNCTIONAL ASSESSMENT: PAIN_FUNCTIONAL_ASSESSMENT: 0-10

## 2025-03-10 ASSESSMENT — PAIN SCALES - GENERAL
PAINLEVEL_OUTOF10: 0 - NO PAIN
PAINLEVEL_OUTOF10: 0 - NO PAIN

## 2025-03-10 NOTE — PROGRESS NOTES
"Yesenia Milner is a 89 y.o. female on day 5 of admission presenting with Acute exacerbation of chronic heart failure.    Subjective   Switch to jennifer meds start sterodis       Objective     Physical Exam  Constitutional:       Appearance: Normal appearance.   HENT:      Head: Normocephalic and atraumatic.      Right Ear: Tympanic membrane and ear canal normal.      Left Ear: Tympanic membrane and ear canal normal.      Mouth/Throat:      Mouth: Mucous membranes are moist.      Pharynx: Oropharynx is clear.   Eyes:      Extraocular Movements: Extraocular movements intact.      Conjunctiva/sclera: Conjunctivae normal.      Pupils: Pupils are equal, round, and reactive to light.   Cardiovascular:      Rate and Rhythm: Normal rate and regular rhythm.      Pulses: Normal pulses.      Heart sounds: Normal heart sounds.   Pulmonary:      Effort: Pulmonary effort is normal.      Breath sounds: Normal breath sounds.   Abdominal:      General: Abdomen is flat. Bowel sounds are normal.      Palpations: Abdomen is soft.   Musculoskeletal:         General: Normal range of motion.      Cervical back: Normal range of motion and neck supple.   Skin:     General: Skin is warm and dry.      Capillary Refill: Capillary refill takes 2 to 3 seconds.   Neurological:      General: No focal deficit present.      Mental Status: She is alert and oriented to person, place, and time. Mental status is at baseline.   Psychiatric:         Mood and Affect: Mood normal.         Behavior: Behavior normal.         Thought Content: Thought content normal.         Judgment: Judgment normal.         Last Recorded Vitals  Blood pressure 134/60, pulse 71, temperature 36 °C (96.8 °F), temperature source Temporal, resp. rate 18, height 1.626 m (5' 4\"), weight 72 kg (158 lb 11.7 oz), SpO2 96%.  Intake/Output last 3 Shifts:  I/O last 3 completed shifts:  In: - (0 mL/kg)   Out: 550 (7.6 mL/kg) [Urine:550 (0.2 mL/kg/hr)]  Weight: 72 kg     Relevant " Results                              Assessment/Plan   Assessment & Plan  Acute exacerbation of chronic heart failure    Hypoxia    Pleural effusion    Cellulitis of lower extremity    Adenocarcinoma of colon (Multi)    Bilateral leg edema    89-year-old female with past medical history of HFpEF (LVEF 60-65% 12/2024), MI, SSS s/p Medtronic dual-chamber pacemaker 2012 with generator change on pacemaker 7/13/2023, HTN, COPD, CVA with TNK 8/21/23, arthritis, venous insufficiency, and  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  Patient has had multiple recent admissions for congestive heart failure exacerbation.  Patient presents with bilateral lower extremity swelling with erythema and warmth over the past several days.  Patient hospitalized for cellulitis and acute on chronic CHF exacerbation, and generalized weakness.     #Acute on chronic diastolic congestive heart failure  #Pleural effusions  #Hypoxia  #History COPD  #History sick sinus syndrome s/p dual-chamber pacemaker     Telemetry monitoring  Full code  Consult pulmonology and cardiology, appreciate input.  Risk stratification needed from pulmonary.  Daily weights  Patient was given torsemide 40 mg in the ER, will defer to cardiology ongoing diuresis.  Supplemental oxygen as needed  Nebulizers as needed  Low suspicion for pneumonia, suspect findings on CXR likely due to fluid overload.  Pulse ox, continuous   Recent echocardiogram done in December     #Ambulatory dysfunction  #Cellulitis, bilateral lower extremities ?  Contact dermatitis  #Venous insufficiency  Ultrasound bilateral lower extremities to rule out DVT  - at increased risk due to cancer  Will put patient on doxycycline 100 mg twice daily.  Cellulitis possibly due to contact dermatitis, however patient denies any recent change in  detergents or lotions  Continue to monitor  PT/OT     #Colon Cancer  Low residue/fiber diet  Recent notes reviewed from Dr. Vazquez, needing pulmonary to evaluate patient for risk stratification prior to scheduling surgery     Chronic issues  # Arthritis  #Hypertension     Continue patient's medications as appropriate     #DVT prophylaxis  Lovenox subcutaneous  Consider SCDs pending   \  bilateral lower extremity duplex.     3/5: b/l cellulitis, red and warm, contineu abx, pain control will montior       3/6: contineu abx, pt/ot, gabapentin     3/7: uric acid is high, will start colchcine likely gout flare causing pain if not better consdier steroids    3/8: treat for gout, still in pain in toes and knees    3/9 switch to oral. Up gabapentin    3/10: start medrl, pt/ot today      I spent 35 minutes in the professional and overall care of this patient.      Iglesia Gabriel, DO

## 2025-03-10 NOTE — PROGRESS NOTES
TCC Note: Met with pt and family at bedside to inform them that pt did get insurance authorization and can admit to Transfer whenever she gets a written discharge that she is medically stable. Mitali Pratt, MSN, RN, TCC.    ADDENDUM: Was informed by RN that Pt/Daughter now were wondering if they could go to [facility] in [facility], as that is closed for the other daughter but they did not see that facility on the list. Told them that I would check. Reviewed the list in [facility] and did not see, so I called the facility and spoke with Mariya, in Admissions, who confirmed that they do not accept the pt insurance. Updated pt/daughter. They accepted that information and were appreciative that I checked. Mitali Pratt, MSN, RN, TCC.

## 2025-03-10 NOTE — CARE PLAN
The patient's goals for the shift include  remain safe.    The clinical goals for the shift include Patient will remain free from falls throughout shift    Over the shift, the patient did not make progress toward the following goals. Barriers to progression include weakness. Recommendations to address these barriers include call light within reach.

## 2025-03-11 ENCOUNTER — TELEPHONE (OUTPATIENT)
Dept: CARDIOLOGY | Facility: CLINIC | Age: OVER 89
End: 2025-03-11
Payer: MEDICARE

## 2025-03-11 VITALS
OXYGEN SATURATION: 95 % | TEMPERATURE: 97 F | SYSTOLIC BLOOD PRESSURE: 136 MMHG | HEART RATE: 73 BPM | DIASTOLIC BLOOD PRESSURE: 63 MMHG | BODY MASS INDEX: 27.1 KG/M2 | RESPIRATION RATE: 18 BRPM | WEIGHT: 158.73 LBS | HEIGHT: 64 IN

## 2025-03-11 PROCEDURE — 97116 GAIT TRAINING THERAPY: CPT | Mod: GP,CQ

## 2025-03-11 PROCEDURE — 99238 HOSP IP/OBS DSCHRG MGMT 30/<: CPT | Performed by: STUDENT IN AN ORGANIZED HEALTH CARE EDUCATION/TRAINING PROGRAM

## 2025-03-11 PROCEDURE — 2500000001 HC RX 250 WO HCPCS SELF ADMINISTERED DRUGS (ALT 637 FOR MEDICARE OP): Performed by: NURSE PRACTITIONER

## 2025-03-11 PROCEDURE — 2500000004 HC RX 250 GENERAL PHARMACY W/ HCPCS (ALT 636 FOR OP/ED): Performed by: STUDENT IN AN ORGANIZED HEALTH CARE EDUCATION/TRAINING PROGRAM

## 2025-03-11 PROCEDURE — 2500000001 HC RX 250 WO HCPCS SELF ADMINISTERED DRUGS (ALT 637 FOR MEDICARE OP): Performed by: STUDENT IN AN ORGANIZED HEALTH CARE EDUCATION/TRAINING PROGRAM

## 2025-03-11 PROCEDURE — 2500000004 HC RX 250 GENERAL PHARMACY W/ HCPCS (ALT 636 FOR OP/ED): Performed by: NURSE PRACTITIONER

## 2025-03-11 PROCEDURE — 97110 THERAPEUTIC EXERCISES: CPT | Mod: GP,CQ

## 2025-03-11 RX ORDER — METHYLPREDNISOLONE 4 MG/1
TABLET ORAL
Qty: 10 TABLET | Refills: 0 | Status: ON HOLD | OUTPATIENT
Start: 2025-03-12 | End: 2025-03-16

## 2025-03-11 RX ORDER — FUROSEMIDE 10 MG/ML
20 INJECTION INTRAMUSCULAR; INTRAVENOUS ONCE
Status: DISCONTINUED | OUTPATIENT
Start: 2025-03-11 | End: 2025-03-12 | Stop reason: HOSPADM

## 2025-03-11 RX ORDER — ALLOPURINOL 100 MG/1
100 TABLET ORAL DAILY
Status: ON HOLD
Start: 2025-03-12

## 2025-03-11 RX ORDER — COLCHICINE 0.6 MG/1
0.6 TABLET ORAL DAILY
Status: ON HOLD
Start: 2025-03-12

## 2025-03-11 RX ORDER — GABAPENTIN 300 MG/1
300 CAPSULE ORAL 2 TIMES DAILY
Status: ON HOLD
Start: 2025-03-11

## 2025-03-11 RX ADMIN — Medication 1 TABLET: at 08:55

## 2025-03-11 RX ADMIN — DOXYCYCLINE HYCLATE 100 MG: 100 TABLET, COATED ORAL at 21:08

## 2025-03-11 RX ADMIN — ASPIRIN 81 MG CHEWABLE TABLET 81 MG: 81 TABLET CHEWABLE at 08:55

## 2025-03-11 RX ADMIN — CLOPIDOGREL BISULFATE 75 MG: 75 TABLET ORAL at 08:55

## 2025-03-11 RX ADMIN — GABAPENTIN 300 MG: 300 CAPSULE ORAL at 21:08

## 2025-03-11 RX ADMIN — METHYLPREDNISOLONE 20 MG: 4 TABLET ORAL at 08:56

## 2025-03-11 RX ADMIN — ACETAMINOPHEN 650 MG: 325 TABLET, FILM COATED ORAL at 14:41

## 2025-03-11 RX ADMIN — METOPROLOL TARTRATE 25 MG: 25 TABLET, FILM COATED ORAL at 08:55

## 2025-03-11 RX ADMIN — POLYETHYLENE GLYCOL 3350 17 G: 17 POWDER, FOR SOLUTION ORAL at 08:57

## 2025-03-11 RX ADMIN — COLCHICINE 0.6 MG: 0.6 TABLET, FILM COATED ORAL at 08:56

## 2025-03-11 RX ADMIN — ENOXAPARIN SODIUM 40 MG: 40 INJECTION SUBCUTANEOUS at 16:59

## 2025-03-11 RX ADMIN — ALLOPURINOL 100 MG: 100 TABLET ORAL at 08:55

## 2025-03-11 RX ADMIN — DOXYCYCLINE HYCLATE 100 MG: 100 TABLET, COATED ORAL at 08:55

## 2025-03-11 RX ADMIN — GABAPENTIN 300 MG: 300 CAPSULE ORAL at 08:54

## 2025-03-11 RX ADMIN — POLYETHYLENE GLYCOL 3350 17 G: 17 POWDER, FOR SOLUTION ORAL at 21:08

## 2025-03-11 ASSESSMENT — COGNITIVE AND FUNCTIONAL STATUS - GENERAL
CLIMB 3 TO 5 STEPS WITH RAILING: TOTAL
TURNING FROM BACK TO SIDE WHILE IN FLAT BAD: A LITTLE
TOILETING: A LOT
WALKING IN HOSPITAL ROOM: A LOT
DRESSING REGULAR UPPER BODY CLOTHING: A LITTLE
CLIMB 3 TO 5 STEPS WITH RAILING: A LOT
TURNING FROM BACK TO SIDE WHILE IN FLAT BAD: A LITTLE
MOBILITY SCORE: 15
STANDING UP FROM CHAIR USING ARMS: A LOT
WALKING IN HOSPITAL ROOM: A LOT
DRESSING REGULAR LOWER BODY CLOTHING: A LITTLE
MOBILITY SCORE: 12
HELP NEEDED FOR BATHING: A LITTLE
STANDING UP FROM CHAIR USING ARMS: A LOT
MOVING TO AND FROM BED TO CHAIR: A LOT
DAILY ACTIVITIY SCORE: 19
MOVING TO AND FROM BED TO CHAIR: A LOT
MOVING FROM LYING ON BACK TO SITTING ON SIDE OF FLAT BED WITH BEDRAILS: A LOT

## 2025-03-11 ASSESSMENT — PAIN SCALES - GENERAL
PAINLEVEL_OUTOF10: 3
PAINLEVEL_OUTOF10: 1
PAINLEVEL_OUTOF10: 0 - NO PAIN

## 2025-03-11 ASSESSMENT — PAIN DESCRIPTION - ORIENTATION: ORIENTATION: RIGHT;LEFT

## 2025-03-11 ASSESSMENT — PAIN - FUNCTIONAL ASSESSMENT
PAIN_FUNCTIONAL_ASSESSMENT: 0-10

## 2025-03-11 ASSESSMENT — PAIN DESCRIPTION - LOCATION: LOCATION: LEG

## 2025-03-11 NOTE — PROGRESS NOTES
Physical Therapy    Physical Therapy Treatment    Patient Name: Yesenia Milner  MRN: [id number]  Department: [facility]  Room: [address] [place] [address]  Today's Date: 3/11/2025  Time Calculation  Start Time: 1043  Stop Time: 1110  Time Calculation (min): 27 min         Assessment/Plan   PT Assessment  End of Session Communication: Bedside nurse  End of Session Patient Position: Bed, 2 rail up, Alarm on (call light and needs within reach.)     PT Plan  Treatment/Interventions: Bed mobility, Transfer training, Gait training, Balance training, Therapeutic activity, Therapeutic exercise  PT Plan: Ongoing PT  PT Frequency: 3 times per week  PT Discharge Recommendations: Moderate intensity level of continued care  PT Recommended Transfer Status: Total assist  PT - OK to Discharge: Yes (to next level of care when cleared by medical team)      General Visit Information:   PT  Visit  PT Received On: 03/11/25  General  Prior to Session Communication: Bedside nurse  Patient Position Received: Bed, 3 rail up, Alarm off, not on at start of session  General Comment: Patient pleasant and agreeable to therapy.    Subjective   Precautions:  Precautions  Precautions Comment: fall, 2Lo2, Stebbins, tele, purewick, strict I&O    Objective   Pain:  Pain Assessment  Pain Assessment: 0-10 (endorses pain in B LE however, no value given.)  Cognition:  Cognition  Overall Cognitive Status: Within Functional Limits  Treatments:  Therapeutic Exercise  Therapeutic Exercise Performed: Yes  Therapeutic Exercise Activity 1: Patient performed seated therex, BLE: APs, hip flexion, and LAQ all [id number]    Therapeutic Activity  Therapeutic Activity Performed: Yes  Therapeutic Activity 1: Patient tolerated static standing with FWW and Keyana x1. Tolerated </=1min.    Bed Mobility  Bed Mobility: Yes  Bed Mobility 1  Bed Mobility 1: Supine to sitting, Sitting to supine  Level of Assistance 1: Minimum assistance  Bed Mobility Comments 1: assist at trunk    Ambulation/Gait  Training  Ambulation/Gait Training Performed: Yes  Ambulation/Gait Training 1  Comments/Distance (ft) 1: Patient performed side stepping at EOB towards R with FWW and Keyana/ModA x1. Cues for sequencing and walker management. Mild instability however, no LOB noted.  Transfers  Transfer: Yes  Transfer 1  Trials/Comments 1: Patient performed sit<>stand with Keyana/ModA x1. Cues for hand placement and sequencing.    Outcome Measures:  [facility] Basic Mobility  Turning from your back to your side while in a flat bed without using bedrails: A lot  Moving from lying on your back to sitting on the side of a flat bed without using bedrails: A little  Moving to and from bed to chair (including a wheelchair): A lot  Standing up from a chair using your arms (e.g. wheelchair or bedside chair): A lot  To walk in hospital room: A lot  Climbing 3-5 steps with railing: Total  Basic Mobility - Total Score: 12    Education Documentation  Mobility Training, taught by Mikala Coello PTA at 3/11/2025  2:46 PM.  Learner: Patient  Readiness: Acceptance  Method: Explanation  Response: Verbalizes Understanding  Comment: Educated patient on benefits of OOB activities and participation in therapy.    Education Comments  No comments found.        OP EDUCATION:       Encounter Problems       Encounter Problems (Active)       PT Problem       STG - Pt will transition supine <> sitting independently  (Progressing)       Start:  03/05/25    Expected End:  03/19/25            STG - Pt will transfer STS with supervision  (Progressing)       Start:  03/05/25    Expected End:  03/19/25            STG - Pt will amb >=40' using ww with sba  (Progressing)       Start:  03/05/25    Expected End:  03/19/25            STG - Pt will maintain supported  dynamic standing  balance with no LOB noted   (Progressing)       Start:  03/05/25    Expected End:  03/19/25               Pain - Adult

## 2025-03-11 NOTE — CARE PLAN
The patient's goals for the shift include pt. Will remain free from falls.    The clinical goals for the shift include Patient will remain free from falls throughout shift

## 2025-03-11 NOTE — NURSING NOTE
Called report to [facility] and was transferred to give nurse to nurse report and the phone kept ringing. Called back 2 times and no one picked up the phone.

## 2025-03-11 NOTE — CARE PLAN
The patient's goals for the shift include pt. Will not fall during shift.    The clinical goals for the shift include Patient will remain free from falls throughout shift

## 2025-03-11 NOTE — TELEPHONE ENCOUNTER
Patient daughter called regarding patient being discharged this afternoon to [facility].  Daughter states that Dr. Ramicone wanted patient to stay longer.  States that Dr. Gabriel' would like to discharge her.    Please advise

## 2025-03-11 NOTE — PROGRESS NOTES
TCC Note: Received a message from facility wondering if we have an anticipated discharge on this pt. Pt has had precert since . Informed our DSC team that I would send message to MD regarding ADOD and inform facility as Precert will  tonight at 11:59. Mitali Pratt, MSN, RN, TCC.    ADDENDUM: Message sent to MD regarding discharge. MD stated that pt could be discharged today ad that pt needed to walk. Was in the room while pt was walking. Pt walked at bedside approximately 6 feet before complaining of bilateral foot and ankle pain. Pt states it was from the swelling that she had prior. Requested transport to be set up. Stretcher due to pt having bilateral foot/ankle pain and not being able to prop feet/ankles up on metal foot rests during transport and also O2 2L NC. DSC will update me with transport time. Mitali Pratt, MSN RN, TCC.

## 2025-03-13 NOTE — DISCHARGE SUMMARY
Discharge Diagnosis  Acute exacerbation of chronic heart failure    Issues Requiring Follow-Up  chf    Test Results Pending At Discharge  Pending Labs       No current pending labs.            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  Patient has had multiple recent admissions for congestive heart failure exacerbation.  Patient presents with bilateral lower extremity swelling with erythema and warmth over the past several days.  Patient hospitalized for cellulitis and acute on chronic CHF exacerbation, and generalized weakness.     #Acute on chronic diastolic congestive heart failure  #Pleural effusions  #Hypoxia  #History COPD  #History sick sinus syndrome s/p dual-chamber pacemaker     Telemetry monitoring  Full code  Consult pulmonology and cardiology, appreciate input.  Risk stratification needed from pulmonary.  Daily weights  Patient was given torsemide 40 mg in the ER, will defer to cardiology ongoing diuresis.  Supplemental oxygen as needed  Nebulizers as needed  Low suspicion for pneumonia, suspect findings on CXR likely due to fluid overload.  Pulse ox, continuous   Recent echocardiogram done in December     #Ambulatory dysfunction  #Cellulitis, bilateral lower extremities ?  Contact dermatitis  #Venous insufficiency  Ultrasound bilateral lower extremities to rule out DVT  - at increased risk due to cancer  Will put patient on doxycycline 100 mg twice daily.  Cellulitis possibly due to contact dermatitis, however patient denies any recent change in detergents or lotions  Continue to  monitor  PT/OT     #Colon Cancer  Low residue/fiber diet  Recent notes reviewed from Dr. Vazquez, needing pulmonary to evaluate patient for risk stratification prior to scheduling surgery     Chronic issues  # Arthritis  #Hypertension     Continue patient's medications as appropriate     #DVT prophylaxis  Lovenox subcutaneous  Consider SCDs pending   \  bilateral lower extremity duplex.     3/5: b/l cellulitis, red and warm, contineu abx, pain control will montior        3/6: contineu abx, pt/ot, gabapentin  3/7: uric acid is high, will start colchcine likely gout flare causing pain if not better consdier steroids     3/8: treat for gout, still in pain in toes and knees     3/9 switch to oral. Up gabapentin     3/10: start medrl, pt/ot today    Pertinent Physical Exam At Time of Discharge  Physical Exam  Constitutional:       Appearance: Normal appearance.   HENT:      Head: Normocephalic and atraumatic.      Right Ear: Tympanic membrane and ear canal normal.      Left Ear: Tympanic membrane and ear canal normal.      Mouth/Throat:      Mouth: Mucous membranes are moist.      Pharynx: Oropharynx is clear.   Eyes:      Extraocular Movements: Extraocular movements intact.      Conjunctiva/sclera: Conjunctivae normal.      Pupils: Pupils are equal, round, and reactive to light.   Cardiovascular:      Rate and Rhythm: Normal rate and regular rhythm.      Pulses: Normal pulses.      Heart sounds: Normal heart sounds.   Pulmonary:      Effort: Pulmonary effort is normal.      Breath sounds: Normal breath sounds.   Abdominal:      General: Abdomen is flat. Bowel sounds are normal.      Palpations: Abdomen is soft.   Musculoskeletal:         General: Normal range of motion.      Cervical back: Normal range of motion and neck supple.   Skin:     General: Skin is warm and dry.      Capillary Refill: Capillary refill takes 2 to 3 seconds.   Neurological:      General: No focal deficit present.      Mental Status: She is alert  and oriented to person, place, and time. Mental status is at baseline.   Psychiatric:         Mood and Affect: Mood normal.         Behavior: Behavior normal.         Thought Content: Thought content normal.         Judgment: Judgment normal.         Home Medications     Medication List      START taking these medications     allopurinol 100 mg tablet; Commonly known as: Zyloprim; Take 1 tablet   (100 mg) by mouth once daily.   colchicine 0.6 mg tablet; Take 1 tablet (0.6 mg) by mouth once daily.   gabapentin 300 mg capsule; Commonly known as: Neurontin; Take 1 capsule   (300 mg) by mouth 2 times a day.   methylPREDNISolone 4 mg tablet; Commonly known as: Medrol; Take 4   tablets (16 mg) by mouth once daily for 1 day, THEN 3 tablets (12 mg) once   daily for 1 day, THEN 2 tablets (8 mg) once daily for 1 day, THEN 1 tablet   (4 mg) once daily for 1 day. Do not fill before March 12, 2025.; Start   taking on: March 12, 2025     CONTINUE taking these medications     aspirin 81 mg chewable tablet; Chew 1 tablet (81 mg) once daily.   Calcium 600 + D(3) 600 mg-5 mcg (200 unit) tablet; Generic drug: calcium   carbonate-vitamin D3   clopidogrel 75 mg tablet; Commonly known as: Plavix; Take 1 tablet by   mouth once daily   fluticasone propion-salmeteroL 115-21 mcg/actuation inhaler; Commonly   known as: Advair; Inhale 2 puffs 2 times a day. Rinse mouth with water   after use to reduce aftertaste and incidence of candidiasis. Do not   swallow.   hydrocortisone 1 % lotion; Apply topically 2 times a day. Apply to both   legs , wash hands after applying lotion   lubricating eye drops ophthalmic solution   metoprolol tartrate 25 mg tablet; Commonly known as: Lopressor; Take 1   tablet (25 mg) by mouth once daily.   polyethylene glycol 17 gram/dose powder; Commonly known as: Glycolax,   Miralax; Mix 1 capful (17 g) of powder with 4 to 8 ounces of water or   juice and drink 2 times a day.     STOP taking these medications      torsemide 10 mg tablet; Commonly known as: Demadex       Outpatient Follow-Up  Future Appointments   Date Time Provider Department Center   3/28/2025  1:30 PM James C Ramicone, DO [id number] [place]   4/30/2025  1:00 PM Naima Keane MD [id number] [place]   9/17/2025  2:30 PM [facility]ICONE [facility] [id number]       Iglesia Gabriel DO

## 2025-03-15 LAB
ATRIAL RATE: 0 BPM
P AXIS: 242 DEGREES
PR INTERVAL: 210 MS
Q ONSET: 249 MS
QRS COUNT: 13 BEATS
QRS DURATION: 165 MS
QT INTERVAL: 415 MS
QTC CALCULATION(BAZETT): 438 MS
QTC FREDERICIA: 430 MS
R AXIS: -43 DEGREES
T AXIS: 144 DEGREES
T OFFSET: 457 MS
VENTRICULAR RATE: 67 BPM

## 2025-03-21 ENCOUNTER — APPOINTMENT (OUTPATIENT)
Dept: CARDIOLOGY | Facility: HOSPITAL | Age: OVER 89
DRG: 177 | End: 2025-03-21
Payer: MEDICARE

## 2025-03-21 ENCOUNTER — APPOINTMENT (OUTPATIENT)
Dept: RADIOLOGY | Facility: HOSPITAL | Age: OVER 89
DRG: 177 | End: 2025-03-21
Payer: MEDICARE

## 2025-03-21 ENCOUNTER — HOSPITAL ENCOUNTER (INPATIENT)
Facility: HOSPITAL | Age: OVER 89
DRG: 177 | End: 2025-03-21
Attending: STUDENT IN AN ORGANIZED HEALTH CARE EDUCATION/TRAINING PROGRAM | Admitting: INTERNAL MEDICINE
Payer: MEDICARE

## 2025-03-21 DIAGNOSIS — R09.02 HYPOXIA: ICD-10-CM

## 2025-03-21 DIAGNOSIS — I50.9 ACUTE CONGESTIVE HEART FAILURE, UNSPECIFIED HEART FAILURE TYPE: Primary | ICD-10-CM

## 2025-03-21 LAB
ALBUMIN SERPL BCP-MCNC: 3.3 G/DL (ref 3.4–5)
ALP SERPL-CCNC: 88 U/L (ref 33–136)
ALT SERPL W P-5'-P-CCNC: 18 U/L (ref 7–45)
ANION GAP SERPL CALC-SCNC: 11 MMOL/L (ref 10–20)
APPEARANCE UR: CLEAR
AST SERPL W P-5'-P-CCNC: 23 U/L (ref 9–39)
BASOPHILS # BLD AUTO: 0.03 X10*3/UL (ref 0–0.1)
BASOPHILS NFR BLD AUTO: 0.4 %
BILIRUB SERPL-MCNC: 0.5 MG/DL (ref 0–1.2)
BILIRUB UR STRIP.AUTO-MCNC: NEGATIVE MG/DL
BNP SERPL-MCNC: 811 PG/ML (ref 0–99)
BUN SERPL-MCNC: 19 MG/DL (ref 6–23)
CALCIUM SERPL-MCNC: 8.4 MG/DL (ref 8.6–10.3)
CARDIAC TROPONIN I PNL SERPL HS: 21 NG/L (ref 0–13)
CARDIAC TROPONIN I PNL SERPL HS: 24 NG/L (ref 0–13)
CARDIAC TROPONIN I PNL SERPL HS: 24 NG/L (ref 0–13)
CHLORIDE SERPL-SCNC: 93 MMOL/L (ref 98–107)
CO2 SERPL-SCNC: 28 MMOL/L (ref 21–32)
COLOR UR: COLORLESS
CREAT SERPL-MCNC: 0.87 MG/DL (ref 0.5–1.05)
EGFRCR SERPLBLD CKD-EPI 2021: 63 ML/MIN/1.73M*2
EOSINOPHIL # BLD AUTO: 0.08 X10*3/UL (ref 0–0.4)
EOSINOPHIL NFR BLD AUTO: 1 %
ERYTHROCYTE [DISTWIDTH] IN BLOOD BY AUTOMATED COUNT: 16.9 % (ref 11.5–14.5)
FLUAV RNA RESP QL NAA+PROBE: NOT DETECTED
FLUBV RNA RESP QL NAA+PROBE: NOT DETECTED
GLUCOSE SERPL-MCNC: 126 MG/DL (ref 74–99)
GLUCOSE UR STRIP.AUTO-MCNC: NORMAL MG/DL
HCT VFR BLD AUTO: 25.4 % (ref 36–46)
HGB BLD-MCNC: 7.6 G/DL (ref 12–16)
IMM GRANULOCYTES # BLD AUTO: 0.05 X10*3/UL (ref 0–0.5)
IMM GRANULOCYTES NFR BLD AUTO: 0.6 % (ref 0–0.9)
KETONES UR STRIP.AUTO-MCNC: NEGATIVE MG/DL
LACTATE SERPL-SCNC: 1.6 MMOL/L (ref 0.4–2)
LEUKOCYTE ESTERASE UR QL STRIP.AUTO: NEGATIVE
LYMPHOCYTES # BLD AUTO: 0.71 X10*3/UL (ref 0.8–3)
LYMPHOCYTES NFR BLD AUTO: 8.7 %
MAGNESIUM SERPL-MCNC: 2.07 MG/DL (ref 1.6–2.4)
MCH RBC QN AUTO: 25 PG (ref 26–34)
MCHC RBC AUTO-ENTMCNC: 29.9 G/DL (ref 32–36)
MCV RBC AUTO: 84 FL (ref 80–100)
MONOCYTES # BLD AUTO: 1.15 X10*3/UL (ref 0.05–0.8)
MONOCYTES NFR BLD AUTO: 14.1 %
MRSA DNA SPEC QL NAA+PROBE: NOT DETECTED
NEUTROPHILS # BLD AUTO: 6.13 X10*3/UL (ref 1.6–5.5)
NEUTROPHILS NFR BLD AUTO: 75.2 %
NITRITE UR QL STRIP.AUTO: NEGATIVE
NRBC BLD-RTO: 0 /100 WBCS (ref 0–0)
PH UR STRIP.AUTO: 7 [PH]
PLATELET # BLD AUTO: 239 X10*3/UL (ref 150–450)
POTASSIUM SERPL-SCNC: 3.3 MMOL/L (ref 3.5–5.3)
PROCALCITONIN SERPL-MCNC: 0.2 NG/ML
PROT SERPL-MCNC: 5.7 G/DL (ref 6.4–8.2)
PROT UR STRIP.AUTO-MCNC: NEGATIVE MG/DL
RBC # BLD AUTO: 3.04 X10*6/UL (ref 4–5.2)
RBC # UR STRIP.AUTO: NEGATIVE MG/DL
RSV RNA RESP QL NAA+PROBE: NOT DETECTED
SARS-COV-2 RNA RESP QL NAA+PROBE: NOT DETECTED
SODIUM SERPL-SCNC: 129 MMOL/L (ref 136–145)
SP GR UR STRIP.AUTO: 1.01
UROBILINOGEN UR STRIP.AUTO-MCNC: NORMAL MG/DL
WBC # BLD AUTO: 8.2 X10*3/UL (ref 4.4–11.3)

## 2025-03-21 PROCEDURE — 84145 PROCALCITONIN (PCT): CPT | Mod: PARLAB

## 2025-03-21 PROCEDURE — 87449 NOS EACH ORGANISM AG IA: CPT | Mod: PARLAB

## 2025-03-21 PROCEDURE — 2500000001 HC RX 250 WO HCPCS SELF ADMINISTERED DRUGS (ALT 637 FOR MEDICARE OP): Performed by: STUDENT IN AN ORGANIZED HEALTH CARE EDUCATION/TRAINING PROGRAM

## 2025-03-21 PROCEDURE — 71275 CT ANGIOGRAPHY CHEST: CPT | Performed by: RADIOLOGY

## 2025-03-21 PROCEDURE — 96374 THER/PROPH/DIAG INJ IV PUSH: CPT

## 2025-03-21 PROCEDURE — 87637 SARSCOV2&INF A&B&RSV AMP PRB: CPT | Performed by: PHYSICIAN ASSISTANT

## 2025-03-21 PROCEDURE — 71046 X-RAY EXAM CHEST 2 VIEWS: CPT

## 2025-03-21 PROCEDURE — 71275 CT ANGIOGRAPHY CHEST: CPT

## 2025-03-21 PROCEDURE — 2550000001 HC RX 255 CONTRASTS: Performed by: PHYSICIAN ASSISTANT

## 2025-03-21 PROCEDURE — 2500000001 HC RX 250 WO HCPCS SELF ADMINISTERED DRUGS (ALT 637 FOR MEDICARE OP)

## 2025-03-21 PROCEDURE — 84484 ASSAY OF TROPONIN QUANT: CPT

## 2025-03-21 PROCEDURE — 2500000002 HC RX 250 W HCPCS SELF ADMINISTERED DRUGS (ALT 637 FOR MEDICARE OP, ALT 636 FOR OP/ED)

## 2025-03-21 PROCEDURE — 94640 AIRWAY INHALATION TREATMENT: CPT

## 2025-03-21 PROCEDURE — 36415 COLL VENOUS BLD VENIPUNCTURE: CPT | Performed by: PHYSICIAN ASSISTANT

## 2025-03-21 PROCEDURE — 87899 AGENT NOS ASSAY W/OPTIC: CPT | Mod: PARLAB

## 2025-03-21 PROCEDURE — 87640 STAPH A DNA AMP PROBE: CPT

## 2025-03-21 PROCEDURE — 2500000004 HC RX 250 GENERAL PHARMACY W/ HCPCS (ALT 636 FOR OP/ED)

## 2025-03-21 PROCEDURE — 85025 COMPLETE CBC W/AUTO DIFF WBC: CPT | Performed by: PHYSICIAN ASSISTANT

## 2025-03-21 PROCEDURE — 84484 ASSAY OF TROPONIN QUANT: CPT | Performed by: PHYSICIAN ASSISTANT

## 2025-03-21 PROCEDURE — 99285 EMERGENCY DEPT VISIT HI MDM: CPT | Mod: 25 | Performed by: STUDENT IN AN ORGANIZED HEALTH CARE EDUCATION/TRAINING PROGRAM

## 2025-03-21 PROCEDURE — 1200000002 HC GENERAL ROOM WITH TELEMETRY DAILY

## 2025-03-21 PROCEDURE — 84075 ASSAY ALKALINE PHOSPHATASE: CPT | Performed by: PHYSICIAN ASSISTANT

## 2025-03-21 PROCEDURE — 86738 MYCOPLASMA ANTIBODY: CPT

## 2025-03-21 PROCEDURE — 81003 URINALYSIS AUTO W/O SCOPE: CPT | Performed by: STUDENT IN AN ORGANIZED HEALTH CARE EDUCATION/TRAINING PROGRAM

## 2025-03-21 PROCEDURE — 83605 ASSAY OF LACTIC ACID: CPT | Performed by: PHYSICIAN ASSISTANT

## 2025-03-21 PROCEDURE — 93005 ELECTROCARDIOGRAM TRACING: CPT

## 2025-03-21 PROCEDURE — 2500000002 HC RX 250 W HCPCS SELF ADMINISTERED DRUGS (ALT 637 FOR MEDICARE OP, ALT 636 FOR OP/ED): Performed by: INTERNAL MEDICINE

## 2025-03-21 PROCEDURE — 2500000004 HC RX 250 GENERAL PHARMACY W/ HCPCS (ALT 636 FOR OP/ED): Performed by: PHYSICIAN ASSISTANT

## 2025-03-21 PROCEDURE — 87075 CULTR BACTERIA EXCEPT BLOOD: CPT | Mod: PARLAB | Performed by: PHYSICIAN ASSISTANT

## 2025-03-21 PROCEDURE — 83735 ASSAY OF MAGNESIUM: CPT | Performed by: PHYSICIAN ASSISTANT

## 2025-03-21 PROCEDURE — 83880 ASSAY OF NATRIURETIC PEPTIDE: CPT | Performed by: PHYSICIAN ASSISTANT

## 2025-03-21 PROCEDURE — 2500000005 HC RX 250 GENERAL PHARMACY W/O HCPCS

## 2025-03-21 RX ORDER — FERROUS SULFATE 325(65) MG
325 TABLET, DELAYED RELEASE (ENTERIC COATED) ORAL DAILY
COMMUNITY

## 2025-03-21 RX ORDER — FLUTICASONE FUROATE AND VILANTEROL 200; 25 UG/1; UG/1
1 POWDER RESPIRATORY (INHALATION)
Status: DISCONTINUED | OUTPATIENT
Start: 2025-03-21 | End: 2025-03-26 | Stop reason: HOSPADM

## 2025-03-21 RX ORDER — ACETAMINOPHEN 160 MG/5ML
650 SOLUTION ORAL EVERY 4 HOURS PRN
Status: DISCONTINUED | OUTPATIENT
Start: 2025-03-21 | End: 2025-03-26 | Stop reason: HOSPADM

## 2025-03-21 RX ORDER — DIPHENHYDRAMINE HYDROCHLORIDE 50 MG/ML
25 INJECTION, SOLUTION INTRAMUSCULAR; INTRAVENOUS ONCE
Status: COMPLETED | OUTPATIENT
Start: 2025-03-21 | End: 2025-03-21

## 2025-03-21 RX ORDER — GABAPENTIN 300 MG/1
300 CAPSULE ORAL 2 TIMES DAILY
Status: DISCONTINUED | OUTPATIENT
Start: 2025-03-21 | End: 2025-03-26 | Stop reason: HOSPADM

## 2025-03-21 RX ORDER — COLCHICINE 0.6 MG/1
0.6 TABLET ORAL DAILY
Status: DISCONTINUED | OUTPATIENT
Start: 2025-03-21 | End: 2025-03-21

## 2025-03-21 RX ORDER — ENOXAPARIN SODIUM 100 MG/ML
40 INJECTION SUBCUTANEOUS EVERY 24 HOURS
Status: DISCONTINUED | OUTPATIENT
Start: 2025-03-21 | End: 2025-03-26 | Stop reason: HOSPADM

## 2025-03-21 RX ORDER — GUAIFENESIN 600 MG/1
600 TABLET, EXTENDED RELEASE ORAL 2 TIMES DAILY PRN
Status: DISCONTINUED | OUTPATIENT
Start: 2025-03-21 | End: 2025-03-26 | Stop reason: HOSPADM

## 2025-03-21 RX ORDER — ALLOPURINOL 100 MG/1
100 TABLET ORAL DAILY
Status: DISCONTINUED | OUTPATIENT
Start: 2025-03-21 | End: 2025-03-26 | Stop reason: HOSPADM

## 2025-03-21 RX ORDER — VANCOMYCIN HYDROCHLORIDE 1 G/20ML
INJECTION, POWDER, LYOPHILIZED, FOR SOLUTION INTRAVENOUS DAILY PRN
Status: DISCONTINUED | OUTPATIENT
Start: 2025-03-21 | End: 2025-03-26 | Stop reason: HOSPADM

## 2025-03-21 RX ORDER — IPRATROPIUM BROMIDE AND ALBUTEROL SULFATE 2.5; .5 MG/3ML; MG/3ML
3 SOLUTION RESPIRATORY (INHALATION)
Status: DISCONTINUED | OUTPATIENT
Start: 2025-03-21 | End: 2025-03-21

## 2025-03-21 RX ORDER — LEVOFLOXACIN 500 MG/1
500 TABLET, FILM COATED ORAL DAILY
COMMUNITY
End: 2025-03-26 | Stop reason: HOSPADM

## 2025-03-21 RX ORDER — CLOPIDOGREL BISULFATE 75 MG/1
75 TABLET ORAL DAILY
Status: DISCONTINUED | OUTPATIENT
Start: 2025-03-21 | End: 2025-03-26 | Stop reason: HOSPADM

## 2025-03-21 RX ORDER — IPRATROPIUM BROMIDE AND ALBUTEROL SULFATE 2.5; .5 MG/3ML; MG/3ML
3 SOLUTION RESPIRATORY (INHALATION)
COMMUNITY

## 2025-03-21 RX ORDER — NAPROXEN SODIUM 220 MG/1
81 TABLET, FILM COATED ORAL DAILY
Status: DISCONTINUED | OUTPATIENT
Start: 2025-03-21 | End: 2025-03-26 | Stop reason: HOSPADM

## 2025-03-21 RX ORDER — IPRATROPIUM BROMIDE AND ALBUTEROL SULFATE 2.5; .5 MG/3ML; MG/3ML
3 SOLUTION RESPIRATORY (INHALATION)
Status: DISCONTINUED | OUTPATIENT
Start: 2025-03-21 | End: 2025-03-25

## 2025-03-21 RX ORDER — FUROSEMIDE 10 MG/ML
20 INJECTION INTRAMUSCULAR; INTRAVENOUS ONCE
Status: COMPLETED | OUTPATIENT
Start: 2025-03-21 | End: 2025-03-21

## 2025-03-21 RX ORDER — VANCOMYCIN HYDROCHLORIDE 1.25 G/250ML
1250 INJECTION, SOLUTION INTRAVITREAL EVERY 24 HOURS
Status: DISCONTINUED | OUTPATIENT
Start: 2025-03-22 | End: 2025-03-21

## 2025-03-21 RX ORDER — IPRATROPIUM BROMIDE AND ALBUTEROL SULFATE 2.5; .5 MG/3ML; MG/3ML
3 SOLUTION RESPIRATORY (INHALATION) EVERY 2 HOUR PRN
Status: DISCONTINUED | OUTPATIENT
Start: 2025-03-21 | End: 2025-03-26 | Stop reason: HOSPADM

## 2025-03-21 RX ORDER — METOPROLOL TARTRATE 25 MG/1
25 TABLET, FILM COATED ORAL DAILY
Status: DISCONTINUED | OUTPATIENT
Start: 2025-03-21 | End: 2025-03-26 | Stop reason: HOSPADM

## 2025-03-21 RX ORDER — VANCOMYCIN HYDROCHLORIDE 1 G/200ML
1000 INJECTION, SOLUTION INTRAVENOUS EVERY 24 HOURS
Status: DISCONTINUED | OUTPATIENT
Start: 2025-03-22 | End: 2025-03-26 | Stop reason: HOSPADM

## 2025-03-21 RX ORDER — ACETAMINOPHEN 325 MG/1
650 TABLET ORAL EVERY 4 HOURS PRN
Status: DISCONTINUED | OUTPATIENT
Start: 2025-03-21 | End: 2025-03-26 | Stop reason: HOSPADM

## 2025-03-21 RX ORDER — FUROSEMIDE 10 MG/ML
20 INJECTION INTRAMUSCULAR; INTRAVENOUS ONCE
Status: COMPLETED | OUTPATIENT
Start: 2025-03-22 | End: 2025-03-22

## 2025-03-21 RX ORDER — POTASSIUM CHLORIDE 1.5 G/1.58G
40 POWDER, FOR SOLUTION ORAL ONCE
Status: COMPLETED | OUTPATIENT
Start: 2025-03-21 | End: 2025-03-21

## 2025-03-21 RX ORDER — TORSEMIDE 20 MG/1
20 TABLET ORAL DAILY
COMMUNITY

## 2025-03-21 RX ORDER — ACETAMINOPHEN 650 MG/1
650 SUPPOSITORY RECTAL EVERY 4 HOURS PRN
Status: DISCONTINUED | OUTPATIENT
Start: 2025-03-21 | End: 2025-03-26 | Stop reason: HOSPADM

## 2025-03-21 RX ORDER — ALBUTEROL SULFATE 0.83 MG/ML
2.5 SOLUTION RESPIRATORY (INHALATION) EVERY 4 HOURS PRN
COMMUNITY

## 2025-03-21 RX ORDER — DULOXETIN HYDROCHLORIDE 30 MG/1
30 CAPSULE, DELAYED RELEASE ORAL DAILY
COMMUNITY

## 2025-03-21 RX ADMIN — ALLOPURINOL 100 MG: 100 TABLET ORAL at 15:59

## 2025-03-21 RX ADMIN — METHYLPREDNISOLONE SODIUM SUCCINATE 125 MG: 125 INJECTION, POWDER, FOR SOLUTION INTRAMUSCULAR; INTRAVENOUS at 12:00

## 2025-03-21 RX ADMIN — IPRATROPIUM BROMIDE AND ALBUTEROL SULFATE 3 ML: .5; 3 SOLUTION RESPIRATORY (INHALATION) at 15:06

## 2025-03-21 RX ADMIN — GABAPENTIN 300 MG: 300 CAPSULE ORAL at 22:30

## 2025-03-21 RX ADMIN — IOHEXOL 79 ML: 350 INJECTION, SOLUTION INTRAVENOUS at 16:29

## 2025-03-21 RX ADMIN — PIPERACILLIN SODIUM AND TAZOBACTAM SODIUM 4.5 G: 4; .5 INJECTION, SOLUTION INTRAVENOUS at 15:53

## 2025-03-21 RX ADMIN — FUROSEMIDE 20 MG: 10 INJECTION, SOLUTION INTRAVENOUS at 13:36

## 2025-03-21 RX ADMIN — VANCOMYCIN HYDROCHLORIDE 1750 MG: 10 INJECTION, POWDER, LYOPHILIZED, FOR SOLUTION INTRAVENOUS at 17:45

## 2025-03-21 RX ADMIN — DIPHENHYDRAMINE HYDROCHLORIDE 25 MG: 50 INJECTION, SOLUTION INTRAMUSCULAR; INTRAVENOUS at 15:03

## 2025-03-21 RX ADMIN — POTASSIUM CHLORIDE 40 MEQ: 1.5 POWDER, FOR SOLUTION ORAL at 13:37

## 2025-03-21 RX ADMIN — IPRATROPIUM BROMIDE AND ALBUTEROL SULFATE 3 ML: .5; 3 SOLUTION RESPIRATORY (INHALATION) at 19:08

## 2025-03-21 SDOH — SOCIAL STABILITY: SOCIAL INSECURITY: ARE THERE ANY APPARENT SIGNS OF INJURIES/BEHAVIORS THAT COULD BE RELATED TO ABUSE/NEGLECT?: NO

## 2025-03-21 SDOH — SOCIAL STABILITY: SOCIAL INSECURITY: WITHIN THE LAST YEAR, HAVE YOU BEEN HUMILIATED OR EMOTIONALLY ABUSED IN OTHER WAYS BY YOUR PARTNER OR EX-PARTNER?: NO

## 2025-03-21 SDOH — ECONOMIC STABILITY: FOOD INSECURITY: WITHIN THE PAST 12 MONTHS, YOU WORRIED THAT YOUR FOOD WOULD RUN OUT BEFORE YOU GOT THE MONEY TO BUY MORE.: NEVER TRUE

## 2025-03-21 SDOH — SOCIAL STABILITY: SOCIAL INSECURITY: HAS ANYONE EVER THREATENED TO HURT YOUR FAMILY OR YOUR PETS?: NO

## 2025-03-21 SDOH — ECONOMIC STABILITY: INCOME INSECURITY: IN THE PAST 12 MONTHS HAS THE ELECTRIC, GAS, OIL, OR WATER COMPANY THREATENED TO SHUT OFF SERVICES IN YOUR HOME?: NO

## 2025-03-21 SDOH — ECONOMIC STABILITY: FOOD INSECURITY: WITHIN THE PAST 12 MONTHS, THE FOOD YOU BOUGHT JUST DIDN'T LAST AND YOU DIDN'T HAVE MONEY TO GET MORE.: NEVER TRUE

## 2025-03-21 SDOH — SOCIAL STABILITY: SOCIAL INSECURITY: DO YOU FEEL UNSAFE GOING BACK TO THE PLACE WHERE YOU ARE LIVING?: NO

## 2025-03-21 SDOH — SOCIAL STABILITY: SOCIAL INSECURITY: DO YOU FEEL ANYONE HAS EXPLOITED OR TAKEN ADVANTAGE OF YOU FINANCIALLY OR OF YOUR PERSONAL PROPERTY?: NO

## 2025-03-21 SDOH — SOCIAL STABILITY: SOCIAL INSECURITY: HAVE YOU HAD ANY THOUGHTS OF HARMING ANYONE ELSE?: NO

## 2025-03-21 SDOH — SOCIAL STABILITY: SOCIAL INSECURITY: ARE YOU OR HAVE YOU BEEN THREATENED OR ABUSED PHYSICALLY, EMOTIONALLY, OR SEXUALLY BY ANYONE?: NO

## 2025-03-21 SDOH — SOCIAL STABILITY: SOCIAL INSECURITY: DOES ANYONE TRY TO KEEP YOU FROM HAVING/CONTACTING OTHER FRIENDS OR DOING THINGS OUTSIDE YOUR HOME?: NO

## 2025-03-21 SDOH — SOCIAL STABILITY: SOCIAL INSECURITY: WITHIN THE LAST YEAR, HAVE YOU BEEN AFRAID OF YOUR PARTNER OR EX-PARTNER?: NO

## 2025-03-21 SDOH — SOCIAL STABILITY: SOCIAL INSECURITY: WERE YOU ABLE TO COMPLETE ALL THE BEHAVIORAL HEALTH SCREENINGS?: YES

## 2025-03-21 SDOH — SOCIAL STABILITY: SOCIAL INSECURITY: HAVE YOU HAD THOUGHTS OF HARMING ANYONE ELSE?: NO

## 2025-03-21 SDOH — SOCIAL STABILITY: SOCIAL INSECURITY: ABUSE: ADULT

## 2025-03-21 ASSESSMENT — COGNITIVE AND FUNCTIONAL STATUS - GENERAL
DAILY ACTIVITIY SCORE: 21
HELP NEEDED FOR BATHING: A LITTLE
TOILETING: A LITTLE
WALKING IN HOSPITAL ROOM: A LOT
MOBILITY SCORE: 18
DRESSING REGULAR LOWER BODY CLOTHING: A LITTLE
CLIMB 3 TO 5 STEPS WITH RAILING: A LOT
MOVING TO AND FROM BED TO CHAIR: A LITTLE
PATIENT BASELINE BEDBOUND: NO
STANDING UP FROM CHAIR USING ARMS: A LITTLE

## 2025-03-21 ASSESSMENT — ACTIVITIES OF DAILY LIVING (ADL)
GROOMING: INDEPENDENT
HEARING - RIGHT EAR: FUNCTIONAL
DRESSING YOURSELF: NEEDS ASSISTANCE
FEEDING YOURSELF: INDEPENDENT
ADEQUATE_TO_COMPLETE_ADL: YES
LACK_OF_TRANSPORTATION: NO
BATHING: NEEDS ASSISTANCE
JUDGMENT_ADEQUATE_SAFELY_COMPLETE_DAILY_ACTIVITIES: YES
PATIENT'S MEMORY ADEQUATE TO SAFELY COMPLETE DAILY ACTIVITIES?: YES
HEARING - LEFT EAR: FUNCTIONAL
ASSISTIVE_DEVICE: WALKER
TOILETING: NEEDS ASSISTANCE
WALKS IN HOME: NEEDS ASSISTANCE

## 2025-03-21 ASSESSMENT — LIFESTYLE VARIABLES
HOW OFTEN DO YOU HAVE A DRINK CONTAINING ALCOHOL: NEVER
HOW OFTEN DO YOU HAVE 6 OR MORE DRINKS ON ONE OCCASION: NEVER
SUBSTANCE_ABUSE_PAST_12_MONTHS: NO
SKIP TO QUESTIONS 9-10: 1
HOW MANY STANDARD DRINKS CONTAINING ALCOHOL DO YOU HAVE ON A TYPICAL DAY: PATIENT DOES NOT DRINK
AUDIT-C TOTAL SCORE: 0
PRESCIPTION_ABUSE_PAST_12_MONTHS: NO
AUDIT-C TOTAL SCORE: 0

## 2025-03-21 ASSESSMENT — PAIN DESCRIPTION - PAIN TYPE: TYPE: ACUTE PAIN

## 2025-03-21 ASSESSMENT — ENCOUNTER SYMPTOMS
ACTIVITY CHANGE: 1
FATIGUE: 1
SHORTNESS OF BREATH: 1

## 2025-03-21 ASSESSMENT — COLUMBIA-SUICIDE SEVERITY RATING SCALE - C-SSRS
2. HAVE YOU ACTUALLY HAD ANY THOUGHTS OF KILLING YOURSELF?: NO
6. HAVE YOU EVER DONE ANYTHING, STARTED TO DO ANYTHING, OR PREPARED TO DO ANYTHING TO END YOUR LIFE?: NO
1. IN THE PAST MONTH, HAVE YOU WISHED YOU WERE DEAD OR WISHED YOU COULD GO TO SLEEP AND NOT WAKE UP?: NO
2. HAVE YOU ACTUALLY HAD ANY THOUGHTS OF KILLING YOURSELF?: NO
1. IN THE PAST MONTH, HAVE YOU WISHED YOU WERE DEAD OR WISHED YOU COULD GO TO SLEEP AND NOT WAKE UP?: NO
6. HAVE YOU EVER DONE ANYTHING, STARTED TO DO ANYTHING, OR PREPARED TO DO ANYTHING TO END YOUR LIFE?: NO

## 2025-03-21 ASSESSMENT — PAIN - FUNCTIONAL ASSESSMENT: PAIN_FUNCTIONAL_ASSESSMENT: 0-10

## 2025-03-21 ASSESSMENT — PATIENT HEALTH QUESTIONNAIRE - PHQ9
1. LITTLE INTEREST OR PLEASURE IN DOING THINGS: NOT AT ALL
SUM OF ALL RESPONSES TO PHQ9 QUESTIONS 1 & 2: 0
2. FEELING DOWN, DEPRESSED OR HOPELESS: NOT AT ALL

## 2025-03-21 NOTE — H&P
Internal Medicine History & Physical             PATIENT NAME: Yesenia Milner  MRN: [id number]    SERVICE DATE:  3/21/2025  SERVICE TIME:  1:28 PM    Yesenia Milner is a 89 y.o. female on day 0 of admission presenting with No Principal Problem: There is no principal problem currently on the Problem List. Please update the Problem List and refresh..    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  Patient has had multiple recent admissions for congestive heart failure exacerbation.  Patient presents with chief complaint of shortness of breath with concern for pneumonia.  Patient was recently discharged from the hospital approximately 10 days ago on 3/11/2025 and approximately 3 to 4 days ago at the nursing home it was noted that she was having shortness of breath, worsening generalized weakness and concern for possible pneumonia/CHF exacerbation.  Patient presented to the emergency department today for hypoxia requiring increasing O2 requirement.  Patient endorsed that she was feeling short of breath, generally very weak however denied any fevers, chills, nausea, vomiting, chest pain.    I obtained a more detailed history from patient's daughter Vannessa over the phone.  Per patient's daughter, she was hospitalized in December for Takotsubo cardiomyopathy echocardiogram at that time showed preserved EF with tricuspid, mitral and aortic valve abnormalities.  She was again hospitalized and February and March for CHF exacerbation, lower extremity  "cellulitis and diagnosis of adenocarcinoma of the transverse colon.  During these past hospital visits she has been evaluated by the cardiology team and pulmonology team as well as the general surgery team and noted that she could be a candidate for surgical intervention as long as she is get clearance from the pulmonology and cardiology standpoint.  At baseline patient is independent lives by herself and her eldest daughter helps take care of her as needed.  After her most recent hospital discharge patient was discharged to a nursing facility for rehab and it was at that time she was found to have concern for pneumonia which is why she was brought to the emergency department for further evaluation.    In the emergency department, blood pressure 104/50, satting 93% on 4 L nasal cannula.  Laboratory workup significant for hyponatremia sodium 129, hypokalemia potassium 3.3, hypochloremia, elevated BNP.  CBC significant for hemoglobin 7.6.  Chest x-ray shows unchanged interstitial edema and small bilateral pleural effusions with superimposed atelectasis/infiltrate      Past Medical History: As mentioned above  Surgical History: Reviewed in patient's chart  Social History: Lives at home however currently coming from nursing home after recent hospitalization.  Independent at baseline      ROS  Complete review of systems was obtained.  Negative aside from HPI    OBJECTIVE  Vitals:    03/21/25 1036 03/21/25 1038   BP: 104/50    BP Location: Right arm    Patient Position: Sitting    Pulse: 84    Resp: 16    Temp: 36.3 °C (97.3 °F)    TempSrc: Temporal    SpO2: (!) 84% (!) 93%   Weight: 71.7 kg (158 lb)    Height: 1.6 m (5' 3\")       Results from last 7 days   Lab Units 03/21/25  1055   WBC AUTO x10*3/uL 8.2   HEMOGLOBIN g/dL 7.6*   HEMATOCRIT % 25.4*   PLATELETS AUTO x10*3/uL 239   NEUTROS PCT AUTO % 75.2   LYMPHS PCT AUTO % 8.7   MONOS PCT AUTO % 14.1   EOS PCT AUTO % 1.0      Results from last 7 days   Lab Units " 03/21/25  1055   SODIUM mmol/L 129*   POTASSIUM mmol/L 3.3*   CHLORIDE mmol/L 93*   CO2 mmol/L 28   BUN mg/dL 19   CREATININE mg/dL 0.87   CALCIUM mg/dL 8.4*   PROTEIN TOTAL g/dL 5.7*   BILIRUBIN TOTAL mg/dL 0.5   ALK PHOS U/L 88   ALT U/L 18   AST U/L 23   GLUCOSE mg/dL 126*       Physical Exam  Physical Exam:  General:  Pleasant and cooperative.  Mild distress  HEENT:  Normocephalic, atraumatic, mucus membranes moist.   Neck:  Trachea midline.  No JVD.    Chest:  Clear to auscultation bilaterally.  Decreased breath sounds in lower lobe with Rales  CV:  Regular rate and rhythm.  Positive S1/S2.   Abdomen: Bowel sounds present in all four quadrants, abdomen is soft, non-tender, non-distended.  Extremities:  No lower extremity edema or cyanosis.   Neurological:  AAOx3. No focal deficits.  Skin:  Warm and dry.       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  Patient has had multiple recent admissions for congestive heart failure exacerbation.  Patient presents with chief complaint of shortness of breath with concern for pneumonia.    Sepsis secondary to pneumonia  Acute hypoxemia secondary to above   history of COPD    PLAN:  -DuoNebs scheduled and as needed  -Mucolytics as needed  -Start patient on broad-spectrum antibiotics with vancomycin and Zosyn  -Urine antigens, procalcitonin ordered  -Blood cultures and sputum cultures pending  -CT PE pending    Congestive heart failure with preserved ejection fraction  Sick sinus syndrome status post pacemaker  Hypertension  Moderate mitral valve  regurgitation  Moderate to severe tricuspid regurgitation  Aortic valve sclerosis    PLAN:  -Hold antihypertensives in the setting of sepsis  -Diuresis as needed.  Patient received IV Lasix 20 mg we will give her another 20 mg tomorrow morning and monitor hemodynamics very closely especially in the setting of her sepsis  -Patient does appear to be essentially volume overloaded evident by her pleural effusion however does not appear to be peripherally volume overloaded  -Do not suspect patient is in acute CHF exacerbation at this time      Transverse colon invasive moderately differentiated adenocarcinoma  Hypokalemia    PLAN:  -Patient follows with general surgery.  At this time no current inpatient surgical plan  -Replenish electrolytes as needed.  Maintain potassium greater than 4, magnesium greater than    Anemia    PLAN:  -Transfuse if hemoglobin less than 7  -Trend CBC    Chronic Conditions  CVA  Venous insufficiency  Gout    -Pt on ASA/Plavix continue   -Continue Allopurinol      DVT ppx: Lovenox  GI ppx: None  IVF: PRN  Diet: Cardiac , Easy to chew   Consults: -  CODE STATUS: Full code.  Discussed with both the patient and her eldest daughter.  Agreeable to CPR, intubation, ICU transfer.     Marshall Laguerre DO  Please SecureChat for any further questions  This is a preliminary note, please await attending attestation for final A/P

## 2025-03-21 NOTE — CONSULTS
Vancomycin Dosing by Pharmacy- INITIAL    Yesenia Milner is a 89 y.o. year old female who Pharmacy has been consulted for vancomycin dosing for pneumonia. Based on the patient's indication and renal status this patient will be dosed based on a goal AUC of 400-600.     Renal function is currently stable.    Visit Vitals  /50 (BP Location: Right arm, Patient Position: Sitting)   Pulse 84   Temp 36.3 °C (97.3 °F) (Temporal)   Resp 16        Lab Results   Component Value Date    CREATININE 0.87 2025    CREATININE 0.80 2025    CREATININE 1.02 2025    CREATININE 0.91 2025        Patient weight is as follows:   Vitals:    25 1036   Weight: 71.7 kg (158 lb)       Cultures:  No results found for the encounter in last 14 days.        No intake/output data recorded.  I/O during current shift:  No intake/output data recorded.    Temp (24hrs), Av.3 °C (97.3 °F), Min:36.3 °C (97.3 °F), Max:36.3 °C (97.3 °F)         Assessment/Plan     Patient will be given a loading dose of 1750 mg at 1600 today (3/21).  Will initiate vancomycin maintenance,  1000 mg every 24 hours at 1600 tomorrow (3/22).    This dosing regimen is predicted by [employer] to result in the following pharmacokinetic parameters:  Loading dose: 1750 mg at 16:00 2025.  Regimen: 1000 mg IV every 24 hours.  Start time: 16:00 on 2025  Exposure target: AUC24 (range)400-600 mg/L.hr   [id number]: 418 mg/L.hr  AUC24,ss: 458 mg/L.hr  Probability of AUC24 > 400: 65 %  Ctrough,ss: 14.1 mg/L  Probability of Ctrough,ss > 20: 20 %    Follow-up level will be ordered on 3/23 at AM labs unless clinically indicated sooner.  Will continue to monitor renal function daily while on vancomycin and order serum creatinine at least every 48 hours if not already ordered.  Follow for continued vancomycin needs, clinical response, and signs/symptoms of toxicity.       Katerina Soni, PharmD

## 2025-03-21 NOTE — ED PROVIDER NOTES
Limitations to History: none  External Records Reviewed  Independent Historians: self, nursing staff  Social determinants affecting care: none    HPI  Yesenia Milner is a 89 y.o. female who presents emergency department for assessment of cough and shortness of breath.  She reports that cough is dry.  2 days ago she was started on antibiotics for pneumonia.  She is really not felt any better.  She reports generalized weakness.  She denies chest pains.  She has not had fever or chills.  She does have slight peripheral edema.  Denies abdominal pains, nausea, vomiting, diarrhea.  She has no further complaints.    [facility]  Past Medical History:   Diagnosis Date    Gross hematuria 10/07/2020    Impacted cerumen 02/22/2024    Other conditions influencing health status     Normal stress echocardiogram    Personal history of other medical treatment     History of echocardiogram    Personal history of other medical treatment     H/O Doppler ultrasound    Systolic CHF (Multi) 05/18/2023    reviewed by myself.    Meds  Current Outpatient Medications   Medication Instructions    allopurinol (ZYLOPRIM) 100 mg, oral, Daily    aspirin 81 mg, oral, Daily    calcium carbonate-vitamin D3 (Calcium 600 + D,3,) 600 mg-5 mcg (200 unit) tablet 1 tablet, Daily    clopidogrel (PLAVIX) 75 mg, oral, Daily    colchicine 0.6 mg, oral, Daily    fluticasone propion-salmeteroL (Advair) 115-21 mcg/actuation inhaler 2 puffs, inhalation, 2 times daily, Rinse mouth with water after use to reduce aftertaste and incidence of candidiasis. Do not swallow.    gabapentin (NEURONTIN) 300 mg, oral, 2 times daily    hydrocortisone 1 % lotion Topical, 2 times daily, Apply to both legs , wash hands after applying lotion    lubricating eye drops ophthalmic solution 1 drop, As needed    metoprolol tartrate (LOPRESSOR) 25 mg, oral, Daily    polyethylene glycol (Glycolax, Miralax) 17 gram/dose powder Mix 1 capful (17 g) of powder with 4 to 8 ounces of water or juice  "and drink 2 times a day.       Allergies  Allergies   Allergen Reactions    Iodine Rash    Shrimp Diarrhea and Nausea/vomiting    Hydrocodone Unknown     Pt does not remember    Adhesive Tape-Silicones Itching    reviewed by myself.    SHx  Social History     Tobacco Use    Smoking status: Former     Current packs/day: 0.00     Types: Cigarettes     Quit date:      Years since quittin.2     Passive exposure: Past    Smokeless tobacco: Never   Vaping Use    Vaping status: Never Used   Substance Use Topics    Alcohol use: Never    Drug use: Never    reviewed by myself.      ------------------------------------------------------------------------------------------------------------------------------------------    /50 (BP Location: Right arm, Patient Position: Sitting)   Pulse 84   Temp 36.3 °C (97.3 °F) (Temporal)   Resp 16   Ht 1.6 m (5' 3\")   Wt 71.7 kg (158 lb)   SpO2 (!) 93%   BMI 27.99 kg/m²     Physical Exam  Vitals and nursing note reviewed.   Constitutional:       General: She is not in acute distress.     Appearance: Normal appearance. She is normal weight. She is not ill-appearing or toxic-appearing.   HENT:      Head: Normocephalic.      Nose: Nose normal.      Mouth/Throat:      Mouth: Mucous membranes are moist.      Pharynx: Oropharynx is clear.   Eyes:      Extraocular Movements: Extraocular movements intact.      Conjunctiva/sclera: Conjunctivae normal.   Cardiovascular:      Rate and Rhythm: Normal rate and regular rhythm.   Pulmonary:      Effort: Pulmonary effort is normal.      Breath sounds: Normal breath sounds.   Abdominal:      General: Abdomen is flat.      Palpations: Abdomen is soft.      Tenderness: There is no abdominal tenderness. There is no guarding or rebound.   Musculoskeletal:         General: Normal range of motion.      Cervical back: Neck supple.      Right lower leg: Edema present.      Left lower leg: Edema present.   Skin:     General: Skin is warm and " dry.   Neurological:      Mental Status: She is alert and oriented to person, place, and time.   Psychiatric:         Attention and Perception: Attention normal.         Mood and Affect: Mood normal.          ------------------------------------------------------------------------------------------------------------------------------------------  Labs  Labs Reviewed   CBC WITH AUTO DIFFERENTIAL - Abnormal       Result Value    WBC 8.2      nRBC 0.0      RBC 3.04 (*)     Hemoglobin 7.6 (*)     Hematocrit 25.4 (*)     MCV 84      MCH 25.0 (*)     MCHC 29.9 (*)     RDW 16.9 (*)     Platelets 239      Neutrophils % 75.2      Immature Granulocytes %, Automated 0.6      Lymphocytes % 8.7      Monocytes % 14.1      Eosinophils % 1.0      Basophils % 0.4      Neutrophils Absolute 6.13 (*)     Immature Granulocytes Absolute, Automated 0.05      Lymphocytes Absolute 0.71 (*)     Monocytes Absolute 1.15 (*)     Eosinophils Absolute 0.08      Basophils Absolute 0.03     COMPREHENSIVE METABOLIC PANEL - Abnormal    Glucose 126 (*)     Sodium 129 (*)     Potassium 3.3 (*)     Chloride 93 (*)     Bicarbonate 28      Anion Gap 11      Urea Nitrogen 19      Creatinine 0.87      eGFR 63      Calcium 8.4 (*)     Albumin 3.3 (*)     Alkaline Phosphatase 88      Total Protein 5.7 (*)     AST 23      Bilirubin, Total 0.5      ALT 18     TROPONIN I, HIGH SENSITIVITY - Abnormal    Troponin I, High Sensitivity 24 (*)     Narrative:     Less than 99th percentile of normal range cutoff-  Female and children under 18 years old <14 ng/L; Male <21 ng/L: Negative  Repeat testing should be performed if clinically indicated.     Female and children under 18 years old 14-50 ng/L; Male 21-50 ng/L:  Consistent with possible cardiac damage and possible increased clinical   risk. Serial measurements may help to assess extent of myocardial damage.     >50 ng/L: Consistent with cardiac damage, increased clinical risk and  myocardial infarction. Serial  measurements may help assess extent of   myocardial damage.      NOTE: Children less than 1 year old may have higher baseline troponin   levels and results should be interpreted in conjunction with the overall   clinical context.     NOTE: Troponin I testing is performed using a different   testing methodology at [facility] than at [facility]. Direct result comparisons should only   be made within the same method.   B-TYPE NATRIURETIC PEPTIDE - Abnormal     (*)     Narrative:        <100 pg/mL - Heart failure unlikely  100-299 pg/mL - Intermediate probability of acute heart                  failure exacerbation. Correlate with clinical                  context and patient history.    >=300 pg/mL - Heart Failure likely. Correlate with clinical                  context and patient history.    BNP testing is performed using different testing methodology at [facility] than at other [facility]. Direct result comparisons should only be made within the same method.      MAGNESIUM - Normal    Magnesium 2.07     SARS-COV-2 AND INFLUENZA A/B PCR - Normal    Flu A Result Not Detected      Flu B Result Not Detected      Coronavirus 2019, PCR Not Detected      Narrative:     This assay is an FDA-cleared, in vitro diagnostic nucleic acid amplification test for the qualitative detection and differentiation of SARS CoV-2/ Influenza A/B from nasopharyngeal specimens collected from individuals with signs and symptoms of respiratory tract infections, and has been validated for use at [facility]. Negative results do not preclude COVID-19/ Influenza A/B infections and should not be used as the sole basis for diagnosis, treatment, or other management decisions. Testing for SARS CoV-2 is recommended only for patients who meet current clinical and/or epidemiological criteria defined by federal, state, or local public health directives.   RSV PCR - Normal    RSV  PCR Not Detected      Narrative:     This assay is an FDA-cleared, in vitro diagnostic nucleic acid amplification test for the detection of RSV from nasopharyngeal specimens, and has been validated for use at [facility]. Negative results do not preclude RSV infections, and should not be used as the sole basis for diagnosis, treatment, or other management decisions. If Influenza A/B and RSV PCR results are negative, testing for Parainfluenza virus, Adenovirus and Metapneumovirus is routinely performed for pediatric oncology and intensive care inpatients at [facility], and is available on other patients by placing an add-on request.       LACTATE - Normal    Lactate 1.6      Narrative:     Venipuncture immediately after or during the administration of Metamizole may lead to falsely low results. Testing should be performed immediately prior to Metamizole dosing.   BLOOD CULTURE   BLOOD CULTURE   STREPTOCOCCUS PNEUMONIAE ANTIGEN, URINE   LEGIONELLA ANTIGEN, URINE   MRSA SURVEILLANCE FOR VANCOMYCIN DE-ESCALATION, PCR   MYCOPLASMA PNEUMONIAE ANTIBODY, IGM   PROCALCITONIN   URINALYSIS WITH REFLEX MICROSCOPIC        Imaging  XR chest 2 views   Final Result   Unchanged interstitial edema and small bilateral pleural effusions   with superimposed atelectasis/infiltrate.        Signed by: Radha Ngo 3/21/2025 11:54 AM   Dictation workstation:   [id number]      CT angio chest for pulmonary embolism    (Results Pending)        ED Course  ED Course as of 03/21/25 1444   Fri Mar 21, 2025   1119 Interpreted by the Emergency Department Attending: ECG revealed paced complexes at a rate of 77 beats per minute with MO interval * , QRS of 163 , QTc of 547.  No acute injury pattern. Previous EKG on March 8 revealed no significant changes.    [MG]      ED Course User Index  [MG] Everton King DO         Diagnoses as of 03/21/25 1444   Acute congestive heart failure, unspecified heart failure type   Hypoxia         Medical Decision Making: She did not appear ill or toxic.  Vital signs reviewed.  She is hypoxic.  Blood pressure is on the lower side of normal.  She is afebrile.  Heart rate stable.  She was placed on a portable cardiac monitor and pulse ox.  Will perform comprehensive workup for her symptoms.    Differential diagnoses considered: Pneumonia, COVID, influenza, CHF exacerbation, pulmonary embolism, other    Medications given: IV Lasix, oral potassium, IV Solu-Medrol, IV Benadryl    EKG interpreted by myself and ED attending: See above    I reviewed the labs from today.  No leukocytosis.  Hemoglobin 7.6 with hematocrit of 25.4 which is about her baseline.  BUN and creatinine normal.  Glucose 126.  Sodium 129.  Potassium 3.43.  Troponin 24.  .  COVID, influenza, flu negative.  Lactic normal.  Blood cultures pending.  Chest x-ray showing unchanged interstitial edema and small bilateral pleural effusions with superimposed atelectasis versus infiltrate.  CTA of the chest was ordered to rule out pulmonary embolism or pneumonia.  She does have allergy to iodine.  She gets hives when she receives this.  She will be premedicated with Solu-Medrol and Benadryl prior to scan.  She will need to be admitted.  I consulted her PCP. I spoke with Dr. Mensah.  Case discussed and evaluated with ED attending who is agreeable to patient plan of care.    Diagnosis: CHF, hypoxia  Plan: Admit     Marlon Davidson PA-C  03/21/25 [id number]

## 2025-03-21 NOTE — H&P
History Of Present Illness  Yesenia Milner is a 89 y.o. female presenting with Acute congestive heart failure, unspecified heart failure type .     Past Medical History  She has a past medical history of Gross hematuria (10/07/2020), Impacted cerumen (02/22/2024), Other conditions influencing health status, Personal history of other medical treatment, Personal history of other medical treatment, and Systolic CHF (Multi) (05/18/2023).    Surgical History  She has a past surgical history that includes Appendectomy (11/22/2017); Hysterectomy (11/22/2017); Tonsillectomy (11/22/2017); Other surgical history (11/22/2017); Other surgical history (11/22/2017); Cardiac pacemaker placement (03/11/2021); IR angiogram renal bilateral (Bilateral, 12/14/2020); IR angiogram inferior epigastric pelvic (12/14/2020); IR angiogram inferior epigastric pelvic (12/14/2020); and Cardiac catheterization (N/A, 12/20/2024).     Social History  She reports that she quit smoking about 52 years ago. Her smoking use included cigarettes. She has been exposed to tobacco smoke. She has never used smokeless tobacco. She reports that she does not drink alcohol and does not use drugs.    Family History  Family History   Problem Relation Name Age of Onset    No Known Problems Mother          Allergies  Iodine, Shrimp, Hydrocodone, and Adhesive tape-silicones    Review of Systems   Constitutional:  Positive for activity change and fatigue.   Respiratory:  Positive for shortness of breath.    Cardiovascular:  Positive for leg swelling.   All other systems reviewed and are negative.       Physical Exam   Physical Exam:  General:  Pleasant and cooperative.  Mild distress  HEENT:  Normocephalic, atraumatic, mucus membranes moist.   Neck:  Trachea midline.  No JVD.    Chest:  Clear to auscultation bilaterally.  Decreased breath sounds in lower lobe with Rales  CV:  Regular rate and rhythm.  Positive S1/S2.   Abdomen: Bowel sounds present in all four  quadrants, abdomen is soft, non-tender, non-distended.  Extremities:  No lower extremity edema or cyanosis.   Neurological:  AAOx3. No focal deficits.  Skin:  Warm and dry.  Last Recorded Vitals  /59   Pulse 66   Temp 36.3 °C (97.3 °F) (Temporal)   Resp 16   Wt 71.7 kg (158 lb)   SpO2 94%     Relevant Results             had concerns including Shortness of Breath (BIBA [place] [facility] from [facility] for hypoxia, patient was recently dx with pneumonia and started antibiotics x2 days ago. Per EMS patient was 80% on RA when arrived and placed on 4L NC. Patient is AOX4, and was sent in at family request).       Problem List Items Addressed This Visit       Hypoxia    * (Principal) Acute congestive heart failure, unspecified heart failure type - Primary    Relevant Medications    clopidogrel (Plavix) tablet 75 mg    metoprolol tartrate (Lopressor) tablet 25 mg       HPI       Shortness of Breath     Additional comments: BIBA [place] [facility] from [facility] for hypoxia, patient was recently dx with pneumonia and started antibiotics x2 days ago. Per EMS patient was 80% on RA when arrived and placed on 4L NC. Patient is AOX4, and was sent in at family request          Last edited by Drea Cesar RN on 3/21/2025 10:44 AM.          Problem List as of 3/21/2025 Reviewed: 2/25/2025  9:52 AM by Naima Keane MD      Aortic stenosis, mild    Arthritis    Chronic diastolic congestive heart failure    Last Assessment & Plan 2/25/2025 Office Visit Edited 2/25/2025 10:33 AM by Naima Keane MD     -Chronic, BP controlled with beta-blocker and torsemide  -Most recent EF 60%, with diastolic dysfunction  -Follow-up cardiology-ASAP-for diuretic/fluid management-for worsening edema  -pt/cg agreeable with palliative care eval(I am surprised and disappointed-this was not already addressed during her multiple recent hospitals admissions)  Orders:    Referral to Palliative Care; Future           Complete heart  block    COPD (chronic obstructive pulmonary disease) (Multi)    Last Assessment & Plan 9/5/2023 Telemedicine Written 9/5/2023  2:17 PM by Adore Carroll PharmD     Patient has COPD diagnosis and is not currently experiencing any SOB, coughing, or wheezing.  Continue advair 115-21 mcg 2 puffs twice daily.  Recommend patient have a rescue inhaler at home, but she declines at this time.          History of paroxysmal supraventricular tachycardia    HTN (hypertension)    Paresthesia    Presence of permanent cardiac pacemaker    Last Assessment & Plan 1/31/2025 Office Visit Edited 1/31/2025  3:31 PM by Naima Keane MD     -Current pacemaker is her second 1  -Appointment with EP specialist February 24       RTC 3 months  No refills needed per pt/CG ( grandson brought her here)         Raynauds disease    Sensorineural hearing loss (SNHL) of both ears    Dyspnea on minimal exertion    Varicose veins of lower extremities with inflammation    Vertigo    Ischemic cerebrovascular accident (CVA) (Multi)    Last Assessment & Plan 9/5/2023 Telemedicine Written 9/5/2023  2:19 PM by Adore Carroll PharmD     Patient recently hospitalized for non-cardioembolic CVA (atherosclerotic in nature).   Continue aspirin 81 mg daily, plavix 75 mg daily, and atorvastatin 20 mg daily. DAPT to only be taken x 21 days, then plavix monotherapy thereafter.   Patient hasn't seen neurologist, but will request referral at PCP appt on 9/11.         Acquired deformity of toe    Benign neoplasm of skin of foot    Benign paroxysmal positional vertigo    Dermatophytosis of nail    Leg swelling    Macular degeneration    Mitral valve insufficiency    Moderate mitral valve regurgitation    Overweight with body mass index (BMI) 25.0-29.9    Plantar wart of left foot    Venous insufficiency    Dysarthria following cerebral infarction    Hyperglycemia    Hand paresthesia    Sick sinus syndrome (Multi)    Last Assessment & Plan 2/26/2024 Office Visit  Written 2/26/2024  2:20 PM by James C Ramicone, DO     1.  Sick sinus syndrome: This patient has a history of symptomatic sinus bradycardia and has a [employer]onic dual-chamber permanent pacemaker which was replaced in July 2023.  The original device implantation was October 2012.  The pacemaker is functioning appropriately and the battery status is stable.  Continue follow-up as scheduled through the cardiac device clinic.  The patient will follow-up with me in 1 year.         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 Julian.  Patient transition to DOAC (Eliquis).  Asymptomatic of her right IJ and SVC thrombus.  No planned mechanical thrombectomy.  Continue to monitor for bleeding.  Patient lives at home no history of falls would recommend PT OT evaluate for home safety.  Awaiting results of vascular ultrasound of upper extremity.    Thank you very much for allowing Vascular Surgery to be involved in the care of your patient sincerely Antonio MONTALVO .  (This note was generated with voice recognition software and may contain errors including spelling, grammar, syntax and missed recognition of what was dictated, of which may not have been fully corrected)         Gastrointestinal hemorrhage, unspecified gastrointestinal hemorrhage type    Last Assessment & Plan 1/12/2025 Hospital Encounter Written 1/23/2025  9:41 AM by Iglesia Gabriel DO     89-year-old female with past medical history of COPD, HTN, CVA, sick sinus syndrome s/p pacemaker, HFpEF, recent NSTEMI, and recent hospitalization for CHF exacerbation with acute pulmonary edema, imaging at that time was concerning for SVC thrombus and patient was evaluated by  vascular who recommended DOAC for 6 months and repeat CT.  She presents with red blood per rectum and possible epistaxis. Hospitalized for further evaluation and management.     #GI bleed, suspect lower -> likely ischemic colitis (wall thickening at distal transverse colon, associated abdominal discomfort and dizziness, brown stool with red blood reported).  #Epistaxis ?- no reported vomiting.  Blood noted with clearing of her nose and when rinsing her mouth with fluids.   #SVC thrombus     Presentation is concerning for ischemic colitis as noted above.  Avoid hypotension  Consult gastroenterology, appreciate input  Will hold aspirin, Plavix, and Eliquis for the time being  Trend H&H, stable on presentation.  Patient agreeable to blood transfusions as needed.  N.p.o. after midnight  Low suspicion for upper GI source, however will continue with Protonix 40 mg IV twice daily until evaluated by GI.  Consult to vascular surgery regarding ongoing need for Eliquis.   Dr Smith's last note on 1/6/2025 “I have reviewed the DVT scan performed today.  There is no evidence of IJ thrombus.  Waveforms are normal in the IJ as well as subclavian, suggestive that even if there is a thrombus in the SVC, it is not hemodynamically significant.”   Check Orthostatic vitals     #Acute hypoxic respiratory failure  #Acute on chronic diastolic congestive heart failure  #History sick sinus syndrome s/p pacemaker  #History NSTEMI  #Valvular hear disease  #pleural effusions     CT angio A/P was suggestive of CHF with interstitial edema and small pleural effusions.   Low-sodium diet  Consult cardiology, patient follows with Dr Jiang and Ramicone for EP  Supplemental oxygen as needed  Continue oral diuretics, will defer to cardiology IV diuresis .  DuoNebs as needed  RT to evaluate     #DVT Ppx  SCD  Holding ac        1/13: doing ok, informed about CT results, gi on board,      1/14: CLD. Plans for colonscopy to get definitive dx, ok to stop  eliquis per vascular/cardio     1/15: plans for scope tomorrow     1/16: scope confirmed cancer, she is unsure if she wants surgery or not, will consult surgery to give her more information  1/17: contemplating surgyer, will need cardiac clearance will talk to family as well     1/18: patient seen by cardiology this am ; continue to hold aspirin and Plavix at this time.;  Patient noted with expiratory wheezes on exam.  Pulse ox is stable.  We will order chest x-rays and ensure that she is getting her nebulizers as ordered.    Awaiting Vascular surgery in put as well  Patient is still contemplating surgery  Continue to trend labs    1/19: lab work stable.  We will start Mucinex and Tessalon Perles for cough.  Patient appears agreeable for surgery we will continue discussion with surgical team.  Aspirin Plavix are on hold, seen by cardiology at discharge patient should only resume aspirin  Continue to monitor labs.  Plan of care discussed with attending   Dr. Iglesia mart.      1/20: plans to undergo surgery, date pending    1/21: continue pre op preparation  1/22: surgery cleared plans to go to OR outpatient to help her recover a bit pior to surgery    I spent 35 minutes in the professional and overall care of this patient.         Malignant neoplasm of transverse colon (Multi)    Last Assessment & Plan 1/31/2025 Office Visit Edited 1/31/2025  3:31 PM by Naima Keane MD                   Stage 3 chronic kidney disease, unspecified whether stage 3a or 3b CKD (Multi)    Last Assessment & Plan 1/31/2025 Office Visit Edited 1/31/2025  3:31 PM by Naima Keane MD     -Her GFR has been stable around 55, she does not see a kidney specialist              Acute decompensated heart failure    Acute exacerbation of chronic heart failure    Hypoxia    Pleural effusion    Cellulitis of lower extremity    Adenocarcinoma of colon (Multi)    Bilateral leg edema    * (Principal) Acute congestive heart failure, unspecified heart  failure type       Active Ambulatory Problems     Diagnosis Date Noted    Aortic stenosis, mild 05/18/2023    Arthritis 05/18/2023    Chronic diastolic congestive heart failure 05/18/2023    Complete heart block 05/18/2023    COPD (chronic obstructive pulmonary disease) (Multi) 05/18/2023    History of paroxysmal supraventricular tachycardia 05/18/2023    HTN (hypertension) 05/18/2023    Paresthesia 05/18/2023    Presence of permanent cardiac pacemaker 05/18/2023    Raynauds disease 05/18/2023    Sensorineural hearing loss (SNHL) of both ears 05/18/2023    Dyspnea on minimal exertion 05/18/2023    Varicose veins of lower extremities with inflammation 05/31/2015    Vertigo 05/18/2023    Ischemic cerebrovascular accident (CVA) (Multi) 09/05/2023    Acquired deformity of toe 05/31/2015    Benign neoplasm of skin of foot 07/03/2016    Benign paroxysmal positional vertigo 09/20/2023    Dermatophytosis of nail 08/09/2014    Leg swelling 09/20/2023    Macular degeneration 09/20/2023    Mitral valve insufficiency 09/20/2023    Moderate mitral valve regurgitation 09/20/2023    Overweight with body mass index (BMI) 25.0-29.9 09/20/2023    Plantar wart of left foot 07/03/2016    Venous insufficiency 08/09/2014    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    Hyperglycemia 08/23/2023    Hand paresthesia 02/22/2024    Sick sinus syndrome (Multi) 02/26/2024    Injury of kidney 06/26/2024    NSTEMI (non-ST elevated myocardial infarction) (Multi) 12/20/2024    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) 01/06/2025    Gastrointestinal hemorrhage, unspecified gastrointestinal hemorrhage type 01/12/2025    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    Stage 3 chronic kidney disease, unspecified whether stage 3a or 3b CKD (Multi) 01/31/2025    Acute decompensated heart failure 02/02/2025    Acute exacerbation of chronic heart failure  03/04/2025    Hypoxia 03/04/2025    Pleural effusion 03/04/2025    Cellulitis of lower extremity 03/04/2025    Adenocarcinoma of colon (Multi) 03/04/2025    Bilateral leg edema 03/05/2025     Resolved Ambulatory Problems     Diagnosis Date Noted    Systolic CHF (Multi) 05/18/2023    Gross hematuria 10/07/2020    Ulcer of toe of left foot (Multi) 12/06/2015    Ulcer of toe of right foot (Multi) 04/24/2016    Post-menopausal 09/20/2023    Impacted cerumen 02/22/2024    Urinary tract infection 02/22/2024    Shortness of breath 01/02/2025     Past Medical History:   Diagnosis Date    Other conditions influencing health status     Personal history of other medical treatment     Personal history of other medical treatment            Active Orders   Imaging    CT angio chest for pulmonary embolism     Frequency: Once     Number of Occurrences: 1 Occurrences   Lab    CBC     Frequency: AM draw     Number of Occurrences: 5 Occurrences    Magnesium     Frequency: AM draw     Number of Occurrences: 5 Occurrences    Renal Function Panel     Frequency: AM draw     Number of Occurrences: 5 Occurrences    Troponin, High Sensitivity, 1 Hour     Frequency: Once     Number of Occurrences: 1 Occurrences    Vancomycin     Frequency: AM draw     Number of Occurrences: 1 Occurrences   Diet    Adult diet Cardiac; 70 gm fat; 2 - 3 grams Sodium; Easy to chew; 1800 mL fluid     Frequency: Effective now     Number of Occurrences: Until Specified   Nursing    Apply sequential compression device     Frequency: Until discontinued     Number of Occurrences: Until Specified   Consult    Inpatient consult to Social Work and TCC     Frequency: Once     Number of Occurrences: 1 Occurrences   Nourishments    May Participate in Room Service     Frequency: Once     Number of Occurrences: 1 Occurrences   OT    OT eval and treat     Frequency: Until therapy completed     Number of Occurrences: 1 Occurrences   PT    PT eval and treat     Frequency: Until  therapy completed     Number of Occurrences: 1 Occurrences   Respiratory Care    Incentive spirometry Instruct     Frequency: Once     Number of Occurrences: 1 Occurrences    Respiratory care eval and treat     Frequency: Once     Number of Occurrences: 1 Occurrences     Order Comments: Due 3/24 @ 0700     ECG    ECG 12 lead     Frequency: Once     Number of Occurrences: 1 Occurrences   Admission    Admit to inpatient     Frequency: Once     Number of Occurrences: 1 Occurrences   Telemetry    Telemetry monitoring     Frequency: Until discontinued     Number of Occurrences: 3 Days   Medications    acetaminophen (Tylenol) oral liquid 650 mg     Linked Order: Or     Frequency: q4h PRN     Dose: 650 mg     Route: nasogastric tube    acetaminophen (Tylenol) suppository 650 mg     Linked Order: Or     Frequency: q4h PRN     Dose: 650 mg     Route: rectal    acetaminophen (Tylenol) tablet 650 mg     Linked Order: Or     Frequency: q4h PRN     Dose: 650 mg     Route: oral    allopurinol (Zyloprim) tablet 100 mg     Frequency: Daily     Dose: 100 mg     Route: oral    aspirin chewable tablet 81 mg     Frequency: Daily     Dose: 81 mg     Route: oral    clopidogrel (Plavix) tablet 75 mg     Frequency: Daily     Dose: 75 mg     Route: oral    enoxaparin (Lovenox) syringe 40 mg     Frequency: q24h     Dose: 40 mg     Route: subcutaneous    fluticasone furoate-vilanteroL (Breo Ellipta) 200-25 mcg/dose inhaler 1 puff     Frequency: Daily     Dose: 1 puff     Route: inhalation    furosemide (Lasix) injection 20 mg     Frequency: Once     Dose: 20 mg     Route: intravenous    gabapentin (Neurontin) capsule 300 mg     Frequency: BID     Dose: 300 mg     Route: oral    guaiFENesin (Mucinex) 12 hr tablet 600 mg     Frequency: BID PRN     Dose: 600 mg     Route: oral    ipratropium-albuteroL (Duo-Neb) 0.5-2.5 mg/3 mL nebulizer solution 3 mL     Frequency: q2h PRN     Dose: 3 mL     Route: nebulization    ipratropium-albuteroL (Duo-Neb)  "0.5-2.5 mg/3 mL nebulizer solution 3 mL     Frequency: TID     Dose: 3 mL     Route: nebulization    metoprolol tartrate (Lopressor) tablet 25 mg     Frequency: Daily     Dose: 25 mg     Route: oral    piperacillin-tazobactam (Zosyn) 4.5 g in dextrose (iso)  mL     Frequency: q8h     Dose: 4.5 g     Route: intravenous    vancomycin (Vancocin) 1,000 mg in dextrose 5%  mL     Frequency: q24h     Dose: 1,000 mg     Route: intravenous    vancomycin (Vancocin) pharmacy to dose - pharmacy monitoring     Frequency: Daily PRN     Route: miscellaneous    vancomycin 1,750 mg in dextrose 5% 500 mL IV     Frequency: Once     Dose: 1,750 mg     Route: intravenous     Dietary Orders (From admission, onward)       Start     Ordered    03/21/25 1607  May Participate in Room Service  ( ROOM SERVICE MAY PARTICIPATE)  Once        Question:  .  Answer:  Yes    03/21/25 1606    03/21/25 1433  Adult diet Cardiac; 70 gm fat; 2 - 3 grams Sodium; Easy to chew; 1800 mL fluid  Diet effective now        Question Answer Comment   Diet type Cardiac    Fat restriction: 70 gm fat    Sodium restriction: 2 - 3 grams Sodium    Texture Easy to chew    Dietary fluid restriction / 24h: 1800 mL fluid        03/21/25 1433                  89 yrs@  ADMITDTTM@  Acute congestive heart failure, unspecified heart failure type [I50.9]  [unfilled]  Weight: 71.7 kg (158 lb)     Vitals:    03/21/25 1036 03/21/25 1038 03/21/25 1506 03/21/25 1604   BP: 104/50      Pulse: 84   66   Resp: 16   16   Temp: 36.3 °C (97.3 °F)      TempSrc: Temporal      SpO2: (!) 84% (!) 93% 96% 94%   Weight: 71.7 kg (158 lb)      Height: 1.6 m (5' 3\")       03/21/25 1606   BP: 125/59   Pulse:    Resp:    Temp:    TempSrc:    SpO2:    Weight:    Height:             Chief Complaint    Shortness of Breath         Patient seen resting in bed with head of bed elevated, no s/s or c/o acute difficulties at this time.  Vital signs for last 24 hours Temperature:  [36.3 °C (97.3 °F)] " 36.3 °C (97.3 °F)  Heart Rate:  [66-84] 66  Respirations:  [16] 16  BP: (104-125)/(50-59) 125/59    No intake/output data recorded.  Patient still requiring frequent cardiac and SPO2 monitoring. Continue aggressive pulmonary hygiene and oral hygiene. Off loading as tolerated for skin integrity. Medications and labs reviewed-    Patient recently received an antibiotic (last 12 hours)       Date/Time Action Medication Dose    03/21/25 1553 New Bag    piperacillin-tazobactam (Zosyn) 4.5 g in dextrose (iso)  mL 4.5 g            Results for orders placed or performed during the hospital encounter of 03/21/25 (from the past 96 hours)   CBC and Auto Differential   Result Value Ref Range    WBC 8.2 4.4 - 11.3 x10*3/uL    nRBC 0.0 0.0 - 0.0 /100 WBCs    RBC 3.04 (L) 4.00 - 5.20 x10*6/uL    Hemoglobin 7.6 (L) 12.0 - 16.0 g/dL    Hematocrit 25.4 (L) 36.0 - 46.0 %    MCV 84 80 - 100 fL    MCH 25.0 (L) 26.0 - 34.0 pg    MCHC 29.9 (L) 32.0 - 36.0 g/dL    RDW 16.9 (H) 11.5 - 14.5 %    Platelets 239 150 - 450 x10*3/uL    Neutrophils % 75.2 40.0 - 80.0 %    Immature Granulocytes %, Automated 0.6 0.0 - 0.9 %    Lymphocytes % 8.7 13.0 - 44.0 %    Monocytes % 14.1 2.0 - 10.0 %    Eosinophils % 1.0 0.0 - 6.0 %    Basophils % 0.4 0.0 - 2.0 %    Neutrophils Absolute 6.13 (H) 1.60 - 5.50 x10*3/uL    Immature Granulocytes Absolute, Automated 0.05 0.00 - 0.50 x10*3/uL    Lymphocytes Absolute 0.71 (L) 0.80 - 3.00 x10*3/uL    Monocytes Absolute 1.15 (H) 0.05 - 0.80 x10*3/uL    Eosinophils Absolute 0.08 0.00 - 0.40 x10*3/uL    Basophils Absolute 0.03 0.00 - 0.10 x10*3/uL   Magnesium   Result Value Ref Range    Magnesium 2.07 1.60 - 2.40 mg/dL   Comprehensive metabolic panel   Result Value Ref Range    Glucose 126 (H) 74 - 99 mg/dL    Sodium 129 (L) 136 - 145 mmol/L    Potassium 3.3 (L) 3.5 - 5.3 mmol/L    Chloride 93 (L) 98 - 107 mmol/L    Bicarbonate 28 21 - 32 mmol/L    Anion Gap 11 10 - 20 mmol/L    Urea Nitrogen 19 6 - 23 mg/dL     Creatinine 0.87 0.50 - 1.05 mg/dL    eGFR 63 >60 mL/min/1.73m*2    Calcium 8.4 (L) 8.6 - 10.3 mg/dL    Albumin 3.3 (L) 3.4 - 5.0 g/dL    Alkaline Phosphatase 88 33 - 136 U/L    Total Protein 5.7 (L) 6.4 - 8.2 g/dL    AST 23 9 - 39 U/L    Bilirubin, Total 0.5 0.0 - 1.2 mg/dL    ALT 18 7 - 45 U/L   Troponin I, High Sensitivity   Result Value Ref Range    Troponin I, High Sensitivity 24 (H) 0 - 13 ng/L   B-Type Natriuretic Peptide   Result Value Ref Range     (H) 0 - 99 pg/mL   Sars-CoV-2 and Influenza A/B PCR   Result Value Ref Range    Flu A Result Not Detected Not Detected    Flu B Result Not Detected Not Detected    Coronavirus 2019, PCR Not Detected Not Detected   RSV PCR   Result Value Ref Range    RSV PCR Not Detected Not Detected   Lactate   Result Value Ref Range    Lactate 1.6 0.4 - 2.0 mmol/L   Urinalysis with Reflex Microscopic   Result Value Ref Range    Color, Urine Colorless (N) Light-Yellow, Yellow, Dark-Yellow    Appearance, Urine Clear Clear    Specific Gravity, Urine 1.007 1.005 - 1.035    pH, Urine 7.0 5.0, 5.5, 6.0, 6.5, 7.0, 7.5, 8.0    Protein, Urine NEGATIVE NEGATIVE, 10 (TRACE), 20 (TRACE) mg/dL    Glucose, Urine Normal Normal mg/dL    Blood, Urine NEGATIVE NEGATIVE mg/dL    Ketones, Urine NEGATIVE NEGATIVE mg/dL    Bilirubin, Urine NEGATIVE NEGATIVE mg/dL    Urobilinogen, Urine Normal Normal mg/dL    Nitrite, Urine NEGATIVE NEGATIVE    Leukocyte Esterase, Urine NEGATIVE NEGATIVE   MRSA Surveillance for Vancomycin De-escalation, PCR    Specimen: Anterior Nares; Swab   Result Value Ref Range    MRSA PCR Not Detected Not Detected   Troponin I, High Sensitivity, Initial   Result Value Ref Range    Troponin I, High Sensitivity 24 (H) 0 - 13 ng/L       Patient fully evaluated 03/21, thorough record review performed of previous labs and notes from prior encounters. Plan discussed with interdisciplinary team, consults placed, appreciate input. Will continue current and repeat labs in the AM.  Therapy evaluations ordered. Patient aware and agreeable to current plan, continue plan as above.     I spent a total of 75 minutes on the date of the service which included preparing to see the patient, face-to-face patient care, completing clinical documentation, obtaining and/or reviewing separately obtained history, performing a medically appropriate examination, counseling and educating the patient/family/caregiver, ordering medications, tests, or procedures, communicating with other HCPs (not separately reported), independently interpreting results (not separately reported), communicating results to the patient/family/caregiver, and care coordination (not separately reported).        Assessment/Plan   Assessment & Plan  Acute congestive heart failure, unspecified heart failure type           Riya Urena

## 2025-03-21 NOTE — ED TRIAGE NOTES
BIBA Angela 5 from [facility] for hypoxia, patient was recently dx with pneumonia and started antibiotics x2 days ago. Per EMS patient was 80% on RA when arrived and placed on 4L NC. Patient is AOX4, and was sent in at family request

## 2025-03-22 LAB
ALBUMIN SERPL BCP-MCNC: 3.3 G/DL (ref 3.4–5)
ANION GAP SERPL CALC-SCNC: 13 MMOL/L (ref 10–20)
BNP SERPL-MCNC: 815 PG/ML (ref 0–99)
BUN SERPL-MCNC: 21 MG/DL (ref 6–23)
CALCIUM SERPL-MCNC: 8.2 MG/DL (ref 8.6–10.3)
CHLORIDE SERPL-SCNC: 94 MMOL/L (ref 98–107)
CO2 SERPL-SCNC: 28 MMOL/L (ref 21–32)
CREAT SERPL-MCNC: 0.95 MG/DL (ref 0.5–1.05)
EGFRCR SERPLBLD CKD-EPI 2021: 57 ML/MIN/1.73M*2
ERYTHROCYTE [DISTWIDTH] IN BLOOD BY AUTOMATED COUNT: 17.1 % (ref 11.5–14.5)
GLUCOSE SERPL-MCNC: 151 MG/DL (ref 74–99)
HCT VFR BLD AUTO: 25.6 % (ref 36–46)
HGB BLD-MCNC: 7.5 G/DL (ref 12–16)
HOLD SPECIMEN: NORMAL
LEGIONELLA AG UR QL: NEGATIVE
MAGNESIUM SERPL-MCNC: 2.17 MG/DL (ref 1.6–2.4)
MCH RBC QN AUTO: 24.4 PG (ref 26–34)
MCHC RBC AUTO-ENTMCNC: 29.3 G/DL (ref 32–36)
MCV RBC AUTO: 83 FL (ref 80–100)
NRBC BLD-RTO: 0 /100 WBCS (ref 0–0)
PHOSPHATE SERPL-MCNC: 4 MG/DL (ref 2.5–4.9)
PLATELET # BLD AUTO: 220 X10*3/UL (ref 150–450)
POTASSIUM SERPL-SCNC: 3.7 MMOL/L (ref 3.5–5.3)
RBC # BLD AUTO: 3.08 X10*6/UL (ref 4–5.2)
S PNEUM AG UR QL: NEGATIVE
SODIUM SERPL-SCNC: 131 MMOL/L (ref 136–145)
WBC # BLD AUTO: 4.8 X10*3/UL (ref 4.4–11.3)

## 2025-03-22 PROCEDURE — 2500000002 HC RX 250 W HCPCS SELF ADMINISTERED DRUGS (ALT 637 FOR MEDICARE OP, ALT 636 FOR OP/ED): Performed by: INTERNAL MEDICINE

## 2025-03-22 PROCEDURE — 94640 AIRWAY INHALATION TREATMENT: CPT

## 2025-03-22 PROCEDURE — 2500000001 HC RX 250 WO HCPCS SELF ADMINISTERED DRUGS (ALT 637 FOR MEDICARE OP)

## 2025-03-22 PROCEDURE — 36415 COLL VENOUS BLD VENIPUNCTURE: CPT

## 2025-03-22 PROCEDURE — 83880 ASSAY OF NATRIURETIC PEPTIDE: CPT | Performed by: INTERNAL MEDICINE

## 2025-03-22 PROCEDURE — 1200000002 HC GENERAL ROOM WITH TELEMETRY DAILY

## 2025-03-22 PROCEDURE — 83735 ASSAY OF MAGNESIUM: CPT

## 2025-03-22 PROCEDURE — 94669 MECHANICAL CHEST WALL OSCILL: CPT

## 2025-03-22 PROCEDURE — 2500000004 HC RX 250 GENERAL PHARMACY W/ HCPCS (ALT 636 FOR OP/ED): Performed by: INTERNAL MEDICINE

## 2025-03-22 PROCEDURE — 99223 1ST HOSP IP/OBS HIGH 75: CPT | Performed by: INTERNAL MEDICINE

## 2025-03-22 PROCEDURE — 85027 COMPLETE CBC AUTOMATED: CPT

## 2025-03-22 PROCEDURE — 2500000004 HC RX 250 GENERAL PHARMACY W/ HCPCS (ALT 636 FOR OP/ED)

## 2025-03-22 PROCEDURE — 97161 PT EVAL LOW COMPLEX 20 MIN: CPT | Mod: GP

## 2025-03-22 PROCEDURE — 80069 RENAL FUNCTION PANEL: CPT

## 2025-03-22 PROCEDURE — 97165 OT EVAL LOW COMPLEX 30 MIN: CPT | Mod: GO

## 2025-03-22 RX ORDER — POLYETHYLENE GLYCOL 3350 17 G/17G
17 POWDER, FOR SOLUTION ORAL 2 TIMES DAILY
Status: DISCONTINUED | OUTPATIENT
Start: 2025-03-22 | End: 2025-03-26 | Stop reason: HOSPADM

## 2025-03-22 RX ADMIN — VANCOMYCIN HYDROCHLORIDE 1000 MG: 1 INJECTION, SOLUTION INTRAVENOUS at 16:13

## 2025-03-22 RX ADMIN — FLUTICASONE FUROATE AND VILANTEROL TRIFENATATE 1 PUFF: 200; 25 POWDER RESPIRATORY (INHALATION) at 07:31

## 2025-03-22 RX ADMIN — PIPERACILLIN SODIUM AND TAZOBACTAM SODIUM 4.5 G: 4; .5 INJECTION, SOLUTION INTRAVENOUS at 00:35

## 2025-03-22 RX ADMIN — PIPERACILLIN SODIUM AND TAZOBACTAM SODIUM 4.5 G: 4; .5 INJECTION, SOLUTION INTRAVENOUS at 07:45

## 2025-03-22 RX ADMIN — GABAPENTIN 300 MG: 300 CAPSULE ORAL at 09:55

## 2025-03-22 RX ADMIN — ASPIRIN 81 MG CHEWABLE TABLET 81 MG: 81 TABLET CHEWABLE at 09:55

## 2025-03-22 RX ADMIN — ALLOPURINOL 100 MG: 100 TABLET ORAL at 09:55

## 2025-03-22 RX ADMIN — ENOXAPARIN SODIUM 40 MG: 40 INJECTION SUBCUTANEOUS at 14:55

## 2025-03-22 RX ADMIN — FUROSEMIDE 20 MG: 10 INJECTION, SOLUTION INTRAVENOUS at 00:33

## 2025-03-22 RX ADMIN — POLYETHYLENE GLYCOL 3350 17 G: 17 POWDER, FOR SOLUTION ORAL at 20:27

## 2025-03-22 RX ADMIN — IPRATROPIUM BROMIDE AND ALBUTEROL SULFATE 3 ML: .5; 3 SOLUTION RESPIRATORY (INHALATION) at 18:55

## 2025-03-22 RX ADMIN — PIPERACILLIN SODIUM AND TAZOBACTAM SODIUM 4.5 G: 4; .5 INJECTION, SOLUTION INTRAVENOUS at 23:48

## 2025-03-22 RX ADMIN — PIPERACILLIN SODIUM AND TAZOBACTAM SODIUM 4.5 G: 4; .5 INJECTION, SOLUTION INTRAVENOUS at 14:55

## 2025-03-22 RX ADMIN — GABAPENTIN 300 MG: 300 CAPSULE ORAL at 20:27

## 2025-03-22 RX ADMIN — IPRATROPIUM BROMIDE AND ALBUTEROL SULFATE 3 ML: .5; 3 SOLUTION RESPIRATORY (INHALATION) at 11:38

## 2025-03-22 RX ADMIN — CLOPIDOGREL BISULFATE 75 MG: 75 TABLET ORAL at 09:55

## 2025-03-22 RX ADMIN — IPRATROPIUM BROMIDE AND ALBUTEROL SULFATE 3 ML: .5; 3 SOLUTION RESPIRATORY (INHALATION) at 07:28

## 2025-03-22 ASSESSMENT — COGNITIVE AND FUNCTIONAL STATUS - GENERAL
DRESSING REGULAR LOWER BODY CLOTHING: A LOT
WALKING IN HOSPITAL ROOM: A LOT
HELP NEEDED FOR BATHING: A LOT
TURNING FROM BACK TO SIDE WHILE IN FLAT BAD: A LITTLE
MOVING FROM LYING ON BACK TO SITTING ON SIDE OF FLAT BED WITH BEDRAILS: A LITTLE
STANDING UP FROM CHAIR USING ARMS: A LOT
TOILETING: TOTAL
PERSONAL GROOMING: A LITTLE
DRESSING REGULAR UPPER BODY CLOTHING: A LITTLE
WALKING IN HOSPITAL ROOM: A LOT
MOVING TO AND FROM BED TO CHAIR: A LOT
MOVING FROM LYING ON BACK TO SITTING ON SIDE OF FLAT BED WITH BEDRAILS: A LOT
DRESSING REGULAR LOWER BODY CLOTHING: A LITTLE
DAILY ACTIVITIY SCORE: 19
DAILY ACTIVITIY SCORE: 15
CLIMB 3 TO 5 STEPS WITH RAILING: TOTAL
MOVING TO AND FROM BED TO CHAIR: A LITTLE
TURNING FROM BACK TO SIDE WHILE IN FLAT BAD: A LOT
MOBILITY SCORE: 12
STANDING UP FROM CHAIR USING ARMS: A LITTLE
MOBILITY SCORE: 15
HELP NEEDED FOR BATHING: A LITTLE
CLIMB 3 TO 5 STEPS WITH RAILING: A LOT
DRESSING REGULAR UPPER BODY CLOTHING: A LITTLE
TOILETING: A LOT

## 2025-03-22 ASSESSMENT — PAIN - FUNCTIONAL ASSESSMENT
PAIN_FUNCTIONAL_ASSESSMENT: 0-10

## 2025-03-22 ASSESSMENT — PAIN SCALES - GENERAL
PAINLEVEL_OUTOF10: 0 - NO PAIN
PAINLEVEL_OUTOF10: 0 - NO PAIN

## 2025-03-22 ASSESSMENT — ACTIVITIES OF DAILY LIVING (ADL): BATHING_ASSISTANCE: MINIMAL

## 2025-03-22 NOTE — CARE PLAN
The patient's goals for the shift include      The clinical goals for the shift include Patient will remain free from injury    Over the shift, the patient did not make progress toward the following goals. Barriers to progression include ssist with adls

## 2025-03-22 NOTE — PROGRESS NOTES
Occupational Therapy    Occupational Therapy    Evaluation    Patient Name: Yesenia Milner  MRN: [id number]  Today's Date: 3/22/2025  Time Calculation  Start Time: 1102  Stop Time: 1120  Time Calculation (min): 18 min  622/622-A    Assessment  IP OT Assessment  OT Assessment: Pt. presents with a decline in self-care, mobility, and safety and would benefit from skilled OT services to maximize independence and promote return to prior level of function.  Prognosis: Good  Barriers to Discharge Home: Physical needs  Physical Needs: Returning to long-term care/other facility  Evaluation/Treatment Tolerance: Patient tolerated treatment well  Medical Staff Made Aware: Yes  End of Session Communication: Bedside nurse  End of Session Patient Position: Up in chair, Alarm on (call light in reach. all needs met)    Plan:  Treatment Interventions: ADL retraining, Functional transfer training, Endurance training, Patient/family training, Equipment evaluation/education  OT Frequency: 3 times per week  OT Discharge Recommendations: Moderate intensity level of continued care  OT - OK to Discharge: Yes (once cleared by medical team)    Subjective     Current Problem:  1. Acute congestive heart failure, unspecified heart failure type        2. Hypoxia            General:  General  Reason for Referral: OT eval and treat for adls  Referred By: Shade  Past Medical History Relevant to Rehab: Pt. admitted from SNF due to sob and cough, hypoxia, recent pneumonia. Past hx of COPD, CVA, HTN, colon Ca, pacemaker.  Family/Caregiver Present: No  Co-Treatment: PT  Co-Treatment Reason: to maximize pt. safety and mobility  Prior to Session Communication: Bedside nurse  Patient Position Received: Bed, 3 rail up, Alarm on  General Comment: pt. pleasant and agreeable to therapy evaluation    Precautions:  Medical Precautions: Fall precautions, Oxygen therapy device and L/min  Precautions Comment: Elim IRA    Pain:  Pain Assessment  Pain Assessment:  0-10  0-10 (Numeric) Pain Score:  (pt. c/o intermittent heel discomfort along with tingling)    Objective     Cognition:  Overall Cognitive Status: Within Functional Limits     Home Living:  Home Living Comments: At baseline, prior to January 2025, pt. was ind. with ambulation with  rolling walker and adls/iadls, lived alone. Pt. recently at SNF for skilled therapy.     Prior Function:  Level of [place]: Needs assistance with ADLs, Needs assistance with homemaking, Needs assistance with functional transfers  Receives Help From:  (dtr. does laundry and driving)  Ambulatory Assistance:  (pt. ambulating with rolling walker with assist from staff at SNF.)  Hand Dominance: Right    ADL:  Eating Assistance: Independent  Grooming Assistance: Stand by  Bathing Assistance: Minimal  UE Dressing Assistance: Minimal  LE Dressing Assistance: Moderate  Toileting Assistance with Device: Total    Activity Tolerance:  Endurance: Decreased tolerance for upright activites    Bed Mobility/Transfers:   Bed Mobility  Bed Mobility:  (mod. assist of 1 supine to sitting at eob)  Transfers  Transfer:  (pt. sit to stand with mod. assist of 2.)    Ambulation/Gait Training:  Functional Mobility  Functional Mobility Performed:  (pt. ambulated a few steps from bed to chair with mod. assist of 1 using rolling walker.)    Sitting Balance:  Static Sitting Balance  Static Sitting-Level of Assistance: Close supervision    Strength:  Strength Comments: bue elbow/ 4/5    Coordination:  Movements are Fluid and Coordinated: Yes     Hand Function:  Hand Function  Gross Grasp: Functional  Coordination: Functional    Extremities:   RUE : Within Functional Limits   LUE: Within Functional Limits    Outcome Measures: [facility] Daily Activity  Putting on and taking off regular lower body clothing: A lot  Bathing (including washing, rinsing, drying): A lot  Putting on and taking off regular upper body clothing: A little  Toileting, which includes using  toilet, bedpan or urinal: Total  Taking care of personal grooming such as brushing teeth: A little  Eating Meals: None  Daily Activity - Total Score: 15     EDUCATION:     Education Documentation  ADL Training, taught by Jackelin Cortez OT at 3/22/2025  1:56 PM.  Learner: Patient  Readiness: Acceptance  Method: Explanation  Response: Verbalizes Understanding    Education Comments  No comments found.        Goals:   Encounter Problems       Encounter Problems (Active)       OT Goals       Pt. will perform bathing and dressing with min. assist using adaptive equipment as needed.  (Progressing)       Start:  03/22/25    Expected End:  04/05/25            Pt. will perform toileting with min. assist using adaptive equipment as needed.  (Progressing)       Start:  03/22/25    Expected End:  04/05/25            Pt. will perform bed mobility/chair/commode transfers with cga. (Progressing)       Start:  03/22/25    Expected End:  04/05/25            Pt. will perform functional mobility with rolling walker with cga to access bathroom. (Progressing)       Start:  03/22/25    Expected End:  04/05/25            Pt. will tolerate 6-8 min. of standing tasks with sba in preparation for grooming at sink.  (Progressing)       Start:  03/22/25    Expected End:  04/05/25

## 2025-03-22 NOTE — PROGRESS NOTES
Physical Therapy    Physical Therapy Evaluation    Patient Name: Yesenia Milner  MRN: [id number]  Today's Date: 3/22/2025   Time Calculation  Start Time: 1101  Stop Time: 1120  Time Calculation (min): 19 min  622/622-A    Assessment/Plan   PT Assessment  PT Assessment Results: Decreased strength, Decreased endurance, Impaired balance, Decreased mobility, Impaired hearing, Impaired sensation, Pain  Rehab Prognosis: Good  Barriers to Discharge Home: Caregiver assistance, Physical needs  Caregiver Assistance: Patient lives alone and/or does not have reliable caregiver assistance  Physical Needs: 24hr mobility assistance needed, 24hr ADL assistance needed, High falls risk due to function or environment  Evaluation/Treatment Tolerance: Patient limited by fatigue  Medical Staff Made Aware: Yes  End of Session Communication: Bedside nurse  Assessment Comment: Pt presents /c above impairments and decline from functional baseline 2nd acute medical conditions /c deconditioning, decreased balance and fall risk. Pt will benefit from continued PT services at mod intensity to address above and maximize functional mobility prior to d/c home alone.  End of Session Patient Position: Up in chair, Alarm on  IP OR SWING BED PT PLAN  Inpatient or Swing Bed: Inpatient  PT Plan  Treatment/Interventions: Bed mobility, Transfer training, Gait training, Balance training, Neuromuscular re-education, Strengthening, Endurance training, Therapeutic exercise, Therapeutic activity  PT Plan: Ongoing PT  PT Frequency: 4 times per week  PT Discharge Recommendations: Moderate intensity level of continued care  PT Recommended Transfer Status: Assist x1  PT - OK to Discharge: Yes    Subjective     Current Problem:  1. Acute congestive heart failure, unspecified heart failure type        2. Hypoxia          Patient Active Problem List   Diagnosis    Aortic stenosis, mild    Arthritis    Chronic diastolic congestive heart failure    Complete heart block     COPD (chronic obstructive pulmonary disease) (Multi)    History of paroxysmal supraventricular tachycardia    HTN (hypertension)    Paresthesia    Presence of permanent cardiac pacemaker    Raynauds disease    Sensorineural hearing loss (SNHL) of both ears    Dyspnea on minimal exertion    Varicose veins of lower extremities with inflammation    Vertigo    Ischemic cerebrovascular accident (CVA) (Multi)    Acquired deformity of toe    Benign neoplasm of skin of foot    Benign paroxysmal positional vertigo    Dermatophytosis of nail    Leg swelling    Macular degeneration    Mitral valve insufficiency    Moderate mitral valve regurgitation    Overweight with body mass index (BMI) 25.0-29.9    Plantar wart of left foot    Venous insufficiency    Dysarthria following cerebral infarction    Hyperglycemia    Hand paresthesia    Sick sinus syndrome (Multi)    Injury of kidney    NSTEMI (non-ST elevated myocardial infarction) (Multi)    Acute superior vena cava thrombosis (Multi)    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)    Gastrointestinal hemorrhage, unspecified gastrointestinal hemorrhage type    Malignant neoplasm of transverse colon (Multi)    Stage 3 chronic kidney disease, unspecified whether stage 3a or 3b CKD (Multi)    Acute decompensated heart failure    Acute exacerbation of chronic heart failure    Hypoxia    Pleural effusion    Cellulitis of lower extremity    Adenocarcinoma of colon (Multi)    Bilateral leg edema    Acute congestive heart failure, unspecified heart failure type       General Visit Information:  General  Reason for Referral: PT eval and treat  Referred By: Shade  Past Medical History Relevant to Rehab: COPD, CVA, HTN, colon Ca  Co-Treatment: OT  Co-Treatment Reason: possible two person assist, maximize functional mobility  Prior to Session Communication: Bedside nurse  Patient Position Received: Bed, 3 rail up, Alarm on  General Comment: Pt presents from SNF /c hypoxia  (80% SpO2 on RA) and recent pneumonia. Pt placed on 4L NC, CXR consistent /c pneumonia. Pt cleared for therapy by nursing. Pt supine, agreeable.    Home Living:  Home Living  Home Living Comments: At baseline, prior to January pt was living alone, mostly indep. Since in/out of hospitals and SNF. Currently active /c PT/OT at SNF, ambulating /c WW. Assist for ADL/IADLs. Denies falls.      Precautions:  Precautions  Precautions Comment: falls, [place]    Objective     Pain:  Pain Assessment  Pain Assessment: 0-10  0-10 (Numeric) Pain Score:  (no numeric stated, pt c/o intermittent heel discomfort from n/t)  Pain Interventions: Repositioned  Response to Interventions: Resting quietly    Cognition:  Cognition  Overall Cognitive Status: Within Functional Limits (A&Ox3)    General Assessments:  General Observation  General Observation: lines: 4L NC O2, tele, piv, purewick skin integrity: frail   Activity Tolerance  Endurance: Tolerates 10 - 20 min exercise with multiple rests  Sensation  Sensation Comment: n/t BLE  Strength  Strength Comments: BLE at least 3/5           Dynamic Sitting Balance  Dynamic Sitting-Comments: G  Dynamic Standing Balance  Dynamic Standing-Comments: F    Functional Assessments:     Bed Mobility  Bed Mobility: Yes  Bed Mobility 1  Bed Mobility Comments 1: Supine<>Sit /c modAx1, HOB elevated, increased time.  Transfers  Transfer: Yes  Transfer 1  Trials/Comments 1: Sit<>Stand /c modAx2, retropulsive /c poor righting reactions.  Ambulation/Gait Training  Ambulation/Gait Training Performed:  (Pt ambulates 4' /c SW, modAx1. Flexed posture, step to gait, narrow BOBBI, mild SOB. Pt up to chair end of session, NAD.)          Outcome Measures:     [facility] Basic Mobility  Turning from your back to your side while in a flat bed without using bedrails: A lot  Moving from lying on your back to sitting on the side of a flat bed without using bedrails: A lot  Moving to and from bed to chair (including a wheelchair): A  lot  Standing up from a chair using your arms (e.g. wheelchair or bedside chair): A lot  To walk in hospital room: A lot  Climbing 3-5 steps with railing: A lot  Basic Mobility - Total Score: 12                Goals:  Encounter Problems       Encounter Problems (Active)       PT Problem       STG - Pt will transition supine <> sitting with supervision (Progressing)       Start:  03/22/25    Expected End:  04/05/25            STG - Pt will transfer STS with SBA (Progressing)       Start:  03/22/25    Expected End:  04/05/25            STG - Pt will amb >/=75' using WW with SBA (Progressing)       Start:  03/22/25    Expected End:  04/05/25            STG - Pt will maintain F+ standing balance to decrease risk of falls (Progressing)       Start:  03/22/25    Expected End:  04/05/25                 Education Documentation  Mobility Training, taught by Teena Stallings, PT at 3/22/2025  1:30 PM.  Learner: Patient  Readiness: Acceptance  Method: Explanation  Response: Verbalizes Understanding, Needs Reinforcement  Comment: AD use, transfers    Education Comments  No comments found.

## 2025-03-22 NOTE — PROGRESS NOTES
Yesenia Milner is a 89 y.o. female on day 1 of admission presenting with Acute congestive heart failure, unspecified heart failure type.      Subjective   Patient fully evaluated  03/22  for    Problem List Items Addressed This Visit       Hypoxia    * (Principal) Acute congestive heart failure, unspecified heart failure type - Primary    Relevant Medications    clopidogrel (Plavix) tablet 75 mg    metoprolol tartrate (Lopressor) tablet 25 mg     Patient seen resting in bed with head of bed elevated, no s/s or c/o acute difficulties at this time.  Vital signs for last 24 hours Temp:  [35.7 °C (96.3 °F)-36.2 °C (97.2 °F)] 36 °C (96.8 °F)  Heart Rate:  [62-73] 68  Resp:  [16-17] 17  BP: (108-155)/(54-82) 118/82  FiO2 (%):  [36 %] 36 %    I/O this shift:  In: 400 [P.O.:200; IV Piggyback:200]  Out: -   Patient still requiring frequent cardiac and SPO2 monitoring. Continue aggressive pulmonary hygiene and oral hygiene. Off loading as tolerated for skin integrity. Medications and labs reviewed-   Results for orders placed or performed during the hospital encounter of 03/21/25 (from the past 24 hours)   Troponin, High Sensitivity, 1 Hour   Result Value Ref Range    Troponin I, High Sensitivity 21 (H) 0 - 13 ng/L   Magnesium   Result Value Ref Range    Magnesium 2.17 1.60 - 2.40 mg/dL   Renal Function Panel   Result Value Ref Range    Glucose 151 (H) 74 - 99 mg/dL    Sodium 131 (L) 136 - 145 mmol/L    Potassium 3.7 3.5 - 5.3 mmol/L    Chloride 94 (L) 98 - 107 mmol/L    Bicarbonate 28 21 - 32 mmol/L    Anion Gap 13 10 - 20 mmol/L    Urea Nitrogen 21 6 - 23 mg/dL    Creatinine 0.95 0.50 - 1.05 mg/dL    eGFR 57 (L) >60 mL/min/1.73m*2    Calcium 8.2 (L) 8.6 - 10.3 mg/dL    Phosphorus 4.0 2.5 - 4.9 mg/dL    Albumin 3.3 (L) 3.4 - 5.0 g/dL   CBC   Result Value Ref Range    WBC 4.8 4.4 - 11.3 x10*3/uL    nRBC 0.0 0.0 - 0.0 /100 WBCs    RBC 3.08 (L) 4.00 - 5.20 x10*6/uL    Hemoglobin 7.5 (L) 12.0 - 16.0 g/dL    Hematocrit 25.6 (L)  36.0 - 46.0 %    MCV 83 80 - 100 fL    MCH 24.4 (L) 26.0 - 34.0 pg    MCHC 29.3 (L) 32.0 - 36.0 g/dL    RDW 17.1 (H) 11.5 - 14.5 %    Platelets 220 150 - 450 x10*3/uL   SST TOP   Result Value Ref Range    Extra Tube Hold for add-ons.    B-type natriuretic peptide   Result Value Ref Range     (H) 0 - 99 pg/mL      Patient recently received an antibiotic (last 12 hours)       Date/Time Action Medication Dose Rate    03/22/25 1613 New Bag    vancomycin (Vancocin) 1,000 mg in dextrose 5%  mL 1,000 mg 200 mL/hr    03/22/25 1455 New Bag    piperacillin-tazobactam (Zosyn) 4.5 g in dextrose (iso)  mL 4.5 g     03/22/25 0745 New Bag    piperacillin-tazobactam (Zosyn) 4.5 g in dextrose (iso)  mL 4.5 g            Plan discussed with interdisciplinary team, will continue to diuresis patient cardiology on the case, appreciate input. Patient being treated for pneumonia, chest physiotherapy as tolerated, incentive spirometry, repeat chest xray in the AM.  Will continue current and repeat labs in the AM.     Discharge planning discussed with patient and care team. Therapy evaluations ordered. Anticipate SNF at discharge. Patient aware and agreeable to current plan, continue plan as above.     I spent a total of 50 minutes on the date of the service which included preparing to see the patient, face-to-face patient care, completing clinical documentation, obtaining and/or reviewing separately obtained history, performing a medically appropriate examination, counseling and educating the patient/family/caregiver, ordering medications, tests, or procedures, communicating with other HCPs (not separately reported), independently interpreting results (not separately reported), communicating results to the patient/family/caregiver, and care coordination (not separately reported).        Objective     Last Recorded Vitals  /82   Pulse 68   Temp 36 °C (96.8 °F)   Resp 17   Wt 71.7 kg (158 lb)   SpO2 97%    Intake/Output last 3 Shifts:    Intake/Output Summary (Last 24 hours) at 3/22/2025 1630  Last data filed at 3/22/2025 1614  Gross per 24 hour   Intake 1000 ml   Output --   Net 1000 ml       Admission Weight  Weight: 71.7 kg (158 lb) (03/21/25 1036)    Daily Weight  03/21/25 : 71.7 kg (158 lb)    Image Results  CT angio chest for pulmonary embolism  Narrative: Interpreted By:  Rupert Parekh,   STUDY:  CT ANGIO CHEST FOR PULMONARY EMBOLISM; 3/21/2025 4:27 pm      INDICATION:  Signs/Symptoms:History of colon cancer, short of breath, hypoxic.      COMPARISON:  None      ACCESSION NUMBER(S):  [id number]      ORDERING CLINICIAN:  KRISTIN GARCIA      TECHNIQUE:  Contiguous axial images of the thorax were obtained from the level of  the thoracic inlet through the lung bases. 3-D MIPS were created,  processed and reviewed on a separate workstation. 100 ml of Omnipaque  350 was utilized. All CT examinations are performed with 1 or more of  the following dose reduction techniques: Automated exposure control,  adjustment of mA and/or kv according to patient's size, or use of  iterative reconstruction techniques.      FINDINGS:  The thyroid gland is unremarkable.      There is adequate contrast opacification of the pulmonary arterial  vasculature. No filling defect or vessel cutoff to indicate pulmonary  embolus.      The heart size is within normal limits.  No pericardial effusion is  identified. The aorta and pulmonary arteries are normal in caliber.  No thoracic aortic dissection.      There are no pathologically enlarged mediastinal, hilar, or axillary  lymph nodes are seen.      The trachea and mainstem bronchi are patent. Moderate right pleural  effusion. Small-moderate left pleural effusion. Patchy ground-glass  airspace opacities bilaterally, nonspecific but can be seen with  inflammatory etiology as well as mild pulmonary edema and potentially  atypical infectious etiology.      The visualized osseous  structures are intact.          Impression: 1. No pulmonary embolus.  2. Moderate right pleural effusion. Small-moderate left pleural  effusion.  3. Patchy ground-glass airspace opacities bilaterally, nonspecific  but can be seen with inflammatory etiology as well as mild pulmonary  edema and potentially atypical infectious etiology.          Signed by: Rupert Parekh 3/21/2025 6:47 PM  Dictation workstation:   [id number]  XR chest 2 views  Narrative: Interpreted By:  Radha Ngo,   STUDY:  XR CHEST 2 VIEWS;  3/21/2025 11:34 am      INDICATION:  Signs/Symptoms:cough.      COMPARISON:  03/04/2025      ACCESSION NUMBER(S):  [id number]      ORDERING CLINICIAN:  MONTEZ PARRY      FINDINGS:  PA and lateral radiographs of the chest were provided.      Left subclavian pacemaker/AICD.      CARDIOMEDIASTINAL SILHOUETTE:  Persistently enlarged cardiomediastinal silhouette.      LUNGS:  Unchanged small bilateral pleural effusions with superimposed  atelectasis/infiltrate. Similar prominent interstitial lung markings  noted. No pneumothorax.      ABDOMEN:  No remarkable upper abdominal findings.      BONES:  No acute osseous changes.      Impression: Unchanged interstitial edema and small bilateral pleural effusions  with superimposed atelectasis/infiltrate.      Signed by: Radha Ngo 3/21/2025 11:54 AM  Dictation workstation:   [id number]      Physical Exam    Relevant Results               Assessment/Plan                  Assessment & Plan  Acute congestive heart failure, unspecified heart failure type          Fei Mensah MD   Physician  Internal Medicine     H&P      Signed     Date of Service: 3/21/2025  5:08 PM     Signed       Expand All Collapse All    History Of Present Illness  Yesenia Milner is a 89 y.o. female presenting with Acute congestive heart failure, unspecified heart failure type .     Past Medical History  She has a past medical history of Gross hematuria (10/07/2020), Impacted  cerumen (02/22/2024), Other conditions influencing health status, Personal history of other medical treatment, Personal history of other medical treatment, and Systolic CHF (Multi) (05/18/2023).     Surgical History  She has a past surgical history that includes Appendectomy (11/22/2017); Hysterectomy (11/22/2017); Tonsillectomy (11/22/2017); Other surgical history (11/22/2017); Other surgical history (11/22/2017); Cardiac pacemaker placement (03/11/2021); IR angiogram renal bilateral (Bilateral, 12/14/2020); IR angiogram inferior epigastric pelvic (12/14/2020); IR angiogram inferior epigastric pelvic (12/14/2020); and Cardiac catheterization (N/A, 12/20/2024).     Social History  She reports that she quit smoking about 52 years ago. Her smoking use included cigarettes. She has been exposed to tobacco smoke. She has never used smokeless tobacco. She reports that she does not drink alcohol and does not use drugs.     Family History  Family History          Family History   Problem Relation Name Age of Onset    No Known Problems Mother                Allergies  Iodine, Shrimp, Hydrocodone, and Adhesive tape-silicones     Review of Systems   Constitutional:  Positive for activity change and fatigue.   Respiratory:  Positive for shortness of breath.    Cardiovascular:  Positive for leg swelling.   All other systems reviewed and are negative.        Physical Exam   Physical Exam:  General:  Pleasant and cooperative.  Mild distress  HEENT:  Normocephalic, atraumatic, mucus membranes moist.   Neck:  Trachea midline.  No JVD.    Chest:  Clear to auscultation bilaterally.  Decreased breath sounds in lower lobe with Rales  CV:  Regular rate and rhythm.  Positive S1/S2.   Abdomen: Bowel sounds present in all four quadrants, abdomen is soft, non-tender, non-distended.  Extremities:  No lower extremity edema or cyanosis.   Neurological:  AAOx3. No focal deficits.  Skin:  Warm and dry.  Last Recorded Vitals  /59   Pulse 66    Temp 36.3 °C (97.3 °F) (Temporal)   Resp 16   Wt 71.7 kg (158 lb)   SpO2 94%      Relevant Results                 had concerns including Shortness of Breath (BIBA [facility] from [facility] for hypoxia, patient was recently dx with pneumonia and started antibiotics x2 days ago. Per EMS patient was 80% on RA when arrived and placed on 4L NC. Patient is AOX4, and was sent in at family request).        Problem List Items Addressed This Visit         Hypoxia     * (Principal) Acute congestive heart failure, unspecified heart failure type - Primary     Relevant Medications     clopidogrel (Plavix) tablet 75 mg     metoprolol tartrate (Lopressor) tablet 25 mg         HPI         Shortness of Breath     Additional comments: BIBA [facility] 5 from [facility] for hypoxia, patient was recently dx with pneumonia and started antibiotics x2 days ago. Per EMS patient was 80% on RA when arrived and placed on 4L NC. Patient is AOX4, and was sent in at family request            Last edited by Drea Cesar RN on 3/21/2025 10:44 AM.             Problem List as of 3/21/2025 Reviewed: 2/25/2025  9:52 AM by Naima Keane MD        Aortic stenosis, mild     Arthritis     Chronic diastolic congestive heart failure     Last Assessment & Plan 2/25/2025 Office Visit Edited 2/25/2025 10:33 AM by Naima Keane MD       -Chronic, BP controlled with beta-blocker and torsemide  -Most recent EF 60%, with diastolic dysfunction  -Follow-up cardiology-ASAP-for diuretic/fluid management-for worsening edema  -pt/cg agreeable with palliative care eval(I am surprised and disappointed-this was not already addressed during her multiple recent hospitals admissions)  Orders:    Referral to Palliative Care; Future              Complete heart block     COPD (chronic obstructive pulmonary disease) (Multi)     Last Assessment & Plan 9/5/2023 Telemedicine Written 9/5/2023  2:17 PM by Adore Carroll, Daphne       Patient has COPD  diagnosis and is not currently experiencing any SOB, coughing, or wheezing.  Continue advair 115-21 mcg 2 puffs twice daily.  Recommend patient have a rescue inhaler at home, but she declines at this time.            History of paroxysmal supraventricular tachycardia     HTN (hypertension)     Paresthesia     Presence of permanent cardiac pacemaker     Last Assessment & Plan 1/31/2025 Office Visit Edited 1/31/2025  3:31 PM by Naima Keane MD       -Current pacemaker is her second 1  -Appointment with EP specialist February 24        RTC 3 months  No refills needed per pt/CG ( julieta brought her here)           Raynauds disease     Sensorineural hearing loss (SNHL) of both ears     Dyspnea on minimal exertion     Varicose veins of lower extremities with inflammation     Vertigo     Ischemic cerebrovascular accident (CVA) (Multi)     Last Assessment & Plan 9/5/2023 Telemedicine Written 9/5/2023  2:19 PM by Adore Carroll, Daphne       Patient recently hospitalized for non-cardioembolic CVA (atherosclerotic in nature).   Continue aspirin 81 mg daily, plavix 75 mg daily, and atorvastatin 20 mg daily. DAPT to only be taken x 21 days, then plavix monotherapy thereafter.   Patient hasn't seen neurologist, but will request referral at PCP appt on 9/11.           Acquired deformity of toe     Benign neoplasm of skin of foot     Benign paroxysmal positional vertigo     Dermatophytosis of nail     Leg swelling     Macular degeneration     Mitral valve insufficiency     Moderate mitral valve regurgitation     Overweight with body mass index (BMI) 25.0-29.9     Plantar wart of left foot     Venous insufficiency     Dysarthria following cerebral infarction     Hyperglycemia     Hand paresthesia     Sick sinus syndrome (Multi)     Last Assessment & Plan 2/26/2024 Office Visit Written 2/26/2024  2:20 PM by James C Ramicone, DO       1.  Sick sinus syndrome: This patient has a history of symptomatic sinus bradycardia and has a  [employer]tronic dual-chamber permanent pacemaker which was replaced in July 2023.  The original device implantation was October 2012.  The pacemaker is functioning appropriately and the battery status is stable.  Continue follow-up as scheduled through the cardiac device clinic.  The patient will follow-up with me in 1 year.           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 Julian.  Patient transition to DOAC (Eliquis).  Asymptomatic of her right IJ and SVC thrombus.  No planned mechanical thrombectomy.  Continue to monitor for bleeding.  Patient lives at home no history of falls would recommend PT OT evaluate for home safety.  Awaiting results of vascular ultrasound of upper extremity.     Thank you very much for allowing Vascular Surgery to be involved in the care of your patient sincerely Antonio MONTALVO .  (This note was generated with voice recognition software and may contain errors including spelling, grammar, syntax and missed recognition of what was dictated, of which may not have been fully corrected)           Gastrointestinal hemorrhage, unspecified gastrointestinal hemorrhage type     Last Assessment & Plan 1/12/2025 Hospital Encounter Written 1/23/2025  9:41 AM by Iglesia Gabriel DO       89-year-old female with past medical history of COPD, HTN, CVA, sick sinus syndrome s/p pacemaker, HFpEF, recent NSTEMI, and recent hospitalization for CHF exacerbation with acute pulmonary edema, imaging at that time was concerning for SVC thrombus and patient was evaluated by vascular who recommended DOAC for 6 months and repeat CT.  She presents with red blood per rectum and possible epistaxis. Hospitalized for  further evaluation and management.     #GI bleed, suspect lower -> likely ischemic colitis (wall thickening at distal transverse colon, associated abdominal discomfort and dizziness, brown stool with red blood reported).  #Epistaxis ?- no reported vomiting.  Blood noted with clearing of her nose and when rinsing her mouth with fluids.   #SVC thrombus     Presentation is concerning for ischemic colitis as noted above.  Avoid hypotension  Consult gastroenterology, appreciate input  Will hold aspirin, Plavix, and Eliquis for the time being  Trend H&H, stable on presentation.  Patient agreeable to blood transfusions as needed.  N.p.o. after midnight  Low suspicion for upper GI source, however will continue with Protonix 40 mg IV twice daily until evaluated by GI.  Consult to vascular surgery regarding ongoing need for Eliquis.   Dr Smith's last note on 1/6/2025 “I have reviewed the DVT scan performed today.  There is no evidence of IJ thrombus.  Waveforms are normal in the IJ as well as subclavian, suggestive that even if there is a thrombus in the SVC, it is not hemodynamically significant.”   Check Orthostatic vitals     #Acute hypoxic respiratory failure  #Acute on chronic diastolic congestive heart failure  #History sick sinus syndrome s/p pacemaker  #History NSTEMI  #Valvular hear disease  #pleural effusions     CT angio A/P was suggestive of CHF with interstitial edema and small pleural effusions.   Low-sodium diet  Consult cardiology, patient follows with Dr Jiang and Ramicone for EP  Supplemental oxygen as needed  Continue oral diuretics, will defer to cardiology IV diuresis .  DuoNebs as needed  RT to evaluate     #DVT Ppx  SCD  Holding ac        1/13: doing ok, informed about CT results, gi on board,      1/14: CLD. Plans for colonscopy to get definitive dx, ok to stop eliquis per vascular/cardio     1/15: plans for scope tomorrow     1/16: scope confirmed cancer, she is unsure if she wants surgery or not,  will consult surgery to give her more information  1/17: contemplating surgyer, will need cardiac clearance will talk to family as well     1/18: patient seen by cardiology this am ; continue to hold aspirin and Plavix at this time.;  Patient noted with expiratory wheezes on exam.  Pulse ox is stable.  We will order chest x-rays and ensure that she is getting her nebulizers as ordered.    Awaiting Vascular surgery in put as well  Patient is still contemplating surgery  Continue to trend labs     1/19: lab work stable.  We will start Mucinex and Tessalon Perles for cough.  Patient appears agreeable for surgery we will continue discussion with surgical team.  Aspirin Plavix are on hold, seen by cardiology at discharge patient should only resume aspirin  Continue to monitor labs.  Plan of care discussed with attending   Dr. Iglesia mart.        1/20: plans to undergo surgery, date pending     1/21: continue pre op preparation  1/22: surgery cleared plans to go to OR outpatient to help her recover a bit pior to surgery     I spent 35 minutes in the professional and overall care of this patient.           Malignant neoplasm of transverse colon (Multi)     Last Assessment & Plan 1/31/2025 Office Visit Edited 1/31/2025  3:31 PM by Naima Keane MD                         Stage 3 chronic kidney disease, unspecified whether stage 3a or 3b CKD (Multi)     Last Assessment & Plan 1/31/2025 Office Visit Edited 1/31/2025  3:31 PM by Naima Keane MD       -Her GFR has been stable around 55, she does not see a kidney specialist                 Acute decompensated heart failure     Acute exacerbation of chronic heart failure     Hypoxia     Pleural effusion     Cellulitis of lower extremity     Adenocarcinoma of colon (Multi)     Bilateral leg edema     * (Principal) Acute congestive heart failure, unspecified heart failure type              Active Ambulatory Problems     Diagnosis Date Noted    Aortic stenosis, mild 05/18/2023     Arthritis 05/18/2023    Chronic diastolic congestive heart failure 05/18/2023    Complete heart block 05/18/2023    COPD (chronic obstructive pulmonary disease) (Multi) 05/18/2023    History of paroxysmal supraventricular tachycardia 05/18/2023    HTN (hypertension) 05/18/2023    Paresthesia 05/18/2023    Presence of permanent cardiac pacemaker 05/18/2023    Raynauds disease 05/18/2023    Sensorineural hearing loss (SNHL) of both ears 05/18/2023    Dyspnea on minimal exertion 05/18/2023    Varicose veins of lower extremities with inflammation 05/31/2015    Vertigo 05/18/2023    Ischemic cerebrovascular accident (CVA) (Multi) 09/05/2023    Acquired deformity of toe 05/31/2015    Benign neoplasm of skin of foot 07/03/2016    Benign paroxysmal positional vertigo 09/20/2023    Dermatophytosis of nail 08/09/2014    Leg swelling 09/20/2023    Macular degeneration 09/20/2023    Mitral valve insufficiency 09/20/2023    Moderate mitral valve regurgitation 09/20/2023    Overweight with body mass index (BMI) 25.0-29.9 09/20/2023    Plantar wart of left foot 07/03/2016    Venous insufficiency 08/09/2014    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    Hyperglycemia 08/23/2023    Hand paresthesia 02/22/2024    Sick sinus syndrome (Multi) 02/26/2024    Injury of kidney 06/26/2024    NSTEMI (non-ST elevated myocardial infarction) (Multi) 12/20/2024    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) 01/06/2025    Gastrointestinal hemorrhage, unspecified gastrointestinal hemorrhage type 01/12/2025    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    Stage 3 chronic kidney disease, unspecified whether stage 3a or 3b CKD (Multi) 01/31/2025    Acute decompensated heart failure 02/02/2025    Acute exacerbation of chronic heart failure 03/04/2025    Hypoxia 03/04/2025    Pleural effusion 03/04/2025    Cellulitis of lower extremity 03/04/2025     Adenocarcinoma of colon (Multi) 03/04/2025    Bilateral leg edema 03/05/2025           Resolved Ambulatory Problems     Diagnosis Date Noted    Systolic CHF (Multi) 05/18/2023    Gross hematuria 10/07/2020    Ulcer of toe of left foot (Multi) 12/06/2015    Ulcer of toe of right foot (Multi) 04/24/2016    Post-menopausal 09/20/2023    Impacted cerumen 02/22/2024    Urinary tract infection 02/22/2024    Shortness of breath 01/02/2025           Past Medical History:   Diagnosis Date    Other conditions influencing health status      Personal history of other medical treatment      Personal history of other medical treatment                      Active Orders   Imaging     CT angio chest for pulmonary embolism       Frequency: Once       Number of Occurrences: 1 Occurrences   Lab     CBC       Frequency: AM draw       Number of Occurrences: 5 Occurrences     Magnesium       Frequency: AM draw       Number of Occurrences: 5 Occurrences     Renal Function Panel       Frequency: AM draw       Number of Occurrences: 5 Occurrences     Troponin, High Sensitivity, 1 Hour       Frequency: Once       Number of Occurrences: 1 Occurrences     Vancomycin       Frequency: AM draw       Number of Occurrences: 1 Occurrences   Diet     Adult diet Cardiac; 70 gm fat; 2 - 3 grams Sodium; Easy to chew; 1800 mL fluid       Frequency: Effective now       Number of Occurrences: Until Specified   Nursing     Apply sequential compression device       Frequency: Until discontinued       Number of Occurrences: Until Specified   Consult     Inpatient consult to Social Work and TCC       Frequency: Once       Number of Occurrences: 1 Occurrences   Nourishments     May Participate in Room Service       Frequency: Once       Number of Occurrences: 1 Occurrences   OT     OT eval and treat       Frequency: Until therapy completed       Number of Occurrences: 1 Occurrences   PT     PT eval and treat       Frequency: Until therapy completed       Number  of Occurrences: 1 Occurrences   Respiratory Care     Incentive spirometry Instruct       Frequency: Once       Number of Occurrences: 1 Occurrences     Respiratory care eval and treat       Frequency: Once       Number of Occurrences: 1 Occurrences       Order Comments: Due 3/24 @ 0700      ECG     ECG 12 lead       Frequency: Once       Number of Occurrences: 1 Occurrences   Admission     Admit to inpatient       Frequency: Once       Number of Occurrences: 1 Occurrences   Telemetry     Telemetry monitoring       Frequency: Until discontinued       Number of Occurrences: 3 Days   Medications     acetaminophen (Tylenol) oral liquid 650 mg       Linked Order: Or       Frequency: q4h PRN       Dose: 650 mg       Route: nasogastric tube     acetaminophen (Tylenol) suppository 650 mg       Linked Order: Or       Frequency: q4h PRN       Dose: 650 mg       Route: rectal     acetaminophen (Tylenol) tablet 650 mg       Linked Order: Or       Frequency: q4h PRN       Dose: 650 mg       Route: oral     allopurinol (Zyloprim) tablet 100 mg       Frequency: Daily       Dose: 100 mg       Route: oral     aspirin chewable tablet 81 mg       Frequency: Daily       Dose: 81 mg       Route: oral     clopidogrel (Plavix) tablet 75 mg       Frequency: Daily       Dose: 75 mg       Route: oral     enoxaparin (Lovenox) syringe 40 mg       Frequency: q24h       Dose: 40 mg       Route: subcutaneous     fluticasone furoate-vilanteroL (Breo Ellipta) 200-25 mcg/dose inhaler 1 puff       Frequency: Daily       Dose: 1 puff       Route: inhalation     furosemide (Lasix) injection 20 mg       Frequency: Once       Dose: 20 mg       Route: intravenous     gabapentin (Neurontin) capsule 300 mg       Frequency: BID       Dose: 300 mg       Route: oral     guaiFENesin (Mucinex) 12 hr tablet 600 mg       Frequency: BID PRN       Dose: 600 mg       Route: oral     ipratropium-albuteroL (Duo-Neb) 0.5-2.5 mg/3 mL nebulizer solution 3 mL        "Frequency: q2h PRN       Dose: 3 mL       Route: nebulization     ipratropium-albuteroL (Duo-Neb) 0.5-2.5 mg/3 mL nebulizer solution 3 mL       Frequency: TID       Dose: 3 mL       Route: nebulization     metoprolol tartrate (Lopressor) tablet 25 mg       Frequency: Daily       Dose: 25 mg       Route: oral     piperacillin-tazobactam (Zosyn) 4.5 g in dextrose (iso)  mL       Frequency: q8h       Dose: 4.5 g       Route: intravenous     vancomycin (Vancocin) 1,000 mg in dextrose 5%  mL       Frequency: q24h       Dose: 1,000 mg       Route: intravenous     vancomycin (Vancocin) pharmacy to dose - pharmacy monitoring       Frequency: Daily PRN       Route: miscellaneous     vancomycin 1,750 mg in dextrose 5% 500 mL IV       Frequency: Once       Dose: 1,750 mg       Route: intravenous      Dietary Orders (From admission, onward)          Start     Ordered     03/21/25 1607   May Participate in Room Service  ( ROOM SERVICE MAY PARTICIPATE)  Once        Question:  .  Answer:  Yes    03/21/25 1606     03/21/25 1433   Adult diet Cardiac; 70 gm fat; 2 - 3 grams Sodium; Easy to chew; 1800 mL fluid  Diet effective now        Question Answer Comment   Diet type Cardiac     Fat restriction: 70 gm fat     Sodium restriction: 2 - 3 grams Sodium     Texture Easy to chew     Dietary fluid restriction / 24h: 1800 mL fluid         03/21/25 1433                       89 yrs@  ADMITDTTM@  Acute congestive heart failure, unspecified heart failure type [I50.9]  [unfilled]  Weight: 71.7 kg (158 lb)     Vitals          Vitals:     03/21/25 1036 03/21/25 1038 03/21/25 1506 03/21/25 1604   BP: 104/50         Pulse: 84     66   Resp: 16     16   Temp: 36.3 °C (97.3 °F)         TempSrc: Temporal         SpO2: (!) 84% (!) 93% 96% 94%   Weight: 71.7 kg (158 lb)         Height: 1.6 m (5' 3\")           03/21/25 1606   BP: 125/59   Pulse:     Resp:     Temp:     TempSrc:     SpO2:     Weight:     Height:                    Chief " Complaint    Shortness of Breath            Patient seen resting in bed with head of bed elevated, no s/s or c/o acute difficulties at this time.  Vital signs for last 24 hours Temperature:  [36.3 °C (97.3 °F)] 36.3 °C (97.3 °F)  Heart Rate:  [66-84] 66  Respirations:  [16] 16  BP: (104-125)/(50-59) 125/59    No intake/output data recorded.  Patient still requiring frequent cardiac and SPO2 monitoring. Continue aggressive pulmonary hygiene and oral hygiene. Off loading as tolerated for skin integrity. Medications and labs reviewed-    Patient recently received an antibiotic (last 12 hours)         Date/Time Action Medication Dose     03/21/25 1553 New Bag    piperacillin-tazobactam (Zosyn) 4.5 g in dextrose (iso)  mL 4.5 g                    Results for orders placed or performed during the hospital encounter of 03/21/25 (from the past 96 hours)   CBC and Auto Differential   Result Value Ref Range     WBC 8.2 4.4 - 11.3 x10*3/uL     nRBC 0.0 0.0 - 0.0 /100 WBCs     RBC 3.04 (L) 4.00 - 5.20 x10*6/uL     Hemoglobin 7.6 (L) 12.0 - 16.0 g/dL     Hematocrit 25.4 (L) 36.0 - 46.0 %     MCV 84 80 - 100 fL     MCH 25.0 (L) 26.0 - 34.0 pg     MCHC 29.9 (L) 32.0 - 36.0 g/dL     RDW 16.9 (H) 11.5 - 14.5 %     Platelets 239 150 - 450 x10*3/uL     Neutrophils % 75.2 40.0 - 80.0 %     Immature Granulocytes %, Automated 0.6 0.0 - 0.9 %     Lymphocytes % 8.7 13.0 - 44.0 %     Monocytes % 14.1 2.0 - 10.0 %     Eosinophils % 1.0 0.0 - 6.0 %     Basophils % 0.4 0.0 - 2.0 %     Neutrophils Absolute 6.13 (H) 1.60 - 5.50 x10*3/uL     Immature Granulocytes Absolute, Automated 0.05 0.00 - 0.50 x10*3/uL     Lymphocytes Absolute 0.71 (L) 0.80 - 3.00 x10*3/uL     Monocytes Absolute 1.15 (H) 0.05 - 0.80 x10*3/uL     Eosinophils Absolute 0.08 0.00 - 0.40 x10*3/uL     Basophils Absolute 0.03 0.00 - 0.10 x10*3/uL   Magnesium   Result Value Ref Range     Magnesium 2.07 1.60 - 2.40 mg/dL   Comprehensive metabolic panel   Result Value Ref Range      Glucose 126 (H) 74 - 99 mg/dL     Sodium 129 (L) 136 - 145 mmol/L     Potassium 3.3 (L) 3.5 - 5.3 mmol/L     Chloride 93 (L) 98 - 107 mmol/L     Bicarbonate 28 21 - 32 mmol/L     Anion Gap 11 10 - 20 mmol/L     Urea Nitrogen 19 6 - 23 mg/dL     Creatinine 0.87 0.50 - 1.05 mg/dL     eGFR 63 >60 mL/min/1.73m*2     Calcium 8.4 (L) 8.6 - 10.3 mg/dL     Albumin 3.3 (L) 3.4 - 5.0 g/dL     Alkaline Phosphatase 88 33 - 136 U/L     Total Protein 5.7 (L) 6.4 - 8.2 g/dL     AST 23 9 - 39 U/L     Bilirubin, Total 0.5 0.0 - 1.2 mg/dL     ALT 18 7 - 45 U/L   Troponin I, High Sensitivity   Result Value Ref Range     Troponin I, High Sensitivity 24 (H) 0 - 13 ng/L   B-Type Natriuretic Peptide   Result Value Ref Range      (H) 0 - 99 pg/mL   Sars-CoV-2 and Influenza A/B PCR   Result Value Ref Range     Flu A Result Not Detected Not Detected     Flu B Result Not Detected Not Detected     Coronavirus 2019, PCR Not Detected Not Detected   RSV PCR   Result Value Ref Range     RSV PCR Not Detected Not Detected   Lactate   Result Value Ref Range     Lactate 1.6 0.4 - 2.0 mmol/L   Urinalysis with Reflex Microscopic   Result Value Ref Range     Color, Urine Colorless (N) Light-Yellow, Yellow, Dark-Yellow     Appearance, Urine Clear Clear     Specific Gravity, Urine 1.007 1.005 - 1.035     pH, Urine 7.0 5.0, 5.5, 6.0, 6.5, 7.0, 7.5, 8.0     Protein, Urine NEGATIVE NEGATIVE, 10 (TRACE), 20 (TRACE) mg/dL     Glucose, Urine Normal Normal mg/dL     Blood, Urine NEGATIVE NEGATIVE mg/dL     Ketones, Urine NEGATIVE NEGATIVE mg/dL     Bilirubin, Urine NEGATIVE NEGATIVE mg/dL     Urobilinogen, Urine Normal Normal mg/dL     Nitrite, Urine NEGATIVE NEGATIVE     Leukocyte Esterase, Urine NEGATIVE NEGATIVE   MRSA Surveillance for Vancomycin De-escalation, PCR     Specimen: Anterior Nares; Swab   Result Value Ref Range     MRSA PCR Not Detected Not Detected   Troponin I, High Sensitivity, Initial   Result Value Ref Range     Troponin I, High  Sensitivity 24 (H) 0 - 13 ng/L         Patient fully evaluated 03/21, thorough record review performed of previous labs and notes from prior encounters. Plan discussed with interdisciplinary team, consults placed, appreciate input. Will continue current and repeat labs in the AM. Therapy evaluations ordered. Patient aware and agreeable to current plan, continue plan as above.      I spent a total of 75 minutes on the date of the service which included preparing to see the patient, face-to-face patient care, completing clinical documentation, obtaining and/or reviewing separately obtained history, performing a medically appropriate examination, counseling and educating the patient/family/caregiver, ordering medications, tests, or procedures, communicating with other HCPs (not separately reported), independently interpreting results (not separately reported), communicating results to the patient/family/caregiver, and care coordination (not separately reported).            Assessment/Plan     Assessment & Plan  Acute congestive heart failure, unspecified heart failure type                       Revision History                   Riya Urena

## 2025-03-22 NOTE — CONSULTS
Consults  History Of Present Illness:    Yesenia Milner is a 89 y.o. female presenting with CHF and pneumonia.  C/O dyspnea, orsening generalized weakness for 3-4- days     Last Recorded Vitals:  Vitals:    03/22/25 0009 03/22/25 0423 03/22/25 0728 03/22/25 0751   BP: 131/60 131/58  117/59   BP Location:       Patient Position:       Pulse: 70 65  67   Resp:       Temp: 36.2 °C (97.2 °F) 35.9 °C (96.6 °F)  35.7 °C (96.3 °F)   TempSrc:       SpO2: 95% 95% 94% 97%   Weight:       Height:           Last Labs:  CBC - 3/22/2025:  7:21 AM  4.8 7.5 220    25.6      CMP - 3/22/2025:  7:21 AM  8.2 5.7 23 --- 0.5   4.0 3.3 18 88      PTT - 1/2/2025:  7:42 PM  1.5   16.5 34     Troponin I, High Sensitivity   Date/Time Value Ref Range Status   03/21/2025 08:16 PM 21 (H) 0 - 13 ng/L Final   03/21/2025 04:05 PM 24 (H) 0 - 13 ng/L Final   03/21/2025 10:55 AM 24 (H) 0 - 13 ng/L Final     BNP   Date/Time Value Ref Range Status   03/21/2025 10:55  (H) 0 - 99 pg/mL Final   03/14/2025 05:00  (H) 0 - 99 pg/mL Final     Hemoglobin A1C   Date/Time Value Ref Range Status   03/14/2025 05:00 AM 5.2 See comment % Final   12/20/2024 02:04 AM 5.5 See comment % Final     LDL Calculated   Date/Time Value Ref Range Status   12/20/2024 02:04 AM 68 <=99 mg/dL Final     Comment:                                 Near   Borderline      AGE      Desirable  Optimal    High     High     Very High     0-19 Y     0 - 109     ---    110-129   >/= 130     ----    20-24 Y     0 - 119     ---    120-159   >/= 160     ----      >24 Y     0 -  99   100-129  130-159   160-189     >/=190     01/24/2024 10:49 AM 73 <=99 mg/dL Final     Comment:                                 Near   Borderline      AGE      Desirable  Optimal    High     High     Very High     0-19 Y     0 - 109     ---    110-129   >/= 130     ----    20-24 Y     0 - 119     ---    120-159   >/= 160     ----      >24 Y     0 -  99   100-129  130-159   160-189     >/=190       VLDL    Date/Time Value Ref Range Status   12/20/2024 02:04 AM 14 0 - 40 mg/dL Final   01/24/2024 10:49 AM 13 0 - 40 mg/dL Final   08/21/2023 12:56 AM 17 0 - 40 mg/dL Final   01/17/2023 09:43 AM 14 0 - 40 mg/dL Final   03/16/2021 10:15 AM 16 0 - 40 mg/dL Final      Last I/O:  I/O last 3 completed shifts:  In: 600 (8.4 mL/kg) [IV Piggyback:600]  Out: - (0 mL/kg)   Weight: 71.7 kg     Past Cardiology Tests (Last 3 Years):  EKG:  Electrocardiogram, 12-lead PRN ACS symtpoms 03/08/2025      ECG 12 Lead 03/04/2025      ECG 12 lead 02/02/2025      ECG 12 lead 01/12/2025      ECG 12 lead 01/02/2025      ECG 12 lead 12/21/2024      ECG 12 lead 12/20/2024      ECG 12 lead 12/20/2024    Echo:  Transthoracic echo (TTE) complete 12/21/2024 LVEF = 60-65%, Tr with moderately elevated RVSP    Ejection Fractions:  EF   Date/Time Value Ref Range Status   12/21/2024 09:03 AM 63 %      Cath:  Cardiac Catheterization Procedure 12/20/2024 non obstructive CAD    Stress Test:  No results found for this or any previous visit from the past 1095 days.    Cardiac Imaging:  No results found for this or any previous visit from the past 1095 days.      Past Medical History:  She has a past medical history of Gross hematuria (10/07/2020), Impacted cerumen (02/22/2024), Other conditions influencing health status, Personal history of other medical treatment, Personal history of other medical treatment, and Systolic CHF (Multi) (05/18/2023).    Past Surgical History:  She has a past surgical history that includes Appendectomy (11/22/2017); Hysterectomy (11/22/2017); Tonsillectomy (11/22/2017); Other surgical history (11/22/2017); Other surgical history (11/22/2017); Cardiac pacemaker placement (03/11/2021); IR angiogram renal bilateral (Bilateral, 12/14/2020); IR angiogram inferior epigastric pelvic (12/14/2020); IR angiogram inferior epigastric pelvic (12/14/2020); and Cardiac catheterization (N/A, 12/20/2024).      Social History:  She reports that she  quit smoking about 52 years ago. Her smoking use included cigarettes. She has been exposed to tobacco smoke. She has never used smokeless tobacco. She reports that she does not drink alcohol and does not use drugs.    Family History:  Family History   Problem Relation Name Age of Onset    No Known Problems Mother          Allergies:  Iodine, Shrimp, Hydrocodone, and Adhesive tape-silicones    Inpatient Medications:  Scheduled medications   Medication Dose Route Frequency    allopurinol  100 mg oral Daily    aspirin  81 mg oral Daily    clopidogrel  75 mg oral Daily    enoxaparin  40 mg subcutaneous q24h    fluticasone furoate-vilanteroL  1 puff inhalation Daily    gabapentin  300 mg oral BID    ipratropium-albuteroL  3 mL nebulization TID    [Held by provider] metoprolol tartrate  25 mg oral Daily    piperacillin-tazobactam  4.5 g intravenous q8h    vancomycin  1,000 mg intravenous q24h     PRN medications   Medication    acetaminophen    Or    acetaminophen    Or    acetaminophen    guaiFENesin    ipratropium-albuteroL    vancomycin     Continuous Medications   Medication Dose Last Rate     Outpatient Medications:  Current Outpatient Medications   Medication Instructions    albuterol 2.5 mg, nebulization, Every 4 hours PRN    allopurinol (ZYLOPRIM) 100 mg, oral, Daily    aspirin 81 mg, oral, Daily    calcium carbonate-vitamin D3 (Calcium 600 + D,3,) 600 mg-5 mcg (200 unit) tablet 1 tablet, Daily    clopidogrel (PLAVIX) 75 mg, oral, Daily    colchicine 0.6 mg, oral, Daily    DULoxetine (CYMBALTA) 30 mg, oral, Daily, Do not crush or chew.    ferrous sulfate 325 mg, oral, Daily, Do not crush, chew, or split.    fluticasone propion-salmeteroL (Advair) 115-21 mcg/actuation inhaler 2 puffs, inhalation, 2 times daily, Rinse mouth with water after use to reduce aftertaste and incidence of candidiasis. Do not swallow.    gabapentin (NEURONTIN) 300 mg, oral, 2 times daily    hydrocortisone 1 % lotion Topical, 2 times daily,  Apply to both legs , wash hands after applying lotion    ipratropium-albuteroL (Duo-Neb) 0.5-2.5 mg/3 mL nebulizer solution 3 mL, nebulization, Every 6 hours RT    levoFLOXacin (LEVAQUIN) 500 mg, oral, Daily, X 5 days.  Stop date 3/25/25    lubricating eye drops ophthalmic solution 1 drop, Both Eyes, Daily PRN    metoprolol tartrate (LOPRESSOR) 25 mg, oral, Daily    polyethylene glycol (Glycolax, Miralax) 17 gram/dose powder Mix 1 capful (17 g) of powder with 4 to 8 ounces of water or juice and drink 2 times a day.    torsemide (DEMADEX) 20 mg, oral, Daily       Physical Exam:  GEN: Frail 89 yo wf sitting 60 degrees cofortably  HHENT: Atraumatic, normocephalic  COR: Reg  LUNGS: Few crackles  EXT: Warm     Assessment/Plan   1.) Acute on chronic HFpEF with superimposed pneumonia  2.) S/P Dual chamber pacer  3.) HTN  4.) COPD  5.) Remote CVA  REC:  1.) Rechekc BNP  2.) Treatmen of pneumoia as pre primary service       Thank you for the conaultation                                   Will follow with you                                                 JLS    Peripheral IV 03/21/25 20 G Proximal;Right Forearm (Active)   Site Assessment Clean;Dry 03/22/25 0900   Dressing Status Clean;Dry 03/22/25 0900   Number of days: 1       Code Status:  Full Code    I spent 30 minutes in the professional and overall care of this patient.        Magnus Hess MD

## 2025-03-22 NOTE — CARE PLAN
The patient's goals for the shift include      The clinical goals for the shift include  remain SpO2 >92%      Problem: Respiratory  Goal: Minimal/no exertional discomfort or dyspnea this shift  Outcome: Progressing     Problem: Respiratory  Goal: No signs of respiratory distress (eg. Use of accessory muscles. Peds grunting)  Outcome: Progressing     Problem: Respiratory  Goal: Patent airway maintained this shift  Outcome: Progressing

## 2025-03-23 ENCOUNTER — APPOINTMENT (OUTPATIENT)
Dept: RADIOLOGY | Facility: HOSPITAL | Age: OVER 89
DRG: 177 | End: 2025-03-23
Payer: MEDICARE

## 2025-03-23 VITALS
TEMPERATURE: 97.7 F | BODY MASS INDEX: 28 KG/M2 | SYSTOLIC BLOOD PRESSURE: 113 MMHG | HEIGHT: 63 IN | WEIGHT: 158 LBS | RESPIRATION RATE: 20 BRPM | DIASTOLIC BLOOD PRESSURE: 56 MMHG | HEART RATE: 107 BPM | OXYGEN SATURATION: 97 %

## 2025-03-23 LAB
ALBUMIN SERPL BCP-MCNC: 3.2 G/DL (ref 3.4–5)
ALP SERPL-CCNC: 71 U/L (ref 33–136)
ALT SERPL W P-5'-P-CCNC: 27 U/L (ref 7–45)
ANION GAP SERPL CALC-SCNC: 11 MMOL/L (ref 10–20)
AST SERPL W P-5'-P-CCNC: 29 U/L (ref 9–39)
BACTERIA BLD CULT: NORMAL
BACTERIA BLD CULT: NORMAL
BASOPHILS # BLD AUTO: 0.03 X10*3/UL (ref 0–0.1)
BASOPHILS NFR BLD AUTO: 0.3 %
BILIRUB SERPL-MCNC: 0.6 MG/DL (ref 0–1.2)
BNP SERPL-MCNC: 310 PG/ML (ref 0–99)
BUN SERPL-MCNC: 24 MG/DL (ref 6–23)
CALCIUM SERPL-MCNC: 8.2 MG/DL (ref 8.6–10.3)
CHLORIDE SERPL-SCNC: 93 MMOL/L (ref 98–107)
CO2 SERPL-SCNC: 30 MMOL/L (ref 21–32)
CREAT SERPL-MCNC: 0.99 MG/DL (ref 0.5–1.05)
EGFRCR SERPLBLD CKD-EPI 2021: 54 ML/MIN/1.73M*2
EOSINOPHIL # BLD AUTO: 0.3 X10*3/UL (ref 0–0.4)
EOSINOPHIL NFR BLD AUTO: 3.2 %
ERYTHROCYTE [DISTWIDTH] IN BLOOD BY AUTOMATED COUNT: 17.4 % (ref 11.5–14.5)
GLUCOSE SERPL-MCNC: 101 MG/DL (ref 74–99)
HCT VFR BLD AUTO: 24.9 % (ref 36–46)
HGB BLD-MCNC: 7.4 G/DL (ref 12–16)
IMM GRANULOCYTES # BLD AUTO: 0.04 X10*3/UL (ref 0–0.5)
IMM GRANULOCYTES NFR BLD AUTO: 0.4 % (ref 0–0.9)
IRON SATN MFR SERPL: 10 % (ref 25–45)
IRON SERPL-MCNC: 35 UG/DL (ref 35–150)
LYMPHOCYTES # BLD AUTO: 1.31 X10*3/UL (ref 0.8–3)
LYMPHOCYTES NFR BLD AUTO: 13.8 %
M PNEUMO IGM SER IA-ACNC: 0 U/L
MAGNESIUM SERPL-MCNC: 2.22 MG/DL (ref 1.6–2.4)
MCH RBC QN AUTO: 25.3 PG (ref 26–34)
MCHC RBC AUTO-ENTMCNC: 29.7 G/DL (ref 32–36)
MCV RBC AUTO: 85 FL (ref 80–100)
MONOCYTES # BLD AUTO: 1.07 X10*3/UL (ref 0.05–0.8)
MONOCYTES NFR BLD AUTO: 11.3 %
NEUTROPHILS # BLD AUTO: 6.74 X10*3/UL (ref 1.6–5.5)
NEUTROPHILS NFR BLD AUTO: 71 %
NRBC BLD-RTO: 0 /100 WBCS (ref 0–0)
PHOSPHATE SERPL-MCNC: 2.9 MG/DL (ref 2.5–4.9)
PLATELET # BLD AUTO: 227 X10*3/UL (ref 150–450)
POTASSIUM SERPL-SCNC: 3.4 MMOL/L (ref 3.5–5.3)
PROT SERPL-MCNC: 5.5 G/DL (ref 6.4–8.2)
RBC # BLD AUTO: 2.92 X10*6/UL (ref 4–5.2)
SODIUM SERPL-SCNC: 131 MMOL/L (ref 136–145)
TIBC SERPL-MCNC: 354 UG/DL (ref 240–445)
UIBC SERPL-MCNC: 319 UG/DL (ref 110–370)
VANCOMYCIN SERPL-MCNC: 12.9 UG/ML (ref 5–20)
WBC # BLD AUTO: 9.5 X10*3/UL (ref 4.4–11.3)

## 2025-03-23 PROCEDURE — 94640 AIRWAY INHALATION TREATMENT: CPT

## 2025-03-23 PROCEDURE — 83735 ASSAY OF MAGNESIUM: CPT

## 2025-03-23 PROCEDURE — 71046 X-RAY EXAM CHEST 2 VIEWS: CPT | Performed by: RADIOLOGY

## 2025-03-23 PROCEDURE — 85025 COMPLETE CBC W/AUTO DIFF WBC: CPT | Performed by: INTERNAL MEDICINE

## 2025-03-23 PROCEDURE — 2500000002 HC RX 250 W HCPCS SELF ADMINISTERED DRUGS (ALT 637 FOR MEDICARE OP, ALT 636 FOR OP/ED): Performed by: INTERNAL MEDICINE

## 2025-03-23 PROCEDURE — 2500000001 HC RX 250 WO HCPCS SELF ADMINISTERED DRUGS (ALT 637 FOR MEDICARE OP)

## 2025-03-23 PROCEDURE — 80202 ASSAY OF VANCOMYCIN: CPT

## 2025-03-23 PROCEDURE — 36415 COLL VENOUS BLD VENIPUNCTURE: CPT | Performed by: INTERNAL MEDICINE

## 2025-03-23 PROCEDURE — 2500000004 HC RX 250 GENERAL PHARMACY W/ HCPCS (ALT 636 FOR OP/ED)

## 2025-03-23 PROCEDURE — 84100 ASSAY OF PHOSPHORUS: CPT

## 2025-03-23 PROCEDURE — 99233 SBSQ HOSP IP/OBS HIGH 50: CPT | Performed by: INTERNAL MEDICINE

## 2025-03-23 PROCEDURE — 83880 ASSAY OF NATRIURETIC PEPTIDE: CPT | Performed by: INTERNAL MEDICINE

## 2025-03-23 PROCEDURE — 2500000004 HC RX 250 GENERAL PHARMACY W/ HCPCS (ALT 636 FOR OP/ED): Performed by: INTERNAL MEDICINE

## 2025-03-23 PROCEDURE — 83540 ASSAY OF IRON: CPT | Performed by: INTERNAL MEDICINE

## 2025-03-23 PROCEDURE — 80053 COMPREHEN METABOLIC PANEL: CPT | Performed by: INTERNAL MEDICINE

## 2025-03-23 PROCEDURE — 71046 X-RAY EXAM CHEST 2 VIEWS: CPT

## 2025-03-23 PROCEDURE — 2500000001 HC RX 250 WO HCPCS SELF ADMINISTERED DRUGS (ALT 637 FOR MEDICARE OP): Performed by: INTERNAL MEDICINE

## 2025-03-23 PROCEDURE — 1200000002 HC GENERAL ROOM WITH TELEMETRY DAILY

## 2025-03-23 RX ORDER — POTASSIUM CHLORIDE 1.5 G/1.58G
20 POWDER, FOR SOLUTION ORAL 2 TIMES DAILY
Status: COMPLETED | OUTPATIENT
Start: 2025-03-23 | End: 2025-03-23

## 2025-03-23 RX ORDER — FUROSEMIDE 10 MG/ML
20 INJECTION INTRAMUSCULAR; INTRAVENOUS ONCE
Status: COMPLETED | OUTPATIENT
Start: 2025-03-23 | End: 2025-03-23

## 2025-03-23 RX ADMIN — IPRATROPIUM BROMIDE AND ALBUTEROL SULFATE 3 ML: .5; 3 SOLUTION RESPIRATORY (INHALATION) at 13:51

## 2025-03-23 RX ADMIN — ASPIRIN 81 MG CHEWABLE TABLET 81 MG: 81 TABLET CHEWABLE at 08:48

## 2025-03-23 RX ADMIN — ENOXAPARIN SODIUM 40 MG: 40 INJECTION SUBCUTANEOUS at 15:04

## 2025-03-23 RX ADMIN — IPRATROPIUM BROMIDE AND ALBUTEROL SULFATE 3 ML: .5; 3 SOLUTION RESPIRATORY (INHALATION) at 19:28

## 2025-03-23 RX ADMIN — ALLOPURINOL 100 MG: 100 TABLET ORAL at 08:48

## 2025-03-23 RX ADMIN — POLYETHYLENE GLYCOL 3350 17 G: 17 POWDER, FOR SOLUTION ORAL at 21:57

## 2025-03-23 RX ADMIN — IPRATROPIUM BROMIDE AND ALBUTEROL SULFATE 3 ML: .5; 3 SOLUTION RESPIRATORY (INHALATION) at 08:00

## 2025-03-23 RX ADMIN — FUROSEMIDE 20 MG: 10 INJECTION, SOLUTION INTRAVENOUS at 17:57

## 2025-03-23 RX ADMIN — GABAPENTIN 300 MG: 300 CAPSULE ORAL at 21:57

## 2025-03-23 RX ADMIN — POTASSIUM CHLORIDE 20 MEQ: 1.5 POWDER, FOR SOLUTION ORAL at 21:57

## 2025-03-23 RX ADMIN — PIPERACILLIN SODIUM AND TAZOBACTAM SODIUM 4.5 G: 4; .5 INJECTION, SOLUTION INTRAVENOUS at 15:04

## 2025-03-23 RX ADMIN — VANCOMYCIN HYDROCHLORIDE 1000 MG: 1 INJECTION, SOLUTION INTRAVENOUS at 16:17

## 2025-03-23 RX ADMIN — FLUTICASONE FUROATE AND VILANTEROL TRIFENATATE 1 PUFF: 200; 25 POWDER RESPIRATORY (INHALATION) at 08:03

## 2025-03-23 RX ADMIN — PIPERACILLIN SODIUM AND TAZOBACTAM SODIUM 4.5 G: 4; .5 INJECTION, SOLUTION INTRAVENOUS at 06:03

## 2025-03-23 RX ADMIN — GABAPENTIN 300 MG: 300 CAPSULE ORAL at 08:48

## 2025-03-23 RX ADMIN — POTASSIUM CHLORIDE 20 MEQ: 1.5 POWDER, FOR SOLUTION ORAL at 13:22

## 2025-03-23 RX ADMIN — CLOPIDOGREL BISULFATE 75 MG: 75 TABLET ORAL at 08:49

## 2025-03-23 RX ADMIN — POLYETHYLENE GLYCOL 3350 17 G: 17 POWDER, FOR SOLUTION ORAL at 08:49

## 2025-03-23 ASSESSMENT — COGNITIVE AND FUNCTIONAL STATUS - GENERAL
TURNING FROM BACK TO SIDE WHILE IN FLAT BAD: A LITTLE
MOBILITY SCORE: 16
DAILY ACTIVITIY SCORE: 19
TURNING FROM BACK TO SIDE WHILE IN FLAT BAD: A LITTLE
MOVING FROM LYING ON BACK TO SITTING ON SIDE OF FLAT BED WITH BEDRAILS: A LITTLE
MOVING TO AND FROM BED TO CHAIR: A LITTLE
STANDING UP FROM CHAIR USING ARMS: A LITTLE
MOBILITY SCORE: 16
MOVING TO AND FROM BED TO CHAIR: A LITTLE
DRESSING REGULAR LOWER BODY CLOTHING: A LITTLE
DAILY ACTIVITIY SCORE: 19
TOILETING: A LOT
WALKING IN HOSPITAL ROOM: A LOT
DRESSING REGULAR UPPER BODY CLOTHING: A LITTLE
CLIMB 3 TO 5 STEPS WITH RAILING: A LOT
STANDING UP FROM CHAIR USING ARMS: A LITTLE
TOILETING: A LOT
CLIMB 3 TO 5 STEPS WITH RAILING: A LOT
MOVING FROM LYING ON BACK TO SITTING ON SIDE OF FLAT BED WITH BEDRAILS: A LITTLE
WALKING IN HOSPITAL ROOM: A LOT
HELP NEEDED FOR BATHING: A LITTLE
DRESSING REGULAR LOWER BODY CLOTHING: A LITTLE
HELP NEEDED FOR BATHING: A LITTLE
DRESSING REGULAR UPPER BODY CLOTHING: A LITTLE

## 2025-03-23 ASSESSMENT — PAIN SCALES - GENERAL: PAINLEVEL_OUTOF10: 0 - NO PAIN

## 2025-03-23 NOTE — CARE PLAN
The patient's goals for the shift include      The clinical goals for the shift include Patient will remain free from injury      Problem: Respiratory  Goal: Minimal/no exertional discomfort or dyspnea this shift  Outcome: Progressing     Problem: Skin  Goal: Promote/optimize nutrition  Outcome: Progressing

## 2025-03-23 NOTE — PROGRESS NOTES
Vancomycin Dosing by Pharmacy- FOLLOW UP    Yesenia Milner is a 89 y.o. year old female who Pharmacy has been consulted for vancomycin dosing for pneumonia. Based on the patient's indication and renal status this patient is being dosed based on a goal AUC of 400-600.     Renal function is currently stable.    Current vancomycin dose: 1,000 mg given every 24 hours    Estimated vancomycin AUC on current dose: 455 mg/L.hr     Visit Vitals  /58   Pulse 63   Temp 36.1 °C (97 °F)   Resp 18        Lab Results   Component Value Date    CREATININE 0.99 2025    CREATININE 0.95 2025    CREATININE 0.87 2025    CREATININE 0.80 2025      Patient weight is as follows:   Vitals:    25 1036   Weight: 71.7 kg (158 lb)     Temp (24hrs), Av.1 °C (96.9 °F), Min:35.9 °C (96.6 °F), Max:36.3 °C (97.3 °F)      Assessment/Plan    Within goal AUC range. Continue current vancomycin regimen.    This dosing regimen is predicted by [employer] to result in the following pharmacokinetic parameters:    MCA36-02: 414 mg/L.hr  AUC24,ss: 455 mg/L.hr  Probability of AUC24 > 400: 72 %  Ctrough,ss: 14.7 mg/L  Probability of Ctrough,ss > 20: 17 %    The next level will be obtained on 3/26/25 with am labs. May be obtained sooner if clinically indicated.   Will continue to monitor renal function daily while on vancomycin and order serum creatinine at least every 48 hours if not already ordered.  Follow for continued vancomycin needs, clinical response, and signs/symptoms of toxicity.       Blair Saunders, PharmD

## 2025-03-23 NOTE — PROGRESS NOTES
Subjective Data:  No complaints    Overnight Events:    None reported     Objective Data:  Last Recorded Vitals:  Vitals:    03/23/25 0408 03/23/25 0803 03/23/25 0811 03/23/25 1207   BP: 130/61  121/58 127/58   BP Location: Right arm      Patient Position: Lying      Pulse: 75  63 70   Resp: 18      Temp: 36.1 °C (97 °F)  36.1 °C (97 °F) 36.2 °C (97.2 °F)   TempSrc: Temporal      SpO2: 98% 98% 96% 98%   Weight:       Height:           Last Labs:  CBC - 3/23/2025:  6:36 AM  9.5 7.4 227    24.9      CMP - 3/23/2025:  6:36 AM  8.2 5.5 29 --- 0.6   2.9 3.2 27 71      PTT - 1/2/2025:  7:42 PM  1.5   16.5 34     TROPHS   Date/Time Value Ref Range Status   03/21/2025 08:16 PM 21 0 - 13 ng/L Final   03/21/2025 04:05 PM 24 0 - 13 ng/L Final   03/21/2025 10:55 AM 24 0 - 13 ng/L Final     BNP   Date/Time Value Ref Range Status   03/22/2025 07:21  0 - 99 pg/mL Final   03/21/2025 10:55  0 - 99 pg/mL Final     HGBA1C   Date/Time Value Ref Range Status   03/14/2025 05:00 AM 5.2 See comment % Final   12/20/2024 02:04 AM 5.5 See comment % Final     LDLCALC   Date/Time Value Ref Range Status   12/20/2024 02:04 AM 68 <=99 mg/dL Final     Comment:                                 Near   Borderline      AGE      Desirable  Optimal    High     High     Very High     0-19 Y     0 - 109     ---    110-129   >/= 130     ----    20-24 Y     0 - 119     ---    120-159   >/= 160     ----      >24 Y     0 -  99   100-129  130-159   160-189     >/=190     01/24/2024 10:49 AM 73 <=99 mg/dL Final     Comment:                                 Near   Borderline      AGE      Desirable  Optimal    High     High     Very High     0-19 Y     0 - 109     ---    110-129   >/= 130     ----    20-24 Y     0 - 119     ---    120-159   >/= 160     ----      >24 Y     0 -  99   100-129  130-159   160-189     >/=190       VLDL   Date/Time Value Ref Range Status   12/20/2024 02:04 AM 14 0 - 40 mg/dL Final   01/24/2024 10:49 AM 13 0 - 40 mg/dL Final    08/21/2023 12:56 AM 17 0 - 40 mg/dL Final   01/17/2023 09:43 AM 14 0 - 40 mg/dL Final   03/16/2021 10:15 AM 16 0 - 40 mg/dL Final      Last I/O:  I/O last 3 completed shifts:  In: 1300 (18.1 mL/kg) [P.O.:200; IV Piggyback:1100]  Out: 1086 (15.2 mL/kg) [Urine:1086 (0.4 mL/kg/hr)]  Weight: 71.7 kg     Past Cardiology Tests (Last 3 Years):  EKG:  Electrocardiogram, 12-lead PRN ACS symtpoms 03/08/2025      ECG 12 Lead 03/04/2025      ECG 12 lead 02/02/2025      ECG 12 lead 01/12/2025      ECG 12 lead 01/02/2025      ECG 12 lead 12/21/2024      ECG 12 lead 12/20/2024      ECG 12 lead 12/20/2024    Echo:  Transthoracic echo (TTE) complete 12/21/2024    Ejection Fractions:  EF   Date/Time Value Ref Range Status   12/21/2024 09:03 AM 63 %      Cath:  Cardiac Catheterization Procedure 12/20/2024    Stress Test:  No results found for this or any previous visit from the past 1095 days.    Cardiac Imaging:  No results found for this or any previous visit from the past 1095 days.      Inpatient Medications:  Scheduled medications   Medication Dose Route Frequency    allopurinol  100 mg oral Daily    aspirin  81 mg oral Daily    clopidogrel  75 mg oral Daily    enoxaparin  40 mg subcutaneous q24h    fluticasone furoate-vilanteroL  1 puff inhalation Daily    gabapentin  300 mg oral BID    ipratropium-albuteroL  3 mL nebulization TID    [Held by provider] metoprolol tartrate  25 mg oral Daily    piperacillin-tazobactam  4.5 g intravenous q8h    polyethylene glycol  17 g oral BID    potassium chloride  20 mEq oral BID    vancomycin  1,000 mg intravenous q24h     PRN medications   Medication    acetaminophen    Or    acetaminophen    Or    acetaminophen    guaiFENesin    ipratropium-albuteroL    vancomycin     Continuous Medications   Medication Dose Last Rate       Physical Exam:  GEN: Frail 89 yo wf sitting in the bedside chair  HEENT: Atraumatic, normocephalic  COR: Reg  LUNGS: Clearer  EXT: Warm     Assessment/Plan   1.) Acute  on chronic HFpEF  2.) S/P Ppm  REC:  1.) Same meds  2.) Recheck BNP  Peripheral IV 03/21/25 20 G Proximal;Right Forearm (Active)   Site Assessment Clean;Dry 03/23/25 0852   Dressing Status Clean;Dry 03/23/25 0852   Number of days: 2       Code Status:  Full Code    I spent 30 minutes in the professional and overall care of this patient.        Magnus Hess MD

## 2025-03-23 NOTE — CARE PLAN
The patient's goals for the shift include      The clinical goals for the shift include Patient will remain free from injury    Over the shift, the patient did not make progress toward the following goals. Barriers to progression include assist with adls.

## 2025-03-23 NOTE — PROGRESS NOTES
Yesenia Milner is a 89 y.o. female on day 2 of admission presenting with Acute congestive heart failure, unspecified heart failure type.      Subjective   Patient fully evaluated  03/22  for    Problem List Items Addressed This Visit       Hypoxia    * (Principal) Acute congestive heart failure, unspecified heart failure type - Primary    Relevant Medications    clopidogrel (Plavix) tablet 75 mg    metoprolol tartrate (Lopressor) tablet 25 mg     Patient seen resting in bed with head of bed elevated, no s/s or c/o acute difficulties at this time.  Vital signs for last 24 hours Temp:  [36 °C (96.8 °F)-36.3 °C (97.3 °F)] 36.2 °C (97.2 °F)  Heart Rate:  [63-75] 70  Resp:  [18] 18  BP: (118-138)/(58-82) 127/58  FiO2 (%):  [32 %-36 %] 32 %    No intake/output data recorded.  Patient still requiring frequent cardiac and SPO2 monitoring. Continue aggressive pulmonary hygiene and oral hygiene. Off loading as tolerated for skin integrity. Medications and labs reviewed-   Results for orders placed or performed during the hospital encounter of 03/21/25 (from the past 24 hours)   Magnesium   Result Value Ref Range    Magnesium 2.22 1.60 - 2.40 mg/dL   Vancomycin   Result Value Ref Range    Vancomycin 12.9 5.0 - 20.0 ug/mL   CBC and Auto Differential   Result Value Ref Range    WBC 9.5 4.4 - 11.3 x10*3/uL    nRBC 0.0 0.0 - 0.0 /100 WBCs    RBC 2.92 (L) 4.00 - 5.20 x10*6/uL    Hemoglobin 7.4 (L) 12.0 - 16.0 g/dL    Hematocrit 24.9 (L) 36.0 - 46.0 %    MCV 85 80 - 100 fL    MCH 25.3 (L) 26.0 - 34.0 pg    MCHC 29.7 (L) 32.0 - 36.0 g/dL    RDW 17.4 (H) 11.5 - 14.5 %    Platelets 227 150 - 450 x10*3/uL    Neutrophils % 71.0 40.0 - 80.0 %    Immature Granulocytes %, Automated 0.4 0.0 - 0.9 %    Lymphocytes % 13.8 13.0 - 44.0 %    Monocytes % 11.3 2.0 - 10.0 %    Eosinophils % 3.2 0.0 - 6.0 %    Basophils % 0.3 0.0 - 2.0 %    Neutrophils Absolute 6.74 (H) 1.60 - 5.50 x10*3/uL    Immature Granulocytes Absolute, Automated 0.04 0.00 -  0.50 x10*3/uL    Lymphocytes Absolute 1.31 0.80 - 3.00 x10*3/uL    Monocytes Absolute 1.07 (H) 0.05 - 0.80 x10*3/uL    Eosinophils Absolute 0.30 0.00 - 0.40 x10*3/uL    Basophils Absolute 0.03 0.00 - 0.10 x10*3/uL   Comprehensive Metabolic Panel   Result Value Ref Range    Glucose 101 (H) 74 - 99 mg/dL    Sodium 131 (L) 136 - 145 mmol/L    Potassium 3.4 (L) 3.5 - 5.3 mmol/L    Chloride 93 (L) 98 - 107 mmol/L    Bicarbonate 30 21 - 32 mmol/L    Anion Gap 11 10 - 20 mmol/L    Urea Nitrogen 24 (H) 6 - 23 mg/dL    Creatinine 0.99 0.50 - 1.05 mg/dL    eGFR 54 (L) >60 mL/min/1.73m*2    Calcium 8.2 (L) 8.6 - 10.3 mg/dL    Albumin 3.2 (L) 3.4 - 5.0 g/dL    Alkaline Phosphatase 71 33 - 136 U/L    Total Protein 5.5 (L) 6.4 - 8.2 g/dL    AST 29 9 - 39 U/L    Bilirubin, Total 0.6 0.0 - 1.2 mg/dL    ALT 27 7 - 45 U/L   Phosphorus   Result Value Ref Range    Phosphorus 2.9 2.5 - 4.9 mg/dL   B-type natriuretic peptide   Result Value Ref Range     (H) 0 - 99 pg/mL     *Note: Due to a large number of results and/or encounters for the requested time period, some results have not been displayed. A complete set of results can be found in Results Review.      Patient recently received an antibiotic (last 12 hours)       Date/Time Action Medication Dose Rate    03/22/25 1613 New Bag    vancomycin (Vancocin) 1,000 mg in dextrose 5%  mL 1,000 mg 200 mL/hr    03/22/25 1455 New Bag    piperacillin-tazobactam (Zosyn) 4.5 g in dextrose (iso)  mL 4.5 g     03/22/25 0745 New Bag    piperacillin-tazobactam (Zosyn) 4.5 g in dextrose (iso)  mL 4.5 g            Plan discussed with interdisciplinary team, will continue to diuresis patient cardiology on the case, appreciate input. Patient being treated for pneumonia, chest physiotherapy as tolerated, incentive spirometry, repeat chest xray in the AM.  Will continue current and repeat labs in the AM.     Discharge planning discussed with patient and care team. Therapy  evaluations ordered. Anticipate SNF at discharge. Patient aware and agreeable to current plan, continue plan as above.     I spent a total of 50 minutes on the date of the service which included preparing to see the patient, face-to-face patient care, completing clinical documentation, obtaining and/or reviewing separately obtained history, performing a medically appropriate examination, counseling and educating the patient/family/caregiver, ordering medications, tests, or procedures, communicating with other HCPs (not separately reported), independently interpreting results (not separately reported), communicating results to the patient/family/caregiver, and care coordination (not separately reported).        Objective     Last Recorded Vitals  /58   Pulse 70   Temp 36.2 °C (97.2 °F)   Resp 18   Wt 71.7 kg (158 lb)   SpO2 100%   Intake/Output last 3 Shifts:    Intake/Output Summary (Last 24 hours) at 3/23/2025 1458  Last data filed at 3/23/2025 0633  Gross per 24 hour   Intake 400 ml   Output 1086 ml   Net -686 ml       Admission Weight  Weight: 71.7 kg (158 lb) (03/21/25 1036)    Daily Weight  03/21/25 : 71.7 kg (158 lb)    Image Results  XR chest 2 views  Narrative: Interpreted By:  Bryce Redding,   STUDY:  XR CHEST 2 VIEWS;  3/23/2025 10:21 am      INDICATION:  Signs/Symptoms:sob.      COMPARISON:  03/21/2025      ACCESSION NUMBER(S):  [id number]      ORDERING CLINICIAN:  SALVATORE YADAV      FINDINGS:  CARDIOMEDIASTINAL SILHOUETTE AND VASCULATURE:      Cardiac size:  Mild-to-moderate cardiomegaly similar to the previous  exam. Aortic shadow:  Within normal limits.      Mediastinal contours: Within normal limits.      Pulmonary vasculature:  Central bronchovascular prominence greater on  the right appears slightly increased from the previous examination.  This is probably due to interstitial congestion.      LUNGS:  Interstitial prominence appears increased from the prior exam, again  most consistent  with congestion. There is opacification at left lung  base, and lungs posteriorly on the lateral projection probably from  small effusions and atelectasis. This may have increased on the left.      ABDOMEN AND OTHER FINDINGS:  No remarkable upper abdominal findings.      BONES:  No acute osseous changes.      Impression: 1.  Increased congestion with persistent effusions. Probably  increased left basilar atelectasis.      Signed by: Bryce Redding 3/23/2025 2:11 PM  Dictation workstation:   [id number]      Physical Exam    Relevant Results               Assessment/Plan                  Assessment & Plan  Acute congestive heart failure, unspecified heart failure type          Fei Mensah MD   Physician  Internal Medicine     H&P      Signed     Date of Service: 3/21/2025  5:08 PM     Signed       Expand All Collapse All    History Of Present Illness  Yesenia Milner is a 89 y.o. female presenting with Acute congestive heart failure, unspecified heart failure type .     Past Medical History  She has a past medical history of Gross hematuria (10/07/2020), Impacted cerumen (02/22/2024), Other conditions influencing health status, Personal history of other medical treatment, Personal history of other medical treatment, and Systolic CHF (Multi) (05/18/2023).     Surgical History  She has a past surgical history that includes Appendectomy (11/22/2017); Hysterectomy (11/22/2017); Tonsillectomy (11/22/2017); Other surgical history (11/22/2017); Other surgical history (11/22/2017); Cardiac pacemaker placement (03/11/2021); IR angiogram renal bilateral (Bilateral, 12/14/2020); IR angiogram inferior epigastric pelvic (12/14/2020); IR angiogram inferior epigastric pelvic (12/14/2020); and Cardiac catheterization (N/A, 12/20/2024).     Social History  She reports that she quit smoking about 52 years ago. Her smoking use included cigarettes. She has been exposed to tobacco smoke. She has never used smokeless tobacco. She  reports that she does not drink alcohol and does not use drugs.     Family History  Family History          Family History   Problem Relation Name Age of Onset    No Known Problems Mother                Allergies  Iodine, Shrimp, Hydrocodone, and Adhesive tape-silicones     Review of Systems   Constitutional:  Positive for activity change and fatigue.   Respiratory:  Positive for shortness of breath.    Cardiovascular:  Positive for leg swelling.   All other systems reviewed and are negative.        Physical Exam   Physical Exam:  General:  Pleasant and cooperative.  Mild distress  HEENT:  Normocephalic, atraumatic, mucus membranes moist.   Neck:  Trachea midline.  No JVD.    Chest:  Clear to auscultation bilaterally.  Decreased breath sounds in lower lobe with Rales  CV:  Regular rate and rhythm.  Positive S1/S2.   Abdomen: Bowel sounds present in all four quadrants, abdomen is soft, non-tender, non-distended.  Extremities:  No lower extremity edema or cyanosis.   Neurological:  AAOx3. No focal deficits.  Skin:  Warm and dry.  Last Recorded Vitals  /59   Pulse 66   Temp 36.3 °C (97.3 °F) (Temporal)   Resp 16   Wt 71.7 kg (158 lb)   SpO2 94%      Relevant Results                 had concerns including Shortness of Breath (BIBA [facility] 5 from [facility] for hypoxia, patient was recently dx with pneumonia and started antibiotics x2 days ago. Per EMS patient was 80% on RA when arrived and placed on 4L NC. Patient is AOX4, and was sent in at family request).        Problem List Items Addressed This Visit         Hypoxia     * (Principal) Acute congestive heart failure, unspecified heart failure type - Primary     Relevant Medications     clopidogrel (Plavix) tablet 75 mg     metoprolol tartrate (Lopressor) tablet 25 mg         HPI         Shortness of Breath     Additional comments: BIBA [facility] 5 from [facility] for hypoxia, patient was recently dx with pneumonia and started antibiotics x2 days  ago. Per EMS patient was 80% on RA when arrived and placed on 4L NC. Patient is AOX4, and was sent in at family request            Last edited by Drea Cesar RN on 3/21/2025 10:44 AM.             Problem List as of 3/21/2025 Reviewed: 2/25/2025  9:52 AM by Naima Keane MD        Aortic stenosis, mild     Arthritis     Chronic diastolic congestive heart failure     Last Assessment & Plan 2/25/2025 Office Visit Edited 2/25/2025 10:33 AM by Naima Keane MD       -Chronic, BP controlled with beta-blocker and torsemide  -Most recent EF 60%, with diastolic dysfunction  -Follow-up cardiology-ASAP-for diuretic/fluid management-for worsening edema  -pt/cg agreeable with palliative care eval(I am surprised and disappointed-this was not already addressed during her multiple recent hospitals admissions)  Orders:    Referral to Palliative Care; Future              Complete heart block     COPD (chronic obstructive pulmonary disease) (Multi)     Last Assessment & Plan 9/5/2023 Telemedicine Written 9/5/2023  2:17 PM by Adroe Carroll, PharmD       Patient has COPD diagnosis and is not currently experiencing any SOB, coughing, or wheezing.  Continue advair 115-21 mcg 2 puffs twice daily.  Recommend patient have a rescue inhaler at home, but she declines at this time.            History of paroxysmal supraventricular tachycardia     HTN (hypertension)     Paresthesia     Presence of permanent cardiac pacemaker     Last Assessment & Plan 1/31/2025 Office Visit Edited 1/31/2025  3:31 PM by Naima Keane MD       -Current pacemaker is her second 1  -Appointment with EP specialist February 24        RTC 3 months  No refills needed per pt/CG ( grandson brought her here)           Raynauds disease     Sensorineural hearing loss (SNHL) of both ears     Dyspnea on minimal exertion     Varicose veins of lower extremities with inflammation     Vertigo     Ischemic cerebrovascular accident (CVA) (Multi)     Last Assessment  & Plan 9/5/2023 Telemedicine Written 9/5/2023  2:19 PM by Adore Carroll, Daphne       Patient recently hospitalized for non-cardioembolic CVA (atherosclerotic in nature).   Continue aspirin 81 mg daily, plavix 75 mg daily, and atorvastatin 20 mg daily. DAPT to only be taken x 21 days, then plavix monotherapy thereafter.   Patient hasn't seen neurologist, but will request referral at PCP appt on 9/11.           Acquired deformity of toe     Benign neoplasm of skin of foot     Benign paroxysmal positional vertigo     Dermatophytosis of nail     Leg swelling     Macular degeneration     Mitral valve insufficiency     Moderate mitral valve regurgitation     Overweight with body mass index (BMI) 25.0-29.9     Plantar wart of left foot     Venous insufficiency     Dysarthria following cerebral infarction     Hyperglycemia     Hand paresthesia     Sick sinus syndrome (Multi)     Last Assessment & Plan 2/26/2024 Office Visit Written 2/26/2024  2:20 PM by James C Ramicone, DO       1.  Sick sinus syndrome: This patient has a history of symptomatic sinus bradycardia and has a [employer]tronic dual-chamber permanent pacemaker which was replaced in July 2023.  The original device implantation was October 2012.  The pacemaker is functioning appropriately and the battery status is stable.  Continue follow-up as scheduled through the cardiac device clinic.  The patient will follow-up with me in 1 year.           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 Julian.  Patient transition to DOAC (Eliquis).  Asymptomatic of her right IJ and SVC thrombus.  No planned mechanical thrombectomy.  Continue to monitor for bleeding.  Patient lives at  home no history of falls would recommend PT OT evaluate for home safety.  Awaiting results of vascular ultrasound of upper extremity.     Thank you very much for allowing Vascular Surgery to be involved in the care of your patient sincerely Antonio MONTALVO .  (This note was generated with voice recognition software and may contain errors including spelling, grammar, syntax and missed recognition of what was dictated, of which may not have been fully corrected)           Gastrointestinal hemorrhage, unspecified gastrointestinal hemorrhage type     Last Assessment & Plan 1/12/2025 Hospital Encounter Written 1/23/2025  9:41 AM by Iglesia Gabriel DO       89-year-old female with past medical history of COPD, HTN, CVA, sick sinus syndrome s/p pacemaker, HFpEF, recent NSTEMI, and recent hospitalization for CHF exacerbation with acute pulmonary edema, imaging at that time was concerning for SVC thrombus and patient was evaluated by vascular who recommended DOAC for 6 months and repeat CT.  She presents with red blood per rectum and possible epistaxis. Hospitalized for further evaluation and management.     #GI bleed, suspect lower -> likely ischemic colitis (wall thickening at distal transverse colon, associated abdominal discomfort and dizziness, brown stool with red blood reported).  #Epistaxis ?- no reported vomiting.  Blood noted with clearing of her nose and when rinsing her mouth with fluids.   #SVC thrombus     Presentation is concerning for ischemic colitis as noted above.  Avoid hypotension  Consult gastroenterology, appreciate input  Will hold aspirin, Plavix, and Eliquis for the time being  Trend H&H, stable on presentation.  Patient agreeable to blood transfusions as needed.  N.p.o. after midnight  Low suspicion for upper GI source, however will continue with Protonix 40 mg IV twice daily until evaluated by GI.  Consult to vascular surgery regarding ongoing need for Eliquis.   Dr Smith's last note on 1/6/2025  “I have reviewed the DVT scan performed today.  There is no evidence of IJ thrombus.  Waveforms are normal in the IJ as well as subclavian, suggestive that even if there is a thrombus in the SVC, it is not hemodynamically significant.”   Check Orthostatic vitals     #Acute hypoxic respiratory failure  #Acute on chronic diastolic congestive heart failure  #History sick sinus syndrome s/p pacemaker  #History NSTEMI  #Valvular hear disease  #pleural effusions     CT angio A/P was suggestive of CHF with interstitial edema and small pleural effusions.   Low-sodium diet  Consult cardiology, patient follows with Dr Jiang and Ramicone for EP  Supplemental oxygen as needed  Continue oral diuretics, will defer to cardiology IV diuresis .  DuoNebs as needed  RT to evaluate     #DVT Ppx  SCD  Holding ac        1/13: doing ok, informed about CT results, gi on board,      1/14: CLD. Plans for colonscopy to get definitive dx, ok to stop eliquis per vascular/cardio     1/15: plans for scope tomorrow     1/16: scope confirmed cancer, she is unsure if she wants surgery or not, will consult surgery to give her more information  1/17: contemplating surgyer, will need cardiac clearance will talk to family as well     1/18: patient seen by cardiology this am ; continue to hold aspirin and Plavix at this time.;  Patient noted with expiratory wheezes on exam.  Pulse ox is stable.  We will order chest x-rays and ensure that she is getting her nebulizers as ordered.    Awaiting Vascular surgery in put as well  Patient is still contemplating surgery  Continue to trend labs     1/19: lab work stable.  We will start Mucinex and Tessalon Perles for cough.  Patient appears agreeable for surgery we will continue discussion with surgical team.  Aspirin Plavix are on hold, seen by cardiology at discharge patient should only resume aspirin  Continue to monitor labs.  Plan of care discussed with attending   Dr. Iglesia mart.        1/20: plans to  undergo surgery, date pending     1/21: continue pre op preparation  1/22: surgery cleared plans to go to OR outpatient to help her recover a bit pior to surgery     I spent 35 minutes in the professional and overall care of this patient.           Malignant neoplasm of transverse colon (Multi)     Last Assessment & Plan 1/31/2025 Office Visit Edited 1/31/2025  3:31 PM by Naima Keane MD                         Stage 3 chronic kidney disease, unspecified whether stage 3a or 3b CKD (Multi)     Last Assessment & Plan 1/31/2025 Office Visit Edited 1/31/2025  3:31 PM by Naima Keane MD       -Her GFR has been stable around 55, she does not see a kidney specialist                 Acute decompensated heart failure     Acute exacerbation of chronic heart failure     Hypoxia     Pleural effusion     Cellulitis of lower extremity     Adenocarcinoma of colon (Multi)     Bilateral leg edema     * (Principal) Acute congestive heart failure, unspecified heart failure type              Active Ambulatory Problems     Diagnosis Date Noted    Aortic stenosis, mild 05/18/2023    Arthritis 05/18/2023    Chronic diastolic congestive heart failure 05/18/2023    Complete heart block 05/18/2023    COPD (chronic obstructive pulmonary disease) (Multi) 05/18/2023    History of paroxysmal supraventricular tachycardia 05/18/2023    HTN (hypertension) 05/18/2023    Paresthesia 05/18/2023    Presence of permanent cardiac pacemaker 05/18/2023    Raynauds disease 05/18/2023    Sensorineural hearing loss (SNHL) of both ears 05/18/2023    Dyspnea on minimal exertion 05/18/2023    Varicose veins of lower extremities with inflammation 05/31/2015    Vertigo 05/18/2023    Ischemic cerebrovascular accident (CVA) (Multi) 09/05/2023    Acquired deformity of toe 05/31/2015    Benign neoplasm of skin of foot 07/03/2016    Benign paroxysmal positional vertigo 09/20/2023    Dermatophytosis of nail 08/09/2014    Leg swelling 09/20/2023    Macular  degeneration 09/20/2023    Mitral valve insufficiency 09/20/2023    Moderate mitral valve regurgitation 09/20/2023    Overweight with body mass index (BMI) 25.0-29.9 09/20/2023    Plantar wart of left foot 07/03/2016    Venous insufficiency 08/09/2014    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    Hyperglycemia 08/23/2023    Hand paresthesia 02/22/2024    Sick sinus syndrome (Multi) 02/26/2024    Injury of kidney 06/26/2024    NSTEMI (non-ST elevated myocardial infarction) (Multi) 12/20/2024    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) 01/06/2025    Gastrointestinal hemorrhage, unspecified gastrointestinal hemorrhage type 01/12/2025    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    Stage 3 chronic kidney disease, unspecified whether stage 3a or 3b CKD (Multi) 01/31/2025    Acute decompensated heart failure 02/02/2025    Acute exacerbation of chronic heart failure 03/04/2025    Hypoxia 03/04/2025    Pleural effusion 03/04/2025    Cellulitis of lower extremity 03/04/2025    Adenocarcinoma of colon (Multi) 03/04/2025    Bilateral leg edema 03/05/2025           Resolved Ambulatory Problems     Diagnosis Date Noted    Systolic CHF (Multi) 05/18/2023    Gross hematuria 10/07/2020    Ulcer of toe of left foot (Multi) 12/06/2015    Ulcer of toe of right foot (Multi) 04/24/2016    Post-menopausal 09/20/2023    Impacted cerumen 02/22/2024    Urinary tract infection 02/22/2024    Shortness of breath 01/02/2025           Past Medical History:   Diagnosis Date    Other conditions influencing health status      Personal history of other medical treatment      Personal history of other medical treatment                      Active Orders   Imaging     CT angio chest for pulmonary embolism       Frequency: Once       Number of Occurrences: 1 Occurrences   Lab     CBC       Frequency: AM draw       Number of Occurrences: 5 Occurrences      Magnesium       Frequency: AM draw       Number of Occurrences: 5 Occurrences     Renal Function Panel       Frequency: AM draw       Number of Occurrences: 5 Occurrences     Troponin, High Sensitivity, 1 Hour       Frequency: Once       Number of Occurrences: 1 Occurrences     Vancomycin       Frequency: AM draw       Number of Occurrences: 1 Occurrences   Diet     Adult diet Cardiac; 70 gm fat; 2 - 3 grams Sodium; Easy to chew; 1800 mL fluid       Frequency: Effective now       Number of Occurrences: Until Specified   Nursing     Apply sequential compression device       Frequency: Until discontinued       Number of Occurrences: Until Specified   Consult     Inpatient consult to Social Work and TCC       Frequency: Once       Number of Occurrences: 1 Occurrences   Nourishments     May Participate in Room Service       Frequency: Once       Number of Occurrences: 1 Occurrences   OT     OT eval and treat       Frequency: Until therapy completed       Number of Occurrences: 1 Occurrences   PT     PT eval and treat       Frequency: Until therapy completed       Number of Occurrences: 1 Occurrences   Respiratory Care     Incentive spirometry Instruct       Frequency: Once       Number of Occurrences: 1 Occurrences     Respiratory care eval and treat       Frequency: Once       Number of Occurrences: 1 Occurrences       Order Comments: Due 3/24 @ 0700      ECG     ECG 12 lead       Frequency: Once       Number of Occurrences: 1 Occurrences   Admission     Admit to inpatient       Frequency: Once       Number of Occurrences: 1 Occurrences   Telemetry     Telemetry monitoring       Frequency: Until discontinued       Number of Occurrences: 3 Days   Medications     acetaminophen (Tylenol) oral liquid 650 mg       Linked Order: Or       Frequency: q4h PRN       Dose: 650 mg       Route: nasogastric tube     acetaminophen (Tylenol) suppository 650 mg       Linked Order: Or       Frequency: q4h PRN       Dose: 650 mg        Route: rectal     acetaminophen (Tylenol) tablet 650 mg       Linked Order: Or       Frequency: q4h PRN       Dose: 650 mg       Route: oral     allopurinol (Zyloprim) tablet 100 mg       Frequency: Daily       Dose: 100 mg       Route: oral     aspirin chewable tablet 81 mg       Frequency: Daily       Dose: 81 mg       Route: oral     clopidogrel (Plavix) tablet 75 mg       Frequency: Daily       Dose: 75 mg       Route: oral     enoxaparin (Lovenox) syringe 40 mg       Frequency: q24h       Dose: 40 mg       Route: subcutaneous     fluticasone furoate-vilanteroL (Breo Ellipta) 200-25 mcg/dose inhaler 1 puff       Frequency: Daily       Dose: 1 puff       Route: inhalation     furosemide (Lasix) injection 20 mg       Frequency: Once       Dose: 20 mg       Route: intravenous     gabapentin (Neurontin) capsule 300 mg       Frequency: BID       Dose: 300 mg       Route: oral     guaiFENesin (Mucinex) 12 hr tablet 600 mg       Frequency: BID PRN       Dose: 600 mg       Route: oral     ipratropium-albuteroL (Duo-Neb) 0.5-2.5 mg/3 mL nebulizer solution 3 mL       Frequency: q2h PRN       Dose: 3 mL       Route: nebulization     ipratropium-albuteroL (Duo-Neb) 0.5-2.5 mg/3 mL nebulizer solution 3 mL       Frequency: TID       Dose: 3 mL       Route: nebulization     metoprolol tartrate (Lopressor) tablet 25 mg       Frequency: Daily       Dose: 25 mg       Route: oral     piperacillin-tazobactam (Zosyn) 4.5 g in dextrose (iso)  mL       Frequency: q8h       Dose: 4.5 g       Route: intravenous     vancomycin (Vancocin) 1,000 mg in dextrose 5%  mL       Frequency: q24h       Dose: 1,000 mg       Route: intravenous     vancomycin (Vancocin) pharmacy to dose - pharmacy monitoring       Frequency: Daily PRN       Route: miscellaneous     vancomycin 1,750 mg in dextrose 5% 500 mL IV       Frequency: Once       Dose: 1,750 mg       Route: intravenous      Dietary Orders (From admission, onward)          Start      "Ordered     03/21/25 1607   May Participate in Room Service  ( ROOM SERVICE MAY PARTICIPATE)  Once        Question:  .  Answer:  Yes    03/21/25 1606     03/21/25 1433   Adult diet Cardiac; 70 gm fat; 2 - 3 grams Sodium; Easy to chew; 1800 mL fluid  Diet effective now        Question Answer Comment   Diet type Cardiac     Fat restriction: 70 gm fat     Sodium restriction: 2 - 3 grams Sodium     Texture Easy to chew     Dietary fluid restriction / 24h: 1800 mL fluid         03/21/25 1433                       89 yrs@  ADMITDTTM@  Acute congestive heart failure, unspecified heart failure type [I50.9]  [unfilled]  Weight: 71.7 kg (158 lb)     Vitals          Vitals:     03/21/25 1036 03/21/25 1038 03/21/25 1506 03/21/25 1604   BP: 104/50         Pulse: 84     66   Resp: 16     16   Temp: 36.3 °C (97.3 °F)         TempSrc: Temporal         SpO2: (!) 84% (!) 93% 96% 94%   Weight: 71.7 kg (158 lb)         Height: 1.6 m (5' 3\")           03/21/25 1606   BP: 125/59   Pulse:     Resp:     Temp:     TempSrc:     SpO2:     Weight:     Height:                    Chief Complaint    Shortness of Breath            Patient seen resting in bed with head of bed elevated, no s/s or c/o acute difficulties at this time.  Vital signs for last 24 hours Temperature:  [36.3 °C (97.3 °F)] 36.3 °C (97.3 °F)  Heart Rate:  [66-84] 66  Respirations:  [16] 16  BP: (104-125)/(50-59) 125/59    No intake/output data recorded.  Patient still requiring frequent cardiac and SPO2 monitoring. Continue aggressive pulmonary hygiene and oral hygiene. Off loading as tolerated for skin integrity. Medications and labs reviewed-    Patient recently received an antibiotic (last 12 hours)         Date/Time Action Medication Dose     03/21/25 1553 New Bag    piperacillin-tazobactam (Zosyn) 4.5 g in dextrose (iso)  mL 4.5 g                    Results for orders placed or performed during the hospital encounter of 03/21/25 (from the past 96 hours)   CBC and " Auto Differential   Result Value Ref Range     WBC 8.2 4.4 - 11.3 x10*3/uL     nRBC 0.0 0.0 - 0.0 /100 WBCs     RBC 3.04 (L) 4.00 - 5.20 x10*6/uL     Hemoglobin 7.6 (L) 12.0 - 16.0 g/dL     Hematocrit 25.4 (L) 36.0 - 46.0 %     MCV 84 80 - 100 fL     MCH 25.0 (L) 26.0 - 34.0 pg     MCHC 29.9 (L) 32.0 - 36.0 g/dL     RDW 16.9 (H) 11.5 - 14.5 %     Platelets 239 150 - 450 x10*3/uL     Neutrophils % 75.2 40.0 - 80.0 %     Immature Granulocytes %, Automated 0.6 0.0 - 0.9 %     Lymphocytes % 8.7 13.0 - 44.0 %     Monocytes % 14.1 2.0 - 10.0 %     Eosinophils % 1.0 0.0 - 6.0 %     Basophils % 0.4 0.0 - 2.0 %     Neutrophils Absolute 6.13 (H) 1.60 - 5.50 x10*3/uL     Immature Granulocytes Absolute, Automated 0.05 0.00 - 0.50 x10*3/uL     Lymphocytes Absolute 0.71 (L) 0.80 - 3.00 x10*3/uL     Monocytes Absolute 1.15 (H) 0.05 - 0.80 x10*3/uL     Eosinophils Absolute 0.08 0.00 - 0.40 x10*3/uL     Basophils Absolute 0.03 0.00 - 0.10 x10*3/uL   Magnesium   Result Value Ref Range     Magnesium 2.07 1.60 - 2.40 mg/dL   Comprehensive metabolic panel   Result Value Ref Range     Glucose 126 (H) 74 - 99 mg/dL     Sodium 129 (L) 136 - 145 mmol/L     Potassium 3.3 (L) 3.5 - 5.3 mmol/L     Chloride 93 (L) 98 - 107 mmol/L     Bicarbonate 28 21 - 32 mmol/L     Anion Gap 11 10 - 20 mmol/L     Urea Nitrogen 19 6 - 23 mg/dL     Creatinine 0.87 0.50 - 1.05 mg/dL     eGFR 63 >60 mL/min/1.73m*2     Calcium 8.4 (L) 8.6 - 10.3 mg/dL     Albumin 3.3 (L) 3.4 - 5.0 g/dL     Alkaline Phosphatase 88 33 - 136 U/L     Total Protein 5.7 (L) 6.4 - 8.2 g/dL     AST 23 9 - 39 U/L     Bilirubin, Total 0.5 0.0 - 1.2 mg/dL     ALT 18 7 - 45 U/L   Troponin I, High Sensitivity   Result Value Ref Range     Troponin I, High Sensitivity 24 (H) 0 - 13 ng/L   B-Type Natriuretic Peptide   Result Value Ref Range      (H) 0 - 99 pg/mL   Sars-CoV-2 and Influenza A/B PCR   Result Value Ref Range     Flu A Result Not Detected Not Detected     Flu B Result Not  Detected Not Detected     Coronavirus 2019, PCR Not Detected Not Detected   RSV PCR   Result Value Ref Range     RSV PCR Not Detected Not Detected   Lactate   Result Value Ref Range     Lactate 1.6 0.4 - 2.0 mmol/L   Urinalysis with Reflex Microscopic   Result Value Ref Range     Color, Urine Colorless (N) Light-Yellow, Yellow, Dark-Yellow     Appearance, Urine Clear Clear     Specific Gravity, Urine 1.007 1.005 - 1.035     pH, Urine 7.0 5.0, 5.5, 6.0, 6.5, 7.0, 7.5, 8.0     Protein, Urine NEGATIVE NEGATIVE, 10 (TRACE), 20 (TRACE) mg/dL     Glucose, Urine Normal Normal mg/dL     Blood, Urine NEGATIVE NEGATIVE mg/dL     Ketones, Urine NEGATIVE NEGATIVE mg/dL     Bilirubin, Urine NEGATIVE NEGATIVE mg/dL     Urobilinogen, Urine Normal Normal mg/dL     Nitrite, Urine NEGATIVE NEGATIVE     Leukocyte Esterase, Urine NEGATIVE NEGATIVE   MRSA Surveillance for Vancomycin De-escalation, PCR     Specimen: Anterior Nares; Swab   Result Value Ref Range     MRSA PCR Not Detected Not Detected   Troponin I, High Sensitivity, Initial   Result Value Ref Range     Troponin I, High Sensitivity 24 (H) 0 - 13 ng/L         Patient fully evaluated 03/21, thorough record review performed of previous labs and notes from prior encounters. Plan discussed with interdisciplinary team, consults placed, appreciate input. Will continue current and repeat labs in the AM. Therapy evaluations ordered. Patient aware and agreeable to current plan, continue plan as above.      I spent a total of 75 minutes on the date of the service which included preparing to see the patient, face-to-face patient care, completing clinical documentation, obtaining and/or reviewing separately obtained history, performing a medically appropriate examination, counseling and educating the patient/family/caregiver, ordering medications, tests, or procedures, communicating with other HCPs (not separately reported), independently interpreting results (not separately reported),  communicating results to the patient/family/caregiver, and care coordination (not separately reported).            Assessment/Plan     Assessment & Plan  Acute congestive heart failure, unspecified heart failure type                       Revision History       Patient fully evaluated  03/23  for    Problem List Items Addressed This Visit       Hypoxia    * (Principal) Acute congestive heart failure, unspecified heart failure type - Primary    Relevant Medications    clopidogrel (Plavix) tablet 75 mg    metoprolol tartrate (Lopressor) tablet 25 mg     Patient seen resting in bed with head of bed elevated, no s/s or c/o acute difficulties at this time.  Vital signs for last 24 hours Temp:  [36 °C (96.8 °F)-36.3 °C (97.3 °F)] 36.2 °C (97.2 °F)  Heart Rate:  [63-75] 70  Resp:  [18] 18  BP: (118-138)/(58-82) 127/58  FiO2 (%):  [32 %-36 %] 32 %    No intake/output data recorded.  Patient still requiring frequent cardiac and SPO2 monitoring. Continue aggressive pulmonary hygiene and oral hygiene. Off loading as tolerated for skin integrity. Medications and labs reviewed-   Results for orders placed or performed during the hospital encounter of 03/21/25 (from the past 24 hours)   Magnesium   Result Value Ref Range    Magnesium 2.22 1.60 - 2.40 mg/dL   Vancomycin   Result Value Ref Range    Vancomycin 12.9 5.0 - 20.0 ug/mL   CBC and Auto Differential   Result Value Ref Range    WBC 9.5 4.4 - 11.3 x10*3/uL    nRBC 0.0 0.0 - 0.0 /100 WBCs    RBC 2.92 (L) 4.00 - 5.20 x10*6/uL    Hemoglobin 7.4 (L) 12.0 - 16.0 g/dL    Hematocrit 24.9 (L) 36.0 - 46.0 %    MCV 85 80 - 100 fL    MCH 25.3 (L) 26.0 - 34.0 pg    MCHC 29.7 (L) 32.0 - 36.0 g/dL    RDW 17.4 (H) 11.5 - 14.5 %    Platelets 227 150 - 450 x10*3/uL    Neutrophils % 71.0 40.0 - 80.0 %    Immature Granulocytes %, Automated 0.4 0.0 - 0.9 %    Lymphocytes % 13.8 13.0 - 44.0 %    Monocytes % 11.3 2.0 - 10.0 %    Eosinophils % 3.2 0.0 - 6.0 %    Basophils % 0.3 0.0 - 2.0 %     Neutrophils Absolute 6.74 (H) 1.60 - 5.50 x10*3/uL    Immature Granulocytes Absolute, Automated 0.04 0.00 - 0.50 x10*3/uL    Lymphocytes Absolute 1.31 0.80 - 3.00 x10*3/uL    Monocytes Absolute 1.07 (H) 0.05 - 0.80 x10*3/uL    Eosinophils Absolute 0.30 0.00 - 0.40 x10*3/uL    Basophils Absolute 0.03 0.00 - 0.10 x10*3/uL   Comprehensive Metabolic Panel   Result Value Ref Range    Glucose 101 (H) 74 - 99 mg/dL    Sodium 131 (L) 136 - 145 mmol/L    Potassium 3.4 (L) 3.5 - 5.3 mmol/L    Chloride 93 (L) 98 - 107 mmol/L    Bicarbonate 30 21 - 32 mmol/L    Anion Gap 11 10 - 20 mmol/L    Urea Nitrogen 24 (H) 6 - 23 mg/dL    Creatinine 0.99 0.50 - 1.05 mg/dL    eGFR 54 (L) >60 mL/min/1.73m*2    Calcium 8.2 (L) 8.6 - 10.3 mg/dL    Albumin 3.2 (L) 3.4 - 5.0 g/dL    Alkaline Phosphatase 71 33 - 136 U/L    Total Protein 5.5 (L) 6.4 - 8.2 g/dL    AST 29 9 - 39 U/L    Bilirubin, Total 0.6 0.0 - 1.2 mg/dL    ALT 27 7 - 45 U/L   Phosphorus   Result Value Ref Range    Phosphorus 2.9 2.5 - 4.9 mg/dL   B-type natriuretic peptide   Result Value Ref Range     (H) 0 - 99 pg/mL      Patient recently received an antibiotic (last 12 hours)       Date/Time Action Medication Dose    03/23/25 0603 New Bag    piperacillin-tazobactam (Zosyn) 4.5 g in dextrose (iso)  mL 4.5 g           Plan discussed with interdisciplinary team, potassium low today @ 3.4 replaced, hemoglobin @ 7.4 today will obtain iron levels. Titrate oxygen as tolerated, will continue IV antibiotics, repeat chest xray shows - IMPRESSION:  1.  Increased congestion with persistent effusions. Probably  increased left basilar atelectasis.  Will order IV lasix and continue to diuresis patient, continue current and repeat labs in the AM.     Discharge planning discussed with patient and care team. Therapy evaluations ordered. Anticipate SNF at discharge. Patient aware and agreeable to current plan, continue plan as above.     I spent a total of 50 minutes on the date of  the service which included preparing to see the patient, face-to-face patient care, completing clinical documentation, obtaining and/or reviewing separately obtained history, performing a medically appropriate examination, counseling and educating the patient/family/caregiver, ordering medications, tests, or procedures, communicating with other HCPs (not separately reported), independently interpreting results (not separately reported), communicating results to the patient/family/caregiver, and care coordination (not separately reported).             Riya Urena

## 2025-03-24 ENCOUNTER — APPOINTMENT (OUTPATIENT)
Dept: RADIOLOGY | Facility: HOSPITAL | Age: OVER 89
DRG: 177 | End: 2025-03-24
Payer: MEDICARE

## 2025-03-24 LAB
ALBUMIN SERPL BCP-MCNC: 3.2 G/DL (ref 3.4–5)
ALP SERPL-CCNC: 69 U/L (ref 33–136)
ALT SERPL W P-5'-P-CCNC: 23 U/L (ref 7–45)
ANION GAP SERPL CALC-SCNC: 11 MMOL/L (ref 10–20)
AST SERPL W P-5'-P-CCNC: 19 U/L (ref 9–39)
ATRIAL RATE: 0 BPM
BASOPHILS # BLD AUTO: 0.04 X10*3/UL (ref 0–0.1)
BASOPHILS NFR BLD AUTO: 0.5 %
BILIRUB SERPL-MCNC: 0.5 MG/DL (ref 0–1.2)
BUN SERPL-MCNC: 22 MG/DL (ref 6–23)
CALCIUM SERPL-MCNC: 8.5 MG/DL (ref 8.6–10.3)
CHLORIDE SERPL-SCNC: 97 MMOL/L (ref 98–107)
CO2 SERPL-SCNC: 31 MMOL/L (ref 21–32)
CREAT SERPL-MCNC: 0.95 MG/DL (ref 0.5–1.05)
EGFRCR SERPLBLD CKD-EPI 2021: 57 ML/MIN/1.73M*2
EOSINOPHIL # BLD AUTO: 0.51 X10*3/UL (ref 0–0.4)
EOSINOPHIL NFR BLD AUTO: 6.9 %
ERYTHROCYTE [DISTWIDTH] IN BLOOD BY AUTOMATED COUNT: 18.1 % (ref 11.5–14.5)
GLUCOSE SERPL-MCNC: 82 MG/DL (ref 74–99)
HCT VFR BLD AUTO: 25.8 % (ref 36–46)
HGB BLD-MCNC: 7.7 G/DL (ref 12–16)
IMM GRANULOCYTES # BLD AUTO: 0.02 X10*3/UL (ref 0–0.5)
IMM GRANULOCYTES NFR BLD AUTO: 0.3 % (ref 0–0.9)
LYMPHOCYTES # BLD AUTO: 1.43 X10*3/UL (ref 0.8–3)
LYMPHOCYTES NFR BLD AUTO: 19.5 %
MAGNESIUM SERPL-MCNC: 2.17 MG/DL (ref 1.6–2.4)
MCH RBC QN AUTO: 25.8 PG (ref 26–34)
MCHC RBC AUTO-ENTMCNC: 29.8 G/DL (ref 32–36)
MCV RBC AUTO: 86 FL (ref 80–100)
MONOCYTES # BLD AUTO: 1.13 X10*3/UL (ref 0.05–0.8)
MONOCYTES NFR BLD AUTO: 15.4 %
NEUTROPHILS # BLD AUTO: 4.21 X10*3/UL (ref 1.6–5.5)
NEUTROPHILS NFR BLD AUTO: 57.4 %
NRBC BLD-RTO: 0 /100 WBCS (ref 0–0)
PHOSPHATE SERPL-MCNC: 2.4 MG/DL (ref 2.5–4.9)
PLATELET # BLD AUTO: 237 X10*3/UL (ref 150–450)
POTASSIUM SERPL-SCNC: 3.6 MMOL/L (ref 3.5–5.3)
PR INTERVAL: 56 MS
PROT SERPL-MCNC: 5.6 G/DL (ref 6.4–8.2)
Q ONSET: 249 MS
QRS COUNT: 11 BEATS
QRS DURATION: 163 MS
QT INTERVAL: 483 MS
QTC CALCULATION(BAZETT): 547 MS
QTC FREDERICIA: 524 MS
R AXIS: -63 DEGREES
RBC # BLD AUTO: 2.99 X10*6/UL (ref 4–5.2)
SODIUM SERPL-SCNC: 135 MMOL/L (ref 136–145)
T AXIS: 153 DEGREES
T OFFSET: 491 MS
VENTRICULAR RATE: 77 BPM
WBC # BLD AUTO: 7.3 X10*3/UL (ref 4.4–11.3)

## 2025-03-24 PROCEDURE — 1200000002 HC GENERAL ROOM WITH TELEMETRY DAILY

## 2025-03-24 PROCEDURE — 84075 ASSAY ALKALINE PHOSPHATASE: CPT | Performed by: INTERNAL MEDICINE

## 2025-03-24 PROCEDURE — 2500000004 HC RX 250 GENERAL PHARMACY W/ HCPCS (ALT 636 FOR OP/ED)

## 2025-03-24 PROCEDURE — 71046 X-RAY EXAM CHEST 2 VIEWS: CPT

## 2025-03-24 PROCEDURE — 2500000002 HC RX 250 W HCPCS SELF ADMINISTERED DRUGS (ALT 637 FOR MEDICARE OP, ALT 636 FOR OP/ED): Performed by: INTERNAL MEDICINE

## 2025-03-24 PROCEDURE — 84100 ASSAY OF PHOSPHORUS: CPT

## 2025-03-24 PROCEDURE — 83735 ASSAY OF MAGNESIUM: CPT

## 2025-03-24 PROCEDURE — 2500000004 HC RX 250 GENERAL PHARMACY W/ HCPCS (ALT 636 FOR OP/ED): Performed by: INTERNAL MEDICINE

## 2025-03-24 PROCEDURE — 2500000001 HC RX 250 WO HCPCS SELF ADMINISTERED DRUGS (ALT 637 FOR MEDICARE OP)

## 2025-03-24 PROCEDURE — 71046 X-RAY EXAM CHEST 2 VIEWS: CPT | Performed by: RADIOLOGY

## 2025-03-24 PROCEDURE — 97535 SELF CARE MNGMENT TRAINING: CPT | Mod: CQ,GP

## 2025-03-24 PROCEDURE — 36415 COLL VENOUS BLD VENIPUNCTURE: CPT | Performed by: INTERNAL MEDICINE

## 2025-03-24 PROCEDURE — 85025 COMPLETE CBC W/AUTO DIFF WBC: CPT | Performed by: INTERNAL MEDICINE

## 2025-03-24 PROCEDURE — 94640 AIRWAY INHALATION TREATMENT: CPT

## 2025-03-24 PROCEDURE — 97116 GAIT TRAINING THERAPY: CPT | Mod: CQ,GP

## 2025-03-24 PROCEDURE — 99233 SBSQ HOSP IP/OBS HIGH 50: CPT | Performed by: INTERNAL MEDICINE

## 2025-03-24 RX ORDER — FUROSEMIDE 10 MG/ML
20 INJECTION INTRAMUSCULAR; INTRAVENOUS EVERY 12 HOURS
Status: DISCONTINUED | OUTPATIENT
Start: 2025-03-24 | End: 2025-03-25

## 2025-03-24 RX ADMIN — PIPERACILLIN SODIUM AND TAZOBACTAM SODIUM 4.5 G: 4; .5 INJECTION, SOLUTION INTRAVENOUS at 06:16

## 2025-03-24 RX ADMIN — GABAPENTIN 300 MG: 300 CAPSULE ORAL at 09:03

## 2025-03-24 RX ADMIN — IPRATROPIUM BROMIDE AND ALBUTEROL SULFATE 3 ML: .5; 3 SOLUTION RESPIRATORY (INHALATION) at 19:16

## 2025-03-24 RX ADMIN — PIPERACILLIN SODIUM AND TAZOBACTAM SODIUM 4.5 G: 4; .5 INJECTION, SOLUTION INTRAVENOUS at 00:00

## 2025-03-24 RX ADMIN — PIPERACILLIN SODIUM AND TAZOBACTAM SODIUM 4.5 G: 4; .5 INJECTION, SOLUTION INTRAVENOUS at 23:03

## 2025-03-24 RX ADMIN — ASPIRIN 81 MG CHEWABLE TABLET 81 MG: 81 TABLET CHEWABLE at 09:03

## 2025-03-24 RX ADMIN — FUROSEMIDE 20 MG: 10 INJECTION, SOLUTION INTRAVENOUS at 12:04

## 2025-03-24 RX ADMIN — CLOPIDOGREL BISULFATE 75 MG: 75 TABLET ORAL at 09:03

## 2025-03-24 RX ADMIN — VANCOMYCIN HYDROCHLORIDE 1000 MG: 1 INJECTION, SOLUTION INTRAVENOUS at 17:01

## 2025-03-24 RX ADMIN — FUROSEMIDE 20 MG: 10 INJECTION, SOLUTION INTRAVENOUS at 21:45

## 2025-03-24 RX ADMIN — PIPERACILLIN SODIUM AND TAZOBACTAM SODIUM 4.5 G: 4; .5 INJECTION, SOLUTION INTRAVENOUS at 15:22

## 2025-03-24 RX ADMIN — ALLOPURINOL 100 MG: 100 TABLET ORAL at 09:03

## 2025-03-24 RX ADMIN — ACETAMINOPHEN 650 MG: 325 TABLET, FILM COATED ORAL at 09:14

## 2025-03-24 RX ADMIN — GABAPENTIN 300 MG: 300 CAPSULE ORAL at 21:39

## 2025-03-24 RX ADMIN — IPRATROPIUM BROMIDE AND ALBUTEROL SULFATE 3 ML: .5; 3 SOLUTION RESPIRATORY (INHALATION) at 14:22

## 2025-03-24 RX ADMIN — IPRATROPIUM BROMIDE AND ALBUTEROL SULFATE 3 ML: .5; 3 SOLUTION RESPIRATORY (INHALATION) at 07:11

## 2025-03-24 RX ADMIN — FLUTICASONE FUROATE AND VILANTEROL TRIFENATATE 1 PUFF: 200; 25 POWDER RESPIRATORY (INHALATION) at 07:14

## 2025-03-24 RX ADMIN — POLYETHYLENE GLYCOL 3350 17 G: 17 POWDER, FOR SOLUTION ORAL at 21:39

## 2025-03-24 RX ADMIN — ENOXAPARIN SODIUM 40 MG: 40 INJECTION SUBCUTANEOUS at 15:22

## 2025-03-24 ASSESSMENT — COGNITIVE AND FUNCTIONAL STATUS - GENERAL
WALKING IN HOSPITAL ROOM: A LITTLE
MOBILITY SCORE: 15
CLIMB 3 TO 5 STEPS WITH RAILING: TOTAL
STANDING UP FROM CHAIR USING ARMS: A LITTLE
MOVING FROM LYING ON BACK TO SITTING ON SIDE OF FLAT BED WITH BEDRAILS: A LITTLE
TURNING FROM BACK TO SIDE WHILE IN FLAT BAD: A LOT
MOVING TO AND FROM BED TO CHAIR: A LITTLE

## 2025-03-24 ASSESSMENT — PAIN SCALES - GENERAL
PAINLEVEL_OUTOF10: 0 - NO PAIN
PAINLEVEL_OUTOF10: 0 - NO PAIN
PAINLEVEL_OUTOF10: 5 - MODERATE PAIN

## 2025-03-24 ASSESSMENT — PAIN SCALES - WONG BAKER: WONGBAKER_NUMERICALRESPONSE: HURTS LITTLE MORE

## 2025-03-24 ASSESSMENT — PAIN - FUNCTIONAL ASSESSMENT: PAIN_FUNCTIONAL_ASSESSMENT: 0-10

## 2025-03-24 NOTE — CARE PLAN
The patient's goals for the shift include      The clinical goals for the shift include Maintain safety, comfort and SpO2 at or above 92%      Problem: Safety - Adult  Goal: Free from fall injury  Outcome: Progressing  Flowsheets (Taken 3/24/2025 0446)  Free from fall injury:   Instruct family/caregiver on patient safety   Based on caregiver fall risk screen, instruct family/caregiver to ask for assistance with transferring infant if caregiver noted to have fall risk factors     Problem: Pain - Adult  Goal: Verbalizes/displays adequate comfort level or baseline comfort level  Outcome: Progressing  Flowsheets (Taken 3/24/2025 0446)  Verbalizes/displays adequate comfort level or baseline comfort level:   Encourage patient to monitor pain and request assistance   Administer analgesics based on type and severity of pain and evaluate response   Assess pain using appropriate pain scale   Implement non-pharmacological measures as appropriate and evaluate response   Consider cultural and social influences on pain and pain management   Notify Licensed Independent Practitioner if interventions unsuccessful or patient reports new pain     Problem: Respiratory  Goal: Minimal/no exertional discomfort or dyspnea this shift  Outcome: Progressing  Flowsheets (Taken 3/24/2025 0446)  Minimal/no exertional discomfort or dyspnea this shift: Positioning to promote ventilation/comfort     Problem: Respiratory  Goal: Patent airway maintained this shift  Outcome: Progressing

## 2025-03-24 NOTE — CARE PLAN
The patient's goals for the shift include      The clinical goals for the shift include Maintain safety, comfort and SpO2 at or above 92%    Over the shift, the patient did make progress toward the following goals.

## 2025-03-24 NOTE — PROGRESS NOTES
Physical Therapy    Physical Therapy Treatment    Patient Name: Yesenia Milner  MRN: [id number]  Department: [facility]  Room: [address] [place] [address]  Today's Date: 3/24/2025  Time Calculation  Start Time: 1116  Stop Time: 1146  Time Calculation (min): 30 min         Assessment/Plan         PT Plan  Treatment/Interventions: Bed mobility, Transfer training, Gait training, Balance training, Neuromuscular re-education, Strengthening, Endurance training, Therapeutic exercise, Therapeutic activity  PT Plan: Ongoing PT  PT Frequency: 4 times per week  PT Discharge Recommendations: Moderate intensity level of continued care  PT Recommended Transfer Status: Assist x1  PT - OK to Discharge: Yes      General Visit Information:   PT  Visit  PT Received On: 03/24/25       Subjective                    Objective   Pain: no complaints                              Treatments:  Therapeutic Exercise  Therapeutic Exercise Performed:  (ble arom exs x 15 reps)    Bed Mobility  Bed Mobility:  (supine to sit mod a x 1)    Ambulation/Gait Training  Ambulation/Gait Training Performed:  (ambulated 25 feet using fixed wheeled walker min a x  1)  Transfers  Transfer:  (sit to stand min a x 1)         Outcome Measures:  [facility] Basic Mobility  Turning from your back to your side while in a flat bed without using bedrails: A little  Moving from lying on your back to sitting on the side of a flat bed without using bedrails: A lot  Moving to and from bed to chair (including a wheelchair): A little  Standing up from a chair using your arms (e.g. wheelchair or bedside chair): A little  To walk in hospital room: A little  Climbing 3-5 steps with railing: Total  Basic Mobility - Total Score: 15    Education Documentation  Mobility Training, taught by Teena Gannon PTA at 3/24/2025 12:44 PM.  Learner: Patient  Readiness: Acceptance  Method: Demonstration  Response: Demonstrated Understanding    Education Comments  No comments found.             Encounter Problems        Encounter Problems (Active)       PT Problem       STG - Pt will transition supine <> sitting with supervision (Progressing)       Start:  03/22/25    Expected End:  04/05/25            STG - Pt will transfer STS with SBA (Progressing)       Start:  03/22/25    Expected End:  04/05/25            STG - Pt will amb >/=75' using WW with SBA (Progressing)       Start:  03/22/25    Expected End:  04/05/25            STG - Pt will maintain F+ standing balance to decrease risk of falls (Progressing)       Start:  03/22/25    Expected End:  04/05/25               Pain - Adult

## 2025-03-24 NOTE — PROGRESS NOTES
Yesenia Milner is a 89 y.o. female on day 3 of admission presenting with Acute congestive heart failure, unspecified heart failure type.      Subjective   Patient fully evaluated  03/22  for    Problem List Items Addressed This Visit          Cardiac and Vasculature    * (Principal) Acute congestive heart failure, unspecified heart failure type - Primary    Relevant Medications    clopidogrel (Plavix) tablet 75 mg    metoprolol tartrate (Lopressor) tablet 25 mg       Pulmonary and Pneumonias    Hypoxia     Patient seen resting in bed with head of bed elevated, no s/s or c/o acute difficulties at this time.  Vital signs for last 24 hours Temp:  [36.1 °C (97 °F)-36.8 °C (98.2 °F)] 36.1 °C (97 °F)  Heart Rate:  [] 62  Resp:  [20] 20  BP: (113-142)/(56-64) 134/64  FiO2 (%):  [21 %-24 %] 24 %    I/O this shift:  In: 100 [IV Piggyback:100]  Out: -   Patient still requiring frequent cardiac and SPO2 monitoring. Continue aggressive pulmonary hygiene and oral hygiene. Off loading as tolerated for skin integrity. Medications and labs reviewed-   Results for orders placed or performed during the hospital encounter of 03/21/25 (from the past 24 hours)   Magnesium   Result Value Ref Range    Magnesium 2.17 1.60 - 2.40 mg/dL   CBC and Auto Differential   Result Value Ref Range    WBC 7.3 4.4 - 11.3 x10*3/uL    nRBC 0.0 0.0 - 0.0 /100 WBCs    RBC 2.99 (L) 4.00 - 5.20 x10*6/uL    Hemoglobin 7.7 (L) 12.0 - 16.0 g/dL    Hematocrit 25.8 (L) 36.0 - 46.0 %    MCV 86 80 - 100 fL    MCH 25.8 (L) 26.0 - 34.0 pg    MCHC 29.8 (L) 32.0 - 36.0 g/dL    RDW 18.1 (H) 11.5 - 14.5 %    Platelets 237 150 - 450 x10*3/uL    Neutrophils % 57.4 40.0 - 80.0 %    Immature Granulocytes %, Automated 0.3 0.0 - 0.9 %    Lymphocytes % 19.5 13.0 - 44.0 %    Monocytes % 15.4 2.0 - 10.0 %    Eosinophils % 6.9 0.0 - 6.0 %    Basophils % 0.5 0.0 - 2.0 %    Neutrophils Absolute 4.21 1.60 - 5.50 x10*3/uL    Immature Granulocytes Absolute, Automated 0.02 0.00  - 0.50 x10*3/uL    Lymphocytes Absolute 1.43 0.80 - 3.00 x10*3/uL    Monocytes Absolute 1.13 (H) 0.05 - 0.80 x10*3/uL    Eosinophils Absolute 0.51 (H) 0.00 - 0.40 x10*3/uL    Basophils Absolute 0.04 0.00 - 0.10 x10*3/uL   Comprehensive Metabolic Panel   Result Value Ref Range    Glucose 82 74 - 99 mg/dL    Sodium 135 (L) 136 - 145 mmol/L    Potassium 3.6 3.5 - 5.3 mmol/L    Chloride 97 (L) 98 - 107 mmol/L    Bicarbonate 31 21 - 32 mmol/L    Anion Gap 11 10 - 20 mmol/L    Urea Nitrogen 22 6 - 23 mg/dL    Creatinine 0.95 0.50 - 1.05 mg/dL    eGFR 57 (L) >60 mL/min/1.73m*2    Calcium 8.5 (L) 8.6 - 10.3 mg/dL    Albumin 3.2 (L) 3.4 - 5.0 g/dL    Alkaline Phosphatase 69 33 - 136 U/L    Total Protein 5.6 (L) 6.4 - 8.2 g/dL    AST 19 9 - 39 U/L    Bilirubin, Total 0.5 0.0 - 1.2 mg/dL    ALT 23 7 - 45 U/L   Phosphorus   Result Value Ref Range    Phosphorus 2.4 (L) 2.5 - 4.9 mg/dL     *Note: Due to a large number of results and/or encounters for the requested time period, some results have not been displayed. A complete set of results can be found in Results Review.      Patient recently received an antibiotic (last 12 hours)       Date/Time Action Medication Dose Rate    03/22/25 1613 New Bag    vancomycin (Vancocin) 1,000 mg in dextrose 5%  mL 1,000 mg 200 mL/hr    03/22/25 1455 New Bag    piperacillin-tazobactam (Zosyn) 4.5 g in dextrose (iso)  mL 4.5 g     03/22/25 0745 New Bag    piperacillin-tazobactam (Zosyn) 4.5 g in dextrose (iso)  mL 4.5 g            Plan discussed with interdisciplinary team, will continue to diuresis patient cardiology on the case, appreciate input. Patient being treated for pneumonia, chest physiotherapy as tolerated, incentive spirometry, repeat chest xray in the AM.  Will continue current and repeat labs in the AM.     Discharge planning discussed with patient and care team. Therapy evaluations ordered. Anticipate SNF at discharge. Patient aware and agreeable to current plan,  continue plan as above.     I spent a total of 50 minutes on the date of the service which included preparing to see the patient, face-to-face patient care, completing clinical documentation, obtaining and/or reviewing separately obtained history, performing a medically appropriate examination, counseling and educating the patient/family/caregiver, ordering medications, tests, or procedures, communicating with other HCPs (not separately reported), independently interpreting results (not separately reported), communicating results to the patient/family/caregiver, and care coordination (not separately reported).        Objective     Last Recorded Vitals  /64   Pulse 62   Temp 36.1 °C (97 °F)   Resp 20   Wt 68.5 kg (151 lb 0.2 oz)   SpO2 94%   Intake/Output last 3 Shifts:    Intake/Output Summary (Last 24 hours) at 3/24/2025 1421  Last data filed at 3/24/2025 0756  Gross per 24 hour   Intake 500 ml   Output 850 ml   Net -350 ml       Admission Weight  Weight: 71.7 kg (158 lb) (03/21/25 1036)    Daily Weight  03/24/25 : 68.5 kg (151 lb 0.2 oz)    Image Results  ECG 12 lead  Afib/flutter and ventricular-paced rhythm      Physical Exam    Relevant Results               Assessment/Plan                  Assessment & Plan  Acute congestive heart failure, unspecified heart failure type          Fei Mensah MD   Physician  Internal Medicine     H&P      Signed     Date of Service: 3/21/2025  5:08 PM     Signed       Expand All Collapse All    History Of Present Illness  Yesenia Milner is a 89 y.o. female presenting with Acute congestive heart failure, unspecified heart failure type .     Past Medical History  She has a past medical history of Gross hematuria (10/07/2020), Impacted cerumen (02/22/2024), Other conditions influencing health status, Personal history of other medical treatment, Personal history of other medical treatment, and Systolic CHF (Multi) (05/18/2023).     Surgical History  She has a past  surgical history that includes Appendectomy (11/22/2017); Hysterectomy (11/22/2017); Tonsillectomy (11/22/2017); Other surgical history (11/22/2017); Other surgical history (11/22/2017); Cardiac pacemaker placement (03/11/2021); IR angiogram renal bilateral (Bilateral, 12/14/2020); IR angiogram inferior epigastric pelvic (12/14/2020); IR angiogram inferior epigastric pelvic (12/14/2020); and Cardiac catheterization (N/A, 12/20/2024).     Social History  She reports that she quit smoking about 52 years ago. Her smoking use included cigarettes. She has been exposed to tobacco smoke. She has never used smokeless tobacco. She reports that she does not drink alcohol and does not use drugs.     Family History  Family History          Family History   Problem Relation Name Age of Onset    No Known Problems Mother                Allergies  Iodine, Shrimp, Hydrocodone, and Adhesive tape-silicones     Review of Systems   Constitutional:  Positive for activity change and fatigue.   Respiratory:  Positive for shortness of breath.    Cardiovascular:  Positive for leg swelling.   All other systems reviewed and are negative.        Physical Exam   Physical Exam:  General:  Pleasant and cooperative.  Mild distress  HEENT:  Normocephalic, atraumatic, mucus membranes moist.   Neck:  Trachea midline.  No JVD.    Chest:  Clear to auscultation bilaterally.  Decreased breath sounds in lower lobe with Rales  CV:  Regular rate and rhythm.  Positive S1/S2.   Abdomen: Bowel sounds present in all four quadrants, abdomen is soft, non-tender, non-distended.  Extremities:  No lower extremity edema or cyanosis.   Neurological:  AAOx3. No focal deficits.  Skin:  Warm and dry.  Last Recorded Vitals  /59   Pulse 66   Temp 36.3 °C (97.3 °F) (Temporal)   Resp 16   Wt 71.7 kg (158 lb)   SpO2 94%      Relevant Results                 had concerns including Shortness of Breath (BIBA [place] [facility] from [facility] for hypoxia, patient was  recently dx with pneumonia and started antibiotics x2 days ago. Per EMS patient was 80% on RA when arrived and placed on 4L NC. Patient is AOX4, and was sent in at family request).        Problem List Items Addressed This Visit         Hypoxia     * (Principal) Acute congestive heart failure, unspecified heart failure type - Primary     Relevant Medications     clopidogrel (Plavix) tablet 75 mg     metoprolol tartrate (Lopressor) tablet 25 mg         HPI         Shortness of Breath     Additional comments: BIBA [place] [facility] from [facility] for hypoxia, patient was recently dx with pneumonia and started antibiotics x2 days ago. Per EMS patient was 80% on RA when arrived and placed on 4L NC. Patient is AOX4, and was sent in at family request            Last edited by Drea Cesar RN on 3/21/2025 10:44 AM.             Problem List as of 3/21/2025 Reviewed: 2/25/2025  9:52 AM by Naima Keane MD        Aortic stenosis, mild     Arthritis     Chronic diastolic congestive heart failure     Last Assessment & Plan 2/25/2025 Office Visit Edited 2/25/2025 10:33 AM by Naima Keane MD       -Chronic, BP controlled with beta-blocker and torsemide  -Most recent EF 60%, with diastolic dysfunction  -Follow-up cardiology-ASAP-for diuretic/fluid management-for worsening edema  -pt/cg agreeable with palliative care eval(I am surprised and disappointed-this was not already addressed during her multiple recent hospitals admissions)  Orders:    Referral to Palliative Care; Future              Complete heart block     COPD (chronic obstructive pulmonary disease) (Multi)     Last Assessment & Plan 9/5/2023 Telemedicine Written 9/5/2023  2:17 PM by Adore Carroll, PharmD       Patient has COPD diagnosis and is not currently experiencing any SOB, coughing, or wheezing.  Continue advair 115-21 mcg 2 puffs twice daily.  Recommend patient have a rescue inhaler at home, but she declines at this time.            History of  paroxysmal supraventricular tachycardia     HTN (hypertension)     Paresthesia     Presence of permanent cardiac pacemaker     Last Assessment & Plan 1/31/2025 Office Visit Edited 1/31/2025  3:31 PM by Naima Keane MD       -Current pacemaker is her second 1  -Appointment with EP specialist February 24        RTC 3 months  No refills needed per pt/CG ( julieta brought her here)           Raynauds disease     Sensorineural hearing loss (SNHL) of both ears     Dyspnea on minimal exertion     Varicose veins of lower extremities with inflammation     Vertigo     Ischemic cerebrovascular accident (CVA) (Multi)     Last Assessment & Plan 9/5/2023 Telemedicine Written 9/5/2023  2:19 PM by Adore Carroll PharmD       Patient recently hospitalized for non-cardioembolic CVA (atherosclerotic in nature).   Continue aspirin 81 mg daily, plavix 75 mg daily, and atorvastatin 20 mg daily. DAPT to only be taken x 21 days, then plavix monotherapy thereafter.   Patient hasn't seen neurologist, but will request referral at PCP appt on 9/11.           Acquired deformity of toe     Benign neoplasm of skin of foot     Benign paroxysmal positional vertigo     Dermatophytosis of nail     Leg swelling     Macular degeneration     Mitral valve insufficiency     Moderate mitral valve regurgitation     Overweight with body mass index (BMI) 25.0-29.9     Plantar wart of left foot     Venous insufficiency     Dysarthria following cerebral infarction     Hyperglycemia     Hand paresthesia     Sick sinus syndrome (Multi)     Last Assessment & Plan 2/26/2024 Office Visit Written 2/26/2024  2:20 PM by James C Ramicone, DO       1.  Sick sinus syndrome: This patient has a history of symptomatic sinus bradycardia and has a [employer]onic dual-chamber permanent pacemaker which was replaced in July 2023.  The original device implantation was October 2012.  The pacemaker is functioning appropriately and the battery status is stable.  Continue follow-up  as scheduled through the cardiac device clinic.  The patient will follow-up with me in 1 year.           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 Julian.  Patient transition to DOAC (Eliquis).  Asymptomatic of her right IJ and SVC thrombus.  No planned mechanical thrombectomy.  Continue to monitor for bleeding.  Patient lives at home no history of falls would recommend PT OT evaluate for home safety.  Awaiting results of vascular ultrasound of upper extremity.     Thank you very much for allowing Vascular Surgery to be involved in the care of your patient sincerely Antonio MONTALVO .  (This note was generated with voice recognition software and may contain errors including spelling, grammar, syntax and missed recognition of what was dictated, of which may not have been fully corrected)           Gastrointestinal hemorrhage, unspecified gastrointestinal hemorrhage type     Last Assessment & Plan 1/12/2025 Hospital Encounter Written 1/23/2025  9:41 AM by Iglesia Gabriel DO       89-year-old female with past medical history of COPD, HTN, CVA, sick sinus syndrome s/p pacemaker, HFpEF, recent NSTEMI, and recent hospitalization for CHF exacerbation with acute pulmonary edema, imaging at that time was concerning for SVC thrombus and patient was evaluated by vascular who recommended DOAC for 6 months and repeat CT.  She presents with red blood per rectum and possible epistaxis. Hospitalized for further evaluation and management.     #GI bleed, suspect lower -> likely ischemic colitis (wall thickening at distal transverse colon, associated abdominal discomfort and dizziness, brown stool with red blood  reported).  #Epistaxis ?- no reported vomiting.  Blood noted with clearing of her nose and when rinsing her mouth with fluids.   #SVC thrombus     Presentation is concerning for ischemic colitis as noted above.  Avoid hypotension  Consult gastroenterology, appreciate input  Will hold aspirin, Plavix, and Eliquis for the time being  Trend H&H, stable on presentation.  Patient agreeable to blood transfusions as needed.  N.p.o. after midnight  Low suspicion for upper GI source, however will continue with Protonix 40 mg IV twice daily until evaluated by GI.  Consult to vascular surgery regarding ongoing need for Eliquis.   Dr Smith's last note on 1/6/2025 “I have reviewed the DVT scan performed today.  There is no evidence of IJ thrombus.  Waveforms are normal in the IJ as well as subclavian, suggestive that even if there is a thrombus in the SVC, it is not hemodynamically significant.”   Check Orthostatic vitals     #Acute hypoxic respiratory failure  #Acute on chronic diastolic congestive heart failure  #History sick sinus syndrome s/p pacemaker  #History NSTEMI  #Valvular hear disease  #pleural effusions     CT angio A/P was suggestive of CHF with interstitial edema and small pleural effusions.   Low-sodium diet  Consult cardiology, patient follows with Dr Jiang and Ramicone for EP  Supplemental oxygen as needed  Continue oral diuretics, will defer to cardiology IV diuresis .  DuoNebs as needed  RT to evaluate     #DVT Ppx  SCD  Holding ac        1/13: doing ok, informed about CT results, gi on board,      1/14: CLD. Plans for colonscopy to get definitive dx, ok to stop eliquis per vascular/cardio     1/15: plans for scope tomorrow     1/16: scope confirmed cancer, she is unsure if she wants surgery or not, will consult surgery to give her more information  1/17: contemplating surgyer, will need cardiac clearance will talk to family as well     1/18: patient seen by cardiology this am ; continue to hold aspirin and  Plavix at this time.;  Patient noted with expiratory wheezes on exam.  Pulse ox is stable.  We will order chest x-rays and ensure that she is getting her nebulizers as ordered.    Awaiting Vascular surgery in put as well  Patient is still contemplating surgery  Continue to trend labs     1/19: lab work stable.  We will start Mucinex and Tessalon Perles for cough.  Patient appears agreeable for surgery we will continue discussion with surgical team.  Aspirin Plavix are on hold, seen by cardiology at discharge patient should only resume aspirin  Continue to monitor labs.  Plan of care discussed with attending   Dr. Iglesia mart.        1/20: plans to undergo surgery, date pending     1/21: continue pre op preparation  1/22: surgery cleared plans to go to OR outpatient to help her recover a bit pior to surgery     I spent 35 minutes in the professional and overall care of this patient.           Malignant neoplasm of transverse colon (Multi)     Last Assessment & Plan 1/31/2025 Office Visit Edited 1/31/2025  3:31 PM by Naima Keane MD                         Stage 3 chronic kidney disease, unspecified whether stage 3a or 3b CKD (Multi)     Last Assessment & Plan 1/31/2025 Office Visit Edited 1/31/2025  3:31 PM by Naima Keane MD       -Her GFR has been stable around 55, she does not see a kidney specialist                 Acute decompensated heart failure     Acute exacerbation of chronic heart failure     Hypoxia     Pleural effusion     Cellulitis of lower extremity     Adenocarcinoma of colon (Multi)     Bilateral leg edema     * (Principal) Acute congestive heart failure, unspecified heart failure type              Active Ambulatory Problems     Diagnosis Date Noted    Aortic stenosis, mild 05/18/2023    Arthritis 05/18/2023    Chronic diastolic congestive heart failure 05/18/2023    Complete heart block 05/18/2023    COPD (chronic obstructive pulmonary disease) (Multi) 05/18/2023    History of paroxysmal  supraventricular tachycardia 05/18/2023    HTN (hypertension) 05/18/2023    Paresthesia 05/18/2023    Presence of permanent cardiac pacemaker 05/18/2023    Raynauds disease 05/18/2023    Sensorineural hearing loss (SNHL) of both ears 05/18/2023    Dyspnea on minimal exertion 05/18/2023    Varicose veins of lower extremities with inflammation 05/31/2015    Vertigo 05/18/2023    Ischemic cerebrovascular accident (CVA) (Multi) 09/05/2023    Acquired deformity of toe 05/31/2015    Benign neoplasm of skin of foot 07/03/2016    Benign paroxysmal positional vertigo 09/20/2023    Dermatophytosis of nail 08/09/2014    Leg swelling 09/20/2023    Macular degeneration 09/20/2023    Mitral valve insufficiency 09/20/2023    Moderate mitral valve regurgitation 09/20/2023    Overweight with body mass index (BMI) 25.0-29.9 09/20/2023    Plantar wart of left foot 07/03/2016    Venous insufficiency 08/09/2014    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    Hyperglycemia 08/23/2023    Hand paresthesia 02/22/2024    Sick sinus syndrome (Multi) 02/26/2024    Injury of kidney 06/26/2024    NSTEMI (non-ST elevated myocardial infarction) (Multi) 12/20/2024    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) 01/06/2025    Gastrointestinal hemorrhage, unspecified gastrointestinal hemorrhage type 01/12/2025    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    Stage 3 chronic kidney disease, unspecified whether stage 3a or 3b CKD (Multi) 01/31/2025    Acute decompensated heart failure 02/02/2025    Acute exacerbation of chronic heart failure 03/04/2025    Hypoxia 03/04/2025    Pleural effusion 03/04/2025    Cellulitis of lower extremity 03/04/2025    Adenocarcinoma of colon (Multi) 03/04/2025    Bilateral leg edema 03/05/2025           Resolved Ambulatory Problems     Diagnosis Date Noted    Systolic CHF (Multi) 05/18/2023    Gross hematuria 10/07/2020     Ulcer of toe of left foot (Multi) 12/06/2015    Ulcer of toe of right foot (Multi) 04/24/2016    Post-menopausal 09/20/2023    Impacted cerumen 02/22/2024    Urinary tract infection 02/22/2024    Shortness of breath 01/02/2025           Past Medical History:   Diagnosis Date    Other conditions influencing health status      Personal history of other medical treatment      Personal history of other medical treatment                      Active Orders   Imaging     CT angio chest for pulmonary embolism       Frequency: Once       Number of Occurrences: 1 Occurrences   Lab     CBC       Frequency: AM draw       Number of Occurrences: 5 Occurrences     Magnesium       Frequency: AM draw       Number of Occurrences: 5 Occurrences     Renal Function Panel       Frequency: AM draw       Number of Occurrences: 5 Occurrences     Troponin, High Sensitivity, 1 Hour       Frequency: Once       Number of Occurrences: 1 Occurrences     Vancomycin       Frequency: AM draw       Number of Occurrences: 1 Occurrences   Diet     Adult diet Cardiac; 70 gm fat; 2 - 3 grams Sodium; Easy to chew; 1800 mL fluid       Frequency: Effective now       Number of Occurrences: Until Specified   Nursing     Apply sequential compression device       Frequency: Until discontinued       Number of Occurrences: Until Specified   Consult     Inpatient consult to Social Work and TCC       Frequency: Once       Number of Occurrences: 1 Occurrences   Nourishments     May Participate in Room Service       Frequency: Once       Number of Occurrences: 1 Occurrences   OT     OT eval and treat       Frequency: Until therapy completed       Number of Occurrences: 1 Occurrences   PT     PT eval and treat       Frequency: Until therapy completed       Number of Occurrences: 1 Occurrences   Respiratory Care     Incentive spirometry Instruct       Frequency: Once       Number of Occurrences: 1 Occurrences     Respiratory care eval and treat       Frequency: Once        Number of Occurrences: 1 Occurrences       Order Comments: Due 3/24 @ 0700      ECG     ECG 12 lead       Frequency: Once       Number of Occurrences: 1 Occurrences   Admission     Admit to inpatient       Frequency: Once       Number of Occurrences: 1 Occurrences   Telemetry     Telemetry monitoring       Frequency: Until discontinued       Number of Occurrences: 3 Days   Medications     acetaminophen (Tylenol) oral liquid 650 mg       Linked Order: Or       Frequency: q4h PRN       Dose: 650 mg       Route: nasogastric tube     acetaminophen (Tylenol) suppository 650 mg       Linked Order: Or       Frequency: q4h PRN       Dose: 650 mg       Route: rectal     acetaminophen (Tylenol) tablet 650 mg       Linked Order: Or       Frequency: q4h PRN       Dose: 650 mg       Route: oral     allopurinol (Zyloprim) tablet 100 mg       Frequency: Daily       Dose: 100 mg       Route: oral     aspirin chewable tablet 81 mg       Frequency: Daily       Dose: 81 mg       Route: oral     clopidogrel (Plavix) tablet 75 mg       Frequency: Daily       Dose: 75 mg       Route: oral     enoxaparin (Lovenox) syringe 40 mg       Frequency: q24h       Dose: 40 mg       Route: subcutaneous     fluticasone furoate-vilanteroL (Breo Ellipta) 200-25 mcg/dose inhaler 1 puff       Frequency: Daily       Dose: 1 puff       Route: inhalation     furosemide (Lasix) injection 20 mg       Frequency: Once       Dose: 20 mg       Route: intravenous     gabapentin (Neurontin) capsule 300 mg       Frequency: BID       Dose: 300 mg       Route: oral     guaiFENesin (Mucinex) 12 hr tablet 600 mg       Frequency: BID PRN       Dose: 600 mg       Route: oral     ipratropium-albuteroL (Duo-Neb) 0.5-2.5 mg/3 mL nebulizer solution 3 mL       Frequency: q2h PRN       Dose: 3 mL       Route: nebulization     ipratropium-albuteroL (Duo-Neb) 0.5-2.5 mg/3 mL nebulizer solution 3 mL       Frequency: TID       Dose: 3 mL       Route: nebulization     metoprolol  "tartrate (Lopressor) tablet 25 mg       Frequency: Daily       Dose: 25 mg       Route: oral     piperacillin-tazobactam (Zosyn) 4.5 g in dextrose (iso)  mL       Frequency: q8h       Dose: 4.5 g       Route: intravenous     vancomycin (Vancocin) 1,000 mg in dextrose 5%  mL       Frequency: q24h       Dose: 1,000 mg       Route: intravenous     vancomycin (Vancocin) pharmacy to dose - pharmacy monitoring       Frequency: Daily PRN       Route: miscellaneous     vancomycin 1,750 mg in dextrose 5% 500 mL IV       Frequency: Once       Dose: 1,750 mg       Route: intravenous      Dietary Orders (From admission, onward)          Start     Ordered     03/21/25 1607   May Participate in Room Service  ( ROOM SERVICE MAY PARTICIPATE)  Once        Question:  .  Answer:  Yes    03/21/25 1606     03/21/25 1433   Adult diet Cardiac; 70 gm fat; 2 - 3 grams Sodium; Easy to chew; 1800 mL fluid  Diet effective now        Question Answer Comment   Diet type Cardiac     Fat restriction: 70 gm fat     Sodium restriction: 2 - 3 grams Sodium     Texture Easy to chew     Dietary fluid restriction / 24h: 1800 mL fluid         03/21/25 1433                       89 yrs@  ADMITDTTM@  Acute congestive heart failure, unspecified heart failure type [I50.9]  [unfilled]  Weight: 71.7 kg (158 lb)     Vitals          Vitals:     03/21/25 1036 03/21/25 1038 03/21/25 1506 03/21/25 1604   BP: 104/50         Pulse: 84     66   Resp: 16     16   Temp: 36.3 °C (97.3 °F)         TempSrc: Temporal         SpO2: (!) 84% (!) 93% 96% 94%   Weight: 71.7 kg (158 lb)         Height: 1.6 m (5' 3\")           03/21/25 1606   BP: 125/59   Pulse:     Resp:     Temp:     TempSrc:     SpO2:     Weight:     Height:                    Chief Complaint    Shortness of Breath            Patient seen resting in bed with head of bed elevated, no s/s or c/o acute difficulties at this time.  Vital signs for last 24 hours Temperature:  [36.3 °C (97.3 °F)] 36.3 °C " (97.3 °F)  Heart Rate:  [66-84] 66  Respirations:  [16] 16  BP: (104-125)/(50-59) 125/59    No intake/output data recorded.  Patient still requiring frequent cardiac and SPO2 monitoring. Continue aggressive pulmonary hygiene and oral hygiene. Off loading as tolerated for skin integrity. Medications and labs reviewed-    Patient recently received an antibiotic (last 12 hours)         Date/Time Action Medication Dose     03/21/25 1553 New Bag    piperacillin-tazobactam (Zosyn) 4.5 g in dextrose (iso)  mL 4.5 g                    Results for orders placed or performed during the hospital encounter of 03/21/25 (from the past 96 hours)   CBC and Auto Differential   Result Value Ref Range     WBC 8.2 4.4 - 11.3 x10*3/uL     nRBC 0.0 0.0 - 0.0 /100 WBCs     RBC 3.04 (L) 4.00 - 5.20 x10*6/uL     Hemoglobin 7.6 (L) 12.0 - 16.0 g/dL     Hematocrit 25.4 (L) 36.0 - 46.0 %     MCV 84 80 - 100 fL     MCH 25.0 (L) 26.0 - 34.0 pg     MCHC 29.9 (L) 32.0 - 36.0 g/dL     RDW 16.9 (H) 11.5 - 14.5 %     Platelets 239 150 - 450 x10*3/uL     Neutrophils % 75.2 40.0 - 80.0 %     Immature Granulocytes %, Automated 0.6 0.0 - 0.9 %     Lymphocytes % 8.7 13.0 - 44.0 %     Monocytes % 14.1 2.0 - 10.0 %     Eosinophils % 1.0 0.0 - 6.0 %     Basophils % 0.4 0.0 - 2.0 %     Neutrophils Absolute 6.13 (H) 1.60 - 5.50 x10*3/uL     Immature Granulocytes Absolute, Automated 0.05 0.00 - 0.50 x10*3/uL     Lymphocytes Absolute 0.71 (L) 0.80 - 3.00 x10*3/uL     Monocytes Absolute 1.15 (H) 0.05 - 0.80 x10*3/uL     Eosinophils Absolute 0.08 0.00 - 0.40 x10*3/uL     Basophils Absolute 0.03 0.00 - 0.10 x10*3/uL   Magnesium   Result Value Ref Range     Magnesium 2.07 1.60 - 2.40 mg/dL   Comprehensive metabolic panel   Result Value Ref Range     Glucose 126 (H) 74 - 99 mg/dL     Sodium 129 (L) 136 - 145 mmol/L     Potassium 3.3 (L) 3.5 - 5.3 mmol/L     Chloride 93 (L) 98 - 107 mmol/L     Bicarbonate 28 21 - 32 mmol/L     Anion Gap 11 10 - 20 mmol/L      Urea Nitrogen 19 6 - 23 mg/dL     Creatinine 0.87 0.50 - 1.05 mg/dL     eGFR 63 >60 mL/min/1.73m*2     Calcium 8.4 (L) 8.6 - 10.3 mg/dL     Albumin 3.3 (L) 3.4 - 5.0 g/dL     Alkaline Phosphatase 88 33 - 136 U/L     Total Protein 5.7 (L) 6.4 - 8.2 g/dL     AST 23 9 - 39 U/L     Bilirubin, Total 0.5 0.0 - 1.2 mg/dL     ALT 18 7 - 45 U/L   Troponin I, High Sensitivity   Result Value Ref Range     Troponin I, High Sensitivity 24 (H) 0 - 13 ng/L   B-Type Natriuretic Peptide   Result Value Ref Range      (H) 0 - 99 pg/mL   Sars-CoV-2 and Influenza A/B PCR   Result Value Ref Range     Flu A Result Not Detected Not Detected     Flu B Result Not Detected Not Detected     Coronavirus 2019, PCR Not Detected Not Detected   RSV PCR   Result Value Ref Range     RSV PCR Not Detected Not Detected   Lactate   Result Value Ref Range     Lactate 1.6 0.4 - 2.0 mmol/L   Urinalysis with Reflex Microscopic   Result Value Ref Range     Color, Urine Colorless (N) Light-Yellow, Yellow, Dark-Yellow     Appearance, Urine Clear Clear     Specific Gravity, Urine 1.007 1.005 - 1.035     pH, Urine 7.0 5.0, 5.5, 6.0, 6.5, 7.0, 7.5, 8.0     Protein, Urine NEGATIVE NEGATIVE, 10 (TRACE), 20 (TRACE) mg/dL     Glucose, Urine Normal Normal mg/dL     Blood, Urine NEGATIVE NEGATIVE mg/dL     Ketones, Urine NEGATIVE NEGATIVE mg/dL     Bilirubin, Urine NEGATIVE NEGATIVE mg/dL     Urobilinogen, Urine Normal Normal mg/dL     Nitrite, Urine NEGATIVE NEGATIVE     Leukocyte Esterase, Urine NEGATIVE NEGATIVE   MRSA Surveillance for Vancomycin De-escalation, PCR     Specimen: Anterior Nares; Swab   Result Value Ref Range     MRSA PCR Not Detected Not Detected   Troponin I, High Sensitivity, Initial   Result Value Ref Range     Troponin I, High Sensitivity 24 (H) 0 - 13 ng/L         Patient fully evaluated 03/21, thorough record review performed of previous labs and notes from prior encounters. Plan discussed with interdisciplinary team, consults placed,  appreciate input. Will continue current and repeat labs in the AM. Therapy evaluations ordered. Patient aware and agreeable to current plan, continue plan as above.      I spent a total of 75 minutes on the date of the service which included preparing to see the patient, face-to-face patient care, completing clinical documentation, obtaining and/or reviewing separately obtained history, performing a medically appropriate examination, counseling and educating the patient/family/caregiver, ordering medications, tests, or procedures, communicating with other HCPs (not separately reported), independently interpreting results (not separately reported), communicating results to the patient/family/caregiver, and care coordination (not separately reported).            Assessment/Plan     Assessment & Plan  Acute congestive heart failure, unspecified heart failure type                       Revision History       Patient fully evaluated  03/23  for    Problem List Items Addressed This Visit          Cardiac and Vasculature    * (Principal) Acute congestive heart failure, unspecified heart failure type - Primary    Relevant Medications    clopidogrel (Plavix) tablet 75 mg    metoprolol tartrate (Lopressor) tablet 25 mg       Pulmonary and Pneumonias    Hypoxia     Patient seen resting in bed with head of bed elevated, no s/s or c/o acute difficulties at this time.  Vital signs for last 24 hours Temp:  [36.1 °C (97 °F)-36.8 °C (98.2 °F)] 36.1 °C (97 °F)  Heart Rate:  [] 62  Resp:  [20] 20  BP: (113-142)/(56-64) 134/64  FiO2 (%):  [21 %-24 %] 24 %    I/O this shift:  In: 100 [IV Piggyback:100]  Out: -   Patient still requiring frequent cardiac and SPO2 monitoring. Continue aggressive pulmonary hygiene and oral hygiene. Off loading as tolerated for skin integrity. Medications and labs reviewed-   Results for orders placed or performed during the hospital encounter of 03/21/25 (from the past 24 hours)   Magnesium   Result  Value Ref Range    Magnesium 2.17 1.60 - 2.40 mg/dL   CBC and Auto Differential   Result Value Ref Range    WBC 7.3 4.4 - 11.3 x10*3/uL    nRBC 0.0 0.0 - 0.0 /100 WBCs    RBC 2.99 (L) 4.00 - 5.20 x10*6/uL    Hemoglobin 7.7 (L) 12.0 - 16.0 g/dL    Hematocrit 25.8 (L) 36.0 - 46.0 %    MCV 86 80 - 100 fL    MCH 25.8 (L) 26.0 - 34.0 pg    MCHC 29.8 (L) 32.0 - 36.0 g/dL    RDW 18.1 (H) 11.5 - 14.5 %    Platelets 237 150 - 450 x10*3/uL    Neutrophils % 57.4 40.0 - 80.0 %    Immature Granulocytes %, Automated 0.3 0.0 - 0.9 %    Lymphocytes % 19.5 13.0 - 44.0 %    Monocytes % 15.4 2.0 - 10.0 %    Eosinophils % 6.9 0.0 - 6.0 %    Basophils % 0.5 0.0 - 2.0 %    Neutrophils Absolute 4.21 1.60 - 5.50 x10*3/uL    Immature Granulocytes Absolute, Automated 0.02 0.00 - 0.50 x10*3/uL    Lymphocytes Absolute 1.43 0.80 - 3.00 x10*3/uL    Monocytes Absolute 1.13 (H) 0.05 - 0.80 x10*3/uL    Eosinophils Absolute 0.51 (H) 0.00 - 0.40 x10*3/uL    Basophils Absolute 0.04 0.00 - 0.10 x10*3/uL   Comprehensive Metabolic Panel   Result Value Ref Range    Glucose 82 74 - 99 mg/dL    Sodium 135 (L) 136 - 145 mmol/L    Potassium 3.6 3.5 - 5.3 mmol/L    Chloride 97 (L) 98 - 107 mmol/L    Bicarbonate 31 21 - 32 mmol/L    Anion Gap 11 10 - 20 mmol/L    Urea Nitrogen 22 6 - 23 mg/dL    Creatinine 0.95 0.50 - 1.05 mg/dL    eGFR 57 (L) >60 mL/min/1.73m*2    Calcium 8.5 (L) 8.6 - 10.3 mg/dL    Albumin 3.2 (L) 3.4 - 5.0 g/dL    Alkaline Phosphatase 69 33 - 136 U/L    Total Protein 5.6 (L) 6.4 - 8.2 g/dL    AST 19 9 - 39 U/L    Bilirubin, Total 0.5 0.0 - 1.2 mg/dL    ALT 23 7 - 45 U/L   Phosphorus   Result Value Ref Range    Phosphorus 2.4 (L) 2.5 - 4.9 mg/dL      Patient recently received an antibiotic (last 12 hours)       Date/Time Action Medication Dose    03/23/25 0603 New Bag    piperacillin-tazobactam (Zosyn) 4.5 g in dextrose (iso)  mL 4.5 g           Plan discussed with interdisciplinary team, potassium low today @ 3.4 replaced, hemoglobin @  7.4 today will obtain iron levels. Titrate oxygen as tolerated, will continue IV antibiotics, repeat chest xray shows - IMPRESSION:  1.  Increased congestion with persistent effusions. Probably  increased left basilar atelectasis.  Will order IV lasix and continue to diuresis patient, continue current and repeat labs in the AM.     Discharge planning discussed with patient and care team. Therapy evaluations ordered. Anticipate SNF at discharge. Patient aware and agreeable to current plan, continue plan as above.     I spent a total of 50 minutes on the date of the service which included preparing to see the patient, face-to-face patient care, completing clinical documentation, obtaining and/or reviewing separately obtained history, performing a medically appropriate examination, counseling and educating the patient/family/caregiver, ordering medications, tests, or procedures, communicating with other HCPs (not separately reported), independently interpreting results (not separately reported), communicating results to the patient/family/caregiver, and care coordination (not separately reported).           Patient fully evaluated 3/24/2025 for    Problem List Items Addressed This Visit          Cardiac and Vasculature    * (Principal) Acute congestive heart failure, unspecified heart failure type - Primary    Relevant Medications    clopidogrel (Plavix) tablet 75 mg    metoprolol tartrate (Lopressor) tablet 25 mg       Pulmonary and Pneumonias    Hypoxia     Patient seen resting in bed with head of bed elevated, no s/s or c/o acute difficulties at this time.  Vital signs for last 24 hours Temp:  [36.1 °C (97 °F)-36.8 °C (98.2 °F)] 36.1 °C (97 °F)  Heart Rate:  [] 62  Resp:  [20] 20  BP: (113-142)/(56-64) 134/64  FiO2 (%):  [21 %-24 %] 24 %    I/O this shift:  In: 100 [IV Piggyback:100]  Out: -   Patient still requiring frequent cardiac and SPO2 monitoring. Continue aggressive pulmonary hygiene and oral hygiene.  Off loading as tolerated for skin integrity. Medications and labs reviewed-   Results for orders placed or performed during the hospital encounter of 03/21/25 (from the past 24 hours)   Magnesium   Result Value Ref Range    Magnesium 2.17 1.60 - 2.40 mg/dL   CBC and Auto Differential   Result Value Ref Range    WBC 7.3 4.4 - 11.3 x10*3/uL    nRBC 0.0 0.0 - 0.0 /100 WBCs    RBC 2.99 (L) 4.00 - 5.20 x10*6/uL    Hemoglobin 7.7 (L) 12.0 - 16.0 g/dL    Hematocrit 25.8 (L) 36.0 - 46.0 %    MCV 86 80 - 100 fL    MCH 25.8 (L) 26.0 - 34.0 pg    MCHC 29.8 (L) 32.0 - 36.0 g/dL    RDW 18.1 (H) 11.5 - 14.5 %    Platelets 237 150 - 450 x10*3/uL    Neutrophils % 57.4 40.0 - 80.0 %    Immature Granulocytes %, Automated 0.3 0.0 - 0.9 %    Lymphocytes % 19.5 13.0 - 44.0 %    Monocytes % 15.4 2.0 - 10.0 %    Eosinophils % 6.9 0.0 - 6.0 %    Basophils % 0.5 0.0 - 2.0 %    Neutrophils Absolute 4.21 1.60 - 5.50 x10*3/uL    Immature Granulocytes Absolute, Automated 0.02 0.00 - 0.50 x10*3/uL    Lymphocytes Absolute 1.43 0.80 - 3.00 x10*3/uL    Monocytes Absolute 1.13 (H) 0.05 - 0.80 x10*3/uL    Eosinophils Absolute 0.51 (H) 0.00 - 0.40 x10*3/uL    Basophils Absolute 0.04 0.00 - 0.10 x10*3/uL   Comprehensive Metabolic Panel   Result Value Ref Range    Glucose 82 74 - 99 mg/dL    Sodium 135 (L) 136 - 145 mmol/L    Potassium 3.6 3.5 - 5.3 mmol/L    Chloride 97 (L) 98 - 107 mmol/L    Bicarbonate 31 21 - 32 mmol/L    Anion Gap 11 10 - 20 mmol/L    Urea Nitrogen 22 6 - 23 mg/dL    Creatinine 0.95 0.50 - 1.05 mg/dL    eGFR 57 (L) >60 mL/min/1.73m*2    Calcium 8.5 (L) 8.6 - 10.3 mg/dL    Albumin 3.2 (L) 3.4 - 5.0 g/dL    Alkaline Phosphatase 69 33 - 136 U/L    Total Protein 5.6 (L) 6.4 - 8.2 g/dL    AST 19 9 - 39 U/L    Bilirubin, Total 0.5 0.0 - 1.2 mg/dL    ALT 23 7 - 45 U/L   Phosphorus   Result Value Ref Range    Phosphorus 2.4 (L) 2.5 - 4.9 mg/dL      Patient recently received an antibiotic (last 12 hours)       Date/Time Action Medication  Dose    03/24/25 0616 New Bag    piperacillin-tazobactam (Zosyn) 4.5 g in dextrose (iso)  mL 4.5 g           Patient was fully evaluated on March 25, with no acute changes overnight. Vitals within normal limits upon evaluation, labs significant for decreased phosphorus at 2.4, remaining labs consistent with previous results. Cardiology team also following the management of this patient. PT/OT signed off. Patient received repeat CXR today, results pending at this time. Patient complains of pain in her great toe today, order placed for uric acid labs for assessment of potential gouty arthritis. Plan discussed with interdisciplinary team, continue current and repeat labs in the AM.     Patient aware and agreeable to current plan, continue plan as above.     I spent a total of 50 minutes on the date of the service which included preparing to see the patient, face-to-face patient care, completing clinical documentation, obtaining and/or reviewing separately obtained history, performing a medically appropriate examination, counseling and educating the patient/family/caregiver, ordering medications, tests, or procedures, communicating with other HCPs (not separately reported), independently interpreting results (not separately reported), communicating results to the patient/family/caregiver, and care coordination (not separately reported).   TAMRA BARRERA

## 2025-03-24 NOTE — PROGRESS NOTES
Subjective Data:  Vague complaints    Overnight Events:    None noted     Objective Data:  Last Recorded Vitals:  Vitals:    03/24/25 0440 03/24/25 0548 03/24/25 0714 03/24/25 0740   BP: 131/62   142/62   BP Location:       Patient Position:       Pulse: 63   75   Resp:       Temp: 36.8 °C (98.2 °F)   36.1 °C (97 °F)   TempSrc:       SpO2: 92%  91% 93%   Weight:  68.5 kg (151 lb 0.2 oz)     Height:           Last Labs:  CBC - 3/24/2025:  6:24 AM  7.3 7.7 237    25.8      CMP - 3/24/2025:  6:24 AM  8.5 5.6 19 --- 0.5   2.4 3.2 23 69      PTT - 1/2/2025:  7:42 PM  1.5   16.5 34     TROPHS   Date/Time Value Ref Range Status   03/21/2025 08:16 PM 21 0 - 13 ng/L Final   03/21/2025 04:05 PM 24 0 - 13 ng/L Final   03/21/2025 10:55 AM 24 0 - 13 ng/L Final     BNP   Date/Time Value Ref Range Status   03/23/2025 06:36  0 - 99 pg/mL Final   03/22/2025 07:21  0 - 99 pg/mL Final     HGBA1C   Date/Time Value Ref Range Status   03/14/2025 05:00 AM 5.2 See comment % Final   12/20/2024 02:04 AM 5.5 See comment % Final     LDLCALC   Date/Time Value Ref Range Status   12/20/2024 02:04 AM 68 <=99 mg/dL Final     Comment:                                 Near   Borderline      AGE      Desirable  Optimal    High     High     Very High     0-19 Y     0 - 109     ---    110-129   >/= 130     ----    20-24 Y     0 - 119     ---    120-159   >/= 160     ----      >24 Y     0 -  99   100-129  130-159   160-189     >/=190     01/24/2024 10:49 AM 73 <=99 mg/dL Final     Comment:                                 Near   Borderline      AGE      Desirable  Optimal    High     High     Very High     0-19 Y     0 - 109     ---    110-129   >/= 130     ----    20-24 Y     0 - 119     ---    120-159   >/= 160     ----      >24 Y     0 -  99   100-129  130-159   160-189     >/=190       VLDL   Date/Time Value Ref Range Status   12/20/2024 02:04 AM 14 0 - 40 mg/dL Final   01/24/2024 10:49 AM 13 0 - 40 mg/dL Final   08/21/2023 12:56 AM 17 0 -  40 mg/dL Final   01/17/2023 09:43 AM 14 0 - 40 mg/dL Final   03/16/2021 10:15 AM 16 0 - 40 mg/dL Final      Last I/O:  I/O last 3 completed shifts:  In: 500 (7.3 mL/kg) [IV Piggyback:500]  Out: 1436 (21 mL/kg) [Urine:1436 (0.6 mL/kg/hr)]  Weight: 68.5 kg     Past Cardiology Tests (Last 3 Years):  EKG:  ECG 12 lead 03/21/2025 (Preliminary)      Electrocardiogram, 12-lead PRN ACS symtpoms 03/08/2025      ECG 12 Lead 03/04/2025      ECG 12 lead 02/02/2025      ECG 12 lead 01/12/2025      ECG 12 lead 01/02/2025      ECG 12 lead 12/21/2024      ECG 12 lead 12/20/2024      ECG 12 lead 12/20/2024    Echo:  Transthoracic echo (TTE) complete 12/21/2024    Ejection Fractions:  EF   Date/Time Value Ref Range Status   12/21/2024 09:03 AM 63 %      Cath:  Cardiac Catheterization Procedure 12/20/2024    Stress Test:  No results found for this or any previous visit from the past 1095 days.    Cardiac Imaging:  No results found for this or any previous visit from the past 1095 days.      Inpatient Medications:  Scheduled medications   Medication Dose Route Frequency    allopurinol  100 mg oral Daily    aspirin  81 mg oral Daily    clopidogrel  75 mg oral Daily    enoxaparin  40 mg subcutaneous q24h    fluticasone furoate-vilanteroL  1 puff inhalation Daily    furosemide  20 mg intravenous q12h    gabapentin  300 mg oral BID    ipratropium-albuteroL  3 mL nebulization TID    [Held by provider] metoprolol tartrate  25 mg oral Daily    piperacillin-tazobactam  4.5 g intravenous q8h    polyethylene glycol  17 g oral BID    vancomycin  1,000 mg intravenous q24h     PRN medications   Medication    acetaminophen    Or    acetaminophen    Or    acetaminophen    guaiFENesin    ipratropium-albuteroL    vancomycin     Continuous Medications   Medication Dose Last Rate       Physical Exam:  GEN: Frail 89q yo wf sitting 60 degrees  HEENT: Atraumatic  COR: Reg  LUNGS: Few rhonchi  EXT: Warm     Assessment/Plan   1.) Acute on chronic HFpEf with  superimposed pneumonia  2.) HTN  PLAN: Agree with jay as per primary team  Peripheral IV 03/21/25 20 G Proximal;Right Forearm (Active)   Site Assessment Clean;Dry;Intact 03/24/25 0900   Dressing Status Clean;Dry 03/24/25 0900   Number of days: 3       Code Status:  Full Code    I spent 30 minutes in the professional and overall care of this patient.        Magnus Hess MD

## 2025-03-24 NOTE — PROGRESS NOTES
03/24/25 1422   Discharge Planning   Living Arrangements Alone   Support Systems Children;Family members   Assistance Needed adls, ransportation   Expected Discharge Disposition SNF   Does the patient need discharge transport arranged? Yes   RoundTrip coordination needed? Yes     I met with patient at the bedside, verified insurance.  Patient presented from [facility] where she had been for 10 days after being discharged from here.  She has been ambulating with a walker, denies falls and is requiring assistance with ADLs.  Patient lived alone and was mostly independent with ADLs prior to recent hospitalization.  Patient asked he to call her daughters for assistance with discharge planning.  I called, spoke with both of patient's daughters, Cherelle & Vannessa.  Vannessa reported patient received poor care at [facility] and does not want her to return there.  Cherelle & Vannessa have been researching & calling facilities themselves, asked for referral to be sent to [facility] in [facility]; referral sent.  Per her request, I emailed list of facilities in the [facility] areas to [email or web address].  This TCC to follow up for additional facility choices; patient will require precert.  Care Coordination team following for assistance with discharge planning.  Frank BASS TCC

## 2025-03-25 LAB
ALBUMIN SERPL BCP-MCNC: 3.2 G/DL (ref 3.4–5)
ALP SERPL-CCNC: 66 U/L (ref 33–136)
ALT SERPL W P-5'-P-CCNC: 18 U/L (ref 7–45)
ANION GAP SERPL CALC-SCNC: 12 MMOL/L (ref 10–20)
AST SERPL W P-5'-P-CCNC: 15 U/L (ref 9–39)
BACTERIA BLD CULT: NORMAL
BACTERIA BLD CULT: NORMAL
BASOPHILS # BLD AUTO: 0.05 X10*3/UL (ref 0–0.1)
BASOPHILS NFR BLD AUTO: 0.8 %
BILIRUB SERPL-MCNC: 0.5 MG/DL (ref 0–1.2)
BUN SERPL-MCNC: 18 MG/DL (ref 6–23)
CALCIUM SERPL-MCNC: 8.6 MG/DL (ref 8.6–10.3)
CHLORIDE SERPL-SCNC: 97 MMOL/L (ref 98–107)
CO2 SERPL-SCNC: 30 MMOL/L (ref 21–32)
CREAT SERPL-MCNC: 1.03 MG/DL (ref 0.5–1.05)
EGFRCR SERPLBLD CKD-EPI 2021: 51 ML/MIN/1.73M*2
EOSINOPHIL # BLD AUTO: 0.43 X10*3/UL (ref 0–0.4)
EOSINOPHIL NFR BLD AUTO: 7.2 %
ERYTHROCYTE [DISTWIDTH] IN BLOOD BY AUTOMATED COUNT: 18.4 % (ref 11.5–14.5)
GLUCOSE SERPL-MCNC: 91 MG/DL (ref 74–99)
HCT VFR BLD AUTO: 25.9 % (ref 36–46)
HGB BLD-MCNC: 7.6 G/DL (ref 12–16)
IMM GRANULOCYTES # BLD AUTO: 0.03 X10*3/UL (ref 0–0.5)
IMM GRANULOCYTES NFR BLD AUTO: 0.5 % (ref 0–0.9)
LYMPHOCYTES # BLD AUTO: 1.35 X10*3/UL (ref 0.8–3)
LYMPHOCYTES NFR BLD AUTO: 22.5 %
MAGNESIUM SERPL-MCNC: 2.08 MG/DL (ref 1.6–2.4)
MCH RBC QN AUTO: 25.2 PG (ref 26–34)
MCHC RBC AUTO-ENTMCNC: 29.3 G/DL (ref 32–36)
MCV RBC AUTO: 86 FL (ref 80–100)
MONOCYTES # BLD AUTO: 0.85 X10*3/UL (ref 0.05–0.8)
MONOCYTES NFR BLD AUTO: 14.2 %
NEUTROPHILS # BLD AUTO: 3.29 X10*3/UL (ref 1.6–5.5)
NEUTROPHILS NFR BLD AUTO: 54.8 %
NRBC BLD-RTO: 0 /100 WBCS (ref 0–0)
PHOSPHATE SERPL-MCNC: 2.3 MG/DL (ref 2.5–4.9)
PLATELET # BLD AUTO: 238 X10*3/UL (ref 150–450)
POTASSIUM SERPL-SCNC: 3.5 MMOL/L (ref 3.5–5.3)
PROT SERPL-MCNC: 5.5 G/DL (ref 6.4–8.2)
RBC # BLD AUTO: 3.02 X10*6/UL (ref 4–5.2)
SODIUM SERPL-SCNC: 135 MMOL/L (ref 136–145)
WBC # BLD AUTO: 6 X10*3/UL (ref 4.4–11.3)

## 2025-03-25 PROCEDURE — 84100 ASSAY OF PHOSPHORUS: CPT

## 2025-03-25 PROCEDURE — 83735 ASSAY OF MAGNESIUM: CPT

## 2025-03-25 PROCEDURE — 1200000002 HC GENERAL ROOM WITH TELEMETRY DAILY

## 2025-03-25 PROCEDURE — 2500000001 HC RX 250 WO HCPCS SELF ADMINISTERED DRUGS (ALT 637 FOR MEDICARE OP): Performed by: INTERNAL MEDICINE

## 2025-03-25 PROCEDURE — 36415 COLL VENOUS BLD VENIPUNCTURE: CPT | Performed by: INTERNAL MEDICINE

## 2025-03-25 PROCEDURE — 2500000001 HC RX 250 WO HCPCS SELF ADMINISTERED DRUGS (ALT 637 FOR MEDICARE OP)

## 2025-03-25 PROCEDURE — 84075 ASSAY ALKALINE PHOSPHATASE: CPT | Performed by: INTERNAL MEDICINE

## 2025-03-25 PROCEDURE — 2500000004 HC RX 250 GENERAL PHARMACY W/ HCPCS (ALT 636 FOR OP/ED)

## 2025-03-25 PROCEDURE — 2500000004 HC RX 250 GENERAL PHARMACY W/ HCPCS (ALT 636 FOR OP/ED): Performed by: INTERNAL MEDICINE

## 2025-03-25 PROCEDURE — 85025 COMPLETE CBC W/AUTO DIFF WBC: CPT | Performed by: INTERNAL MEDICINE

## 2025-03-25 RX ORDER — ENOXAPARIN SODIUM 100 MG/ML
40 INJECTION SUBCUTANEOUS EVERY 24 HOURS
Start: 2025-03-25

## 2025-03-25 RX ORDER — IPRATROPIUM BROMIDE AND ALBUTEROL SULFATE 2.5; .5 MG/3ML; MG/3ML
3 SOLUTION RESPIRATORY (INHALATION) EVERY 2 HOUR PRN
Status: DISCONTINUED | OUTPATIENT
Start: 2025-03-25 | End: 2025-03-26 | Stop reason: HOSPADM

## 2025-03-25 RX ORDER — FUROSEMIDE 40 MG/1
40 TABLET ORAL DAILY
Status: DISCONTINUED | OUTPATIENT
Start: 2025-03-25 | End: 2025-03-26 | Stop reason: HOSPADM

## 2025-03-25 RX ORDER — ACETAMINOPHEN 325 MG/1
650 TABLET ORAL EVERY 4 HOURS PRN
Start: 2025-03-25

## 2025-03-25 RX ORDER — GUAIFENESIN 600 MG/1
600 TABLET, EXTENDED RELEASE ORAL 2 TIMES DAILY PRN
Start: 2025-03-25

## 2025-03-25 RX ADMIN — VANCOMYCIN HYDROCHLORIDE 1000 MG: 1 INJECTION, SOLUTION INTRAVENOUS at 15:26

## 2025-03-25 RX ADMIN — GABAPENTIN 300 MG: 300 CAPSULE ORAL at 20:40

## 2025-03-25 RX ADMIN — GABAPENTIN 300 MG: 300 CAPSULE ORAL at 09:09

## 2025-03-25 RX ADMIN — FUROSEMIDE 20 MG: 10 INJECTION, SOLUTION INTRAVENOUS at 10:21

## 2025-03-25 RX ADMIN — POLYETHYLENE GLYCOL 3350 17 G: 17 POWDER, FOR SOLUTION ORAL at 20:40

## 2025-03-25 RX ADMIN — CLOPIDOGREL BISULFATE 75 MG: 75 TABLET ORAL at 09:09

## 2025-03-25 RX ADMIN — ENOXAPARIN SODIUM 40 MG: 40 INJECTION SUBCUTANEOUS at 14:05

## 2025-03-25 RX ADMIN — PIPERACILLIN SODIUM AND TAZOBACTAM SODIUM 4.5 G: 4; .5 INJECTION, SOLUTION INTRAVENOUS at 22:37

## 2025-03-25 RX ADMIN — PIPERACILLIN SODIUM AND TAZOBACTAM SODIUM 4.5 G: 4; .5 INJECTION, SOLUTION INTRAVENOUS at 14:05

## 2025-03-25 RX ADMIN — ALLOPURINOL 100 MG: 100 TABLET ORAL at 09:09

## 2025-03-25 RX ADMIN — PIPERACILLIN SODIUM AND TAZOBACTAM SODIUM 4.5 G: 4; .5 INJECTION, SOLUTION INTRAVENOUS at 06:00

## 2025-03-25 RX ADMIN — FUROSEMIDE 40 MG: 40 TABLET ORAL at 11:55

## 2025-03-25 RX ADMIN — ASPIRIN 81 MG CHEWABLE TABLET 81 MG: 81 TABLET CHEWABLE at 09:09

## 2025-03-25 RX ADMIN — POLYETHYLENE GLYCOL 3350 17 G: 17 POWDER, FOR SOLUTION ORAL at 09:09

## 2025-03-25 ASSESSMENT — PAIN - FUNCTIONAL ASSESSMENT
PAIN_FUNCTIONAL_ASSESSMENT: 0-10
PAIN_FUNCTIONAL_ASSESSMENT: 0-10

## 2025-03-25 ASSESSMENT — PAIN SCALES - GENERAL
PAINLEVEL_OUTOF10: 0 - NO PAIN
PAINLEVEL_OUTOF10: 0 - NO PAIN

## 2025-03-25 NOTE — PROGRESS NOTES
03/25/25 4312   Discharge Planning   Assistance Needed Precert approved. Patient to D/C to [facility]

## 2025-03-25 NOTE — PROGRESS NOTES
03/25/25 1432   Discharge Planning   Assistance Needed Precert approved to [facility]. Daughter is aware and since it be being late in the afternoon she would prefer patient to D.C 03/26/25. Will arrange transport for tomorrow AM.       03/25/25 1506: Patient and daughter are aware of transport being 03/26/25 @10:30 AM. N2N report number given to RN and bedside RN updated.

## 2025-03-25 NOTE — PROGRESS NOTES
03/25/25 1049   Discharge Planning   Assistance Needed Spoke to daughter and she would like [facility] for SNF. Will start precert     03/25/25: This TCC was just made aware that patient needs 5 days of IV ATB. Zosyn 200 MG Q6H for 5 days. Attending would like to send with a peripheral not place a PICC/Midline. Asked facility if they are able to accommodate.     03/25/25 @11:15 AM; Facility is able to accommodate the peripheral IV. Please leave IV in upon discharge to facility.

## 2025-03-25 NOTE — PROGRESS NOTES
03/25/25 1142   Discharge Planning   Assistance Needed Precert started 03/25/25 @ 11:42 to [facility]

## 2025-03-25 NOTE — CARE PLAN
The patient's goals for the shift include      The clinical goals for the shift include no edema    Over the shift, the patient did not make progress toward the following goals. Barriers to progression include cognition. Recommendations to address these barriers include following poc.

## 2025-03-25 NOTE — PROGRESS NOTES
03/25/25 0945   Discharge Planning   Living Arrangements Alone   Support Systems Children;Family members   Assistance Needed Spoke to patient and daughters over the phone. Explained to them that [facility] accepted patient and they do have a bed. Daughter is going to tour facility and will get back to [facility] once she tours with a decision. Sent a message to the physician to see when medicaly ready for discharge. Patient will require a precert.   Type of Residence Skilled nursing facility   Who is requesting discharge planning? Provider   Home or Post Acute Services Post acute facilities (Rehab/SNF/etc)   Type of Post Acute Facility Services Skilled nursing   Expected Discharge Disposition SNF   Does the patient need discharge transport arranged? Yes   RoundTrip coordination needed? Yes   Has discharge transport been arranged? No   Patient Choice   Provider Choice list and CMS website (https://medicare.gov/care-compare#search) for post-acute Quality and Resource Measure Data were provided and reviewed with: Family;Patient   Patient / Family choosing to utilize agency / facility established prior to hospitalization No

## 2025-03-25 NOTE — DISCHARGE SUMMARY
Discharge Diagnosis  Acute congestive heart failure, unspecified heart failure type    Issues Requiring Follow-Up  Patient fully evaluated  03/25 for   Assessment & Plan  Acute congestive heart failure, unspecified heart failure type      Patient with significant clinical improvement noted, patient medically cleared for discharge today. Patient seen resting in bed with head of bed elevated, no s/s or c/o acute difficulties at this time. Vital signs for last 24 hours:  Temp:  [35.7 °C (96.3 °F)-36.7 °C (98.1 °F)] 35.7 °C (96.3 °F)  Heart Rate:  [63-74] 74  Resp:  [16-18] 18  BP: (106-135)/(51-64) 106/51  FiO2 (%):  [24 %] 24 % Medications and labs reviewed-   Results for orders placed or performed during the hospital encounter of 03/21/25 (from the past 24 hours)   Magnesium   Result Value Ref Range    Magnesium 2.08 1.60 - 2.40 mg/dL   CBC and Auto Differential   Result Value Ref Range    WBC 6.0 4.4 - 11.3 x10*3/uL    nRBC 0.0 0.0 - 0.0 /100 WBCs    RBC 3.02 (L) 4.00 - 5.20 x10*6/uL    Hemoglobin 7.6 (L) 12.0 - 16.0 g/dL    Hematocrit 25.9 (L) 36.0 - 46.0 %    MCV 86 80 - 100 fL    MCH 25.2 (L) 26.0 - 34.0 pg    MCHC 29.3 (L) 32.0 - 36.0 g/dL    RDW 18.4 (H) 11.5 - 14.5 %    Platelets 238 150 - 450 x10*3/uL    Neutrophils % 54.8 40.0 - 80.0 %    Immature Granulocytes %, Automated 0.5 0.0 - 0.9 %    Lymphocytes % 22.5 13.0 - 44.0 %    Monocytes % 14.2 2.0 - 10.0 %    Eosinophils % 7.2 0.0 - 6.0 %    Basophils % 0.8 0.0 - 2.0 %    Neutrophils Absolute 3.29 1.60 - 5.50 x10*3/uL    Immature Granulocytes Absolute, Automated 0.03 0.00 - 0.50 x10*3/uL    Lymphocytes Absolute 1.35 0.80 - 3.00 x10*3/uL    Monocytes Absolute 0.85 (H) 0.05 - 0.80 x10*3/uL    Eosinophils Absolute 0.43 (H) 0.00 - 0.40 x10*3/uL    Basophils Absolute 0.05 0.00 - 0.10 x10*3/uL   Comprehensive Metabolic Panel   Result Value Ref Range    Glucose 91 74 - 99 mg/dL    Sodium 135 (L) 136 - 145 mmol/L    Potassium 3.5 3.5 - 5.3 mmol/L    Chloride 97 (L)  98 - 107 mmol/L    Bicarbonate 30 21 - 32 mmol/L    Anion Gap 12 10 - 20 mmol/L    Urea Nitrogen 18 6 - 23 mg/dL    Creatinine 1.03 0.50 - 1.05 mg/dL    eGFR 51 (L) >60 mL/min/1.73m*2    Calcium 8.6 8.6 - 10.3 mg/dL    Albumin 3.2 (L) 3.4 - 5.0 g/dL    Alkaline Phosphatase 66 33 - 136 U/L    Total Protein 5.5 (L) 6.4 - 8.2 g/dL    AST 15 9 - 39 U/L    Bilirubin, Total 0.5 0.0 - 1.2 mg/dL    ALT 18 7 - 45 U/L   Phosphorus   Result Value Ref Range    Phosphorus 2.3 (L) 2.5 - 4.9 mg/dL      Patient recently received an antibiotic (last 12 hours)       Date/Time Action Medication Dose    03/25/25 0600 New Bag    piperacillin-tazobactam (Zosyn) 4.5 g in dextrose (iso)  mL 4.5 g           No results found for the last 90 days.       Continue aggressive pulmonary hygiene and oral hygiene. Off loading as tolerated for skin integrity.  Plan discussed with interdisciplinary team, no acute events overnight, patient denies chest pain, worsening SOB at this time. ok to discharge, will continue current antibiotics. Peer TCC_ patient to discharge to [facility], Facility is able to accommodate the peripheral IV. Please leave IV in upon discharge to facility. Will continue current and repeat labs in the AM if patient still hospitalized. Patient aware and agreeable to current plan, continue plan as above.     I spent 30 minutes on the date of the service which included preparing to see the patient, face-to-face patient care, completing clinical documentation, obtaining and/or reviewing separately obtained history, performing a medically appropriate examination, counseling and educating the patient/family/caregiver, ordering medications, tests, or procedures, communicating with other HCPs (not separately reported), independently interpreting results (not separately reported), communicating results to the patient/family/caregiver, and care coordination (not separately reported    Discharge Meds     Medication List       START taking these medications     acetaminophen 325 mg tablet; Commonly known as: Tylenol; Take 2 tablets   (650 mg) by mouth every 4 hours if needed for mild pain (1 - 3).   enoxaparin 40 mg/0.4 mL syringe; Commonly known as: Lovenox; Inject 0.4   mL (40 mg) under the skin once every 24 hours.   guaiFENesin 600 mg 12 hr tablet; Commonly known as: Mucinex; Take 1   tablet (600 mg) by mouth 2 times a day as needed for cough. Do not crush,   chew, or split.   piperacillin-tazobactam 200 mg/mL injection; Commonly known as: Zosyn;   Infuse 15 mL (3.375 g) over 30 minutes into a venous catheter every 6   hours for 7 days.     CHANGE how you take these medications     hydrocortisone 1 % lotion; Apply topically 2 times a day. Apply to both   legs , wash hands after applying lotion     CONTINUE taking these medications     albuterol 2.5 mg /3 mL (0.083 %) nebulizer solution   allopurinol 100 mg tablet; Commonly known as: Zyloprim; Take 1 tablet   (100 mg) by mouth once daily.   aspirin 81 mg chewable tablet; Chew 1 tablet (81 mg) once daily.   Calcium 600 + D(3) 600 mg-5 mcg (200 unit) tablet; Generic drug: calcium   carbonate-vitamin D3   clopidogrel 75 mg tablet; Commonly known as: Plavix; Take 1 tablet by   mouth once daily   colchicine 0.6 mg tablet; Take 1 tablet (0.6 mg) by mouth once daily.   DULoxetine 30 mg DR capsule; Commonly known as: Cymbalta   ferrous sulfate 325 (65 Fe) mg EC tablet   fluticasone propion-salmeteroL 115-21 mcg/actuation inhaler; Commonly   known as: Advair; Inhale 2 puffs 2 times a day. Rinse mouth with water   after use to reduce aftertaste and incidence of candidiasis. Do not   swallow.   gabapentin 300 mg capsule; Commonly known as: Neurontin; Take 1 capsule   (300 mg) by mouth 2 times a day.   ipratropium-albuteroL 0.5-2.5 mg/3 mL nebulizer solution; Commonly known   as: Duo-Neb   lubricating eye drops ophthalmic solution   metoprolol tartrate 25 mg tablet; Commonly known as:  Lopressor; Take 1   tablet (25 mg) by mouth once daily.   polyethylene glycol 17 gram/dose powder; Commonly known as: Glycolax,   Miralax; Mix 1 capful (17 g) of powder with 4 to 8 ounces of water or   juice and drink 2 times a day.   torsemide 20 mg tablet; Commonly known as: Demadex     STOP taking these medications     levoFLOXacin 500 mg tablet; Commonly known as: Levaquin   methylPREDNISolone 4 mg tablet; Commonly known as: Medrol       Test Results Pending At Discharge  Pending Labs       Order Current Status    Blood Culture Preliminary result    Blood Culture Preliminary result            Hospital Course         Fei Mensah MD   Physician  Internal Medicine     Progress Notes      Signed     Date of Service: 3/24/2025  2:21 PM     Signed       Expand All Collapse All    Yesenia Milner is a 89 y.o. female on day 3 of admission presenting with Acute congestive heart failure, unspecified heart failure type.           Subjective  Patient fully evaluated  03/22  for    Problem List Items Addressed This Visit                  Cardiac and Vasculature     * (Principal) Acute congestive heart failure, unspecified heart failure type - Primary     Relevant Medications     clopidogrel (Plavix) tablet 75 mg     metoprolol tartrate (Lopressor) tablet 25 mg          Pulmonary and Pneumonias     Hypoxia      Patient seen resting in bed with head of bed elevated, no s/s or c/o acute difficulties at this time.  Vital signs for last 24 hours Temp:  [36.1 °C (97 °F)-36.8 °C (98.2 °F)] 36.1 °C (97 °F)  Heart Rate:  [] 62  Resp:  [20] 20  BP: (113-142)/(56-64) 134/64  FiO2 (%):  [21 %-24 %] 24 %    I/O this shift:  In: 100 [IV Piggyback:100]  Out: -   Patient still requiring frequent cardiac and SPO2 monitoring. Continue aggressive pulmonary hygiene and oral hygiene. Off loading as tolerated for skin integrity. Medications and labs reviewed-         Results for orders placed or performed during the hospital encounter  of 03/21/25 (from the past 24 hours)   Magnesium   Result Value Ref Range     Magnesium 2.17 1.60 - 2.40 mg/dL   CBC and Auto Differential   Result Value Ref Range     WBC 7.3 4.4 - 11.3 x10*3/uL     nRBC 0.0 0.0 - 0.0 /100 WBCs     RBC 2.99 (L) 4.00 - 5.20 x10*6/uL     Hemoglobin 7.7 (L) 12.0 - 16.0 g/dL     Hematocrit 25.8 (L) 36.0 - 46.0 %     MCV 86 80 - 100 fL     MCH 25.8 (L) 26.0 - 34.0 pg     MCHC 29.8 (L) 32.0 - 36.0 g/dL     RDW 18.1 (H) 11.5 - 14.5 %     Platelets 237 150 - 450 x10*3/uL     Neutrophils % 57.4 40.0 - 80.0 %     Immature Granulocytes %, Automated 0.3 0.0 - 0.9 %     Lymphocytes % 19.5 13.0 - 44.0 %     Monocytes % 15.4 2.0 - 10.0 %     Eosinophils % 6.9 0.0 - 6.0 %     Basophils % 0.5 0.0 - 2.0 %     Neutrophils Absolute 4.21 1.60 - 5.50 x10*3/uL     Immature Granulocytes Absolute, Automated 0.02 0.00 - 0.50 x10*3/uL     Lymphocytes Absolute 1.43 0.80 - 3.00 x10*3/uL     Monocytes Absolute 1.13 (H) 0.05 - 0.80 x10*3/uL     Eosinophils Absolute 0.51 (H) 0.00 - 0.40 x10*3/uL     Basophils Absolute 0.04 0.00 - 0.10 x10*3/uL   Comprehensive Metabolic Panel   Result Value Ref Range     Glucose 82 74 - 99 mg/dL     Sodium 135 (L) 136 - 145 mmol/L     Potassium 3.6 3.5 - 5.3 mmol/L     Chloride 97 (L) 98 - 107 mmol/L     Bicarbonate 31 21 - 32 mmol/L     Anion Gap 11 10 - 20 mmol/L     Urea Nitrogen 22 6 - 23 mg/dL     Creatinine 0.95 0.50 - 1.05 mg/dL     eGFR 57 (L) >60 mL/min/1.73m*2     Calcium 8.5 (L) 8.6 - 10.3 mg/dL     Albumin 3.2 (L) 3.4 - 5.0 g/dL     Alkaline Phosphatase 69 33 - 136 U/L     Total Protein 5.6 (L) 6.4 - 8.2 g/dL     AST 19 9 - 39 U/L     Bilirubin, Total 0.5 0.0 - 1.2 mg/dL     ALT 23 7 - 45 U/L   Phosphorus   Result Value Ref Range     Phosphorus 2.4 (L) 2.5 - 4.9 mg/dL      *Note: Due to a large number of results and/or encounters for the requested time period, some results have not been displayed. A complete set of results can be found in Results Review.      Patient  recently received an antibiotic (last 12 hours)         Date/Time Action Medication Dose Rate     03/22/25 1613 New Bag    vancomycin (Vancocin) 1,000 mg in dextrose 5%  mL 1,000 mg 200 mL/hr     03/22/25 1455 New Bag    piperacillin-tazobactam (Zosyn) 4.5 g in dextrose (iso)  mL 4.5 g       03/22/25 0745 New Bag    piperacillin-tazobactam (Zosyn) 4.5 g in dextrose (iso)  mL 4.5 g               Plan discussed with interdisciplinary team, will continue to diuresis patient cardiology on the case, appreciate input. Patient being treated for pneumonia, chest physiotherapy as tolerated, incentive spirometry, repeat chest xray in the AM.  Will continue current and repeat labs in the AM.      Discharge planning discussed with patient and care team. Therapy evaluations ordered. Anticipate SNF at discharge. Patient aware and agreeable to current plan, continue plan as above.      I spent a total of 50 minutes on the date of the service which included preparing to see the patient, face-to-face patient care, completing clinical documentation, obtaining and/or reviewing separately obtained history, performing a medically appropriate examination, counseling and educating the patient/family/caregiver, ordering medications, tests, or procedures, communicating with other HCPs (not separately reported), independently interpreting results (not separately reported), communicating results to the patient/family/caregiver, and care coordination (not separately reported).               Objective  Last Recorded Vitals  /64   Pulse 62   Temp 36.1 °C (97 °F)   Resp 20   Wt 68.5 kg (151 lb 0.2 oz)   SpO2 94%   Intake/Output last 3 Shifts:     Intake/Output Summary (Last 24 hours) at 3/24/2025 1421  Last data filed at 3/24/2025 0756      Gross per 24 hour   Intake 500 ml   Output 850 ml   Net -350 ml         Admission Weight  Weight: 71.7 kg (158 lb) (03/21/25 1036)     Daily Weight  03/24/25 : 68.5 kg (151 lb 0.2  oz)     Image Results  ECG 12 lead  Afib/flutter and ventricular-paced rhythm        Physical Exam     Relevant Results                       Assessment/Plan                       Assessment & Plan  Acute congestive heart failure, unspecified heart failure type           Fei Mensah MD   Physician  Internal Medicine     H&P      Signed     Date of Service: 3/21/2025  5:08 PM      Signed        Expand All Collapse All    History Of Present Illness  Yesenia Milner is a 89 y.o. female presenting with Acute congestive heart failure, unspecified heart failure type .     Past Medical History  She has a past medical history of Gross hematuria (10/07/2020), Impacted cerumen (02/22/2024), Other conditions influencing health status, Personal history of other medical treatment, Personal history of other medical treatment, and Systolic CHF (Multi) (05/18/2023).     Surgical History  She has a past surgical history that includes Appendectomy (11/22/2017); Hysterectomy (11/22/2017); Tonsillectomy (11/22/2017); Other surgical history (11/22/2017); Other surgical history (11/22/2017); Cardiac pacemaker placement (03/11/2021); IR angiogram renal bilateral (Bilateral, 12/14/2020); IR angiogram inferior epigastric pelvic (12/14/2020); IR angiogram inferior epigastric pelvic (12/14/2020); and Cardiac catheterization (N/A, 12/20/2024).     Social History  She reports that she quit smoking about 52 years ago. Her smoking use included cigarettes. She has been exposed to tobacco smoke. She has never used smokeless tobacco. She reports that she does not drink alcohol and does not use drugs.     Family History  Family History               Family History   Problem Relation Name Age of Onset    No Known Problems Mother                Allergies  Iodine, Shrimp, Hydrocodone, and Adhesive tape-silicones     Review of Systems   Constitutional:  Positive for activity change and fatigue.   Respiratory:  Positive for shortness of breath.     Cardiovascular:  Positive for leg swelling.   All other systems reviewed and are negative.        Physical Exam   Physical Exam:  General:  Pleasant and cooperative.  Mild distress  HEENT:  Normocephalic, atraumatic, mucus membranes moist.   Neck:  Trachea midline.  No JVD.    Chest:  Clear to auscultation bilaterally.  Decreased breath sounds in lower lobe with Rales  CV:  Regular rate and rhythm.  Positive S1/S2.   Abdomen: Bowel sounds present in all four quadrants, abdomen is soft, non-tender, non-distended.  Extremities:  No lower extremity edema or cyanosis.   Neurological:  AAOx3. No focal deficits.  Skin:  Warm and dry.  Last Recorded Vitals  /59   Pulse 66   Temp 36.3 °C (97.3 °F) (Temporal)   Resp 16   Wt 71.7 kg (158 lb)   SpO2 94%      Relevant Results                 had concerns including Shortness of Breath (BIBA [facility] from [facility] for hypoxia, patient was recently dx with pneumonia and started antibiotics x2 days ago. Per EMS patient was 80% on RA when arrived and placed on 4L NC. Patient is AOX4, and was sent in at family request).        Problem List Items Addressed This Visit         Hypoxia     * (Principal) Acute congestive heart failure, unspecified heart failure type - Primary     Relevant Medications     clopidogrel (Plavix) tablet 75 mg     metoprolol tartrate (Lopressor) tablet 25 mg         HPI         Shortness of Breath     Additional comments: BIBA [place] [facility] from [facility] for hypoxia, patient was recently dx with pneumonia and started antibiotics x2 days ago. Per EMS patient was 80% on RA when arrived and placed on 4L NC. Patient is AOX4, and was sent in at family request            Last edited by Drea Cesar RN on 3/21/2025 10:44 AM.             Problem List as of 3/21/2025 Reviewed: 2/25/2025  9:52 AM by Naima Keane MD        Aortic stenosis, mild     Arthritis     Chronic diastolic congestive heart failure     Last Assessment & Plan  2/25/2025 Office Visit Edited 2/25/2025 10:33 AM by Naima Keane MD       -Chronic, BP controlled with beta-blocker and torsemide  -Most recent EF 60%, with diastolic dysfunction  -Follow-up cardiology-ASAP-for diuretic/fluid management-for worsening edema  -pt/cg agreeable with palliative care eval(I am surprised and disappointed-this was not already addressed during her multiple recent hospitals admissions)  Orders:    Referral to Palliative Care; Future              Complete heart block     COPD (chronic obstructive pulmonary disease) (Multi)     Last Assessment & Plan 9/5/2023 Telemedicine Written 9/5/2023  2:17 PM by Adore Carorll, Daphne       Patient has COPD diagnosis and is not currently experiencing any SOB, coughing, or wheezing.  Continue advair 115-21 mcg 2 puffs twice daily.  Recommend patient have a rescue inhaler at home, but she declines at this time.            History of paroxysmal supraventricular tachycardia     HTN (hypertension)     Paresthesia     Presence of permanent cardiac pacemaker     Last Assessment & Plan 1/31/2025 Office Visit Edited 1/31/2025  3:31 PM by Naima Keane MD       -Current pacemaker is her second 1  -Appointment with EP specialist February 24        RTC 3 months  No refills needed per pt/CG ( grandson brought her here)           Raynauds disease     Sensorineural hearing loss (SNHL) of both ears     Dyspnea on minimal exertion     Varicose veins of lower extremities with inflammation     Vertigo     Ischemic cerebrovascular accident (CVA) (Multi)     Last Assessment & Plan 9/5/2023 Telemedicine Written 9/5/2023  2:19 PM by Adore Carroll, PharmD       Patient recently hospitalized for non-cardioembolic CVA (atherosclerotic in nature).   Continue aspirin 81 mg daily, plavix 75 mg daily, and atorvastatin 20 mg daily. DAPT to only be taken x 21 days, then plavix monotherapy thereafter.   Patient hasn't seen neurologist, but will request referral at PCP appt on  9/11.           Acquired deformity of toe     Benign neoplasm of skin of foot     Benign paroxysmal positional vertigo     Dermatophytosis of nail     Leg swelling     Macular degeneration     Mitral valve insufficiency     Moderate mitral valve regurgitation     Overweight with body mass index (BMI) 25.0-29.9     Plantar wart of left foot     Venous insufficiency     Dysarthria following cerebral infarction     Hyperglycemia     Hand paresthesia     Sick sinus syndrome (Multi)     Last Assessment & Plan 2/26/2024 Office Visit Written 2/26/2024  2:20 PM by James C Ramicone, DO       1.  Sick sinus syndrome: This patient has a history of symptomatic sinus bradycardia and has a [employer]onic dual-chamber permanent pacemaker which was replaced in July 2023.  The original device implantation was October 2012.  The pacemaker is functioning appropriately and the battery status is stable.  Continue follow-up as scheduled through the cardiac device clinic.  The patient will follow-up with me in 1 year.           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 Julian.  Patient transition to DOAC (Eliquis).  Asymptomatic of her right IJ and SVC thrombus.  No planned mechanical thrombectomy.  Continue to monitor for bleeding.  Patient lives at home no history of falls would recommend PT OT evaluate for home safety.  Awaiting results of vascular ultrasound of upper extremity.     Thank you very much for allowing Vascular Surgery to be involved in the care of your patient sincerely Antonio MONTALVO .  (This note was generated with voice recognition software and may contain errors including spelling, grammar, syntax and missed  recognition of what was dictated, of which may not have been fully corrected)           Gastrointestinal hemorrhage, unspecified gastrointestinal hemorrhage type     Last Assessment & Plan 1/12/2025 Hospital Encounter Written 1/23/2025  9:41 AM by Iglesia Gabriel DO       89-year-old female with past medical history of COPD, HTN, CVA, sick sinus syndrome s/p pacemaker, HFpEF, recent NSTEMI, and recent hospitalization for CHF exacerbation with acute pulmonary edema, imaging at that time was concerning for SVC thrombus and patient was evaluated by vascular who recommended DOAC for 6 months and repeat CT.  She presents with red blood per rectum and possible epistaxis. Hospitalized for further evaluation and management.     #GI bleed, suspect lower -> likely ischemic colitis (wall thickening at distal transverse colon, associated abdominal discomfort and dizziness, brown stool with red blood reported).  #Epistaxis ?- no reported vomiting.  Blood noted with clearing of her nose and when rinsing her mouth with fluids.   #SVC thrombus     Presentation is concerning for ischemic colitis as noted above.  Avoid hypotension  Consult gastroenterology, appreciate input  Will hold aspirin, Plavix, and Eliquis for the time being  Trend H&H, stable on presentation.  Patient agreeable to blood transfusions as needed.  N.p.o. after midnight  Low suspicion for upper GI source, however will continue with Protonix 40 mg IV twice daily until evaluated by GI.  Consult to vascular surgery regarding ongoing need for Eliquis.   Dr Smith's last note on 1/6/2025 “I have reviewed the DVT scan performed today.  There is no evidence of IJ thrombus.  Waveforms are normal in the IJ as well as subclavian, suggestive that even if there is a thrombus in the SVC, it is not hemodynamically significant.”   Check Orthostatic vitals     #Acute hypoxic respiratory failure  #Acute on chronic diastolic congestive heart failure  #History sick sinus syndrome s/p  pacemaker  #History NSTEMI  #Valvular hear disease  #pleural effusions     CT angio A/P was suggestive of CHF with interstitial edema and small pleural effusions.   Low-sodium diet  Consult cardiology, patient follows with Dr Jiang and Ramicone for EP  Supplemental oxygen as needed  Continue oral diuretics, will defer to cardiology IV diuresis .  DuoNebs as needed  RT to evaluate     #DVT Ppx  SCD  Holding ac        1/13: doing ok, informed about CT results, gi on board,      1/14: CLD. Plans for colonscopy to get definitive dx, ok to stop eliquis per vascular/cardio     1/15: plans for scope tomorrow     1/16: scope confirmed cancer, she is unsure if she wants surgery or not, will consult surgery to give her more information  1/17: contemplating surgyer, will need cardiac clearance will talk to family as well     1/18: patient seen by cardiology this am ; continue to hold aspirin and Plavix at this time.;  Patient noted with expiratory wheezes on exam.  Pulse ox is stable.  We will order chest x-rays and ensure that she is getting her nebulizers as ordered.    Awaiting Vascular surgery in put as well  Patient is still contemplating surgery  Continue to trend labs     1/19: lab work stable.  We will start Mucinex and Tessalon Perles for cough.  Patient appears agreeable for surgery we will continue discussion with surgical team.  Aspirin Plavix are on hold, seen by cardiology at discharge patient should only resume aspirin  Continue to monitor labs.  Plan of care discussed with attending   Dr. Iglesia mart.        1/20: plans to undergo surgery, date pending     1/21: continue pre op preparation  1/22: surgery cleared plans to go to OR outpatient to help her recover a bit pior to surgery     I spent 35 minutes in the professional and overall care of this patient.           Malignant neoplasm of transverse colon (Multi)     Last Assessment & Plan 1/31/2025 Office Visit Edited 1/31/2025  3:31 PM by Naima Keane MD                          Stage 3 chronic kidney disease, unspecified whether stage 3a or 3b CKD (Multi)     Last Assessment & Plan 1/31/2025 Office Visit Edited 1/31/2025  3:31 PM by Naima Keane MD       -Her GFR has been stable around 55, she does not see a kidney specialist                 Acute decompensated heart failure     Acute exacerbation of chronic heart failure     Hypoxia     Pleural effusion     Cellulitis of lower extremity     Adenocarcinoma of colon (Multi)     Bilateral leg edema     * (Principal) Acute congestive heart failure, unspecified heart failure type                 Active Ambulatory Problems     Diagnosis Date Noted    Aortic stenosis, mild 05/18/2023    Arthritis 05/18/2023    Chronic diastolic congestive heart failure 05/18/2023    Complete heart block 05/18/2023    COPD (chronic obstructive pulmonary disease) (Multi) 05/18/2023    History of paroxysmal supraventricular tachycardia 05/18/2023    HTN (hypertension) 05/18/2023    Paresthesia 05/18/2023    Presence of permanent cardiac pacemaker 05/18/2023    Raynauds disease 05/18/2023    Sensorineural hearing loss (SNHL) of both ears 05/18/2023    Dyspnea on minimal exertion 05/18/2023    Varicose veins of lower extremities with inflammation 05/31/2015    Vertigo 05/18/2023    Ischemic cerebrovascular accident (CVA) (Multi) 09/05/2023    Acquired deformity of toe 05/31/2015    Benign neoplasm of skin of foot 07/03/2016    Benign paroxysmal positional vertigo 09/20/2023    Dermatophytosis of nail 08/09/2014    Leg swelling 09/20/2023    Macular degeneration 09/20/2023    Mitral valve insufficiency 09/20/2023    Moderate mitral valve regurgitation 09/20/2023    Overweight with body mass index (BMI) 25.0-29.9 09/20/2023    Plantar wart of left foot 07/03/2016    Venous insufficiency 08/09/2014    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    Hyperglycemia 08/23/2023    Hand paresthesia 02/22/2024    Sick sinus syndrome (Multi)  02/26/2024    Injury of kidney 06/26/2024    NSTEMI (non-ST elevated myocardial infarction) (Multi) 12/20/2024    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) 01/06/2025    Gastrointestinal hemorrhage, unspecified gastrointestinal hemorrhage type 01/12/2025    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    Stage 3 chronic kidney disease, unspecified whether stage 3a or 3b CKD (Multi) 01/31/2025    Acute decompensated heart failure 02/02/2025    Acute exacerbation of chronic heart failure 03/04/2025    Hypoxia 03/04/2025    Pleural effusion 03/04/2025    Cellulitis of lower extremity 03/04/2025    Adenocarcinoma of colon (Multi) 03/04/2025    Bilateral leg edema 03/05/2025              Resolved Ambulatory Problems     Diagnosis Date Noted    Systolic CHF (Multi) 05/18/2023    Gross hematuria 10/07/2020    Ulcer of toe of left foot (Multi) 12/06/2015    Ulcer of toe of right foot (Multi) 04/24/2016    Post-menopausal 09/20/2023    Impacted cerumen 02/22/2024    Urinary tract infection 02/22/2024    Shortness of breath 01/02/2025              Past Medical History:   Diagnosis Date    Other conditions influencing health status      Personal history of other medical treatment      Personal history of other medical treatment                         Active Orders   Imaging     CT angio chest for pulmonary embolism       Frequency: Once       Number of Occurrences: 1 Occurrences   Lab     CBC       Frequency: AM draw       Number of Occurrences: 5 Occurrences     Magnesium       Frequency: AM draw       Number of Occurrences: 5 Occurrences     Renal Function Panel       Frequency: AM draw       Number of Occurrences: 5 Occurrences     Troponin, High Sensitivity, 1 Hour       Frequency: Once       Number of Occurrences: 1 Occurrences     Vancomycin       Frequency: AM draw       Number of Occurrences: 1 Occurrences   Diet     Adult diet  Cardiac; 70 gm fat; 2 - 3 grams Sodium; Easy to chew; 1800 mL fluid       Frequency: Effective now       Number of Occurrences: Until Specified   Nursing     Apply sequential compression device       Frequency: Until discontinued       Number of Occurrences: Until Specified   Consult     Inpatient consult to Social Work and T[facility]       Frequency: Once       Number of Occurrences: 1 Occurrences   Nourishments     May Participate in Room Service       Frequency: Once       Number of Occurrences: 1 Occurrences   OT     OT eval and treat       Frequency: Until therapy completed       Number of Occurrences: 1 Occurrences   PT     PT eval and treat       Frequency: Until therapy completed       Number of Occurrences: 1 Occurrences   Respiratory Care     Incentive spirometry Instruct       Frequency: Once       Number of Occurrences: 1 Occurrences     Respiratory care eval and treat       Frequency: Once       Number of Occurrences: 1 Occurrences       Order Comments: Due 3/24 @ 0700      ECG     ECG 12 lead       Frequency: Once       Number of Occurrences: 1 Occurrences   Admission     Admit to inpatient       Frequency: Once       Number of Occurrences: 1 Occurrences   Telemetry     Telemetry monitoring       Frequency: Until discontinued       Number of Occurrences: 3 Days   Medications     acetaminophen (Tylenol) oral liquid 650 mg       Linked Order: Or       Frequency: q4h PRN       Dose: 650 mg       Route: nasogastric tube     acetaminophen (Tylenol) suppository 650 mg       Linked Order: Or       Frequency: q4h PRN       Dose: 650 mg       Route: rectal     acetaminophen (Tylenol) tablet 650 mg       Linked Order: Or       Frequency: q4h PRN       Dose: 650 mg       Route: oral     allopurinol (Zyloprim) tablet 100 mg       Frequency: Daily       Dose: 100 mg       Route: oral     aspirin chewable tablet 81 mg       Frequency: Daily       Dose: 81 mg       Route: oral     clopidogrel (Plavix) tablet 75 mg        Frequency: Daily       Dose: 75 mg       Route: oral     enoxaparin (Lovenox) syringe 40 mg       Frequency: q24h       Dose: 40 mg       Route: subcutaneous     fluticasone furoate-vilanteroL (Breo Ellipta) 200-25 mcg/dose inhaler 1 puff       Frequency: Daily       Dose: 1 puff       Route: inhalation     furosemide (Lasix) injection 20 mg       Frequency: Once       Dose: 20 mg       Route: intravenous     gabapentin (Neurontin) capsule 300 mg       Frequency: BID       Dose: 300 mg       Route: oral     guaiFENesin (Mucinex) 12 hr tablet 600 mg       Frequency: BID PRN       Dose: 600 mg       Route: oral     ipratropium-albuteroL (Duo-Neb) 0.5-2.5 mg/3 mL nebulizer solution 3 mL       Frequency: q2h PRN       Dose: 3 mL       Route: nebulization     ipratropium-albuteroL (Duo-Neb) 0.5-2.5 mg/3 mL nebulizer solution 3 mL       Frequency: TID       Dose: 3 mL       Route: nebulization     metoprolol tartrate (Lopressor) tablet 25 mg       Frequency: Daily       Dose: 25 mg       Route: oral     piperacillin-tazobactam (Zosyn) 4.5 g in dextrose (iso)  mL       Frequency: q8h       Dose: 4.5 g       Route: intravenous     vancomycin (Vancocin) 1,000 mg in dextrose 5%  mL       Frequency: q24h       Dose: 1,000 mg       Route: intravenous     vancomycin (Vancocin) pharmacy to dose - pharmacy monitoring       Frequency: Daily PRN       Route: miscellaneous     vancomycin 1,750 mg in dextrose 5% 500 mL IV       Frequency: Once       Dose: 1,750 mg       Route: intravenous      Dietary Orders (From admission, onward)          Start     Ordered     03/21/25 1607   May Participate in Room Service  ( ROOM SERVICE MAY PARTICIPATE)  Once        Question:  .  Answer:  Yes    03/21/25 1606     03/21/25 1433   Adult diet Cardiac; 70 gm fat; 2 - 3 grams Sodium; Easy to chew; 1800 mL fluid  Diet effective now        Question Answer Comment   Diet type Cardiac     Fat restriction: 70 gm fat     Sodium restriction: 2  "- 3 grams Sodium     Texture Easy to chew     Dietary fluid restriction / 24h: 1800 mL fluid         03/21/25 1433                       89 yrs@  ADMITDTTM@  Acute congestive heart failure, unspecified heart failure type [I50.9]  [unfilled]  Weight: 71.7 kg (158 lb)     Vitals               Vitals:     03/21/25 1036 03/21/25 1038 03/21/25 1506 03/21/25 1604   BP: 104/50         Pulse: 84     66   Resp: 16     16   Temp: 36.3 °C (97.3 °F)         TempSrc: Temporal         SpO2: (!) 84% (!) 93% 96% 94%   Weight: 71.7 kg (158 lb)         Height: 1.6 m (5' 3\")           03/21/25 1606   BP: 125/59   Pulse:     Resp:     Temp:     TempSrc:     SpO2:     Weight:     Height:                    Chief Complaint    Shortness of Breath            Patient seen resting in bed with head of bed elevated, no s/s or c/o acute difficulties at this time.  Vital signs for last 24 hours Temperature:  [36.3 °C (97.3 °F)] 36.3 °C (97.3 °F)  Heart Rate:  [66-84] 66  Respirations:  [16] 16  BP: (104-125)/(50-59) 125/59    No intake/output data recorded.  Patient still requiring frequent cardiac and SPO2 monitoring. Continue aggressive pulmonary hygiene and oral hygiene. Off loading as tolerated for skin integrity. Medications and labs reviewed-    Patient recently received an antibiotic (last 12 hours)         Date/Time Action Medication Dose     03/21/25 1553 New Bag    piperacillin-tazobactam (Zosyn) 4.5 g in dextrose (iso)  mL 4.5 g                        Results for orders placed or performed during the hospital encounter of 03/21/25 (from the past 96 hours)   CBC and Auto Differential   Result Value Ref Range     WBC 8.2 4.4 - 11.3 x10*3/uL     nRBC 0.0 0.0 - 0.0 /100 WBCs     RBC 3.04 (L) 4.00 - 5.20 x10*6/uL     Hemoglobin 7.6 (L) 12.0 - 16.0 g/dL     Hematocrit 25.4 (L) 36.0 - 46.0 %     MCV 84 80 - 100 fL     MCH 25.0 (L) 26.0 - 34.0 pg     MCHC 29.9 (L) 32.0 - 36.0 g/dL     RDW 16.9 (H) 11.5 - 14.5 %     Platelets 239 150 - " 450 x10*3/uL     Neutrophils % 75.2 40.0 - 80.0 %     Immature Granulocytes %, Automated 0.6 0.0 - 0.9 %     Lymphocytes % 8.7 13.0 - 44.0 %     Monocytes % 14.1 2.0 - 10.0 %     Eosinophils % 1.0 0.0 - 6.0 %     Basophils % 0.4 0.0 - 2.0 %     Neutrophils Absolute 6.13 (H) 1.60 - 5.50 x10*3/uL     Immature Granulocytes Absolute, Automated 0.05 0.00 - 0.50 x10*3/uL     Lymphocytes Absolute 0.71 (L) 0.80 - 3.00 x10*3/uL     Monocytes Absolute 1.15 (H) 0.05 - 0.80 x10*3/uL     Eosinophils Absolute 0.08 0.00 - 0.40 x10*3/uL     Basophils Absolute 0.03 0.00 - 0.10 x10*3/uL   Magnesium   Result Value Ref Range     Magnesium 2.07 1.60 - 2.40 mg/dL   Comprehensive metabolic panel   Result Value Ref Range     Glucose 126 (H) 74 - 99 mg/dL     Sodium 129 (L) 136 - 145 mmol/L     Potassium 3.3 (L) 3.5 - 5.3 mmol/L     Chloride 93 (L) 98 - 107 mmol/L     Bicarbonate 28 21 - 32 mmol/L     Anion Gap 11 10 - 20 mmol/L     Urea Nitrogen 19 6 - 23 mg/dL     Creatinine 0.87 0.50 - 1.05 mg/dL     eGFR 63 >60 mL/min/1.73m*2     Calcium 8.4 (L) 8.6 - 10.3 mg/dL     Albumin 3.3 (L) 3.4 - 5.0 g/dL     Alkaline Phosphatase 88 33 - 136 U/L     Total Protein 5.7 (L) 6.4 - 8.2 g/dL     AST 23 9 - 39 U/L     Bilirubin, Total 0.5 0.0 - 1.2 mg/dL     ALT 18 7 - 45 U/L   Troponin I, High Sensitivity   Result Value Ref Range     Troponin I, High Sensitivity 24 (H) 0 - 13 ng/L   B-Type Natriuretic Peptide   Result Value Ref Range      (H) 0 - 99 pg/mL   Sars-CoV-2 and Influenza A/B PCR   Result Value Ref Range     Flu A Result Not Detected Not Detected     Flu B Result Not Detected Not Detected     Coronavirus 2019, PCR Not Detected Not Detected   RSV PCR   Result Value Ref Range     RSV PCR Not Detected Not Detected   Lactate   Result Value Ref Range     Lactate 1.6 0.4 - 2.0 mmol/L   Urinalysis with Reflex Microscopic   Result Value Ref Range     Color, Urine Colorless (N) Light-Yellow, Yellow, Dark-Yellow     Appearance, Urine Clear  Clear     Specific Gravity, Urine 1.007 1.005 - 1.035     pH, Urine 7.0 5.0, 5.5, 6.0, 6.5, 7.0, 7.5, 8.0     Protein, Urine NEGATIVE NEGATIVE, 10 (TRACE), 20 (TRACE) mg/dL     Glucose, Urine Normal Normal mg/dL     Blood, Urine NEGATIVE NEGATIVE mg/dL     Ketones, Urine NEGATIVE NEGATIVE mg/dL     Bilirubin, Urine NEGATIVE NEGATIVE mg/dL     Urobilinogen, Urine Normal Normal mg/dL     Nitrite, Urine NEGATIVE NEGATIVE     Leukocyte Esterase, Urine NEGATIVE NEGATIVE   MRSA Surveillance for Vancomycin De-escalation, PCR     Specimen: Anterior Nares; Swab   Result Value Ref Range     MRSA PCR Not Detected Not Detected   Troponin I, High Sensitivity, Initial   Result Value Ref Range     Troponin I, High Sensitivity 24 (H) 0 - 13 ng/L         Patient fully evaluated 03/21, thorough record review performed of previous labs and notes from prior encounters. Plan discussed with interdisciplinary team, consults placed, appreciate input. Will continue current and repeat labs in the AM. Therapy evaluations ordered. Patient aware and agreeable to current plan, continue plan as above.      I spent a total of 75 minutes on the date of the service which included preparing to see the patient, face-to-face patient care, completing clinical documentation, obtaining and/or reviewing separately obtained history, performing a medically appropriate examination, counseling and educating the patient/family/caregiver, ordering medications, tests, or procedures, communicating with other HCPs (not separately reported), independently interpreting results (not separately reported), communicating results to the patient/family/caregiver, and care coordination (not separately reported).            Assessment/Plan     Assessment & Plan  Acute congestive heart failure, unspecified heart failure type                        Revision History       Patient fully evaluated  03/23  for    Problem List Items Addressed This Visit                  Cardiac and  Vasculature     * (Principal) Acute congestive heart failure, unspecified heart failure type - Primary     Relevant Medications     clopidogrel (Plavix) tablet 75 mg     metoprolol tartrate (Lopressor) tablet 25 mg          Pulmonary and Pneumonias     Hypoxia      Patient seen resting in bed with head of bed elevated, no s/s or c/o acute difficulties at this time.  Vital signs for last 24 hours Temp:  [36.1 °C (97 °F)-36.8 °C (98.2 °F)] 36.1 °C (97 °F)  Heart Rate:  [] 62  Resp:  [20] 20  BP: (113-142)/(56-64) 134/64  FiO2 (%):  [21 %-24 %] 24 %    I/O this shift:  In: 100 [IV Piggyback:100]  Out: -   Patient still requiring frequent cardiac and SPO2 monitoring. Continue aggressive pulmonary hygiene and oral hygiene. Off loading as tolerated for skin integrity. Medications and labs reviewed-         Results for orders placed or performed during the hospital encounter of 03/21/25 (from the past 24 hours)   Magnesium   Result Value Ref Range     Magnesium 2.17 1.60 - 2.40 mg/dL   CBC and Auto Differential   Result Value Ref Range     WBC 7.3 4.4 - 11.3 x10*3/uL     nRBC 0.0 0.0 - 0.0 /100 WBCs     RBC 2.99 (L) 4.00 - 5.20 x10*6/uL     Hemoglobin 7.7 (L) 12.0 - 16.0 g/dL     Hematocrit 25.8 (L) 36.0 - 46.0 %     MCV 86 80 - 100 fL     MCH 25.8 (L) 26.0 - 34.0 pg     MCHC 29.8 (L) 32.0 - 36.0 g/dL     RDW 18.1 (H) 11.5 - 14.5 %     Platelets 237 150 - 450 x10*3/uL     Neutrophils % 57.4 40.0 - 80.0 %     Immature Granulocytes %, Automated 0.3 0.0 - 0.9 %     Lymphocytes % 19.5 13.0 - 44.0 %     Monocytes % 15.4 2.0 - 10.0 %     Eosinophils % 6.9 0.0 - 6.0 %     Basophils % 0.5 0.0 - 2.0 %     Neutrophils Absolute 4.21 1.60 - 5.50 x10*3/uL     Immature Granulocytes Absolute, Automated 0.02 0.00 - 0.50 x10*3/uL     Lymphocytes Absolute 1.43 0.80 - 3.00 x10*3/uL     Monocytes Absolute 1.13 (H) 0.05 - 0.80 x10*3/uL     Eosinophils Absolute 0.51 (H) 0.00 - 0.40 x10*3/uL     Basophils Absolute 0.04 0.00 - 0.10 x10*3/uL    Comprehensive Metabolic Panel   Result Value Ref Range     Glucose 82 74 - 99 mg/dL     Sodium 135 (L) 136 - 145 mmol/L     Potassium 3.6 3.5 - 5.3 mmol/L     Chloride 97 (L) 98 - 107 mmol/L     Bicarbonate 31 21 - 32 mmol/L     Anion Gap 11 10 - 20 mmol/L     Urea Nitrogen 22 6 - 23 mg/dL     Creatinine 0.95 0.50 - 1.05 mg/dL     eGFR 57 (L) >60 mL/min/1.73m*2     Calcium 8.5 (L) 8.6 - 10.3 mg/dL     Albumin 3.2 (L) 3.4 - 5.0 g/dL     Alkaline Phosphatase 69 33 - 136 U/L     Total Protein 5.6 (L) 6.4 - 8.2 g/dL     AST 19 9 - 39 U/L     Bilirubin, Total 0.5 0.0 - 1.2 mg/dL     ALT 23 7 - 45 U/L   Phosphorus   Result Value Ref Range     Phosphorus 2.4 (L) 2.5 - 4.9 mg/dL      Patient recently received an antibiotic (last 12 hours)         Date/Time Action Medication Dose     03/23/25 0603 New Bag    piperacillin-tazobactam (Zosyn) 4.5 g in dextrose (iso)  mL 4.5 g             Plan discussed with interdisciplinary team, potassium low today @ 3.4 replaced, hemoglobin @ 7.4 today will obtain iron levels. Titrate oxygen as tolerated, will continue IV antibiotics, repeat chest xray shows - IMPRESSION:  1.  Increased congestion with persistent effusions. Probably  increased left basilar atelectasis.  Will order IV lasix and continue to diuresis patient, continue current and repeat labs in the AM.      Discharge planning discussed with patient and care team. Therapy evaluations ordered. Anticipate SNF at discharge. Patient aware and agreeable to current plan, continue plan as above.      I spent a total of 50 minutes on the date of the service which included preparing to see the patient, face-to-face patient care, completing clinical documentation, obtaining and/or reviewing separately obtained history, performing a medically appropriate examination, counseling and educating the patient/family/caregiver, ordering medications, tests, or procedures, communicating with other HCPs (not separately reported),  independently interpreting results (not separately reported), communicating results to the patient/family/caregiver, and care coordination (not separately reported).      Patient fully evaluated 3/24/2025 for    Problem List Items Addressed This Visit                  Cardiac and Vasculature     * (Principal) Acute congestive heart failure, unspecified heart failure type - Primary     Relevant Medications     clopidogrel (Plavix) tablet 75 mg     metoprolol tartrate (Lopressor) tablet 25 mg          Pulmonary and Pneumonias     Hypoxia      Patient seen resting in bed with head of bed elevated, no s/s or c/o acute difficulties at this time.  Vital signs for last 24 hours Temp:  [36.1 °C (97 °F)-36.8 °C (98.2 °F)] 36.1 °C (97 °F)  Heart Rate:  [] 62  Resp:  [20] 20  BP: (113-142)/(56-64) 134/64  FiO2 (%):  [21 %-24 %] 24 %    I/O this shift:  In: 100 [IV Piggyback:100]  Out: -   Patient still requiring frequent cardiac and SPO2 monitoring. Continue aggressive pulmonary hygiene and oral hygiene. Off loading as tolerated for skin integrity. Medications and labs reviewed-         Results for orders placed or performed during the hospital encounter of 03/21/25 (from the past 24 hours)   Magnesium   Result Value Ref Range     Magnesium 2.17 1.60 - 2.40 mg/dL   CBC and Auto Differential   Result Value Ref Range     WBC 7.3 4.4 - 11.3 x10*3/uL     nRBC 0.0 0.0 - 0.0 /100 WBCs     RBC 2.99 (L) 4.00 - 5.20 x10*6/uL     Hemoglobin 7.7 (L) 12.0 - 16.0 g/dL     Hematocrit 25.8 (L) 36.0 - 46.0 %     MCV 86 80 - 100 fL     MCH 25.8 (L) 26.0 - 34.0 pg     MCHC 29.8 (L) 32.0 - 36.0 g/dL     RDW 18.1 (H) 11.5 - 14.5 %     Platelets 237 150 - 450 x10*3/uL     Neutrophils % 57.4 40.0 - 80.0 %     Immature Granulocytes %, Automated 0.3 0.0 - 0.9 %     Lymphocytes % 19.5 13.0 - 44.0 %     Monocytes % 15.4 2.0 - 10.0 %     Eosinophils % 6.9 0.0 - 6.0 %     Basophils % 0.5 0.0 - 2.0 %     Neutrophils Absolute 4.21 1.60 - 5.50  x10*3/uL     Immature Granulocytes Absolute, Automated 0.02 0.00 - 0.50 x10*3/uL     Lymphocytes Absolute 1.43 0.80 - 3.00 x10*3/uL     Monocytes Absolute 1.13 (H) 0.05 - 0.80 x10*3/uL     Eosinophils Absolute 0.51 (H) 0.00 - 0.40 x10*3/uL     Basophils Absolute 0.04 0.00 - 0.10 x10*3/uL   Comprehensive Metabolic Panel   Result Value Ref Range     Glucose 82 74 - 99 mg/dL     Sodium 135 (L) 136 - 145 mmol/L     Potassium 3.6 3.5 - 5.3 mmol/L     Chloride 97 (L) 98 - 107 mmol/L     Bicarbonate 31 21 - 32 mmol/L     Anion Gap 11 10 - 20 mmol/L     Urea Nitrogen 22 6 - 23 mg/dL     Creatinine 0.95 0.50 - 1.05 mg/dL     eGFR 57 (L) >60 mL/min/1.73m*2     Calcium 8.5 (L) 8.6 - 10.3 mg/dL     Albumin 3.2 (L) 3.4 - 5.0 g/dL     Alkaline Phosphatase 69 33 - 136 U/L     Total Protein 5.6 (L) 6.4 - 8.2 g/dL     AST 19 9 - 39 U/L     Bilirubin, Total 0.5 0.0 - 1.2 mg/dL     ALT 23 7 - 45 U/L   Phosphorus   Result Value Ref Range     Phosphorus 2.4 (L) 2.5 - 4.9 mg/dL      Patient recently received an antibiotic (last 12 hours)         Date/Time Action Medication Dose     03/24/25 0616 New Bag    piperacillin-tazobactam (Zosyn) 4.5 g in dextrose (iso)  mL 4.5 g             Patient was fully evaluated on March 25, with no acute changes overnight. Vitals within normal limits upon evaluation, labs significant for decreased phosphorus at 2.4, remaining labs consistent with previous results. Cardiology team also following the management of this patient. PT/OT signed off. Patient received repeat CXR today, results pending at this time. Patient complains of pain in her great toe today, order placed for uric acid labs for assessment of potential gouty arthritis. Plan discussed with interdisciplinary team, continue current and repeat labs in the AM.      Patient aware and agreeable to current plan, continue plan as above.      I spent a total of 50 minutes on the date of the service which included preparing to see the patient,  face-to-face patient care, completing clinical documentation, obtaining and/or reviewing separately obtained history, performing a medically appropriate examination, counseling and educating the patient/family/caregiver, ordering medications, tests, or procedures, communicating with other HCPs (not separately reported), independently interpreting results (not separately reported), communicating results to the patient/family/caregiver, and care coordination (not separately reported).   TAMRA BARRERA                      Revision History         Pertinent Physical Exam At Time of Discharge  Physical Exam  no acute events overnight, patient denies chest pain, worsening SOB at this time.  Outpatient Follow-Up  Future Appointments   Date Time Provider Department Center   3/28/2025  1:30 PM James C Ramicone, DO [id number] [facility]   4/10/2025  3:00 PM Naima Keane MD [id number] [facility]   4/30/2025  1:00 PM Naima Keane MD [id number] [facility]   9/17/2025  2:30 PM [facility] [id number] [facility] [place] [facility]         Riya Urena

## 2025-03-26 VITALS
RESPIRATION RATE: 16 BRPM | HEART RATE: 69 BPM | TEMPERATURE: 97.5 F | HEIGHT: 63 IN | DIASTOLIC BLOOD PRESSURE: 57 MMHG | SYSTOLIC BLOOD PRESSURE: 117 MMHG | BODY MASS INDEX: 29.38 KG/M2 | OXYGEN SATURATION: 96 % | WEIGHT: 165.79 LBS

## 2025-03-26 LAB
ALBUMIN SERPL BCP-MCNC: 3.2 G/DL (ref 3.4–5)
ALP SERPL-CCNC: 63 U/L (ref 33–136)
ALT SERPL W P-5'-P-CCNC: 19 U/L (ref 7–45)
ANION GAP SERPL CALC-SCNC: 10 MMOL/L (ref 10–20)
AST SERPL W P-5'-P-CCNC: 17 U/L (ref 9–39)
BASOPHILS # BLD AUTO: 0.05 X10*3/UL (ref 0–0.1)
BASOPHILS NFR BLD AUTO: 0.9 %
BILIRUB SERPL-MCNC: 0.5 MG/DL (ref 0–1.2)
BUN SERPL-MCNC: 13 MG/DL (ref 6–23)
CALCIUM SERPL-MCNC: 8.7 MG/DL (ref 8.6–10.3)
CHLORIDE SERPL-SCNC: 97 MMOL/L (ref 98–107)
CO2 SERPL-SCNC: 32 MMOL/L (ref 21–32)
CREAT SERPL-MCNC: 0.89 MG/DL (ref 0.5–1.05)
EGFRCR SERPLBLD CKD-EPI 2021: 61 ML/MIN/1.73M*2
EOSINOPHIL # BLD AUTO: 0.47 X10*3/UL (ref 0–0.4)
EOSINOPHIL NFR BLD AUTO: 8.3 %
ERYTHROCYTE [DISTWIDTH] IN BLOOD BY AUTOMATED COUNT: 18.5 % (ref 11.5–14.5)
GLUCOSE SERPL-MCNC: 96 MG/DL (ref 74–99)
HCT VFR BLD AUTO: 27.1 % (ref 36–46)
HGB BLD-MCNC: 8 G/DL (ref 12–16)
IMM GRANULOCYTES # BLD AUTO: 0.02 X10*3/UL (ref 0–0.5)
IMM GRANULOCYTES NFR BLD AUTO: 0.4 % (ref 0–0.9)
LYMPHOCYTES # BLD AUTO: 1.43 X10*3/UL (ref 0.8–3)
LYMPHOCYTES NFR BLD AUTO: 25.3 %
MAGNESIUM SERPL-MCNC: 2.17 MG/DL (ref 1.6–2.4)
MCH RBC QN AUTO: 24.8 PG (ref 26–34)
MCHC RBC AUTO-ENTMCNC: 29.5 G/DL (ref 32–36)
MCV RBC AUTO: 84 FL (ref 80–100)
MONOCYTES # BLD AUTO: 0.79 X10*3/UL (ref 0.05–0.8)
MONOCYTES NFR BLD AUTO: 14 %
NEUTROPHILS # BLD AUTO: 2.89 X10*3/UL (ref 1.6–5.5)
NEUTROPHILS NFR BLD AUTO: 51.1 %
NRBC BLD-RTO: 0 /100 WBCS (ref 0–0)
PHOSPHATE SERPL-MCNC: 2.6 MG/DL (ref 2.5–4.9)
PLATELET # BLD AUTO: 238 X10*3/UL (ref 150–450)
POTASSIUM SERPL-SCNC: 3.3 MMOL/L (ref 3.5–5.3)
PROT SERPL-MCNC: 5.5 G/DL (ref 6.4–8.2)
RBC # BLD AUTO: 3.23 X10*6/UL (ref 4–5.2)
SODIUM SERPL-SCNC: 136 MMOL/L (ref 136–145)
VANCOMYCIN SERPL-MCNC: 18.3 UG/ML (ref 5–20)
WBC # BLD AUTO: 5.7 X10*3/UL (ref 4.4–11.3)

## 2025-03-26 PROCEDURE — 84100 ASSAY OF PHOSPHORUS: CPT

## 2025-03-26 PROCEDURE — 83735 ASSAY OF MAGNESIUM: CPT

## 2025-03-26 PROCEDURE — 2500000002 HC RX 250 W HCPCS SELF ADMINISTERED DRUGS (ALT 637 FOR MEDICARE OP, ALT 636 FOR OP/ED): Performed by: INTERNAL MEDICINE

## 2025-03-26 PROCEDURE — 80053 COMPREHEN METABOLIC PANEL: CPT | Performed by: INTERNAL MEDICINE

## 2025-03-26 PROCEDURE — 36415 COLL VENOUS BLD VENIPUNCTURE: CPT | Performed by: INTERNAL MEDICINE

## 2025-03-26 PROCEDURE — 2500000004 HC RX 250 GENERAL PHARMACY W/ HCPCS (ALT 636 FOR OP/ED)

## 2025-03-26 PROCEDURE — 2500000001 HC RX 250 WO HCPCS SELF ADMINISTERED DRUGS (ALT 637 FOR MEDICARE OP): Performed by: INTERNAL MEDICINE

## 2025-03-26 PROCEDURE — 2500000001 HC RX 250 WO HCPCS SELF ADMINISTERED DRUGS (ALT 637 FOR MEDICARE OP)

## 2025-03-26 PROCEDURE — 80202 ASSAY OF VANCOMYCIN: CPT

## 2025-03-26 PROCEDURE — 85025 COMPLETE CBC W/AUTO DIFF WBC: CPT | Performed by: INTERNAL MEDICINE

## 2025-03-26 RX ORDER — POTASSIUM CHLORIDE 750 MG/1
10 TABLET, FILM COATED, EXTENDED RELEASE ORAL ONCE
Status: COMPLETED | OUTPATIENT
Start: 2025-03-26 | End: 2025-03-26

## 2025-03-26 RX ADMIN — ALLOPURINOL 100 MG: 100 TABLET ORAL at 08:48

## 2025-03-26 RX ADMIN — POTASSIUM CHLORIDE 10 MEQ: 750 TABLET, EXTENDED RELEASE ORAL at 11:38

## 2025-03-26 RX ADMIN — ASPIRIN 81 MG CHEWABLE TABLET 81 MG: 81 TABLET CHEWABLE at 08:49

## 2025-03-26 RX ADMIN — GABAPENTIN 300 MG: 300 CAPSULE ORAL at 08:48

## 2025-03-26 RX ADMIN — CLOPIDOGREL BISULFATE 75 MG: 75 TABLET ORAL at 08:49

## 2025-03-26 RX ADMIN — FUROSEMIDE 40 MG: 40 TABLET ORAL at 08:49

## 2025-03-26 RX ADMIN — PIPERACILLIN SODIUM AND TAZOBACTAM SODIUM 4.5 G: 4; .5 INJECTION, SOLUTION INTRAVENOUS at 06:22

## 2025-03-26 ASSESSMENT — PAIN SCALES - GENERAL: PAINLEVEL_OUTOF10: 0 - NO PAIN

## 2025-03-26 ASSESSMENT — PAIN SCALES - WONG BAKER: WONGBAKER_NUMERICALRESPONSE: NO HURT

## 2025-03-26 NOTE — DISCHARGE SUMMARY
Discharge Diagnosis  Acute congestive heart failure, unspecified heart failure type    Issues Requiring Follow-Up  Patient fully evaluated  03/25 for   Assessment & Plan  Acute congestive heart failure, unspecified heart failure type    Patient with significant clinical improvement noted, patient medically cleared for discharge today. Patient seen resting in bed with head of bed elevated, no s/s or c/o acute difficulties at this time. Vital signs for last 24 hours:  Temp:  [35.9 °C (96.6 °F)-36.5 °C (97.7 °F)] 36.4 °C (97.5 °F)  Heart Rate:  [68-79] 69  Resp:  [16] 16  BP: (117-162)/(56-74) 117/57 Medications and labs reviewed-   Results for orders placed or performed during the hospital encounter of 03/21/25 (from the past 24 hours)   Magnesium   Result Value Ref Range    Magnesium 2.17 1.60 - 2.40 mg/dL   CBC and Auto Differential   Result Value Ref Range    WBC 5.7 4.4 - 11.3 x10*3/uL    nRBC 0.0 0.0 - 0.0 /100 WBCs    RBC 3.23 (L) 4.00 - 5.20 x10*6/uL    Hemoglobin 8.0 (L) 12.0 - 16.0 g/dL    Hematocrit 27.1 (L) 36.0 - 46.0 %    MCV 84 80 - 100 fL    MCH 24.8 (L) 26.0 - 34.0 pg    MCHC 29.5 (L) 32.0 - 36.0 g/dL    RDW 18.5 (H) 11.5 - 14.5 %    Platelets 238 150 - 450 x10*3/uL    Neutrophils % 51.1 40.0 - 80.0 %    Immature Granulocytes %, Automated 0.4 0.0 - 0.9 %    Lymphocytes % 25.3 13.0 - 44.0 %    Monocytes % 14.0 2.0 - 10.0 %    Eosinophils % 8.3 0.0 - 6.0 %    Basophils % 0.9 0.0 - 2.0 %    Neutrophils Absolute 2.89 1.60 - 5.50 x10*3/uL    Immature Granulocytes Absolute, Automated 0.02 0.00 - 0.50 x10*3/uL    Lymphocytes Absolute 1.43 0.80 - 3.00 x10*3/uL    Monocytes Absolute 0.79 0.05 - 0.80 x10*3/uL    Eosinophils Absolute 0.47 (H) 0.00 - 0.40 x10*3/uL    Basophils Absolute 0.05 0.00 - 0.10 x10*3/uL   Comprehensive Metabolic Panel   Result Value Ref Range    Glucose 96 74 - 99 mg/dL    Sodium 136 136 - 145 mmol/L    Potassium 3.3 (L) 3.5 - 5.3 mmol/L    Chloride 97 (L) 98 - 107 mmol/L    Bicarbonate 32  21 - 32 mmol/L    Anion Gap 10 10 - 20 mmol/L    Urea Nitrogen 13 6 - 23 mg/dL    Creatinine 0.89 0.50 - 1.05 mg/dL    eGFR 61 >60 mL/min/1.73m*2    Calcium 8.7 8.6 - 10.3 mg/dL    Albumin 3.2 (L) 3.4 - 5.0 g/dL    Alkaline Phosphatase 63 33 - 136 U/L    Total Protein 5.5 (L) 6.4 - 8.2 g/dL    AST 17 9 - 39 U/L    Bilirubin, Total 0.5 0.0 - 1.2 mg/dL    ALT 19 7 - 45 U/L   Vancomycin   Result Value Ref Range    Vancomycin 18.3 5.0 - 20.0 ug/mL   Phosphorus   Result Value Ref Range    Phosphorus 2.6 2.5 - 4.9 mg/dL     *Note: Due to a large number of results and/or encounters for the requested time period, some results have not been displayed. A complete set of results can be found in Results Review.      Patient recently received an antibiotic (last 12 hours)       Date/Time Action Medication Dose    03/25/25 0600 New Bag    piperacillin-tazobactam (Zosyn) 4.5 g in dextrose (iso)  mL 4.5 g           No results found for the last 90 days.       Continue aggressive pulmonary hygiene and oral hygiene. Off loading as tolerated for skin integrity.  Plan discussed with interdisciplinary team, no acute events overnight, patient denies chest pain, worsening SOB at this time. ok to discharge, will continue current antibiotics. Per TCC_ patient to discharge to [facility], Facility is able to accommodate the peripheral IV. Please leave IV in upon discharge to facility. Will continue current and repeat labs in the AM if patient still hospitalized. Patient aware and agreeable to current plan, continue plan as above.     I spent 30 minutes on the date of the service which included preparing to see the patient, face-to-face patient care, completing clinical documentation, obtaining and/or reviewing separately obtained history, performing a medically appropriate examination, counseling and educating the patient/family/caregiver, ordering medications, tests, or procedures, communicating with other HCPs (not  separately reported), independently interpreting results (not separately reported), communicating results to the patient/family/caregiver, and care coordination (not separately reported      Patient fully evaluated  03/26  for   Assessment & Plan  Acute congestive heart failure, unspecified heart failure type      Patient with significant clinical improvement noted, patient medically cleared for discharge today. Patient seen resting in bed with head of bed elevated, no s/s or c/o acute difficulties at this time. Vital signs for last 24 hours:  Temp:  [35.9 °C (96.6 °F)-36.5 °C (97.7 °F)] 36.4 °C (97.5 °F)  Heart Rate:  [68-79] 69  Resp:  [16] 16  BP: (117-162)/(56-74) 117/57 Medications and labs reviewed-   Results for orders placed or performed during the hospital encounter of 03/21/25 (from the past 24 hours)   Magnesium   Result Value Ref Range    Magnesium 2.17 1.60 - 2.40 mg/dL   CBC and Auto Differential   Result Value Ref Range    WBC 5.7 4.4 - 11.3 x10*3/uL    nRBC 0.0 0.0 - 0.0 /100 WBCs    RBC 3.23 (L) 4.00 - 5.20 x10*6/uL    Hemoglobin 8.0 (L) 12.0 - 16.0 g/dL    Hematocrit 27.1 (L) 36.0 - 46.0 %    MCV 84 80 - 100 fL    MCH 24.8 (L) 26.0 - 34.0 pg    MCHC 29.5 (L) 32.0 - 36.0 g/dL    RDW 18.5 (H) 11.5 - 14.5 %    Platelets 238 150 - 450 x10*3/uL    Neutrophils % 51.1 40.0 - 80.0 %    Immature Granulocytes %, Automated 0.4 0.0 - 0.9 %    Lymphocytes % 25.3 13.0 - 44.0 %    Monocytes % 14.0 2.0 - 10.0 %    Eosinophils % 8.3 0.0 - 6.0 %    Basophils % 0.9 0.0 - 2.0 %    Neutrophils Absolute 2.89 1.60 - 5.50 x10*3/uL    Immature Granulocytes Absolute, Automated 0.02 0.00 - 0.50 x10*3/uL    Lymphocytes Absolute 1.43 0.80 - 3.00 x10*3/uL    Monocytes Absolute 0.79 0.05 - 0.80 x10*3/uL    Eosinophils Absolute 0.47 (H) 0.00 - 0.40 x10*3/uL    Basophils Absolute 0.05 0.00 - 0.10 x10*3/uL   Comprehensive Metabolic Panel   Result Value Ref Range    Glucose 96 74 - 99 mg/dL    Sodium 136 136 - 145 mmol/L     Potassium 3.3 (L) 3.5 - 5.3 mmol/L    Chloride 97 (L) 98 - 107 mmol/L    Bicarbonate 32 21 - 32 mmol/L    Anion Gap 10 10 - 20 mmol/L    Urea Nitrogen 13 6 - 23 mg/dL    Creatinine 0.89 0.50 - 1.05 mg/dL    eGFR 61 >60 mL/min/1.73m*2    Calcium 8.7 8.6 - 10.3 mg/dL    Albumin 3.2 (L) 3.4 - 5.0 g/dL    Alkaline Phosphatase 63 33 - 136 U/L    Total Protein 5.5 (L) 6.4 - 8.2 g/dL    AST 17 9 - 39 U/L    Bilirubin, Total 0.5 0.0 - 1.2 mg/dL    ALT 19 7 - 45 U/L   Vancomycin   Result Value Ref Range    Vancomycin 18.3 5.0 - 20.0 ug/mL   Phosphorus   Result Value Ref Range    Phosphorus 2.6 2.5 - 4.9 mg/dL      Patient recently received an antibiotic (last 12 hours)       Date/Time Action Medication Dose    03/26/25 0622 New Bag    piperacillin-tazobactam (Zosyn) 4.5 g in dextrose (iso)  mL 4.5 g           No results found for the last 90 days.     Patient stable, medically cleared for discharge today, continue aggressive pulmonary hygiene and oral hygiene. Off loading as tolerated for skin integrity.  Plan discussed with interdisciplinary team, no acute events overnight, patient denies chest pain, worsening SOB at this time. ok to discharge, will continue current antibiotics. Per TCC_-patient to discharge to [facility], Facility is able to accommodate the peripheral IV. Please leave IV in upon discharge to facility. Will continue current and repeat labs in the AM if patient still hospitalized. Patient aware and agreeable to current plan, continue plan as above.     I spent 30 minutes on the date of the service which included preparing to see the patient, face-to-face patient care, completing clinical documentation, obtaining and/or reviewing separately obtained history, performing a medically appropriate examination, counseling and educating the patient/family/caregiver, ordering medications, tests, or procedures, communicating with other HCPs (not separately reported), independently interpreting  results (not separately reported), communicating results to the patient/family/caregiver, and care coordination (not separately reported  Continue aggressive pulmonary hygiene and oral hygiene. Off loading as tolerated for skin integrity.  Plan discussed with interdisciplinary team, no acute events overnight, patient denies chest pain, worsening SOB at this time. Ok to discharge, will continue current and repeat labs in the AM if patient still hospitalized. Patient aware and agreeable to current plan, continue plan as above.   Discharge Meds     Medication List      START taking these medications     acetaminophen 325 mg tablet; Commonly known as: Tylenol; Take 2 tablets   (650 mg) by mouth every 4 hours if needed for mild pain (1 - 3).   enoxaparin 40 mg/0.4 mL syringe; Commonly known as: Lovenox; Inject 0.4   mL (40 mg) under the skin once every 24 hours.   guaiFENesin 600 mg 12 hr tablet; Commonly known as: Mucinex; Take 1   tablet (600 mg) by mouth 2 times a day as needed for cough. Do not crush,   chew, or split.   piperacillin-tazobactam 200 mg/mL injection; Commonly known as: Zosyn;   Infuse 15 mL (3.375 g) over 30 minutes into a venous catheter every 6   hours for 7 days.     CHANGE how you take these medications     hydrocortisone 1 % lotion; Apply topically 2 times a day. Apply to both   legs , wash hands after applying lotion     CONTINUE taking these medications     albuterol 2.5 mg /3 mL (0.083 %) nebulizer solution   allopurinol 100 mg tablet; Commonly known as: Zyloprim; Take 1 tablet   (100 mg) by mouth once daily.   aspirin 81 mg chewable tablet; Chew 1 tablet (81 mg) once daily.   Calcium 600 + D(3) 600 mg-5 mcg (200 unit) tablet; Generic drug: calcium   carbonate-vitamin D3   clopidogrel 75 mg tablet; Commonly known as: Plavix; Take 1 tablet by   mouth once daily   colchicine 0.6 mg tablet; Take 1 tablet (0.6 mg) by mouth once daily.   DULoxetine 30 mg DR capsule; Commonly known as: Cymbalta    ferrous sulfate 325 (65 Fe) mg EC tablet   fluticasone propion-salmeteroL 115-21 mcg/actuation inhaler; Commonly   known as: Advair; Inhale 2 puffs 2 times a day. Rinse mouth with water   after use to reduce aftertaste and incidence of candidiasis. Do not   swallow.   gabapentin 300 mg capsule; Commonly known as: Neurontin; Take 1 capsule   (300 mg) by mouth 2 times a day.   ipratropium-albuteroL 0.5-2.5 mg/3 mL nebulizer solution; Commonly known   as: Duo-Neb   lubricating eye drops ophthalmic solution   metoprolol tartrate 25 mg tablet; Commonly known as: Lopressor; Take 1   tablet (25 mg) by mouth once daily.   polyethylene glycol 17 gram/dose powder; Commonly known as: Glycolax,   Miralax; Mix 1 capful (17 g) of powder with 4 to 8 ounces of water or   juice and drink 2 times a day.   torsemide 20 mg tablet; Commonly known as: Demadex     STOP taking these medications     levoFLOXacin 500 mg tablet; Commonly known as: Levaquin   methylPREDNISolone 4 mg tablet; Commonly known as: Medrol       Test Results Pending At Discharge  Pending Labs       No current pending labs.            Hospital Course         Fei Mensah MD   Physician  Internal Medicine     Progress Notes      Signed     Date of Service: 3/24/2025  2:21 PM     Signed       Expand All Collapse All    Yesenia Milner is a 89 y.o. female on day 3 of admission presenting with Acute congestive heart failure, unspecified heart failure type.           Subjective  Patient fully evaluated  03/22  for    Problem List Items Addressed This Visit                  Cardiac and Vasculature     * (Principal) Acute congestive heart failure, unspecified heart failure type - Primary     Relevant Medications     clopidogrel (Plavix) tablet 75 mg     metoprolol tartrate (Lopressor) tablet 25 mg          Pulmonary and Pneumonias     Hypoxia      Patient seen resting in bed with head of bed elevated, no s/s or c/o acute difficulties at this time.  Vital signs for last  24 hours Temp:  [36.1 °C (97 °F)-36.8 °C (98.2 °F)] 36.1 °C (97 °F)  Heart Rate:  [] 62  Resp:  [20] 20  BP: (113-142)/(56-64) 134/64  FiO2 (%):  [21 %-24 %] 24 %    I/O this shift:  In: 100 [IV Piggyback:100]  Out: -   Patient still requiring frequent cardiac and SPO2 monitoring. Continue aggressive pulmonary hygiene and oral hygiene. Off loading as tolerated for skin integrity. Medications and labs reviewed-         Results for orders placed or performed during the hospital encounter of 03/21/25 (from the past 24 hours)   Magnesium   Result Value Ref Range     Magnesium 2.17 1.60 - 2.40 mg/dL   CBC and Auto Differential   Result Value Ref Range     WBC 7.3 4.4 - 11.3 x10*3/uL     nRBC 0.0 0.0 - 0.0 /100 WBCs     RBC 2.99 (L) 4.00 - 5.20 x10*6/uL     Hemoglobin 7.7 (L) 12.0 - 16.0 g/dL     Hematocrit 25.8 (L) 36.0 - 46.0 %     MCV 86 80 - 100 fL     MCH 25.8 (L) 26.0 - 34.0 pg     MCHC 29.8 (L) 32.0 - 36.0 g/dL     RDW 18.1 (H) 11.5 - 14.5 %     Platelets 237 150 - 450 x10*3/uL     Neutrophils % 57.4 40.0 - 80.0 %     Immature Granulocytes %, Automated 0.3 0.0 - 0.9 %     Lymphocytes % 19.5 13.0 - 44.0 %     Monocytes % 15.4 2.0 - 10.0 %     Eosinophils % 6.9 0.0 - 6.0 %     Basophils % 0.5 0.0 - 2.0 %     Neutrophils Absolute 4.21 1.60 - 5.50 x10*3/uL     Immature Granulocytes Absolute, Automated 0.02 0.00 - 0.50 x10*3/uL     Lymphocytes Absolute 1.43 0.80 - 3.00 x10*3/uL     Monocytes Absolute 1.13 (H) 0.05 - 0.80 x10*3/uL     Eosinophils Absolute 0.51 (H) 0.00 - 0.40 x10*3/uL     Basophils Absolute 0.04 0.00 - 0.10 x10*3/uL   Comprehensive Metabolic Panel   Result Value Ref Range     Glucose 82 74 - 99 mg/dL     Sodium 135 (L) 136 - 145 mmol/L     Potassium 3.6 3.5 - 5.3 mmol/L     Chloride 97 (L) 98 - 107 mmol/L     Bicarbonate 31 21 - 32 mmol/L     Anion Gap 11 10 - 20 mmol/L     Urea Nitrogen 22 6 - 23 mg/dL     Creatinine 0.95 0.50 - 1.05 mg/dL     eGFR 57 (L) >60 mL/min/1.73m*2     Calcium 8.5 (L) 8.6  - 10.3 mg/dL     Albumin 3.2 (L) 3.4 - 5.0 g/dL     Alkaline Phosphatase 69 33 - 136 U/L     Total Protein 5.6 (L) 6.4 - 8.2 g/dL     AST 19 9 - 39 U/L     Bilirubin, Total 0.5 0.0 - 1.2 mg/dL     ALT 23 7 - 45 U/L   Phosphorus   Result Value Ref Range     Phosphorus 2.4 (L) 2.5 - 4.9 mg/dL      *Note: Due to a large number of results and/or encounters for the requested time period, some results have not been displayed. A complete set of results can be found in Results Review.      Patient recently received an antibiotic (last 12 hours)         Date/Time Action Medication Dose Rate     03/22/25 1613 New Bag    vancomycin (Vancocin) 1,000 mg in dextrose 5%  mL 1,000 mg 200 mL/hr     03/22/25 1455 New Bag    piperacillin-tazobactam (Zosyn) 4.5 g in dextrose (iso)  mL 4.5 g       03/22/25 0745 New Bag    piperacillin-tazobactam (Zosyn) 4.5 g in dextrose (iso)  mL 4.5 g               Plan discussed with interdisciplinary team, will continue to diuresis patient cardiology on the case, appreciate input. Patient being treated for pneumonia, chest physiotherapy as tolerated, incentive spirometry, repeat chest xray in the AM.  Will continue current and repeat labs in the AM.      Discharge planning discussed with patient and care team. Therapy evaluations ordered. Anticipate SNF at discharge. Patient aware and agreeable to current plan, continue plan as above.      I spent a total of 50 minutes on the date of the service which included preparing to see the patient, face-to-face patient care, completing clinical documentation, obtaining and/or reviewing separately obtained history, performing a medically appropriate examination, counseling and educating the patient/family/caregiver, ordering medications, tests, or procedures, communicating with other HCPs (not separately reported), independently interpreting results (not separately reported), communicating results to the patient/family/caregiver, and care  coordination (not separately reported).               Objective  Last Recorded Vitals  /64   Pulse 62   Temp 36.1 °C (97 °F)   Resp 20   Wt 68.5 kg (151 lb 0.2 oz)   SpO2 94%   Intake/Output last 3 Shifts:     Intake/Output Summary (Last 24 hours) at 3/24/2025 1421  Last data filed at 3/24/2025 0756      Gross per 24 hour   Intake 500 ml   Output 850 ml   Net -350 ml         Admission Weight  Weight: 71.7 kg (158 lb) (03/21/25 1036)     Daily Weight  03/24/25 : 68.5 kg (151 lb 0.2 oz)     Image Results  ECG 12 lead  Afib/flutter and ventricular-paced rhythm        Physical Exam     Relevant Results                       Assessment/Plan                       Assessment & Plan  Acute congestive heart failure, unspecified heart failure type           Fei Mensah MD   Physician  Internal Medicine     H&P      Signed     Date of Service: 3/21/2025  5:08 PM      Signed        Expand All Collapse All    History Of Present Illness  Yesenia Milner is a 89 y.o. female presenting with Acute congestive heart failure, unspecified heart failure type .     Past Medical History  She has a past medical history of Gross hematuria (10/07/2020), Impacted cerumen (02/22/2024), Other conditions influencing health status, Personal history of other medical treatment, Personal history of other medical treatment, and Systolic CHF (Multi) (05/18/2023).     Surgical History  She has a past surgical history that includes Appendectomy (11/22/2017); Hysterectomy (11/22/2017); Tonsillectomy (11/22/2017); Other surgical history (11/22/2017); Other surgical history (11/22/2017); Cardiac pacemaker placement (03/11/2021); IR angiogram renal bilateral (Bilateral, 12/14/2020); IR angiogram inferior epigastric pelvic (12/14/2020); IR angiogram inferior epigastric pelvic (12/14/2020); and Cardiac catheterization (N/A, 12/20/2024).     Social History  She reports that she quit smoking about 52 years ago. Her smoking use included  cigarettes. She has been exposed to tobacco smoke. She has never used smokeless tobacco. She reports that she does not drink alcohol and does not use drugs.     Family History  Family History               Family History   Problem Relation Name Age of Onset    No Known Problems Mother                Allergies  Iodine, Shrimp, Hydrocodone, and Adhesive tape-silicones     Review of Systems   Constitutional:  Positive for activity change and fatigue.   Respiratory:  Positive for shortness of breath.    Cardiovascular:  Positive for leg swelling.   All other systems reviewed and are negative.        Physical Exam   Physical Exam:  General:  Pleasant and cooperative.  Mild distress  HEENT:  Normocephalic, atraumatic, mucus membranes moist.   Neck:  Trachea midline.  No JVD.    Chest:  Clear to auscultation bilaterally.  Decreased breath sounds in lower lobe with Rales  CV:  Regular rate and rhythm.  Positive S1/S2.   Abdomen: Bowel sounds present in all four quadrants, abdomen is soft, non-tender, non-distended.  Extremities:  No lower extremity edema or cyanosis.   Neurological:  AAOx3. No focal deficits.  Skin:  Warm and dry.  Last Recorded Vitals  /59   Pulse 66   Temp 36.3 °C (97.3 °F) (Temporal)   Resp 16   Wt 71.7 kg (158 lb)   SpO2 94%      Relevant Results                 had concerns including Shortness of Breath (BIBA [facility] from [facility] for hypoxia, patient was recently dx with pneumonia and started antibiotics x2 days ago. Per EMS patient was 80% on RA when arrived and placed on 4L NC. Patient is AOX4, and was sent in at family request).        Problem List Items Addressed This Visit         Hypoxia     * (Principal) Acute congestive heart failure, unspecified heart failure type - Primary     Relevant Medications     clopidogrel (Plavix) tablet 75 mg     metoprolol tartrate (Lopressor) tablet 25 mg         HPI         Shortness of Breath     Additional comments: BIBA [facility] [facility] from  [facility] for hypoxia, patient was recently dx with pneumonia and started antibiotics x2 days ago. Per EMS patient was 80% on RA when arrived and placed on 4L NC. Patient is AOX4, and was sent in at family request            Last edited by Drea Cesar RN on 3/21/2025 10:44 AM.             Problem List as of 3/21/2025 Reviewed: 2/25/2025  9:52 AM by Naima Keane MD        Aortic stenosis, mild     Arthritis     Chronic diastolic congestive heart failure     Last Assessment & Plan 2/25/2025 Office Visit Edited 2/25/2025 10:33 AM by Naima Keane MD       -Chronic, BP controlled with beta-blocker and torsemide  -Most recent EF 60%, with diastolic dysfunction  -Follow-up cardiology-ASAP-for diuretic/fluid management-for worsening edema  -pt/cg agreeable with palliative care eval(I am surprised and disappointed-this was not already addressed during her multiple recent hospitals admissions)  Orders:    Referral to Palliative Care; Future              Complete heart block     COPD (chronic obstructive pulmonary disease) (Multi)     Last Assessment & Plan 9/5/2023 Telemedicine Written 9/5/2023  2:17 PM by Adore Carroll, Daphne       Patient has COPD diagnosis and is not currently experiencing any SOB, coughing, or wheezing.  Continue advair 115-21 mcg 2 puffs twice daily.  Recommend patient have a rescue inhaler at home, but she declines at this time.            History of paroxysmal supraventricular tachycardia     HTN (hypertension)     Paresthesia     Presence of permanent cardiac pacemaker     Last Assessment & Plan 1/31/2025 Office Visit Edited 1/31/2025  3:31 PM by Naima Keane MD       -Current pacemaker is her second 1  -Appointment with EP specialist February 24        RTC 3 months  No refills needed per pt/CG ( grandson brought her here)           Raynauds disease     Sensorineural hearing loss (SNHL) of both ears     Dyspnea on minimal exertion     Varicose veins of lower  extremities with inflammation     Vertigo     Ischemic cerebrovascular accident (CVA) (Multi)     Last Assessment & Plan 9/5/2023 Telemedicine Written 9/5/2023  2:19 PM by Adore Carroll, Daphne       Patient recently hospitalized for non-cardioembolic CVA (atherosclerotic in nature).   Continue aspirin 81 mg daily, plavix 75 mg daily, and atorvastatin 20 mg daily. DAPT to only be taken x 21 days, then plavix monotherapy thereafter.   Patient hasn't seen neurologist, but will request referral at PCP appt on 9/11.           Acquired deformity of toe     Benign neoplasm of skin of foot     Benign paroxysmal positional vertigo     Dermatophytosis of nail     Leg swelling     Macular degeneration     Mitral valve insufficiency     Moderate mitral valve regurgitation     Overweight with body mass index (BMI) 25.0-29.9     Plantar wart of left foot     Venous insufficiency     Dysarthria following cerebral infarction     Hyperglycemia     Hand paresthesia     Sick sinus syndrome (Multi)     Last Assessment & Plan 2/26/2024 Office Visit Written 2/26/2024  2:20 PM by James C Ramicone, DO       1.  Sick sinus syndrome: This patient has a history of symptomatic sinus bradycardia and has a [employer]onic dual-chamber permanent pacemaker which was replaced in July 2023.  The original device implantation was October 2012.  The pacemaker is functioning appropriately and the battery status is stable.  Continue follow-up as scheduled through the cardiac device clinic.  The patient will follow-up with me in 1 year.           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 Julian.  Patient transition to DOAC (Eliquis).  Asymptomatic of her  right IJ and SVC thrombus.  No planned mechanical thrombectomy.  Continue to monitor for bleeding.  Patient lives at home no history of falls would recommend PT OT evaluate for home safety.  Awaiting results of vascular ultrasound of upper extremity.     Thank you very much for allowing Vascular Surgery to be involved in the care of your patient sincerely Antonio MONTALVO .  (This note was generated with voice recognition software and may contain errors including spelling, grammar, syntax and missed recognition of what was dictated, of which may not have been fully corrected)           Gastrointestinal hemorrhage, unspecified gastrointestinal hemorrhage type     Last Assessment & Plan 1/12/2025 Hospital Encounter Written 1/23/2025  9:41 AM by Iglesia Gabriel DO       89-year-old female with past medical history of COPD, HTN, CVA, sick sinus syndrome s/p pacemaker, HFpEF, recent NSTEMI, and recent hospitalization for CHF exacerbation with acute pulmonary edema, imaging at that time was concerning for SVC thrombus and patient was evaluated by vascular who recommended DOAC for 6 months and repeat CT.  She presents with red blood per rectum and possible epistaxis. Hospitalized for further evaluation and management.     #GI bleed, suspect lower -> likely ischemic colitis (wall thickening at distal transverse colon, associated abdominal discomfort and dizziness, brown stool with red blood reported).  #Epistaxis ?- no reported vomiting.  Blood noted with clearing of her nose and when rinsing her mouth with fluids.   #SVC thrombus     Presentation is concerning for ischemic colitis as noted above.  Avoid hypotension  Consult gastroenterology, appreciate input  Will hold aspirin, Plavix, and Eliquis for the time being  Trend H&H, stable on presentation.  Patient agreeable to blood transfusions as needed.  N.p.o. after midnight  Low suspicion for upper GI source, however will continue with Protonix 40 mg IV twice daily  until evaluated by GI.  Consult to vascular surgery regarding ongoing need for Eliquis.   Dr Smith's last note on 1/6/2025 “I have reviewed the DVT scan performed today.  There is no evidence of IJ thrombus.  Waveforms are normal in the IJ as well as subclavian, suggestive that even if there is a thrombus in the SVC, it is not hemodynamically significant.”   Check Orthostatic vitals     #Acute hypoxic respiratory failure  #Acute on chronic diastolic congestive heart failure  #History sick sinus syndrome s/p pacemaker  #History NSTEMI  #Valvular hear disease  #pleural effusions     CT angio A/P was suggestive of CHF with interstitial edema and small pleural effusions.   Low-sodium diet  Consult cardiology, patient follows with Dr Jiang and Ramicone for EP  Supplemental oxygen as needed  Continue oral diuretics, will defer to cardiology IV diuresis .  DuoNebs as needed  RT to evaluate     #DVT Ppx  SCD  Holding ac        1/13: doing ok, informed about CT results, gi on board,      1/14: CLD. Plans for colonscopy to get definitive dx, ok to stop eliquis per vascular/cardio     1/15: plans for scope tomorrow     1/16: scope confirmed cancer, she is unsure if she wants surgery or not, will consult surgery to give her more information  1/17: contemplating surgyer, will need cardiac clearance will talk to family as well     1/18: patient seen by cardiology this am ; continue to hold aspirin and Plavix at this time.;  Patient noted with expiratory wheezes on exam.  Pulse ox is stable.  We will order chest x-rays and ensure that she is getting her nebulizers as ordered.    Awaiting Vascular surgery in put as well  Patient is still contemplating surgery  Continue to trend labs     1/19: lab work stable.  We will start Mucinex and Tessalon Perles for cough.  Patient appears agreeable for surgery we will continue discussion with surgical team.  Aspirin Plavix are on hold, seen by cardiology at discharge patient should only  resume aspirin  Continue to monitor labs.  Plan of care discussed with attending   Dr. Iglesia mart.        1/20: plans to undergo surgery, date pending     1/21: continue pre op preparation  1/22: surgery cleared plans to go to OR outpatient to help her recover a bit pior to surgery     I spent 35 minutes in the professional and overall care of this patient.           Malignant neoplasm of transverse colon (Multi)     Last Assessment & Plan 1/31/2025 Office Visit Edited 1/31/2025  3:31 PM by Naima Keane MD                         Stage 3 chronic kidney disease, unspecified whether stage 3a or 3b CKD (Multi)     Last Assessment & Plan 1/31/2025 Office Visit Edited 1/31/2025  3:31 PM by Naima Keane MD       -Her GFR has been stable around 55, she does not see a kidney specialist                 Acute decompensated heart failure     Acute exacerbation of chronic heart failure     Hypoxia     Pleural effusion     Cellulitis of lower extremity     Adenocarcinoma of colon (Multi)     Bilateral leg edema     * (Principal) Acute congestive heart failure, unspecified heart failure type                 Active Ambulatory Problems     Diagnosis Date Noted    Aortic stenosis, mild 05/18/2023    Arthritis 05/18/2023    Chronic diastolic congestive heart failure 05/18/2023    Complete heart block 05/18/2023    COPD (chronic obstructive pulmonary disease) (Multi) 05/18/2023    History of paroxysmal supraventricular tachycardia 05/18/2023    HTN (hypertension) 05/18/2023    Paresthesia 05/18/2023    Presence of permanent cardiac pacemaker 05/18/2023    Raynauds disease 05/18/2023    Sensorineural hearing loss (SNHL) of both ears 05/18/2023    Dyspnea on minimal exertion 05/18/2023    Varicose veins of lower extremities with inflammation 05/31/2015    Vertigo 05/18/2023    Ischemic cerebrovascular accident (CVA) (Multi) 09/05/2023    Acquired deformity of toe 05/31/2015    Benign neoplasm of skin of foot 07/03/2016    Benign  paroxysmal positional vertigo 09/20/2023    Dermatophytosis of nail 08/09/2014    Leg swelling 09/20/2023    Macular degeneration 09/20/2023    Mitral valve insufficiency 09/20/2023    Moderate mitral valve regurgitation 09/20/2023    Overweight with body mass index (BMI) 25.0-29.9 09/20/2023    Plantar wart of left foot 07/03/2016    Venous insufficiency 08/09/2014    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    Hyperglycemia 08/23/2023    Hand paresthesia 02/22/2024    Sick sinus syndrome (Multi) 02/26/2024    Injury of kidney 06/26/2024    NSTEMI (non-ST elevated myocardial infarction) (Multi) 12/20/2024    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) 01/06/2025    Gastrointestinal hemorrhage, unspecified gastrointestinal hemorrhage type 01/12/2025    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    Stage 3 chronic kidney disease, unspecified whether stage 3a or 3b CKD (Multi) 01/31/2025    Acute decompensated heart failure 02/02/2025    Acute exacerbation of chronic heart failure 03/04/2025    Hypoxia 03/04/2025    Pleural effusion 03/04/2025    Cellulitis of lower extremity 03/04/2025    Adenocarcinoma of colon (Multi) 03/04/2025    Bilateral leg edema 03/05/2025              Resolved Ambulatory Problems     Diagnosis Date Noted    Systolic CHF (Multi) 05/18/2023    Gross hematuria 10/07/2020    Ulcer of toe of left foot (Multi) 12/06/2015    Ulcer of toe of right foot (Multi) 04/24/2016    Post-menopausal 09/20/2023    Impacted cerumen 02/22/2024    Urinary tract infection 02/22/2024    Shortness of breath 01/02/2025              Past Medical History:   Diagnosis Date    Other conditions influencing health status      Personal history of other medical treatment      Personal history of other medical treatment                         Active Orders   Imaging     CT angio chest for pulmonary embolism       Frequency: Once        Number of Occurrences: 1 Occurrences   Lab     CBC       Frequency: AM draw       Number of Occurrences: 5 Occurrences     Magnesium       Frequency: AM draw       Number of Occurrences: 5 Occurrences     Renal Function Panel       Frequency: AM draw       Number of Occurrences: 5 Occurrences     Troponin, High Sensitivity, 1 Hour       Frequency: Once       Number of Occurrences: 1 Occurrences     Vancomycin       Frequency: AM draw       Number of Occurrences: 1 Occurrences   Diet     Adult diet Cardiac; 70 gm fat; 2 - 3 grams Sodium; Easy to chew; 1800 mL fluid       Frequency: Effective now       Number of Occurrences: Until Specified   Nursing     Apply sequential compression device       Frequency: Until discontinued       Number of Occurrences: Until Specified   Consult     Inpatient consult to Social Work and TCC       Frequency: Once       Number of Occurrences: 1 Occurrences   Nourishments     May Participate in Room Service       Frequency: Once       Number of Occurrences: 1 Occurrences   OT     OT eval and treat       Frequency: Until therapy completed       Number of Occurrences: 1 Occurrences   PT     PT eval and treat       Frequency: Until therapy completed       Number of Occurrences: 1 Occurrences   Respiratory Care     Incentive spirometry Instruct       Frequency: Once       Number of Occurrences: 1 Occurrences     Respiratory care eval and treat       Frequency: Once       Number of Occurrences: 1 Occurrences       Order Comments: Due 3/24 @ 0700      ECG     ECG 12 lead       Frequency: Once       Number of Occurrences: 1 Occurrences   Admission     Admit to inpatient       Frequency: Once       Number of Occurrences: 1 Occurrences   Telemetry     Telemetry monitoring       Frequency: Until discontinued       Number of Occurrences: 3 Days   Medications     acetaminophen (Tylenol) oral liquid 650 mg       Linked Order: Or       Frequency: q4h PRN       Dose: 650 mg       Route:  nasogastric tube     acetaminophen (Tylenol) suppository 650 mg       Linked Order: Or       Frequency: q4h PRN       Dose: 650 mg       Route: rectal     acetaminophen (Tylenol) tablet 650 mg       Linked Order: Or       Frequency: q4h PRN       Dose: 650 mg       Route: oral     allopurinol (Zyloprim) tablet 100 mg       Frequency: Daily       Dose: 100 mg       Route: oral     aspirin chewable tablet 81 mg       Frequency: Daily       Dose: 81 mg       Route: oral     clopidogrel (Plavix) tablet 75 mg       Frequency: Daily       Dose: 75 mg       Route: oral     enoxaparin (Lovenox) syringe 40 mg       Frequency: q24h       Dose: 40 mg       Route: subcutaneous     fluticasone furoate-vilanteroL (Breo Ellipta) 200-25 mcg/dose inhaler 1 puff       Frequency: Daily       Dose: 1 puff       Route: inhalation     furosemide (Lasix) injection 20 mg       Frequency: Once       Dose: 20 mg       Route: intravenous     gabapentin (Neurontin) capsule 300 mg       Frequency: BID       Dose: 300 mg       Route: oral     guaiFENesin (Mucinex) 12 hr tablet 600 mg       Frequency: BID PRN       Dose: 600 mg       Route: oral     ipratropium-albuteroL (Duo-Neb) 0.5-2.5 mg/3 mL nebulizer solution 3 mL       Frequency: q2h PRN       Dose: 3 mL       Route: nebulization     ipratropium-albuteroL (Duo-Neb) 0.5-2.5 mg/3 mL nebulizer solution 3 mL       Frequency: TID       Dose: 3 mL       Route: nebulization     metoprolol tartrate (Lopressor) tablet 25 mg       Frequency: Daily       Dose: 25 mg       Route: oral     piperacillin-tazobactam (Zosyn) 4.5 g in dextrose (iso)  mL       Frequency: q8h       Dose: 4.5 g       Route: intravenous     vancomycin (Vancocin) 1,000 mg in dextrose 5%  mL       Frequency: q24h       Dose: 1,000 mg       Route: intravenous     vancomycin (Vancocin) pharmacy to dose - pharmacy monitoring       Frequency: Daily PRN       Route: miscellaneous     vancomycin 1,750 mg in dextrose 5% 500  "mL IV       Frequency: Once       Dose: 1,750 mg       Route: intravenous      Dietary Orders (From admission, onward)          Start     Ordered     03/21/25 1607   May Participate in Room Service  ( ROOM SERVICE MAY PARTICIPATE)  Once        Question:  .  Answer:  Yes    03/21/25 1606     03/21/25 1433   Adult diet Cardiac; 70 gm fat; 2 - 3 grams Sodium; Easy to chew; 1800 mL fluid  Diet effective now        Question Answer Comment   Diet type Cardiac     Fat restriction: 70 gm fat     Sodium restriction: 2 - 3 grams Sodium     Texture Easy to chew     Dietary fluid restriction / 24h: 1800 mL fluid         03/21/25 1433                       89 yrs@  ADMITDTTM@  Acute congestive heart failure, unspecified heart failure type [I50.9]  [unfilled]  Weight: 71.7 kg (158 lb)     Vitals               Vitals:     03/21/25 1036 03/21/25 1038 03/21/25 1506 03/21/25 1604   BP: 104/50         Pulse: 84     66   Resp: 16     16   Temp: 36.3 °C (97.3 °F)         TempSrc: Temporal         SpO2: (!) 84% (!) 93% 96% 94%   Weight: 71.7 kg (158 lb)         Height: 1.6 m (5' 3\")           03/21/25 1606   BP: 125/59   Pulse:     Resp:     Temp:     TempSrc:     SpO2:     Weight:     Height:                    Chief Complaint    Shortness of Breath            Patient seen resting in bed with head of bed elevated, no s/s or c/o acute difficulties at this time.  Vital signs for last 24 hours Temperature:  [36.3 °C (97.3 °F)] 36.3 °C (97.3 °F)  Heart Rate:  [66-84] 66  Respirations:  [16] 16  BP: (104-125)/(50-59) 125/59    No intake/output data recorded.  Patient still requiring frequent cardiac and SPO2 monitoring. Continue aggressive pulmonary hygiene and oral hygiene. Off loading as tolerated for skin integrity. Medications and labs reviewed-    Patient recently received an antibiotic (last 12 hours)         Date/Time Action Medication Dose     03/21/25 1553 New Bag    piperacillin-tazobactam (Zosyn) 4.5 g in dextrose (iso)  " mL 4.5 g                        Results for orders placed or performed during the hospital encounter of 03/21/25 (from the past 96 hours)   CBC and Auto Differential   Result Value Ref Range     WBC 8.2 4.4 - 11.3 x10*3/uL     nRBC 0.0 0.0 - 0.0 /100 WBCs     RBC 3.04 (L) 4.00 - 5.20 x10*6/uL     Hemoglobin 7.6 (L) 12.0 - 16.0 g/dL     Hematocrit 25.4 (L) 36.0 - 46.0 %     MCV 84 80 - 100 fL     MCH 25.0 (L) 26.0 - 34.0 pg     MCHC 29.9 (L) 32.0 - 36.0 g/dL     RDW 16.9 (H) 11.5 - 14.5 %     Platelets 239 150 - 450 x10*3/uL     Neutrophils % 75.2 40.0 - 80.0 %     Immature Granulocytes %, Automated 0.6 0.0 - 0.9 %     Lymphocytes % 8.7 13.0 - 44.0 %     Monocytes % 14.1 2.0 - 10.0 %     Eosinophils % 1.0 0.0 - 6.0 %     Basophils % 0.4 0.0 - 2.0 %     Neutrophils Absolute 6.13 (H) 1.60 - 5.50 x10*3/uL     Immature Granulocytes Absolute, Automated 0.05 0.00 - 0.50 x10*3/uL     Lymphocytes Absolute 0.71 (L) 0.80 - 3.00 x10*3/uL     Monocytes Absolute 1.15 (H) 0.05 - 0.80 x10*3/uL     Eosinophils Absolute 0.08 0.00 - 0.40 x10*3/uL     Basophils Absolute 0.03 0.00 - 0.10 x10*3/uL   Magnesium   Result Value Ref Range     Magnesium 2.07 1.60 - 2.40 mg/dL   Comprehensive metabolic panel   Result Value Ref Range     Glucose 126 (H) 74 - 99 mg/dL     Sodium 129 (L) 136 - 145 mmol/L     Potassium 3.3 (L) 3.5 - 5.3 mmol/L     Chloride 93 (L) 98 - 107 mmol/L     Bicarbonate 28 21 - 32 mmol/L     Anion Gap 11 10 - 20 mmol/L     Urea Nitrogen 19 6 - 23 mg/dL     Creatinine 0.87 0.50 - 1.05 mg/dL     eGFR 63 >60 mL/min/1.73m*2     Calcium 8.4 (L) 8.6 - 10.3 mg/dL     Albumin 3.3 (L) 3.4 - 5.0 g/dL     Alkaline Phosphatase 88 33 - 136 U/L     Total Protein 5.7 (L) 6.4 - 8.2 g/dL     AST 23 9 - 39 U/L     Bilirubin, Total 0.5 0.0 - 1.2 mg/dL     ALT 18 7 - 45 U/L   Troponin I, High Sensitivity   Result Value Ref Range     Troponin I, High Sensitivity 24 (H) 0 - 13 ng/L   B-Type Natriuretic Peptide   Result Value Ref Range     BNP  811 (H) 0 - 99 pg/mL   Sars-CoV-2 and Influenza A/B PCR   Result Value Ref Range     Flu A Result Not Detected Not Detected     Flu B Result Not Detected Not Detected     Coronavirus 2019, PCR Not Detected Not Detected   RSV PCR   Result Value Ref Range     RSV PCR Not Detected Not Detected   Lactate   Result Value Ref Range     Lactate 1.6 0.4 - 2.0 mmol/L   Urinalysis with Reflex Microscopic   Result Value Ref Range     Color, Urine Colorless (N) Light-Yellow, Yellow, Dark-Yellow     Appearance, Urine Clear Clear     Specific Gravity, Urine 1.007 1.005 - 1.035     pH, Urine 7.0 5.0, 5.5, 6.0, 6.5, 7.0, 7.5, 8.0     Protein, Urine NEGATIVE NEGATIVE, 10 (TRACE), 20 (TRACE) mg/dL     Glucose, Urine Normal Normal mg/dL     Blood, Urine NEGATIVE NEGATIVE mg/dL     Ketones, Urine NEGATIVE NEGATIVE mg/dL     Bilirubin, Urine NEGATIVE NEGATIVE mg/dL     Urobilinogen, Urine Normal Normal mg/dL     Nitrite, Urine NEGATIVE NEGATIVE     Leukocyte Esterase, Urine NEGATIVE NEGATIVE   MRSA Surveillance for Vancomycin De-escalation, PCR     Specimen: Anterior Nares; Swab   Result Value Ref Range     MRSA PCR Not Detected Not Detected   Troponin I, High Sensitivity, Initial   Result Value Ref Range     Troponin I, High Sensitivity 24 (H) 0 - 13 ng/L         Patient fully evaluated 03/21, thorough record review performed of previous labs and notes from prior encounters. Plan discussed with interdisciplinary team, consults placed, appreciate input. Will continue current and repeat labs in the AM. Therapy evaluations ordered. Patient aware and agreeable to current plan, continue plan as above.      I spent a total of 75 minutes on the date of the service which included preparing to see the patient, face-to-face patient care, completing clinical documentation, obtaining and/or reviewing separately obtained history, performing a medically appropriate examination, counseling and educating the patient/family/caregiver, ordering  medications, tests, or procedures, communicating with other HCPs (not separately reported), independently interpreting results (not separately reported), communicating results to the patient/family/caregiver, and care coordination (not separately reported).            Assessment/Plan     Assessment & Plan  Acute congestive heart failure, unspecified heart failure type                        Revision History       Patient fully evaluated  03/23  for    Problem List Items Addressed This Visit                  Cardiac and Vasculature     * (Principal) Acute congestive heart failure, unspecified heart failure type - Primary     Relevant Medications     clopidogrel (Plavix) tablet 75 mg     metoprolol tartrate (Lopressor) tablet 25 mg          Pulmonary and Pneumonias     Hypoxia      Patient seen resting in bed with head of bed elevated, no s/s or c/o acute difficulties at this time.  Vital signs for last 24 hours Temp:  [36.1 °C (97 °F)-36.8 °C (98.2 °F)] 36.1 °C (97 °F)  Heart Rate:  [] 62  Resp:  [20] 20  BP: (113-142)/(56-64) 134/64  FiO2 (%):  [21 %-24 %] 24 %    I/O this shift:  In: 100 [IV Piggyback:100]  Out: -   Patient still requiring frequent cardiac and SPO2 monitoring. Continue aggressive pulmonary hygiene and oral hygiene. Off loading as tolerated for skin integrity. Medications and labs reviewed-         Results for orders placed or performed during the hospital encounter of 03/21/25 (from the past 24 hours)   Magnesium   Result Value Ref Range     Magnesium 2.17 1.60 - 2.40 mg/dL   CBC and Auto Differential   Result Value Ref Range     WBC 7.3 4.4 - 11.3 x10*3/uL     nRBC 0.0 0.0 - 0.0 /100 WBCs     RBC 2.99 (L) 4.00 - 5.20 x10*6/uL     Hemoglobin 7.7 (L) 12.0 - 16.0 g/dL     Hematocrit 25.8 (L) 36.0 - 46.0 %     MCV 86 80 - 100 fL     MCH 25.8 (L) 26.0 - 34.0 pg     MCHC 29.8 (L) 32.0 - 36.0 g/dL     RDW 18.1 (H) 11.5 - 14.5 %     Platelets 237 150 - 450 x10*3/uL     Neutrophils % 57.4 40.0 - 80.0  %     Immature Granulocytes %, Automated 0.3 0.0 - 0.9 %     Lymphocytes % 19.5 13.0 - 44.0 %     Monocytes % 15.4 2.0 - 10.0 %     Eosinophils % 6.9 0.0 - 6.0 %     Basophils % 0.5 0.0 - 2.0 %     Neutrophils Absolute 4.21 1.60 - 5.50 x10*3/uL     Immature Granulocytes Absolute, Automated 0.02 0.00 - 0.50 x10*3/uL     Lymphocytes Absolute 1.43 0.80 - 3.00 x10*3/uL     Monocytes Absolute 1.13 (H) 0.05 - 0.80 x10*3/uL     Eosinophils Absolute 0.51 (H) 0.00 - 0.40 x10*3/uL     Basophils Absolute 0.04 0.00 - 0.10 x10*3/uL   Comprehensive Metabolic Panel   Result Value Ref Range     Glucose 82 74 - 99 mg/dL     Sodium 135 (L) 136 - 145 mmol/L     Potassium 3.6 3.5 - 5.3 mmol/L     Chloride 97 (L) 98 - 107 mmol/L     Bicarbonate 31 21 - 32 mmol/L     Anion Gap 11 10 - 20 mmol/L     Urea Nitrogen 22 6 - 23 mg/dL     Creatinine 0.95 0.50 - 1.05 mg/dL     eGFR 57 (L) >60 mL/min/1.73m*2     Calcium 8.5 (L) 8.6 - 10.3 mg/dL     Albumin 3.2 (L) 3.4 - 5.0 g/dL     Alkaline Phosphatase 69 33 - 136 U/L     Total Protein 5.6 (L) 6.4 - 8.2 g/dL     AST 19 9 - 39 U/L     Bilirubin, Total 0.5 0.0 - 1.2 mg/dL     ALT 23 7 - 45 U/L   Phosphorus   Result Value Ref Range     Phosphorus 2.4 (L) 2.5 - 4.9 mg/dL      Patient recently received an antibiotic (last 12 hours)         Date/Time Action Medication Dose     03/23/25 0603 New Bag    piperacillin-tazobactam (Zosyn) 4.5 g in dextrose (iso)  mL 4.5 g             Plan discussed with interdisciplinary team, potassium low today @ 3.4 replaced, hemoglobin @ 7.4 today will obtain iron levels. Titrate oxygen as tolerated, will continue IV antibiotics, repeat chest xray shows - IMPRESSION:  1.  Increased congestion with persistent effusions. Probably  increased left basilar atelectasis.  Will order IV lasix and continue to diuresis patient, continue current and repeat labs in the AM.      Discharge planning discussed with patient and care team. Therapy evaluations ordered. Anticipate  SNF at discharge. Patient aware and agreeable to current plan, continue plan as above.      I spent a total of 50 minutes on the date of the service which included preparing to see the patient, face-to-face patient care, completing clinical documentation, obtaining and/or reviewing separately obtained history, performing a medically appropriate examination, counseling and educating the patient/family/caregiver, ordering medications, tests, or procedures, communicating with other HCPs (not separately reported), independently interpreting results (not separately reported), communicating results to the patient/family/caregiver, and care coordination (not separately reported).      Patient fully evaluated 3/24/2025 for    Problem List Items Addressed This Visit                  Cardiac and Vasculature     * (Principal) Acute congestive heart failure, unspecified heart failure type - Primary     Relevant Medications     clopidogrel (Plavix) tablet 75 mg     metoprolol tartrate (Lopressor) tablet 25 mg          Pulmonary and Pneumonias     Hypoxia      Patient seen resting in bed with head of bed elevated, no s/s or c/o acute difficulties at this time.  Vital signs for last 24 hours Temp:  [36.1 °C (97 °F)-36.8 °C (98.2 °F)] 36.1 °C (97 °F)  Heart Rate:  [] 62  Resp:  [20] 20  BP: (113-142)/(56-64) 134/64  FiO2 (%):  [21 %-24 %] 24 %    I/O this shift:  In: 100 [IV Piggyback:100]  Out: -   Patient still requiring frequent cardiac and SPO2 monitoring. Continue aggressive pulmonary hygiene and oral hygiene. Off loading as tolerated for skin integrity. Medications and labs reviewed-         Results for orders placed or performed during the hospital encounter of 03/21/25 (from the past 24 hours)   Magnesium   Result Value Ref Range     Magnesium 2.17 1.60 - 2.40 mg/dL   CBC and Auto Differential   Result Value Ref Range     WBC 7.3 4.4 - 11.3 x10*3/uL     nRBC 0.0 0.0 - 0.0 /100 WBCs     RBC 2.99 (L) 4.00 - 5.20  x10*6/uL     Hemoglobin 7.7 (L) 12.0 - 16.0 g/dL     Hematocrit 25.8 (L) 36.0 - 46.0 %     MCV 86 80 - 100 fL     MCH 25.8 (L) 26.0 - 34.0 pg     MCHC 29.8 (L) 32.0 - 36.0 g/dL     RDW 18.1 (H) 11.5 - 14.5 %     Platelets 237 150 - 450 x10*3/uL     Neutrophils % 57.4 40.0 - 80.0 %     Immature Granulocytes %, Automated 0.3 0.0 - 0.9 %     Lymphocytes % 19.5 13.0 - 44.0 %     Monocytes % 15.4 2.0 - 10.0 %     Eosinophils % 6.9 0.0 - 6.0 %     Basophils % 0.5 0.0 - 2.0 %     Neutrophils Absolute 4.21 1.60 - 5.50 x10*3/uL     Immature Granulocytes Absolute, Automated 0.02 0.00 - 0.50 x10*3/uL     Lymphocytes Absolute 1.43 0.80 - 3.00 x10*3/uL     Monocytes Absolute 1.13 (H) 0.05 - 0.80 x10*3/uL     Eosinophils Absolute 0.51 (H) 0.00 - 0.40 x10*3/uL     Basophils Absolute 0.04 0.00 - 0.10 x10*3/uL   Comprehensive Metabolic Panel   Result Value Ref Range     Glucose 82 74 - 99 mg/dL     Sodium 135 (L) 136 - 145 mmol/L     Potassium 3.6 3.5 - 5.3 mmol/L     Chloride 97 (L) 98 - 107 mmol/L     Bicarbonate 31 21 - 32 mmol/L     Anion Gap 11 10 - 20 mmol/L     Urea Nitrogen 22 6 - 23 mg/dL     Creatinine 0.95 0.50 - 1.05 mg/dL     eGFR 57 (L) >60 mL/min/1.73m*2     Calcium 8.5 (L) 8.6 - 10.3 mg/dL     Albumin 3.2 (L) 3.4 - 5.0 g/dL     Alkaline Phosphatase 69 33 - 136 U/L     Total Protein 5.6 (L) 6.4 - 8.2 g/dL     AST 19 9 - 39 U/L     Bilirubin, Total 0.5 0.0 - 1.2 mg/dL     ALT 23 7 - 45 U/L   Phosphorus   Result Value Ref Range     Phosphorus 2.4 (L) 2.5 - 4.9 mg/dL      Patient recently received an antibiotic (last 12 hours)         Date/Time Action Medication Dose     03/24/25 0616 New Bag    piperacillin-tazobactam (Zosyn) 4.5 g in dextrose (iso)  mL 4.5 g             Patient was fully evaluated on March 25, with no acute changes overnight. Vitals within normal limits upon evaluation, labs significant for decreased phosphorus at 2.4, remaining labs consistent with previous results. Cardiology team also  following the management of this patient. PT/OT signed off. Patient received repeat CXR today, results pending at this time. Patient complains of pain in her great toe today, order placed for uric acid labs for assessment of potential gouty arthritis. Plan discussed with interdisciplinary team, continue current and repeat labs in the AM.      Patient aware and agreeable to current plan, continue plan as above.      I spent a total of 50 minutes on the date of the service which included preparing to see the patient, face-to-face patient care, completing clinical documentation, obtaining and/or reviewing separately obtained history, performing a medically appropriate examination, counseling and educating the patient/family/caregiver, ordering medications, tests, or procedures, communicating with other HCPs (not separately reported), independently interpreting results (not separately reported), communicating results to the patient/family/caregiver, and care coordination (not separately reported).   TAMRA BARRERA                      Revision History         Pertinent Physical Exam At Time of Discharge  Physical Exam  no acute events overnight, patient denies chest pain, worsening SOB at this time.  Outpatient Follow-Up  Future Appointments   Date Time Provider Department Center   3/28/2025  1:30 PM James C Ramicone, DO [id number] [place]   4/10/2025  3:00 PM Naima Keane MD [id number] [place]   4/30/2025  1:00 PM Naima Keane MD [id number] [place]   9/17/2025  2:30 PM [facility] [id number]         Riya Urena

## 2025-03-26 NOTE — PROGRESS NOTES
Subjective Data:  ***    Overnight Events:    ***     Objective Data:  Last Recorded Vitals:  Vitals:    03/25/25 2014 03/25/25 2355 03/26/25 0433 03/26/25 0908   BP: 129/59 137/63 162/74 117/57   BP Location: Left arm Left arm     Patient Position: Sitting Lying  Sitting   Pulse: 79 68  69   Resp:    16   Temp: 35.9 °C (96.6 °F) 36 °C (96.8 °F) 36.4 °C (97.5 °F) 36.4 °C (97.5 °F)   TempSrc: Temporal Temporal     SpO2: 97% 97%  96%   Weight:       Height:           Last Labs:  CBC - 3/26/2025:  7:04 AM  5.7 8.0 238    27.1      CMP - 3/26/2025:  7:04 AM  8.7 5.5 17 --- 0.5   2.6 3.2 19 63      PTT - 1/2/2025:  7:42 PM  1.5   16.5 34     TROPHS   Date/Time Value Ref Range Status   03/21/2025 08:16 PM 21 0 - 13 ng/L Final   03/21/2025 04:05 PM 24 0 - 13 ng/L Final   03/21/2025 10:55 AM 24 0 - 13 ng/L Final     BNP   Date/Time Value Ref Range Status   03/23/2025 06:36  0 - 99 pg/mL Final   03/22/2025 07:21  0 - 99 pg/mL Final     HGBA1C   Date/Time Value Ref Range Status   03/14/2025 05:00 AM 5.2 See comment % Final   12/20/2024 02:04 AM 5.5 See comment % Final     LDLCALC   Date/Time Value Ref Range Status   12/20/2024 02:04 AM 68 <=99 mg/dL Final     Comment:                                 Near   Borderline      AGE      Desirable  Optimal    High     High     Very High     0-19 Y     0 - 109     ---    110-129   >/= 130     ----    20-24 Y     0 - 119     ---    120-159   >/= 160     ----      >24 Y     0 -  99   100-129  130-159   160-189     >/=190     01/24/2024 10:49 AM 73 <=99 mg/dL Final     Comment:                                 Near   Borderline      AGE      Desirable  Optimal    High     High     Very High     0-19 Y     0 - 109     ---    110-129   >/= 130     ----    20-24 Y     0 - 119     ---    120-159   >/= 160     ----      >24 Y     0 -  99   100-129  130-159   160-189     >/=190       VLDL   Date/Time Value Ref Range Status   12/20/2024 02:04 AM 14 0 - 40 mg/dL Final   01/24/2024  10:49 AM 13 0 - 40 mg/dL Final   08/21/2023 12:56 AM 17 0 - 40 mg/dL Final   01/17/2023 09:43 AM 14 0 - 40 mg/dL Final   03/16/2021 10:15 AM 16 0 - 40 mg/dL Final      Last I/O:  I/O last 3 completed shifts:  In: 940 (12.5 mL/kg) [P.O.:240; IV Piggyback:700]  Out: 3450 (45.9 mL/kg) [Urine:3450 (1.3 mL/kg/hr)]  Weight: 75.2 kg     Past Cardiology Tests (Last 3 Years):  EKG:  ECG 12 lead 03/21/2025      Electrocardiogram, 12-lead PRN ACS symtpoms 03/08/2025      ECG 12 Lead 03/04/2025      ECG 12 lead 02/02/2025      ECG 12 lead 01/12/2025      ECG 12 lead 01/02/2025      ECG 12 lead 12/21/2024      ECG 12 lead 12/20/2024      ECG 12 lead 12/20/2024    Echo:  Transthoracic echo (TTE) complete 12/21/2024    Ejection Fractions:  EF   Date/Time Value Ref Range Status   12/21/2024 09:03 AM 63 %      Cath:  Cardiac Catheterization Procedure 12/20/2024    Stress Test:  No results found for this or any previous visit from the past 1095 days.    Cardiac Imaging:  No results found for this or any previous visit from the past 1095 days.      Inpatient Medications:  Scheduled medications   Medication Dose Route Frequency    allopurinol  100 mg oral Daily    aspirin  81 mg oral Daily    clopidogrel  75 mg oral Daily    enoxaparin  40 mg subcutaneous q24h    fluticasone furoate-vilanteroL  1 puff inhalation Daily    furosemide  40 mg oral Daily    gabapentin  300 mg oral BID    [Held by provider] metoprolol tartrate  25 mg oral Daily    piperacillin-tazobactam  4.5 g intravenous q8h    polyethylene glycol  17 g oral BID    vancomycin  1,000 mg intravenous q24h     PRN medications   Medication    acetaminophen    Or    acetaminophen    Or    acetaminophen    guaiFENesin    ipratropium-albuteroL    ipratropium-albuteroL    vancomycin     Continuous Medications   Medication Dose Last Rate       Physical Exam:  ***     Assessment/Plan   ***  Peripheral IV 03/24/25 22 G Proximal;Right;Anterior Forearm (Active)   Site Assessment  Clean;Dry;Intact 03/26/25 0852   Dressing Status Clean;Dry 03/26/25 0852   Number of days: 2       Code Status:  Full Code    I spent *** minutes in the professional and overall care of this patient.        Magnus Hess MD

## 2025-03-26 NOTE — DOCUMENTATION CLARIFICATION NOTE
"    PATIENT:               JULY MILLER  ACCT #:                  [id number]  MRN:                       [id number]  :                       1933  ADMIT DATE:       3/21/2025 10:31 AM  DISCH DATE:        3/26/2025 12:38 PM  RESPONDING PROVIDER #:        [id number]          PROVIDER RESPONSE TEXT:    Acute Hypoxemic Respiratory Failure    CDI QUERY TEXT:    Clarification        Instruction:    Based on your assessment of the patient and the clinical information, please provide the requested documentation by clicking on the appropriate radio button and enter any additional information if prompted.    Question: Is there a diagnosis indicative of the clinical information    When answering this query, please exercise your independent professional judgment. The fact that a question is being asked, does not imply that any particular answer is desired or expected.    The patient's clinical indicators include:  Clinical Information: 89 year old female presented with cough and shortness of breath.    Clinical Indicators:  VS 3/21/2025, 1036:  T 36.3, HR 84, RR 16, /50, POx 84% RA.... 93 % on 4L O2 n/c    3/21/2025 ED Provider Note:  \" persistent cough as well as associated shortness of breath.  She typically does not have an oxygen requirement....She does now have a new oxygen requirement of 3 L nasal cannula....Resp: Nonlabored breathing slight rhonchi bilateral bases clear in the apices. No increased work of breathing.  Saturating appropriately on nasal cannula... initially hypoxic to 84% this improved on nasal cannula.\"    Treatment:  - supplemental O2: 4L n/c 3/21 - 3/22 and 1-3L n/c - 3/23/-3/24  - 3/21  Solu-Medrol 125mg x1 dose  - ipratropium-albuteroL (Duo-Neb) 3x daily  - Mucinex prn      Risk Factors:  PNA, acute CHF exacerbation  Options provided:  -- Acute Hypoxemic Respiratory Failure  -- Acute Hypercapnic Respiratory Failure  -- Acute Hypoxemic and Hypercapnic Respiratory Failure  -- No acute " respiratory failure  -- Other - I will add my own diagnosis  -- Refer to Clinical Documentation Reviewer    Query created by: Callie Etienne on 3/24/2025 2:30 PM      Electronically signed by:  SALVATORE YADAV MD 3/26/2025 5:45 PM

## 2025-03-26 NOTE — DOCUMENTATION CLARIFICATION NOTE
"    PATIENT:               JULY MILLER  ACCT #:                  [id number]  MRN:                       [id number]  :                       1933  ADMIT DATE:       3/21/2025 10:31 AM  DISCH DATE:        3/26/2025 12:38 PM  RESPONDING PROVIDER #:        [id number]          PROVIDER RESPONSE TEXT:    Sepsis was a differential diagnosis and ruled out after study    CDI QUERY TEXT:    Clarification        Instruction:  Based on your assessment of the patient and the clinical information, please provide the requested documentation by clicking on the appropriate radio button and enter any additional information if prompted.    Question: Sepsis was documented in the medical record. Based on the documentation and the clinical information, can the diagnosis be further clarified as    When answering this query, please exercise your independent professional judgment. The fact that a question is being asked, does not imply that any particular answer is desired or expected.    The patient's clinical indicators include:  Clinical Information:  89 year old female presented with SOB.    Documented Diagnosis: \"Sepsis secondary to pneumonia\" was only documented in the 3/21/25 H&P    Clinical Indicators, 3/21/2025:  -Vital Signs:  T 36.3, HR 84, RR 16, /50,  84% RA, 93% on 4L O2  -WBC: 8.7  -Microbiology Results: NEG  -Band Neutrophil Count/percent Band Neutrophil: n/a  -Lactic acid:  1.6  -BUN/Creat:  21 / 0.80  -Bilirubin:  0.5  -MAP:68  -Calvin Coma Scale:  15  -PAO2/FIO2:  -Procalcitonin:  0.20  -Platelets: 126  -Other clinical indicators:  glucose:  109, 126, 151, 101, 82, 91  troponin: 24, 24, 21      Treatment:  - 3/21 - 3/24 IV Vancomycin  - 3/21 - 3/25 IV Zosyn  - supplemental O2: 4L n/c 3/21 - 3/22 and 1-3L n/c - 3/23/-3/24    Risk Factors:  pneumonia  Options provided:  -- Sepsis was a differential diagnosis and ruled out after study  -- Sepsis with respiratory organ dysfunction of acute hypoxic respiratory " failure  -- Sepsis with endocrine organ dysfunction of hyperglycemia in the absence of diabetes  -- Sepsis with multi-system organ dysfunction of acute hypoxic respiratory failure and  endocrine organ dysfunction of hyperglycemia in the absence of diabetes  -- Sepsis with other organ dysfunction, Please specify sepsis associated organ dysfunction below  -- Other - I will add my own diagnosis  -- Refer to Clinical Documentation Reviewer    Query created by: Callie Etienne on 3/25/2025 2:37 PM      Electronically signed by:  SALVATORE YADAV MD 3/26/2025 5:45 PM

## 2025-03-26 NOTE — CARE PLAN
The patient's goals for the shift include      The clinical goals for the shift include no edema    Over the shift, the patient did not make progress toward the following goals. Barriers to progression include edema. Recommendations to address these barriers include follow poc.

## 2025-03-26 NOTE — PROGRESS NOTES
Vancomycin Dosing by Pharmacy- FOLLOW UP    Yesenia Milner is a 89 y.o. year old female who Pharmacy has been consulted for vancomycin dosing for pneumonia. Based on the patient's indication and renal status this patient is being dosed based on a goal AUC of 400-600.     Renal function is currently stable.    Current vancomycin dose: 1000 mg given every 24 hours    Estimated vancomycin AUC on current dose: 460 mg/L.hr     Visit Vitals  /57 (Patient Position: Sitting)   Pulse 69   Temp 36.4 °C (97.5 °F)   Resp 16        Lab Results   Component Value Date    CREATININE 0.89 2025    CREATININE 1.03 2025    CREATININE 0.95 2025    CREATININE 0.99 2025        Patient weight is as follows:   Vitals:    25 0630   Weight: 75.2 kg (165 lb 12.6 oz)       Cultures:  No results found for the encounter in last 14 days.       I/O last 3 completed shifts:  In: 940 (12.5 mL/kg) [P.O.:240; IV Piggyback:700]  Out: 3450 (45.9 mL/kg) [Urine:3450 (1.3 mL/kg/hr)]  Weight: 75.2 kg   I/O during current shift:  No intake/output data recorded.    Temp (24hrs), Av.2 °C (97.1 °F), Min:35.7 °C (96.3 °F), Max:36.5 °C (97.7 °F)      Assessment/Plan    Within goal AUC range. Continue current vancomycin regimen.    This dosing regimen is predicted by [employer] to result in the following pharmacokinetic parameters:  [id number]: 457 mg/L.hr  AUC24,ss: 460 mg/L.hr  Probability of AUC24 > 400: 83 %  Ctrough,ss: 15 mg/L  Probability of Ctrough,ss > 20: 9 %    The next level will be obtained on 3/31 at AM labs. May be obtained sooner if clinically indicated.   Will continue to monitor renal function daily while on vancomycin and order serum creatinine at least every 48 hours if not already ordered.  Follow for continued vancomycin needs, clinical response, and signs/symptoms of toxicity.       Daniel J Weiland, PharmD

## 2025-03-26 NOTE — DOCUMENTATION CLARIFICATION NOTE
"    PATIENT:               JULY MILLER  ACCT #:                  [id number]  MRN:                       [id number]  :                       1933  ADMIT DATE:       3/21/2025 10:31 AM  DISCH DATE:        3/26/2025 12:38 PM  RESPONDING PROVIDER #:        [id number]          PROVIDER RESPONSE TEXT:    Gram Negative PNA    CDI QUERY TEXT:    Clarification        Instruction:    Based on your assessment of the patient and the clinical information, please provide the requested documentation by clicking on the appropriate radio button and enter any additional information if prompted.    Question: Please further specify the type of pneumonia being treated    When answering this query, please exercise your independent professional judgment. The fact that a question is being asked, does not imply that any particular answer is desired or expected.    The patient's clinical indicators include:  Clinical Information:  89 year old female presented with cough and shortness of breath.    Clinical Indicators:  3/21/2025 H&P:  \"Patient presents with chief complaint of shortness of breath with concern for pneumonia.  Patient was recently discharged from the hospital approximately 10 days ago on 3/11/2025 and approximately 3 to 4 days ago at the nursing home it was noted that she was having shortness of breath, worsening generalized weakness and concern for possible pneumonia/CHF exacerbation....  Chest x-ray shows unchanged interstitial edema and small bilateral pleural effusions with superimposed atelectasis/infiltrate\"    3/22/2025 Internal Medicine PN:  \"Patient being treated for pneumonia, chest physiotherapy as tolerated, incentive spirometry, repeat chest xray in the AM.\"    Treatment:  - CXR  - incentive spirometry  - 3/21-3/23 IV vancomycin  - 3/21-3/24 IV Zosyn      Risk Factors:  89 year old with PNA and recent hospitalization and Nursing Home admission  Options provided:  -- Gram Negative PNA  -- Other - I will add my " own diagnosis  -- Refer to Clinical Documentation Reviewer    Query created by: Callie Etienne on 3/24/2025 2:11 PM      Electronically signed by:  SALVATORE YADAV MD 3/26/2025 5:45 PM

## 2025-03-27 NOTE — PROGRESS NOTES
History Of Present Illness:      This is a 89-year-old female with recurrent diastolic CHF.  She has had numerous hospitalizations in recent months because of congestive heart failure.    Past medical history:     HFpEF (ejection fraction 60 to 65%)  Sick sinus syndrome with history of [employer] pacemaker insertion  Hypertension  History of a stroke treated with thrombolytics in August 2023  COPD  Adenocarcinoma of the colon     Social history: Former smoker     Family history: Negative for premature CAD    Review of Systems  Other review of systems negative  Last Recorded Vitals:      3/25/2025     7:34 AM 3/25/2025    11:26 AM 3/25/2025     4:21 PM 3/25/2025     8:14 PM 3/25/2025    11:55 PM 3/26/2025     4:33 AM 3/26/2025     9:08 AM   Vitals   Systolic 133 106 125 129 137 162 117   Diastolic 64 51 56 59 63 74 57   BP Location Right arm Right arm  Left arm Left arm     Heart Rate 66 74 69 79 68  69   Temp 35.9 °C (96.6 °F) 35.7 °C (96.3 °F) 36.5 °C (97.7 °F) 35.9 °C (96.6 °F) 36 °C (96.8 °F) 36.4 °C (97.5 °F) 36.4 °C (97.5 °F)   Resp 18 18     16     Allergies:  Iodine, Shrimp, Hydrocodone, and Adhesive tape-silicones  Outpatient Medications:  Current Outpatient Medications   Medication Instructions    acetaminophen (TYLENOL) 650 mg, oral, Every 4 hours PRN    albuterol 2.5 mg, nebulization, Every 4 hours PRN    allopurinol (ZYLOPRIM) 100 mg, oral, Daily    aspirin 81 mg, oral, Daily    calcium carbonate-vitamin D3 (Calcium 600 + D,3,) 600 mg-5 mcg (200 unit) tablet 1 tablet, Daily    clopidogrel (PLAVIX) 75 mg, oral, Daily    colchicine 0.6 mg, oral, Daily    DULoxetine (CYMBALTA) 30 mg, oral, Daily, Do not crush or chew.    enoxaparin (LOVENOX) 40 mg, subcutaneous, Every 24 hours    ferrous sulfate 325 mg, oral, Daily, Do not crush, chew, or split.    fluticasone propion-salmeteroL (Advair) 115-21 mcg/actuation inhaler 2 puffs, inhalation, 2 times daily, Rinse mouth with water after use to reduce aftertaste  and incidence of candidiasis. Do not swallow.    gabapentin (NEURONTIN) 300 mg, oral, 2 times daily    guaiFENesin (MUCINEX) 600 mg, oral, 2 times daily PRN, Do not crush, chew, or split.    hydrocortisone 1 % lotion Topical, 2 times daily, Apply to both legs , wash hands after applying lotion    ipratropium-albuteroL (Duo-Neb) 0.5-2.5 mg/3 mL nebulizer solution 3 mL, nebulization, Every 6 hours RT    lubricating eye drops ophthalmic solution 1 drop, Both Eyes, Daily PRN    metoprolol tartrate (LOPRESSOR) 25 mg, oral, Daily    piperacillin-tazobactam (ZOSYN) 3.375 g, intravenous, Every 6 hours    polyethylene glycol (Glycolax, Miralax) 17 gram/dose powder Mix 1 capful (17 g) of powder with 4 to 8 ounces of water or juice and drink 2 times a day.    torsemide (DEMADEX) 20 mg, oral, Daily       Physical Exam:    General Appearance:  Alert, oriented, no distress  Skin:  Warm and dry  Head and Neck:  No elevation of JVP, no carotid bruits  Cardiac Exam:  Rhythm is regular, S1 and S2 are normal, no murmur S3 or S4  Lungs:  Clear to auscultation  Extremities:  no edema  Neurologic:  No focal deficits  Psychiatric:  Appropriate mood and behavior    Lab Results:    CMP:  Recent Labs     03/26/25  0704 03/25/25  0656 03/24/25  0624 03/23/25  0636 03/22/25  0721 03/21/25  1055 03/21/25  0500 03/18/25  0500 03/17/25  0500 03/14/25  0500 03/05/25  0417 03/04/25  1309    135* 135* 131* 131* 129* 130* 137   < > 136   < >  --    K 3.3* 3.5 3.6 3.4* 3.7 3.3* 3.5 4.3   < > 4.2   < >  --    CL 97* 97* 97* 93* 94* 93* 94* 99   < > 100   < >  --    CO2 32 30 31 30 28 28 29 33*   < > 30   < >  --    ANIONGAP 10 12 11 11 13 11 11 9*   < > 10   < >  --    BUN 13 18 22 24* 21 19 21 30*   < > 27*   < >  --    CREATININE 0.89 1.03 0.95 0.99 0.95 0.87 0.80 1.02   < > 1.00   < >  --    EGFR 61 51* 57* 54* 57* 63 70 52*   < > 53*   < >  --    MG 2.17 2.08 2.17 2.22 2.17 2.07  --   --   --  2.19  --  2.23    < > = values in this interval  not displayed.     Recent Labs     03/26/25  0704 03/25/25  0656 03/24/25  0624 03/23/25  0636 03/22/25  0721 03/21/25  1055 01/13/25  0626 01/12/25  1122   ALBUMIN 3.2* 3.2* 3.2* 3.2* 3.3* 3.3*   < > 3.9   ALKPHOS 63 66 69 71  --  88   < > 83   ALT 19 18 23 27  --  18   < > 15   AST 17 15 19 29  --  23   < > 17   BILITOT 0.5 0.5 0.5 0.6  --  0.5   < > 0.5   LIPASE  --   --   --   --   --   --   --  23    < > = values in this interval not displayed.     CBC:  Recent Labs     03/26/25  0704 03/25/25  0656 03/24/25  0624 03/23/25  0636 03/22/25  0721 03/21/25  1055 03/21/25  0500 03/18/25  0500   WBC 5.7 6.0 7.3 9.5 4.8 8.2 8.7 5.0   HGB 8.0* 7.6* 7.7* 7.4* 7.5* 7.6* 7.8* 7.4*   HCT 27.1* 25.9* 25.8* 24.9* 25.6* 25.4* 25.1* 25.1*    238 237 227 220 239 246 278   MCV 84 86 86 85 83 84 83 85     COAG:   Recent Labs     01/16/25  0709 01/12/25  1122 01/05/25  1412 01/05/25  1028 01/04/25  0612 01/03/25  1539 01/03/25  0900 01/03/25  0344 01/02/25  2334 12/21/24  0538 12/20/24  1013 12/20/24  0251 08/21/23  0056 07/13/23  1335 01/08/23  1412 12/14/20  0731   INR 1.5* 1.7*  --   --   --   --   --   --   --  1.1  --  1.1 1.1 1.1 1.2* 1.1   HAUF  --   --  1.8* 0.4 0.4 0.5 0.7 0.9 1.0  --  1.1*  --   --   --   --   --      Cardiology Tests (personally reviewed):    Echocardiogram December 21, 2023: Ejection fraction 60 to 65%, moderate mitral valve regurgitation, moderate to severe tricuspid regurgitation     Assessment/Plan   {Assess/Plan SmartLinks:66146}    James C Ramicone, DO

## 2025-03-28 ENCOUNTER — APPOINTMENT (OUTPATIENT)
Dept: CARDIOLOGY | Facility: CLINIC | Age: OVER 89
End: 2025-03-28
Payer: MEDICARE

## 2025-03-28 PROBLEM — E66.3 OVERWEIGHT WITH BODY MASS INDEX (BMI) 25.0-29.9: Status: RESOLVED | Noted: 2023-09-20 | Resolved: 2025-03-28

## 2025-03-28 PROBLEM — I50.33 ACUTE ON CHRONIC DIASTOLIC HEART FAILURE: Status: ACTIVE | Noted: 2025-03-11

## 2025-04-10 ENCOUNTER — APPOINTMENT (OUTPATIENT)
Dept: PRIMARY CARE | Facility: CLINIC | Age: OVER 89
End: 2025-04-10
Payer: MEDICARE

## 2025-04-17 ENCOUNTER — OFFICE VISIT (OUTPATIENT)
Dept: CARDIOLOGY | Facility: CLINIC | Age: OVER 89
End: 2025-04-17
Payer: MEDICARE

## 2025-04-17 VITALS
WEIGHT: 186 LBS | OXYGEN SATURATION: 96 % | HEIGHT: 64 IN | SYSTOLIC BLOOD PRESSURE: 118 MMHG | HEART RATE: 64 BPM | BODY MASS INDEX: 31.76 KG/M2 | DIASTOLIC BLOOD PRESSURE: 62 MMHG

## 2025-04-17 DIAGNOSIS — I50.33 ACUTE ON CHRONIC DIASTOLIC HEART FAILURE: Primary | ICD-10-CM

## 2025-04-17 DIAGNOSIS — R06.09 DYSPNEA ON MINIMAL EXERTION: ICD-10-CM

## 2025-04-17 DIAGNOSIS — I36.1 NONRHEUMATIC TRICUSPID VALVE REGURGITATION: ICD-10-CM

## 2025-04-17 DIAGNOSIS — I44.2 COMPLETE HEART BLOCK: ICD-10-CM

## 2025-04-17 DIAGNOSIS — I34.0 NONRHEUMATIC MITRAL VALVE REGURGITATION: ICD-10-CM

## 2025-04-17 PROCEDURE — 99214 OFFICE O/P EST MOD 30 MIN: CPT | Performed by: PHYSICIAN ASSISTANT

## 2025-04-17 PROCEDURE — 1111F DSCHRG MED/CURRENT MED MERGE: CPT | Performed by: PHYSICIAN ASSISTANT

## 2025-04-17 PROCEDURE — 1157F ADVNC CARE PLAN IN RCRD: CPT | Performed by: PHYSICIAN ASSISTANT

## 2025-04-17 PROCEDURE — 1036F TOBACCO NON-USER: CPT | Performed by: PHYSICIAN ASSISTANT

## 2025-04-17 PROCEDURE — 1160F RVW MEDS BY RX/DR IN RCRD: CPT | Performed by: PHYSICIAN ASSISTANT

## 2025-04-17 PROCEDURE — 3078F DIAST BP <80 MM HG: CPT | Performed by: PHYSICIAN ASSISTANT

## 2025-04-17 PROCEDURE — 3074F SYST BP LT 130 MM HG: CPT | Performed by: PHYSICIAN ASSISTANT

## 2025-04-17 PROCEDURE — 1159F MED LIST DOCD IN RCRD: CPT | Performed by: PHYSICIAN ASSISTANT

## 2025-04-17 RX ORDER — TORSEMIDE 20 MG/1
60 TABLET ORAL DAILY
Qty: 90 TABLET | Refills: 11 | Status: SHIPPED | OUTPATIENT
Start: 2025-04-17

## 2025-04-17 NOTE — PROGRESS NOTES
"Chief Complaint:   Congestive Heart Failure (exacerbation)     History Of Present Illness:    04/17/25  Patient was recently hospitalized for volume overload secondary to acute/chronic HFrEF and moderate/severe tricuspid regurgitation requiring administration of IV furosemide which resulted in improvement in volume status.  Patient was discharged on torsemide 20mg daily, eventually increased to 40mg daily however continues to struggle with BLE edema and weight gain.  No additional signs/symptoms of volume overload at this time, however patient is requesting changes to diuretic therapy as she is frustrated with hospitalization/readmission within recent history.    12/11/23  Yesenia Milner is a 89 y.o. female presenting for routine cardiovascular follow up.  Patient denies chest pain, chest pressure, palpitations, dyspnea on exertion, shortness of breath at rest, diaphoresis, nausea/vomiting, back pain, headache, lightheadedness, dizziness, syncope or presyncopal episodes, active bleeding signs or symptoms, excessive weight gain, muscle or joint pain, claudication.       Last Recorded Vitals:  Vitals:    04/17/25 1034   BP: 118/62   BP Location: Left arm   Patient Position: Sitting   BP Cuff Size: Adult   Pulse: 64   SpO2: 96%   Weight: 84.4 kg (186 lb)   Height: 1.626 m (5' 4\")       Past Medical History:  She has a past medical history of Gross hematuria (10/07/2020), Impacted cerumen (02/22/2024), Other conditions influencing health status, Personal history of other medical treatment, Personal history of other medical treatment, and Systolic CHF (Multi) (05/18/2023).    Past Surgical History:  She has a past surgical history that includes Appendectomy (11/22/2017); Hysterectomy (11/22/2017); Tonsillectomy (11/22/2017); Other surgical history (11/22/2017); Other surgical history (11/22/2017); Cardiac pacemaker placement (03/11/2021); IR angiogram renal bilateral (Bilateral, 12/14/2020); IR angiogram inferior " epigastric pelvic (12/14/2020); IR angiogram inferior epigastric pelvic (12/14/2020); and Cardiac catheterization (N/A, 12/20/2024).      Social History:  She reports that she quit smoking about 52 years ago. Her smoking use included cigarettes. She has been exposed to tobacco smoke. She has never used smokeless tobacco. She reports that she does not drink alcohol and does not use drugs.    Family History:  Family History   Problem Relation Name Age of Onset    No Known Problems Mother          Allergies:  Iodine, Shrimp, Hydrocodone, and Adhesive tape-silicones    Outpatient Medications:  Current Outpatient Medications   Medication Instructions    acetaminophen (TYLENOL) 650 mg, oral, Every 4 hours PRN    albuterol 2.5 mg, Every 4 hours PRN    allopurinol (ZYLOPRIM) 100 mg, oral, Daily    aspirin 81 mg, oral, Daily    calcium carbonate-vitamin D3 (Calcium 600 + D,3,) 600 mg-5 mcg (200 unit) tablet 1 tablet, Daily    clopidogrel (PLAVIX) 75 mg, oral, Daily    colchicine 0.6 mg, oral, Daily    DULoxetine (CYMBALTA) 30 mg, Daily    enoxaparin (LOVENOX) 40 mg, subcutaneous, Every 24 hours    ferrous sulfate 325 mg, Daily    fluticasone propion-salmeteroL (Advair) 115-21 mcg/actuation inhaler 2 puffs, inhalation, 2 times daily, Rinse mouth with water after use to reduce aftertaste and incidence of candidiasis. Do not swallow.    gabapentin (NEURONTIN) 300 mg, oral, 2 times daily    guaiFENesin (MUCINEX) 600 mg, oral, 2 times daily PRN, Do not crush, chew, or split.    hydrocortisone 1 % lotion Topical, 2 times daily, Apply to both legs , wash hands after applying lotion    ipratropium-albuteroL (Duo-Neb) 0.5-2.5 mg/3 mL nebulizer solution 3 mL, Every 6 hours RT    lubricating eye drops ophthalmic solution 1 drop, Daily PRN    metoprolol tartrate (LOPRESSOR) 25 mg, oral, Daily    polyethylene glycol (Glycolax, Miralax) 17 gram/dose powder Mix 1 capful (17 g) of powder with 4 to 8 ounces of water or juice and drink 2  times a day.    torsemide (DEMADEX) 60 mg, oral, Daily       Physical Exam:  Constitutional: awake and alert, oriented ×3, no apparent distress  Skin: warm, dry, good turgor no obvious lesions  Eyes: pupils equal, round, reactive to light, conjunctiva pink and noninjected, no discharge  HENT: normocephalic and atraumatic, mucous membranes moist, trachea midline with no masses/goiter  Cardiovascular: S1/S2 regular, 1/6 holosystolic murmur no rubs/gallops, no carotid bruits, no JVD  Pulmonary: symmetrical chest expansion, lungs are clear to auscultation bilaterally, no wheezes/rales/rhonchi, normal effort  Abdomen: nontender, nondistended, active bowel sounds, no ascites  Extremities: no cyanosis, clubbing, 3+ BLE pitting edema no lesions; palpable pedal pulses  Neurologic: cranial nerves II - XII grossly intact, stable gait, no tremor       Last Labs:  CBC -  Lab Results   Component Value Date    WBC 5.7 03/26/2025    HGB 8.0 (L) 03/26/2025    HCT 27.1 (L) 03/26/2025    MCV 84 03/26/2025     03/26/2025       CMP -  Lab Results   Component Value Date    CALCIUM 8.7 03/26/2025    PHOS 2.6 03/26/2025    PROT 5.5 (L) 03/26/2025    ALBUMIN 3.2 (L) 03/26/2025    AST 17 03/26/2025    ALT 19 03/26/2025    ALKPHOS 63 03/26/2025    BILITOT 0.5 03/26/2025       LIPID PANEL -   Lab Results   Component Value Date    CHOL 145 12/20/2024    TRIG 70 12/20/2024    HDL 63.1 12/20/2024    CHHDL 2.3 12/20/2024    LDLF 81 08/21/2023    VLDL 14 12/20/2024    NHDL 82 12/20/2024       RENAL FUNCTION PANEL -   Lab Results   Component Value Date    GLUCOSE 96 03/26/2025     03/26/2025    K 3.3 (L) 03/26/2025    CL 97 (L) 03/26/2025    CO2 32 03/26/2025    ANIONGAP 10 03/26/2025    BUN 13 03/26/2025    CREATININE 0.89 03/26/2025    CALCIUM 8.7 03/26/2025    PHOS 2.6 03/26/2025    ALBUMIN 3.2 (L) 03/26/2025        Lab Results   Component Value Date     (H) 03/23/2025    HGBA1C 5.2 03/14/2025       Last Cardiology  Tests:  ECG:  No results found for this or any previous visit from the past 1095 days.      Echo:  No results found for this or any previous visit from the past 1095 days.      Ejection Fractions:  EF   Date/Time Value Ref Range Status   12/21/2024 09:03 AM 63 %        Cath:  No results found for this or any previous visit from the past 1095 days.      Stress Test:  No results found for this or any previous visit from the past 1095 days.      Cardiac Imaging:  No results found for this or any previous visit from the past 1095 days.      Assessment/Plan   Problem List Items Addressed This Visit           ICD-10-CM       Cardiac and Vasculature    Complete heart block I44.2    Dyspnea on minimal exertion R06.09    Mitral valve insufficiency I34.0    Acute on chronic diastolic heart failure - Primary I50.33    Relevant Medications    torsemide (Demadex) 20 mg tablet    Other Relevant Orders    Basic metabolic panel    Nonrheumatic tricuspid valve regurgitation I36.1       -As discussed in HPI    -Normal LVSF with moderate/severe TR on most recent 2D echo    -Increase torsemide to 60mg daily    -Repeat BMP in 2 weeks to reassess renal function and electrolyte status    -Continue current GDMT as prescribed    -F/U in 3 months      Joselito Madden PA-C

## 2025-04-21 ENCOUNTER — HOSPITAL ENCOUNTER (OUTPATIENT)
Dept: CARDIOLOGY | Facility: CLINIC | Age: OVER 89
Discharge: HOME | End: 2025-04-21
Payer: MEDICARE

## 2025-04-21 ENCOUNTER — TELEPHONE (OUTPATIENT)
Dept: CARDIOLOGY | Facility: CLINIC | Age: OVER 89
End: 2025-04-21
Payer: MEDICARE

## 2025-04-21 DIAGNOSIS — I44.2 COMPLETE HEART BLOCK: ICD-10-CM

## 2025-04-21 DIAGNOSIS — Z95.0 PRESENCE OF PERMANENT CARDIAC PACEMAKER: ICD-10-CM

## 2025-04-21 PROCEDURE — 93296 REM INTERROG EVL PM/IDS: CPT

## 2025-04-21 NOTE — TELEPHONE ENCOUNTER
HH nurse called in while she was visiting today    Has orders for nebulizer treatment: no machine or medication    Medication sent to [facility] in [place]    Machine sent to Medical Service    I let her know that she does have a PCP that should follow for this Dr. Keane, (just saw in feb) but she states they are trying to find a new one    Was discharged home on oxygen as well (new for her)    Saw KELLEY on 4-17, no mention in ov about above    Will call PCP if needed, depending on if KELLEY will send    They are asking if Joselito will write for her nebulizer machine and both medications for it (albuterol and duo-neb)

## 2025-04-22 NOTE — TELEPHONE ENCOUNTER
Per Joselito, called and notified pt to contact PCP for these orders. Pt verbalizes understanding.

## 2025-04-30 ENCOUNTER — APPOINTMENT (OUTPATIENT)
Dept: PRIMARY CARE | Facility: CLINIC | Age: OVER 89
End: 2025-04-30
Payer: MEDICARE

## 2025-04-30 VITALS
BODY MASS INDEX: 26.16 KG/M2 | HEART RATE: 70 BPM | OXYGEN SATURATION: 93 % | SYSTOLIC BLOOD PRESSURE: 111 MMHG | WEIGHT: 152.4 LBS | TEMPERATURE: 96.6 F | DIASTOLIC BLOOD PRESSURE: 64 MMHG

## 2025-04-30 DIAGNOSIS — D64.9 CHRONIC ANEMIA: ICD-10-CM

## 2025-04-30 DIAGNOSIS — N18.30 HYPERTENSIVE HEART AND KIDNEY DISEASE WITH CHRONIC DIASTOLIC CONGESTIVE HEART FAILURE AND STAGE 3 CHRONIC KIDNEY DISEASE, UNSPECIFIED WHETHER STAGE 3A OR 3B CKD: ICD-10-CM

## 2025-04-30 DIAGNOSIS — M10.9 GOUT, UNSPECIFIED CAUSE, UNSPECIFIED CHRONICITY, UNSPECIFIED SITE: ICD-10-CM

## 2025-04-30 DIAGNOSIS — I13.0 HYPERTENSIVE HEART AND KIDNEY DISEASE WITH CHRONIC DIASTOLIC CONGESTIVE HEART FAILURE AND STAGE 3 CHRONIC KIDNEY DISEASE, UNSPECIFIED WHETHER STAGE 3A OR 3B CKD: ICD-10-CM

## 2025-04-30 DIAGNOSIS — I50.32 HYPERTENSIVE HEART AND KIDNEY DISEASE WITH CHRONIC DIASTOLIC CONGESTIVE HEART FAILURE AND STAGE 3 CHRONIC KIDNEY DISEASE, UNSPECIFIED WHETHER STAGE 3A OR 3B CKD: ICD-10-CM

## 2025-04-30 DIAGNOSIS — J44.9 CHRONIC OBSTRUCTIVE PULMONARY DISEASE, UNSPECIFIED COPD TYPE (MULTI): ICD-10-CM

## 2025-04-30 DIAGNOSIS — J96.11 CHRONIC RESPIRATORY FAILURE WITH HYPOXIA: Primary | ICD-10-CM

## 2025-04-30 PROCEDURE — 1160F RVW MEDS BY RX/DR IN RCRD: CPT | Performed by: INTERNAL MEDICINE

## 2025-04-30 PROCEDURE — 1036F TOBACCO NON-USER: CPT | Performed by: INTERNAL MEDICINE

## 2025-04-30 PROCEDURE — 3074F SYST BP LT 130 MM HG: CPT | Performed by: INTERNAL MEDICINE

## 2025-04-30 PROCEDURE — 3078F DIAST BP <80 MM HG: CPT | Performed by: INTERNAL MEDICINE

## 2025-04-30 PROCEDURE — 1126F AMNT PAIN NOTED NONE PRSNT: CPT | Performed by: INTERNAL MEDICINE

## 2025-04-30 PROCEDURE — 99214 OFFICE O/P EST MOD 30 MIN: CPT | Performed by: INTERNAL MEDICINE

## 2025-04-30 PROCEDURE — G2211 COMPLEX E/M VISIT ADD ON: HCPCS | Performed by: INTERNAL MEDICINE

## 2025-04-30 PROCEDURE — 1157F ADVNC CARE PLAN IN RCRD: CPT | Performed by: INTERNAL MEDICINE

## 2025-04-30 PROCEDURE — 1159F MED LIST DOCD IN RCRD: CPT | Performed by: INTERNAL MEDICINE

## 2025-04-30 RX ORDER — PEN NEEDLE, DIABETIC 31 GX5/16"
NEEDLE, DISPOSABLE MISCELLANEOUS
Qty: 1 EACH | Refills: 0 | Status: SHIPPED | OUTPATIENT
Start: 2025-04-30

## 2025-04-30 RX ORDER — ALLOPURINOL 100 MG/1
100 TABLET ORAL DAILY
Qty: 90 TABLET | Refills: 0 | Status: SHIPPED | OUTPATIENT
Start: 2025-04-30

## 2025-04-30 RX ORDER — COLCHICINE 0.6 MG/1
0.6 TABLET ORAL DAILY
Qty: 90 TABLET | Refills: 3 | Status: SHIPPED | OUTPATIENT
Start: 2025-04-30 | End: 2026-04-30

## 2025-04-30 RX ORDER — ALBUTEROL SULFATE 0.83 MG/ML
2.5 SOLUTION RESPIRATORY (INHALATION) EVERY 4 HOURS PRN
Qty: 75 ML | Refills: 1 | Status: SHIPPED | OUTPATIENT
Start: 2025-04-30

## 2025-04-30 RX ORDER — IPRATROPIUM BROMIDE AND ALBUTEROL SULFATE 2.5; .5 MG/3ML; MG/3ML
3 SOLUTION RESPIRATORY (INHALATION)
Qty: 180 ML | Status: CANCELLED | OUTPATIENT
Start: 2025-04-30

## 2025-04-30 RX ORDER — DULOXETIN HYDROCHLORIDE 30 MG/1
30 CAPSULE, DELAYED RELEASE ORAL DAILY
Qty: 90 CAPSULE | Refills: 1 | Status: SHIPPED | OUTPATIENT
Start: 2025-04-30 | End: 2026-04-30

## 2025-04-30 ASSESSMENT — ENCOUNTER SYMPTOMS
TROUBLE SWALLOWING: 0
SLEEP DISTURBANCE: 0
BACK PAIN: 0
ABDOMINAL PAIN: 0
APPETITE CHANGE: 1
FEVER: 0
WOUND: 0
COUGH: 0
UNEXPECTED WEIGHT CHANGE: 0
CHILLS: 0
WHEEZING: 0
CONSTIPATION: 0
NAUSEA: 0
DEPRESSION: 0
DIARRHEA: 0
SORE THROAT: 0
JOINT SWELLING: 0
NUMBNESS: 0
DYSURIA: 0
TREMORS: 0
HEADACHES: 0
WEAKNESS: 0
EYE DISCHARGE: 0
DIZZINESS: 0
PALPITATIONS: 0
SEIZURES: 0
SHORTNESS OF BREATH: 1
HEMATURIA: 0
FREQUENCY: 0
NERVOUS/ANXIOUS: 0
BLOOD IN STOOL: 0
CONFUSION: 0
EYE PAIN: 0
VOMITING: 0
FLANK PAIN: 0

## 2025-04-30 ASSESSMENT — PATIENT HEALTH QUESTIONNAIRE - PHQ9
1. LITTLE INTEREST OR PLEASURE IN DOING THINGS: NOT AT ALL
SUM OF ALL RESPONSES TO PHQ9 QUESTIONS 1 AND 2: 0
2. FEELING DOWN, DEPRESSED OR HOPELESS: NOT AT ALL

## 2025-04-30 ASSESSMENT — PAIN SCALES - GENERAL: PAINLEVEL_OUTOF10: 0-NO PAIN

## 2025-04-30 NOTE — ASSESSMENT & PLAN NOTE
- pt now on 2 L home O2 by NC   -DuoNeb nebulizer solution-was Rx'd while in hospital, but she never had the nebulizer machine prescribed !!!!  Orders:    albuterol 2.5 mg /3 mL (0.083 %) nebulizer solution; Take 3 mL (2.5 mg) by nebulization every 4 hours if needed for wheezing.    nebulizers misc; With tubing and mask.   Use as directed

## 2025-04-30 NOTE — PROGRESS NOTES
"Subjective   Patient ID: Yesenia Milner is a 89 y.o. female who presents for Follow-up.    HPI   Patient returns for sodbsl-dl-niuvtt post hospital discharge, s/p multiple hospitalizations-for  CHF exacerbation, worsening edema, and pneumonia-hence she was discharged to skilled nursing facility for acute rehab, now home for past 2 weeks  Last ED to hospital admission was on March 21, 2025 diagnosis acute congestive heart failure and hypoxia   Palliative care contacted by caregiver\" does not exist \" for   Currently with [facility]    Overall-according to PT and CG-much improved/feeling better!    Review of Systems   Constitutional:  Positive for appetite change (improved). Negative for chills, fever and unexpected weight change.   HENT:  Negative for congestion, ear pain, sneezing, sore throat and trouble swallowing.    Eyes:  Negative for pain, discharge and visual disturbance.   Respiratory:  Positive for shortness of breath (VARGAS). Negative for cough and wheezing.    Cardiovascular:  Positive for leg swelling (Improved). Negative for chest pain and palpitations.   Gastrointestinal:  Negative for abdominal pain, blood in stool, constipation, diarrhea, nausea and vomiting.   Genitourinary:  Negative for dysuria, flank pain, frequency, hematuria and urgency.   Musculoskeletal:  Positive for gait problem (chronic). Negative for back pain and joint swelling.   Skin:  Negative for rash and wound.   Neurological:  Negative for dizziness, tremors, seizures, syncope, weakness, numbness and headaches.   Psychiatric/Behavioral:  Negative for confusion, sleep disturbance and suicidal ideas. The patient is not nervous/anxious.        Objective   /64 (BP Location: Left arm, Patient Position: Sitting)   Pulse 70   Temp 35.9 °C (96.6 °F) (Temporal)   Wt 69.1 kg (152 lb 6.4 oz)   SpO2 93% Comment: oxygen machine set at 3  BMI 26.16 kg/m²   Patient Health Questionnaire-2 Score: 0      Physical Exam  Vitals and nursing " note reviewed.   Constitutional:       General: She is not in acute distress.     Appearance: Normal appearance.   HENT:      Head: Normocephalic and atraumatic.      Nose: Nose normal.      Mouth/Throat:      Mouth: Mucous membranes are moist.      Pharynx: Oropharynx is clear.   Eyes:      Extraocular Movements: Extraocular movements intact.      Conjunctiva/sclera: Conjunctivae normal.      Pupils: Pupils are equal, round, and reactive to light.   Cardiovascular:      Rate and Rhythm: Normal rate and regular rhythm.      Heart sounds: Murmur (AIDAN 2/6) heard.      Comments: Improved BL edema, has BL knee high zippered compression stockings     Pulmonary:      Effort: Pulmonary effort is normal. No respiratory distress.      Breath sounds: Normal breath sounds. No wheezing or rhonchi.   Abdominal:      General: Bowel sounds are normal.      Palpations: Abdomen is soft.      Tenderness: There is no abdominal tenderness.   Musculoskeletal:         General: Normal range of motion.      Cervical back: Neck supple.      Thoracic back: Scoliosis (kyphosis) present.      Right lower le+ Edema present.      Left lower le+ Edema present.   Skin:     General: Skin is warm and dry.      Findings: No bruising or rash.   Neurological:      General: No focal deficit present.      Mental Status: She is alert and oriented to person, place, and time.      Motor: No weakness.      Gait: Gait abnormal (rollator).   Psychiatric:         Mood and Affect: Mood normal.         Behavior: Behavior normal.         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 Dr ice)  -Edema improved-now on megadose torsemide 60 mg daily-Per cardiologist  -Bilateral knee-high compression stockings  -Going for blood work  right after the visit  -I added CBC and uric acid levels-to be done today       Chronic respiratory failure with hypoxia  Chronic obstructive pulmonary disease, unspecified COPD type (Multi)  - pt now on 2 L home O2 by NC   -DuoNeb nebulizer solution-was Rx'd while in hospital, but she never had the nebulizer machine prescribed !!!!  Orders:    albuterol 2.5 mg /3 mL (0.083 %) nebulizer solution; Take 3 mL (2.5 mg) by nebulization every 4 hours if needed for wheezing.    nebulizers misc; With tubing and mask.   Use as directed    Gout, unspecified cause, unspecified chronicity, unspecified site  - New diagnosis, started on colchicine and allopurinol-by hospitalist(the attached diagnosis was like swelling-not gout!)  Most recent uric acid level from March was 7.4  Orders:    colchicine 0.6 mg tablet; Take 1 tablet (0.6 mg) by mouth once daily.    DULoxetine (Cymbalta) 30 mg DR capsule; Take 1 capsule (30 mg) by mouth once daily. Do not crush or chew.    allopurinol (Zyloprim) 100 mg tablet; Take 1 tablet (100 mg) by mouth once daily.    Uric Acid; Future    Chronic anemia  - taking now 45 mg iron OTC x 2 weeks only( per daughter)  -Most recent hemoglobin in March was 8.0  -DW-how anemia can contribute to being short winded, fatigue, and poor oxygen exchange  Orders:    CBC and Auto Differential; Future      RTC 3 months AWV

## 2025-05-01 LAB
ANION GAP SERPL CALCULATED.4IONS-SCNC: 13 MMOL/L (CALC) (ref 7–17)
BASOPHILS # BLD AUTO: 53 CELLS/UL (ref 0–200)
BASOPHILS NFR BLD AUTO: 0.8 %
BUN SERPL-MCNC: 27 MG/DL (ref 7–25)
BUN/CREAT SERPL: 26 (CALC) (ref 6–22)
CALCIUM SERPL-MCNC: 10 MG/DL (ref 8.6–10.4)
CHLORIDE SERPL-SCNC: 94 MMOL/L (ref 98–110)
CO2 SERPL-SCNC: 32 MMOL/L (ref 20–32)
CREAT SERPL-MCNC: 1.02 MG/DL (ref 0.6–0.95)
EGFRCR SERPLBLD CKD-EPI 2021: 52 ML/MIN/1.73M2
EOSINOPHIL # BLD AUTO: 152 CELLS/UL (ref 15–500)
EOSINOPHIL NFR BLD AUTO: 2.3 %
ERYTHROCYTE [DISTWIDTH] IN BLOOD BY AUTOMATED COUNT: 19.4 % (ref 11–15)
GLUCOSE SERPL-MCNC: 78 MG/DL (ref 65–99)
HCT VFR BLD AUTO: 34 % (ref 35–45)
HGB BLD-MCNC: 10.8 G/DL (ref 11.7–15.5)
LYMPHOCYTES # BLD AUTO: 1690 CELLS/UL (ref 850–3900)
LYMPHOCYTES NFR BLD AUTO: 25.6 %
MCH RBC QN AUTO: 28.9 PG (ref 27–33)
MCHC RBC AUTO-ENTMCNC: 31.8 G/DL (ref 32–36)
MCV RBC AUTO: 90.9 FL (ref 80–100)
MONOCYTES # BLD AUTO: 805 CELLS/UL (ref 200–950)
MONOCYTES NFR BLD AUTO: 12.2 %
NEUTROPHILS # BLD AUTO: 3901 CELLS/UL (ref 1500–7800)
NEUTROPHILS NFR BLD AUTO: 59.1 %
PLATELET # BLD AUTO: 294 THOUSAND/UL (ref 140–400)
PMV BLD REES-ECKER: 10.6 FL (ref 7.5–12.5)
POTASSIUM SERPL-SCNC: 3.8 MMOL/L (ref 3.5–5.3)
RBC # BLD AUTO: 3.74 MILLION/UL (ref 3.8–5.1)
SODIUM SERPL-SCNC: 139 MMOL/L (ref 135–146)
URATE SERPL-MCNC: 7.9 MG/DL (ref 2.5–7)
WBC # BLD AUTO: 6.6 THOUSAND/UL (ref 3.8–10.8)

## 2025-05-02 ENCOUNTER — TELEPHONE (OUTPATIENT)
Dept: PRIMARY CARE | Facility: CLINIC | Age: OVER 89
End: 2025-05-02
Payer: MEDICARE

## 2025-05-05 ENCOUNTER — APPOINTMENT (OUTPATIENT)
Dept: PRIMARY CARE | Facility: CLINIC | Age: OVER 89
End: 2025-05-05
Payer: MEDICARE

## 2025-05-21 NOTE — TELEPHONE ENCOUNTER
05/02/2025 02:55 PM EDT by Margy Nguyen MA  Outgoing AlfYesenia (Self) [phone] (Home)    Spoke with Patient - informed patient of Uric acid level and PCP plan for her to increase her dosage to 1 tab daily to 2 tabs daily of Allopurinol 100 mg daily          Reason for Conversation

## 2025-05-22 ENCOUNTER — TELEPHONE (OUTPATIENT)
Dept: PRIMARY CARE | Facility: CLINIC | Age: OVER 89
End: 2025-05-22
Payer: MEDICARE

## 2025-05-22 DIAGNOSIS — M25.512 ACUTE PAIN OF LEFT SHOULDER: Primary | ICD-10-CM

## 2025-05-27 ENCOUNTER — HOSPITAL ENCOUNTER (OUTPATIENT)
Dept: RADIOLOGY | Facility: HOSPITAL | Age: OVER 89
Discharge: HOME | End: 2025-05-27
Payer: MEDICARE

## 2025-05-27 DIAGNOSIS — M25.512 ACUTE PAIN OF LEFT SHOULDER: ICD-10-CM

## 2025-05-27 DIAGNOSIS — I63.9 CEREBROVASCULAR ACCIDENT (CVA), UNSPECIFIED MECHANISM (MULTI): ICD-10-CM

## 2025-05-27 PROCEDURE — 73030 X-RAY EXAM OF SHOULDER: CPT | Mod: LT

## 2025-05-27 PROCEDURE — 73030 X-RAY EXAM OF SHOULDER: CPT | Mod: LEFT SIDE | Performed by: RADIOLOGY

## 2025-05-28 RX ORDER — CLOPIDOGREL BISULFATE 75 MG/1
75 TABLET ORAL DAILY
Qty: 90 TABLET | Refills: 0 | Status: SHIPPED | OUTPATIENT
Start: 2025-05-28

## 2025-06-03 ENCOUNTER — TELEPHONE (OUTPATIENT)
Dept: PRIMARY CARE | Facility: CLINIC | Age: OVER 89
End: 2025-06-03
Payer: MEDICARE

## 2025-06-23 ENCOUNTER — APPOINTMENT (OUTPATIENT)
Dept: RADIOLOGY | Facility: HOSPITAL | Age: OVER 89
End: 2025-06-23
Payer: MEDICARE

## 2025-06-23 ENCOUNTER — APPOINTMENT (OUTPATIENT)
Dept: CARDIOLOGY | Facility: HOSPITAL | Age: OVER 89
End: 2025-06-23
Payer: MEDICARE

## 2025-06-23 ENCOUNTER — HOSPITAL ENCOUNTER (INPATIENT)
Facility: HOSPITAL | Age: OVER 89
Discharge: NURSING, REHAB OR LONG-TERM CARE | End: 2025-06-23
Attending: EMERGENCY MEDICINE | Admitting: INTERNAL MEDICINE
Payer: MEDICARE

## 2025-06-23 DIAGNOSIS — I50.33 ACUTE ON CHRONIC DIASTOLIC HEART FAILURE: Primary | ICD-10-CM

## 2025-06-23 DIAGNOSIS — R55 SYNCOPE: ICD-10-CM

## 2025-06-23 DIAGNOSIS — R06.09 DYSPNEA ON MINIMAL EXERTION: ICD-10-CM

## 2025-06-23 DIAGNOSIS — J90 PLEURAL EFFUSION: ICD-10-CM

## 2025-06-23 DIAGNOSIS — R55 SYNCOPE, UNSPECIFIED SYNCOPE TYPE: ICD-10-CM

## 2025-06-23 DIAGNOSIS — T67.1XXA HEAT SYNCOPE, INITIAL ENCOUNTER: ICD-10-CM

## 2025-06-23 DIAGNOSIS — I50.33 ACUTE ON CHRONIC DIASTOLIC CONGESTIVE HEART FAILURE: ICD-10-CM

## 2025-06-23 DIAGNOSIS — R60.0 BILATERAL LEG EDEMA: ICD-10-CM

## 2025-06-23 PROBLEM — K59.00 CONSTIPATION: Status: ACTIVE | Noted: 2025-06-23

## 2025-06-23 PROBLEM — Z71.89 ADVANCE CARE PLANNING: Status: ACTIVE | Noted: 2025-06-23

## 2025-06-23 PROBLEM — B96.89 ACUTE BACTERIAL SINUSITIS: Status: ACTIVE | Noted: 2025-06-23

## 2025-06-23 PROBLEM — J01.90 ACUTE BACTERIAL SINUSITIS: Status: ACTIVE | Noted: 2025-06-23

## 2025-06-23 LAB
ALBUMIN SERPL BCP-MCNC: 3.8 G/DL (ref 3.4–5)
ALP SERPL-CCNC: 89 U/L (ref 33–136)
ALT SERPL W P-5'-P-CCNC: 17 U/L (ref 7–45)
ANION GAP SERPL CALC-SCNC: 16 MMOL/L (ref 10–20)
APPEARANCE UR: CLEAR
APTT PPP: 26 SECONDS (ref 26–36)
AST SERPL W P-5'-P-CCNC: 24 U/L (ref 9–39)
BASOPHILS # BLD AUTO: 0.02 X10*3/UL (ref 0–0.1)
BASOPHILS NFR BLD AUTO: 0.3 %
BILIRUB SERPL-MCNC: 0.6 MG/DL (ref 0–1.2)
BILIRUB UR STRIP.AUTO-MCNC: NEGATIVE MG/DL
BNP SERPL-MCNC: 548 PG/ML (ref 0–99)
BUN SERPL-MCNC: 30 MG/DL (ref 6–23)
CALCIUM SERPL-MCNC: 9.6 MG/DL (ref 8.6–10.3)
CARDIAC TROPONIN I PNL SERPL HS: 19 NG/L (ref 0–13)
CARDIAC TROPONIN I PNL SERPL HS: 22 NG/L (ref 0–13)
CARDIAC TROPONIN I PNL SERPL HS: 23 NG/L (ref 0–13)
CHLORIDE SERPL-SCNC: 99 MMOL/L (ref 98–107)
CO2 SERPL-SCNC: 27 MMOL/L (ref 21–32)
COLOR UR: NORMAL
CREAT SERPL-MCNC: 1 MG/DL (ref 0.5–1.05)
EGFRCR SERPLBLD CKD-EPI 2021: 53 ML/MIN/1.73M*2
EOSINOPHIL # BLD AUTO: 0.12 X10*3/UL (ref 0–0.4)
EOSINOPHIL NFR BLD AUTO: 2 %
ERYTHROCYTE [DISTWIDTH] IN BLOOD BY AUTOMATED COUNT: 15.9 % (ref 11.5–14.5)
FLUAV RNA RESP QL NAA+PROBE: NOT DETECTED
FLUBV RNA RESP QL NAA+PROBE: NOT DETECTED
GLUCOSE SERPL-MCNC: 116 MG/DL (ref 74–99)
GLUCOSE UR STRIP.AUTO-MCNC: NORMAL MG/DL
HCT VFR BLD AUTO: 31.3 % (ref 36–46)
HGB BLD-MCNC: 10.1 G/DL (ref 12–16)
IMM GRANULOCYTES # BLD AUTO: 0.03 X10*3/UL (ref 0–0.5)
IMM GRANULOCYTES NFR BLD AUTO: 0.5 % (ref 0–0.9)
INR PPP: 1.1 (ref 0.9–1.1)
KETONES UR STRIP.AUTO-MCNC: NEGATIVE MG/DL
LEUKOCYTE ESTERASE UR QL STRIP.AUTO: NEGATIVE
LIPASE SERPL-CCNC: 18 U/L (ref 9–82)
LYMPHOCYTES # BLD AUTO: 0.99 X10*3/UL (ref 0.8–3)
LYMPHOCYTES NFR BLD AUTO: 16.6 %
MAGNESIUM SERPL-MCNC: 2.24 MG/DL (ref 1.6–2.4)
MCH RBC QN AUTO: 30 PG (ref 26–34)
MCHC RBC AUTO-ENTMCNC: 32.3 G/DL (ref 32–36)
MCV RBC AUTO: 93 FL (ref 80–100)
MONOCYTES # BLD AUTO: 0.64 X10*3/UL (ref 0.05–0.8)
MONOCYTES NFR BLD AUTO: 10.7 %
NEUTROPHILS # BLD AUTO: 4.18 X10*3/UL (ref 1.6–5.5)
NEUTROPHILS NFR BLD AUTO: 69.9 %
NITRITE UR QL STRIP.AUTO: NEGATIVE
NRBC BLD-RTO: 0 /100 WBCS (ref 0–0)
PH UR STRIP.AUTO: 7 [PH]
PLATELET # BLD AUTO: 206 X10*3/UL (ref 150–450)
POTASSIUM SERPL-SCNC: 3.6 MMOL/L (ref 3.5–5.3)
PROT SERPL-MCNC: 6.3 G/DL (ref 6.4–8.2)
PROT UR STRIP.AUTO-MCNC: NEGATIVE MG/DL
PROTHROMBIN TIME: 12.4 SECONDS (ref 9.8–12.4)
RBC # BLD AUTO: 3.37 X10*6/UL (ref 4–5.2)
RBC # UR STRIP.AUTO: NEGATIVE MG/DL
RSV RNA RESP QL NAA+PROBE: NOT DETECTED
SARS-COV-2 RNA RESP QL NAA+PROBE: NOT DETECTED
SODIUM SERPL-SCNC: 138 MMOL/L (ref 136–145)
SP GR UR STRIP.AUTO: 1.01
UROBILINOGEN UR STRIP.AUTO-MCNC: NORMAL MG/DL
WBC # BLD AUTO: 6 X10*3/UL (ref 4.4–11.3)

## 2025-06-23 PROCEDURE — 70450 CT HEAD/BRAIN W/O DYE: CPT

## 2025-06-23 PROCEDURE — 36415 COLL VENOUS BLD VENIPUNCTURE: CPT

## 2025-06-23 PROCEDURE — 99223 1ST HOSP IP/OBS HIGH 75: CPT | Performed by: NURSE PRACTITIONER

## 2025-06-23 PROCEDURE — 74176 CT ABD & PELVIS W/O CONTRAST: CPT | Performed by: RADIOLOGY

## 2025-06-23 PROCEDURE — 2500000001 HC RX 250 WO HCPCS SELF ADMINISTERED DRUGS (ALT 637 FOR MEDICARE OP)

## 2025-06-23 PROCEDURE — 84484 ASSAY OF TROPONIN QUANT: CPT

## 2025-06-23 PROCEDURE — 74176 CT ABD & PELVIS W/O CONTRAST: CPT

## 2025-06-23 PROCEDURE — 2500000001 HC RX 250 WO HCPCS SELF ADMINISTERED DRUGS (ALT 637 FOR MEDICARE OP): Performed by: NURSE PRACTITIONER

## 2025-06-23 PROCEDURE — 87637 SARSCOV2&INF A&B&RSV AMP PRB: CPT | Performed by: NURSE PRACTITIONER

## 2025-06-23 PROCEDURE — 4B02XSZ MEASUREMENT OF CARDIAC PACEMAKER, EXTERNAL APPROACH: ICD-10-PCS

## 2025-06-23 PROCEDURE — 99285 EMERGENCY DEPT VISIT HI MDM: CPT | Mod: 25 | Performed by: EMERGENCY MEDICINE

## 2025-06-23 PROCEDURE — 85730 THROMBOPLASTIN TIME PARTIAL: CPT

## 2025-06-23 PROCEDURE — 83690 ASSAY OF LIPASE: CPT

## 2025-06-23 PROCEDURE — 70450 CT HEAD/BRAIN W/O DYE: CPT | Performed by: RADIOLOGY

## 2025-06-23 PROCEDURE — 85025 COMPLETE CBC W/AUTO DIFF WBC: CPT

## 2025-06-23 PROCEDURE — 71250 CT THORAX DX C-: CPT | Performed by: RADIOLOGY

## 2025-06-23 PROCEDURE — 2500000005 HC RX 250 GENERAL PHARMACY W/O HCPCS: Performed by: NURSE PRACTITIONER

## 2025-06-23 PROCEDURE — 2500000004 HC RX 250 GENERAL PHARMACY W/ HCPCS (ALT 636 FOR OP/ED): Performed by: NURSE PRACTITIONER

## 2025-06-23 PROCEDURE — G0390 TRAUMA RESPONS W/HOSP CRITI: HCPCS

## 2025-06-23 PROCEDURE — 84484 ASSAY OF TROPONIN QUANT: CPT | Performed by: NURSE PRACTITIONER

## 2025-06-23 PROCEDURE — 83735 ASSAY OF MAGNESIUM: CPT

## 2025-06-23 PROCEDURE — 80053 COMPREHEN METABOLIC PANEL: CPT

## 2025-06-23 PROCEDURE — 72125 CT NECK SPINE W/O DYE: CPT | Performed by: RADIOLOGY

## 2025-06-23 PROCEDURE — 93005 ELECTROCARDIOGRAM TRACING: CPT

## 2025-06-23 PROCEDURE — 83880 ASSAY OF NATRIURETIC PEPTIDE: CPT

## 2025-06-23 PROCEDURE — 81003 URINALYSIS AUTO W/O SCOPE: CPT

## 2025-06-23 PROCEDURE — 72125 CT NECK SPINE W/O DYE: CPT

## 2025-06-23 PROCEDURE — 85610 PROTHROMBIN TIME: CPT

## 2025-06-23 PROCEDURE — 1200000002 HC GENERAL ROOM WITH TELEMETRY DAILY

## 2025-06-23 RX ORDER — HEPARIN SODIUM 5000 [USP'U]/ML
5000 INJECTION, SOLUTION INTRAVENOUS; SUBCUTANEOUS EVERY 12 HOURS
Status: DISCONTINUED | OUTPATIENT
Start: 2025-06-23 | End: 2025-07-03 | Stop reason: HOSPADM

## 2025-06-23 RX ORDER — DULOXETIN HYDROCHLORIDE 30 MG/1
30 CAPSULE, DELAYED RELEASE ORAL DAILY
Status: DISCONTINUED | OUTPATIENT
Start: 2025-06-23 | End: 2025-07-03 | Stop reason: HOSPADM

## 2025-06-23 RX ORDER — FERROUS SULFATE 325(65) MG
65 TABLET ORAL
Status: DISCONTINUED | OUTPATIENT
Start: 2025-06-24 | End: 2025-07-03 | Stop reason: HOSPADM

## 2025-06-23 RX ORDER — ACETAMINOPHEN 160 MG/5ML
650 SOLUTION ORAL EVERY 4 HOURS PRN
Status: DISCONTINUED | OUTPATIENT
Start: 2025-06-23 | End: 2025-07-03 | Stop reason: HOSPADM

## 2025-06-23 RX ORDER — POLYETHYLENE GLYCOL 3350 17 G/17G
17 POWDER, FOR SOLUTION ORAL 2 TIMES DAILY
Status: DISCONTINUED | OUTPATIENT
Start: 2025-06-23 | End: 2025-07-03 | Stop reason: HOSPADM

## 2025-06-23 RX ORDER — ALBUTEROL SULFATE 0.83 MG/ML
2.5 SOLUTION RESPIRATORY (INHALATION) EVERY 2 HOUR PRN
Status: DISCONTINUED | OUTPATIENT
Start: 2025-06-23 | End: 2025-07-03 | Stop reason: HOSPADM

## 2025-06-23 RX ORDER — POLYETHYLENE GLYCOL 3350 17 G/17G
17 POWDER, FOR SOLUTION ORAL 2 TIMES DAILY
Status: DISCONTINUED | OUTPATIENT
Start: 2025-06-23 | End: 2025-06-23

## 2025-06-23 RX ORDER — METOPROLOL TARTRATE 25 MG/1
25 TABLET, FILM COATED ORAL DAILY
Status: DISCONTINUED | OUTPATIENT
Start: 2025-06-23 | End: 2025-06-26

## 2025-06-23 RX ORDER — CLOPIDOGREL BISULFATE 75 MG/1
75 TABLET ORAL DAILY
Status: DISCONTINUED | OUTPATIENT
Start: 2025-06-23 | End: 2025-07-03 | Stop reason: HOSPADM

## 2025-06-23 RX ORDER — BISACODYL 5 MG
5 TABLET, DELAYED RELEASE (ENTERIC COATED) ORAL ONCE
Status: COMPLETED | OUTPATIENT
Start: 2025-06-23 | End: 2025-06-23

## 2025-06-23 RX ORDER — ACETAMINOPHEN 325 MG/1
650 TABLET ORAL EVERY 4 HOURS PRN
Status: DISCONTINUED | OUTPATIENT
Start: 2025-06-23 | End: 2025-07-03 | Stop reason: HOSPADM

## 2025-06-23 RX ORDER — AMOXICILLIN 250 MG
2 CAPSULE ORAL 2 TIMES DAILY
Status: DISCONTINUED | OUTPATIENT
Start: 2025-06-23 | End: 2025-07-03 | Stop reason: HOSPADM

## 2025-06-23 RX ORDER — ALLOPURINOL 100 MG/1
100 TABLET ORAL DAILY
Status: DISCONTINUED | OUTPATIENT
Start: 2025-06-23 | End: 2025-07-03 | Stop reason: HOSPADM

## 2025-06-23 RX ORDER — ACETAMINOPHEN 650 MG/1
650 SUPPOSITORY RECTAL EVERY 4 HOURS PRN
Status: DISCONTINUED | OUTPATIENT
Start: 2025-06-23 | End: 2025-07-03 | Stop reason: HOSPADM

## 2025-06-23 RX ORDER — COLCHICINE 0.6 MG/1
0.6 TABLET ORAL DAILY
Status: DISCONTINUED | OUTPATIENT
Start: 2025-06-23 | End: 2025-07-03 | Stop reason: HOSPADM

## 2025-06-23 RX ORDER — L. ACIDOPHILUS/L.BULGARICUS 1MM CELL
1 TABLET ORAL 2 TIMES DAILY
Status: DISCONTINUED | OUTPATIENT
Start: 2025-06-23 | End: 2025-07-03 | Stop reason: HOSPADM

## 2025-06-23 RX ORDER — TORSEMIDE 20 MG/1
60 TABLET ORAL DAILY
Status: DISCONTINUED | OUTPATIENT
Start: 2025-06-24 | End: 2025-06-24

## 2025-06-23 RX ORDER — ACETAMINOPHEN 325 MG/1
975 TABLET ORAL ONCE
Status: COMPLETED | OUTPATIENT
Start: 2025-06-23 | End: 2025-06-23

## 2025-06-23 RX ORDER — CEFTRIAXONE 1 G/50ML
1 INJECTION, SOLUTION INTRAVENOUS EVERY 24 HOURS
Status: DISCONTINUED | OUTPATIENT
Start: 2025-06-23 | End: 2025-06-26

## 2025-06-23 RX ORDER — ALBUTEROL SULFATE 0.83 MG/ML
2.5 SOLUTION RESPIRATORY (INHALATION) EVERY 4 HOURS PRN
Status: DISCONTINUED | OUTPATIENT
Start: 2025-06-23 | End: 2025-06-23

## 2025-06-23 RX ORDER — FUROSEMIDE 10 MG/ML
40 INJECTION INTRAMUSCULAR; INTRAVENOUS ONCE
Status: COMPLETED | OUTPATIENT
Start: 2025-06-23 | End: 2025-06-23

## 2025-06-23 RX ORDER — NAPROXEN SODIUM 220 MG/1
81 TABLET, FILM COATED ORAL DAILY
Status: DISCONTINUED | OUTPATIENT
Start: 2025-06-23 | End: 2025-07-03 | Stop reason: HOSPADM

## 2025-06-23 RX ADMIN — CLOPIDOGREL BISULFATE 75 MG: 75 TABLET, FILM COATED ORAL at 15:48

## 2025-06-23 RX ADMIN — HEPARIN SODIUM 5000 UNITS: 5000 INJECTION, SOLUTION INTRAVENOUS; SUBCUTANEOUS at 15:49

## 2025-06-23 RX ADMIN — ACETAMINOPHEN 975 MG: 325 TABLET ORAL at 13:39

## 2025-06-23 RX ADMIN — POLYVINYL ALCOHOL, POVIDONE 1 DROP: 14; 6 SOLUTION/ DROPS OPHTHALMIC at 15:48

## 2025-06-23 RX ADMIN — METOPROLOL TARTRATE 25 MG: 25 TABLET, FILM COATED ORAL at 15:47

## 2025-06-23 RX ADMIN — CEFTRIAXONE 1 G: 1 INJECTION, SOLUTION INTRAVENOUS at 15:54

## 2025-06-23 RX ADMIN — POLYETHYLENE GLYCOL 3350 17 G: 17 POWDER, FOR SOLUTION ORAL at 21:22

## 2025-06-23 RX ADMIN — DULOXETINE 30 MG: 30 CAPSULE, DELAYED RELEASE ORAL at 15:47

## 2025-06-23 RX ADMIN — ALLOPURINOL 100 MG: 100 TABLET ORAL at 16:18

## 2025-06-23 RX ADMIN — ASPIRIN 81 MG CHEWABLE TABLET 81 MG: 81 TABLET CHEWABLE at 15:47

## 2025-06-23 RX ADMIN — BISACODYL 5 MG: 5 TABLET, COATED ORAL at 15:47

## 2025-06-23 RX ADMIN — Medication 2 L/MIN: at 15:21

## 2025-06-23 RX ADMIN — COLCHICINE 0.6 MG: 0.6 TABLET, FILM COATED ORAL at 15:48

## 2025-06-23 RX ADMIN — FUROSEMIDE 40 MG: 10 INJECTION, SOLUTION INTRAMUSCULAR; INTRAVENOUS at 15:48

## 2025-06-23 SDOH — SOCIAL STABILITY: SOCIAL INSECURITY: ARE YOU OR HAVE YOU BEEN THREATENED OR ABUSED PHYSICALLY, EMOTIONALLY, OR SEXUALLY BY ANYONE?: NO

## 2025-06-23 SDOH — SOCIAL STABILITY: SOCIAL INSECURITY: WITHIN THE LAST YEAR, HAVE YOU BEEN HUMILIATED OR EMOTIONALLY ABUSED IN OTHER WAYS BY YOUR PARTNER OR EX-PARTNER?: NO

## 2025-06-23 SDOH — SOCIAL STABILITY: SOCIAL INSECURITY: DO YOU FEEL UNSAFE GOING BACK TO THE PLACE WHERE YOU ARE LIVING?: NO

## 2025-06-23 SDOH — SOCIAL STABILITY: SOCIAL INSECURITY: DOES ANYONE TRY TO KEEP YOU FROM HAVING/CONTACTING OTHER FRIENDS OR DOING THINGS OUTSIDE YOUR HOME?: NO

## 2025-06-23 SDOH — SOCIAL STABILITY: SOCIAL INSECURITY: HAVE YOU HAD ANY THOUGHTS OF HARMING ANYONE ELSE?: NO

## 2025-06-23 SDOH — SOCIAL STABILITY: SOCIAL INSECURITY: ARE THERE ANY APPARENT SIGNS OF INJURIES/BEHAVIORS THAT COULD BE RELATED TO ABUSE/NEGLECT?: NO

## 2025-06-23 SDOH — SOCIAL STABILITY: SOCIAL INSECURITY: WITHIN THE LAST YEAR, HAVE YOU BEEN AFRAID OF YOUR PARTNER OR EX-PARTNER?: NO

## 2025-06-23 SDOH — SOCIAL STABILITY: SOCIAL INSECURITY: DO YOU FEEL ANYONE HAS EXPLOITED OR TAKEN ADVANTAGE OF YOU FINANCIALLY OR OF YOUR PERSONAL PROPERTY?: NO

## 2025-06-23 SDOH — ECONOMIC STABILITY: INCOME INSECURITY: IN THE PAST 12 MONTHS HAS THE ELECTRIC, GAS, OIL, OR WATER COMPANY THREATENED TO SHUT OFF SERVICES IN YOUR HOME?: NO

## 2025-06-23 SDOH — ECONOMIC STABILITY: FOOD INSECURITY: WITHIN THE PAST 12 MONTHS, YOU WORRIED THAT YOUR FOOD WOULD RUN OUT BEFORE YOU GOT THE MONEY TO BUY MORE.: NEVER TRUE

## 2025-06-23 SDOH — ECONOMIC STABILITY: FOOD INSECURITY: WITHIN THE PAST 12 MONTHS, THE FOOD YOU BOUGHT JUST DIDN'T LAST AND YOU DIDN'T HAVE MONEY TO GET MORE.: NEVER TRUE

## 2025-06-23 SDOH — SOCIAL STABILITY: SOCIAL INSECURITY: WERE YOU ABLE TO COMPLETE ALL THE BEHAVIORAL HEALTH SCREENINGS?: YES

## 2025-06-23 SDOH — SOCIAL STABILITY: SOCIAL INSECURITY: ABUSE: ADULT

## 2025-06-23 SDOH — SOCIAL STABILITY: SOCIAL INSECURITY: HAS ANYONE EVER THREATENED TO HURT YOUR FAMILY OR YOUR PETS?: NO

## 2025-06-23 SDOH — SOCIAL STABILITY: SOCIAL INSECURITY: HAVE YOU HAD THOUGHTS OF HARMING ANYONE ELSE?: NO

## 2025-06-23 ASSESSMENT — COGNITIVE AND FUNCTIONAL STATUS - GENERAL
HELP NEEDED FOR BATHING: A LITTLE
HELP NEEDED FOR BATHING: A LITTLE
WALKING IN HOSPITAL ROOM: A LITTLE
CLIMB 3 TO 5 STEPS WITH RAILING: A LOT
PATIENT BASELINE BEDBOUND: NO
DRESSING REGULAR LOWER BODY CLOTHING: A LITTLE
MOBILITY SCORE: 20
TOILETING: A LITTLE
CLIMB 3 TO 5 STEPS WITH RAILING: A LOT
MOVING TO AND FROM BED TO CHAIR: A LITTLE
TOILETING: A LITTLE
DRESSING REGULAR LOWER BODY CLOTHING: A LITTLE
DAILY ACTIVITIY SCORE: 21
STANDING UP FROM CHAIR USING ARMS: A LITTLE
MOBILITY SCORE: 19
DAILY ACTIVITIY SCORE: 21
STANDING UP FROM CHAIR USING ARMS: A LITTLE
WALKING IN HOSPITAL ROOM: A LITTLE

## 2025-06-23 ASSESSMENT — LIFESTYLE VARIABLES
AUDIT-C TOTAL SCORE: 0
HOW OFTEN DO YOU HAVE A DRINK CONTAINING ALCOHOL: NEVER
HOW OFTEN DO YOU HAVE 6 OR MORE DRINKS ON ONE OCCASION: NEVER
SKIP TO QUESTIONS 9-10: 1
SUBSTANCE_ABUSE_PAST_12_MONTHS: NO
AUDIT-C TOTAL SCORE: 0
HOW MANY STANDARD DRINKS CONTAINING ALCOHOL DO YOU HAVE ON A TYPICAL DAY: PATIENT DOES NOT DRINK
PRESCIPTION_ABUSE_PAST_12_MONTHS: NO

## 2025-06-23 ASSESSMENT — ACTIVITIES OF DAILY LIVING (ADL)
PATIENT'S MEMORY ADEQUATE TO SAFELY COMPLETE DAILY ACTIVITIES?: YES
HEARING - LEFT EAR: HEARING AID
WALKS IN HOME: NEEDS ASSISTANCE
DRESSING YOURSELF: NEEDS ASSISTANCE
LACK_OF_TRANSPORTATION: NO
JUDGMENT_ADEQUATE_SAFELY_COMPLETE_DAILY_ACTIVITIES: YES
FEEDING YOURSELF: INDEPENDENT
HEARING - RIGHT EAR: HEARING AID
ASSISTIVE_DEVICE: WALKER
TOILETING: NEEDS ASSISTANCE
BATHING: NEEDS ASSISTANCE
GROOMING: INDEPENDENT
ADEQUATE_TO_COMPLETE_ADL: YES

## 2025-06-23 ASSESSMENT — PAIN SCALES - GENERAL
PAINLEVEL_OUTOF10: 0 - NO PAIN

## 2025-06-23 ASSESSMENT — PAIN - FUNCTIONAL ASSESSMENT
PAIN_FUNCTIONAL_ASSESSMENT: 0-10
PAIN_FUNCTIONAL_ASSESSMENT: 0-10

## 2025-06-23 NOTE — ED PROVIDER NOTES
Emergency Department Provider Note        History of Present Illness     History provided by: Patient  Limitations to History: None  External Records Reviewed with Brief Summary: Prior discharge summary 3/26/2025 for CHF, [facility]    HPI:  Yesenia Milner is a 89 y.o. 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 She reports a syncopal fall. She reports she was try to get clothes in her bedroom whenever she fell, passed out, reports mild LOC, is on Plavix. She reports she is on 1 to 2 L at home at baseline. Patient reports prior left arm injury having no new pain. Patient does endorse headache. Reports chronic abdominal pain, history of colon cancer. Patient denies shortness of breath, chest pain, back pain.     Physical Exam   Triage vitals:  T 36.5 °C (97.7 °F)  HR (!) 120  /50  RR 20  O2 98 %      General: Awake, alert, in no acute distress, oriented to person, place, year  Eyes: Gaze conjugate.  No scleral icterus or injection  HENT: Normo-cephalic, atraumatic. No stridor  CV: Tachycardic rate, regular rhythm. Radial pulses 2+ bilaterally  Resp: Breathing non-labored, speaking in full sentences.  Clear to auscultation bilaterally  GI: Soft, non-distended, generalized tender. No rebound or guarding.  MSK/Extremities: No gross bony deformities. Moving all extremities, no midline back tenderness, no LE edema, pulses sensation intact in all extremities  Skin: Warm. Appropriate color  Neurologic: Patient is awake and alert. Speech is clear. Face is symmetric without facial droop and facial sensation to light touch equal bilaterally. Uvula midline. Tongue protrusion midline. Hearing intact bilaterally. Full and equal shoulder shrug. 5/5 motor strength of UEs and 4/5 LEs.  Sensation to light touch intact in all four extremities. Finger to nose intact. No pronator drift.  Psych: Appropriate mood and affect    Medical Decision Making & ED Course   Medical Decision Makin y.o. female presents to the ED for syncopal fall.  Reports Head trauma, LOC, is on plavix. Currently hemodynamically stable, neurovascularly intact.  GCS 15.  Alert and oriented x 3.  Cranial nerves intact, no focal deficits on exam.  Patient has clear breath sounds, protecting airway. On baseline 2 L of oxygen. Exam notable for tenderness to chest, generalized abdominal tenderness. She reports history of colon cancer and abdominal pain at baseline per patient. No midline spine tenderness. Pulses and sensation intact.  Given these findings, CT head was ordered to rule out SAH, ICH, mass.  CT chest abdomen pelvis ordered to rule out any pelvis fracture, traumatic intra-abdominal pathology.  CT c spine ordered to rule out any spine fracture. CT concerning for pleural effusions. The report says to consider pneumonia however patient has no fever, new oxygen requirement, infectious symptoms such as productive cough, less concerning for pneumonia at this time. Troponin downtrended, BNP elevated compared to prior concerning for CHF exacerbation. She is on torsemide 60 mg daily. Patient was discussed with Dr. Mensah. Patient was admitted for syncopal fall, hypervolemia/CHF exacerbation.    ----  Differential diagnoses considered include but are not limited to: SAH, ICH, c spine fracture, traumatic abdominal injury, hematoma     Social Determinants of Health which Significantly Impact Care: None identified     EKG Independent Interpretation: EKG interpreted by myself. Please see ED Course for full interpretation.    Independent Result Review and Interpretation: Relevant laboratory and radiographic results were reviewed and independently interpreted by myself.  As necessary, they are commented on in the ED Course.    Chronic  conditions affecting the patient's care: As documented above in [facility]    The patient was discussed with the following consultants/services: Hospitalist/Admitting Provider who accepted the patient for admission    Care Considerations: As documented above in [facility]    ED Course:  ED Course as of 06/23/25 1407   Mon Jun 23, 2025   1319 Cth:  No acute intracranial hemorrhage or mass-effect.      Mild fluid or mucosal thickening in the left sphenoid sinus..   [AT]   1319 CT c spine:  No cervical vertebral compression deformity or acute displaced  fracture. Multilevel productive/degenerative changes with  straightening of the cervical lordosis and slight spondylolisthesis  at C5.      Paraspinal/soft tissue findings as above.   [AT]   1334 I independently interpreted the EKG:  Dual paced rhythm. Motion artifact present. Irregular rate.   Normal NH, wide QRS and Qtc intervals.   No ST segment elevations, depressions, or T wave inversions. Compared to March 2025   [AT]   1339 CTCAP:  CHEST:      Bilateral infiltrates and right-greater-than-left pleural effusions.  Differential includes CHF and pneumonia. Clinical correlation and  follow-up to ensure resolution after appropriate treatment  recommended.      ABDOMEN AND PELVIS:      Apparent persistent distal transverse colon wall thickening  consistent with reportedly known malignancy.      No gross evidence of solid organ injury or free fluid within limits  of unenhanced exam.      Fecal retention. Distal colon diverticulosis.      Other findings as described above.   [AT]   1354 Troponin I, High Sensitivity(!): 19  Downtrended from 3 mo ago [AT]   1355 BNP(!): 548  Increased concern for chf exacerbation [AT]      ED Course User Index  [AT] Celina Medina MD         Diagnoses as of 06/23/25 1407   Acute on chronic diastolic congestive heart failure   Pleural effusion   Syncope, unspecified syncope type     Disposition   As a result of their workup, the patient will require  admission to the hospital.  The patient was informed of her diagnosis.  The patient was given the opportunity to ask questions and I answered them. The patient agreed to be admitted to the hospital.    Procedures   Procedures    Patient seen and discussed with ED attending physician.    Celina Medina MD  Emergency Medicine          Celina Medina MD  Resident  06/23/25 [id number]

## 2025-06-23 NOTE — ED TRIAGE NOTES
Patient to ER with concern for fall. States that she was getting dressed and fell. States she did hit her head. Pt does take blood thinners.

## 2025-06-23 NOTE — H&P
History Of Present Illness  Yesenia Milner is a 89 y.o. 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  Patient has had multiple admissions for congestive heart failure exacerbation who presented today after a syncopal episode.  Patient notes she was gathering her clothing to get ready for the day when she became lightheaded and passed out.  She denies any injury from the fall.  She notes over the past 2 weeks she has been experiencing a stuffy nose and a cough upon wakening in the morning.  She otherwise denies any fevers, chills, chest pain, change in her chronic shortness of breath with exertion, abdominal pain, nausea, vomiting, diarrhea, or dysuria.  She denies any change in her chronic intermittent bilateral lower extremity edema.  She denies any weight gain.  She denies any recent medication changes.  She reports compliance with her medications.  She is on her baseline oxygen at 2 L.  She notes her cardiologist is Dr. Ramicone.  In regards to her colon cancer, family notes that patient was deemed a risk for surgery via cardiology and surgeon, Dr. Vazquez, recommended patient speak with Dr. Ramicone in regards to her risk for surgery before he would schedule her for an operation.  They noted that her appointment is not until the fall and they are hopeful to speak with him this admission about her risk for surgery.  Patient notes she lives alone and uses a walker.  CODE STATUS discussed and patient became tearful during conversation.  She would like to remain a full code at this time and referred to family if an emergency would arise that she was incapacitated to answer for  herself.    In the ED lab work, EKG, head CT, C-spine CT and CT chest/abdomen/pelvis were performed. Labs revealed troponin 19 (appears baseline with previous result 21 in March 2025), H&H 10.1 and 31.3 (within patient's recent baseline),  (310 in March 2025).  EKG, per ER physician impression revealed Dual paced rhythm. Motion artifact present. Irregular rate. Normal IN, wide QRS and Qtc intervals. No ST segment elevations, depressions, or T wave inversions. Compared to March 2025 imaging positive for fluid/mucosal thickening of the left sphenoid sinus.  Bilateral infiltrates and right greater than left pleural effusions. Moderate fecal residue.  Heart rate was 120 on arrival, vitals were otherwise stable.  She was given Tylenol and admitted for further evaluation and treatment under the care of Dr. Fei Mensah.    Echo 12/21/2024:    CONCLUSIONS:   1. Left ventricular ejection fraction is normal, by visual estimate at 60-65%.   2. Abnormal left venticular wall motion.   3. Left ventricular diastolic filling is indeterminate.   4. There is a moderate apical and anteroapical myocardial infarction.   5. There is normal right ventricular global systolic function.   6. There is moderate mitral annular calcification.   7. Moderate mitral valve regurgitation.   8. Moderate to severe tricuspid regurgitation visualized.   9. Moderately elevated right ventricular systolic pressure.  10. Aortic valve sclerosis.    Carotid duplex 2019:    Right Carotid: Findings are consistent with less than 50% stenosis of the right proximal ICA. Laminar flow seen by color Doppler. Right external carotid artery appears patent with no evidence of stenosis. The right vertebral artery is patent with antegrade flow. No evidence of hemodynamically significant stenosis in the right subclavian.  Left Carotid: Findings are consistent with less than 50% stenosis of the left proximal ICA. Laminar flow seen by color Doppler. Left external  "carotid artery appears patent with no evidence of stenosis. The left vertebral artery is patent with antegrade flow.  No evidence of hemodynamically significant stenosis in the left subclavian.       Past Medical History  Medical History[1]    Surgical History  Surgical History[2]     Social History  She reports that she quit smoking about 52 years ago. Her smoking use included cigarettes. She has been exposed to tobacco smoke. She has never used smokeless tobacco. She reports that she does not drink alcohol and does not use drugs.    Family History  Family History[3]     Allergies  Iodine, Shrimp, Hydrocodone, and Adhesive tape-silicones    10 point review systems performed and is negative besides what is stated in HPI     Physical Exam  HENT:      Head: Normocephalic and atraumatic.      Nose: Nose normal.      Mouth/Throat:      Mouth: Mucous membranes are dry.   Eyes:      Conjunctiva/sclera: Conjunctivae normal.   Cardiovascular:      Rate and Rhythm: Normal rate. Rhythm irregular.      Heart sounds: Murmur heard.   Pulmonary:      Effort: Pulmonary effort is normal.      Breath sounds: Rales present.   Abdominal:      General: Bowel sounds are normal.      Tenderness: There is abdominal tenderness.   Musculoskeletal:         General: Normal range of motion.      Cervical back: Neck supple.   Skin:     General: Skin is warm and dry.   Neurological:      Mental Status: She is alert. Mental status is at baseline.   Psychiatric:         Mood and Affect: Mood normal.          Last Recorded Vitals  Blood pressure 135/63, pulse 69, temperature 36.5 °C (97.7 °F), resp. rate 20, height 1.626 m (5' 4\"), weight 69.9 kg (154 lb), SpO2 98%.    Relevant Results    Medications Ordered Prior to Encounter[4]     Results for orders placed or performed during the hospital encounter of 06/23/25 (from the past 24 hours)   CBC and Auto Differential   Result Value Ref Range    WBC 6.0 4.4 - 11.3 x10*3/uL    nRBC 0.0 0.0 - 0.0 /100 " WBCs    RBC 3.37 (L) 4.00 - 5.20 x10*6/uL    Hemoglobin 10.1 (L) 12.0 - 16.0 g/dL    Hematocrit 31.3 (L) 36.0 - 46.0 %    MCV 93 80 - 100 fL    MCH 30.0 26.0 - 34.0 pg    MCHC 32.3 32.0 - 36.0 g/dL    RDW 15.9 (H) 11.5 - 14.5 %    Platelets 206 150 - 450 x10*3/uL    Neutrophils % 69.9 40.0 - 80.0 %    Immature Granulocytes %, Automated 0.5 0.0 - 0.9 %    Lymphocytes % 16.6 13.0 - 44.0 %    Monocytes % 10.7 2.0 - 10.0 %    Eosinophils % 2.0 0.0 - 6.0 %    Basophils % 0.3 0.0 - 2.0 %    Neutrophils Absolute 4.18 1.60 - 5.50 x10*3/uL    Immature Granulocytes Absolute, Automated 0.03 0.00 - 0.50 x10*3/uL    Lymphocytes Absolute 0.99 0.80 - 3.00 x10*3/uL    Monocytes Absolute 0.64 0.05 - 0.80 x10*3/uL    Eosinophils Absolute 0.12 0.00 - 0.40 x10*3/uL    Basophils Absolute 0.02 0.00 - 0.10 x10*3/uL   Comprehensive metabolic panel   Result Value Ref Range    Glucose 116 (H) 74 - 99 mg/dL    Sodium 138 136 - 145 mmol/L    Potassium 3.6 3.5 - 5.3 mmol/L    Chloride 99 98 - 107 mmol/L    Bicarbonate 27 21 - 32 mmol/L    Anion Gap 16 10 - 20 mmol/L    Urea Nitrogen 30 (H) 6 - 23 mg/dL    Creatinine 1.00 0.50 - 1.05 mg/dL    eGFR 53 (L) >60 mL/min/1.73m*2    Calcium 9.6 8.6 - 10.3 mg/dL    Albumin 3.8 3.4 - 5.0 g/dL    Alkaline Phosphatase 89 33 - 136 U/L    Total Protein 6.3 (L) 6.4 - 8.2 g/dL    AST 24 9 - 39 U/L    Bilirubin, Total 0.6 0.0 - 1.2 mg/dL    ALT 17 7 - 45 U/L   Lipase   Result Value Ref Range    Lipase 18 9 - 82 U/L   Magnesium   Result Value Ref Range    Magnesium 2.24 1.60 - 2.40 mg/dL   aPTT   Result Value Ref Range    aPTT 26 26 - 36 seconds   Protime-INR   Result Value Ref Range    Protime 12.4 9.8 - 12.4 seconds    INR 1.1 0.9 - 1.1   B-Type Natriuretic Peptide   Result Value Ref Range     (H) 0 - 99 pg/mL   Troponin I, High Sensitivity, Initial   Result Value Ref Range    Troponin I, High Sensitivity 19 (H) 0 - 13 ng/L   Troponin, High Sensitivity, 1 Hour   Result Value Ref Range    Troponin I,  High Sensitivity 23 (H) 0 - 13 ng/L        CT chest abdomen pelvis wo IV contrast  Result Date: 6/23/2025  Interpreted By:  Mary Jane Foreman, STUDY: CT CHEST ABDOMEN PELVIS WO CONTRAST;  6/23/2025 1:05 pm   INDICATION: Signs/Symptoms:abdominal pain prior cancer syncope r/o mass, nephro jesse traumatic injury.     COMPARISON: CT chest 03/21/2025, CT abdomen and pelvis 01/12/2025   ACCESSION NUMBER(S): [id number]   ORDERING CLINICIAN: MURTAZA HINOJOSA   TECHNIQUE: CT of the chest, abdomen, and pelvis was performed. Sagittal and coronal reconstructions were generated. No intravenous contrast given for the examination.   FINDINGS: CHEST:   Hilar, vessel, and solid organ evaluation limited without IV contrast.   CHEST WALL AND LOWER NECK: Metallic streak artifact from port in the left anterior chest wall limits assessment of adjacent structures. No gross axillary adenopathy as visualized. Slightly prominent heterogenous right thyroid lobe.   MEDIASTINUM AND HARITHA:  Limited hilar assessment on unenhanced exam. No significant mediastinal or hilar adenopathy within limits of unenhanced study. Mild gaseous distention of the proximal esophagus.   HEART AND VESSELS:  Lack of IV contrast precludes vascular luminal assessment. The heart is normal in size. No significant pericardial effusion. Pacer wires in the right side of the heart. Multifocal atherosclerotic calcifications.   LUNGS, PLEURA, LARGE AIRWAYS:  Mild motion artifact in the lower chest. Pulmonary heterogeneity including scattered bilateral ground-glass infiltrates and reticular densities most prominent in the lung bases. Moderate right and small to moderate left pleural effusions and presumed compressive atelectasis of the adjacent lung bases. Slight fluid/thickening along the superior right main fissure. No pneumothorax. The central airways are grossly patent.   BONES:  Kyphosis and degenerative endplate spurring in the thoracic spine.     ABDOMEN/PELVIS:   Solid organ  and vessel evaluation limited without IV contrast. Artifact related to overlying upper extremities.   ABDOMINAL ORGANS:   LIVER: No focal lesion within limits of unenhanced exam   GALL BLADDER AND BILIARY TREE: No calcified gallstone   SPLEEN: No focal lesion within limits of unenhanced exam   PANCREAS: No focal lesion within limits of unenhanced exam   ADRENALS: No adrenal mass   KIDNEYS AND URETERS: No renal mass or hydronephrosis within limits of unenhanced exam   BOWEL: No abnormally dilated large or small bowel loops. Moderate fecal residue. Distal colon diverticulosis. Within limits of unenhanced exam probable persistent circumferential wall thickening in the distal transverse colon for example image 110/201, as noted on the prior exam.   PERITONEUM, RETROPERITONEUM, NODES: No significant free fluid. No free air. No significant retroperitoneal lymphadenopathy within limits of unenhanced exam. Slight increased density in the lower abdominal mesentery.   VESSELS:  Lack of IV contrast precludes vascular luminal assessment. Multifocal atherosclerotic calcifications. No abdominal aortic aneurysm.   PELVIS: Urinary bladder is moderately distended and grossly normal in contour. 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   ABDOMINAL WALL: No sizable abdominal wall hernia.   BONES: Osteopenia. Multifocal degenerative changes. Mild scoliosis of the lumbar spine.       CHEST:   Bilateral infiltrates and right-greater-than-left pleural effusions. Differential includes CHF and pneumonia. Clinical correlation and follow-up to ensure resolution after appropriate treatment recommended.   ABDOMEN AND PELVIS:   Apparent persistent distal transverse colon wall thickening consistent with reportedly known malignancy.   No gross evidence of solid organ injury or free fluid within limits of unenhanced exam.   Fecal retention. Distal colon diverticulosis.   Other findings as described above.   MACRO: None.   Signed by: Mary Jane Foreman  6/23/2025 1:32 PM Dictation workstation:   [id number]    CT cervical spine wo IV contrast  Result Date: 6/23/2025  Interpreted By:  Mary Jane Foreman, STUDY: CT CERVICAL SPINE WO IV CONTRAST;  6/23/2025 1:05 pm   INDICATION: Signs/Symptoms:fall blood thinners r/o frx.     COMPARISON: None.   ACCESSION NUMBER(S): [id number]   ORDERING CLINICIAN: MURTAZA HINOJOSA   TECHNIQUE: CT images were obtained through the cervical spine. Sagittal and coronal reconstructions were generated.   FINDINGS:     ALIGNMENT: Mild levocurvature. Straightening of the lower cervical lordosis. Slight retrolisthesis of C5.   VERTEBRAE/DISC SPACES: No compression deformity or acute displaced fracture. Multilevel degenerative changes including atlantoaxial joint space narrowing spurring, multilevel intervertebral disc space narrowing with endplate sclerosis and spurring, multilevel bilateral facet joint narrowing and spurring. At least partial fusion across the anterior C5-6 vertebra.   ADDITIONAL FINDINGS: No abnormal thickening of the prevertebral soft tissues.  Dependent fluid/thickening in the sphenoid sinus. Ill-defined biapical infiltrates. Small right pleural effusion. Partially included pacer wires in the left upper chest.       No cervical vertebral compression deformity or acute displaced fracture. Multilevel productive/degenerative changes with straightening of the cervical lordosis and slight spondylolisthesis at C5.   Paraspinal/soft tissue findings as above.   MACRO: None.   Signed by: Mary Jane Foreman 6/23/2025 1:16 PM Dictation workstation:   [id number]    CT head wo IV contrast  Result Date: 6/23/2025  Interpreted By:  Mary Jane Foreman, STUDY: CT HEAD WO IV CONTRAST;  6/23/2025 1:05 pm   INDICATION: Signs/Symptoms:headache fall blood thinners r/o sah ich mass.     COMPARISON: 08/23/2023   ACCESSION NUMBER(S): [id number]   ORDERING CLINICIAN: MURTAZA HINOJOSA   TECHNIQUE: Unenhanced CT images of the head were obtained.   FINDINGS: The  ventricles, cisterns and sulci are enlarged, consistent with diffuse volume loss. There are areas of nonspecific white matter hypodensity, which are probably age related or microvascular in nature. These findings are similar to the prior exam. There is no acute intracranial hemorrhage, mass effect or midline shift. No extraaxial fluid collection.   No acute displaced calvarial fracture. No focal calvarial lesion.   Left sphenoid sinus dependent fluid/thickening. Remaining visualized paranasal sinuses are clear. Dense presumed surgical material along the anterior margin of each globe again seen.       No acute intracranial hemorrhage or mass-effect.   Mild fluid or mucosal thickening in the left sphenoid sinus..   MACRO: None.   Signed by: Mary Jane Foreman 6/23/2025 1:08 PM Dictation workstation:   [id number]       Assessment & Plan  Acute on chronic diastolic congestive heart failure    Acute bacterial sinusitis    Syncope    Advance care planning    Constipation        Admit to telemetry  Inpatient  Appreciate cardiology recommendations  I am not sure if patient's CHF represents more of a chronic condition so we will give 40 mg IV Lasix x 1 and resume torsemide tomorrow and look to cardiology for further recommendations  Please see echo from 2024 and carotid duplex from 2029 above  Start Rocephin 1 g IV daily  Orthostatic vital signs  Repeat EKG and troponin pacer check  Continue home aspirin and Plavix  Daily weight  Intake and output  AM CBC, BMP, coags, lipids  Check flu and COVID  Urinalysis pending  PT/OT    Chronic conditions: HFpEF, MI, heart block/SVT/SSS s/p Medtronic dual-chamber pacemaker 2012 with generator change on pacemaker 7/13/2023, HTN, ALS, COPD (on 1 to 2 L of oxygen at baseline), CVA with TNK 8/21/23, gout, arthritis, venous insufficiency, and  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 and being considered for surgical intervention    Continue home medications as listed  above  Appreciate cardiology recommendations in regards to resection of colon cancer  Cardiac diet    DVT prophylaxis    SCDs  Heparin          Nanci Waterman, APRN-CNP         [1]   Past Medical History:  Diagnosis Date    Gross hematuria 10/07/2020    Impacted cerumen 02/22/2024    Other conditions influencing health status     Normal stress echocardiogram    Personal history of other medical treatment     History of echocardiogram    Personal history of other medical treatment     H/O Doppler ultrasound    Systolic CHF (Multi) 05/18/2023   [2]   Past Surgical History:  Procedure Laterality Date    APPENDECTOMY  11/22/2017    Appendectomy    CARDIAC CATHETERIZATION N/A 12/20/2024    Procedure: Left Heart Cath, With LV;  Surgeon: Tahir Ruelas MD;  Location: [facility] Card[facility];  Service: Cardiovascular;  Laterality: N/A;    CARDIAC PACEMAKER PLACEMENT  03/11/2021    Pacemaker Placement    HYSTERECTOMY  11/22/2017    Hysterectomy    IR ANGIOGRAM INFERIOR EPIGASTRIC PELVIC  12/14/2020    IR ANGIOGRAM INFERIOR EPIGASTRIC PELVIC 12/14/2020 [facility]    IR ANGIOGRAM INFERIOR EPIGASTRIC PELVIC  12/14/2020    IR ANGIOGRAM INFERIOR EPIGASTRIC PELVIC 12/14/2020 [facility]    IR ANGIOGRAM RENAL BILATERAL Bilateral 12/14/2020    IR ANGIOGRAM RENAL BILATERAL 12/14/2020 [facility]    OTHER SURGICAL HISTORY  11/22/2017    Stab Phlebectomy Of Varicose Veins    OTHER SURGICAL HISTORY  11/22/2017    Venous Ligation With Stripping    TONSILLECTOMY  11/22/2017    Tonsillectomy   [3]   Family History  Problem Relation Name Age of Onset    No Known Problems Mother     [4]   No current facility-administered medications on file prior to encounter.     Current Outpatient Medications on File Prior to Encounter   Medication Sig Dispense Refill    allopurinol (Zyloprim) 100 mg tablet Take 1 tablet (100 mg) by mouth once daily. 90 tablet 0    aspirin 81 mg chewable tablet Chew 1 tablet (81 mg) once daily.      calcium  carbonate-vitamin D3 (Calcium 600 + D,3,) 600 mg-5 mcg (200 unit) tablet Take 1 tablet by mouth once daily.      clopidogrel (Plavix) 75 mg tablet Take 1 tablet by mouth once daily 90 tablet 0    colchicine 0.6 mg tablet Take 1 tablet (0.6 mg) by mouth once daily. 90 tablet 3    DULoxetine (Cymbalta) 30 mg DR capsule Take 1 capsule (30 mg) by mouth once daily. Do not crush or chew. 90 capsule 1    ferrous sulfate 325 (65 Fe) mg EC tablet Take 1 tablet by mouth once daily. Do not crush, chew, or split.      fluticasone propion-salmeteroL (Advair) 115-21 mcg/actuation inhaler Inhale 2 puffs 2 times a day. Rinse mouth with water after use to reduce aftertaste and incidence of candidiasis. Do not swallow. 12 g 11    metoprolol tartrate (Lopressor) 25 mg tablet Take 1 tablet (25 mg) by mouth once daily. 30 tablet 0    polyethylene glycol (Glycolax, Miralax) 17 gram/dose powder Mix 1 capful (17 g) of powder with 4 to 8 ounces of water or juice and drink 2 times a day. 1010 g 1    torsemide (Demadex) 20 mg tablet Take 3 tablets (60 mg) by mouth once daily. 90 tablet 11    acetaminophen (Tylenol) 325 mg tablet Take 2 tablets (650 mg) by mouth every 4 hours if needed for mild pain (1 - 3). (Patient not taking: Reported on 6/23/2025)      albuterol 2.5 mg /3 mL (0.083 %) nebulizer solution Take 3 mL (2.5 mg) by nebulization every 4 hours if needed for wheezing. 75 mL 1    enoxaparin (Lovenox) 40 mg/0.4 mL syringe Inject 0.4 mL (40 mg) under the skin once every 24 hours. (Patient not taking: Reported on 6/23/2025)      guaiFENesin (Mucinex) 600 mg 12 hr tablet Take 1 tablet (600 mg) by mouth 2 times a day as needed for cough. Do not crush, chew, or split. (Patient not taking: Reported on 6/23/2025)      hydrocortisone 1 % lotion Apply topically 2 times a day. Apply to both legs , wash hands after applying lotion (Patient not taking: Reported on 6/23/2025) 60 mL 0    lubricating eye drops ophthalmic solution Administer 1 drop  into both eyes once daily as needed for dry eyes.      nebulizers misc With tubing and mask.   Use as directed 1 each 0    [DISCONTINUED] ipratropium-albuteroL (Duo-Neb) 0.5-2.5 mg/3 mL nebulizer solution Take 3 mL by nebulization every 6 hours.

## 2025-06-23 NOTE — PROGRESS NOTES
Pharmacy Medication History Review    Yesenia Milner is a 89 y.o. female admitted for No Principal Problem: There is no principal problem currently on the Problem List. Please update the Problem List and refresh.. Pharmacy reviewed the patient's [id number] medications and allergies for accuracy.    The list below reflectives the updated PTA list. Please review each medication in order reconciliation for additional clarification and justification.  Prior to Admission medications    Medication Sig Start Date End Date Authorizing Provider   allopurinol (Zyloprim) 100 mg tablet Take 1 tablet (100 mg) by mouth once daily.   Naima Kaene MD   aspirin 81 mg chewable tablet Chew 1 tablet (81 mg) once daily.   Everton Rubio, DO   calcium carbonate-vitamin D3 (Calcium 600 + D,3,) 600 mg-5 mcg (200 unit) tablet Take 1 tablet by mouth once daily.   Historical Provider, MD   clopidogrel (Plavix) 75 mg tablet Take 1 tablet by mouth once daily   Naima Keane MD   colchicine 0.6 mg tablet Take 1 tablet (0.6 mg) by mouth once daily.   Naima Keane MD   DULoxetine (Cymbalta) 30 mg DR capsule Take 1 capsule (30 mg) by mouth once daily. Do not crush or chew.   Naima Keane MD   ferrous sulfate 325 (65 Fe) mg EC tablet Take 1 tablet by mouth once daily. Do not crush, chew, or split.   Historical Provider, MD   fluticasone propion-salmeteroL (Advair) 115-21 mcg/actuation inhaler Inhale 2 puffs 2 times a day. Rinse mouth with water after use to reduce aftertaste and incidence of candidiasis. Do not swallow.   Everton Rubio, DO   metoprolol tartrate (Lopressor) 25 mg tablet Take 1 tablet (25 mg) by mouth once daily.   Iglesia Gabriel,    polyethylene glycol (Glycolax, Miralax) 17 gram/dose powder Mix 1 capful (17 g) of powder with 4 to 8 ounces of water or juice and drink 2 times a day.   Kylah Pagan PA-C   torsemide (Demadex) 20 mg tablet Take 3 tablets (60 mg) by mouth once daily.   Joselito Madden PA-C    albuterol 2.5 mg /3 mL (0.083 %) nebulizer solution Take 3 mL (2.5 mg) by nebulization every 4 hours if needed for wheezing.   Naima Keane MD   lubricating eye drops ophthalmic solution Administer 1 drop into both eyes once daily as needed for dry eyes.   Historical Provider, MD        The list below reflectives the updated allergy list. Please review each documented allergy for additional clarification and justification.  Allergies  Reviewed by Celina Medina MD on 6/23/2025        Severity Reactions Comments    Iodine Medium Rash     Shrimp Medium Diarrhea, Nausea/vomiting     Hydrocodone Not Specified Unknown Pt does not remember    Adhesive Tape-silicones Low Itching             Below are additional concerns with the patient's PTA list.      Kirti Brito

## 2025-06-23 NOTE — CARE PLAN
The patient's goals for the shift include      The clinical goals for the shift include admission

## 2025-06-23 NOTE — Clinical Note
Sheath was exchanged in the left subclavian vein with INTRODUCER SYSTEM, PRELUDE SNAP, SPLITTABLE, HEMOSTATIC, 7FR.

## 2025-06-24 ENCOUNTER — APPOINTMENT (OUTPATIENT)
Dept: CARDIOLOGY | Facility: HOSPITAL | Age: OVER 89
End: 2025-06-24
Payer: MEDICARE

## 2025-06-24 LAB
ANION GAP SERPL CALC-SCNC: 14 MMOL/L (ref 10–20)
AORTIC VALVE MEAN GRADIENT: 7 MMHG
AORTIC VALVE PEAK VELOCITY: 1.85 M/S
APTT PPP: 36 SECONDS (ref 26–36)
AV PEAK GRADIENT: 14 MMHG
AVA (PEAK VEL): 1.64 CM2
AVA (VTI): 1.73 CM2
BUN SERPL-MCNC: 28 MG/DL (ref 6–23)
CALCIUM SERPL-MCNC: 9.1 MG/DL (ref 8.6–10.3)
CHLORIDE SERPL-SCNC: 100 MMOL/L (ref 98–107)
CHOLEST SERPL-MCNC: 119 MG/DL (ref 0–199)
CHOLESTEROL/HDL RATIO: 2.4
CO2 SERPL-SCNC: 30 MMOL/L (ref 21–32)
CREAT SERPL-MCNC: 0.95 MG/DL (ref 0.5–1.05)
EGFRCR SERPLBLD CKD-EPI 2021: 57 ML/MIN/1.73M*2
EJECTION FRACTION APICAL 4 CHAMBER: 49.7
EJECTION FRACTION: 53 %
ERYTHROCYTE [DISTWIDTH] IN BLOOD BY AUTOMATED COUNT: 15.8 % (ref 11.5–14.5)
GLUCOSE SERPL-MCNC: 98 MG/DL (ref 74–99)
HCT VFR BLD AUTO: 32 % (ref 36–46)
HDLC SERPL-MCNC: 49.2 MG/DL
HGB BLD-MCNC: 10.1 G/DL (ref 12–16)
HOLD SPECIMEN: NORMAL
INR PPP: 1.2 (ref 0.9–1.1)
LDLC SERPL CALC-MCNC: 59 MG/DL
LEFT ATRIUM VOLUME AREA LENGTH INDEX BSA: 30 ML/M2
LEFT VENTRICLE INTERNAL DIMENSION DIASTOLE: 3.8 CM (ref 3.5–6)
LEFT VENTRICULAR OUTFLOW TRACT DIAMETER: 2.1 CM
MCH RBC QN AUTO: 29.6 PG (ref 26–34)
MCHC RBC AUTO-ENTMCNC: 31.6 G/DL (ref 32–36)
MCV RBC AUTO: 94 FL (ref 80–100)
NON HDL CHOLESTEROL: 70 MG/DL (ref 0–149)
NRBC BLD-RTO: 0 /100 WBCS (ref 0–0)
PLATELET # BLD AUTO: 215 X10*3/UL (ref 150–450)
POTASSIUM SERPL-SCNC: 3.6 MMOL/L (ref 3.5–5.3)
PROTHROMBIN TIME: 12.8 SECONDS (ref 9.8–12.4)
RBC # BLD AUTO: 3.41 X10*6/UL (ref 4–5.2)
RIGHT VENTRICLE FREE WALL PEAK S': 8.59 CM/S
RIGHT VENTRICLE PEAK SYSTOLIC PRESSURE: 33 MMHG
SODIUM SERPL-SCNC: 140 MMOL/L (ref 136–145)
TRICUSPID ANNULAR PLANE SYSTOLIC EXCURSION: 1.6 CM
TRIGL SERPL-MCNC: 56 MG/DL (ref 0–149)
VLDL: 11 MG/DL (ref 0–40)
WBC # BLD AUTO: 4.7 X10*3/UL (ref 4.4–11.3)

## 2025-06-24 PROCEDURE — 97165 OT EVAL LOW COMPLEX 30 MIN: CPT | Mod: GO

## 2025-06-24 PROCEDURE — 2500000001 HC RX 250 WO HCPCS SELF ADMINISTERED DRUGS (ALT 637 FOR MEDICARE OP): Performed by: NURSE PRACTITIONER

## 2025-06-24 PROCEDURE — 36415 COLL VENOUS BLD VENIPUNCTURE: CPT | Performed by: NURSE PRACTITIONER

## 2025-06-24 PROCEDURE — 93306 TTE W/DOPPLER COMPLETE: CPT | Performed by: INTERNAL MEDICINE

## 2025-06-24 PROCEDURE — 97161 PT EVAL LOW COMPLEX 20 MIN: CPT | Mod: GP | Performed by: PHYSICAL THERAPIST

## 2025-06-24 PROCEDURE — 85610 PROTHROMBIN TIME: CPT | Performed by: NURSE PRACTITIONER

## 2025-06-24 PROCEDURE — 1200000002 HC GENERAL ROOM WITH TELEMETRY DAILY

## 2025-06-24 PROCEDURE — 80061 LIPID PANEL: CPT | Performed by: NURSE PRACTITIONER

## 2025-06-24 PROCEDURE — 80048 BASIC METABOLIC PNL TOTAL CA: CPT | Performed by: NURSE PRACTITIONER

## 2025-06-24 PROCEDURE — 99223 1ST HOSP IP/OBS HIGH 75: CPT | Performed by: NURSE PRACTITIONER

## 2025-06-24 PROCEDURE — 85027 COMPLETE CBC AUTOMATED: CPT | Performed by: NURSE PRACTITIONER

## 2025-06-24 PROCEDURE — 2500000004 HC RX 250 GENERAL PHARMACY W/ HCPCS (ALT 636 FOR OP/ED): Performed by: NURSE PRACTITIONER

## 2025-06-24 PROCEDURE — 93306 TTE W/DOPPLER COMPLETE: CPT

## 2025-06-24 RX ORDER — TORSEMIDE 20 MG/1
40 TABLET ORAL DAILY
Status: DISCONTINUED | OUTPATIENT
Start: 2025-06-25 | End: 2025-07-03

## 2025-06-24 RX ORDER — FLUTICASONE FUROATE AND VILANTEROL 200; 25 UG/1; UG/1
1 POWDER RESPIRATORY (INHALATION)
Status: DISCONTINUED | OUTPATIENT
Start: 2025-06-24 | End: 2025-06-25

## 2025-06-24 RX ORDER — ONDANSETRON HYDROCHLORIDE 2 MG/ML
4 INJECTION, SOLUTION INTRAVENOUS EVERY 6 HOURS PRN
Status: DISCONTINUED | OUTPATIENT
Start: 2025-06-24 | End: 2025-07-03 | Stop reason: HOSPADM

## 2025-06-24 RX ADMIN — TORSEMIDE 60 MG: 20 TABLET ORAL at 09:19

## 2025-06-24 RX ADMIN — ASPIRIN 81 MG CHEWABLE TABLET 81 MG: 81 TABLET CHEWABLE at 09:19

## 2025-06-24 RX ADMIN — CLOPIDOGREL BISULFATE 75 MG: 75 TABLET, FILM COATED ORAL at 09:19

## 2025-06-24 RX ADMIN — Medication 1 TABLET: at 21:09

## 2025-06-24 RX ADMIN — Medication 1 TABLET: at 09:19

## 2025-06-24 RX ADMIN — COLCHICINE 0.6 MG: 0.6 TABLET, FILM COATED ORAL at 09:20

## 2025-06-24 RX ADMIN — ALLOPURINOL 100 MG: 100 TABLET ORAL at 09:19

## 2025-06-24 RX ADMIN — CEFTRIAXONE 1 G: 1 INJECTION, SOLUTION INTRAVENOUS at 16:03

## 2025-06-24 RX ADMIN — FERROUS SULFATE TAB 325 MG (65 MG ELEMENTAL FE) 1 TABLET: 325 (65 FE) TAB at 09:19

## 2025-06-24 RX ADMIN — METOPROLOL TARTRATE 25 MG: 25 TABLET, FILM COATED ORAL at 09:20

## 2025-06-24 RX ADMIN — POLYETHYLENE GLYCOL 3350 17 G: 17 POWDER, FOR SOLUTION ORAL at 09:19

## 2025-06-24 RX ADMIN — SENNOSIDES AND DOCUSATE SODIUM 2 TABLET: 50; 8.6 TABLET ORAL at 21:09

## 2025-06-24 RX ADMIN — POLYETHYLENE GLYCOL 3350 17 G: 17 POWDER, FOR SOLUTION ORAL at 21:10

## 2025-06-24 RX ADMIN — HEPARIN SODIUM 5000 UNITS: 5000 INJECTION, SOLUTION INTRAVENOUS; SUBCUTANEOUS at 03:19

## 2025-06-24 RX ADMIN — HEPARIN SODIUM 5000 UNITS: 5000 INJECTION, SOLUTION INTRAVENOUS; SUBCUTANEOUS at 16:03

## 2025-06-24 RX ADMIN — DULOXETINE 30 MG: 30 CAPSULE, DELAYED RELEASE ORAL at 09:19

## 2025-06-24 ASSESSMENT — COGNITIVE AND FUNCTIONAL STATUS - GENERAL
MOBILITY SCORE: 11
MOVING TO AND FROM BED TO CHAIR: TOTAL
DAILY ACTIVITIY SCORE: 15
WALKING IN HOSPITAL ROOM: A LITTLE
HELP NEEDED FOR BATHING: A LITTLE
DRESSING REGULAR UPPER BODY CLOTHING: A LITTLE
WALKING IN HOSPITAL ROOM: A LOT
DAILY ACTIVITIY SCORE: 21
DRESSING REGULAR LOWER BODY CLOTHING: A LOT
TOILETING: A LITTLE
EATING MEALS: A LITTLE
TOILETING: A LOT
MOVING FROM LYING ON BACK TO SITTING ON SIDE OF FLAT BED WITH BEDRAILS: A LITTLE
CLIMB 3 TO 5 STEPS WITH RAILING: A LOT
TURNING FROM BACK TO SIDE WHILE IN FLAT BAD: A LITTLE
MOBILITY SCORE: 20
STANDING UP FROM CHAIR USING ARMS: TOTAL
DRESSING REGULAR LOWER BODY CLOTHING: A LITTLE
STANDING UP FROM CHAIR USING ARMS: A LITTLE
HELP NEEDED FOR BATHING: A LOT
CLIMB 3 TO 5 STEPS WITH RAILING: TOTAL
PERSONAL GROOMING: A LITTLE

## 2025-06-24 ASSESSMENT — PAIN SCALES - GENERAL
PAINLEVEL_OUTOF10: 0 - NO PAIN

## 2025-06-24 ASSESSMENT — ENCOUNTER SYMPTOMS
FEVER: 0
PND: 0
NEAR-SYNCOPE: 1
ORTHOPNEA: 0
SHORTNESS OF BREATH: 1
DYSPNEA ON EXERTION: 1
PALPITATIONS: 0

## 2025-06-24 ASSESSMENT — PAIN - FUNCTIONAL ASSESSMENT: PAIN_FUNCTIONAL_ASSESSMENT: 0-10

## 2025-06-24 NOTE — PROGRESS NOTES
Occupational Therapy    Evaluation    Patient Name: Yesenia Milner  MRN: [id number]  Today's Date: 6/24/2025  Time Calculation  Start Time: 1004  Stop Time: 1022  Time Calculation (min): 18 min  808/808-B    Assessment  IP OT Assessment  OT Assessment: Patient requires assist for all care secondary to impaired activity tolerance, impaired balance and generalized weakness; patient would benefit from 24 hour care with O.T. services at a moderate intensity to improve independence with ADLs, transfers & mobility.  Prognosis: Good  Barriers to Discharge Home: Caregiver assistance, Physical needs  Caregiver Assistance: Patient lives alone and/or does not have reliable caregiver assistance  Physical Needs: 24hr mobility assistance needed, 24hr ADL assistance needed  End of Session Communication: Bedside nurse  End of Session Patient Position: Up in chair, Alarm on (call light in reach)    Plan:  Treatment Interventions: ADL retraining, Functional transfer training, Endurance training, Neuromuscular reeducation, Compensatory technique education  OT Frequency: 3 times per week  OT Discharge Recommendations: Moderate intensity level of continued care  OT Recommended Transfer Status: Assist of 2, Minimal assist  OT - OK to Discharge: Yes (from an O.T. standpoint)    Subjective     Current Problem:  1. Acute on chronic diastolic congestive heart failure  Referral to Healthy at Home Program      2. Pleural effusion        3. Syncope, unspecified syncope type  Cardiac device check - Inpatient    Cardiac device check - Inpatient    Referral to Healthy at Home Program    Transthoracic Echo Complete    Transthoracic Echo Complete          General:  General  Reason for Referral: OT eval & treat for ADLs (Acute on chronic CHF, syncope)  Referred By: SAQIB Zavala-CNP  Past Medical History Relevant to Rehab: 6/23/25: lightheaded, passed out. CT head & C-spine: negative.   PMH: HTN, HFpEF, MI, SVT, SSS, pacemaker, ALS, COPD,  CVA, gout, colon CA  Family/Caregiver Present: No  Co-Treatment: PT  Co-Treatment Reason: To maximize patient safety & mobility  Prior to Session Communication: Bedside nurse  Patient Position Received: Bed, 2 rail up  General Comment: Patient cleared for therapy by nursing.    Precautions:  Medical Precautions: Fall precautions  Precautions Comment: OOB with assist, fall/safety, 2 L O2, purewick catheter      Pain:  Pain Assessment  Pain Assessment: 0-10  0-10 (Numeric) Pain Score: 0 - No pain    Objective     Cognition:  Overall Cognitive Status: Within Functional Limits  Orientation Level: Oriented X4        Home Living:  Home Living Comments: Patient reports recent discharge home from skilled facility following lengthy hospital admit; has been home approximately one month; patient lives alone in house with 1 step to enter, stair lift to kitchen level and stair lift to bed/bath on 2nd floor. Independent ADLs, transfers & mobility with wheeled walker. Owns rollator, wheeled walker, raised toilet seat, reachers, sock aide, bed rail, tub/shower with transfer bench, grab bar & hand held shower. Daughter local/supportive. Has not driven in over 6 months.     ADL:  Grooming Assistance: Stand by  UE Dressing Assistance: Minimal  LE Dressing Assistance: Maximal  Toileting Assistance with Device: Maximal    Activity Tolerance:  Endurance: Decreased tolerance for upright activites (deconditioned, fatigued easily with activity)    Bed Mobility/Transfers:   Bed Mobility  Bed Mobility: Yes  Bed Mobility 1  Bed Mobility Comments 1: SBA supine to sit with HOB elevated and use of bed rail  Transfers  Transfer: Yes  Transfer 1  Trials/Comments 1: mod assist x 2 sit to stand    Ambulation/Gait Training:  Functional Mobility  Functional Mobility Performed: Yes (min assist x 2 with wheeled walker & gait belt)    Sitting Balance:  Dynamic Sitting Balance  Dynamic Sitting-Balance Support: Feet supported  Dynamic Sitting-Level of  Assistance: Contact guard    Standing Balance:  Dynamic Standing Balance  Dynamic Standing-Level of Assistance: Minimum assistance (x2 with BUE support)    Sensation:  Light Touch: No apparent deficits    Strength:  Strength Comments: LUE WFL; RUE 3-/5 proximally, 4-/5 distally. Patient reports chronic limitation RUE.       Coordination:  Movements are Fluid and Coordinated: Yes       Outcome Measures: [facility] Daily Activity  Putting on and taking off regular lower body clothing: A lot  Bathing (including washing, rinsing, drying): A lot  Putting on and taking off regular upper body clothing: A little  Toileting, which includes using toilet, bedpan or urinal: A lot  Taking care of personal grooming such as brushing teeth: A little  Eating Meals: A little  Daily Activity - Total Score: 15                       EDUCATION:     Education Documentation  ADL Training, taught by Rose Marie Mcmillan OT at 6/24/2025 12:48 PM.  Learner: Patient  Readiness: Acceptance  Method: Explanation  Response: Verbalizes Understanding  Comment: OT POC    Education Comments  No comments found.        Goals:   Encounter Problems       Encounter Problems (Active)       OT Goals       Increase UE bathing/dressing to SBA and LE bathing/dressing to min assist with adaptive equipment as needed.  (Progressing)       Start:  06/24/25    Expected End:  07/08/25            Increase functional transfers & mobility to/from bed, chair & commode to CGA with DME for safety  (Progressing)       Start:  06/24/25    Expected End:  07/08/25            Increase dynamic stand balance to SBA with UE support to promote increased independence with ADL & functional transfers  (Progressing)       Start:  06/24/25    Expected End:  07/08/25            Tolerate 15 minutes UE therex with rest periods prn to promote increased activity tolerance for ADLs  (Progressing)       Start:  06/24/25    Expected End:  07/08/25

## 2025-06-24 NOTE — CONSULTS
Cardiology Consult Note    Inpatient consult to Cardiology  Consult performed by: Lexie Wolfe, SAQIB-CNP  Consult ordered by: Fei Mensah MD         Yesenia Milner is a 89 y.o. female known to Dr. 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 Patient presented to [facility] on 6/23/2025 with syncope and fall. Chronically on aspirin and Plavix. Cardiology has been consulted for syncope      Subjective   Today, patient seen and assessed resting comfortably in her chair on 2L 02 NC. She has had recent worsening lightheadedness, shortness of breath, and chest heaviness for the past 3-4 weeks. She denies orthopnea or PND. Has noticed worsening lower extremity edema, although none present upon exam today. She states her lightheadedness occurs with rest and exertion. Her torsemide was increased to 60 mg daily about two months ago. She received 40 mg IV lasix yesterday and denies any improvement in her shortness of breath since admission. She received her normal 60 Mg Torsemide daily dose today.        ROS:  Review of Systems   Constitutional: Positive for malaise/fatigue. Negative for fever.   Cardiovascular:  Positive for dyspnea on exertion, leg swelling and near-syncope. Negative for orthopnea, palpitations and paroxysmal nocturnal dyspnea.        Chest heaviness     Respiratory:  Positive for shortness of breath (mild).         Past Medical History:  Medical History[1]    Problem List Items Addressed This Visit          Cardiac and Vasculature    * (Principal) Acute on chronic diastolic congestive heart failure - Primary    Relevant Medications    clopidogrel (Plavix) tablet 75 mg    metoprolol  tartrate (Lopressor) tablet 25 mg    Other Relevant Orders    Referral to [facility] at Home Program       Pulmonary and Pneumonias    Pleural effusion       Symptoms and Signs    Syncope    Relevant Orders    Cardiac device check - Inpatient    Referral to Healthy at Home Program       Family History:  No relevant family history has been documented for this patient.    Medications:  Prior to Admission medications    Medication Sig Start Date End Date Taking? Authorizing Provider   allopurinol (Zyloprim) 100 mg tablet Take 1 tablet (100 mg) by mouth once daily. 4/30/25  Yes Naima Keane MD   aspirin 81 mg chewable tablet Chew 1 tablet (81 mg) once daily. 12/27/24  Yes Everton Rubio, DO   calcium carbonate-vitamin D3 (Calcium 600 + D,3,) 600 mg-5 mcg (200 unit) tablet Take 1 tablet by mouth once daily.   Yes Historical Provider, MD   clopidogrel (Plavix) 75 mg tablet Take 1 tablet by mouth once daily 5/28/25  Yes Naima Keane MD   colchicine 0.6 mg tablet Take 1 tablet (0.6 mg) by mouth once daily. 4/30/25 4/30/26 Yes Naima Keane MD   DULoxetine (Cymbalta) 30 mg DR capsule Take 1 capsule (30 mg) by mouth once daily. Do not crush or chew. 4/30/25 4/30/26 Yes Naima Keane MD   ferrous sulfate 325 (65 Fe) mg EC tablet Take 1 tablet by mouth once daily. Do not crush, chew, or split.   Yes Historical Provider, MD   fluticasone propion-salmeteroL (Advair) 115-21 mcg/actuation inhaler Inhale 2 puffs 2 times a day. Rinse mouth with water after use to reduce aftertaste and incidence of candidiasis. Do not swallow. 6/26/24  Yes Everton Rubio, DO   metoprolol tartrate (Lopressor) 25 mg tablet Take 1 tablet (25 mg) by mouth once daily. 2/12/25  Yes Iglesia Gabriel, DO   polyethylene glycol (Glycolax, Miralax) 17 gram/dose powder Mix 1 capful (17 g) of powder with 4 to 8 ounces of water or juice and drink 2 times a day. 1/23/25  Yes Kylah Pagan PA-C   torsemide (Demadex) 20 mg tablet Take 3 tablets (60 mg) by  mouth once daily. 25  Yes Joselito Madden PA-C   acetaminophen (Tylenol) 325 mg tablet Take 2 tablets (650 mg) by mouth every 4 hours if needed for mild pain (1 - 3).  Patient not taking: Reported on 2025 3/25/25   Fei Mensah MD   albuterol 2.5 mg /3 mL (0.083 %) nebulizer solution Take 3 mL (2.5 mg) by nebulization every 4 hours if needed for wheezing. 25   Naima Keane MD   enoxaparin (Lovenox) 40 mg/0.4 mL syringe Inject 0.4 mL (40 mg) under the skin once every 24 hours.  Patient not taking: Reported on 2025 3/25/25   Fei Mensah MD   guaiFENesin (Mucinex) 600 mg 12 hr tablet Take 1 tablet (600 mg) by mouth 2 times a day as needed for cough. Do not crush, chew, or split.  Patient not taking: Reported on 2025 3/25/25   Fei Mensah MD   hydrocortisone 1 % lotion Apply topically 2 times a day. Apply to both legs , wash hands after applying lotion  Patient not taking: Reported on 2025   Naima Keane MD   lubricating eye drops ophthalmic solution Administer 1 drop into both eyes once daily as needed for dry eyes.    Historical Provider, MD   nebulizers misc With tubing and mask.   Use as directed 25   Naima Keane MD   ipratropium-albuteroL (Duo-Neb) 0.5-2.5 mg/3 mL nebulizer solution Take 3 mL by nebulization every 6 hours.  25  Historical Provider, MD     Current Medications[2]    Allergies:  Allergies[3]    Social History:  Social History     Tobacco Use    Smoking status: Former     Current packs/day: 0.00     Types: Cigarettes     Quit date:      Years since quittin.5     Passive exposure: Past    Smokeless tobacco: Never   Substance Use Topics    Alcohol use: Never       Physical Exam:    Physical Exam  Constitutional:       Appearance: Normal appearance. She is normal weight.   HENT:      Head: Normocephalic and atraumatic.   Cardiovascular:      Rate and Rhythm: Normal rate and regular rhythm.      Pulses: Normal pulses.       Heart sounds: Normal heart sounds.   Pulmonary:      Effort: Pulmonary effort is normal.      Breath sounds: Examination of the right-lower field reveals decreased breath sounds. Examination of the left-lower field reveals decreased breath sounds. Decreased breath sounds present.   Musculoskeletal:      Cervical back: Normal range of motion and neck supple.      Right lower leg: No edema.      Left lower leg: No edema.   Skin:     General: Skin is warm and dry.   Neurological:      General: No focal deficit present.      Mental Status: She is alert.          Last Recorded Vitals    Visit Vitals  /61 (BP Location: Right arm, Patient Position: Sitting)   Pulse 72   Temp 36.2 °C (97.2 °F) (Temporal)   Resp 19          Intake/Output Summary (Last 24 hours) at 6/24/2025 1137  Last data filed at 6/24/2025 0816  Gross per 24 hour   Intake 286 ml   Output 900 ml   Net -614 ml        /61 (BP Location: Right arm, Patient Position: Sitting)   Pulse 72   Temp 36.2 °C (97.2 °F) (Temporal)   Resp 19   Wt 69.9 kg (154 lb)   SpO2 98%   Intake/Output last 3 Shifts:    Intake/Output Summary (Last 24 hours) at 6/24/2025 1137  Last data filed at 6/24/2025 0816  Gross per 24 hour   Intake 286 ml   Output 900 ml   Net -614 ml       Admission Weight  Weight: 69.9 kg (154 lb) (06/23/25 1227)    Daily Weight  06/23/25 : 69.9 kg (154 lb)        Recent Image Results  CT chest abdomen pelvis wo IV contrast  Narrative: Interpreted By:  Mary Jane Foreman,   STUDY:  CT CHEST ABDOMEN PELVIS WO CONTRAST;  6/23/2025 1:05 pm      INDICATION:  Signs/Symptoms:abdominal pain prior cancer syncope r/o mass, nephro  jesse traumatic injury.          COMPARISON:  CT chest 03/21/2025, CT abdomen and pelvis 01/12/2025      ACCESSION NUMBER(S):  [id number]      ORDERING CLINICIAN:  MURTAZA HINOJOSA      TECHNIQUE:  CT of the chest, abdomen, and pelvis was performed. Sagittal and  coronal reconstructions were generated. No intravenous contrast  given  for the examination.      FINDINGS:  CHEST:      Hilar, vessel, and solid organ evaluation limited without IV contrast.      CHEST WALL AND LOWER NECK: Metallic streak artifact from port in the  left anterior chest wall limits assessment of adjacent structures. No  gross axillary adenopathy as visualized. Slightly prominent  heterogenous right thyroid lobe.      MEDIASTINUM AND HARITHA:  Limited hilar assessment on unenhanced exam.  No significant mediastinal or hilar adenopathy within limits of  unenhanced study. Mild gaseous distention of the proximal esophagus.      HEART AND VESSELS:  Lack of IV contrast precludes vascular luminal  assessment. The heart is normal in size. No significant pericardial  effusion. Pacer wires in the right side of the heart. Multifocal  atherosclerotic calcifications.      LUNGS, PLEURA, LARGE AIRWAYS:  Mild motion artifact in the lower  chest. Pulmonary heterogeneity including scattered bilateral  ground-glass infiltrates and reticular densities most prominent in  the lung bases. Moderate right and small to moderate left pleural  effusions and presumed compressive atelectasis of the adjacent lung  bases. Slight fluid/thickening along the superior right main fissure.  No pneumothorax. The central airways are grossly patent.      BONES:  Kyphosis and degenerative endplate spurring in the thoracic  spine.          ABDOMEN/PELVIS:      Solid organ and vessel evaluation limited without IV contrast.  Artifact related to overlying upper extremities.      ABDOMINAL ORGANS:      LIVER: No focal lesion within limits of unenhanced exam      GALL BLADDER AND BILIARY TREE: No calcified gallstone      SPLEEN: No focal lesion within limits of unenhanced exam      PANCREAS: No focal lesion within limits of unenhanced exam      ADRENALS: No adrenal mass      KIDNEYS AND URETERS: No renal mass or hydronephrosis within limits of  unenhanced exam      BOWEL: No abnormally dilated large or small  bowel loops. Moderate  fecal residue. Distal colon diverticulosis. Within limits of  unenhanced exam probable persistent circumferential wall thickening  in the distal transverse colon for example image 110/201, as noted on  the prior exam.      PERITONEUM, RETROPERITONEUM, NODES: No significant free fluid. No  free air. No significant retroperitoneal lymphadenopathy within  limits of unenhanced exam. Slight increased density in the lower  abdominal mesentery.      VESSELS:  Lack of IV contrast precludes vascular luminal assessment.  Multifocal atherosclerotic calcifications. No abdominal aortic  aneurysm.      PELVIS: Urinary bladder is moderately distended and grossly normal in  contour. 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      ABDOMINAL WALL: No sizable abdominal wall hernia.      BONES: Osteopenia. Multifocal degenerative changes. Mild scoliosis of  the lumbar spine.      Impression: CHEST:      Bilateral infiltrates and right-greater-than-left pleural effusions.  Differential includes CHF and pneumonia. Clinical correlation and  follow-up to ensure resolution after appropriate treatment  recommended.      ABDOMEN AND PELVIS:      Apparent persistent distal transverse colon wall thickening  consistent with reportedly known malignancy.      No gross evidence of solid organ injury or free fluid within limits  of unenhanced exam.      Fecal retention. Distal colon diverticulosis.      Other findings as described above.      MACRO:  None.      Signed by: Mary Jane Foreman 6/23/2025 1:32 PM  Dictation workstation:   [id number]  CT cervical spine wo IV contrast  Narrative: Interpreted By:  Mary Jane Foreman,   STUDY:  CT CERVICAL SPINE WO IV CONTRAST;  6/23/2025 1:05 pm      INDICATION:  Signs/Symptoms:fall blood thinners r/o frx.          COMPARISON:  None.      ACCESSION NUMBER(S):  [id number]      ORDERING CLINICIAN:  MURTAZA HINOJOSA      TECHNIQUE:  CT images were obtained through the cervical spine. Sagittal  and  coronal reconstructions were generated.      FINDINGS:          ALIGNMENT:  Mild levocurvature. Straightening of the lower cervical lordosis.  Slight retrolisthesis of C5.      VERTEBRAE/DISC SPACES:  No compression deformity or acute displaced fracture. Multilevel  degenerative changes including atlantoaxial joint space narrowing  spurring, multilevel intervertebral disc space narrowing with  endplate sclerosis and spurring, multilevel bilateral facet joint  narrowing and spurring. At least partial fusion across the anterior  C5-6 vertebra.      ADDITIONAL FINDINGS:  No abnormal thickening of the prevertebral soft tissues.  Dependent  fluid/thickening in the sphenoid sinus. Ill-defined biapical  infiltrates. Small right pleural effusion. Partially included pacer  wires in the left upper chest.      Impression: No cervical vertebral compression deformity or acute displaced  fracture. Multilevel productive/degenerative changes with  straightening of the cervical lordosis and slight spondylolisthesis  at C5.      Paraspinal/soft tissue findings as above.      MACRO:  None.      Signed by: Mary Jane Foreman 6/23/2025 1:16 PM  Dictation workstation:   [id number]  CT head wo IV contrast  Narrative: Interpreted By:  Mary Jane Foreman,   STUDY:  CT HEAD WO IV CONTRAST;  6/23/2025 1:05 pm      INDICATION:  Signs/Symptoms:headache fall blood thinners r/o sah ich mass.          COMPARISON:  08/23/2023      ACCESSION NUMBER(S):  [id number]      ORDERING CLINICIAN:  MURTAZA HINOJOSA      TECHNIQUE:  Unenhanced CT images of the head were obtained.      FINDINGS:  The ventricles, cisterns and sulci are enlarged, consistent with  diffuse volume loss. There are areas of nonspecific white matter  hypodensity, which are probably age related or microvascular in  nature. These findings are similar to the prior exam. There is no  acute intracranial hemorrhage, mass effect or midline shift. No  extraaxial fluid collection.      No acute  displaced calvarial fracture. No focal calvarial lesion.      Left sphenoid sinus dependent fluid/thickening. Remaining visualized  paranasal sinuses are clear. Dense presumed surgical material along  the anterior margin of each globe again seen.      Impression: No acute intracranial hemorrhage or mass-effect.      Mild fluid or mucosal thickening in the left sphenoid sinus..      MACRO:  None.      Signed by: Mary Jane Foreman 6/23/2025 1:08 PM  Dictation workstation:   [id number]        Relevant Cardiac Testing:  Echo 12/21/2024     CONCLUSIONS:   1. Left ventricular ejection fraction is normal, by visual estimate at 60-65%.   2. Abnormal left venticular wall motion.   3. Left ventricular diastolic filling is indeterminate.   4. There is a moderate apical and anteroapical myocardial infarction.   5. There is normal right ventricular global systolic function.   6. There is moderate mitral annular calcification.   7. Moderate mitral valve regurgitation.   8. Moderate to severe tricuspid regurgitation visualized.   9. Moderately elevated right ventricular systolic pressure.  10. Aortic valve sclerosis.        Assessment/Plan    Yesenia Milner is a 89 y.o. female known to Dr. 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 Patient presented to [facility] on 6/23/2025 with syncope and fall. Chronically on aspirin and Plavix. Cardiology has been consulted for syncope    Today, patient seen and assessed resting comfortably in her chair on 2L 02 NC. She has had recent worsening lightheadedness, shortness of breath, and chest heaviness for the past 3-4 weeks. She denies orthopnea or PND. Has noticed worsening lower extremity edema, although none present upon  exam today. She states her lightheadedness occurs with rest and exertion. Her torsemide was increased to 60 mg daily about two months ago. She received 40 mg IV lasix yesterday and denies any improvement in her shortness of breath since admission. She received her normal 60 Mg Torsemide daily dose today.     Syncope   - Patient reports lightheadedness with rest and exertion for the past three weeks. She denies any loss of consciousness until yesterday. She was feeling lightheaded across the room and the had a syncopal episode. Due to ongoing lightheadedness, will recommend to reduce Torsemide to 40 mg daily. Patient may not be tolerating higher dose of diuretic therapy.   - Heart rate was 120 upon admission and /50  - Appears to have some pauses on telemetry review.  Recommend inpatient device check to ensure PPM is properly functioning.   - Echocardiogram to assess structure and function.     Acute on Chronic Diastolic Heart Failure   - Patient report mild shortness of breath and lower extremity edema prior to admission. Patient appears compensated upon exam. She is on 2L 02 NC. No lower extremity edema present upon exam. Reports her shortness of breath was unchanged with IV lasix dose. Concerns for intolerance to higher dose of diuretic therapy. Will reduce Torsemide to 40 mg daily.  - BNP: 548   - CT Chest concerning for bilateral infiltrates vs pleural effusions.   - Strict Intake & Output monitoring  -900 ml this admission   - Repeat echocardiogram ordered.   - Low sodium diet   - Daily weights    Chest Heaviness  - Troponin I High Sensitivity: 19 -> 23 -> 23. Mild chronic elevation without delta pattern.  No acute ischemic changes noted on EKG. Recommend echocardiogram to assess LV Function.     Discussed case with Dr. Hess. Cardiology will continue to follow. Thank you for the Consult. Please reach out with any questions of concerns.    Lexie Wolfe, SAQIB-CNP            [1]   Past Medical  History:  Diagnosis Date    Gross hematuria 10/07/2020    Impacted cerumen 02/22/2024    Other conditions influencing health status     Normal stress echocardiogram    Personal history of other medical treatment     History of echocardiogram    Personal history of other medical treatment     H/O Doppler ultrasound    Systolic CHF (Multi) 05/18/2023   [2]   Current Facility-Administered Medications   Medication Dose Route Frequency Provider Last Rate Last Admin    acetaminophen (Tylenol) tablet 650 mg  650 mg oral q4h PRN Nanci Waterman APRN-CNP        Or    acetaminophen (Tylenol) oral liquid 650 mg  650 mg oral q4h PRN Nanci Waterman APRN-CNP        Or    acetaminophen (Tylenol) suppository 650 mg  650 mg rectal q4h PRN Nanci Waterman APRN-CNP        albuterol 2.5 mg /3 mL (0.083 %) nebulizer solution 2.5 mg  2.5 mg nebulization q2h PRN Petr Lafleur DO        allopurinol (Zyloprim) tablet 100 mg  100 mg oral Daily Nanci Waterman APRN-CNP   100 mg at 06/24/25 0919    aspirin chewable tablet 81 mg  81 mg oral Daily Nanci Waterman APRN-CNP   81 mg at 06/24/25 0919    cefTRIAXone (Rocephin) 1 g in dextrose (iso) IV 50 mL  1 g intravenous q24h Nanci Waterman APRN-CNP   Stopped at 06/23/25 1624    clopidogrel (Plavix) tablet 75 mg  75 mg oral Daily Nanci Waterman APRN-CNP   75 mg at 06/24/25 0919    colchicine tablet 0.6 mg  0.6 mg oral Daily SAQIB Zavala-CNP   0.6 mg at 06/24/25 0920    DULoxetine (Cymbalta) DR capsule 30 mg  30 mg oral Daily Nanci Waterman APRN-CNP   30 mg at 06/24/25 0919    ferrous sulfate 325 mg (65 mg elemental) tablet 1 tablet  65 mg of elemental iron oral Daily with breakfast Nanci Waterman APRN-CNP   1 tablet at 06/24/25 0919    heparin (porcine) injection 5,000 Units  5,000 Units subcutaneous q12h Nanci Waetrman APRN-CNP   5,000 Units at 06/24/25 0319    lactobacillus acidophilus tablet 1 tablet  1 tablet oral BID Nanci Waterman, APRN-CNP   1  tablet at 06/24/25 0919    lubricating eye drops ophthalmic solution 1 drop  1 drop Both Eyes Daily PRN SAQIB Zavala-CNP   1 drop at 06/23/25 1548    metoprolol tartrate (Lopressor) tablet 25 mg  25 mg oral Daily SAQIB Zavala-CNP   25 mg at 06/24/25 0920    oxygen (O2) therapy   inhalation Continuous PRN - O2/gases Petr Lafleur DO        polyethylene glycol (Glycolax, Miralax) packet 17 g  17 g oral BID SAQIB Zavala-CNP   17 g at 06/24/25 0919    sennosides-docusate sodium (Sindy-Colace) 8.6-50 mg per tablet 2 tablet  2 tablet oral BID SELVIN Zavala        torsemide (Demadex) tablet 60 mg  60 mg oral Daily SAQIB Zavala-CNP   60 mg at 06/24/25 0919   [3]   Allergies  Allergen Reactions    Iodine Rash    Shrimp Diarrhea and Nausea/vomiting    Hydrocodone Unknown     Pt does not remember    Adhesive Tape-Silicones Itching

## 2025-06-24 NOTE — CARE PLAN
The patient's goals for the shift include      The clinical goals for the shift include safety and comfort      Problem: Heart Failure  Goal: Improved gas exchange this shift  Outcome: Progressing  Goal: Report improvement of dyspnea/breathlessness this shift  Outcome: Progressing     Problem: Safety - Adult  Goal: Free from fall injury  Outcome: Progressing

## 2025-06-24 NOTE — CARE PLAN
Patient is off the floor for and echo.  I discussed the case with NP Lexie Wolfe.. Tele reviewed shows apparent intermittent pacer non function with up to 3 sec. Pauses.  Pacer interrogation pending.  Dr. Ramicone will also be made aware                                  JLS

## 2025-06-24 NOTE — PROGRESS NOTES
Yesenia Milner is a 89 y.o. female on day 1 of admission presenting with Acute on chronic diastolic congestive heart failure.      Subjective   06/24 -Patient seen and fully examined at bedside, patient ill appearing, acutely complex, marked decline from baseline. Acute on chronic diastolic congestive heart failure, syncope- cardiology consulted, telemetry, frequent vitals, cardiac device check, appreciate input.Constipation- bowel protocol ordered. Patient remains hospitalized for acute onset with complex medical and pharmacological management. Will continue antibiotics for sepsis, leukocytosis. Will continue with empiric coverage, cultures in process, await final results.  Continue to monitor overnight and repeat labs in the morning. Slow but progressive improvements noted.   High Complexity with updates to multiple problems, adjustments to treatment plan, and need for ongoing inpatient care.   Long discussion on goals of care with patient and family, will continue current and await diagnostic findings.  Patient reports: no new complaints, decline from baseline, weakness         Objective     Last Recorded Vitals  /58 (BP Location: Left arm, Patient Position: Lying)   Pulse 70   Temp 36.3 °C (97.3 °F) (Temporal)   Resp 19   Wt 69.9 kg (154 lb)   SpO2 95%   Intake/Output last 3 Shifts:    Intake/Output Summary (Last 24 hours) at 6/24/2025 1023  Last data filed at 6/24/2025 0816  Gross per 24 hour   Intake 286 ml   Output 900 ml   Net -614 ml       Admission Weight  Weight: 69.9 kg (154 lb) (06/23/25 1227)    Daily Weight  06/23/25 : 69.9 kg (154 lb)    Image Results  CT chest abdomen pelvis wo IV contrast  Narrative: Interpreted By:  Mary Jane Foreman,   STUDY:  CT CHEST ABDOMEN PELVIS WO CONTRAST;  6/23/2025 1:05 pm      INDICATION:  Signs/Symptoms:abdominal pain prior cancer syncope r/o mass, nephro  jesse traumatic injury.          COMPARISON:  CT chest 03/21/2025, CT abdomen and pelvis 01/12/2025       ACCESSION NUMBER(S):  [id number]      ORDERING CLINICIAN:  MURTAZA HINOJOSA      TECHNIQUE:  CT of the chest, abdomen, and pelvis was performed. Sagittal and  coronal reconstructions were generated. No intravenous contrast given  for the examination.      FINDINGS:  CHEST:      Hilar, vessel, and solid organ evaluation limited without IV contrast.      CHEST WALL AND LOWER NECK: Metallic streak artifact from port in the  left anterior chest wall limits assessment of adjacent structures. No  gross axillary adenopathy as visualized. Slightly prominent  heterogenous right thyroid lobe.      MEDIASTINUM AND HARITHA:  Limited hilar assessment on unenhanced exam.  No significant mediastinal or hilar adenopathy within limits of  unenhanced study. Mild gaseous distention of the proximal esophagus.      HEART AND VESSELS:  Lack of IV contrast precludes vascular luminal  assessment. The heart is normal in size. No significant pericardial  effusion. Pacer wires in the right side of the heart. Multifocal  atherosclerotic calcifications.      LUNGS, PLEURA, LARGE AIRWAYS:  Mild motion artifact in the lower  chest. Pulmonary heterogeneity including scattered bilateral  ground-glass infiltrates and reticular densities most prominent in  the lung bases. Moderate right and small to moderate left pleural  effusions and presumed compressive atelectasis of the adjacent lung  bases. Slight fluid/thickening along the superior right main fissure.  No pneumothorax. The central airways are grossly patent.      BONES:  Kyphosis and degenerative endplate spurring in the thoracic  spine.          ABDOMEN/PELVIS:      Solid organ and vessel evaluation limited without IV contrast.  Artifact related to overlying upper extremities.      ABDOMINAL ORGANS:      LIVER: No focal lesion within limits of unenhanced exam      GALL BLADDER AND BILIARY TREE: No calcified gallstone      SPLEEN: No focal lesion within limits of unenhanced exam      PANCREAS:  No focal lesion within limits of unenhanced exam      ADRENALS: No adrenal mass      KIDNEYS AND URETERS: No renal mass or hydronephrosis within limits of  unenhanced exam      BOWEL: No abnormally dilated large or small bowel loops. Moderate  fecal residue. Distal colon diverticulosis. Within limits of  unenhanced exam probable persistent circumferential wall thickening  in the distal transverse colon for example image 110/201, as noted on  the prior exam.      PERITONEUM, RETROPERITONEUM, NODES: No significant free fluid. No  free air. No significant retroperitoneal lymphadenopathy within  limits of unenhanced exam. Slight increased density in the lower  abdominal mesentery.      VESSELS:  Lack of IV contrast precludes vascular luminal assessment.  Multifocal atherosclerotic calcifications. No abdominal aortic  aneurysm.      PELVIS: Urinary bladder is moderately distended and grossly normal in  contour. 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      ABDOMINAL WALL: No sizable abdominal wall hernia.      BONES: Osteopenia. Multifocal degenerative changes. Mild scoliosis of  the lumbar spine.      Impression: CHEST:      Bilateral infiltrates and right-greater-than-left pleural effusions.  Differential includes CHF and pneumonia. Clinical correlation and  follow-up to ensure resolution after appropriate treatment  recommended.      ABDOMEN AND PELVIS:      Apparent persistent distal transverse colon wall thickening  consistent with reportedly known malignancy.      No gross evidence of solid organ injury or free fluid within limits  of unenhanced exam.      Fecal retention. Distal colon diverticulosis.      Other findings as described above.      MACRO:  None.      Signed by: Mary Jane Foreman 6/23/2025 1:32 PM  Dictation workstation:   [id number]  CT cervical spine wo IV contrast  Narrative: Interpreted By:  Mary Jane Foreman,   STUDY:  CT CERVICAL SPINE WO IV CONTRAST;  6/23/2025 1:05 pm       INDICATION:  Signs/Symptoms:fall blood thinners r/o frx.          COMPARISON:  None.      ACCESSION NUMBER(S):  [id number]      ORDERING CLINICIAN:  MURTAZA HINOJOSA      TECHNIQUE:  CT images were obtained through the cervical spine. Sagittal and  coronal reconstructions were generated.      FINDINGS:          ALIGNMENT:  Mild levocurvature. Straightening of the lower cervical lordosis.  Slight retrolisthesis of C5.      VERTEBRAE/DISC SPACES:  No compression deformity or acute displaced fracture. Multilevel  degenerative changes including atlantoaxial joint space narrowing  spurring, multilevel intervertebral disc space narrowing with  endplate sclerosis and spurring, multilevel bilateral facet joint  narrowing and spurring. At least partial fusion across the anterior  C5-6 vertebra.      ADDITIONAL FINDINGS:  No abnormal thickening of the prevertebral soft tissues.  Dependent  fluid/thickening in the sphenoid sinus. Ill-defined biapical  infiltrates. Small right pleural effusion. Partially included pacer  wires in the left upper chest.      Impression: No cervical vertebral compression deformity or acute displaced  fracture. Multilevel productive/degenerative changes with  straightening of the cervical lordosis and slight spondylolisthesis  at C5.      Paraspinal/soft tissue findings as above.      MACRO:  None.      Signed by: Mary Jane Foreman 6/23/2025 1:16 PM  Dictation workstation:   [id number]  CT head wo IV contrast  Narrative: Interpreted By:  Mary Jane Foreman,   STUDY:  CT HEAD WO IV CONTRAST;  6/23/2025 1:05 pm      INDICATION:  Signs/Symptoms:headache fall blood thinners r/o sah ich mass.          COMPARISON:  08/23/2023      ACCESSION NUMBER(S):  [id number]      ORDERING CLINICIAN:  MURTAZA HINOJOSA      TECHNIQUE:  Unenhanced CT images of the head were obtained.      FINDINGS:  The ventricles, cisterns and sulci are enlarged, consistent with  diffuse volume loss. There are areas of nonspecific white  matter  hypodensity, which are probably age related or microvascular in  nature. These findings are similar to the prior exam. There is no  acute intracranial hemorrhage, mass effect or midline shift. No  extraaxial fluid collection.      No acute displaced calvarial fracture. No focal calvarial lesion.      Left sphenoid sinus dependent fluid/thickening. Remaining visualized  paranasal sinuses are clear. Dense presumed surgical material along  the anterior margin of each globe again seen.      Impression: No acute intracranial hemorrhage or mass-effect.      Mild fluid or mucosal thickening in the left sphenoid sinus..      MACRO:  None.      Signed by: Mary Jane Foreman 6/23/2025 1:08 PM  Dictation workstation:   [id number]     Riya Urena  Coordinator  Internal Medicine     H&P      Incomplete     Date of Service: 6/23/2025  2:22 PM     Incomplete         History Of Present Illness  Yesenia Milner is a 89 y.o. 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  Patient has had multiple admissions for congestive heart failure exacerbation who presented today after a syncopal episode.  Patient notes she was gathering her clothing to get ready for the day when she became lightheaded and passed out.  She denies any injury from the fall.  She notes over the past 2 weeks she has been experiencing a stuffy nose and a cough upon wakening in the morning.  She otherwise denies any fevers, chills, chest pain, change in her chronic shortness of breath with exertion, abdominal pain, nausea, vomiting, diarrhea, or dysuria.  She denies any change in her chronic intermittent bilateral lower  extremity edema.  She denies any weight gain.  She denies any recent medication changes.  She reports compliance with her medications.  She is on her baseline oxygen at 2 L.  She notes her cardiologist is Dr. Ramicone.  In regards to her colon cancer, family notes that patient was deemed a risk for surgery via cardiology and surgeon, Dr. Vazquez, recommended patient speak with Dr. Ramicone in regards to her risk for surgery before he would schedule her for an operation.  They noted that her appointment is not until the fall and they are hopeful to speak with him this admission about her risk for surgery.  Patient notes she lives alone and uses a walker.  CODE STATUS discussed and patient became tearful during conversation.  She would like to remain a full code at this time and referred to family if an emergency would arise that she was incapacitated to answer for herself.     In the ED lab work, EKG, head CT, C-spine CT and CT chest/abdomen/pelvis were performed. Labs revealed troponin 19 (appears baseline with previous result 21 in March 2025), H&H 10.1 and 31.3 (within patient's recent baseline),  (310 in March 2025).  EKG, per ER physician impression revealed Dual paced rhythm. Motion artifact present. Irregular rate. Normal IN, wide QRS and Qtc intervals. No ST segment elevations, depressions, or T wave inversions. Compared to March 2025 imaging positive for fluid/mucosal thickening of the left sphenoid sinus.  Bilateral infiltrates and right greater than left pleural effusions. Moderate fecal residue.  Heart rate was 120 on arrival, vitals were otherwise stable.    Echo 12/21/2024:     CONCLUSIONS:   1. Left ventricular ejection fraction is normal, by visual estimate at 60-65%.   2. Abnormal left venticular wall motion.   3. Left ventricular diastolic filling is indeterminate.   4. There is a moderate apical and anteroapical myocardial infarction.   5. There is normal right ventricular global systolic  function.   6. There is moderate mitral annular calcification.   7. Moderate mitral valve regurgitation.   8. Moderate to severe tricuspid regurgitation visualized.   9. Moderately elevated right ventricular systolic pressure.  10. Aortic valve sclerosis.     Carotid duplex 2019:     Right Carotid: Findings are consistent with less than 50% stenosis of the right proximal ICA. Laminar flow seen by color Doppler. Right external carotid artery appears patent with no evidence of stenosis. The right vertebral artery is patent with antegrade flow. No evidence of hemodynamically significant stenosis in the right subclavian.  Left Carotid: Findings are consistent with less than 50% stenosis of the left proximal ICA. Laminar flow seen by color Doppler. Left external carotid artery appears patent with no evidence of stenosis. The left vertebral artery is patent with antegrade flow.  No evidence of hemodynamically significant stenosis in the left subclavian.        Past Medical History  [Medical History]    [Medical History]  Past Medical History       Diagnosis Date    Gross hematuria 10/07/2020    Impacted cerumen 02/22/2024    Other conditions influencing health status       Normal stress echocardiogram    Personal history of other medical treatment       History of echocardiogram    Personal history of other medical treatment       H/O Doppler ultrasound    Systolic CHF (Multi) 05/18/2023        Surgical History  [Surgical History]    [Surgical History]  Past Surgical History        Procedure Laterality Date    APPENDECTOMY   11/22/2017     Appendectomy    CARDIAC CATHETERIZATION N/A 12/20/2024     Procedure: Left Heart Cath, With LV;  Surgeon: Tahir Ruelas MD;  Location: [facility] Cardiac [facility];  Service: Cardiovascular;  Laterality: N/A;    CARDIAC PACEMAKER PLACEMENT   03/11/2021     Pacemaker Placement    HYSTERECTOMY   11/22/2017     Hysterectomy    IR ANGIOGRAM INFERIOR EPIGASTRIC PELVIC   12/14/2020     IR ANGIOGRAM  INFERIOR EPIGASTRIC PELVIC 12/14/2020 [facility]    IR ANGIOGRAM INFERIOR EPIGASTRIC PELVIC   12/14/2020     IR ANGIOGRAM INFERIOR EPIGASTRIC PELVIC 12/14/2020 [facility]    IR ANGIOGRAM RENAL BILATERAL Bilateral 12/14/2020     IR ANGIOGRAM RENAL BILATERAL 12/14/2020 [facility]    OTHER SURGICAL HISTORY   11/22/2017     Stab Phlebectomy Of Varicose Veins    OTHER SURGICAL HISTORY   11/22/2017     Venous Ligation With Stripping    TONSILLECTOMY   11/22/2017     Tonsillectomy        Social History  She reports that she quit smoking about 52 years ago. Her smoking use included cigarettes. She has been exposed to tobacco smoke. She has never used smokeless tobacco. She reports that she does not drink alcohol and does not use drugs.     Family History  [Family History]    [Family History]         Problem Relation Name Age of Onset    No Known Problems Mother            Allergies  Iodine, Shrimp, Hydrocodone, and Adhesive tape-silicones     10 point review systems performed and is negative besides what is stated in HPI     Physical Exam  HENT:      Head: Normocephalic and atraumatic.      Nose: Nose normal.      Mouth/Throat:      Mouth: Mucous membranes are dry.   Eyes:      Conjunctiva/sclera: Conjunctivae normal.   Cardiovascular:      Rate and Rhythm: Normal rate. Rhythm irregular.      Heart sounds: Murmur heard.   Pulmonary:      Effort: Pulmonary effort is normal.      Breath sounds: Rales present.   Abdominal:      General: Bowel sounds are normal.      Tenderness: There is abdominal tenderness.   Musculoskeletal:         General: Normal range of motion.      Cervical back: Neck supple.   Skin:     General: Skin is warm and dry.   Neurological:      Mental Status: She is alert. Mental status is at baseline.   Psychiatric:         Mood and Affect: Mood normal.            Last Recorded Vitals  Blood pressure 114/58, pulse 70, temperature 36.3 °C (97.3 °F), temperature source Temporal, resp. rate 19,  "height 1.626 m (5' 4\"), weight 69.9 kg (154 lb), SpO2 95%.     Relevant Results     [Medications Ordered Prior to Encounter]     [Medications Ordered Prior to Encounter]  No current facility-administered medications on file prior to encounter.             Current Outpatient Medications on File Prior to Encounter   Medication Sig Dispense Refill    allopurinol (Zyloprim) 100 mg tablet Take 1 tablet (100 mg) by mouth once daily. 90 tablet 0    aspirin 81 mg chewable tablet Chew 1 tablet (81 mg) once daily.        calcium carbonate-vitamin D3 (Calcium 600 + D,3,) 600 mg-5 mcg (200 unit) tablet Take 1 tablet by mouth once daily.        clopidogrel (Plavix) 75 mg tablet Take 1 tablet by mouth once daily 90 tablet 0    colchicine 0.6 mg tablet Take 1 tablet (0.6 mg) by mouth once daily. 90 tablet 3    DULoxetine (Cymbalta) 30 mg DR capsule Take 1 capsule (30 mg) by mouth once daily. Do not crush or chew. 90 capsule 1    ferrous sulfate 325 (65 Fe) mg EC tablet Take 1 tablet by mouth once daily. Do not crush, chew, or split.        fluticasone propion-salmeteroL (Advair) 115-21 mcg/actuation inhaler Inhale 2 puffs 2 times a day. Rinse mouth with water after use to reduce aftertaste and incidence of candidiasis. Do not swallow. 12 g 11    metoprolol tartrate (Lopressor) 25 mg tablet Take 1 tablet (25 mg) by mouth once daily. 30 tablet 0    polyethylene glycol (Glycolax, Miralax) 17 gram/dose powder Mix 1 capful (17 g) of powder with 4 to 8 ounces of water or juice and drink 2 times a day. 1010 g 1    torsemide (Demadex) 20 mg tablet Take 3 tablets (60 mg) by mouth once daily. 90 tablet 11    acetaminophen (Tylenol) 325 mg tablet Take 2 tablets (650 mg) by mouth every 4 hours if needed for mild pain (1 - 3). (Patient not taking: Reported on 6/23/2025)        albuterol 2.5 mg /3 mL (0.083 %) nebulizer solution Take 3 mL (2.5 mg) by nebulization every 4 hours if needed for wheezing. 75 mL 1    enoxaparin (Lovenox) 40 mg/0.4 mL " syringe Inject 0.4 mL (40 mg) under the skin once every 24 hours. (Patient not taking: Reported on 6/23/2025)        guaiFENesin (Mucinex) 600 mg 12 hr tablet Take 1 tablet (600 mg) by mouth 2 times a day as needed for cough. Do not crush, chew, or split. (Patient not taking: Reported on 6/23/2025)        hydrocortisone 1 % lotion Apply topically 2 times a day. Apply to both legs , wash hands after applying lotion (Patient not taking: Reported on 6/23/2025) 60 mL 0    lubricating eye drops ophthalmic solution Administer 1 drop into both eyes once daily as needed for dry eyes.        nebulizers misc With tubing and mask.   Use as directed 1 each 0    [DISCONTINUED] ipratropium-albuteroL (Duo-Neb) 0.5-2.5 mg/3 mL nebulizer solution Take 3 mL by nebulization every 6 hours.                  Results for orders placed or performed during the hospital encounter of 06/23/25 (from the past 24 hours)   CBC and Auto Differential   Result Value Ref Range     WBC 6.0 4.4 - 11.3 x10*3/uL     nRBC 0.0 0.0 - 0.0 /100 WBCs     RBC 3.37 (L) 4.00 - 5.20 x10*6/uL     Hemoglobin 10.1 (L) 12.0 - 16.0 g/dL     Hematocrit 31.3 (L) 36.0 - 46.0 %     MCV 93 80 - 100 fL     MCH 30.0 26.0 - 34.0 pg     MCHC 32.3 32.0 - 36.0 g/dL     RDW 15.9 (H) 11.5 - 14.5 %     Platelets 206 150 - 450 x10*3/uL     Neutrophils % 69.9 40.0 - 80.0 %     Immature Granulocytes %, Automated 0.5 0.0 - 0.9 %     Lymphocytes % 16.6 13.0 - 44.0 %     Monocytes % 10.7 2.0 - 10.0 %     Eosinophils % 2.0 0.0 - 6.0 %     Basophils % 0.3 0.0 - 2.0 %     Neutrophils Absolute 4.18 1.60 - 5.50 x10*3/uL     Immature Granulocytes Absolute, Automated 0.03 0.00 - 0.50 x10*3/uL     Lymphocytes Absolute 0.99 0.80 - 3.00 x10*3/uL     Monocytes Absolute 0.64 0.05 - 0.80 x10*3/uL     Eosinophils Absolute 0.12 0.00 - 0.40 x10*3/uL     Basophils Absolute 0.02 0.00 - 0.10 x10*3/uL   Comprehensive metabolic panel   Result Value Ref Range     Glucose 116 (H) 74 - 99 mg/dL     Sodium 138  136 - 145 mmol/L     Potassium 3.6 3.5 - 5.3 mmol/L     Chloride 99 98 - 107 mmol/L     Bicarbonate 27 21 - 32 mmol/L     Anion Gap 16 10 - 20 mmol/L     Urea Nitrogen 30 (H) 6 - 23 mg/dL     Creatinine 1.00 0.50 - 1.05 mg/dL     eGFR 53 (L) >60 mL/min/1.73m*2     Calcium 9.6 8.6 - 10.3 mg/dL     Albumin 3.8 3.4 - 5.0 g/dL     Alkaline Phosphatase 89 33 - 136 U/L     Total Protein 6.3 (L) 6.4 - 8.2 g/dL     AST 24 9 - 39 U/L     Bilirubin, Total 0.6 0.0 - 1.2 mg/dL     ALT 17 7 - 45 U/L   Lipase   Result Value Ref Range     Lipase 18 9 - 82 U/L   Magnesium   Result Value Ref Range     Magnesium 2.24 1.60 - 2.40 mg/dL   aPTT   Result Value Ref Range     aPTT 26 26 - 36 seconds   Protime-INR   Result Value Ref Range     Protime 12.4 9.8 - 12.4 seconds     INR 1.1 0.9 - 1.1   B-Type Natriuretic Peptide   Result Value Ref Range      (H) 0 - 99 pg/mL   Troponin I, High Sensitivity, Initial   Result Value Ref Range     Troponin I, High Sensitivity 19 (H) 0 - 13 ng/L   Troponin, High Sensitivity, 1 Hour   Result Value Ref Range     Troponin I, High Sensitivity 23 (H) 0 - 13 ng/L   Sars-CoV-2, Influenza A/B and RSV PCR   Result Value Ref Range     Coronavirus 2019, PCR Not Detected Not Detected     Flu A Result Not Detected Not Detected     Flu B Result Not Detected Not Detected     RSV PCR Not Detected Not Detected   Troponin I, High Sensitivity   Result Value Ref Range     Troponin I, High Sensitivity 22 (H) 0 - 13 ng/L   Urinalysis with Reflex Culture and Microscopic   Result Value Ref Range     Color, Urine Light-Yellow Light-Yellow, Yellow, Dark-Yellow     Appearance, Urine Clear Clear     Specific Gravity, Urine 1.010 1.005 - 1.035     pH, Urine 7.0 5.0, 5.5, 6.0, 6.5, 7.0, 7.5, 8.0     Protein, Urine NEGATIVE NEGATIVE, 10 (TRACE), 20 (TRACE) mg/dL     Glucose, Urine Normal Normal mg/dL     Blood, Urine NEGATIVE NEGATIVE mg/dL     Ketones, Urine NEGATIVE NEGATIVE mg/dL     Bilirubin, Urine NEGATIVE NEGATIVE  mg/dL     Urobilinogen, Urine Normal Normal mg/dL     Nitrite, Urine NEGATIVE NEGATIVE     Leukocyte Esterase, Urine NEGATIVE NEGATIVE   CBC   Result Value Ref Range     WBC 4.7 4.4 - 11.3 x10*3/uL     nRBC 0.0 0.0 - 0.0 /100 WBCs     RBC 3.41 (L) 4.00 - 5.20 x10*6/uL     Hemoglobin 10.1 (L) 12.0 - 16.0 g/dL     Hematocrit 32.0 (L) 36.0 - 46.0 %     MCV 94 80 - 100 fL     MCH 29.6 26.0 - 34.0 pg     MCHC 31.6 (L) 32.0 - 36.0 g/dL     RDW 15.8 (H) 11.5 - 14.5 %     Platelets 215 150 - 450 x10*3/uL   Coagulation Screen   Result Value Ref Range     Protime 12.8 (H) 9.8 - 12.4 seconds     INR 1.2 (H) 0.9 - 1.1     aPTT 36 26 - 36 seconds   Basic Metabolic Panel   Result Value Ref Range     Glucose 98 74 - 99 mg/dL     Sodium 140 136 - 145 mmol/L     Potassium 3.6 3.5 - 5.3 mmol/L     Chloride 100 98 - 107 mmol/L     Bicarbonate 30 21 - 32 mmol/L     Anion Gap 14 10 - 20 mmol/L     Urea Nitrogen 28 (H) 6 - 23 mg/dL     Creatinine 0.95 0.50 - 1.05 mg/dL     eGFR 57 (L) >60 mL/min/1.73m*2     Calcium 9.1 8.6 - 10.3 mg/dL   Lipid Panel   Result Value Ref Range     Cholesterol 119 0 - 199 mg/dL     HDL-Cholesterol 49.2 mg/dL     Cholesterol/HDL Ratio 2.4       LDL Calculated 59 <=99 mg/dL     VLDL 11 0 - 40 mg/dL     Triglycerides 56 0 - 149 mg/dL     Non HDL Cholesterol 70 0 - 149 mg/dL      *Note: Due to a large number of results and/or encounters for the requested time period, some results have not been displayed. A complete set of results can be found in Results Review.         CT chest abdomen pelvis wo IV contrast  Result Date: 6/23/2025  Interpreted By:  Mary Jane Foreman, STUDY: CT CHEST ABDOMEN PELVIS WO CONTRAST;  6/23/2025 1:05 pm   INDICATION: Signs/Symptoms:abdominal pain prior cancer syncope r/o mass, nephro jesse traumatic injury.     COMPARISON: CT chest 03/21/2025, CT abdomen and pelvis 01/12/2025   ACCESSION NUMBER(S): [id number]   ORDERING CLINICIAN: MURTAZA HINOJOSA   TECHNIQUE: CT of the chest, abdomen, and  pelvis was performed. Sagittal and coronal reconstructions were generated. No intravenous contrast given for the examination.   FINDINGS: CHEST:   Hilar, vessel, and solid organ evaluation limited without IV contrast.   CHEST WALL AND LOWER NECK: Metallic streak artifact from port in the left anterior chest wall limits assessment of adjacent structures. No gross axillary adenopathy as visualized. Slightly prominent heterogenous right thyroid lobe.   MEDIASTINUM AND HARITHA:  Limited hilar assessment on unenhanced exam. No significant mediastinal or hilar adenopathy within limits of unenhanced study. Mild gaseous distention of the proximal esophagus.   HEART AND VESSELS:  Lack of IV contrast precludes vascular luminal assessment. The heart is normal in size. No significant pericardial effusion. Pacer wires in the right side of the heart. Multifocal atherosclerotic calcifications.   LUNGS, PLEURA, LARGE AIRWAYS:  Mild motion artifact in the lower chest. Pulmonary heterogeneity including scattered bilateral ground-glass infiltrates and reticular densities most prominent in the lung bases. Moderate right and small to moderate left pleural effusions and presumed compressive atelectasis of the adjacent lung bases. Slight fluid/thickening along the superior right main fissure. No pneumothorax. The central airways are grossly patent.   BONES:  Kyphosis and degenerative endplate spurring in the thoracic spine.     ABDOMEN/PELVIS:   Solid organ and vessel evaluation limited without IV contrast. Artifact related to overlying upper extremities.   ABDOMINAL ORGANS:   LIVER: No focal lesion within limits of unenhanced exam   GALL BLADDER AND BILIARY TREE: No calcified gallstone   SPLEEN: No focal lesion within limits of unenhanced exam   PANCREAS: No focal lesion within limits of unenhanced exam   ADRENALS: No adrenal mass   KIDNEYS AND URETERS: No renal mass or hydronephrosis within limits of unenhanced exam   BOWEL: No abnormally  dilated large or small bowel loops. Moderate fecal residue. Distal colon diverticulosis. Within limits of unenhanced exam probable persistent circumferential wall thickening in the distal transverse colon for example image 110/201, as noted on the prior exam.   PERITONEUM, RETROPERITONEUM, NODES: No significant free fluid. No free air. No significant retroperitoneal lymphadenopathy within limits of unenhanced exam. Slight increased density in the lower abdominal mesentery.   VESSELS:  Lack of IV contrast precludes vascular luminal assessment. Multifocal atherosclerotic calcifications. No abdominal aortic aneurysm.   PELVIS: Urinary bladder is moderately distended and grossly normal in contour. 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   ABDOMINAL WALL: No sizable abdominal wall hernia.   BONES: Osteopenia. Multifocal degenerative changes. Mild scoliosis of the lumbar spine.        CHEST:   Bilateral infiltrates and right-greater-than-left pleural effusions. Differential includes CHF and pneumonia. Clinical correlation and follow-up to ensure resolution after appropriate treatment recommended.   ABDOMEN AND PELVIS:   Apparent persistent distal transverse colon wall thickening consistent with reportedly known malignancy.   No gross evidence of solid organ injury or free fluid within limits of unenhanced exam.   Fecal retention. Distal colon diverticulosis.   Other findings as described above.   MACRO: None.   Signed by: Mary Jane Foreman 6/23/2025 1:32 PM Dictation workstation:   [id number]     CT cervical spine wo IV contrast  Result Date: 6/23/2025  Interpreted By:  Mary Jane Foreman, STUDY: CT CERVICAL SPINE WO IV CONTRAST;  6/23/2025 1:05 pm   INDICATION: Signs/Symptoms:fall blood thinners r/o frx.     COMPARISON: None.   ACCESSION NUMBER(S): [id number]   ORDERING CLINICIAN: MURTAZA HINOJOSA   TECHNIQUE: CT images were obtained through the cervical spine. Sagittal and coronal reconstructions were generated.   FINDINGS:      ALIGNMENT: Mild levocurvature. Straightening of the lower cervical lordosis. Slight retrolisthesis of C5.   VERTEBRAE/DISC SPACES: No compression deformity or acute displaced fracture. Multilevel degenerative changes including atlantoaxial joint space narrowing spurring, multilevel intervertebral disc space narrowing with endplate sclerosis and spurring, multilevel bilateral facet joint narrowing and spurring. At least partial fusion across the anterior C5-6 vertebra.   ADDITIONAL FINDINGS: No abnormal thickening of the prevertebral soft tissues.  Dependent fluid/thickening in the sphenoid sinus. Ill-defined biapical infiltrates. Small right pleural effusion. Partially included pacer wires in the left upper chest.        No cervical vertebral compression deformity or acute displaced fracture. Multilevel productive/degenerative changes with straightening of the cervical lordosis and slight spondylolisthesis at C5.   Paraspinal/soft tissue findings as above.   MACRO: None.   Signed by: Mary Jane Foreman 6/23/2025 1:16 PM Dictation workstation:   [id number]     CT head wo IV contrast  Result Date: 6/23/2025  Interpreted By:  Mary Jane Foreman, STUDY: CT HEAD WO IV CONTRAST;  6/23/2025 1:05 pm   INDICATION: Signs/Symptoms:headache fall blood thinners r/o sah ich mass.     COMPARISON: 08/23/2023   ACCESSION NUMBER(S): [id number]   ORDERING CLINICIAN: MURTAZA HINOJOSA   TECHNIQUE: Unenhanced CT images of the head were obtained.   FINDINGS: The ventricles, cisterns and sulci are enlarged, consistent with diffuse volume loss. There are areas of nonspecific white matter hypodensity, which are probably age related or microvascular in nature. These findings are similar to the prior exam. There is no acute intracranial hemorrhage, mass effect or midline shift. No extraaxial fluid collection.   No acute displaced calvarial fracture. No focal calvarial lesion.   Left sphenoid sinus dependent fluid/thickening. Remaining visualized  paranasal sinuses are clear. Dense presumed surgical material along the anterior margin of each globe again seen.        No acute intracranial hemorrhage or mass-effect.   Mild fluid or mucosal thickening in the left sphenoid sinus..   MACRO: None.   Signed by: Mary Jane Foreman 6/23/2025 1:08 PM Dictation workstation:   [id number]        Assessment & Plan  Acute on chronic diastolic congestive heart failure     Acute bacterial sinusitis     Syncope     Advance care planning     Constipation           Admit to telemetry  Inpatient  Appreciate cardiology recommendations  I am not sure if patient's CHF represents more of a chronic condition so we will give 40 mg IV Lasix x 1 and resume torsemide tomorrow and look to cardiology for further recommendations  Please see echo from 2024 and carotid duplex from 2029 above  Start Rocephin 1 g IV daily  Orthostatic vital signs  Repeat EKG and troponin pacer check  Continue home aspirin and Plavix  Daily weight  Intake and output  AM CBC, BMP, coags, lipids  Check flu and COVID  Urinalysis pending  PT/OT     Chronic conditions: HFpEF, MI, 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 Plan discussed with interdisciplinary team, consults placed, appreciate input. Will continue current and repeat labs in the AM.          Discharge planning discussed with patient and care team. Therapy evaluations  ordered. Patient aware and agreeable to current plan, continue plan as above.      I spent a total of 75 minutes on the date of the service which included preparing to see the patient, face-to-face patient care, completing clinical documentation, obtaining and/or reviewing separately obtained history, performing a medically appropriate examination, counseling and educating the patient/family/caregiver, ordering medications, tests, or procedures, communicating with other HCPs (not separately reported), independently interpreting results (not separately reported), communicating results to the patient/family/caregiver, and care coordination (not separately reported).            Riya Urena                      Physical Exam    General: in no acute distress  Eyes: PERRLA   HENT: Normo-cephalic, atraumatic.   CV: Irregular rate, irregular rhythm.   Resp: Breathing non-labored,  diminished to auscultation bilaterally  GI: BS x4   : monitor intake and output  MSK/Extremities: No gross bony deformities. PT/OT on the case  Skin: Warm. Appropriate color, continue offloading  Neuro:  Face symmetric.   Psych: returning to baseline, improved     10 point ROS negative unless otherwise noted in HPI    Patient fully evaluated 06/24  for    Problem List Items Addressed This Visit       Pleural effusion    * (Principal) Acute on chronic diastolic congestive heart failure - Primary    Relevant Medications    clopidogrel (Plavix) tablet 75 mg    metoprolol tartrate (Lopressor) tablet 25 mg    Other Relevant Orders    Referral to Healthy at Home Program    Syncope    Relevant Orders    Cardiac device check - Inpatient    Referral to Healthy at Home Program     Brought to hospital - had concerns including Fall.  Patient seen resting in bed with head of bed elevated, no s/s or c/o acute difficulties at this time.  Vital signs for last 24 hours Temp:  [35.8 °C (96.4 °F)-36.6 °C (97.9 °F)] 36.3 °C (97.3 °F)  Heart Rate:  []  70  Resp:  [16-20] 19  BP: ()/(50-63) 114/58    I/O this shift:  In: 236 [P.O.:236]  Out: -   Patient still requiring frequent cardiac and SPO2 monitoring. Continue aggressive pulmonary hygiene and oral hygiene. Off loading as tolerated for skin integrity. Medications and labs reviewed-   Results for orders placed or performed during the hospital encounter of 06/23/25 (from the past 24 hours)   CBC and Auto Differential   Result Value Ref Range    WBC 6.0 4.4 - 11.3 x10*3/uL    nRBC 0.0 0.0 - 0.0 /100 WBCs    RBC 3.37 (L) 4.00 - 5.20 x10*6/uL    Hemoglobin 10.1 (L) 12.0 - 16.0 g/dL    Hematocrit 31.3 (L) 36.0 - 46.0 %    MCV 93 80 - 100 fL    MCH 30.0 26.0 - 34.0 pg    MCHC 32.3 32.0 - 36.0 g/dL    RDW 15.9 (H) 11.5 - 14.5 %    Platelets 206 150 - 450 x10*3/uL    Neutrophils % 69.9 40.0 - 80.0 %    Immature Granulocytes %, Automated 0.5 0.0 - 0.9 %    Lymphocytes % 16.6 13.0 - 44.0 %    Monocytes % 10.7 2.0 - 10.0 %    Eosinophils % 2.0 0.0 - 6.0 %    Basophils % 0.3 0.0 - 2.0 %    Neutrophils Absolute 4.18 1.60 - 5.50 x10*3/uL    Immature Granulocytes Absolute, Automated 0.03 0.00 - 0.50 x10*3/uL    Lymphocytes Absolute 0.99 0.80 - 3.00 x10*3/uL    Monocytes Absolute 0.64 0.05 - 0.80 x10*3/uL    Eosinophils Absolute 0.12 0.00 - 0.40 x10*3/uL    Basophils Absolute 0.02 0.00 - 0.10 x10*3/uL   Comprehensive metabolic panel   Result Value Ref Range    Glucose 116 (H) 74 - 99 mg/dL    Sodium 138 136 - 145 mmol/L    Potassium 3.6 3.5 - 5.3 mmol/L    Chloride 99 98 - 107 mmol/L    Bicarbonate 27 21 - 32 mmol/L    Anion Gap 16 10 - 20 mmol/L    Urea Nitrogen 30 (H) 6 - 23 mg/dL    Creatinine 1.00 0.50 - 1.05 mg/dL    eGFR 53 (L) >60 mL/min/1.73m*2    Calcium 9.6 8.6 - 10.3 mg/dL    Albumin 3.8 3.4 - 5.0 g/dL    Alkaline Phosphatase 89 33 - 136 U/L    Total Protein 6.3 (L) 6.4 - 8.2 g/dL    AST 24 9 - 39 U/L    Bilirubin, Total 0.6 0.0 - 1.2 mg/dL    ALT 17 7 - 45 U/L   Lipase   Result Value Ref Range    Lipase 18 9 -  82 U/L   Magnesium   Result Value Ref Range    Magnesium 2.24 1.60 - 2.40 mg/dL   aPTT   Result Value Ref Range    aPTT 26 26 - 36 seconds   Protime-INR   Result Value Ref Range    Protime 12.4 9.8 - 12.4 seconds    INR 1.1 0.9 - 1.1   B-Type Natriuretic Peptide   Result Value Ref Range     (H) 0 - 99 pg/mL   Troponin I, High Sensitivity, Initial   Result Value Ref Range    Troponin I, High Sensitivity 19 (H) 0 - 13 ng/L   Troponin, High Sensitivity, 1 Hour   Result Value Ref Range    Troponin I, High Sensitivity 23 (H) 0 - 13 ng/L   Sars-CoV-2, Influenza A/B and RSV PCR   Result Value Ref Range    Coronavirus 2019, PCR Not Detected Not Detected    Flu A Result Not Detected Not Detected    Flu B Result Not Detected Not Detected    RSV PCR Not Detected Not Detected   Troponin I, High Sensitivity   Result Value Ref Range    Troponin I, High Sensitivity 22 (H) 0 - 13 ng/L   Urinalysis with Reflex Culture and Microscopic   Result Value Ref Range    Color, Urine Light-Yellow Light-Yellow, Yellow, Dark-Yellow    Appearance, Urine Clear Clear    Specific Gravity, Urine 1.010 1.005 - 1.035    pH, Urine 7.0 5.0, 5.5, 6.0, 6.5, 7.0, 7.5, 8.0    Protein, Urine NEGATIVE NEGATIVE, 10 (TRACE), 20 (TRACE) mg/dL    Glucose, Urine Normal Normal mg/dL    Blood, Urine NEGATIVE NEGATIVE mg/dL    Ketones, Urine NEGATIVE NEGATIVE mg/dL    Bilirubin, Urine NEGATIVE NEGATIVE mg/dL    Urobilinogen, Urine Normal Normal mg/dL    Nitrite, Urine NEGATIVE NEGATIVE    Leukocyte Esterase, Urine NEGATIVE NEGATIVE   Extra Urine Gray Tube   Result Value Ref Range    Extra Tube     CBC   Result Value Ref Range    WBC 4.7 4.4 - 11.3 x10*3/uL    nRBC 0.0 0.0 - 0.0 /100 WBCs    RBC 3.41 (L) 4.00 - 5.20 x10*6/uL    Hemoglobin 10.1 (L) 12.0 - 16.0 g/dL    Hematocrit 32.0 (L) 36.0 - 46.0 %    MCV 94 80 - 100 fL    MCH 29.6 26.0 - 34.0 pg    MCHC 31.6 (L) 32.0 - 36.0 g/dL    RDW 15.8 (H) 11.5 - 14.5 %    Platelets 215 150 - 450 x10*3/uL   Coagulation  Screen   Result Value Ref Range    Protime 12.8 (H) 9.8 - 12.4 seconds    INR 1.2 (H) 0.9 - 1.1    aPTT 36 26 - 36 seconds   Basic Metabolic Panel   Result Value Ref Range    Glucose 98 74 - 99 mg/dL    Sodium 140 136 - 145 mmol/L    Potassium 3.6 3.5 - 5.3 mmol/L    Chloride 100 98 - 107 mmol/L    Bicarbonate 30 21 - 32 mmol/L    Anion Gap 14 10 - 20 mmol/L    Urea Nitrogen 28 (H) 6 - 23 mg/dL    Creatinine 0.95 0.50 - 1.05 mg/dL    eGFR 57 (L) >60 mL/min/1.73m*2    Calcium 9.1 8.6 - 10.3 mg/dL   Lipid Panel   Result Value Ref Range    Cholesterol 119 0 - 199 mg/dL    HDL-Cholesterol 49.2 mg/dL    Cholesterol/HDL Ratio 2.4     LDL Calculated 59 <=99 mg/dL    VLDL 11 0 - 40 mg/dL    Triglycerides 56 0 - 149 mg/dL    Non HDL Cholesterol 70 0 - 149 mg/dL      Patient recently received an antibiotic (last 12 hours)       None           Plan discussed with interdisciplinary team, continue current and repeat labs in the AM.     Discharge planning discussed with patient and care team. Therapy evaluations ordered.   Anticipate - [facility]/SNF    Patient aware and agreeable to current plan, continue plan as above.     I spent a total of 60 minutes on the date of the service which included preparing to see the patient, face-to-face patient care, completing clinical documentation, obtaining and/or reviewing separately obtained history, performing a medically appropriate examination, counseling and educating the patient/family/caregiver, ordering medications, tests, or procedures, communicating with other HCPs (not separately reported), independently interpreting results (not separately reported), communicating results to the patient/family/caregiver, and care coordination (not separately reported).   Relevant Results               This patient currently has cardiac telemetry ordered; if you would like to modify or discontinue the telemetry order, click here to go to the orders activity to modify/discontinue the  order.              Assessment & Plan  Acute on chronic diastolic congestive heart failure    Acute bacterial sinusitis    Syncope    Advance care planning    Constipation      Syncope might be related to vasovagal.  Orthostatic vitals every shift         Riya Urena

## 2025-06-24 NOTE — PROGRESS NOTES
06/24/25 1331   Discharge Planning   Living Arrangements Alone   Support Systems Children   Assistance Needed Independent, uses walker. daughters provide transportation   Type of Residence Private residence  (split level home)   Number of Stairs to Enter Residence   (has chair lift to enter home through garage)   Number of Stairs Within Residence   (has chair lift to get to bedroom and bathroom)   Intensity of Service   Intensity of Service 0-30 min     Met with patient at bedside, patient was talking with her daughter Cherelle on phone and provided permission for TCC to speak with her and her daughter.  Address, insurance and contact information verified.  Patient lives at home.  Patient has home oxygen, patient is unsure of name of provider for home oxygen.  PCP: Dr Keane  Pharmacy: [facility] in [place]  PT/OT evaluation is pending.

## 2025-06-24 NOTE — CARE PLAN
The patient's goals for the shift include      The clinical goals for the shift include patient will be able to rest throughout the day

## 2025-06-24 NOTE — PROGRESS NOTES
Physical Therapy    Physical Therapy    Physical Therapy Evaluation    Patient Name: Yesenia Milner  MRN: [id number]  Today's Date: 6/24/2025   Time Calculation  Start Time: 1005  Stop Time: 1023  Time Calculation (min): 18 min  808/808-B    Assessment/Plan   PT Assessment  PT Assessment Results: Decreased strength, Decreased endurance, Impaired balance, Decreased mobility  Rehab Prognosis: Good  Barriers to Discharge Home: Physical needs  Physical Needs: 24hr mobility assistance needed  Evaluation/Treatment Tolerance: Patient limited by fatigue  Medical Staff Made Aware: Yes  Strengths: Ability to acquire knowledge  End of Session Communication: Bedside nurse  End of Session Patient Position: Up in chair, Alarm on  IP OR SWING BED PT PLAN  Inpatient or Swing Bed: Inpatient  PT Plan  Treatment/Interventions: Bed mobility, Transfer training, Gait training, Strengthening  PT Plan: Ongoing PT  PT Frequency: 3 times per week  PT Discharge Recommendations: Moderate intensity level of continued care  PT Recommended Transfer Status: Assist x2  PT - OK to Discharge: Yes (once cleared by medical team)    Subjective     Current Problem:  1. Acute on chronic diastolic congestive heart failure  Referral to Healthy at Home Program      2. Pleural effusion        3. Syncope, unspecified syncope type  Cardiac device check - Inpatient    Cardiac device check - Inpatient    Referral to Healthy at Home Program    Transthoracic Echo Complete    Transthoracic Echo Complete        Problem List[1]    General Visit Information:  General  Reason for Referral: PT eval and treat for impaired mobility  Referred By: SAQIB Zavala-CNP  Past Medical History Relevant to Rehab: PMH: HTN, HFpEF, MI, SVT, SSS, pacemaker, ALS, COPD, CVA, gout, colon CA  Family/Caregiver Present: No  Co-Treatment: OT  Co-Treatment Reason:  (for improved safety and mobility)  Prior to Session Communication: Bedside nurse  Patient Position Received: Bed, 2 rail  up  General Comment:  (Pt is a 89 y.o female presenting to the ED with syncopal episode. Dx: acute on chronic CHF. CT head, C-spine negative)    Home Living:Prior Level of Function:  Home Living  Home Living Comments: Pt reports frequent hospital admissions over past year. Pt was recently at SNF for therapy and has been home around 6 weeks. Pt lives alone in a multilevel home. pt has 1 steps to enter with chair lift to 1st floor and chair lift to 2nd fllor. Pt indep with adls, Dtr assist with driving/shopping. Pt has transfre bend, gb, hhs, higher toilet. Pt is indep via ww.           Precautions:  Precautions  Medical Precautions: Fall precautions, Oxygen therapy device and L/min (2L)  Precautions Comment:  (out of bed with assist, purewick)    Vital Signs:     Objective     Pain:       Cognition:  Cognition  Overall Cognitive Status: Within Functional Limits  Orientation Level: Oriented X4 (pleasant, witty)    General Assessments:  General Observation  General Observation:  (Pt pleasant and cooperative. Pt is motivated to mobilize.)   Activity Tolerance  Endurance: Decreased tolerance for upright activites  Sensation  Light Touch: No apparent deficits  Strength  Strength Comments:  (Generalized weakness 3+/5)     Coordination  Movements are Fluid and Coordinated: Yes             Functional Assessments:     Bed Mobility  Bed Mobility:  (supine to sit with sba with head of bed elevated and bed rail)  Transfers  Transfer:  (sit<>stand from eob with mod assisst x 2)  Ambulation/Gait Training  Ambulation/Gait Training Performed:  (side stepping along side of bed, and a few steps with ww and min assist x 2)                          Outcome Measures:     [facility] Basic Mobility  Turning from your back to your side while in a flat bed without using bedrails: A little  Moving from lying on your back to sitting on the side of a flat bed without using bedrails: A little  Moving to and from bed to chair (including a wheelchair):  Total  Standing up from a chair using your arms (e.g. wheelchair or bedside chair): Total  To walk in hospital room: A lot  Climbing 3-5 steps with railing: Total  Basic Mobility - Total Score: 11                                                             Goals:  Encounter Problems       Encounter Problems (Active)       Impaired mobility       STG: bed mobility sit<>supine with mod indep        Start:  06/24/25    Expected End:  07/08/25            STG: transfers sit<>stand with mod indep        Start:  06/24/25    Expected End:  07/08/25            STG: pt ambulates 50' via ww and cga        Start:  06/24/25    Expected End:  07/08/25            STG: b/l le there ex x 20 reps        Start:  06/24/25    Expected End:  07/08/25                          Education Documentation  Mobility Training, taught by Darlyn Reilly PT at 6/24/2025  2:20 PM.  Learner: Patient  Readiness: Acceptance  Method: Explanation  Response: Verbalizes Understanding, Needs Reinforcement    Education Comments  No comments found.               [1]   Patient Active Problem List  Diagnosis    Aortic stenosis, mild    Arthritis    Complete heart block    COPD (chronic obstructive pulmonary disease) (Multi)    History of paroxysmal supraventricular tachycardia    HTN (hypertension)    Paresthesia    Presence of permanent cardiac pacemaker    Raynauds disease    Sensorineural hearing loss (SNHL) of both ears    Dyspnea on minimal exertion    Varicose veins of lower extremities with inflammation    Vertigo    Ischemic cerebrovascular accident (CVA) (Multi)    Acquired deformity of toe    Benign neoplasm of skin of foot    Benign paroxysmal positional vertigo    Dermatophytosis of nail    Leg swelling    Macular degeneration    Mitral valve insufficiency    Moderate mitral valve regurgitation    Plantar wart of left foot    Venous insufficiency    Dysarthria following cerebral infarction    Hyperglycemia    Hand paresthesia    Sick sinus  syndrome (Multi)    Injury of kidney    NSTEMI (non-ST elevated myocardial infarction) (Multi)    Acute superior vena cava thrombosis (Multi)    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)    Gastrointestinal hemorrhage, unspecified gastrointestinal hemorrhage type    Malignant neoplasm of transverse colon (Multi)    Stage 3 chronic kidney disease, unspecified whether stage 3a or 3b CKD (Multi)    Hypoxia    Pleural effusion    Cellulitis of lower extremity    Adenocarcinoma of colon    Bilateral leg edema    Acute on chronic diastolic heart failure    Nonrheumatic tricuspid valve regurgitation    Acute on chronic diastolic congestive heart failure    Acute bacterial sinusitis    Syncope    Advance care planning    Constipation

## 2025-06-24 NOTE — H&P
History Of Present Illness  Yesenia Milner is a 89 y.o. 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  Patient has had multiple admissions for congestive heart failure exacerbation who presented today after a syncopal episode.  Patient notes she was gathering her clothing to get ready for the day when she became lightheaded and passed out.  She denies any injury from the fall.  She notes over the past 2 weeks she has been experiencing a stuffy nose and a cough upon wakening in the morning.  She otherwise denies any fevers, chills, chest pain, change in her chronic shortness of breath with exertion, abdominal pain, nausea, vomiting, diarrhea, or dysuria.  She denies any change in her chronic intermittent bilateral lower extremity edema.  She denies any weight gain.  She denies any recent medication changes.  She reports compliance with her medications.  She is on her baseline oxygen at 2 L.  She notes her cardiologist is Dr. Ramicone.  In regards to her colon cancer, family notes that patient was deemed a risk for surgery via cardiology and surgeon, Dr. Vazquez, recommended patient speak with Dr. Ramicone in regards to her risk for surgery before he would schedule her for an operation.  They noted that her appointment is not until the fall and they are hopeful to speak with him this admission about her risk for surgery.  Patient notes she lives alone and uses a walker.  CODE STATUS discussed and patient became tearful during conversation.  She would like to remain a full code at this time and referred to family if an emergency would arise that she was incapacitated to answer for  herself.    In the ED lab work, EKG, head CT, C-spine CT and CT chest/abdomen/pelvis were performed. Labs revealed troponin 19 (appears baseline with previous result 21 in March 2025), H&H 10.1 and 31.3 (within patient's recent baseline),  (310 in March 2025).  EKG, per ER physician impression revealed Dual paced rhythm. Motion artifact present. Irregular rate. Normal TN, wide QRS and Qtc intervals. No ST segment elevations, depressions, or T wave inversions. Compared to March 2025 imaging positive for fluid/mucosal thickening of the left sphenoid sinus.  Bilateral infiltrates and right greater than left pleural effusions. Moderate fecal residue.  Heart rate was 120 on arrival, vitals were otherwise stable.    Echo 12/21/2024:    CONCLUSIONS:   1. Left ventricular ejection fraction is normal, by visual estimate at 60-65%.   2. Abnormal left venticular wall motion.   3. Left ventricular diastolic filling is indeterminate.   4. There is a moderate apical and anteroapical myocardial infarction.   5. There is normal right ventricular global systolic function.   6. There is moderate mitral annular calcification.   7. Moderate mitral valve regurgitation.   8. Moderate to severe tricuspid regurgitation visualized.   9. Moderately elevated right ventricular systolic pressure.  10. Aortic valve sclerosis.    Carotid duplex 2019:    Right Carotid: Findings are consistent with less than 50% stenosis of the right proximal ICA. Laminar flow seen by color Doppler. Right external carotid artery appears patent with no evidence of stenosis. The right vertebral artery is patent with antegrade flow. No evidence of hemodynamically significant stenosis in the right subclavian.  Left Carotid: Findings are consistent with less than 50% stenosis of the left proximal ICA. Laminar flow seen by color Doppler. Left external carotid artery appears patent with no evidence of stenosis. The left vertebral artery is patent with antegrade  "flow.  No evidence of hemodynamically significant stenosis in the left subclavian.       Past Medical History  Medical History[1]    Surgical History  Surgical History[2]     Social History  She reports that she quit smoking about 52 years ago. Her smoking use included cigarettes. She has been exposed to tobacco smoke. She has never used smokeless tobacco. She reports that she does not drink alcohol and does not use drugs.    Family History  Family History[3]     Allergies  Iodine, Shrimp, Hydrocodone, and Adhesive tape-silicones    10 point review systems performed and is negative besides what is stated in HPI     Physical Exam  HENT:      Head: Normocephalic and atraumatic.      Nose: Nose normal.      Mouth/Throat:      Mouth: Mucous membranes are dry.   Eyes:      Conjunctiva/sclera: Conjunctivae normal.   Cardiovascular:      Rate and Rhythm: Normal rate. Rhythm irregular.      Heart sounds: Murmur heard.   Pulmonary:      Effort: Pulmonary effort is normal.      Breath sounds: Rales present.   Abdominal:      General: Bowel sounds are normal.      Tenderness: There is abdominal tenderness.   Musculoskeletal:         General: Normal range of motion.      Cervical back: Neck supple.   Skin:     General: Skin is warm and dry.   Neurological:      Mental Status: She is alert. Mental status is at baseline.   Psychiatric:         Mood and Affect: Mood normal.          Last Recorded Vitals  Blood pressure 114/58, pulse 70, temperature 36.3 °C (97.3 °F), temperature source Temporal, resp. rate 19, height 1.626 m (5' 4\"), weight 69.9 kg (154 lb), SpO2 95%.    Relevant Results    Medications Ordered Prior to Encounter[4]     Results for orders placed or performed during the hospital encounter of 06/23/25 (from the past 24 hours)   CBC and Auto Differential   Result Value Ref Range    WBC 6.0 4.4 - 11.3 x10*3/uL    nRBC 0.0 0.0 - 0.0 /100 WBCs    RBC 3.37 (L) 4.00 - 5.20 x10*6/uL    Hemoglobin 10.1 (L) 12.0 - 16.0 g/dL "    Hematocrit 31.3 (L) 36.0 - 46.0 %    MCV 93 80 - 100 fL    MCH 30.0 26.0 - 34.0 pg    MCHC 32.3 32.0 - 36.0 g/dL    RDW 15.9 (H) 11.5 - 14.5 %    Platelets 206 150 - 450 x10*3/uL    Neutrophils % 69.9 40.0 - 80.0 %    Immature Granulocytes %, Automated 0.5 0.0 - 0.9 %    Lymphocytes % 16.6 13.0 - 44.0 %    Monocytes % 10.7 2.0 - 10.0 %    Eosinophils % 2.0 0.0 - 6.0 %    Basophils % 0.3 0.0 - 2.0 %    Neutrophils Absolute 4.18 1.60 - 5.50 x10*3/uL    Immature Granulocytes Absolute, Automated 0.03 0.00 - 0.50 x10*3/uL    Lymphocytes Absolute 0.99 0.80 - 3.00 x10*3/uL    Monocytes Absolute 0.64 0.05 - 0.80 x10*3/uL    Eosinophils Absolute 0.12 0.00 - 0.40 x10*3/uL    Basophils Absolute 0.02 0.00 - 0.10 x10*3/uL   Comprehensive metabolic panel   Result Value Ref Range    Glucose 116 (H) 74 - 99 mg/dL    Sodium 138 136 - 145 mmol/L    Potassium 3.6 3.5 - 5.3 mmol/L    Chloride 99 98 - 107 mmol/L    Bicarbonate 27 21 - 32 mmol/L    Anion Gap 16 10 - 20 mmol/L    Urea Nitrogen 30 (H) 6 - 23 mg/dL    Creatinine 1.00 0.50 - 1.05 mg/dL    eGFR 53 (L) >60 mL/min/1.73m*2    Calcium 9.6 8.6 - 10.3 mg/dL    Albumin 3.8 3.4 - 5.0 g/dL    Alkaline Phosphatase 89 33 - 136 U/L    Total Protein 6.3 (L) 6.4 - 8.2 g/dL    AST 24 9 - 39 U/L    Bilirubin, Total 0.6 0.0 - 1.2 mg/dL    ALT 17 7 - 45 U/L   Lipase   Result Value Ref Range    Lipase 18 9 - 82 U/L   Magnesium   Result Value Ref Range    Magnesium 2.24 1.60 - 2.40 mg/dL   aPTT   Result Value Ref Range    aPTT 26 26 - 36 seconds   Protime-INR   Result Value Ref Range    Protime 12.4 9.8 - 12.4 seconds    INR 1.1 0.9 - 1.1   B-Type Natriuretic Peptide   Result Value Ref Range     (H) 0 - 99 pg/mL   Troponin I, High Sensitivity, Initial   Result Value Ref Range    Troponin I, High Sensitivity 19 (H) 0 - 13 ng/L   Troponin, High Sensitivity, 1 Hour   Result Value Ref Range    Troponin I, High Sensitivity 23 (H) 0 - 13 ng/L   Sars-CoV-2, Influenza A/B and RSV PCR   Result  Value Ref Range    Coronavirus 2019, PCR Not Detected Not Detected    Flu A Result Not Detected Not Detected    Flu B Result Not Detected Not Detected    RSV PCR Not Detected Not Detected   Troponin I, High Sensitivity   Result Value Ref Range    Troponin I, High Sensitivity 22 (H) 0 - 13 ng/L   Urinalysis with Reflex Culture and Microscopic   Result Value Ref Range    Color, Urine Light-Yellow Light-Yellow, Yellow, Dark-Yellow    Appearance, Urine Clear Clear    Specific Gravity, Urine 1.010 1.005 - 1.035    pH, Urine 7.0 5.0, 5.5, 6.0, 6.5, 7.0, 7.5, 8.0    Protein, Urine NEGATIVE NEGATIVE, 10 (TRACE), 20 (TRACE) mg/dL    Glucose, Urine Normal Normal mg/dL    Blood, Urine NEGATIVE NEGATIVE mg/dL    Ketones, Urine NEGATIVE NEGATIVE mg/dL    Bilirubin, Urine NEGATIVE NEGATIVE mg/dL    Urobilinogen, Urine Normal Normal mg/dL    Nitrite, Urine NEGATIVE NEGATIVE    Leukocyte Esterase, Urine NEGATIVE NEGATIVE   CBC   Result Value Ref Range    WBC 4.7 4.4 - 11.3 x10*3/uL    nRBC 0.0 0.0 - 0.0 /100 WBCs    RBC 3.41 (L) 4.00 - 5.20 x10*6/uL    Hemoglobin 10.1 (L) 12.0 - 16.0 g/dL    Hematocrit 32.0 (L) 36.0 - 46.0 %    MCV 94 80 - 100 fL    MCH 29.6 26.0 - 34.0 pg    MCHC 31.6 (L) 32.0 - 36.0 g/dL    RDW 15.8 (H) 11.5 - 14.5 %    Platelets 215 150 - 450 x10*3/uL   Coagulation Screen   Result Value Ref Range    Protime 12.8 (H) 9.8 - 12.4 seconds    INR 1.2 (H) 0.9 - 1.1    aPTT 36 26 - 36 seconds   Basic Metabolic Panel   Result Value Ref Range    Glucose 98 74 - 99 mg/dL    Sodium 140 136 - 145 mmol/L    Potassium 3.6 3.5 - 5.3 mmol/L    Chloride 100 98 - 107 mmol/L    Bicarbonate 30 21 - 32 mmol/L    Anion Gap 14 10 - 20 mmol/L    Urea Nitrogen 28 (H) 6 - 23 mg/dL    Creatinine 0.95 0.50 - 1.05 mg/dL    eGFR 57 (L) >60 mL/min/1.73m*2    Calcium 9.1 8.6 - 10.3 mg/dL   Lipid Panel   Result Value Ref Range    Cholesterol 119 0 - 199 mg/dL    HDL-Cholesterol 49.2 mg/dL    Cholesterol/HDL Ratio 2.4     LDL Calculated 59 <=99  mg/dL    VLDL 11 0 - 40 mg/dL    Triglycerides 56 0 - 149 mg/dL    Non HDL Cholesterol 70 0 - 149 mg/dL     *Note: Due to a large number of results and/or encounters for the requested time period, some results have not been displayed. A complete set of results can be found in Results Review.        CT chest abdomen pelvis wo IV contrast  Result Date: 6/23/2025  Interpreted By:  Mary Jane Foreman, STUDY: CT CHEST ABDOMEN PELVIS WO CONTRAST;  6/23/2025 1:05 pm   INDICATION: Signs/Symptoms:abdominal pain prior cancer syncope r/o mass, nephro jesse traumatic injury.     COMPARISON: CT chest 03/21/2025, CT abdomen and pelvis 01/12/2025   ACCESSION NUMBER(S): [id number]   ORDERING CLINICIAN: MURTAZA HINOJOSA   TECHNIQUE: CT of the chest, abdomen, and pelvis was performed. Sagittal and coronal reconstructions were generated. No intravenous contrast given for the examination.   FINDINGS: CHEST:   Hilar, vessel, and solid organ evaluation limited without IV contrast.   CHEST WALL AND LOWER NECK: Metallic streak artifact from port in the left anterior chest wall limits assessment of adjacent structures. No gross axillary adenopathy as visualized. Slightly prominent heterogenous right thyroid lobe.   MEDIASTINUM AND HARITHA:  Limited hilar assessment on unenhanced exam. No significant mediastinal or hilar adenopathy within limits of unenhanced study. Mild gaseous distention of the proximal esophagus.   HEART AND VESSELS:  Lack of IV contrast precludes vascular luminal assessment. The heart is normal in size. No significant pericardial effusion. Pacer wires in the right side of the heart. Multifocal atherosclerotic calcifications.   LUNGS, PLEURA, LARGE AIRWAYS:  Mild motion artifact in the lower chest. Pulmonary heterogeneity including scattered bilateral ground-glass infiltrates and reticular densities most prominent in the lung bases. Moderate right and small to moderate left pleural effusions and presumed compressive atelectasis  of the adjacent lung bases. Slight fluid/thickening along the superior right main fissure. No pneumothorax. The central airways are grossly patent.   BONES:  Kyphosis and degenerative endplate spurring in the thoracic spine.     ABDOMEN/PELVIS:   Solid organ and vessel evaluation limited without IV contrast. Artifact related to overlying upper extremities.   ABDOMINAL ORGANS:   LIVER: No focal lesion within limits of unenhanced exam   GALL BLADDER AND BILIARY TREE: No calcified gallstone   SPLEEN: No focal lesion within limits of unenhanced exam   PANCREAS: No focal lesion within limits of unenhanced exam   ADRENALS: No adrenal mass   KIDNEYS AND URETERS: No renal mass or hydronephrosis within limits of unenhanced exam   BOWEL: No abnormally dilated large or small bowel loops. Moderate fecal residue. Distal colon diverticulosis. Within limits of unenhanced exam probable persistent circumferential wall thickening in the distal transverse colon for example image 110/201, as noted on the prior exam.   PERITONEUM, RETROPERITONEUM, NODES: No significant free fluid. No free air. No significant retroperitoneal lymphadenopathy within limits of unenhanced exam. Slight increased density in the lower abdominal mesentery.   VESSELS:  Lack of IV contrast precludes vascular luminal assessment. Multifocal atherosclerotic calcifications. No abdominal aortic aneurysm.   PELVIS: Urinary bladder is moderately distended and grossly normal in contour. 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   ABDOMINAL WALL: No sizable abdominal wall hernia.   BONES: Osteopenia. Multifocal degenerative changes. Mild scoliosis of the lumbar spine.       CHEST:   Bilateral infiltrates and right-greater-than-left pleural effusions. Differential includes CHF and pneumonia. Clinical correlation and follow-up to ensure resolution after appropriate treatment recommended.   ABDOMEN AND PELVIS:   Apparent persistent distal transverse colon wall thickening  consistent with reportedly known malignancy.   No gross evidence of solid organ injury or free fluid within limits of unenhanced exam.   Fecal retention. Distal colon diverticulosis.   Other findings as described above.   MACRO: None.   Signed by: Mary Jane Foreman 6/23/2025 1:32 PM Dictation workstation:   [id number]    CT cervical spine wo IV contrast  Result Date: 6/23/2025  Interpreted By:  Mary Jane Foreman, STUDY: CT CERVICAL SPINE WO IV CONTRAST;  6/23/2025 1:05 pm   INDICATION: Signs/Symptoms:fall blood thinners r/o frx.     COMPARISON: None.   ACCESSION NUMBER(S): [id number]   ORDERING CLINICIAN: MURTAZA HINOJOSA   TECHNIQUE: CT images were obtained through the cervical spine. Sagittal and coronal reconstructions were generated.   FINDINGS:     ALIGNMENT: Mild levocurvature. Straightening of the lower cervical lordosis. Slight retrolisthesis of C5.   VERTEBRAE/DISC SPACES: No compression deformity or acute displaced fracture. Multilevel degenerative changes including atlantoaxial joint space narrowing spurring, multilevel intervertebral disc space narrowing with endplate sclerosis and spurring, multilevel bilateral facet joint narrowing and spurring. At least partial fusion across the anterior C5-6 vertebra.   ADDITIONAL FINDINGS: No abnormal thickening of the prevertebral soft tissues.  Dependent fluid/thickening in the sphenoid sinus. Ill-defined biapical infiltrates. Small right pleural effusion. Partially included pacer wires in the left upper chest.       No cervical vertebral compression deformity or acute displaced fracture. Multilevel productive/degenerative changes with straightening of the cervical lordosis and slight spondylolisthesis at C5.   Paraspinal/soft tissue findings as above.   MACRO: None.   Signed by: Mary Jane Foreman 6/23/2025 1:16 PM Dictation workstation:   [id number]    CT head wo IV contrast  Result Date: 6/23/2025  Interpreted By:  Mary Jane Foreman, STUDY: CT HEAD WO IV CONTRAST;  6/23/2025  1:05 pm   INDICATION: Signs/Symptoms:headache fall blood thinners r/o sah ich mass.     COMPARISON: 08/23/2023   ACCESSION NUMBER(S): [id number]   ORDERING CLINICIAN: MURTAZA HINOJOSA   TECHNIQUE: Unenhanced CT images of the head were obtained.   FINDINGS: The ventricles, cisterns and sulci are enlarged, consistent with diffuse volume loss. There are areas of nonspecific white matter hypodensity, which are probably age related or microvascular in nature. These findings are similar to the prior exam. There is no acute intracranial hemorrhage, mass effect or midline shift. No extraaxial fluid collection.   No acute displaced calvarial fracture. No focal calvarial lesion.   Left sphenoid sinus dependent fluid/thickening. Remaining visualized paranasal sinuses are clear. Dense presumed surgical material along the anterior margin of each globe again seen.       No acute intracranial hemorrhage or mass-effect.   Mild fluid or mucosal thickening in the left sphenoid sinus..   MACRO: None.   Signed by: Mary Jane Foreman 6/23/2025 1:08 PM Dictation workstation:   [id number]       Assessment & Plan  Acute on chronic diastolic congestive heart failure    Acute bacterial sinusitis    Syncope    Advance care planning    Constipation        Admit to telemetry  Inpatient  Appreciate cardiology recommendations  I am not sure if patient's CHF represents more of a chronic condition so we will give 40 mg IV Lasix x 1 and resume torsemide tomorrow and look to cardiology for further recommendations  Please see echo from 2024 and carotid duplex from 2029 above  Start Rocephin 1 g IV daily  Orthostatic vital signs  Repeat EKG and troponin pacer check  Continue home aspirin and Plavix  Daily weight  Intake and output  AM CBC, BMP, coags, lipids  Check flu and COVID  Urinalysis pending  PT/OT    Chronic conditions: HFpEF, MI, heart block/SVT/SSS s/p Medtronic dual-chamber pacemaker 2012 with generator change on pacemaker 7/13/2023, HTN, ALS,  COPD (on 1 to 2 L of oxygen at baseline), CVA with TNK 8/21/23, gout, arthritis, venous insufficiency, and  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 Plan discussed with interdisciplinary team, consults placed, appreciate input. Will continue current and repeat labs in the AM.        Discharge planning discussed with patient and care team. Therapy evaluations ordered. Patient aware and agreeable to current plan, continue plan as above.     I spent a total of 75 minutes on the date of the service which included preparing to see the patient, face-to-face patient care, completing clinical documentation, obtaining and/or reviewing separately obtained history, performing a medically appropriate examination, counseling and educating the patient/family/caregiver, ordering medications, tests, or procedures, communicating with other HCPs (not separately reported), independently interpreting results (not separately reported), communicating results to the patient/family/caregiver, and care coordination (not separately reported).         Riya Urena         [1]   Past Medical History:  Diagnosis Date    Gross hematuria 10/07/2020    Impacted cerumen 02/22/2024    Other conditions influencing health status     Normal stress echocardiogram    Personal history of other medical treatment     History of echocardiogram    Personal history of other medical treatment     H/O Doppler ultrasound    Systolic CHF (Multi) 05/18/2023   [2]   Past Surgical History:  Procedure Laterality Date    APPENDECTOMY  11/22/2017    Appendectomy    CARDIAC CATHETERIZATION N/A 12/20/2024     Procedure: Left Heart Cath, With LV;  Surgeon: Tahir Ruelas MD;  Location: [facility] Cardiac [facility] Lab;  Service: Cardiovascular;  Laterality: N/A;    CARDIAC PACEMAKER PLACEMENT  03/11/2021    Pacemaker Placement    HYSTERECTOMY  11/22/2017    Hysterectomy    IR ANGIOGRAM INFERIOR EPIGASTRIC PELVIC  12/14/2020    IR ANGIOGRAM INFERIOR EPIGASTRIC PELVIC 12/14/2020 [facility]    IR ANGIOGRAM INFERIOR EPIGASTRIC PELVIC  12/14/2020    IR ANGIOGRAM INFERIOR EPIGASTRIC PELVIC 12/14/2020 [facility]    IR ANGIOGRAM RENAL BILATERAL Bilateral 12/14/2020    IR ANGIOGRAM RENAL BILATERAL 12/14/2020 [facility]    OTHER SURGICAL HISTORY  11/22/2017    Stab Phlebectomy Of Varicose Veins    OTHER SURGICAL HISTORY  11/22/2017    Venous Ligation With Stripping    TONSILLECTOMY  11/22/2017    Tonsillectomy   [3]   Family History  Problem Relation Name Age of Onset    No Known Problems Mother     [4]   No current facility-administered medications on file prior to encounter.     Current Outpatient Medications on File Prior to Encounter   Medication Sig Dispense Refill    allopurinol (Zyloprim) 100 mg tablet Take 1 tablet (100 mg) by mouth once daily. 90 tablet 0    aspirin 81 mg chewable tablet Chew 1 tablet (81 mg) once daily.      calcium carbonate-vitamin D3 (Calcium 600 + D,3,) 600 mg-5 mcg (200 unit) tablet Take 1 tablet by mouth once daily.      clopidogrel (Plavix) 75 mg tablet Take 1 tablet by mouth once daily 90 tablet 0    colchicine 0.6 mg tablet Take 1 tablet (0.6 mg) by mouth once daily. 90 tablet 3    DULoxetine (Cymbalta) 30 mg DR capsule Take 1 capsule (30 mg) by mouth once daily. Do not crush or chew. 90 capsule 1    ferrous sulfate 325 (65 Fe) mg EC tablet Take 1 tablet by mouth once daily. Do not crush, chew, or split.      fluticasone propion-salmeteroL (Advair) 115-21 mcg/actuation inhaler Inhale 2 puffs 2 times a day. Rinse mouth with water after use to reduce aftertaste and incidence of candidiasis. Do not  swallow. 12 g 11    metoprolol tartrate (Lopressor) 25 mg tablet Take 1 tablet (25 mg) by mouth once daily. 30 tablet 0    polyethylene glycol (Glycolax, Miralax) 17 gram/dose powder Mix 1 capful (17 g) of powder with 4 to 8 ounces of water or juice and drink 2 times a day. 1010 g 1    torsemide (Demadex) 20 mg tablet Take 3 tablets (60 mg) by mouth once daily. 90 tablet 11    acetaminophen (Tylenol) 325 mg tablet Take 2 tablets (650 mg) by mouth every 4 hours if needed for mild pain (1 - 3). (Patient not taking: Reported on 6/23/2025)      albuterol 2.5 mg /3 mL (0.083 %) nebulizer solution Take 3 mL (2.5 mg) by nebulization every 4 hours if needed for wheezing. 75 mL 1    enoxaparin (Lovenox) 40 mg/0.4 mL syringe Inject 0.4 mL (40 mg) under the skin once every 24 hours. (Patient not taking: Reported on 6/23/2025)      guaiFENesin (Mucinex) 600 mg 12 hr tablet Take 1 tablet (600 mg) by mouth 2 times a day as needed for cough. Do not crush, chew, or split. (Patient not taking: Reported on 6/23/2025)      hydrocortisone 1 % lotion Apply topically 2 times a day. Apply to both legs , wash hands after applying lotion (Patient not taking: Reported on 6/23/2025) 60 mL 0    lubricating eye drops ophthalmic solution Administer 1 drop into both eyes once daily as needed for dry eyes.      nebulizers misc With tubing and mask.   Use as directed 1 each 0    [DISCONTINUED] ipratropium-albuteroL (Duo-Neb) 0.5-2.5 mg/3 mL nebulizer solution Take 3 mL by nebulization every 6 hours.

## 2025-06-25 ENCOUNTER — APPOINTMENT (OUTPATIENT)
Dept: RADIOLOGY | Facility: HOSPITAL | Age: OVER 89
End: 2025-06-25
Payer: MEDICARE

## 2025-06-25 LAB
ALBUMIN SERPL BCP-MCNC: 3.4 G/DL (ref 3.4–5)
ALP SERPL-CCNC: 83 U/L (ref 33–136)
ALT SERPL W P-5'-P-CCNC: 15 U/L (ref 7–45)
ANION GAP SERPL CALC-SCNC: 11 MMOL/L (ref 10–20)
AST SERPL W P-5'-P-CCNC: 23 U/L (ref 9–39)
BASOPHILS # BLD AUTO: 0.03 X10*3/UL (ref 0–0.1)
BASOPHILS NFR BLD AUTO: 0.6 %
BILIRUB SERPL-MCNC: 0.4 MG/DL (ref 0–1.2)
BUN SERPL-MCNC: 27 MG/DL (ref 6–23)
CALCIUM SERPL-MCNC: 9.1 MG/DL (ref 8.6–10.3)
CHLORIDE SERPL-SCNC: 99 MMOL/L (ref 98–107)
CO2 SERPL-SCNC: 31 MMOL/L (ref 21–32)
CREAT SERPL-MCNC: 0.88 MG/DL (ref 0.5–1.05)
EGFRCR SERPLBLD CKD-EPI 2021: 62 ML/MIN/1.73M*2
EOSINOPHIL # BLD AUTO: 0.42 X10*3/UL (ref 0–0.4)
EOSINOPHIL NFR BLD AUTO: 8.5 %
ERYTHROCYTE [DISTWIDTH] IN BLOOD BY AUTOMATED COUNT: 15.7 % (ref 11.5–14.5)
GLUCOSE SERPL-MCNC: 97 MG/DL (ref 74–99)
HCT VFR BLD AUTO: 32 % (ref 36–46)
HGB BLD-MCNC: 9.9 G/DL (ref 12–16)
IMM GRANULOCYTES # BLD AUTO: 0.02 X10*3/UL (ref 0–0.5)
IMM GRANULOCYTES NFR BLD AUTO: 0.4 % (ref 0–0.9)
LYMPHOCYTES # BLD AUTO: 1.39 X10*3/UL (ref 0.8–3)
LYMPHOCYTES NFR BLD AUTO: 28 %
MCH RBC QN AUTO: 29.2 PG (ref 26–34)
MCHC RBC AUTO-ENTMCNC: 30.9 G/DL (ref 32–36)
MCV RBC AUTO: 94 FL (ref 80–100)
MONOCYTES # BLD AUTO: 0.71 X10*3/UL (ref 0.05–0.8)
MONOCYTES NFR BLD AUTO: 14.3 %
NEUTROPHILS # BLD AUTO: 2.4 X10*3/UL (ref 1.6–5.5)
NEUTROPHILS NFR BLD AUTO: 48.2 %
NRBC BLD-RTO: 0 /100 WBCS (ref 0–0)
PLATELET # BLD AUTO: 213 X10*3/UL (ref 150–450)
POTASSIUM SERPL-SCNC: 3.8 MMOL/L (ref 3.5–5.3)
PROT SERPL-MCNC: 5.8 G/DL (ref 6.4–8.2)
RBC # BLD AUTO: 3.39 X10*6/UL (ref 4–5.2)
SODIUM SERPL-SCNC: 137 MMOL/L (ref 136–145)
WBC # BLD AUTO: 5 X10*3/UL (ref 4.4–11.3)

## 2025-06-25 PROCEDURE — 99233 SBSQ HOSP IP/OBS HIGH 50: CPT | Performed by: NURSE PRACTITIONER

## 2025-06-25 PROCEDURE — 80053 COMPREHEN METABOLIC PANEL: CPT | Performed by: INTERNAL MEDICINE

## 2025-06-25 PROCEDURE — 99223 1ST HOSP IP/OBS HIGH 75: CPT | Performed by: SURGERY

## 2025-06-25 PROCEDURE — 2500000001 HC RX 250 WO HCPCS SELF ADMINISTERED DRUGS (ALT 637 FOR MEDICARE OP): Performed by: NURSE PRACTITIONER

## 2025-06-25 PROCEDURE — 36415 COLL VENOUS BLD VENIPUNCTURE: CPT | Performed by: INTERNAL MEDICINE

## 2025-06-25 PROCEDURE — 71045 X-RAY EXAM CHEST 1 VIEW: CPT

## 2025-06-25 PROCEDURE — 99221 1ST HOSP IP/OBS SF/LOW 40: CPT | Performed by: INTERNAL MEDICINE

## 2025-06-25 PROCEDURE — 71045 X-RAY EXAM CHEST 1 VIEW: CPT | Performed by: RADIOLOGY

## 2025-06-25 PROCEDURE — 2500000004 HC RX 250 GENERAL PHARMACY W/ HCPCS (ALT 636 FOR OP/ED): Performed by: NURSE PRACTITIONER

## 2025-06-25 PROCEDURE — 1200000002 HC GENERAL ROOM WITH TELEMETRY DAILY

## 2025-06-25 PROCEDURE — 85025 COMPLETE CBC W/AUTO DIFF WBC: CPT | Performed by: INTERNAL MEDICINE

## 2025-06-25 RX ORDER — DAPAGLIFLOZIN 10 MG/1
10 TABLET, FILM COATED ORAL EVERY 24 HOURS
Status: DISCONTINUED | OUTPATIENT
Start: 2025-06-25 | End: 2025-07-03 | Stop reason: HOSPADM

## 2025-06-25 RX ADMIN — Medication 1 TABLET: at 20:53

## 2025-06-25 RX ADMIN — ASPIRIN 81 MG CHEWABLE TABLET 81 MG: 81 TABLET CHEWABLE at 08:51

## 2025-06-25 RX ADMIN — DULOXETINE 30 MG: 30 CAPSULE, DELAYED RELEASE ORAL at 08:51

## 2025-06-25 RX ADMIN — POLYETHYLENE GLYCOL 3350 17 G: 17 POWDER, FOR SOLUTION ORAL at 20:53

## 2025-06-25 RX ADMIN — FERROUS SULFATE TAB 325 MG (65 MG ELEMENTAL FE) 1 TABLET: 325 (65 FE) TAB at 08:52

## 2025-06-25 RX ADMIN — METOPROLOL TARTRATE 25 MG: 25 TABLET, FILM COATED ORAL at 08:52

## 2025-06-25 RX ADMIN — DAPAGLIFLOZIN 10 MG: 10 TABLET, FILM COATED ORAL at 13:01

## 2025-06-25 RX ADMIN — SENNOSIDES AND DOCUSATE SODIUM 2 TABLET: 50; 8.6 TABLET ORAL at 08:51

## 2025-06-25 RX ADMIN — CLOPIDOGREL BISULFATE 75 MG: 75 TABLET, FILM COATED ORAL at 08:51

## 2025-06-25 RX ADMIN — CEFTRIAXONE 1 G: 1 INJECTION, SOLUTION INTRAVENOUS at 16:51

## 2025-06-25 RX ADMIN — HEPARIN SODIUM 5000 UNITS: 5000 INJECTION, SOLUTION INTRAVENOUS; SUBCUTANEOUS at 16:51

## 2025-06-25 RX ADMIN — POLYETHYLENE GLYCOL 3350 17 G: 17 POWDER, FOR SOLUTION ORAL at 08:51

## 2025-06-25 RX ADMIN — HEPARIN SODIUM 5000 UNITS: 5000 INJECTION, SOLUTION INTRAVENOUS; SUBCUTANEOUS at 05:00

## 2025-06-25 RX ADMIN — ALLOPURINOL 100 MG: 100 TABLET ORAL at 08:52

## 2025-06-25 RX ADMIN — TORSEMIDE 40 MG: 20 TABLET ORAL at 08:52

## 2025-06-25 RX ADMIN — Medication 1 TABLET: at 08:52

## 2025-06-25 RX ADMIN — COLCHICINE 0.6 MG: 0.6 TABLET, FILM COATED ORAL at 08:51

## 2025-06-25 ASSESSMENT — COGNITIVE AND FUNCTIONAL STATUS - GENERAL
WALKING IN HOSPITAL ROOM: A LITTLE
TOILETING: A LITTLE
DAILY ACTIVITIY SCORE: 21
MOVING TO AND FROM BED TO CHAIR: A LITTLE
MOBILITY SCORE: 19
DRESSING REGULAR LOWER BODY CLOTHING: A LITTLE
STANDING UP FROM CHAIR USING ARMS: A LITTLE
HELP NEEDED FOR BATHING: A LITTLE
CLIMB 3 TO 5 STEPS WITH RAILING: A LOT

## 2025-06-25 ASSESSMENT — ENCOUNTER SYMPTOMS
IRREGULAR HEARTBEAT: 0
DYSPNEA ON EXERTION: 1
SHORTNESS OF BREATH: 1
ORTHOPNEA: 0
PALPITATIONS: 0
PND: 0
LIGHT-HEADEDNESS: 1

## 2025-06-25 ASSESSMENT — PAIN SCALES - GENERAL
PAINLEVEL_OUTOF10: 0 - NO PAIN
PAINLEVEL_OUTOF10: 0 - NO PAIN

## 2025-06-25 ASSESSMENT — PAIN - FUNCTIONAL ASSESSMENT: PAIN_FUNCTIONAL_ASSESSMENT: 0-10

## 2025-06-25 NOTE — CONSULTS
Reason For Consult  History of adenocarcinoma of transverse colon.    History Of Present Illness  Yesenia Milner is a 89 y.o. female presenting with syncopal episode.  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  The patient was evaluated by Dr. Vazquez.  She has multiple comorbidities and multiple admissions to the hospital and deemed not to be an ideal candidate for surgery.  Information obtained from patient's daughter.     Past Medical History  She has a past medical history of Gross hematuria (10/07/2020), Impacted cerumen (02/22/2024), Other conditions influencing health status, Personal history of other medical treatment, Personal history of other medical treatment, and Systolic CHF (Multi) (05/18/2023).    Surgical History  She has a past surgical history that includes Appendectomy (11/22/2017); Hysterectomy (11/22/2017); Tonsillectomy (11/22/2017); Other surgical history (11/22/2017); Other surgical history (11/22/2017); Cardiac pacemaker placement (03/11/2021); IR angiogram renal bilateral (Bilateral, 12/14/2020); IR angiogram inferior epigastric pelvic (12/14/2020); IR angiogram inferior epigastric pelvic (12/14/2020); and Cardiac catheterization (N/A, 12/20/2024).     Social History  She reports that she quit smoking about 52 years ago. Her smoking use included cigarettes. She has been exposed to tobacco smoke. She has never used smokeless tobacco. She reports that she does not drink alcohol and does not use drugs.    Family History  Family History[1]     Allergies  Iodine, Shrimp, Hydrocodone, and Adhesive tape-silicones    Review of Systems  As above     Physical Exam  Thin frail woman, chronically ill-appearing.  In mild distress.    Skin is pale.    HEENT normal.    Lungs clear to  "auscultation    With heart sounds normal no third sound.  Systolic murmur grade 4 x 6.    Abdomen soft hepatosplenomegaly bowel sounds present    Saccular system exam detailed not done.         Last Recorded Vitals  Blood pressure 169/67, pulse 60, temperature 36.6 °C (97.9 °F), temperature source Temporal, resp. rate 19, height 1.626 m (5' 4\"), weight 69.9 kg (154 lb), SpO2 96%.    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  Physical examination revealed conjunctival pallor.  I had a detailed discussion with Dr. Mensah as well as patient's daughter.  Surgery consult was obtained in the past but because of multiple admissions and comorbidities deemed not to be an ideal candidate for surgery.  Recent CT scan of abdomen pelvis on June 23, 2025 did not reveal any evidence of progression of disease locally or distantly.  Given the age of 89 years if palliative surgery could be considered it will alleviate at least bleeding problems.  If surgery is not considered would consider radiation opinion although and colonic cancer radiation therapy is generally not considered.  Neoadjuvant chemotherapy is not in consideration either.  The patient and her daughter understood appreciated all the details provided and grateful.    Agree with your management.    Thank you for allowing me to participate in care of your patient if you have any questions please feel free to call me.              Victor Manuel Min MD         [1]   Family History  Problem Relation Name Age of Onset    No Known Problems Mother       "

## 2025-06-25 NOTE — CONSULTS
Reason For Consult  Colon cancer    History Of Present Illness  Yesenia Milner is a 89 y.o. female admitted on June 23, 2025 after a syncopal episode for which she is currently being evaluated.  She has been noted to have 2 to 3-second pauses on her telemetry monitoring.  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  She has occasional right upper quadrant abdominal discomfort.  She has been tolerating a regular diet without vomiting.  No weight loss.  She takes Plavix and aspirin.  She reports her bowel movements have been normal with no further bleeding since January.  She was recently in a nursing home but currently lives at home.  She ambulates with a walker at home, but has been unable to ambulate due to weakness since this hospital admission.  She has COPD requiring continuous 2 L nasal cannula oxygen and has dyspnea on exertion.  She has had multiple hospital admissions over the past 6 months for exacerbation of CHF.    Past medical history:  Hospital admission from December 20 to December 23, 2024 for non-STEMI.  Diagnosed with Takotsubo cardiomyopathy.  Normal coronary angiogram December 20, 2024.  Hospital admission from January 2 to January 7, 2025 with acute CHF.  Hospital admission from January 12 to January 23, 2025 with GI bleed.  Hospital admission from February 2 to February 11, 2025 with acute decompensated heart failure resulting in shortness of breath and lower extremity edema.  Hospital admission in March for acute CHF.  Second hospital admission in March for sepsis due to pneumonia as well as acute CHF.  Hypertension  Sick sinus syndrome status post pacemaker placement in 2012 and 2023  COPD.  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    Surgical History  She has a past surgical history that includes Appendectomy (11/22/2017); Hysterectomy (11/22/2017); Tonsillectomy (11/22/2017); Other surgical history (11/22/2017); Other surgical history (11/22/2017); Cardiac pacemaker placement (03/11/2021); IR angiogram renal bilateral (Bilateral, 12/14/2020); IR angiogram inferior epigastric pelvic (12/14/2020); IR angiogram inferior epigastric pelvic (12/14/2020); and Cardiac catheterization (N/A, 12/20/2024).     Social History  She reports that she quit smoking about 52 years ago. Her smoking use included cigarettes. She has been exposed to tobacco smoke. She has never used smokeless tobacco. She reports that she does not drink alcohol and does not use drugs.    Family History  Family History[1]     Allergies  Iodine, Shrimp, Hydrocodone, and Adhesive tape-silicones    Review of Systems  As above     Physical Exam  Constitutional: Well-developed, well-nourished, alert and oriented, no acute distress  Skin: Warm and dry, no lesions, no rashes, no jaundice  HEENT: Normocephalic, atraumatic, EOMI, no scleral icterus, eyes have no redness or swelling or discharge, external inspection of ears and nose is normal  Neck: Soft, nontender, no mass or adenopathy  Cardiac: Regular rate and rhythm, no murmur  Chest: Patent airway, clear to auscultation, normal breath sounds with good chest expansion, no wheezes or rales or rhonchi noted, thorax symmetric  Abdomen: Nondistended, positive bowel sounds, soft, nontender, no mass  Rectal: Not performed  Extremities: No injury, no calf tenderness, mild bilateral lower extremity edema  Lymphatic: No cervical  "adenopathy  Musculoskeletal: Range of motion intact, no joint swelling.  Two-person assist to transfer from a chair to her bed.  Neurological: Alert and oriented x3, no obvious focal neurologic abnormalities  Psychological: Appropriate mood and behavior     Last Recorded Vitals  Blood pressure 139/60, pulse 62, temperature 36.3 °C (97.3 °F), temperature source Temporal, resp. rate 16, height 1.626 m (5' 4\"), weight 69.9 kg (154 lb), SpO2 98%.    Relevant Results  Labs: WBC 5.0, hemoglobin 9.9, platelet 213  BUN 27, creatinine 0.88  CEA level in January was 45    I reviewed the CT abdomen/pelvis report and images:  IMPRESSION:  CHEST:    Bilateral infiltrates and right-greater-than-left pleural effusions.  Differential includes CHF and pneumonia. Clinical correlation and  follow-up to ensure resolution after appropriate treatment  recommended.    ABDOMEN AND PELVIS:    Apparent persistent distal transverse colon wall thickening  consistent with reportedly known malignancy.    No gross evidence of solid organ injury or free fluid within limits  of unenhanced exam.    Fecal retention. Distal colon diverticulosis.    Other findings as described above.     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  The colon cancer is presently minimally or asymptomatic.  No bleeding on Plavix and aspirin.  Her CEA level was significantly elevated in January, but no metastatic disease has been identified on imaging.  The patient is at increased risk for operation due to her multiple comorbid conditions including her recent cardiomyopathy and CHF episodes.  She has been hospitalized 7 times in the past 6 months.  The patient is quite frail.  In February I reviewed the ACS NSQIP surgical risk calculator and discussed the results with the patient and her daughter.  Based on this calculator, her risk of serious complication is 19.4%.  Risk of readmission is 18.2%.  Risk of " death is 7.9%.  Risk of being discharged to a SNF is 65.8%.  Her risk is likely higher now.  She has significant pulmonary and cardiac disease.  She is unable to ambulate.  She was evaluated by medical oncology who felt that chemotherapy would not be an option.  I again discussed the options of operative treatment versus observation.  Because her operative risk is quite high, I feel that it would be reasonable to not operate as long as she remains asymptomatic.  She does have risk of eventual colonic obstruction or metastatic disease.  If she is going to have an operation, then her medical condition needs to be optimized as much as possible.  I told her to follow-up in my office with her family to have further discussion when she has recovered from her present illness.  Order repeat CEA level.    Devan Vazquez MD         [1]   Family History  Problem Relation Name Age of Onset    No Known Problems Mother

## 2025-06-25 NOTE — PROGRESS NOTES
July Milner is a 89 y.o. female on day 2 of admission presenting with Acute on chronic diastolic congestive heart failure.      Subjective   06/25 -Patient seen and fully examined at bedside, patient ill appearing, acutely complex, marked decline from baseline. Acute on chronic diastolic congestive heart failure, syncope- cardiology consulted, telemetry, frequent vitals, cardiac device check, appreciate input. Constipation improved with bowel protocol ordered. Patient remains hospitalized for acute onset with complex medical and pharmacological management. Will need rehab - [facility] 11.   Continue to monitor overnight and repeat labs in the morning. Slow but progressive improvements noted.   High Complexity with updates to multiple problems, adjustments to treatment plan, and need for ongoing inpatient care.   Long discussion on goals of care with patient and family, will continue current and await diagnostic findings.  Patient reports: no new complaints, decline from baseline, weakness         Objective     Last Recorded Vitals  /67 (BP Location: Right arm, Patient Position: Lying)   Pulse 60   Temp 36.6 °C (97.9 °F) (Temporal)   Resp 19   Wt 69.9 kg (154 lb)   SpO2 96%   Intake/Output last 3 Shifts:    Intake/Output Summary (Last 24 hours) at 6/25/2025 1152  Last data filed at 6/25/2025 0620  Gross per 24 hour   Intake 168 ml   Output 300 ml   Net -132 ml       Admission Weight  Weight: 69.9 kg (154 lb) (06/23/25 1227)    Daily Weight  06/23/25 : 69.9 kg (154 lb)    Image Results  Transthoracic Echo Complete     [facility], [address] [place] [address]            Tel [phone] and Fax [phone]    TRANSTHORACIC ECHOCARDIOGRAM REPORT       Patient Name:       JULY MILNER    Reading Physician:    [id number] Magnus Hess MD  Study Date:         6/24/2025           Ordering Provider:    [id number] KAYLIN HU                                                                 ELIJAH  MRN/PID:            [id number]            Fellow:  Accession#:         [id number]        Nurse:  Date of Birth/Age:  7/29/1933 / 89      Sonographer:          Israel burks RDCS  Gender assigned at  F                   Additional Staff:  Birth:  Height:             162.00 cm           Admit Date:           6/23/2025  Weight:             69.01 kg            Admission Status:     Inpatient -                                                                Routine  BSA / BMI:          1.74 m2 / 26.30     Encounter#:           [id number]                      kg/m2  Blood Pressure:     108/51 mmHg         Department Location:  [address]                                                                [facility]    Study Type:    TRANSTHORACIC ECHO (TTE) COMPLETE  Diagnosis/ICD: Syncope-R55  Indication:    Syncope  CPT Code:      Echo Complete w Full [id number]    Patient History:  Pertinent History: PMH HFpEF, MI, heart block/SVT/SSS s/p Medtronic dual-chamber                     pacemaker 2012 with generator change on pacemaker 7/13/2023,                     HTN, ALS, COPD (on 1 to 2 L of oxygen at baseline), 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    Study Detail: The following Echo studies were performed: 2D, M-Mode, Doppler,                color flow and 3D. Technically challenging study due to body                habitus and prominent lung artifact.       PHYSICIAN INTERPRETATION:  Left Ventricle: Left ventricular ejection fraction is low normal by visual estimate at 50-55%. There are no regional left ventricular wall motion abnormalities. The left ventricular cavity size is decreased. There is mildly increased septal and mildly increased  posterior left ventricular wall thickness. There is left ventricular concentric remodeling. Left ventricular diastolic filling is indeterminate.  Left Atrium: The left atrial size is normal.  Right Ventricle: The right ventricle is normal in size. There is normal right ventricular global systolic function. A device is visualized in the right ventricle.  Right Atrium: The right atrium is normal in size. There is a device visualized in the right atrium.  Aortic Valve: The aortic valve is trileaflet. The aortic valve area by VTI is 1.73 cmï¿½ with a peak velocity of 1.85 m/s. The peak and mean gradients are 11 mmHg and 7 mmHg, respectively with a dimensionless index of 0.50. There is moderate aortic valve thickening. There is evidence of mildly elevated transaortic gradients consistent with sclerosis of the aortic valve. There is trace aortic valve regurgitation.  Mitral Valve: The mitral valve is moderately thickened. The doppler estimated peak and mean diastolic pressure gradients are 7.1 mmHg and 3 mmHg, respectively. The mean gradient of the mitral valve is 3 mmHg. There is mild mitral valve regurgitation. The E Vmax is 0.14 m/s.  Tricuspid Valve: The tricuspid valve is structurally normal. No evidence of tricuspid regurgitation. The Doppler estimated right ventricular systolic pressure (RVSP) is mildly elevated at 38 mmHg. The right ventricular systolic pressure could not be estimated.  Pulmonic Valve: The pulmonic valve is structurally normal. There is no indication of pulmonic valve regurgitation.  Pericardium: There is no pericardial effusion noted.  Aorta: The aortic root is normal. There is no dilatation of the aortic root.  Systemic Veins: The inferior vena cava appears normal in size, with IVC inspiratory collapse less than 50%.  In comparison to the previous echocardiogram(s): Compared with study dated 12/21/2024,.       CONCLUSIONS:   1. Left ventricular ejection fraction is low normal by visual estimate  at 50-55%.   2. Left ventricular diastolic filling is indeterminate.   3. Left ventricular cavity size is decreased.   4. There is normal right ventricular global systolic function.   5. The mitral valve is moderately thickened.   6. The Doppler estimated RVSP is mildly elevated at 38 mmHg.   7. Aortic valve sclerosis. The peak and mean gradients are 14 mmHg and 7 mmHg respectively.    QUANTITATIVE DATA SUMMARY:     2D MEASUREMENTS:          Normal Ranges:  LAs:             3.50 cm  (2.7-4.0cm)  IVSd:            1.00 cm  (0.6-1.1cm)  LVPWd:           1.10 cm  (0.6-1.1cm)  LVIDd:           3.80 cm  (3.9-5.9cm)  LVIDs:           3.10 cm  LV Mass Index:   72 g/m2  LVEDV Index:     52 ml/m2  LV % FS          18.4 %       LEFT ATRIUM:                  Normal Ranges:  LA Vol A4C:        37.7 ml    (22+/-6mL/m2)  LA Vol A2C:        71.2 ml  LA Vol BP:         52.2 ml  LA Vol Index A4C:  21.7ml/m2  LA Vol Index A2C:  41.0 ml/m2  LA Vol Index BP:   30.0 ml/m2  LA Area A4C:       16.1 cm2  LA Area A2C:       22.3 cm2  LA Major Axis A4C: 5.8 cm  LA Major Axis A2C: 5.9 cm  LA Volume Index:   30.0 ml/m2       M-MODE MEASUREMENTS:         Normal Ranges:  Ao Root:             3.30 cm (2.0-3.7cm)  AoV Exc:             1.30 cm (1.5-2.5cm)  LAs:                 4.00 cm (2.7-4.0cm)       AORTA MEASUREMENTS:         Normal Ranges:  AoV Exc:            1.30 cm (1.5-2.5cm)  Ao Sinus, d:        3.30 cm (2.1-3.5cm)  Ao STJ, d:          2.60 cm (1.7-3.4cm)  Asc Ao, d:          3.60 cm (2.1-3.4cm)       LV SYSTOLIC FUNCTION:                       Normal Ranges:  EF-A4C View:    50 % (>=55%)  EF-A2C View:    60 %  EF-Biplane:     54 %  EF-Visual:      53 %  LV EF Reported: 53 %       LV DIASTOLIC FUNCTION:           Normal Ranges:  MV Peak E:             0.14 m/s  (0.7-1.2 m/s)  MV e'                  0.094 m/s (>8.0)  MV lateral e'          0.12 m/s  MV medial e'           0.06 m/s  E/e' Ratio:            1.47      (<8.0)       MITRAL VALVE:           Normal Ranges:  MV Vmax:      1.33 m/s (<=1.3m/s)  MV peak P.1 mmHg (<5mmHg)  MV mean PG:   3.0 mmHg (<2mmHg)       AORTIC VALVE:                      Normal Ranges:  AoV Vmax:                1.85 m/s  (<=1.7m/s)  AoV Peak P.7 mmHg (<20mmHg)  AoV Mean P.0 mmHg  (1.7-11.5mmHg)  LVOT Max Marek:            0.88 m/s  (<=1.1m/s)  AoV VTI:                 38.80 cm  (18-25cm)  LVOT VTI:                19.40 cm  LVOT Diameter:           2.10 cm   (1.8-2.4cm)  AoV Area, VTI:           1.73 cm2  (2.5-5.5cm2)  AoV Area,Vmax:           1.64 cm2  (2.5-4.5cm2)  AoV Dimensionless Index: 0.50       RIGHT VENTRICLE:  RV Basal 4.40 cm  RV Mid   3.20 cm  RV Major 7.7 cm  TAPSE:   15.6 mm  RV s'    0.09 m/s       TRICUSPID VALVE/RVSP:          Normal Ranges:  Peak TR Velocity:     2.74 m/s  Est. RA Pressure:     8  RV Syst Pressure:     38       (< 30mmHg)  IVC Diam:             1.90 cm       PULMONIC VALVE:          Normal Ranges:  PV Max Marek:     0.9 m/s  (0.6-0.9m/s)  PV Max PG:      3.5 mmHg       [id number] Magnus Hess MD  Electronically signed on 2025 at 3:10:42 PM       ** Final **     Riya Urena  Coordinator  Internal Medicine     H&P      Incomplete     Date of Service: 2025  2:22 PM     Incomplete         History Of Present Illness  Yesenia Milner is a 89 y.o. 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  Patient has had multiple admissions for congestive heart failure exacerbation who presented today after a syncopal episode.  Patient notes she was gathering her clothing to get ready for the day when she became lightheaded and  passed out.  She denies any injury from the fall.  She notes over the past 2 weeks she has been experiencing a stuffy nose and a cough upon wakening in the morning.  She otherwise denies any fevers, chills, chest pain, change in her chronic shortness of breath with exertion, abdominal pain, nausea, vomiting, diarrhea, or dysuria.  She denies any change in her chronic intermittent bilateral lower extremity edema.  She denies any weight gain.  She denies any recent medication changes.  She reports compliance with her medications.  She is on her baseline oxygen at 2 L.  She notes her cardiologist is Dr. Ramicone.  In regards to her colon cancer, family notes that patient was deemed a risk for surgery via cardiology and surgeon, Dr. Vazquez, recommended patient speak with Dr. Ramicone in regards to her risk for surgery before he would schedule her for an operation.  They noted that her appointment is not until the fall and they are hopeful to speak with him this admission about her risk for surgery.  Patient notes she lives alone and uses a walker.  CODE STATUS discussed and patient became tearful during conversation.  She would like to remain a full code at this time and referred to family if an emergency would arise that she was incapacitated to answer for herself.     In the ED lab work, EKG, head CT, C-spine CT and CT chest/abdomen/pelvis were performed. Labs revealed troponin 19 (appears baseline with previous result 21 in March 2025), H&H 10.1 and 31.3 (within patient's recent baseline),  (310 in March 2025).  EKG, per ER physician impression revealed Dual paced rhythm. Motion artifact present. Irregular rate. Normal MA, wide QRS and Qtc intervals. No ST segment elevations, depressions, or T wave inversions. Compared to March 2025 imaging positive for fluid/mucosal thickening of the left sphenoid sinus.  Bilateral infiltrates and right greater than left pleural effusions. Moderate fecal residue.  Heart rate  was 120 on arrival, vitals were otherwise stable.    Echo 12/21/2024:     CONCLUSIONS:   1. Left ventricular ejection fraction is normal, by visual estimate at 60-65%.   2. Abnormal left venticular wall motion.   3. Left ventricular diastolic filling is indeterminate.   4. There is a moderate apical and anteroapical myocardial infarction.   5. There is normal right ventricular global systolic function.   6. There is moderate mitral annular calcification.   7. Moderate mitral valve regurgitation.   8. Moderate to severe tricuspid regurgitation visualized.   9. Moderately elevated right ventricular systolic pressure.  10. Aortic valve sclerosis.     Carotid duplex 2019:     Right Carotid: Findings are consistent with less than 50% stenosis of the right proximal ICA. Laminar flow seen by color Doppler. Right external carotid artery appears patent with no evidence of stenosis. The right vertebral artery is patent with antegrade flow. No evidence of hemodynamically significant stenosis in the right subclavian.  Left Carotid: Findings are consistent with less than 50% stenosis of the left proximal ICA. Laminar flow seen by color Doppler. Left external carotid artery appears patent with no evidence of stenosis. The left vertebral artery is patent with antegrade flow.  No evidence of hemodynamically significant stenosis in the left subclavian.        Past Medical History  [Medical History]    [Medical History]  Past Medical History       Diagnosis Date    Gross hematuria 10/07/2020    Impacted cerumen 02/22/2024    Other conditions influencing health status       Normal stress echocardiogram    Personal history of other medical treatment       History of echocardiogram    Personal history of other medical treatment       H/O Doppler ultrasound    Systolic CHF (Multi) 05/18/2023        Surgical History  [Surgical History]    [Surgical History]  Past Surgical History        Procedure Laterality Date    APPENDECTOMY    11/22/2017     Appendectomy    CARDIAC CATHETERIZATION N/A 12/20/2024     Procedure: Left Heart Cath, With LV;  Surgeon: Tahir Ruelas MD;  Location: [facility] Cardiac Cath Lab;  Service: Cardiovascular;  Laterality: N/A;    CARDIAC PACEMAKER PLACEMENT   03/11/2021     Pacemaker Placement    HYSTERECTOMY   11/22/2017     Hysterectomy    IR ANGIOGRAM INFERIOR EPIGASTRIC PELVIC   12/14/2020     IR ANGIOGRAM INFERIOR EPIGASTRIC PELVIC 12/14/2020 [facility]    IR ANGIOGRAM INFERIOR EPIGASTRIC PELVIC   12/14/2020     IR ANGIOGRAM INFERIOR EPIGASTRIC PELVIC 12/14/2020 [facility]    IR ANGIOGRAM RENAL BILATERAL Bilateral 12/14/2020     IR ANGIOGRAM RENAL BILATERAL 12/14/2020 [facility]    OTHER SURGICAL HISTORY   11/22/2017     Stab Phlebectomy Of Varicose Veins    OTHER SURGICAL HISTORY   11/22/2017     Venous Ligation With Stripping    TONSILLECTOMY   11/22/2017     Tonsillectomy        Social History  She reports that she quit smoking about 52 years ago. Her smoking use included cigarettes. She has been exposed to tobacco smoke. She has never used smokeless tobacco. She reports that she does not drink alcohol and does not use drugs.     Family History  [Family History]    [Family History]         Problem Relation Name Age of Onset    No Known Problems Mother            Allergies  Iodine, Shrimp, Hydrocodone, and Adhesive tape-silicones     10 point review systems performed and is negative besides what is stated in HPI     Physical Exam  HENT:      Head: Normocephalic and atraumatic.      Nose: Nose normal.      Mouth/Throat:      Mouth: Mucous membranes are dry.   Eyes:      Conjunctiva/sclera: Conjunctivae normal.   Cardiovascular:      Rate and Rhythm: Normal rate. Rhythm irregular.      Heart sounds: Murmur heard.   Pulmonary:      Effort: Pulmonary effort is normal.      Breath sounds: Rales present.   Abdominal:      General: Bowel sounds are normal.      Tenderness: There is abdominal tenderness.  "  Musculoskeletal:         General: Normal range of motion.      Cervical back: Neck supple.   Skin:     General: Skin is warm and dry.   Neurological:      Mental Status: She is alert. Mental status is at baseline.   Psychiatric:         Mood and Affect: Mood normal.            Last Recorded Vitals  Blood pressure 114/58, pulse 70, temperature 36.3 °C (97.3 °F), temperature source Temporal, resp. rate 19, height 1.626 m (5' 4\"), weight 69.9 kg (154 lb), SpO2 95%.     Relevant Results     [Medications Ordered Prior to Encounter]     [Medications Ordered Prior to Encounter]  No current facility-administered medications on file prior to encounter.             Current Outpatient Medications on File Prior to Encounter   Medication Sig Dispense Refill    allopurinol (Zyloprim) 100 mg tablet Take 1 tablet (100 mg) by mouth once daily. 90 tablet 0    aspirin 81 mg chewable tablet Chew 1 tablet (81 mg) once daily.        calcium carbonate-vitamin D3 (Calcium 600 + D,3,) 600 mg-5 mcg (200 unit) tablet Take 1 tablet by mouth once daily.        clopidogrel (Plavix) 75 mg tablet Take 1 tablet by mouth once daily 90 tablet 0    colchicine 0.6 mg tablet Take 1 tablet (0.6 mg) by mouth once daily. 90 tablet 3    DULoxetine (Cymbalta) 30 mg DR capsule Take 1 capsule (30 mg) by mouth once daily. Do not crush or chew. 90 capsule 1    ferrous sulfate 325 (65 Fe) mg EC tablet Take 1 tablet by mouth once daily. Do not crush, chew, or split.        fluticasone propion-salmeteroL (Advair) 115-21 mcg/actuation inhaler Inhale 2 puffs 2 times a day. Rinse mouth with water after use to reduce aftertaste and incidence of candidiasis. Do not swallow. 12 g 11    metoprolol tartrate (Lopressor) 25 mg tablet Take 1 tablet (25 mg) by mouth once daily. 30 tablet 0    polyethylene glycol (Glycolax, Miralax) 17 gram/dose powder Mix 1 capful (17 g) of powder with 4 to 8 ounces of water or juice and drink 2 times a day. 1010 g 1    torsemide (Demadex) " 20 mg tablet Take 3 tablets (60 mg) by mouth once daily. 90 tablet 11    acetaminophen (Tylenol) 325 mg tablet Take 2 tablets (650 mg) by mouth every 4 hours if needed for mild pain (1 - 3). (Patient not taking: Reported on 6/23/2025)        albuterol 2.5 mg /3 mL (0.083 %) nebulizer solution Take 3 mL (2.5 mg) by nebulization every 4 hours if needed for wheezing. 75 mL 1    enoxaparin (Lovenox) 40 mg/0.4 mL syringe Inject 0.4 mL (40 mg) under the skin once every 24 hours. (Patient not taking: Reported on 6/23/2025)        guaiFENesin (Mucinex) 600 mg 12 hr tablet Take 1 tablet (600 mg) by mouth 2 times a day as needed for cough. Do not crush, chew, or split. (Patient not taking: Reported on 6/23/2025)        hydrocortisone 1 % lotion Apply topically 2 times a day. Apply to both legs , wash hands after applying lotion (Patient not taking: Reported on 6/23/2025) 60 mL 0    lubricating eye drops ophthalmic solution Administer 1 drop into both eyes once daily as needed for dry eyes.        nebulizers misc With tubing and mask.   Use as directed 1 each 0    [DISCONTINUED] ipratropium-albuteroL (Duo-Neb) 0.5-2.5 mg/3 mL nebulizer solution Take 3 mL by nebulization every 6 hours.                  Results for orders placed or performed during the hospital encounter of 06/23/25 (from the past 24 hours)   CBC and Auto Differential   Result Value Ref Range     WBC 6.0 4.4 - 11.3 x10*3/uL     nRBC 0.0 0.0 - 0.0 /100 WBCs     RBC 3.37 (L) 4.00 - 5.20 x10*6/uL     Hemoglobin 10.1 (L) 12.0 - 16.0 g/dL     Hematocrit 31.3 (L) 36.0 - 46.0 %     MCV 93 80 - 100 fL     MCH 30.0 26.0 - 34.0 pg     MCHC 32.3 32.0 - 36.0 g/dL     RDW 15.9 (H) 11.5 - 14.5 %     Platelets 206 150 - 450 x10*3/uL     Neutrophils % 69.9 40.0 - 80.0 %     Immature Granulocytes %, Automated 0.5 0.0 - 0.9 %     Lymphocytes % 16.6 13.0 - 44.0 %     Monocytes % 10.7 2.0 - 10.0 %     Eosinophils % 2.0 0.0 - 6.0 %     Basophils % 0.3 0.0 - 2.0 %     Neutrophils  Absolute 4.18 1.60 - 5.50 x10*3/uL     Immature Granulocytes Absolute, Automated 0.03 0.00 - 0.50 x10*3/uL     Lymphocytes Absolute 0.99 0.80 - 3.00 x10*3/uL     Monocytes Absolute 0.64 0.05 - 0.80 x10*3/uL     Eosinophils Absolute 0.12 0.00 - 0.40 x10*3/uL     Basophils Absolute 0.02 0.00 - 0.10 x10*3/uL   Comprehensive metabolic panel   Result Value Ref Range     Glucose 116 (H) 74 - 99 mg/dL     Sodium 138 136 - 145 mmol/L     Potassium 3.6 3.5 - 5.3 mmol/L     Chloride 99 98 - 107 mmol/L     Bicarbonate 27 21 - 32 mmol/L     Anion Gap 16 10 - 20 mmol/L     Urea Nitrogen 30 (H) 6 - 23 mg/dL     Creatinine 1.00 0.50 - 1.05 mg/dL     eGFR 53 (L) >60 mL/min/1.73m*2     Calcium 9.6 8.6 - 10.3 mg/dL     Albumin 3.8 3.4 - 5.0 g/dL     Alkaline Phosphatase 89 33 - 136 U/L     Total Protein 6.3 (L) 6.4 - 8.2 g/dL     AST 24 9 - 39 U/L     Bilirubin, Total 0.6 0.0 - 1.2 mg/dL     ALT 17 7 - 45 U/L   Lipase   Result Value Ref Range     Lipase 18 9 - 82 U/L   Magnesium   Result Value Ref Range     Magnesium 2.24 1.60 - 2.40 mg/dL   aPTT   Result Value Ref Range     aPTT 26 26 - 36 seconds   Protime-INR   Result Value Ref Range     Protime 12.4 9.8 - 12.4 seconds     INR 1.1 0.9 - 1.1   B-Type Natriuretic Peptide   Result Value Ref Range      (H) 0 - 99 pg/mL   Troponin I, High Sensitivity, Initial   Result Value Ref Range     Troponin I, High Sensitivity 19 (H) 0 - 13 ng/L   Troponin, High Sensitivity, 1 Hour   Result Value Ref Range     Troponin I, High Sensitivity 23 (H) 0 - 13 ng/L   Sars-CoV-2, Influenza A/B and RSV PCR   Result Value Ref Range     Coronavirus 2019, PCR Not Detected Not Detected     Flu A Result Not Detected Not Detected     Flu B Result Not Detected Not Detected     RSV PCR Not Detected Not Detected   Troponin I, High Sensitivity   Result Value Ref Range     Troponin I, High Sensitivity 22 (H) 0 - 13 ng/L   Urinalysis with Reflex Culture and Microscopic   Result Value Ref Range     Color, Urine  Light-Yellow Light-Yellow, Yellow, Dark-Yellow     Appearance, Urine Clear Clear     Specific Gravity, Urine 1.010 1.005 - 1.035     pH, Urine 7.0 5.0, 5.5, 6.0, 6.5, 7.0, 7.5, 8.0     Protein, Urine NEGATIVE NEGATIVE, 10 (TRACE), 20 (TRACE) mg/dL     Glucose, Urine Normal Normal mg/dL     Blood, Urine NEGATIVE NEGATIVE mg/dL     Ketones, Urine NEGATIVE NEGATIVE mg/dL     Bilirubin, Urine NEGATIVE NEGATIVE mg/dL     Urobilinogen, Urine Normal Normal mg/dL     Nitrite, Urine NEGATIVE NEGATIVE     Leukocyte Esterase, Urine NEGATIVE NEGATIVE   CBC   Result Value Ref Range     WBC 4.7 4.4 - 11.3 x10*3/uL     nRBC 0.0 0.0 - 0.0 /100 WBCs     RBC 3.41 (L) 4.00 - 5.20 x10*6/uL     Hemoglobin 10.1 (L) 12.0 - 16.0 g/dL     Hematocrit 32.0 (L) 36.0 - 46.0 %     MCV 94 80 - 100 fL     MCH 29.6 26.0 - 34.0 pg     MCHC 31.6 (L) 32.0 - 36.0 g/dL     RDW 15.8 (H) 11.5 - 14.5 %     Platelets 215 150 - 450 x10*3/uL   Coagulation Screen   Result Value Ref Range     Protime 12.8 (H) 9.8 - 12.4 seconds     INR 1.2 (H) 0.9 - 1.1     aPTT 36 26 - 36 seconds   Basic Metabolic Panel   Result Value Ref Range     Glucose 98 74 - 99 mg/dL     Sodium 140 136 - 145 mmol/L     Potassium 3.6 3.5 - 5.3 mmol/L     Chloride 100 98 - 107 mmol/L     Bicarbonate 30 21 - 32 mmol/L     Anion Gap 14 10 - 20 mmol/L     Urea Nitrogen 28 (H) 6 - 23 mg/dL     Creatinine 0.95 0.50 - 1.05 mg/dL     eGFR 57 (L) >60 mL/min/1.73m*2     Calcium 9.1 8.6 - 10.3 mg/dL   Lipid Panel   Result Value Ref Range     Cholesterol 119 0 - 199 mg/dL     HDL-Cholesterol 49.2 mg/dL     Cholesterol/HDL Ratio 2.4       LDL Calculated 59 <=99 mg/dL     VLDL 11 0 - 40 mg/dL     Triglycerides 56 0 - 149 mg/dL     Non HDL Cholesterol 70 0 - 149 mg/dL      *Note: Due to a large number of results and/or encounters for the requested time period, some results have not been displayed. A complete set of results can be found in Results Review.         CT chest abdomen pelvis wo IV  contrast  Result Date: 6/23/2025  Interpreted By:  Mary Jane Foreman, STUDY: CT CHEST ABDOMEN PELVIS WO CONTRAST;  6/23/2025 1:05 pm   INDICATION: Signs/Symptoms:abdominal pain prior cancer syncope r/o mass, nephro jesse traumatic injury.     COMPARISON: CT chest 03/21/2025, CT abdomen and pelvis 01/12/2025   ACCESSION NUMBER(S): [id number]   ORDERING CLINICIAN: MURTAZA HINOJOSA   TECHNIQUE: CT of the chest, abdomen, and pelvis was performed. Sagittal and coronal reconstructions were generated. No intravenous contrast given for the examination.   FINDINGS: CHEST:   Hilar, vessel, and solid organ evaluation limited without IV contrast.   CHEST WALL AND LOWER NECK: Metallic streak artifact from port in the left anterior chest wall limits assessment of adjacent structures. No gross axillary adenopathy as visualized. Slightly prominent heterogenous right thyroid lobe.   MEDIASTINUM AND HARITHA:  Limited hilar assessment on unenhanced exam. No significant mediastinal or hilar adenopathy within limits of unenhanced study. Mild gaseous distention of the proximal esophagus.   HEART AND VESSELS:  Lack of IV contrast precludes vascular luminal assessment. The heart is normal in size. No significant pericardial effusion. Pacer wires in the right side of the heart. Multifocal atherosclerotic calcifications.   LUNGS, PLEURA, LARGE AIRWAYS:  Mild motion artifact in the lower chest. Pulmonary heterogeneity including scattered bilateral ground-glass infiltrates and reticular densities most prominent in the lung bases. Moderate right and small to moderate left pleural effusions and presumed compressive atelectasis of the adjacent lung bases. Slight fluid/thickening along the superior right main fissure. No pneumothorax. The central airways are grossly patent.   BONES:  Kyphosis and degenerative endplate spurring in the thoracic spine.     ABDOMEN/PELVIS:   Solid organ and vessel evaluation limited without IV contrast. Artifact related to  overlying upper extremities.   ABDOMINAL ORGANS:   LIVER: No focal lesion within limits of unenhanced exam   GALL BLADDER AND BILIARY TREE: No calcified gallstone   SPLEEN: No focal lesion within limits of unenhanced exam   PANCREAS: No focal lesion within limits of unenhanced exam   ADRENALS: No adrenal mass   KIDNEYS AND URETERS: No renal mass or hydronephrosis within limits of unenhanced exam   BOWEL: No abnormally dilated large or small bowel loops. Moderate fecal residue. Distal colon diverticulosis. Within limits of unenhanced exam probable persistent circumferential wall thickening in the distal transverse colon for example image 110/201, as noted on the prior exam.   PERITONEUM, RETROPERITONEUM, NODES: No significant free fluid. No free air. No significant retroperitoneal lymphadenopathy within limits of unenhanced exam. Slight increased density in the lower abdominal mesentery.   VESSELS:  Lack of IV contrast precludes vascular luminal assessment. Multifocal atherosclerotic calcifications. No abdominal aortic aneurysm.   PELVIS: Urinary bladder is moderately distended and grossly normal in contour. 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   ABDOMINAL WALL: No sizable abdominal wall hernia.   BONES: Osteopenia. Multifocal degenerative changes. Mild scoliosis of the lumbar spine.        CHEST:   Bilateral infiltrates and right-greater-than-left pleural effusions. Differential includes CHF and pneumonia. Clinical correlation and follow-up to ensure resolution after appropriate treatment recommended.   ABDOMEN AND PELVIS:   Apparent persistent distal transverse colon wall thickening consistent with reportedly known malignancy.   No gross evidence of solid organ injury or free fluid within limits of unenhanced exam.   Fecal retention. Distal colon diverticulosis.   Other findings as described above.   MACRO: None.   Signed by: Mary Jane Foreman 6/23/2025 1:32 PM Dictation workstation:   [id number]     CT cervical  spine wo IV contrast  Result Date: 6/23/2025  Interpreted By:  Mary Jane Foreman, STUDY: CT CERVICAL SPINE WO IV CONTRAST;  6/23/2025 1:05 pm   INDICATION: Signs/Symptoms:fall blood thinners r/o frx.     COMPARISON: None.   ACCESSION NUMBER(S): [id number]   ORDERING CLINICIAN: MURTAZA HINOJOSA   TECHNIQUE: CT images were obtained through the cervical spine. Sagittal and coronal reconstructions were generated.   FINDINGS:     ALIGNMENT: Mild levocurvature. Straightening of the lower cervical lordosis. Slight retrolisthesis of C5.   VERTEBRAE/DISC SPACES: No compression deformity or acute displaced fracture. Multilevel degenerative changes including atlantoaxial joint space narrowing spurring, multilevel intervertebral disc space narrowing with endplate sclerosis and spurring, multilevel bilateral facet joint narrowing and spurring. At least partial fusion across the anterior C5-6 vertebra.   ADDITIONAL FINDINGS: No abnormal thickening of the prevertebral soft tissues.  Dependent fluid/thickening in the sphenoid sinus. Ill-defined biapical infiltrates. Small right pleural effusion. Partially included pacer wires in the left upper chest.        No cervical vertebral compression deformity or acute displaced fracture. Multilevel productive/degenerative changes with straightening of the cervical lordosis and slight spondylolisthesis at C5.   Paraspinal/soft tissue findings as above.   MACRO: None.   Signed by: Mary Jane Foreman 6/23/2025 1:16 PM Dictation workstation:   [id number]     CT head wo IV contrast  Result Date: 6/23/2025  Interpreted By:  Mary Jane Foreman, STUDY: CT HEAD WO IV CONTRAST;  6/23/2025 1:05 pm   INDICATION: Signs/Symptoms:headache fall blood thinners r/o sah ich mass.     COMPARISON: 08/23/2023   ACCESSION NUMBER(S): [id number]   ORDERING CLINICIAN: MURTAZA HINOJOSA   TECHNIQUE: Unenhanced CT images of the head were obtained.   FINDINGS: The ventricles, cisterns and sulci are enlarged, consistent with diffuse  volume loss. There are areas of nonspecific white matter hypodensity, which are probably age related or microvascular in nature. These findings are similar to the prior exam. There is no acute intracranial hemorrhage, mass effect or midline shift. No extraaxial fluid collection.   No acute displaced calvarial fracture. No focal calvarial lesion.   Left sphenoid sinus dependent fluid/thickening. Remaining visualized paranasal sinuses are clear. Dense presumed surgical material along the anterior margin of each globe again seen.        No acute intracranial hemorrhage or mass-effect.   Mild fluid or mucosal thickening in the left sphenoid sinus..   MACRO: None.   Signed by: Mary Jane Foreman 6/23/2025 1:08 PM Dictation workstation:   [id number]        Assessment & Plan  Acute on chronic diastolic congestive heart failure     Acute bacterial sinusitis     Syncope     Advance care planning     Constipation           Admit to telemetry  Inpatient  Appreciate cardiology recommendations  I am not sure if patient's CHF represents more of a chronic condition so we will give 40 mg IV Lasix x 1 and resume torsemide tomorrow and look to cardiology for further recommendations  Please see echo from 2024 and carotid duplex from 2029 above  Start Rocephin 1 g IV daily  Orthostatic vital signs  Repeat EKG and troponin pacer check  Continue home aspirin and Plavix  Daily weight  Intake and output  AM CBC, BMP, coags, lipids  Check flu and COVID  Urinalysis pending  PT/OT     Chronic conditions: HFpEF, MI, 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 Plan discussed with interdisciplinary team, consults placed, appreciate input. Will continue current and repeat labs in the AM.          Discharge planning discussed with patient and care team. Therapy evaluations ordered. Patient aware and agreeable to current plan, continue plan as above.      I spent a total of 75 minutes on the date of the service which included preparing to see the patient, face-to-face patient care, completing clinical documentation, obtaining and/or reviewing separately obtained history, performing a medically appropriate examination, counseling and educating the patient/family/caregiver, ordering medications, tests, or procedures, communicating with other HCPs (not separately reported), independently interpreting results (not separately reported), communicating results to the patient/family/caregiver, and care coordination (not separately reported).            Riya Urena                      Physical Exam    General: in no acute distress  Eyes: PERRLA   HENT: Normo-cephalic, atraumatic.   CV: Irregular rate, irregular rhythm.   Resp: Breathing non-labored,  diminished to auscultation bilaterally  GI: BS x4   : monitor intake and output  MSK/Extremities: No gross bony deformities. PT/OT on the case  Skin: Warm. Appropriate color, continue offloading  Neuro:  Face symmetric.   Psych: returning to baseline, improved     10 point ROS negative unless otherwise noted in HPI    Patient fully evaluated 06/25  for    Problem List Items Addressed This Visit       Pleural effusion    * (Principal) Acute on chronic diastolic congestive heart failure - Primary    Relevant Medications    clopidogrel (Plavix) tablet 75 mg    metoprolol tartrate (Lopressor) tablet 25 mg    Other Relevant Orders     Referral to Healthy at Home Program    Syncope    Relevant Orders    Cardiac device check - Inpatient    Referral to Healthy at Home Program    Transthoracic Echo Complete (Completed)     Brought to hospital - had concerns including Fall.  Patient seen resting in bed with head of bed elevated, no s/s or c/o acute difficulties at this time.  Vital signs for last 24 hours Temp:  [36.2 °C (97.2 °F)-37.5 °C (99.5 °F)] 36.6 °C (97.9 °F)  Heart Rate:  [58-67] 60  Resp:  [17-19] 19  BP: (104-169)/(53-67) 169/67    No intake/output data recorded.  Patient still requiring frequent cardiac and SPO2 monitoring. Continue aggressive pulmonary hygiene and oral hygiene. Off loading as tolerated for skin integrity. Medications and labs reviewed-   Results for orders placed or performed during the hospital encounter of 06/23/25 (from the past 24 hours)   Transthoracic Echo Complete   Result Value Ref Range    AV pk kevin 1.85 m/s    AV mn grad 7 mmHg    LVOT diam 2.10 cm    LA vol index A/L 30.0 ml/m2    Tricuspid annular plane systolic excursion 1.6 cm    LV EF 53 %    RV free wall pk S' 8.59 cm/s    LVIDd 3.80 cm    RVSP 33 mmHg    Aortic Valve Area by Continuity of VTI 1.73 cm2    Aortic Valve Area by Continuity of Peak Velocity 1.64 cm2    AV pk grad 14 mmHg    LV A4C EF 49.7    Comprehensive Metabolic Panel   Result Value Ref Range    Glucose 97 74 - 99 mg/dL    Sodium 137 136 - 145 mmol/L    Potassium 3.8 3.5 - 5.3 mmol/L    Chloride 99 98 - 107 mmol/L    Bicarbonate 31 21 - 32 mmol/L    Anion Gap 11 10 - 20 mmol/L    Urea Nitrogen 27 (H) 6 - 23 mg/dL    Creatinine 0.88 0.50 - 1.05 mg/dL    eGFR 62 >60 mL/min/1.73m*2    Calcium 9.1 8.6 - 10.3 mg/dL    Albumin 3.4 3.4 - 5.0 g/dL    Alkaline Phosphatase 83 33 - 136 U/L    Total Protein 5.8 (L) 6.4 - 8.2 g/dL    AST 23 9 - 39 U/L    Bilirubin, Total 0.4 0.0 - 1.2 mg/dL    ALT 15 7 - 45 U/L   CBC and Auto Differential   Result Value Ref Range    WBC 5.0 4.4 - 11.3 x10*3/uL    nRBC  0.0 0.0 - 0.0 /100 WBCs    RBC 3.39 (L) 4.00 - 5.20 x10*6/uL    Hemoglobin 9.9 (L) 12.0 - 16.0 g/dL    Hematocrit 32.0 (L) 36.0 - 46.0 %    MCV 94 80 - 100 fL    MCH 29.2 26.0 - 34.0 pg    MCHC 30.9 (L) 32.0 - 36.0 g/dL    RDW 15.7 (H) 11.5 - 14.5 %    Platelets 213 150 - 450 x10*3/uL    Neutrophils % 48.2 40.0 - 80.0 %    Immature Granulocytes %, Automated 0.4 0.0 - 0.9 %    Lymphocytes % 28.0 13.0 - 44.0 %    Monocytes % 14.3 2.0 - 10.0 %    Eosinophils % 8.5 0.0 - 6.0 %    Basophils % 0.6 0.0 - 2.0 %    Neutrophils Absolute 2.40 1.60 - 5.50 x10*3/uL    Immature Granulocytes Absolute, Automated 0.02 0.00 - 0.50 x10*3/uL    Lymphocytes Absolute 1.39 0.80 - 3.00 x10*3/uL    Monocytes Absolute 0.71 0.05 - 0.80 x10*3/uL    Eosinophils Absolute 0.42 (H) 0.00 - 0.40 x10*3/uL    Basophils Absolute 0.03 0.00 - 0.10 x10*3/uL     *Note: Due to a large number of results and/or encounters for the requested time period, some results have not been displayed. A complete set of results can be found in Results Review.      Patient recently received an antibiotic (last 12 hours)       None           Plan discussed with interdisciplinary team, continue current and repeat labs in the AM.     Discharge planning discussed with patient and care team. Therapy evaluations ordered.   Anticipate - [facility]/SNF    Patient aware and agreeable to current plan, continue plan as above.     I spent a total of 60 minutes on the date of the service which included preparing to see the patient, face-to-face patient care, completing clinical documentation, obtaining and/or reviewing separately obtained history, performing a medically appropriate examination, counseling and educating the patient/family/caregiver, ordering medications, tests, or procedures, communicating with other HCPs (not separately reported), independently interpreting results (not separately reported), communicating results to the patient/family/caregiver, and care coordination (not  separately reported).   Relevant Results               This patient currently has cardiac telemetry ordered; if you would like to modify or discontinue the telemetry order, click here to go to the orders activity to modify/discontinue the order.              Assessment & Plan  Acute on chronic diastolic congestive heart failure    Acute bacterial sinusitis    Syncope    Advance care planning    Constipation      Syncope might be related to vasovagal.  Orthostatic vitals every shift         Riya Urena

## 2025-06-25 NOTE — CARE PLAN
The patient's goals for the shift include  sleep    The clinical goals for the shift include patient will be able to rest throughout the day      Problem: Heart Failure  Goal: Reduction in peripheral edema within 24 hours  Outcome: Progressing     Problem: Safety - Adult  Goal: Free from fall injury  Outcome: Progressing     Problem: Chronic Conditions and Co-morbidities  Goal: Patient's chronic conditions and co-morbidity symptoms are monitored and maintained or improved  Outcome: Progressing

## 2025-06-25 NOTE — PROGRESS NOTES
06/25/25 1315   Discharge Planning   Home or Post Acute Services Post acute facilities (Rehab/SNF/etc)   Type of Post Acute Facility Services Skilled nursing   Expected Discharge Disposition SNF   Does the patient need discharge transport arranged? Yes   RoundTrip coordination needed? Yes   Has discharge transport been arranged? No   Patient Choice   Provider Choice list and CMS website (https://medicare.gov/care-compare#search) for post-acute Quality and Resource Measure Data were provided and reviewed with: Patient;Family   Intensity of Service   Intensity of Service 0-30 min     [facility] PT: 11 OT: 15  Met with patient at bedside to follow up on discharge plan. Patient provided with SNF choice list. Patient states her daughter will be in this afternoon. Patient agreeable to TCC returning when daughter present to further discuss discharge plan.     Addendum 1406: Met with patient and patients daughter at bedside to follow up on discharge plan. Patients daughter provided SNF preferences of [facility]. Requested for [facility] to send referrals.

## 2025-06-25 NOTE — PROGRESS NOTES
Cardiology Progress Note    Yesenia Milner is a 89 y.o. female known to Dr. 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 Patient presented to [facility] on 6/23/2025 with syncope and fall. Chronically on aspirin and Plavix. Cardiology has been consulted for syncope .    Subjective   Today, patient seen and assessed. She continues to endorse unchanged shortness of breath. She continued to have intermittent episodes of chest heaviness and lightheadedness. Denies near syncope. She remains on 2L 02 NC.       ROS:    Review of Systems   Constitutional: Positive for malaise/fatigue.   Cardiovascular:  Positive for dyspnea on exertion. Negative for chest pain, irregular heartbeat, leg swelling, orthopnea, palpitations and paroxysmal nocturnal dyspnea.        Intermittent chest heaviness     Respiratory:  Positive for shortness of breath.    Neurological:  Positive for light-headedness.        Past Medical History:  Medical History[1]    Problem List Items Addressed This Visit          Cardiac and Vasculature    * (Principal) Acute on chronic diastolic congestive heart failure - Primary    Relevant Medications    clopidogrel (Plavix) tablet 75 mg    metoprolol tartrate (Lopressor) tablet 25 mg    Other Relevant Orders    Referral to Healthy at Home Program       Pulmonary and Pneumonias    Pleural effusion       Symptoms and Signs    Syncope    Relevant Orders    Cardiac device check - Inpatient    Referral to Healthy at Home Program    Transthoracic Echo Complete (Completed)       Family History:  No relevant family history has been documented for this patient.    Social History:  Social History     Tobacco Use     Smoking status: Former     Current packs/day: 0.00     Types: Cigarettes     Quit date:      Years since quittin.5     Passive exposure: Past    Smokeless tobacco: Never   Substance Use Topics    Alcohol use: Never         Allergies:  Allergies[2]        MEDICATION:    Scheduled medications  Scheduled Medications[3]  Continuous medications  Continuous Medications[4]  PRN medications  PRN Medications[5]     Assessment & Plan  Acute on chronic diastolic congestive heart failure    Acute bacterial sinusitis    Syncope    Advance care planning    Constipation      OBJECTIVE:    Visit Vitals  /67 (BP Location: Right arm, Patient Position: Lying)   Pulse 60   Temp 36.6 °C (97.9 °F) (Temporal)   Resp 19          Intake/Output Summary (Last 24 hours) at 2025 1133  Last data filed at 2025 0620  Gross per 24 hour   Intake 168 ml   Output 300 ml   Net -132 ml          Physical Exam   Physical Exam  Constitutional:       Appearance: Normal appearance. She is normal weight.   HENT:      Head: Normocephalic and atraumatic.   Cardiovascular:      Rate and Rhythm: Regular rhythm.      Pulses: Normal pulses.      Heart sounds: Normal heart sounds.   Pulmonary:      Effort: Pulmonary effort is normal.      Breath sounds: Examination of the right-lower field reveals decreased breath sounds. Examination of the left-lower field reveals decreased breath sounds. Decreased breath sounds present.   Musculoskeletal:      Cervical back: Normal range of motion.      Right lower leg: No edema.      Left lower leg: No edema.   Skin:     General: Skin is warm and dry.   Neurological:      General: No focal deficit present.      Mental Status: She is alert.   Psychiatric:         Mood and Affect: Mood normal.          Recent Image Results  Transthoracic Echo Complete     [facility], [address] [place] [address].[place] [address]            Tel [phone] and Fax [phone]    TRANSTHORACIC ECHOCARDIOGRAM REPORT        Patient Name:       JULY MILLER    Milvia Physician:    [id number] Magnus Hess MD  Study Date:         6/24/2025           Ordering Provider:    [id number] KAYLIN NAVA  MRN/PID:            [id number]            Fellow:  Accession#:         [id number]        Nurse:  Date of Birth/Age:  7/29/1933 / 89      Sonographer:          Israel burks RDCS  Gender assigned at  F                   Additional Staff:  Birth:  Height:             162.00 cm           Admit Date:           6/23/2025  Weight:             69.01 kg            Admission Status:     Inpatient -                                                                Routine  BSA / BMI:          1.74 m2 / 26.30     Encounter#:           [id number]                      kg/m2  Blood Pressure:     108/51 mmHg         Department Location:  [address]                                                                [facility]    Study Type:    TRANSTHORACIC ECHO (TTE) COMPLETE  Diagnosis/ICD: Syncope-R55  Indication:    Syncope  CPT Code:      Echo Complete w Full [id number]    Patient History:  Pertinent History: PMH HFpEF, MI, heart block/SVT/SSS s/p Medtronic dual-chamber                     pacemaker 2012 with generator change on pacemaker 7/13/2023,                     HTN, ALS, COPD (on 1 to 2 L of oxygen at baseline), 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    Study Detail: The following Echo studies were performed: 2D, M-Mode, Doppler,                color flow and 3D. Technically challenging study due to body                habitus and prominent lung artifact.       PHYSICIAN INTERPRETATION:  Left Ventricle:  Left ventricular ejection fraction is low normal by visual estimate at 50-55%. There are no regional left ventricular wall motion abnormalities. The left ventricular cavity size is decreased. There is mildly increased septal and mildly increased posterior left ventricular wall thickness. There is left ventricular concentric remodeling. Left ventricular diastolic filling is indeterminate.  Left Atrium: The left atrial size is normal.  Right Ventricle: The right ventricle is normal in size. There is normal right ventricular global systolic function. A device is visualized in the right ventricle.  Right Atrium: The right atrium is normal in size. There is a device visualized in the right atrium.  Aortic Valve: The aortic valve is trileaflet. The aortic valve area by VTI is 1.73 cmï¿½ with a peak velocity of 1.85 m/s. The peak and mean gradients are 11 mmHg and 7 mmHg, respectively with a dimensionless index of 0.50. There is moderate aortic valve thickening. There is evidence of mildly elevated transaortic gradients consistent with sclerosis of the aortic valve. There is trace aortic valve regurgitation.  Mitral Valve: The mitral valve is moderately thickened. The doppler estimated peak and mean diastolic pressure gradients are 7.1 mmHg and 3 mmHg, respectively. The mean gradient of the mitral valve is 3 mmHg. There is mild mitral valve regurgitation. The E Vmax is 0.14 m/s.  Tricuspid Valve: The tricuspid valve is structurally normal. No evidence of tricuspid regurgitation. The Doppler estimated right ventricular systolic pressure (RVSP) is mildly elevated at 38 mmHg. The right ventricular systolic pressure could not be estimated.  Pulmonic Valve: The pulmonic valve is structurally normal. There is no indication of pulmonic valve regurgitation.  Pericardium: There is no pericardial effusion noted.  Aorta: The aortic root is normal. There is no dilatation of the aortic root.  Systemic Veins: The inferior vena cava  appears normal in size, with IVC inspiratory collapse less than 50%.  In comparison to the previous echocardiogram(s): Compared with study dated 12/21/2024,.       CONCLUSIONS:   1. Left ventricular ejection fraction is low normal by visual estimate at 50-55%.   2. Left ventricular diastolic filling is indeterminate.   3. Left ventricular cavity size is decreased.   4. There is normal right ventricular global systolic function.   5. The mitral valve is moderately thickened.   6. The Doppler estimated RVSP is mildly elevated at 38 mmHg.   7. Aortic valve sclerosis. The peak and mean gradients are 14 mmHg and 7 mmHg respectively.    QUANTITATIVE DATA SUMMARY:     2D MEASUREMENTS:          Normal Ranges:  LAs:             3.50 cm  (2.7-4.0cm)  IVSd:            1.00 cm  (0.6-1.1cm)  LVPWd:           1.10 cm  (0.6-1.1cm)  LVIDd:           3.80 cm  (3.9-5.9cm)  LVIDs:           3.10 cm  LV Mass Index:   72 g/m2  LVEDV Index:     52 ml/m2  LV % FS          18.4 %       LEFT ATRIUM:                  Normal Ranges:  LA Vol A4C:        37.7 ml    (22+/-6mL/m2)  LA Vol A2C:        71.2 ml  LA Vol BP:         52.2 ml  LA Vol Index A4C:  21.7ml/m2  LA Vol Index A2C:  41.0 ml/m2  LA Vol Index BP:   30.0 ml/m2  LA Area A4C:       16.1 cm2  LA Area A2C:       22.3 cm2  LA Major Axis A4C: 5.8 cm  LA Major Axis A2C: 5.9 cm  LA Volume Index:   30.0 ml/m2       M-MODE MEASUREMENTS:         Normal Ranges:  Ao Root:             3.30 cm (2.0-3.7cm)  AoV Exc:             1.30 cm (1.5-2.5cm)  LAs:                 4.00 cm (2.7-4.0cm)       AORTA MEASUREMENTS:         Normal Ranges:  AoV Exc:            1.30 cm (1.5-2.5cm)  Ao Sinus, d:        3.30 cm (2.1-3.5cm)  Ao STJ, d:          2.60 cm (1.7-3.4cm)  Asc Ao, d:          3.60 cm (2.1-3.4cm)       LV SYSTOLIC FUNCTION:                       Normal Ranges:  EF-A4C View:    50 % (>=55%)  EF-A2C View:    60 %  EF-Biplane:     54 %  EF-Visual:      53 %  LV EF Reported: 53 %       LV DIASTOLIC  FUNCTION:           Normal Ranges:  MV Peak E:             0.14 m/s  (0.7-1.2 m/s)  MV e'                  0.094 m/s (>8.0)  MV lateral e'          0.12 m/s  MV medial e'           0.06 m/s  E/e' Ratio:            1.47      (<8.0)       MITRAL VALVE:          Normal Ranges:  MV Vmax:      1.33 m/s (<=1.3m/s)  MV peak P.1 mmHg (<5mmHg)  MV mean PG:   3.0 mmHg (<2mmHg)       AORTIC VALVE:                      Normal Ranges:  AoV Vmax:                1.85 m/s  (<=1.7m/s)  AoV Peak P.7 mmHg (<20mmHg)  AoV Mean P.0 mmHg  (1.7-11.5mmHg)  LVOT Max Marek:            0.88 m/s  (<=1.1m/s)  AoV VTI:                 38.80 cm  (18-25cm)  LVOT VTI:                19.40 cm  LVOT Diameter:           2.10 cm   (1.8-2.4cm)  AoV Area, VTI:           1.73 cm2  (2.5-5.5cm2)  AoV Area,Vmax:           1.64 cm2  (2.5-4.5cm2)  AoV Dimensionless Index: 0.50       RIGHT VENTRICLE:  RV Basal 4.40 cm  RV Mid   3.20 cm  RV Major 7.7 cm  TAPSE:   15.6 mm  RV s'    0.09 m/s       TRICUSPID VALVE/RVSP:          Normal Ranges:  Peak TR Velocity:     2.74 m/s  Est. RA Pressure:     8  RV Syst Pressure:     38       (< 30mmHg)  IVC Diam:             1.90 cm       PULMONIC VALVE:          Normal Ranges:  PV Max Marek:     0.9 m/s  (0.6-0.9m/s)  PV Max PG:      3.5 mmHg       [id number] Magnus Hess MD  Electronically signed on 2025 at 3:10:42 PM       ** Final **        Relevant Results:  Lab Results   Component Value Date    WBC 5.0 2025    HGB 9.9 (L) 2025    HCT 32.0 (L) 2025    MCV 94 2025     2025        Lab Results   Component Value Date    CREATININE 0.88 2025    BUN 27 (H) 2025     2025    K 3.8 2025    CL 99 2025    CO2 31 2025        Assessment/Plan      Yesenia Milner is a 89 y.o. female known to Dr. 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 Patient presented to [facility] on 6/23/2025 with syncope and fall. Chronically on aspirin and Plavix. Cardiology has been consulted for syncope .    Today, patient seen and assessed. She continues to endorse unchanged shortness of breath. She continued to have intermittent episodes of chest heaviness and lightheadedness. Denies near syncope. She remains on 2L 02 NC.     Syncope  - Patient reports lightheadedness with rest and exertion for the past three weeks. She denies any loss of consciousness until 6/23/2025. She was feeling lightheaded across the room and the had a syncopal episode.  She has continued to have intermittent episodes of chest heaviness and lightheadedness, which is not associated with positional changes or exertion. Her Telemetry continues to show 2-3 second pauses. Her device check was completed yesterday by PPM rep that did not see any abnormalities. I discussed ongoing pauses with Dr. Ramicone. He is going to review device programmed settings.   - Will order CXR to rule out any lead fracture  - Echo revealed preserved LV function, EF 50-55% and no significant structural or valvular abnormalities identified.   - Reduced Torsemide to 40 mg daily    Acute on Chronic Diastolic Heart Failure   - Echo 6/24/2025 revealed EF 50-55%  - CT Chest concerning for bilateral infiltrates vs pleural effusions.   - Patient reports ongoing shortness of breath that was unchanged with Diuresis. Shortness of breath is likely multifactorial due to underlying COPD, possible infection, and chronic diastolic heart failure.   - BNP: 548  - Continue Torsemide 40 mg daily  - Recommended to start Farxiga 10 mg daily   - Patient -1200 ml this admission   - Continue to monitor Intake and  output closely.  - Daily weights    Chest Heaviness  - Troponin I High Sensitivity: 19 -> 23 -> 23. Mild chronic elevation without delta pattern.   - No acute ischemic changes noted on EKG   - Intermittent chest heaviness is brief and not associated with exertion - possibly related to underlying pauses noted on telemetry.  - Continue Metoprolol Tartrate 25 mg BID      Discussed Case with Dr. Hess. Cardiology will continue to follow. Please contact with any questions or concerns.    Lexie Wolfe, APRN-CNP          [1]   Past Medical History:  Diagnosis Date    Gross hematuria 10/07/2020    Impacted cerumen 02/22/2024    Other conditions influencing health status     Normal stress echocardiogram    Personal history of other medical treatment     History of echocardiogram    Personal history of other medical treatment     H/O Doppler ultrasound    Systolic CHF (Multi) 05/18/2023   [2]   Allergies  Allergen Reactions    Iodine Rash    Shrimp Diarrhea and Nausea/vomiting    Hydrocodone Unknown     Pt does not remember    Adhesive Tape-Silicones Itching   [3] allopurinol, 100 mg, oral, Daily  aspirin, 81 mg, oral, Daily  cefTRIAXone, 1 g, intravenous, q24h  clopidogrel, 75 mg, oral, Daily  colchicine, 0.6 mg, oral, Daily  dapagliflozin propanediol, 10 mg, oral, q24h  DULoxetine, 30 mg, oral, Daily  ferrous sulfate, 65 mg of elemental iron, oral, Daily with breakfast  fluticasone furoate-vilanteroL, 1 puff, inhalation, Daily  heparin (porcine), 5,000 Units, subcutaneous, q12h  lactobacillus acidophilus, 1 tablet, oral, BID  metoprolol tartrate, 25 mg, oral, Daily  polyethylene glycol, 17 g, oral, BID  sennosides-docusate sodium, 2 tablet, oral, BID  torsemide, 40 mg, oral, Daily     [4]    [5] PRN medications: acetaminophen **OR** acetaminophen **OR** acetaminophen, albuterol, lubricating eye drops, ondansetron, oxygen

## 2025-06-25 NOTE — CARE PLAN
Problem: Heart Failure  Goal: Improved gas exchange this shift  6/25/2025 1046 by Mychal Sharma RN  Outcome: Progressing  6/25/2025 1043 by Mychal Sharma RN  Outcome: Progressing  Goal: Improved urinary output this shift  6/25/2025 1046 by Mychal Sharma RN  Outcome: Progressing  6/25/2025 1043 by Mychal Sharma RN  Outcome: Progressing  Goal: Reduction in peripheral edema within 24 hours  6/25/2025 1046 by Mychal Sharma RN  Outcome: Progressing  6/25/2025 1043 by Mychal Sharma RN  Outcome: Progressing  Goal: Report improvement of dyspnea/breathlessness this shift  6/25/2025 1046 by Mychal Sharma RN  Outcome: Progressing  6/25/2025 1043 by Mychal Sharma RN  Outcome: Progressing  Goal: Weight from fluid excess reduced over 2-3 days, then stabilize  6/25/2025 1046 by Mychal Sharma RN  Outcome: Progressing  6/25/2025 1043 by Mychal Sharma RN  Outcome: Progressing  Goal: Increase self care and/or family involvement in 24 hours  6/25/2025 1046 by Mychal Sharma RN  Outcome: Progressing  6/25/2025 1043 by Mychal Sharma RN  Outcome: Progressing     Problem: Pain - Adult  Goal: Verbalizes/displays adequate comfort level or baseline comfort level  6/25/2025 1046 by Mychal Sharma RN  Outcome: Progressing  6/25/2025 1043 by Mychal Sharma RN  Outcome: Progressing     Problem: Safety - Adult  Goal: Free from fall injury  6/25/2025 1046 by Mychal Sharma RN  Outcome: Progressing  6/25/2025 1043 by Mychal Sharma RN  Outcome: Progressing     Problem: Discharge Planning  Goal: Discharge to home or other facility with appropriate resources  6/25/2025 1046 by Mychal Sharma RN  Outcome: Progressing  6/25/2025 1043 by Mychal Sharma RN  Outcome: Progressing     Problem: Chronic Conditions and Co-morbidities  Goal: Patient's chronic conditions and co-morbidity symptoms are  monitored and maintained or improved  6/25/2025 1046 by Mychal Sharma RN  Outcome: Progressing  6/25/2025 1043 by Mychal Sharma RN  Outcome: Progressing     Problem: Nutrition  Goal: Nutrient intake appropriate for maintaining nutritional needs  6/25/2025 1046 by Mychal Sharma RN  Outcome: Progressing  6/25/2025 1043 by Mychal Sharma RN  Outcome: Progressing     Problem: Skin  Goal: Decreased wound size/increased tissue granulation at next dressing change  Outcome: Progressing  Flowsheets (Taken 6/25/2025 1046)  Decreased wound size/increased tissue granulation at next dressing change:   Promote sleep for wound healing   Utilize specialty bed per algorithm  Goal: Participates in plan/prevention/treatment measures  Outcome: Progressing  Flowsheets (Taken 6/25/2025 1046)  Participates in plan/prevention/treatment measures: Increase activity/out of bed for meals  Goal: Prevent/manage excess moisture  Outcome: Progressing  Flowsheets (Taken 6/25/2025 1046)  Prevent/manage excess moisture: Cleanse incontinence/protect with barrier cream  Goal: Prevent/minimize sheer/friction injuries  Outcome: Progressing  Flowsheets (Taken 6/25/2025 1046)  Prevent/minimize sheer/friction injuries:   Complete micro-shifts as needed if patient unable. Adjust patient position to relieve pressure points, not a full turn   Increase activity/out of bed for meals  Goal: Promote/optimize nutrition  Outcome: Progressing  Flowsheets (Taken 6/25/2025 1046)  Promote/optimize nutrition: Consume > 50% meals/supplements  Goal: Promote skin healing  Outcome: Progressing  Flowsheets (Taken 6/25/2025 1046)  Promote skin healing:   Assess skin/pad under line(s)/device(s)   Turn/reposition every 2 hours/use positioning/transfer devices

## 2025-06-26 DIAGNOSIS — T67.1XXA HEAT SYNCOPE, INITIAL ENCOUNTER: ICD-10-CM

## 2025-06-26 LAB
ALBUMIN SERPL BCP-MCNC: 3.3 G/DL (ref 3.4–5)
ALP SERPL-CCNC: 90 U/L (ref 33–136)
ALT SERPL W P-5'-P-CCNC: 19 U/L (ref 7–45)
ANION GAP SERPL CALC-SCNC: 10 MMOL/L (ref 10–20)
AST SERPL W P-5'-P-CCNC: 28 U/L (ref 9–39)
BASOPHILS # BLD AUTO: 0.03 X10*3/UL (ref 0–0.1)
BASOPHILS NFR BLD AUTO: 0.7 %
BILIRUB SERPL-MCNC: 0.3 MG/DL (ref 0–1.2)
BUN SERPL-MCNC: 32 MG/DL (ref 6–23)
CALCIUM SERPL-MCNC: 9.1 MG/DL (ref 8.6–10.3)
CEA SERPL-MCNC: 61.5 UG/L
CHLORIDE SERPL-SCNC: 100 MMOL/L (ref 98–107)
CO2 SERPL-SCNC: 31 MMOL/L (ref 21–32)
CREAT SERPL-MCNC: 0.9 MG/DL (ref 0.5–1.05)
EGFRCR SERPLBLD CKD-EPI 2021: 60 ML/MIN/1.73M*2
EOSINOPHIL # BLD AUTO: 0.39 X10*3/UL (ref 0–0.4)
EOSINOPHIL NFR BLD AUTO: 8.8 %
ERYTHROCYTE [DISTWIDTH] IN BLOOD BY AUTOMATED COUNT: 15.3 % (ref 11.5–14.5)
GLUCOSE SERPL-MCNC: 92 MG/DL (ref 74–99)
HCT VFR BLD AUTO: 30.7 % (ref 36–46)
HGB BLD-MCNC: 9.6 G/DL (ref 12–16)
IMM GRANULOCYTES # BLD AUTO: 0.02 X10*3/UL (ref 0–0.5)
IMM GRANULOCYTES NFR BLD AUTO: 0.5 % (ref 0–0.9)
LYMPHOCYTES # BLD AUTO: 1.44 X10*3/UL (ref 0.8–3)
LYMPHOCYTES NFR BLD AUTO: 32.4 %
MCH RBC QN AUTO: 29.4 PG (ref 26–34)
MCHC RBC AUTO-ENTMCNC: 31.3 G/DL (ref 32–36)
MCV RBC AUTO: 94 FL (ref 80–100)
MONOCYTES # BLD AUTO: 0.71 X10*3/UL (ref 0.05–0.8)
MONOCYTES NFR BLD AUTO: 16 %
NEUTROPHILS # BLD AUTO: 1.85 X10*3/UL (ref 1.6–5.5)
NEUTROPHILS NFR BLD AUTO: 41.6 %
NRBC BLD-RTO: 0 /100 WBCS (ref 0–0)
PLATELET # BLD AUTO: 215 X10*3/UL (ref 150–450)
POTASSIUM SERPL-SCNC: 3.8 MMOL/L (ref 3.5–5.3)
PROT SERPL-MCNC: 5.7 G/DL (ref 6.4–8.2)
RBC # BLD AUTO: 3.27 X10*6/UL (ref 4–5.2)
SODIUM SERPL-SCNC: 137 MMOL/L (ref 136–145)
WBC # BLD AUTO: 4.4 X10*3/UL (ref 4.4–11.3)

## 2025-06-26 PROCEDURE — 2500000001 HC RX 250 WO HCPCS SELF ADMINISTERED DRUGS (ALT 637 FOR MEDICARE OP): Performed by: INTERNAL MEDICINE

## 2025-06-26 PROCEDURE — 36415 COLL VENOUS BLD VENIPUNCTURE: CPT | Performed by: INTERNAL MEDICINE

## 2025-06-26 PROCEDURE — 2500000001 HC RX 250 WO HCPCS SELF ADMINISTERED DRUGS (ALT 637 FOR MEDICARE OP): Performed by: NURSE PRACTITIONER

## 2025-06-26 PROCEDURE — 99233 SBSQ HOSP IP/OBS HIGH 50: CPT | Performed by: NURSE PRACTITIONER

## 2025-06-26 PROCEDURE — 97110 THERAPEUTIC EXERCISES: CPT | Mod: GP

## 2025-06-26 PROCEDURE — 1200000002 HC GENERAL ROOM WITH TELEMETRY DAILY

## 2025-06-26 PROCEDURE — 80053 COMPREHEN METABOLIC PANEL: CPT | Performed by: INTERNAL MEDICINE

## 2025-06-26 PROCEDURE — 82378 CARCINOEMBRYONIC ANTIGEN: CPT | Mod: PARLAB | Performed by: SURGERY

## 2025-06-26 PROCEDURE — 85025 COMPLETE CBC W/AUTO DIFF WBC: CPT | Performed by: INTERNAL MEDICINE

## 2025-06-26 PROCEDURE — 99231 SBSQ HOSP IP/OBS SF/LOW 25: CPT | Performed by: NURSE PRACTITIONER

## 2025-06-26 PROCEDURE — 2500000004 HC RX 250 GENERAL PHARMACY W/ HCPCS (ALT 636 FOR OP/ED): Performed by: NURSE PRACTITIONER

## 2025-06-26 PROCEDURE — 99231 SBSQ HOSP IP/OBS SF/LOW 25: CPT | Performed by: SURGERY

## 2025-06-26 RX ORDER — AMOXICILLIN 250 MG
2 CAPSULE ORAL 2 TIMES DAILY
Start: 2025-06-26

## 2025-06-26 RX ORDER — CEFUROXIME AXETIL 250 MG/1
250 TABLET ORAL 2 TIMES DAILY
Status: DISCONTINUED | OUTPATIENT
Start: 2025-06-26 | End: 2025-07-03 | Stop reason: HOSPADM

## 2025-06-26 RX ORDER — L. ACIDOPHILUS/L.BULGARICUS 1MM CELL
1 TABLET ORAL 2 TIMES DAILY
Start: 2025-06-26

## 2025-06-26 RX ORDER — DAPAGLIFLOZIN 10 MG/1
10 TABLET, FILM COATED ORAL EVERY 24 HOURS
Start: 2025-06-27

## 2025-06-26 RX ORDER — HEPARIN SODIUM 5000 [USP'U]/ML
5000 INJECTION, SOLUTION INTRAVENOUS; SUBCUTANEOUS EVERY 12 HOURS
Start: 2025-06-26

## 2025-06-26 RX ORDER — ACETAMINOPHEN 325 MG/1
650 TABLET ORAL EVERY 4 HOURS PRN
Qty: 30 TABLET | Refills: 0 | Status: SHIPPED | OUTPATIENT
Start: 2025-06-26

## 2025-06-26 RX ORDER — METOPROLOL TARTRATE 25 MG/1
12.5 TABLET, FILM COATED ORAL 2 TIMES DAILY
Status: DISCONTINUED | OUTPATIENT
Start: 2025-06-26 | End: 2025-07-03 | Stop reason: HOSPADM

## 2025-06-26 RX ORDER — CEFUROXIME AXETIL 250 MG/1
250 TABLET ORAL 2 TIMES DAILY
Start: 2025-06-26 | End: 2025-07-06

## 2025-06-26 RX ADMIN — CLOPIDOGREL BISULFATE 75 MG: 75 TABLET, FILM COATED ORAL at 10:00

## 2025-06-26 RX ADMIN — DULOXETINE 30 MG: 30 CAPSULE, DELAYED RELEASE ORAL at 10:00

## 2025-06-26 RX ADMIN — POLYETHYLENE GLYCOL 3350 17 G: 17 POWDER, FOR SOLUTION ORAL at 20:48

## 2025-06-26 RX ADMIN — Medication 1 TABLET: at 20:48

## 2025-06-26 RX ADMIN — TORSEMIDE 40 MG: 20 TABLET ORAL at 10:00

## 2025-06-26 RX ADMIN — HEPARIN SODIUM 5000 UNITS: 5000 INJECTION, SOLUTION INTRAVENOUS; SUBCUTANEOUS at 03:28

## 2025-06-26 RX ADMIN — HEPARIN SODIUM 5000 UNITS: 5000 INJECTION, SOLUTION INTRAVENOUS; SUBCUTANEOUS at 16:33

## 2025-06-26 RX ADMIN — COLCHICINE 0.6 MG: 0.6 TABLET, FILM COATED ORAL at 10:00

## 2025-06-26 RX ADMIN — CEFUROXIME AXETIL 250 MG: 250 TABLET, FILM COATED ORAL at 20:48

## 2025-06-26 RX ADMIN — ALLOPURINOL 100 MG: 100 TABLET ORAL at 10:00

## 2025-06-26 RX ADMIN — FERROUS SULFATE TAB 325 MG (65 MG ELEMENTAL FE) 1 TABLET: 325 (65 FE) TAB at 08:42

## 2025-06-26 RX ADMIN — SENNOSIDES AND DOCUSATE SODIUM 2 TABLET: 50; 8.6 TABLET ORAL at 10:00

## 2025-06-26 RX ADMIN — ASPIRIN 81 MG CHEWABLE TABLET 81 MG: 81 TABLET CHEWABLE at 10:00

## 2025-06-26 RX ADMIN — METOPROLOL TARTRATE 25 MG: 25 TABLET, FILM COATED ORAL at 10:00

## 2025-06-26 RX ADMIN — DAPAGLIFLOZIN 10 MG: 10 TABLET, FILM COATED ORAL at 11:29

## 2025-06-26 RX ADMIN — POLYETHYLENE GLYCOL 3350 17 G: 17 POWDER, FOR SOLUTION ORAL at 10:00

## 2025-06-26 RX ADMIN — Medication 1 TABLET: at 10:00

## 2025-06-26 ASSESSMENT — PAIN SCALES - GENERAL
PAINLEVEL_OUTOF10: 0 - NO PAIN

## 2025-06-26 ASSESSMENT — COGNITIVE AND FUNCTIONAL STATUS - GENERAL
WALKING IN HOSPITAL ROOM: A LITTLE
MOVING FROM LYING ON BACK TO SITTING ON SIDE OF FLAT BED WITH BEDRAILS: A LITTLE
DRESSING REGULAR LOWER BODY CLOTHING: A LITTLE
DAILY ACTIVITIY SCORE: 21
STANDING UP FROM CHAIR USING ARMS: TOTAL
HELP NEEDED FOR BATHING: A LITTLE
WALKING IN HOSPITAL ROOM: A LOT
CLIMB 3 TO 5 STEPS WITH RAILING: TOTAL
MOBILITY SCORE: 11
MOBILITY SCORE: 19
STANDING UP FROM CHAIR USING ARMS: A LITTLE
MOVING TO AND FROM BED TO CHAIR: TOTAL
TURNING FROM BACK TO SIDE WHILE IN FLAT BAD: A LITTLE
DRESSING REGULAR LOWER BODY CLOTHING: A LITTLE
TOILETING: A LITTLE
HELP NEEDED FOR BATHING: A LITTLE
CLIMB 3 TO 5 STEPS WITH RAILING: A LOT
DAILY ACTIVITIY SCORE: 21
MOVING TO AND FROM BED TO CHAIR: A LITTLE
TOILETING: A LITTLE

## 2025-06-26 ASSESSMENT — PAIN - FUNCTIONAL ASSESSMENT
PAIN_FUNCTIONAL_ASSESSMENT: 0-10
PAIN_FUNCTIONAL_ASSESSMENT: 0-10

## 2025-06-26 ASSESSMENT — ENCOUNTER SYMPTOMS
SHORTNESS OF BREATH: 1
LIGHT-HEADEDNESS: 1
NEAR-SYNCOPE: 0
IRREGULAR HEARTBEAT: 0

## 2025-06-26 NOTE — PROGRESS NOTES
"Yesenia Milner is a 89 y.o. female on day 3 of admission presenting with Acute on chronic diastolic congestive heart failure.    Subjective   Patient lying in bed speaking with Dr. Vazquez. She denies abdominal pain at this time. Patient reports that she spoke with her daughter and they are leaning towards no operative treatment for the colon CA.     Objective     Physical Exam  Constitutional:       General: Not in acute distress.     Appearance: Normal appearance. Mildly ill-appearing.   HENT:      Head: Normocephalic.      Mouth: Mucous membranes are moist.   Eyes:      Extraocular Movements: Extraocular movements intact.      Pupils: Pupils are equal, round, and reactive to light.      Sclera: Sclera is non-icteric  Cardiovascular:      Rate and Rhythm: Normal rate and regular rhythm.      Pulses: Normal pulses.      Heart sounds: Normal heart sounds.   Pulmonary:      Effort: Pulmonary effort is normal. No respiratory distress. On O2 via NC     Breath sounds: Normal breath sounds.   Abdominal:      General: Bowel sounds are normal. There is no distension.      Palpations: Abdomen is soft.      Tenderness: There is no abdominal tenderness.   Skin:     General: Skin is warm and dry.   Neurological:      General: No focal deficit present.      Mental Status: Alert and oriented to person, place, and time.   Psychiatric:         Mood and Affect: Mood normal.         Behavior: Behavior normal.         Last Recorded Vitals  Blood pressure 105/50, pulse 60, temperature 36.1 °C (97 °F), temperature source Temporal, resp. rate 18, height 1.626 m (5' 4\"), weight 69.9 kg (154 lb), SpO2 97%.  Intake/Output last 3 Shifts:  I/O last 3 completed shifts:  In: - (0 mL/kg)   Out: 900 (12.9 mL/kg) [Urine:900 (0.4 mL/kg/hr)]  Weight: 69.9 kg     Relevant Results  Results for orders placed or performed during the hospital encounter of 06/23/25 (from the past 24 hours)   Comprehensive Metabolic Panel   Result Value Ref Range    " Glucose 92 74 - 99 mg/dL    Sodium 137 136 - 145 mmol/L    Potassium 3.8 3.5 - 5.3 mmol/L    Chloride 100 98 - 107 mmol/L    Bicarbonate 31 21 - 32 mmol/L    Anion Gap 10 10 - 20 mmol/L    Urea Nitrogen 32 (H) 6 - 23 mg/dL    Creatinine 0.90 0.50 - 1.05 mg/dL    eGFR 60 (L) >60 mL/min/1.73m*2    Calcium 9.1 8.6 - 10.3 mg/dL    Albumin 3.3 (L) 3.4 - 5.0 g/dL    Alkaline Phosphatase 90 33 - 136 U/L    Total Protein 5.7 (L) 6.4 - 8.2 g/dL    AST 28 9 - 39 U/L    Bilirubin, Total 0.3 0.0 - 1.2 mg/dL    ALT 19 7 - 45 U/L   CBC and Auto Differential   Result Value Ref Range    WBC 4.4 4.4 - 11.3 x10*3/uL    nRBC 0.0 0.0 - 0.0 /100 WBCs    RBC 3.27 (L) 4.00 - 5.20 x10*6/uL    Hemoglobin 9.6 (L) 12.0 - 16.0 g/dL    Hematocrit 30.7 (L) 36.0 - 46.0 %    MCV 94 80 - 100 fL    MCH 29.4 26.0 - 34.0 pg    MCHC 31.3 (L) 32.0 - 36.0 g/dL    RDW 15.3 (H) 11.5 - 14.5 %    Platelets 215 150 - 450 x10*3/uL    Neutrophils % 41.6 40.0 - 80.0 %    Immature Granulocytes %, Automated 0.5 0.0 - 0.9 %    Lymphocytes % 32.4 13.0 - 44.0 %    Monocytes % 16.0 2.0 - 10.0 %    Eosinophils % 8.8 0.0 - 6.0 %    Basophils % 0.7 0.0 - 2.0 %    Neutrophils Absolute 1.85 1.60 - 5.50 x10*3/uL    Immature Granulocytes Absolute, Automated 0.02 0.00 - 0.50 x10*3/uL    Lymphocytes Absolute 1.44 0.80 - 3.00 x10*3/uL    Monocytes Absolute 0.71 0.05 - 0.80 x10*3/uL    Eosinophils Absolute 0.39 0.00 - 0.40 x10*3/uL    Basophils Absolute 0.03 0.00 - 0.10 x10*3/uL     XR chest 1 view  Result Date: 6/25/2025  Interpreted By:  Bryce Redding, STUDY: XR CHEST 1 VIEW;  6/25/2025 1:47 pm   INDICATION: Signs/Symptoms:shortness of breath, PPM Lead eval.   COMPARISON: 03/24/2025   ACCESSION NUMBER(S): [id number]   ORDERING CLINICIAN: KAYLIN NAVA   FINDINGS: CARDIOMEDIASTINAL SILHOUETTE AND VASCULATURE:   Cardiac size:  Within normal limits considering a right pericardial fat pad and similar to the prior exam. Aortic shadow:  Within normal limits.   Mediastinal  contours: Within normal limits.   Pulmonary vasculature:  The central vasculature is unremarkable   LUNGS: There is blunting of the left costophrenic angle is probably from a residual effusion with atelectasis, improved from the previous exam. The lungs otherwise relatively clear.   ABDOMEN AND OTHER FINDINGS: Cardiac pacemaker device overlies the left chest similar to the previous exam.   BONES: No acute osseous changes.       1.  Improved left basilar atelectasis and effusion.   Signed by: Bryce Redding 6/25/2025 5:03 PM Dictation workstation:   [id number]    Transthoracic Echo Complete  Result Date: 6/24/2025   [facility], [place] [address]           Tel [phone] and Fax [phone] TRANSTHORACIC ECHOCARDIOGRAM REPORT  Patient Name:       JULY Steel Physician:    [id number] Magnus Hess MD Study Date:         6/24/2025           Ordering Provider:    [id number] KAYLIN NAVA MRN/PID:            [id number]            Fellow: Accession#:         [id number]        Nurse: Date of Birth/Age:  7/29/1933 / 89      Sonographer:          Israel burks                                     DEVIN Gender assigned at  F                   Additional Staff: Birth: Height:             162.00 cm           Admit Date:           6/23/2025 Weight:             69.01 kg            Admission Status:     Inpatient -                                                               Routine BSA / BMI:          1.74 m2 / 26.30     Encounter#:           [id number]                     kg/m2 Blood Pressure:     108/51 mmHg         Department Location:  [place] 1st floor                                                               [facility] Study Type:    TRANSTHORACIC ECHO (TTE) COMPLETE Diagnosis/ICD: Syncope-R55 Indication:    Syncope CPT Code:       Echo Complete w Full [id number] Patient History: Pertinent History: PMH HFpEF, MI, heart block/SVT/SSS s/p Medtronic dual-chamber                    pacemaker 2012 with generator change on pacemaker 7/13/2023,                    HTN, ALS, COPD (on 1 to 2 L of oxygen at baseline), 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 Study Detail: The following Echo studies were performed: 2D, M-Mode, Doppler,               color flow and 3D. Technically challenging study due to body               habitus and prominent lung artifact.  PHYSICIAN INTERPRETATION: Left Ventricle: Left ventricular ejection fraction is low normal by visual estimate at 50-55%. There are no regional left ventricular wall motion abnormalities. The left ventricular cavity size is decreased. There is mildly increased septal and mildly increased posterior left ventricular wall thickness. There is left ventricular concentric remodeling. Left ventricular diastolic filling is indeterminate. Left Atrium: The left atrial size is normal. Right Ventricle: The right ventricle is normal in size. There is normal right ventricular global systolic function. A device is visualized in the right ventricle. Right Atrium: The right atrium is normal in size. There is a device visualized in the right atrium. Aortic Valve: The aortic valve is trileaflet. The aortic valve area by VTI is 1.73 cmï¿½ with a peak velocity of 1.85 m/s. The peak and mean gradients are 11 mmHg and 7 mmHg, respectively with a dimensionless index of 0.50. There is moderate aortic valve thickening. There is evidence of mildly elevated transaortic gradients consistent with sclerosis of the aortic valve. There is trace aortic valve regurgitation. Mitral Valve: The mitral valve is moderately thickened. The doppler estimated peak and mean diastolic pressure  gradients are 7.1 mmHg and 3 mmHg, respectively. The mean gradient of the mitral valve is 3 mmHg. There is mild mitral valve regurgitation. The E Vmax is 0.14 m/s. Tricuspid Valve: The tricuspid valve is structurally normal. No evidence of tricuspid regurgitation. The Doppler estimated right ventricular systolic pressure (RVSP) is mildly elevated at 38 mmHg. The right ventricular systolic pressure could not be estimated. Pulmonic Valve: The pulmonic valve is structurally normal. There is no indication of pulmonic valve regurgitation. Pericardium: There is no pericardial effusion noted. Aorta: The aortic root is normal. There is no dilatation of the aortic root. Systemic Veins: The inferior vena cava appears normal in size, with IVC inspiratory collapse less than 50%. In comparison to the previous echocardiogram(s): Compared with study dated 12/21/2024,.  CONCLUSIONS:  1. Left ventricular ejection fraction is low normal by visual estimate at 50-55%.  2. Left ventricular diastolic filling is indeterminate.  3. Left ventricular cavity size is decreased.  4. There is normal right ventricular global systolic function.  5. The mitral valve is moderately thickened.  6. The Doppler estimated RVSP is mildly elevated at 38 mmHg.  7. Aortic valve sclerosis. The peak and mean gradients are 14 mmHg and 7 mmHg respectively. QUANTITATIVE DATA SUMMARY:  2D MEASUREMENTS:          Normal Ranges: LAs:             3.50 cm  (2.7-4.0cm) IVSd:            1.00 cm  (0.6-1.1cm) LVPWd:           1.10 cm  (0.6-1.1cm) LVIDd:           3.80 cm  (3.9-5.9cm) LVIDs:           3.10 cm LV Mass Index:   72 g/m2 LVEDV Index:     52 ml/m2 LV % FS          18.4 %  LEFT ATRIUM:                  Normal Ranges: LA Vol A4C:        37.7 ml    (22+/-6mL/m2) LA Vol A2C:        71.2 ml LA Vol BP:         52.2 ml LA Vol Index A4C:  21.7ml/m2 LA Vol Index A2C:  41.0 ml/m2 LA Vol Index BP:   30.0 ml/m2 LA Area A4C:       16.1 cm2 LA Area A2C:       22.3 cm2 LA  Major Axis A4C: 5.8 cm LA Major Axis A2C: 5.9 cm LA Volume Index:   30.0 ml/m2  M-MODE MEASUREMENTS:         Normal Ranges: Ao Root:             3.30 cm (2.0-3.7cm) AoV Exc:             1.30 cm (1.5-2.5cm) LAs:                 4.00 cm (2.7-4.0cm)  AORTA MEASUREMENTS:         Normal Ranges: AoV Exc:            1.30 cm (1.5-2.5cm) Ao Sinus, d:        3.30 cm (2.1-3.5cm) Ao STJ, d:          2.60 cm (1.7-3.4cm) Asc Ao, d:          3.60 cm (2.1-3.4cm)  LV SYSTOLIC FUNCTION:                      Normal Ranges: EF-A4C View:    50 % (>=55%) EF-A2C View:    60 % EF-Biplane:     54 % EF-Visual:      53 % LV EF Reported: 53 %  LV DIASTOLIC FUNCTION:           Normal Ranges: MV Peak E:             0.14 m/s  (0.7-1.2 m/s) MV e'                  0.094 m/s (>8.0) MV lateral e'          0.12 m/s MV medial e'           0.06 m/s E/e' Ratio:            1.47      (<8.0)  MITRAL VALVE:          Normal Ranges: MV Vmax:      1.33 m/s (<=1.3m/s) MV peak P.1 mmHg (<5mmHg) MV mean PG:   3.0 mmHg (<2mmHg)  AORTIC VALVE:                      Normal Ranges: AoV Vmax:                1.85 m/s  (<=1.7m/s) AoV Peak P.7 mmHg (<20mmHg) AoV Mean P.0 mmHg  (1.7-11.5mmHg) LVOT Max Marek:            0.88 m/s  (<=1.1m/s) AoV VTI:                 38.80 cm  (18-25cm) LVOT VTI:                19.40 cm LVOT Diameter:           2.10 cm   (1.8-2.4cm) AoV Area, VTI:           1.73 cm2  (2.5-5.5cm2) AoV Area,Vmax:           1.64 cm2  (2.5-4.5cm2) AoV Dimensionless Index: 0.50  RIGHT VENTRICLE: RV Basal 4.40 cm RV Mid   3.20 cm RV Major 7.7 cm TAPSE:   15.6 mm RV s'    0.09 m/s  TRICUSPID VALVE/RVSP:          Normal Ranges: Peak TR Velocity:     2.74 m/s Est. 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  The colon cancer is presently minimally or asymptomatic.  No bleeding on Plavix and aspirin. Her CEA level was significantly elevated in January, but no metastatic disease has been identified on imaging. The patient is at increased risk for operation due to her multiple comorbid conditions including her recent cardiomyopathy and CHF episodes.  She has been hospitalized 7 times in the past 6 months.    Impression  #Transverse colon adenocarcinoma     Recommendations  -no acute surgical intervention    >patient and family are strongly considering no surgical intervention at any time    >patient told that if she would like to discuss options further, that she should follow up with Dr. Vazquez in his office a few weeks after discharge and bring her family with her, so that everyone is informed of options, risks, etc.  -Diet as tolerated  -remainder of care per primary team    General surgery will sign off at this time    Patient seen and discussed with attending surgeon Dr. Vazquez.    SAQIB Hernandez-CNP

## 2025-06-26 NOTE — PROGRESS NOTES
Physical Therapy    Physical Therapy Treatment    Patient Name: Yesenia Milner  MRN: [id number]  Department: [facility]  Room: [facility]  Today's Date: 6/26/2025  Time Calculation  Start Time: 1055  Stop Time: 1115  Time Calculation (min): 20 min         Assessment/Plan         PT Plan  Treatment/Interventions: Bed mobility, Transfer training, Gait training, Strengthening  PT Plan: Ongoing PT  PT Frequency: 3 times per week  PT Discharge Recommendations: Moderate intensity level of continued care  PT Recommended Transfer Status: Assist x2  PT - OK to Discharge: Yes (once cleared by medical team)    PT Visit Info:  PT Received On: 06/26/25     General Visit Information:   General  Prior to Session Communication: Bedside nurse (Bedside nurse cleared pt for seated ex only due to cardiac instability. Cardiologist present and aware.)  Patient Position Received: Up in chair, Alarm on    Subjective   Precautions:  Precautions  Medical Precautions: Fall precautions, Oxygen therapy device and L/min        Objective   Pain:  Pain Assessment  Pain Assessment: 0-10  0-10 (Numeric) Pain Score: 0 - No pain     Treatments:  Therapeutic Exercise  Therapeutic Exercise Performed:  (Pt performed seated BLE ther ex 2 x 10 reps each.  Ex performed to improve strength and mobility.)    Outcome Measures:  [facility] Basic Mobility  Turning from your back to your side while in a flat bed without using bedrails: A little  Moving from lying on your back to sitting on the side of a flat bed without using bedrails: A little  Moving to and from bed to chair (including a wheelchair): Total  Standing up from a chair using your arms (e.g. wheelchair or bedside chair): Total  To walk in hospital room: A lot  Climbing 3-5 steps with railing: Total  Basic Mobility - Total Score: 11    Education Documentation  Mobility Training, taught by Randa Frazier, PT at 6/26/2025 11:49 AM.  Learner: Patient  Readiness: Acceptance  Method: Explanation,  Demonstration  Response: Verbalizes Understanding, Needs Reinforcement    EDUCATION:  Education Comment:  (Pt educated on treatment intervention and goals of treatment.)    Encounter Problems       Encounter Problems (Active)       Impaired mobility       STG: bed mobility sit<>supine with mod indep  (Progressing)       Start:  06/24/25    Expected End:  07/08/25            STG: transfers sit<>stand with mod indep  (Progressing)       Start:  06/24/25    Expected End:  07/08/25            STG: pt ambulates 50' via ww and cga  (Progressing)       Start:  06/24/25    Expected End:  07/08/25            STG: b/l le there ex x 20 reps  (Met)       Start:  06/24/25    Expected End:  07/08/25    Resolved:  06/26/25            Pain - Adult

## 2025-06-26 NOTE — CARE PLAN
The patient's goals for the shift include  rest    The clinical goals for the shift include safety and rest      Problem: Safety - Adult  Goal: Free from fall injury  Outcome: Progressing  Flowsheets (Taken 6/26/2025 0225)  Free from fall injury: Instruct family/caregiver on patient safety     Problem: Chronic Conditions and Co-morbidities  Goal: Patient's chronic conditions and co-morbidity symptoms are monitored and maintained or improved  Outcome: Progressing  Flowsheets (Taken 6/26/2025 0225)  Care Plan - Patient's Chronic Conditions and Co-Morbidity Symptoms are Monitored and Maintained or Improved:   Monitor and assess patient's chronic conditions and comorbid symptoms for stability, deterioration, or improvement   Collaborate with multidisciplinary team to address chronic and comorbid conditions and prevent exacerbation or deterioration   Update acute care plan with appropriate goals if chronic or comorbid symptoms are exacerbated and prevent overall improvement and discharge     Problem: Skin  Goal: Prevent/manage excess moisture  Outcome: Progressing  Flowsheets (Taken 6/26/2025 0225)  Prevent/manage excess moisture:   Cleanse incontinence/protect with barrier cream   Follow provider orders for dressing changes   Moisturize dry skin   Monitor for/manage infection if present  Goal: Prevent/minimize sheer/friction injuries  Outcome: Progressing  Flowsheets (Taken 6/26/2025 0225)  Prevent/minimize sheer/friction injuries:   Complete micro-shifts as needed if patient unable. Adjust patient position to relieve pressure points, not a full turn   Turn/reposition every 2 hours/use positioning/transfer devices   HOB 30 degrees or less   Use pull sheet   Increase activity/out of bed for meals

## 2025-06-26 NOTE — PROGRESS NOTES
Cardiology Progress Note      Yesenia Milner is a 89 y.o. female known to Dr. 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 Patient presented to [facility] on 6/23/2025 with syncope and fall. Chronically on aspirin and Plavix. Cardiology has been consulted for syncope .     Subjective   Today, patient seen and assessed resting in her chair. She reports some improvement in her shortness of breath. Continues to have intermittent pauses on telemetry. Continues to have intermittent episodes of lightheadedness and chest heaviness.        ROS:    Review of Systems   Cardiovascular:  Positive for leg swelling. Negative for chest pain, irregular heartbeat and near-syncope.        Chest heaviness     Respiratory:  Positive for shortness of breath.    Neurological:  Positive for light-headedness.        Past Medical History:  Medical History[1]    Problem List Items Addressed This Visit          Cardiac and Vasculature    Dyspnea on minimal exertion    Relevant Medications    cefuroxime (Ceftin) 250 mg tablet    * (Principal) Acute on chronic diastolic congestive heart failure - Primary    Relevant Medications    clopidogrel (Plavix) tablet 75 mg    metoprolol tartrate (Lopressor) tablet 12.5 mg (Start on 6/26/2025  9:00 PM)       Pulmonary and Pneumonias    Pleural effusion       Symptoms and Signs    Syncope    Relevant Medications    acetaminophen (Tylenol) 325 mg tablet    dapagliflozin propanediol (Farxiga) 10 mg tablet (Start on 6/27/2025)    heparin sodium,porcine (heparin, porcine,) 5,000 unit/mL injection    lactobacillus acidophilus tablet tablet    oxygen (O2) gas therapy    sennosides-docusate sodium  (Sindy-Colace) 8.6-50 mg tablet    Other Relevant Orders    Cardiac device check - Inpatient    Transthoracic Echo Complete (Completed)    CBC    Comprehensive metabolic panel       Family History:  No relevant family history has been documented for this patient.    Social History:  Social History     Tobacco Use    Smoking status: Former     Current packs/day: 0.00     Types: Cigarettes     Quit date:      Years since quittin.5     Passive exposure: Past    Smokeless tobacco: Never   Substance Use Topics    Alcohol use: Never         Allergies:  Allergies[2]        MEDICATION:    Scheduled medications  Scheduled Medications[3]  Continuous medications  Continuous Medications[4]  PRN medications  PRN Medications[5]     Assessment & Plan  Acute on chronic diastolic congestive heart failure    Acute bacterial sinusitis    Syncope    Advance care planning    Constipation      OBJECTIVE:    Visit Vitals  /57 (BP Location: Right arm, Patient Position: Sitting)   Pulse 63   Temp 36.1 °C (97 °F) (Temporal)   Resp 19          Intake/Output Summary (Last 24 hours) at 2025 1347  Last data filed at 2025 0626  Gross per 24 hour   Intake --   Output 600 ml   Net -600 ml          Physical Exam   Physical Exam  Constitutional:       Appearance: Normal appearance.   HENT:      Head: Normocephalic and atraumatic.   Cardiovascular:      Rate and Rhythm: Normal rate and regular rhythm.      Pulses: Normal pulses.           Posterior tibial pulses are 2+ on the right side and 2+ on the left side.      Heart sounds: Normal heart sounds. No murmur heard.  Pulmonary:      Effort: Pulmonary effort is normal.      Breath sounds: Normal breath sounds.   Musculoskeletal:      Cervical back: Normal range of motion and neck supple.      Right lower le+ Edema present.      Left lower le+ Edema present.   Skin:     General: Skin is warm and dry.   Neurological:      General: No focal deficit present.      Mental  Status: She is alert.   Psychiatric:         Mood and Affect: Mood normal.         Behavior: Behavior normal.          Recent Image Results  XR chest 1 view  Narrative: Interpreted By:  Bryce Redding,   STUDY:  XR CHEST 1 VIEW;  6/25/2025 1:47 pm      INDICATION:  Signs/Symptoms:shortness of breath, PPM Lead eval.      COMPARISON:  03/24/2025      ACCESSION NUMBER(S):  [id number]      ORDERING CLINICIAN:  KAYLIN NAVA      FINDINGS:  CARDIOMEDIASTINAL SILHOUETTE AND VASCULATURE:      Cardiac size:  Within normal limits considering a right pericardial  fat pad and similar to the prior exam. Aortic shadow:  Within normal  limits.      Mediastinal contours: Within normal limits.      Pulmonary vasculature:  The central vasculature is unremarkable      LUNGS:  There is blunting of the left costophrenic angle is probably from a  residual effusion with atelectasis, improved from the previous exam.  The lungs otherwise relatively clear.      ABDOMEN AND OTHER FINDINGS:  Cardiac pacemaker device overlies the left chest similar to the  previous exam.      BONES:  No acute osseous changes.      Impression: 1.  Improved left basilar atelectasis and effusion.      Signed by: Bryce Redding 6/25/2025 5:03 PM  Dictation workstation:   [id number]        Relevant Results:  Lab Results   Component Value Date    WBC 4.4 06/26/2025    HGB 9.6 (L) 06/26/2025    HCT 30.7 (L) 06/26/2025    MCV 94 06/26/2025     06/26/2025        Lab Results   Component Value Date    CREATININE 0.90 06/26/2025    BUN 32 (H) 06/26/2025     06/26/2025    K 3.8 06/26/2025     06/26/2025    CO2 31 06/26/2025        Assessment/Plan    Yesenia Milner is a 89 y.o. female known to Dr. 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 Patient presented to [facility] on 6/23/2025 with syncope and fall. Chronically on aspirin and Plavix. Cardiology has been consulted for syncope .       Syncope  - Patient reports lightheadedness with rest and exertion for the past three weeks. She denies any loss of consciousness until 6/23/2025. She was feeling lightheaded across the room and the had a syncopal episode.  Her Telemetry continues to show 2-3 second pauses and patient continues to endorse intermittent brief episodes of chest heaviness and lightheadedness.   - CXR did not reveal any concerns for lead fracture  - Discussed with Dr. Ramicone and her device was reprogrammed today. Will continue to monitor for any further pauses. If she has persistent pauses, may need to consider further intervention with PPM next week.    Acute on Chronic Diastolic Heart Failure   - Appears compensated with improvement in her shortness of breath despite reduced dose of diuretic therapy. Her shortness of breath is likely multifactorial with her chronic diastolic heart failure and underlying lung disease.  - EF 50%  - BNP: 548  - Continue Torsemide 40 mg and Farxiga 10 mg daily     Chest Heaviness  - Troponin I High Sensitivity: 19 -> 23 -> 23. Mild chronic elevation without delta pattern.   - No acute ischemic changes noted on EKG   - Intermittent chest heaviness is brief and not associated with exertion - possibly related to underlying pauses noted on telemetry. Will monitor to see if symptoms improve with reprogramming of her PPM.  - Patient was on 25 mg Metoprolol Tartrate daily? Will transition to Metoprolol Tartrate 12.5 mg BID      Discussed Case with Dr. Hess. Cardiology will continue follow. Please contact with any questions or concerns.    Some portions of this note were copied from previous note and edited appropriately to reflect today's assessment  and treatment plan.    Lexie Wolfe, APRN-CNP          [1]   Past Medical History:  Diagnosis Date    Gross hematuria 10/07/2020    Impacted cerumen 02/22/2024    Other conditions influencing health status     Normal stress echocardiogram    Personal history of other medical treatment     History of echocardiogram    Personal history of other medical treatment     H/O Doppler ultrasound    Systolic CHF (Multi) 05/18/2023   [2]   Allergies  Allergen Reactions    Iodine Rash    Shrimp Diarrhea and Nausea/vomiting    Hydrocodone Unknown     Pt does not remember    Adhesive Tape-Silicones Itching   [3] allopurinol, 100 mg, oral, Daily  aspirin, 81 mg, oral, Daily  cefuroxime, 250 mg, oral, BID  clopidogrel, 75 mg, oral, Daily  colchicine, 0.6 mg, oral, Daily  dapagliflozin propanediol, 10 mg, oral, q24h  DULoxetine, 30 mg, oral, Daily  ferrous sulfate, 65 mg of elemental iron, oral, Daily with breakfast  heparin (porcine), 5,000 Units, subcutaneous, q12h  lactobacillus acidophilus, 1 tablet, oral, BID  metoprolol tartrate, 12.5 mg, oral, BID  polyethylene glycol, 17 g, oral, BID  sennosides-docusate sodium, 2 tablet, oral, BID  torsemide, 40 mg, oral, Daily  [4]    [5] PRN medications: acetaminophen **OR** acetaminophen **OR** acetaminophen, albuterol, lubricating eye drops, ondansetron, oxygen

## 2025-06-26 NOTE — PROGRESS NOTES
06/26/25 1004   Discharge Planning   Home or Post Acute Services Post acute facilities (Rehab/SNF/etc)   Type of Post Acute Facility Services Skilled nursing   Expected Discharge Disposition SNF   Does the patient need discharge transport arranged? Yes   RoundTrip coordination needed? Yes   Has discharge transport been arranged? No     Reviewed SNF referrals, [facility], [facility] can accept. Called and spoke with patients daughter Cherelle, confirmed with her that [facility] is facility of choice. [facility] updated.     Addendum 1236: Requested for direct pre-cert team to start pre-cert for [facility].     Addendum 1317: Received update from direct pre-cert team, pre-cert approved for [facility] 06/26 - 07/02.    Addendum 1424: Rounded with Dr. Mesnah, received update that discharge is cancelled for today. [facility] updated.

## 2025-06-26 NOTE — NURSING NOTE
"Heart Failure Nurse Navigator      Assessment    I met with Yesenia Milner at the bedside    Patients Cardiologist(s): Dr. Ramicone    Patients Primary Care Provider:    1. Medical Domain  What is the patient's most recent LVEF? 50-55%  HFrEF (LVEF <= 40%) Quadruple therapy recommended  HFmrEF (LVEF 41-49%) Quadruple therapy recommended  HFpEF (LVEF >= 50%) Minimum recommendations: SGLT2i and MRA  Is the patient on OP GDMT for their condition?   ARNI/ACEI/ARB: No None  BB: Yes Metoprolol Tartrate 25 mg daily  MRA: No None  SGLT2i: No None  Is the patient prescribed a diuretic? Yes  Torsemide 60 mg daily  Does this patient have an implanted device (ie cardioMEMS, ICD, CRT-D)?  Device type: pacemaker  Could this patient have advanced heart failure (Stage D heart failure)?:    If yes, the potential markers of advanced heart failure include:     REFERENCE: Potential markers of advanced HF   Inotrope (dobutamine or milrinone) used during this admission?   LVEF<=25%?   2.   >=2 hospitalizations for ADHF in the last year?   3.   Severe symptoms of HF (fatigue, dyspnea, confusion, edema) despite medical therapy?   4.  Downtitration of GDMT as compared to home medications?   5.  Discontinuation of GDMT because of hypotension or renal intolerance?   6.  Recurrent arrhythmias (AF, VT with ICD shocks)?   7.  Cardiac cachexia (i.e., unintentional weight loss due to HF)?   8.  High-risk biomarker profile (e.g., hyponatremia [Na<135], very elevated BNP, worsening kidney function)   9.  Escalating doses of diuretics or persistent edema despite escalation     2. Mind and Emotion  Does this patient have possible cognitive impairment?: No (The Mini-Cog score )  Ask the patient to memorize these 3 words: banana, sunrise, chair  Ask the patient to draw a clock with hands pointing at \"20 minutes after 8\"  Ask the patient to recall the 3 words  Score: Add number of words recalled + clock drawing (0 points for any errors, 2 points if " correct)  Interpretation: A score of 0-2 suggests cognitive impairment is present, a score of 3-5 suggests cognitive impairment is absent  Does this patient have major depression?: No (PH-Q2 score )  Over the last 2 weeks: Little interest or pleasure in doing things? (Not at all +0, Several days +1, More than half the days +2, Nearly every day +3)  Over the last 2 weeks: Feeling down, depressed or hopeless? (Not at all +0, Several days +1, More than half the days +2, Nearly every day +3)  Score: Add points  Interpretation: A score of 3 or more suggests that major depression is likely.     3. Physical Function  Could this patient be frail?: Yes   Defined by presence of all of these: slowness, weakness, shrinking, inactivity, exhaustion  Is this patient at risk for falling?: Yes   Defined by having experienced a fall in the last 12 months.        I provided the following heart failure education:  - Heart Failure education packet provided.  - HF signs and symptoms, heart failure zones and when to call cardiologist.   - Controlling Heart Failure at Home: medication adherence, following up with cardiologist at least once yearly, staying healthy and active, limiting sodium and fluid intake as directed by cardiologist.  - Daily Weight Education  -Low Sodium Diet Education  -Fluid Restriction Education      *Discharge Appointment Follow-up Plan:        Additional Comments: Patient sees Dr. Ramicone for all of her cardiac needs. Discussed CHF signs and symptoms and when to call Dr. Ramicone. Discussed the importance of following a low sodium diet to prevent fluid retention. Discussed foods to avoid. Patient states she doesn't add extra salt to her food. Patient appreciated education.

## 2025-06-26 NOTE — CARE PLAN
Problem: Heart Failure  Goal: Improved gas exchange this shift  6/26/2025 1105 by Mychal Sharma RN  Outcome: Progressing  6/26/2025 1105 by Mychal Sharma RN  Outcome: Progressing  Goal: Improved urinary output this shift  6/26/2025 1105 by Mychal Sharma RN  Outcome: Progressing  6/26/2025 1105 by Mychal Sharma RN  Outcome: Progressing  Goal: Reduction in peripheral edema within 24 hours  6/26/2025 1105 by Mychal Sharma RN  Outcome: Progressing  6/26/2025 1105 by Mychal Sharma RN  Outcome: Progressing  Goal: Report improvement of dyspnea/breathlessness this shift  6/26/2025 1105 by Mychal Sharma RN  Outcome: Progressing  6/26/2025 1105 by Mychal Sharma RN  Outcome: Progressing  Goal: Weight from fluid excess reduced over 2-3 days, then stabilize  6/26/2025 1105 by Mychal Sharma RN  Outcome: Progressing  6/26/2025 1105 by Mychal Sharma RN  Outcome: Progressing  Goal: Increase self care and/or family involvement in 24 hours  6/26/2025 1105 by Mychal Sharma RN  Outcome: Progressing  6/26/2025 1105 by Mychal Sharma RN  Outcome: Progressing     Problem: Pain - Adult  Goal: Verbalizes/displays adequate comfort level or baseline comfort level  6/26/2025 1105 by Mychal Sharma RN  Outcome: Progressing  6/26/2025 1105 by Mychal Sharma RN  Outcome: Progressing     Problem: Safety - Adult  Goal: Free from fall injury  6/26/2025 1105 by Mychal Sharma RN  Outcome: Progressing  6/26/2025 1105 by Mychal Sharma RN  Outcome: Progressing     Problem: Discharge Planning  Goal: Discharge to home or other facility with appropriate resources  6/26/2025 1105 by Mychal Sharma RN  Outcome: Progressing  6/26/2025 1105 by Mychal Sharma RN  Outcome: Progressing     Problem: Chronic Conditions and Co-morbidities  Goal: Patient's chronic conditions and co-morbidity symptoms are  monitored and maintained or improved  6/26/2025 1105 by Mychal Sharma RN  Outcome: Progressing  6/26/2025 1105 by Mychal Sharma RN  Outcome: Progressing     Problem: Nutrition  Goal: Nutrient intake appropriate for maintaining nutritional needs  6/26/2025 1105 by Mychal Sharma RN  Outcome: Progressing  6/26/2025 1105 by Mychal Sharma RN  Outcome: Progressing     Problem: Skin  Goal: Decreased wound size/increased tissue granulation at next dressing change  6/26/2025 1105 by Mychal Sharma RN  Outcome: Progressing  Flowsheets (Taken 6/26/2025 1105)  Decreased wound size/increased tissue granulation at next dressing change: Utilize specialty bed per algorithm  6/26/2025 1105 by Mychal Sharma RN  Outcome: Progressing  Flowsheets (Taken 6/26/2025 1105)  Decreased wound size/increased tissue granulation at next dressing change: Utilize specialty bed per algorithm  Goal: Participates in plan/prevention/treatment measures  6/26/2025 1105 by Mychal Sharma RN  Outcome: Progressing  Flowsheets (Taken 6/26/2025 1105)  Participates in plan/prevention/treatment measures:   Discuss with provider PT/OT consult   Increase activity/out of bed for meals  6/26/2025 1105 by Mychal Sharma RN  Outcome: Progressing  Flowsheets (Taken 6/26/2025 1105)  Participates in plan/prevention/treatment measures:   Discuss with provider PT/OT consult   Increase activity/out of bed for meals  Goal: Prevent/manage excess moisture  6/26/2025 1105 by Mychal Sharma RN  Outcome: Progressing  Flowsheets (Taken 6/26/2025 1105)  Prevent/manage excess moisture: Cleanse incontinence/protect with barrier cream  6/26/2025 1105 by Mychal Sharma RN  Outcome: Progressing  Flowsheets (Taken 6/26/2025 1105)  Prevent/manage excess moisture: Cleanse incontinence/protect with barrier cream  Goal: Prevent/minimize sheer/friction injuries  6/26/2025 1105 by Mychal Sharma  RN  Outcome: Progressing  Flowsheets (Taken 6/26/2025 1105)  Prevent/minimize sheer/friction injuries:   Complete micro-shifts as needed if patient unable. Adjust patient position to relieve pressure points, not a full turn   Increase activity/out of bed for meals   Turn/reposition every 2 hours/use positioning/transfer devices   Utilize specialty bed per algorithm  6/26/2025 1105 by Mychal Sharma RN  Outcome: Progressing  Flowsheets (Taken 6/26/2025 1105)  Prevent/minimize sheer/friction injuries:   Complete micro-shifts as needed if patient unable. Adjust patient position to relieve pressure points, not a full turn   Increase activity/out of bed for meals   Turn/reposition every 2 hours/use positioning/transfer devices   Utilize specialty bed per algorithm  Goal: Promote/optimize nutrition  6/26/2025 1105 by Mychal Sharma RN  Outcome: Progressing  Flowsheets (Taken 6/26/2025 1105)  Promote/optimize nutrition:   Consume > 50% meals/supplements   Offer water/supplements/favorite foods  6/26/2025 1105 by Mychal Sharma RN  Outcome: Progressing  Flowsheets (Taken 6/26/2025 1105)  Promote/optimize nutrition:   Consume > 50% meals/supplements   Offer water/supplements/favorite foods  Goal: Promote skin healing  6/26/2025 1105 by Mychal Sharma RN  Outcome: Progressing  Flowsheets (Taken 6/26/2025 1105)  Promote skin healing:   Assess skin/pad under line(s)/device(s)   Turn/reposition every 2 hours/use positioning/transfer devices  6/26/2025 1105 by Mychal Sharma RN  Outcome: Progressing  Flowsheets (Taken 6/26/2025 1105)  Promote skin healing:   Assess skin/pad under line(s)/device(s)   Turn/reposition every 2 hours/use positioning/transfer devices

## 2025-06-26 NOTE — PROGRESS NOTES
Yesenia Milner is a 89 y.o. female on day 3 of admission presenting with Acute on chronic diastolic congestive heart failure.      Subjective   06/26 -Patient seen and fully examined at bedside, patient ill appearing, acutely complex, marked decline from baseline. Continues to have episodes of chest heaviness and lightheadedness, Seen by cardiology today with plan to deescalate medications from 25 mg to Metoprolol Tartrate 12.5 mg BID. Will monitor overnight. Orthostatic vitals ordered. Strict intake and output.Acute on chronic diastolic congestive heart failure, syncope- cardiology consulted, telemetry, frequent vitals, cardiac device check, appreciate input. Constipation improved with bowel protocol ordered. Patient remains hospitalized for acute onset with complex medical and pharmacological management. Will deescalate antibiotics to oral, repeat labs in the AM.  Will need rehab - [facility].   Continue to monitor overnight. Slow but progressive improvements noted. High Complexity with updates to multiple problems, adjustments to treatment plan, and need for ongoing inpatient care.   Long discussion on goals of care with patient and family, will continue current and await diagnostic findings.  Patient reports: no new complaints, decline from baseline, weakness         Objective     Last Recorded Vitals  /57 (BP Location: Right arm, Patient Position: Sitting)   Pulse 63   Temp 36.1 °C (97 °F) (Temporal)   Resp 19   Wt 69.9 kg (154 lb)   SpO2 99%   Intake/Output last 3 Shifts:    Intake/Output Summary (Last 24 hours) at 6/26/2025 1443  Last data filed at 6/26/2025 0626  Gross per 24 hour   Intake --   Output 600 ml   Net -600 ml       Admission Weight  Weight: 69.9 kg (154 lb) (06/23/25 1227)    Daily Weight  06/23/25 : 69.9 kg (154 lb)    Image Results  XR chest 1 view  Narrative: Interpreted By:  Bryce Redding,   STUDY:  XR CHEST 1 VIEW;  6/25/2025 1:47 pm      INDICATION:  Signs/Symptoms:shortness of  breath, PPM Lead eval.      COMPARISON:  03/24/2025      ACCESSION NUMBER(S):  [id number]      ORDERING CLINICIAN:  KAYLIN NAVA      FINDINGS:  CARDIOMEDIASTINAL SILHOUETTE AND VASCULATURE:      Cardiac size:  Within normal limits considering a right pericardial  fat pad and similar to the prior exam. Aortic shadow:  Within normal  limits.      Mediastinal contours: Within normal limits.      Pulmonary vasculature:  The central vasculature is unremarkable      LUNGS:  There is blunting of the left costophrenic angle is probably from a  residual effusion with atelectasis, improved from the previous exam.  The lungs otherwise relatively clear.      ABDOMEN AND OTHER FINDINGS:  Cardiac pacemaker device overlies the left chest similar to the  previous exam.      BONES:  No acute osseous changes.      Impression: 1.  Improved left basilar atelectasis and effusion.      Signed by: Bryce Redding 6/25/2025 5:03 PM  Dictation workstation:   [id number]     Riya Urena  Coordinator  Internal Medicine     H&P      Incomplete     Date of Service: 6/23/2025  2:22 PM     Incomplete         History Of Present Illness  Yesenia Milner is a 89 y.o. 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  Patient has had multiple admissions for congestive heart failure exacerbation who presented today after a syncopal episode.  Patient notes she was gathering her clothing to get ready for the day when she became lightheaded and passed out.  She denies any injury from the fall.  She notes over the past 2 weeks she has been experiencing a stuffy nose and a cough upon wakening in the morning.  She  otherwise denies any fevers, chills, chest pain, change in her chronic shortness of breath with exertion, abdominal pain, nausea, vomiting, diarrhea, or dysuria.  She denies any change in her chronic intermittent bilateral lower extremity edema.  She denies any weight gain.  She denies any recent medication changes.  She reports compliance with her medications.  She is on her baseline oxygen at 2 L.  She notes her cardiologist is Dr. Ramicone.  In regards to her colon cancer, family notes that patient was deemed a risk for surgery via cardiology and surgeon, Dr. Vazquez, recommended patient speak with Dr. Ramicone in regards to her risk for surgery before he would schedule her for an operation.  They noted that her appointment is not until the fall and they are hopeful to speak with him this admission about her risk for surgery.  Patient notes she lives alone and uses a walker.  CODE STATUS discussed and patient became tearful during conversation.  She would like to remain a full code at this time and referred to family if an emergency would arise that she was incapacitated to answer for herself.     In the ED lab work, EKG, head CT, C-spine CT and CT chest/abdomen/pelvis were performed. Labs revealed troponin 19 (appears baseline with previous result 21 in March 2025), H&H 10.1 and 31.3 (within patient's recent baseline),  (310 in March 2025).  EKG, per ER physician impression revealed Dual paced rhythm. Motion artifact present. Irregular rate. Normal HI, wide QRS and Qtc intervals. No ST segment elevations, depressions, or T wave inversions. Compared to March 2025 imaging positive for fluid/mucosal thickening of the left sphenoid sinus.  Bilateral infiltrates and right greater than left pleural effusions. Moderate fecal residue.  Heart rate was 120 on arrival, vitals were otherwise stable.    Echo 12/21/2024:     CONCLUSIONS:   1. Left ventricular ejection fraction is normal, by visual estimate at 60-65%.    2. Abnormal left venticular wall motion.   3. Left ventricular diastolic filling is indeterminate.   4. There is a moderate apical and anteroapical myocardial infarction.   5. There is normal right ventricular global systolic function.   6. There is moderate mitral annular calcification.   7. Moderate mitral valve regurgitation.   8. Moderate to severe tricuspid regurgitation visualized.   9. Moderately elevated right ventricular systolic pressure.  10. Aortic valve sclerosis.     Carotid duplex 2019:     Right Carotid: Findings are consistent with less than 50% stenosis of the right proximal ICA. Laminar flow seen by color Doppler. Right external carotid artery appears patent with no evidence of stenosis. The right vertebral artery is patent with antegrade flow. No evidence of hemodynamically significant stenosis in the right subclavian.  Left Carotid: Findings are consistent with less than 50% stenosis of the left proximal ICA. Laminar flow seen by color Doppler. Left external carotid artery appears patent with no evidence of stenosis. The left vertebral artery is patent with antegrade flow.  No evidence of hemodynamically significant stenosis in the left subclavian.        Past Medical History  [Medical History]    [Medical History]  Past Medical History       Diagnosis Date    Gross hematuria 10/07/2020    Impacted cerumen 02/22/2024    Other conditions influencing health status       Normal stress echocardiogram    Personal history of other medical treatment       History of echocardiogram    Personal history of other medical treatment       H/O Doppler ultrasound    Systolic CHF (Multi) 05/18/2023        Surgical History  [Surgical History]    [Surgical History]  Past Surgical History        Procedure Laterality Date    APPENDECTOMY   11/22/2017     Appendectomy    CARDIAC CATHETERIZATION N/A 12/20/2024     Procedure: Left Heart Cath, With LV;  Surgeon: Tahir Ruelas MD;  Location: [facility];   Service: Cardiovascular;  Laterality: N/A;    CARDIAC PACEMAKER PLACEMENT   03/11/2021     Pacemaker Placement    HYSTERECTOMY   11/22/2017     Hysterectomy    IR ANGIOGRAM INFERIOR EPIGASTRIC PELVIC   12/14/2020     IR ANGIOGRAM INFERIOR EPIGASTRIC PELVIC 12/14/2020 [facility]    IR ANGIOGRAM INFERIOR EPIGASTRIC PELVIC   12/14/2020     IR ANGIOGRAM INFERIOR EPIGASTRIC PELVIC 12/14/2020 [facility]    IR ANGIOGRAM RENAL BILATERAL Bilateral 12/14/2020     IR ANGIOGRAM RENAL BILATERAL 12/14/2020 [facility]    OTHER SURGICAL HISTORY   11/22/2017     Stab Phlebectomy Of Varicose Veins    OTHER SURGICAL HISTORY   11/22/2017     Venous Ligation With Stripping    TONSILLECTOMY   11/22/2017     Tonsillectomy        Social History  She reports that she quit smoking about 52 years ago. Her smoking use included cigarettes. She has been exposed to tobacco smoke. She has never used smokeless tobacco. She reports that she does not drink alcohol and does not use drugs.     Family History  [Family History]    [Family History]         Problem Relation Name Age of Onset    No Known Problems Mother            Allergies  Iodine, Shrimp, Hydrocodone, and Adhesive tape-silicones     10 point review systems performed and is negative besides what is stated in HPI     Physical Exam  HENT:      Head: Normocephalic and atraumatic.      Nose: Nose normal.      Mouth/Throat:      Mouth: Mucous membranes are dry.   Eyes:      Conjunctiva/sclera: Conjunctivae normal.   Cardiovascular:      Rate and Rhythm: Normal rate. Rhythm irregular.      Heart sounds: Murmur heard.   Pulmonary:      Effort: Pulmonary effort is normal.      Breath sounds: Rales present.   Abdominal:      General: Bowel sounds are normal.      Tenderness: There is abdominal tenderness.   Musculoskeletal:         General: Normal range of motion.      Cervical back: Neck supple.   Skin:     General: Skin is warm and dry.   Neurological:      Mental Status: She is  "alert. Mental status is at baseline.   Psychiatric:         Mood and Affect: Mood normal.            Last Recorded Vitals  Blood pressure 114/58, pulse 70, temperature 36.3 °C (97.3 °F), temperature source Temporal, resp. rate 19, height 1.626 m (5' 4\"), weight 69.9 kg (154 lb), SpO2 95%.     Relevant Results     [Medications Ordered Prior to Encounter]     [Medications Ordered Prior to Encounter]  No current facility-administered medications on file prior to encounter.             Current Outpatient Medications on File Prior to Encounter   Medication Sig Dispense Refill    allopurinol (Zyloprim) 100 mg tablet Take 1 tablet (100 mg) by mouth once daily. 90 tablet 0    aspirin 81 mg chewable tablet Chew 1 tablet (81 mg) once daily.        calcium carbonate-vitamin D3 (Calcium 600 + D,3,) 600 mg-5 mcg (200 unit) tablet Take 1 tablet by mouth once daily.        clopidogrel (Plavix) 75 mg tablet Take 1 tablet by mouth once daily 90 tablet 0    colchicine 0.6 mg tablet Take 1 tablet (0.6 mg) by mouth once daily. 90 tablet 3    DULoxetine (Cymbalta) 30 mg DR capsule Take 1 capsule (30 mg) by mouth once daily. Do not crush or chew. 90 capsule 1    ferrous sulfate 325 (65 Fe) mg EC tablet Take 1 tablet by mouth once daily. Do not crush, chew, or split.        fluticasone propion-salmeteroL (Advair) 115-21 mcg/actuation inhaler Inhale 2 puffs 2 times a day. Rinse mouth with water after use to reduce aftertaste and incidence of candidiasis. Do not swallow. 12 g 11    metoprolol tartrate (Lopressor) 25 mg tablet Take 1 tablet (25 mg) by mouth once daily. 30 tablet 0    polyethylene glycol (Glycolax, Miralax) 17 gram/dose powder Mix 1 capful (17 g) of powder with 4 to 8 ounces of water or juice and drink 2 times a day. 1010 g 1    torsemide (Demadex) 20 mg tablet Take 3 tablets (60 mg) by mouth once daily. 90 tablet 11    acetaminophen (Tylenol) 325 mg tablet Take 2 tablets (650 mg) by mouth every 4 hours if needed for mild " pain (1 - 3). (Patient not taking: Reported on 6/23/2025)        albuterol 2.5 mg /3 mL (0.083 %) nebulizer solution Take 3 mL (2.5 mg) by nebulization every 4 hours if needed for wheezing. 75 mL 1    enoxaparin (Lovenox) 40 mg/0.4 mL syringe Inject 0.4 mL (40 mg) under the skin once every 24 hours. (Patient not taking: Reported on 6/23/2025)        guaiFENesin (Mucinex) 600 mg 12 hr tablet Take 1 tablet (600 mg) by mouth 2 times a day as needed for cough. Do not crush, chew, or split. (Patient not taking: Reported on 6/23/2025)        hydrocortisone 1 % lotion Apply topically 2 times a day. Apply to both legs , wash hands after applying lotion (Patient not taking: Reported on 6/23/2025) 60 mL 0    lubricating eye drops ophthalmic solution Administer 1 drop into both eyes once daily as needed for dry eyes.        nebulizers misc With tubing and mask.   Use as directed 1 each 0    [DISCONTINUED] ipratropium-albuteroL (Duo-Neb) 0.5-2.5 mg/3 mL nebulizer solution Take 3 mL by nebulization every 6 hours.                  Results for orders placed or performed during the hospital encounter of 06/23/25 (from the past 24 hours)   CBC and Auto Differential   Result Value Ref Range     WBC 6.0 4.4 - 11.3 x10*3/uL     nRBC 0.0 0.0 - 0.0 /100 WBCs     RBC 3.37 (L) 4.00 - 5.20 x10*6/uL     Hemoglobin 10.1 (L) 12.0 - 16.0 g/dL     Hematocrit 31.3 (L) 36.0 - 46.0 %     MCV 93 80 - 100 fL     MCH 30.0 26.0 - 34.0 pg     MCHC 32.3 32.0 - 36.0 g/dL     RDW 15.9 (H) 11.5 - 14.5 %     Platelets 206 150 - 450 x10*3/uL     Neutrophils % 69.9 40.0 - 80.0 %     Immature Granulocytes %, Automated 0.5 0.0 - 0.9 %     Lymphocytes % 16.6 13.0 - 44.0 %     Monocytes % 10.7 2.0 - 10.0 %     Eosinophils % 2.0 0.0 - 6.0 %     Basophils % 0.3 0.0 - 2.0 %     Neutrophils Absolute 4.18 1.60 - 5.50 x10*3/uL     Immature Granulocytes Absolute, Automated 0.03 0.00 - 0.50 x10*3/uL     Lymphocytes Absolute 0.99 0.80 - 3.00 x10*3/uL     Monocytes Absolute  0.64 0.05 - 0.80 x10*3/uL     Eosinophils Absolute 0.12 0.00 - 0.40 x10*3/uL     Basophils Absolute 0.02 0.00 - 0.10 x10*3/uL   Comprehensive metabolic panel   Result Value Ref Range     Glucose 116 (H) 74 - 99 mg/dL     Sodium 138 136 - 145 mmol/L     Potassium 3.6 3.5 - 5.3 mmol/L     Chloride 99 98 - 107 mmol/L     Bicarbonate 27 21 - 32 mmol/L     Anion Gap 16 10 - 20 mmol/L     Urea Nitrogen 30 (H) 6 - 23 mg/dL     Creatinine 1.00 0.50 - 1.05 mg/dL     eGFR 53 (L) >60 mL/min/1.73m*2     Calcium 9.6 8.6 - 10.3 mg/dL     Albumin 3.8 3.4 - 5.0 g/dL     Alkaline Phosphatase 89 33 - 136 U/L     Total Protein 6.3 (L) 6.4 - 8.2 g/dL     AST 24 9 - 39 U/L     Bilirubin, Total 0.6 0.0 - 1.2 mg/dL     ALT 17 7 - 45 U/L   Lipase   Result Value Ref Range     Lipase 18 9 - 82 U/L   Magnesium   Result Value Ref Range     Magnesium 2.24 1.60 - 2.40 mg/dL   aPTT   Result Value Ref Range     aPTT 26 26 - 36 seconds   Protime-INR   Result Value Ref Range     Protime 12.4 9.8 - 12.4 seconds     INR 1.1 0.9 - 1.1   B-Type Natriuretic Peptide   Result Value Ref Range      (H) 0 - 99 pg/mL   Troponin I, High Sensitivity, Initial   Result Value Ref Range     Troponin I, High Sensitivity 19 (H) 0 - 13 ng/L   Troponin, High Sensitivity, 1 Hour   Result Value Ref Range     Troponin I, High Sensitivity 23 (H) 0 - 13 ng/L   Sars-CoV-2, Influenza A/B and RSV PCR   Result Value Ref Range     Coronavirus 2019, PCR Not Detected Not Detected     Flu A Result Not Detected Not Detected     Flu B Result Not Detected Not Detected     RSV PCR Not Detected Not Detected   Troponin I, High Sensitivity   Result Value Ref Range     Troponin I, High Sensitivity 22 (H) 0 - 13 ng/L   Urinalysis with Reflex Culture and Microscopic   Result Value Ref Range     Color, Urine Light-Yellow Light-Yellow, Yellow, Dark-Yellow     Appearance, Urine Clear Clear     Specific Gravity, Urine 1.010 1.005 - 1.035     pH, Urine 7.0 5.0, 5.5, 6.0, 6.5, 7.0, 7.5, 8.0      Protein, Urine NEGATIVE NEGATIVE, 10 (TRACE), 20 (TRACE) mg/dL     Glucose, Urine Normal Normal mg/dL     Blood, Urine NEGATIVE NEGATIVE mg/dL     Ketones, Urine NEGATIVE NEGATIVE mg/dL     Bilirubin, Urine NEGATIVE NEGATIVE mg/dL     Urobilinogen, Urine Normal Normal mg/dL     Nitrite, Urine NEGATIVE NEGATIVE     Leukocyte Esterase, Urine NEGATIVE NEGATIVE   CBC   Result Value Ref Range     WBC 4.7 4.4 - 11.3 x10*3/uL     nRBC 0.0 0.0 - 0.0 /100 WBCs     RBC 3.41 (L) 4.00 - 5.20 x10*6/uL     Hemoglobin 10.1 (L) 12.0 - 16.0 g/dL     Hematocrit 32.0 (L) 36.0 - 46.0 %     MCV 94 80 - 100 fL     MCH 29.6 26.0 - 34.0 pg     MCHC 31.6 (L) 32.0 - 36.0 g/dL     RDW 15.8 (H) 11.5 - 14.5 %     Platelets 215 150 - 450 x10*3/uL   Coagulation Screen   Result Value Ref Range     Protime 12.8 (H) 9.8 - 12.4 seconds     INR 1.2 (H) 0.9 - 1.1     aPTT 36 26 - 36 seconds   Basic Metabolic Panel   Result Value Ref Range     Glucose 98 74 - 99 mg/dL     Sodium 140 136 - 145 mmol/L     Potassium 3.6 3.5 - 5.3 mmol/L     Chloride 100 98 - 107 mmol/L     Bicarbonate 30 21 - 32 mmol/L     Anion Gap 14 10 - 20 mmol/L     Urea Nitrogen 28 (H) 6 - 23 mg/dL     Creatinine 0.95 0.50 - 1.05 mg/dL     eGFR 57 (L) >60 mL/min/1.73m*2     Calcium 9.1 8.6 - 10.3 mg/dL   Lipid Panel   Result Value Ref Range     Cholesterol 119 0 - 199 mg/dL     HDL-Cholesterol 49.2 mg/dL     Cholesterol/HDL Ratio 2.4       LDL Calculated 59 <=99 mg/dL     VLDL 11 0 - 40 mg/dL     Triglycerides 56 0 - 149 mg/dL     Non HDL Cholesterol 70 0 - 149 mg/dL      *Note: Due to a large number of results and/or encounters for the requested time period, some results have not been displayed. A complete set of results can be found in Results Review.         CT chest abdomen pelvis wo IV contrast  Result Date: 6/23/2025  Interpreted By:  Mary Jane Foreman, STUDY: CT CHEST ABDOMEN PELVIS WO CONTRAST;  6/23/2025 1:05 pm   INDICATION: Signs/Symptoms:abdominal pain prior cancer  syncope r/o mass, nephro jesse traumatic injury.     COMPARISON: CT chest 03/21/2025, CT abdomen and pelvis 01/12/2025   ACCESSION NUMBER(S): [id number]   ORDERING CLINICIAN: MURTAZA HINOJOSA   TECHNIQUE: CT of the chest, abdomen, and pelvis was performed. Sagittal and coronal reconstructions were generated. No intravenous contrast given for the examination.   FINDINGS: CHEST:   Hilar, vessel, and solid organ evaluation limited without IV contrast.   CHEST WALL AND LOWER NECK: Metallic streak artifact from port in the left anterior chest wall limits assessment of adjacent structures. No gross axillary adenopathy as visualized. Slightly prominent heterogenous right thyroid lobe.   MEDIASTINUM AND HARITHA:  Limited hilar assessment on unenhanced exam. No significant mediastinal or hilar adenopathy within limits of unenhanced study. Mild gaseous distention of the proximal esophagus.   HEART AND VESSELS:  Lack of IV contrast precludes vascular luminal assessment. The heart is normal in size. No significant pericardial effusion. Pacer wires in the right side of the heart. Multifocal atherosclerotic calcifications.   LUNGS, PLEURA, LARGE AIRWAYS:  Mild motion artifact in the lower chest. Pulmonary heterogeneity including scattered bilateral ground-glass infiltrates and reticular densities most prominent in the lung bases. Moderate right and small to moderate left pleural effusions and presumed compressive atelectasis of the adjacent lung bases. Slight fluid/thickening along the superior right main fissure. No pneumothorax. The central airways are grossly patent.   BONES:  Kyphosis and degenerative endplate spurring in the thoracic spine.     ABDOMEN/PELVIS:   Solid organ and vessel evaluation limited without IV contrast. Artifact related to overlying upper extremities.   ABDOMINAL ORGANS:   LIVER: No focal lesion within limits of unenhanced exam   GALL BLADDER AND BILIARY TREE: No calcified gallstone   SPLEEN: No focal  lesion within limits of unenhanced exam   PANCREAS: No focal lesion within limits of unenhanced exam   ADRENALS: No adrenal mass   KIDNEYS AND URETERS: No renal mass or hydronephrosis within limits of unenhanced exam   BOWEL: No abnormally dilated large or small bowel loops. Moderate fecal residue. Distal colon diverticulosis. Within limits of unenhanced exam probable persistent circumferential wall thickening in the distal transverse colon for example image 110/201, as noted on the prior exam.   PERITONEUM, RETROPERITONEUM, NODES: No significant free fluid. No free air. No significant retroperitoneal lymphadenopathy within limits of unenhanced exam. Slight increased density in the lower abdominal mesentery.   VESSELS:  Lack of IV contrast precludes vascular luminal assessment. Multifocal atherosclerotic calcifications. No abdominal aortic aneurysm.   PELVIS: Urinary bladder is moderately distended and grossly normal in contour. 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   ABDOMINAL WALL: No sizable abdominal wall hernia.   BONES: Osteopenia. Multifocal degenerative changes. Mild scoliosis of the lumbar spine.        CHEST:   Bilateral infiltrates and right-greater-than-left pleural effusions. Differential includes CHF and pneumonia. Clinical correlation and follow-up to ensure resolution after appropriate treatment recommended.   ABDOMEN AND PELVIS:   Apparent persistent distal transverse colon wall thickening consistent with reportedly known malignancy.   No gross evidence of solid organ injury or free fluid within limits of unenhanced exam.   Fecal retention. Distal colon diverticulosis.   Other findings as described above.   MACRO: None.   Signed by: Mary Jane Foreman 6/23/2025 1:32 PM Dictation workstation:   [id number]     CT cervical spine wo IV contrast  Result Date: 6/23/2025  Interpreted By:  Mary Jane Foreman, STUDY: CT CERVICAL SPINE WO IV CONTRAST;  6/23/2025 1:05 pm   INDICATION: Signs/Symptoms:fall blood  thinners r/o frx.     COMPARISON: None.   ACCESSION NUMBER(S): [id number]   ORDERING CLINICIAN: MURTAZA HINOJOSA   TECHNIQUE: CT images were obtained through the cervical spine. Sagittal and coronal reconstructions were generated.   FINDINGS:     ALIGNMENT: Mild levocurvature. Straightening of the lower cervical lordosis. Slight retrolisthesis of C5.   VERTEBRAE/DISC SPACES: No compression deformity or acute displaced fracture. Multilevel degenerative changes including atlantoaxial joint space narrowing spurring, multilevel intervertebral disc space narrowing with endplate sclerosis and spurring, multilevel bilateral facet joint narrowing and spurring. At least partial fusion across the anterior C5-6 vertebra.   ADDITIONAL FINDINGS: No abnormal thickening of the prevertebral soft tissues.  Dependent fluid/thickening in the sphenoid sinus. Ill-defined biapical infiltrates. Small right pleural effusion. Partially included pacer wires in the left upper chest.        No cervical vertebral compression deformity or acute displaced fracture. Multilevel productive/degenerative changes with straightening of the cervical lordosis and slight spondylolisthesis at C5.   Paraspinal/soft tissue findings as above.   MACRO: None.   Signed by: Mary Jane Foreman 6/23/2025 1:16 PM Dictation workstation:   [id number]     CT head wo IV contrast  Result Date: 6/23/2025  Interpreted By:  Mary Jane Foreman, STUDY: CT HEAD WO IV CONTRAST;  6/23/2025 1:05 pm   INDICATION: Signs/Symptoms:headache fall blood thinners r/o sah ich mass.     COMPARISON: 08/23/2023   ACCESSION NUMBER(S): [id number]   ORDERING CLINICIAN: MURTAZA HINOJOSA   TECHNIQUE: Unenhanced CT images of the head were obtained.   FINDINGS: The ventricles, cisterns and sulci are enlarged, consistent with diffuse volume loss. There are areas of nonspecific white matter hypodensity, which are probably age related or microvascular in nature. These findings are similar to the prior exam.  There is no acute intracranial hemorrhage, mass effect or midline shift. No extraaxial fluid collection.   No acute displaced calvarial fracture. No focal calvarial lesion.   Left sphenoid sinus dependent fluid/thickening. Remaining visualized paranasal sinuses are clear. Dense presumed surgical material along the anterior margin of each globe again seen.        No acute intracranial hemorrhage or mass-effect.   Mild fluid or mucosal thickening in the left sphenoid sinus..   MACRO: None.   Signed by: Mary Jane Foreman 6/23/2025 1:08 PM Dictation workstation:   [id number]        Assessment & Plan  Acute on chronic diastolic congestive heart failure     Acute bacterial sinusitis     Syncope     Advance care planning     Constipation           Admit to telemetry  Inpatient  Appreciate cardiology recommendations  I am not sure if patient's CHF represents more of a chronic condition so we will give 40 mg IV Lasix x 1 and resume torsemide tomorrow and look to cardiology for further recommendations  Please see echo from 2024 and carotid duplex from 2029 above  Start Rocephin 1 g IV daily  Orthostatic vital signs  Repeat EKG and troponin pacer check  Continue home aspirin and Plavix  Daily weight  Intake and output  AM CBC, BMP, coags, lipids  Check flu and COVID  Urinalysis pending  PT/OT     Chronic conditions: HFpEF, MI, 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 Plan discussed with interdisciplinary team, consults placed, appreciate input. Will continue current and repeat labs in the AM.          Discharge planning discussed with patient and care team. Therapy evaluations ordered. Patient aware and agreeable to current plan, continue plan as above.      I spent a total of 75 minutes on the date of the service which included preparing to see the patient, face-to-face patient care, completing clinical documentation, obtaining and/or reviewing separately obtained history, performing a medically appropriate examination, counseling and educating the patient/family/caregiver, ordering medications, tests, or procedures, communicating with other HCPs (not separately reported), independently interpreting results (not separately reported), communicating results to the patient/family/caregiver, and care coordination (not separately reported).            Riya Urena                      Physical Exam  Vitals and nursing note reviewed.         General: in no acute distress  Eyes: PERRLA   HENT: Normo-cephalic, atraumatic.   CV: Irregular rate, irregular rhythm.   Resp: Breathing non-labored,  diminished to auscultation bilaterally  GI: BS x4   : monitor intake and output  MSK/Extremities: No gross bony deformities. PT/OT on the case  Skin: Warm. Appropriate color, continue offloading  Neuro:  Face symmetric.   Psych: returning to baseline, improved     10 point ROS negative unless otherwise noted in HPI    Patient fully evaluated 06/26  for    Problem List Items Addressed This Visit       Dyspnea on minimal exertion    Relevant Medications    cefuroxime (Ceftin) 250 mg tablet    Pleural effusion    * (Principal) Acute on chronic diastolic congestive heart failure - Primary    Relevant Medications    clopidogrel (Plavix) tablet 75 mg    metoprolol tartrate (Lopressor) tablet 12.5 mg (Start on 6/26/2025  9:00 PM)    Syncope    Relevant Medications     acetaminophen (Tylenol) 325 mg tablet    dapagliflozin propanediol (Farxiga) 10 mg tablet (Start on 6/27/2025)    heparin sodium,porcine (heparin, porcine,) 5,000 unit/mL injection    lactobacillus acidophilus tablet tablet    oxygen (O2) gas therapy    sennosides-docusate sodium (Sindy-Colace) 8.6-50 mg tablet    Other Relevant Orders    Cardiac device check - Inpatient    Transthoracic Echo Complete (Completed)    CBC    Comprehensive metabolic panel     Brought to hospital - had concerns including Fall.  Patient seen resting in bed with head of bed elevated, no s/s or c/o acute difficulties at this time.  Vital signs for last 24 hours Temp:  [35.6 °C (96.1 °F)-36.3 °C (97.3 °F)] 36.1 °C (97 °F)  Heart Rate:  [60-63] 63  Resp:  [16-19] 19  BP: (105-139)/(50-63) 122/57    No intake/output data recorded.  Patient still requiring frequent cardiac and SPO2 monitoring. Continue aggressive pulmonary hygiene and oral hygiene. Off loading as tolerated for skin integrity. Medications and labs reviewed-   Results for orders placed or performed during the hospital encounter of 06/23/25 (from the past 24 hours)   Comprehensive Metabolic Panel   Result Value Ref Range    Glucose 92 74 - 99 mg/dL    Sodium 137 136 - 145 mmol/L    Potassium 3.8 3.5 - 5.3 mmol/L    Chloride 100 98 - 107 mmol/L    Bicarbonate 31 21 - 32 mmol/L    Anion Gap 10 10 - 20 mmol/L    Urea Nitrogen 32 (H) 6 - 23 mg/dL    Creatinine 0.90 0.50 - 1.05 mg/dL    eGFR 60 (L) >60 mL/min/1.73m*2    Calcium 9.1 8.6 - 10.3 mg/dL    Albumin 3.3 (L) 3.4 - 5.0 g/dL    Alkaline Phosphatase 90 33 - 136 U/L    Total Protein 5.7 (L) 6.4 - 8.2 g/dL    AST 28 9 - 39 U/L    Bilirubin, Total 0.3 0.0 - 1.2 mg/dL    ALT 19 7 - 45 U/L   CBC and Auto Differential   Result Value Ref Range    WBC 4.4 4.4 - 11.3 x10*3/uL    nRBC 0.0 0.0 - 0.0 /100 WBCs    RBC 3.27 (L) 4.00 - 5.20 x10*6/uL    Hemoglobin 9.6 (L) 12.0 - 16.0 g/dL    Hematocrit 30.7 (L) 36.0 - 46.0 %    MCV 94 80 - 100  fL    MCH 29.4 26.0 - 34.0 pg    MCHC 31.3 (L) 32.0 - 36.0 g/dL    RDW 15.3 (H) 11.5 - 14.5 %    Platelets 215 150 - 450 x10*3/uL    Neutrophils % 41.6 40.0 - 80.0 %    Immature Granulocytes %, Automated 0.5 0.0 - 0.9 %    Lymphocytes % 32.4 13.0 - 44.0 %    Monocytes % 16.0 2.0 - 10.0 %    Eosinophils % 8.8 0.0 - 6.0 %    Basophils % 0.7 0.0 - 2.0 %    Neutrophils Absolute 1.85 1.60 - 5.50 x10*3/uL    Immature Granulocytes Absolute, Automated 0.02 0.00 - 0.50 x10*3/uL    Lymphocytes Absolute 1.44 0.80 - 3.00 x10*3/uL    Monocytes Absolute 0.71 0.05 - 0.80 x10*3/uL    Eosinophils Absolute 0.39 0.00 - 0.40 x10*3/uL    Basophils Absolute 0.03 0.00 - 0.10 x10*3/uL      Patient recently received an antibiotic (last 12 hours)       None           Plan discussed with interdisciplinary team, continue current and repeat labs in the AM.     Discharge planning discussed with patient and care team. Therapy evaluations ordered.   Anticipate - [facility]    Patient aware and agreeable to current plan, continue plan as above.     I spent a total of 60 minutes on the date of the service which included preparing to see the patient, face-to-face patient care, completing clinical documentation, obtaining and/or reviewing separately obtained history, performing a medically appropriate examination, counseling and educating the patient/family/caregiver, ordering medications, tests, or procedures, communicating with other HCPs (not separately reported), independently interpreting results (not separately reported), communicating results to the patient/family/caregiver, and care coordination (not separately reported).   Relevant Results               This patient currently has cardiac telemetry ordered; if you would like to modify or discontinue the telemetry order, click here to go to the orders activity to modify/discontinue the order.              Assessment & Plan  Acute on chronic diastolic congestive heart failure    Acute bacterial  sinusitis    Syncope    Advance care planning    Constipation      Syncope might be related to vasovagal.  Orthostatic vitals every shift         Riya Urena

## 2025-06-27 DIAGNOSIS — R06.09 DYSPNEA ON MINIMAL EXERTION: ICD-10-CM

## 2025-06-27 LAB
ALBUMIN SERPL BCP-MCNC: 3.4 G/DL (ref 3.4–5)
ALP SERPL-CCNC: 100 U/L (ref 33–136)
ALT SERPL W P-5'-P-CCNC: 24 U/L (ref 7–45)
ANION GAP SERPL CALC-SCNC: 9 MMOL/L (ref 10–20)
AST SERPL W P-5'-P-CCNC: 34 U/L (ref 9–39)
BASOPHILS # BLD AUTO: 0.05 X10*3/UL (ref 0–0.1)
BASOPHILS NFR BLD AUTO: 1 %
BILIRUB SERPL-MCNC: 0.4 MG/DL (ref 0–1.2)
BUN SERPL-MCNC: 33 MG/DL (ref 6–23)
CALCIUM SERPL-MCNC: 9.3 MG/DL (ref 8.6–10.3)
CHLORIDE SERPL-SCNC: 100 MMOL/L (ref 98–107)
CO2 SERPL-SCNC: 32 MMOL/L (ref 21–32)
CREAT SERPL-MCNC: 0.94 MG/DL (ref 0.5–1.05)
EGFRCR SERPLBLD CKD-EPI 2021: 57 ML/MIN/1.73M*2
EOSINOPHIL # BLD AUTO: 0.42 X10*3/UL (ref 0–0.4)
EOSINOPHIL NFR BLD AUTO: 8.7 %
ERYTHROCYTE [DISTWIDTH] IN BLOOD BY AUTOMATED COUNT: 15.4 % (ref 11.5–14.5)
GLUCOSE SERPL-MCNC: 94 MG/DL (ref 74–99)
HCT VFR BLD AUTO: 32.6 % (ref 36–46)
HGB BLD-MCNC: 10.4 G/DL (ref 12–16)
IMM GRANULOCYTES # BLD AUTO: 0.01 X10*3/UL (ref 0–0.5)
IMM GRANULOCYTES NFR BLD AUTO: 0.2 % (ref 0–0.9)
LYMPHOCYTES # BLD AUTO: 1.61 X10*3/UL (ref 0.8–3)
LYMPHOCYTES NFR BLD AUTO: 33.4 %
MCH RBC QN AUTO: 29.9 PG (ref 26–34)
MCHC RBC AUTO-ENTMCNC: 31.9 G/DL (ref 32–36)
MCV RBC AUTO: 94 FL (ref 80–100)
MONOCYTES # BLD AUTO: 0.73 X10*3/UL (ref 0.05–0.8)
MONOCYTES NFR BLD AUTO: 15.1 %
NEUTROPHILS # BLD AUTO: 2 X10*3/UL (ref 1.6–5.5)
NEUTROPHILS NFR BLD AUTO: 41.6 %
NRBC BLD-RTO: 0 /100 WBCS (ref 0–0)
PLATELET # BLD AUTO: 219 X10*3/UL (ref 150–450)
POTASSIUM SERPL-SCNC: 3.9 MMOL/L (ref 3.5–5.3)
PROT SERPL-MCNC: 5.9 G/DL (ref 6.4–8.2)
RBC # BLD AUTO: 3.48 X10*6/UL (ref 4–5.2)
SODIUM SERPL-SCNC: 137 MMOL/L (ref 136–145)
WBC # BLD AUTO: 4.8 X10*3/UL (ref 4.4–11.3)

## 2025-06-27 PROCEDURE — 2500000001 HC RX 250 WO HCPCS SELF ADMINISTERED DRUGS (ALT 637 FOR MEDICARE OP): Performed by: NURSE PRACTITIONER

## 2025-06-27 PROCEDURE — 1100000001 HC PRIVATE ROOM DAILY

## 2025-06-27 PROCEDURE — 84075 ASSAY ALKALINE PHOSPHATASE: CPT | Performed by: INTERNAL MEDICINE

## 2025-06-27 PROCEDURE — 2500000004 HC RX 250 GENERAL PHARMACY W/ HCPCS (ALT 636 FOR OP/ED): Performed by: NURSE PRACTITIONER

## 2025-06-27 PROCEDURE — 85025 COMPLETE CBC W/AUTO DIFF WBC: CPT | Performed by: INTERNAL MEDICINE

## 2025-06-27 PROCEDURE — 2500000001 HC RX 250 WO HCPCS SELF ADMINISTERED DRUGS (ALT 637 FOR MEDICARE OP): Performed by: INTERNAL MEDICINE

## 2025-06-27 PROCEDURE — 36415 COLL VENOUS BLD VENIPUNCTURE: CPT | Performed by: INTERNAL MEDICINE

## 2025-06-27 PROCEDURE — 99233 SBSQ HOSP IP/OBS HIGH 50: CPT | Performed by: NURSE PRACTITIONER

## 2025-06-27 RX ORDER — METOPROLOL TARTRATE 25 MG/1
12.5 TABLET, FILM COATED ORAL 2 TIMES DAILY
Start: 2025-06-27

## 2025-06-27 RX ADMIN — POLYETHYLENE GLYCOL 3350 17 G: 17 POWDER, FOR SOLUTION ORAL at 20:37

## 2025-06-27 RX ADMIN — FERROUS SULFATE TAB 325 MG (65 MG ELEMENTAL FE) 1 TABLET: 325 (65 FE) TAB at 08:00

## 2025-06-27 RX ADMIN — TORSEMIDE 40 MG: 20 TABLET ORAL at 09:18

## 2025-06-27 RX ADMIN — HEPARIN SODIUM 5000 UNITS: 5000 INJECTION, SOLUTION INTRAVENOUS; SUBCUTANEOUS at 15:58

## 2025-06-27 RX ADMIN — CLOPIDOGREL BISULFATE 75 MG: 75 TABLET, FILM COATED ORAL at 09:18

## 2025-06-27 RX ADMIN — Medication 1 TABLET: at 20:37

## 2025-06-27 RX ADMIN — HEPARIN SODIUM 5000 UNITS: 5000 INJECTION, SOLUTION INTRAVENOUS; SUBCUTANEOUS at 03:21

## 2025-06-27 RX ADMIN — ALLOPURINOL 100 MG: 100 TABLET ORAL at 09:19

## 2025-06-27 RX ADMIN — METOPROLOL TARTRATE 12.5 MG: 25 TABLET, FILM COATED ORAL at 20:37

## 2025-06-27 RX ADMIN — COLCHICINE 0.6 MG: 0.6 TABLET, FILM COATED ORAL at 09:19

## 2025-06-27 RX ADMIN — DULOXETINE 30 MG: 30 CAPSULE, DELAYED RELEASE ORAL at 09:19

## 2025-06-27 RX ADMIN — DAPAGLIFLOZIN 10 MG: 10 TABLET, FILM COATED ORAL at 15:58

## 2025-06-27 RX ADMIN — CEFUROXIME AXETIL 250 MG: 250 TABLET, FILM COATED ORAL at 09:18

## 2025-06-27 RX ADMIN — POLYETHYLENE GLYCOL 3350 17 G: 17 POWDER, FOR SOLUTION ORAL at 09:19

## 2025-06-27 RX ADMIN — Medication 1 TABLET: at 09:18

## 2025-06-27 RX ADMIN — ASPIRIN 81 MG CHEWABLE TABLET 81 MG: 81 TABLET CHEWABLE at 09:18

## 2025-06-27 RX ADMIN — SENNOSIDES AND DOCUSATE SODIUM 2 TABLET: 50; 8.6 TABLET ORAL at 09:19

## 2025-06-27 RX ADMIN — CEFUROXIME AXETIL 250 MG: 250 TABLET, FILM COATED ORAL at 20:37

## 2025-06-27 RX ADMIN — METOPROLOL TARTRATE 12.5 MG: 25 TABLET, FILM COATED ORAL at 09:19

## 2025-06-27 ASSESSMENT — ENCOUNTER SYMPTOMS
PALPITATIONS: 0
IRREGULAR HEARTBEAT: 0
NEAR-SYNCOPE: 0
ORTHOPNEA: 0
LIGHT-HEADEDNESS: 1
PND: 0
DYSPNEA ON EXERTION: 0
SHORTNESS OF BREATH: 0

## 2025-06-27 ASSESSMENT — COGNITIVE AND FUNCTIONAL STATUS - GENERAL
MOVING TO AND FROM BED TO CHAIR: A LITTLE
STANDING UP FROM CHAIR USING ARMS: A LITTLE
MOBILITY SCORE: 19
DRESSING REGULAR LOWER BODY CLOTHING: A LITTLE
HELP NEEDED FOR BATHING: A LITTLE
DAILY ACTIVITIY SCORE: 21
TOILETING: A LITTLE
WALKING IN HOSPITAL ROOM: A LITTLE
CLIMB 3 TO 5 STEPS WITH RAILING: A LOT

## 2025-06-27 ASSESSMENT — PAIN - FUNCTIONAL ASSESSMENT: PAIN_FUNCTIONAL_ASSESSMENT: 0-10

## 2025-06-27 ASSESSMENT — PAIN SCALES - GENERAL
PAINLEVEL_OUTOF10: 0 - NO PAIN
PAINLEVEL_OUTOF10: 0 - NO PAIN

## 2025-06-27 NOTE — CARE PLAN
The patient's goals for the shift include      The clinical goals for the shift include safety and rest    Over the shift, the patient will have decreased shortness of breath and refrain from further complicaions

## 2025-06-27 NOTE — DISCHARGE SUMMARY
Discharge Diagnosis  Acute on chronic diastolic congestive heart failure         Issues Requiring Follow-Up  06/27-Patient fully examined at the bedside. Brought to hospital - had concerns including Fall. Awake, more animated, with no acute events overnight, no new complaints. Slow but progressive improvements noted. No further issues with chest heaviness and lightheadedness. Patient medically stable at time of exam, cleared for discharge today. Goals of care emphasized with patient and family, will continue current plan of care.     Discharge Meds     Medication List      START taking these medications     cefuroxime 250 mg tablet; Commonly known as: Ceftin; Take 1 tablet (250   mg) by mouth 2 times a day for 10 days.   dapagliflozin propanediol 10 mg tablet; Commonly known as: Farxiga; Take   1 tablet (10 mg) by mouth once every 24 hours.   heparin (porcine) 5,000 unit/mL injection; Inject 1 mL (5,000 Units)   under the skin every 12 hours.   lactobacillus acidophilus tablet tablet; Take 1 tablet by mouth 2 times   a day.   oxygen gas therapy; Commonly known as: O2; Inhale 2 L/min at 120,000   mL/hr once every 24 hours.   sennosides-docusate sodium 8.6-50 mg tablet; Commonly known as:   Sindy-Colace; Take 2 tablets by mouth 2 times a day.     CHANGE how you take these medications     metoprolol tartrate 25 mg tablet; Commonly known as: Lopressor; Take 0.5   tablets (12.5 mg) by mouth 2 times a day.; What changed: how much to take,   when to take this     CONTINUE taking these medications     acetaminophen 325 mg tablet; Commonly known as: Tylenol; Take 2 tablets   (650 mg) by mouth every 4 hours if needed for mild pain (1 - 3).   albuterol 2.5 mg /3 mL (0.083 %) nebulizer solution; Take 3 mL (2.5 mg)   by nebulization every 4 hours if needed for wheezing.   allopurinol 100 mg tablet; Commonly known as: Zyloprim; Take 1 tablet   (100 mg) by mouth once daily.   aspirin 81 mg chewable tablet; Chew 1 tablet (81 mg)  once daily.   Calcium 600 + D(3) 600 mg-5 mcg (200 unit) tablet; Generic drug: calcium   carbonate-vitamin D3   clopidogrel 75 mg tablet; Commonly known as: Plavix; Take 1 tablet by   mouth once daily   colchicine 0.6 mg tablet; Take 1 tablet (0.6 mg) by mouth once daily.   DULoxetine 30 mg DR capsule; Commonly known as: Cymbalta; Take 1 capsule   (30 mg) by mouth once daily. Do not crush or chew.   ferrous sulfate 325 (65 Fe) mg EC tablet   fluticasone propion-salmeteroL 115-21 mcg/actuation inhaler; Commonly   known as: Advair; Inhale 2 puffs 2 times a day. Rinse mouth with water   after use to reduce aftertaste and incidence of candidiasis. Do not   swallow.   lubricating eye drops ophthalmic solution   nebulizers misc; With tubing and mask.  Use as directed   polyethylene glycol 17 gram/dose powder; Commonly known as: Glycolax,   Miralax; Mix 1 capful (17 g) of powder with 4 to 8 ounces of water or   juice and drink 2 times a day.   torsemide 20 mg tablet; Commonly known as: Demadex; Take 3 tablets (60   mg) by mouth once daily.     STOP taking these medications     enoxaparin 40 mg/0.4 mL syringe; Commonly known as: Lovenox   guaiFENesin 600 mg 12 hr tablet; Commonly known as: Mucinex   hydrocortisone 1 % lotion       Test Results Pending At Discharge  Pending Labs       No current pending labs.            Hospital Course         Fei Mensah MD   Physician  Internal Medicine     Progress Notes      Signed     Date of Service: 6/26/2025  2:43 PM     Signed       Expand All Collapse All    Yesenia Milner is a 89 y.o. female on day 3 of admission presenting with Acute on chronic diastolic congestive heart failure.        Subjective  06/26 -Patient seen and fully examined at bedside, patient ill appearing, acutely complex, marked decline from baseline. Continues to have episodes of chest heaviness and lightheadedness, Seen by cardiology today with plan to deescalate medications from 25 mg to Metoprolol  Tartrate 12.5 mg BID. Will monitor overnight. Orthostatic vitals ordered. Strict intake and output.Acute on chronic diastolic congestive heart failure, syncope- cardiology consulted, telemetry, frequent vitals, cardiac device check, appreciate input. Constipation improved with bowel protocol ordered. Patient remains hospitalized for acute onset with complex medical and pharmacological management. Will deescalate antibiotics to oral, repeat labs in the AM.  Will need rehab - [facility].   Continue to monitor overnight. Slow but progressive improvements noted. High Complexity with updates to multiple problems, adjustments to treatment plan, and need for ongoing inpatient care.   Long discussion on goals of care with patient and family, will continue current and await diagnostic findings.  Patient reports: no new complaints, decline from baseline, weakness          Objective  Last Recorded Vitals  /57 (BP Location: Right arm, Patient Position: Sitting)   Pulse 63   Temp 36.1 °C (97 °F) (Temporal)   Resp 19   Wt 69.9 kg (154 lb)   SpO2 99%   Intake/Output last 3 Shifts:     Intake/Output Summary (Last 24 hours) at 6/26/2025 1443  Last data filed at 6/26/2025 0626      Gross per 24 hour   Intake --   Output 600 ml   Net -600 ml         Admission Weight  Weight: 69.9 kg (154 lb) (06/23/25 1227)     Daily Weight  06/23/25 : 69.9 kg (154 lb)     Image Results  XR chest 1 view  Narrative: Interpreted By:  Bryce Redding,   STUDY:  XR CHEST 1 VIEW;  6/25/2025 1:47 pm      INDICATION:  Signs/Symptoms:shortness of breath, PPM Lead eval.      COMPARISON:  03/24/2025      ACCESSION NUMBER(S):  [id number]      ORDERING CLINICIAN:  KAYLIN NAVA      FINDINGS:  CARDIOMEDIASTINAL SILHOUETTE AND VASCULATURE:      Cardiac size:  Within normal limits considering a right pericardial  fat pad and similar to the prior exam. Aortic shadow:  Within normal  limits.      Mediastinal contours: Within normal limits.      Pulmonary  vasculature:  The central vasculature is unremarkable      LUNGS:  There is blunting of the left costophrenic angle is probably from a  residual effusion with atelectasis, improved from the previous exam.  The lungs otherwise relatively clear.      ABDOMEN AND OTHER FINDINGS:  Cardiac pacemaker device overlies the left chest similar to the  previous exam.      BONES:  No acute osseous changes.      Impression: 1.  Improved left basilar atelectasis and effusion.      Signed by: Bryce Redding 6/25/2025 5:03 PM  Dictation workstation:   [id number]     Riya Urena  Coordinator  Internal Medicine     H&P      Incomplete     Date of Service: 6/23/2025  2:22 PM      Incomplete          History Of Present Illness  Yesenia Milner is a 89 y.o. 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  Patient has had multiple admissions for congestive heart failure exacerbation who presented today after a syncopal episode.  Patient notes she was gathering her clothing to get ready for the day when she became lightheaded and passed out.  She denies any injury from the fall.  She notes over the past 2 weeks she has been experiencing a stuffy nose and a cough upon wakening in the morning.  She otherwise denies any fevers, chills, chest pain, change in her chronic shortness of breath with exertion, abdominal pain, nausea, vomiting, diarrhea, or dysuria.  She denies any change in her chronic intermittent bilateral lower extremity edema.  She denies any weight gain.  She denies any recent medication changes.  She reports compliance with her medications.  She is on her baseline oxygen at 2 L.  She notes  her cardiologist is Dr. Ramicone.  In regards to her colon cancer, family notes that patient was deemed a risk for surgery via cardiology and surgeon, Dr. Vazquez, recommended patient speak with Dr. Ramicone in regards to her risk for surgery before he would schedule her for an operation.  They noted that her appointment is not until the fall and they are hopeful to speak with him this admission about her risk for surgery.  Patient notes she lives alone and uses a walker.  CODE STATUS discussed and patient became tearful during conversation.  She would like to remain a full code at this time and referred to family if an emergency would arise that she was incapacitated to answer for herself.     In the ED lab work, EKG, head CT, C-spine CT and CT chest/abdomen/pelvis were performed. Labs revealed troponin 19 (appears baseline with previous result 21 in March 2025), H&H 10.1 and 31.3 (within patient's recent baseline),  (310 in March 2025).  EKG, per ER physician impression revealed Dual paced rhythm. Motion artifact present. Irregular rate. Normal CA, wide QRS and Qtc intervals. No ST segment elevations, depressions, or T wave inversions. Compared to March 2025 imaging positive for fluid/mucosal thickening of the left sphenoid sinus.  Bilateral infiltrates and right greater than left pleural effusions. Moderate fecal residue.  Heart rate was 120 on arrival, vitals were otherwise stable.    Echo 12/21/2024:     CONCLUSIONS:   1. Left ventricular ejection fraction is normal, by visual estimate at 60-65%.   2. Abnormal left venticular wall motion.   3. Left ventricular diastolic filling is indeterminate.   4. There is a moderate apical and anteroapical myocardial infarction.   5. There is normal right ventricular global systolic function.   6. There is moderate mitral annular calcification.   7. Moderate mitral valve regurgitation.   8. Moderate to severe tricuspid regurgitation visualized.   9. Moderately  elevated right ventricular systolic pressure.  10. Aortic valve sclerosis.     Carotid duplex 2019:     Right Carotid: Findings are consistent with less than 50% stenosis of the right proximal ICA. Laminar flow seen by color Doppler. Right external carotid artery appears patent with no evidence of stenosis. The right vertebral artery is patent with antegrade flow. No evidence of hemodynamically significant stenosis in the right subclavian.  Left Carotid: Findings are consistent with less than 50% stenosis of the left proximal ICA. Laminar flow seen by color Doppler. Left external carotid artery appears patent with no evidence of stenosis. The left vertebral artery is patent with antegrade flow.  No evidence of hemodynamically significant stenosis in the left subclavian.        Past Medical History  [Medical History]    [Medical History]  Past Medical History          Diagnosis Date    Gross hematuria 10/07/2020    Impacted cerumen 02/22/2024    Other conditions influencing health status       Normal stress echocardiogram    Personal history of other medical treatment       History of echocardiogram    Personal history of other medical treatment       H/O Doppler ultrasound    Systolic CHF (Multi) 05/18/2023         Surgical History  [Surgical History]    [Surgical History]  Past Surgical History            Procedure Laterality Date    APPENDECTOMY   11/22/2017     Appendectomy    CARDIAC CATHETERIZATION N/A 12/20/2024     Procedure: Left Heart Cath, With LV;  Surgeon: Tahir Ruelas MD;  Location: [facility];  Service: Cardiovascular;  Laterality: N/A;    CARDIAC PACEMAKER PLACEMENT   03/11/2021     Pacemaker Placement    HYSTERECTOMY   11/22/2017     Hysterectomy    IR ANGIOGRAM INFERIOR EPIGASTRIC PELVIC   12/14/2020     IR ANGIOGRAM INFERIOR EPIGASTRIC PELVIC 12/14/2020 [facility]    IR ANGIOGRAM INFERIOR EPIGASTRIC PELVIC   12/14/2020     IR ANGIOGRAM INFERIOR EPIGASTRIC PELVIC 12/14/2020 [facility]    IR ANGIOGRAM RENAL BILATERAL Bilateral 12/14/2020     IR ANGIOGRAM RENAL BILATERAL 12/14/2020 [facility]    OTHER SURGICAL HISTORY   11/22/2017     Stab Phlebectomy Of Varicose Veins    OTHER SURGICAL HISTORY   11/22/2017     Venous Ligation With Stripping    TONSILLECTOMY   11/22/2017     Tonsillectomy         Social History  She reports that she quit smoking about 52 years ago. Her smoking use included cigarettes. She has been exposed to tobacco smoke. She has never used smokeless tobacco. She reports that she does not drink alcohol and does not use drugs.     Family History  [Family History]    [Family History]              Problem Relation Name Age of Onset    No Known Problems Mother             Allergies  Iodine, Shrimp, Hydrocodone, and Adhesive tape-silicones     10 point review systems performed and is negative besides what is stated in HPI     Physical Exam  HENT:      Head: Normocephalic and atraumatic.      Nose: Nose normal.      Mouth/Throat:      Mouth: Mucous membranes are dry.   Eyes:      Conjunctiva/sclera: Conjunctivae normal.   Cardiovascular:      Rate and Rhythm: Normal rate. Rhythm irregular.      Heart sounds: Murmur heard.   Pulmonary:      Effort: Pulmonary effort is normal.      Breath sounds: Rales present.   Abdominal:      General: Bowel sounds are normal.      Tenderness: There is abdominal tenderness.   Musculoskeletal:         General: Normal range of motion.      Cervical back: Neck supple.   Skin:     General: Skin is warm and dry.   Neurological:      Mental Status: She is alert. Mental status is at baseline.   Psychiatric:         Mood and Affect: Mood normal.            Last Recorded Vitals  Blood pressure 114/58, pulse 70, temperature 36.3 °C (97.3 °F), temperature source Temporal, resp. rate 19, height 1.626 m (5' 4\"), weight 69.9 kg (154 lb), SpO2 95%.     Relevant Results     [Medications Ordered Prior to Encounter]     [Medications Ordered Prior to " Encounter]  No current facility-administered medications on file prior to encounter.                  Current Outpatient Medications on File Prior to Encounter   Medication Sig Dispense Refill    allopurinol (Zyloprim) 100 mg tablet Take 1 tablet (100 mg) by mouth once daily. 90 tablet 0    aspirin 81 mg chewable tablet Chew 1 tablet (81 mg) once daily.        calcium carbonate-vitamin D3 (Calcium 600 + D,3,) 600 mg-5 mcg (200 unit) tablet Take 1 tablet by mouth once daily.        clopidogrel (Plavix) 75 mg tablet Take 1 tablet by mouth once daily 90 tablet 0    colchicine 0.6 mg tablet Take 1 tablet (0.6 mg) by mouth once daily. 90 tablet 3    DULoxetine (Cymbalta) 30 mg DR capsule Take 1 capsule (30 mg) by mouth once daily. Do not crush or chew. 90 capsule 1    ferrous sulfate 325 (65 Fe) mg EC tablet Take 1 tablet by mouth once daily. Do not crush, chew, or split.        fluticasone propion-salmeteroL (Advair) 115-21 mcg/actuation inhaler Inhale 2 puffs 2 times a day. Rinse mouth with water after use to reduce aftertaste and incidence of candidiasis. Do not swallow. 12 g 11    metoprolol tartrate (Lopressor) 25 mg tablet Take 1 tablet (25 mg) by mouth once daily. 30 tablet 0    polyethylene glycol (Glycolax, Miralax) 17 gram/dose powder Mix 1 capful (17 g) of powder with 4 to 8 ounces of water or juice and drink 2 times a day. 1010 g 1    torsemide (Demadex) 20 mg tablet Take 3 tablets (60 mg) by mouth once daily. 90 tablet 11    acetaminophen (Tylenol) 325 mg tablet Take 2 tablets (650 mg) by mouth every 4 hours if needed for mild pain (1 - 3). (Patient not taking: Reported on 6/23/2025)        albuterol 2.5 mg /3 mL (0.083 %) nebulizer solution Take 3 mL (2.5 mg) by nebulization every 4 hours if needed for wheezing. 75 mL 1    enoxaparin (Lovenox) 40 mg/0.4 mL syringe Inject 0.4 mL (40 mg) under the skin once every 24 hours. (Patient not taking: Reported on 6/23/2025)        guaiFENesin (Mucinex) 600 mg 12 hr  tablet Take 1 tablet (600 mg) by mouth 2 times a day as needed for cough. Do not crush, chew, or split. (Patient not taking: Reported on 6/23/2025)        hydrocortisone 1 % lotion Apply topically 2 times a day. Apply to both legs , wash hands after applying lotion (Patient not taking: Reported on 6/23/2025) 60 mL 0    lubricating eye drops ophthalmic solution Administer 1 drop into both eyes once daily as needed for dry eyes.        nebulizers misc With tubing and mask.   Use as directed 1 each 0    [DISCONTINUED] ipratropium-albuteroL (Duo-Neb) 0.5-2.5 mg/3 mL nebulizer solution Take 3 mL by nebulization every 6 hours.                       Results for orders placed or performed during the hospital encounter of 06/23/25 (from the past 24 hours)   CBC and Auto Differential   Result Value Ref Range     WBC 6.0 4.4 - 11.3 x10*3/uL     nRBC 0.0 0.0 - 0.0 /100 WBCs     RBC 3.37 (L) 4.00 - 5.20 x10*6/uL     Hemoglobin 10.1 (L) 12.0 - 16.0 g/dL     Hematocrit 31.3 (L) 36.0 - 46.0 %     MCV 93 80 - 100 fL     MCH 30.0 26.0 - 34.0 pg     MCHC 32.3 32.0 - 36.0 g/dL     RDW 15.9 (H) 11.5 - 14.5 %     Platelets 206 150 - 450 x10*3/uL     Neutrophils % 69.9 40.0 - 80.0 %     Immature Granulocytes %, Automated 0.5 0.0 - 0.9 %     Lymphocytes % 16.6 13.0 - 44.0 %     Monocytes % 10.7 2.0 - 10.0 %     Eosinophils % 2.0 0.0 - 6.0 %     Basophils % 0.3 0.0 - 2.0 %     Neutrophils Absolute 4.18 1.60 - 5.50 x10*3/uL     Immature Granulocytes Absolute, Automated 0.03 0.00 - 0.50 x10*3/uL     Lymphocytes Absolute 0.99 0.80 - 3.00 x10*3/uL     Monocytes Absolute 0.64 0.05 - 0.80 x10*3/uL     Eosinophils Absolute 0.12 0.00 - 0.40 x10*3/uL     Basophils Absolute 0.02 0.00 - 0.10 x10*3/uL   Comprehensive metabolic panel   Result Value Ref Range     Glucose 116 (H) 74 - 99 mg/dL     Sodium 138 136 - 145 mmol/L     Potassium 3.6 3.5 - 5.3 mmol/L     Chloride 99 98 - 107 mmol/L     Bicarbonate 27 21 - 32 mmol/L     Anion Gap 16 10 - 20 mmol/L      Urea Nitrogen 30 (H) 6 - 23 mg/dL     Creatinine 1.00 0.50 - 1.05 mg/dL     eGFR 53 (L) >60 mL/min/1.73m*2     Calcium 9.6 8.6 - 10.3 mg/dL     Albumin 3.8 3.4 - 5.0 g/dL     Alkaline Phosphatase 89 33 - 136 U/L     Total Protein 6.3 (L) 6.4 - 8.2 g/dL     AST 24 9 - 39 U/L     Bilirubin, Total 0.6 0.0 - 1.2 mg/dL     ALT 17 7 - 45 U/L   Lipase   Result Value Ref Range     Lipase 18 9 - 82 U/L   Magnesium   Result Value Ref Range     Magnesium 2.24 1.60 - 2.40 mg/dL   aPTT   Result Value Ref Range     aPTT 26 26 - 36 seconds   Protime-INR   Result Value Ref Range     Protime 12.4 9.8 - 12.4 seconds     INR 1.1 0.9 - 1.1   B-Type Natriuretic Peptide   Result Value Ref Range      (H) 0 - 99 pg/mL   Troponin I, High Sensitivity, Initial   Result Value Ref Range     Troponin I, High Sensitivity 19 (H) 0 - 13 ng/L   Troponin, High Sensitivity, 1 Hour   Result Value Ref Range     Troponin I, High Sensitivity 23 (H) 0 - 13 ng/L   Sars-CoV-2, Influenza A/B and RSV PCR   Result Value Ref Range     Coronavirus 2019, PCR Not Detected Not Detected     Flu A Result Not Detected Not Detected     Flu B Result Not Detected Not Detected     RSV PCR Not Detected Not Detected   Troponin I, High Sensitivity   Result Value Ref Range     Troponin I, High Sensitivity 22 (H) 0 - 13 ng/L   Urinalysis with Reflex Culture and Microscopic   Result Value Ref Range     Color, Urine Light-Yellow Light-Yellow, Yellow, Dark-Yellow     Appearance, Urine Clear Clear     Specific Gravity, Urine 1.010 1.005 - 1.035     pH, Urine 7.0 5.0, 5.5, 6.0, 6.5, 7.0, 7.5, 8.0     Protein, Urine NEGATIVE NEGATIVE, 10 (TRACE), 20 (TRACE) mg/dL     Glucose, Urine Normal Normal mg/dL     Blood, Urine NEGATIVE NEGATIVE mg/dL     Ketones, Urine NEGATIVE NEGATIVE mg/dL     Bilirubin, Urine NEGATIVE NEGATIVE mg/dL     Urobilinogen, Urine Normal Normal mg/dL     Nitrite, Urine NEGATIVE NEGATIVE     Leukocyte Esterase, Urine NEGATIVE NEGATIVE   CBC   Result  Value Ref Range     WBC 4.7 4.4 - 11.3 x10*3/uL     nRBC 0.0 0.0 - 0.0 /100 WBCs     RBC 3.41 (L) 4.00 - 5.20 x10*6/uL     Hemoglobin 10.1 (L) 12.0 - 16.0 g/dL     Hematocrit 32.0 (L) 36.0 - 46.0 %     MCV 94 80 - 100 fL     MCH 29.6 26.0 - 34.0 pg     MCHC 31.6 (L) 32.0 - 36.0 g/dL     RDW 15.8 (H) 11.5 - 14.5 %     Platelets 215 150 - 450 x10*3/uL   Coagulation Screen   Result Value Ref Range     Protime 12.8 (H) 9.8 - 12.4 seconds     INR 1.2 (H) 0.9 - 1.1     aPTT 36 26 - 36 seconds   Basic Metabolic Panel   Result Value Ref Range     Glucose 98 74 - 99 mg/dL     Sodium 140 136 - 145 mmol/L     Potassium 3.6 3.5 - 5.3 mmol/L     Chloride 100 98 - 107 mmol/L     Bicarbonate 30 21 - 32 mmol/L     Anion Gap 14 10 - 20 mmol/L     Urea Nitrogen 28 (H) 6 - 23 mg/dL     Creatinine 0.95 0.50 - 1.05 mg/dL     eGFR 57 (L) >60 mL/min/1.73m*2     Calcium 9.1 8.6 - 10.3 mg/dL   Lipid Panel   Result Value Ref Range     Cholesterol 119 0 - 199 mg/dL     HDL-Cholesterol 49.2 mg/dL     Cholesterol/HDL Ratio 2.4       LDL Calculated 59 <=99 mg/dL     VLDL 11 0 - 40 mg/dL     Triglycerides 56 0 - 149 mg/dL     Non HDL Cholesterol 70 0 - 149 mg/dL      *Note: Due to a large number of results and/or encounters for the requested time period, some results have not been displayed. A complete set of results can be found in Results Review.         CT chest abdomen pelvis wo IV contrast  Result Date: 6/23/2025  Interpreted By:  Mary Jane Foreman, STUDY: CT CHEST ABDOMEN PELVIS WO CONTRAST;  6/23/2025 1:05 pm   INDICATION: Signs/Symptoms:abdominal pain prior cancer syncope r/o mass, nephro jesse traumatic injury.     COMPARISON: CT chest 03/21/2025, CT abdomen and pelvis 01/12/2025   ACCESSION NUMBER(S): [id number]   ORDERING CLINICIAN: MURTAZA HINOJOSA   TECHNIQUE: CT of the chest, abdomen, and pelvis was performed. Sagittal and coronal reconstructions were generated. No intravenous contrast given for the examination.   FINDINGS: CHEST:    Hilar, vessel, and solid organ evaluation limited without IV contrast.   CHEST WALL AND LOWER NECK: Metallic streak artifact from port in the left anterior chest wall limits assessment of adjacent structures. No gross axillary adenopathy as visualized. Slightly prominent heterogenous right thyroid lobe.   MEDIASTINUM AND HARITHA:  Limited hilar assessment on unenhanced exam. No significant mediastinal or hilar adenopathy within limits of unenhanced study. Mild gaseous distention of the proximal esophagus.   HEART AND VESSELS:  Lack of IV contrast precludes vascular luminal assessment. The heart is normal in size. No significant pericardial effusion. Pacer wires in the right side of the heart. Multifocal atherosclerotic calcifications.   LUNGS, PLEURA, LARGE AIRWAYS:  Mild motion artifact in the lower chest. Pulmonary heterogeneity including scattered bilateral ground-glass infiltrates and reticular densities most prominent in the lung bases. Moderate right and small to moderate left pleural effusions and presumed compressive atelectasis of the adjacent lung bases. Slight fluid/thickening along the superior right main fissure. No pneumothorax. The central airways are grossly patent.   BONES:  Kyphosis and degenerative endplate spurring in the thoracic spine.     ABDOMEN/PELVIS:   Solid organ and vessel evaluation limited without IV contrast. Artifact related to overlying upper extremities.   ABDOMINAL ORGANS:   LIVER: No focal lesion within limits of unenhanced exam   GALL BLADDER AND BILIARY TREE: No calcified gallstone   SPLEEN: No focal lesion within limits of unenhanced exam   PANCREAS: No focal lesion within limits of unenhanced exam   ADRENALS: No adrenal mass   KIDNEYS AND URETERS: No renal mass or hydronephrosis within limits of unenhanced exam   BOWEL: No abnormally dilated large or small bowel loops. Moderate fecal residue. Distal colon diverticulosis. Within limits of unenhanced exam probable persistent  circumferential wall thickening in the distal transverse colon for example image 110/201, as noted on the prior exam.   PERITONEUM, RETROPERITONEUM, NODES: No significant free fluid. No free air. No significant retroperitoneal lymphadenopathy within limits of unenhanced exam. Slight increased density in the lower abdominal mesentery.   VESSELS:  Lack of IV contrast precludes vascular luminal assessment. Multifocal atherosclerotic calcifications. No abdominal aortic aneurysm.   PELVIS: Urinary bladder is moderately distended and grossly normal in contour. 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   ABDOMINAL WALL: No sizable abdominal wall hernia.   BONES: Osteopenia. Multifocal degenerative changes. Mild scoliosis of the lumbar spine.        CHEST:   Bilateral infiltrates and right-greater-than-left pleural effusions. Differential includes CHF and pneumonia. Clinical correlation and follow-up to ensure resolution after appropriate treatment recommended.   ABDOMEN AND PELVIS:   Apparent persistent distal transverse colon wall thickening consistent with reportedly known malignancy.   No gross evidence of solid organ injury or free fluid within limits of unenhanced exam.   Fecal retention. Distal colon diverticulosis.   Other findings as described above.   MACRO: None.   Signed by: Mary Jane Foreman 6/23/2025 1:32 PM Dictation workstation:   [id number]     CT cervical spine wo IV contrast  Result Date: 6/23/2025  Interpreted By:  Mary Jane Foreman, STUDY: CT CERVICAL SPINE WO IV CONTRAST;  6/23/2025 1:05 pm   INDICATION: Signs/Symptoms:fall blood thinners r/o frx.     COMPARISON: None.   ACCESSION NUMBER(S): [id number]   ORDERING CLINICIAN: MURTAZA HINOJOSA   TECHNIQUE: CT images were obtained through the cervical spine. Sagittal and coronal reconstructions were generated.   FINDINGS:     ALIGNMENT: Mild levocurvature. Straightening of the lower cervical lordosis. Slight retrolisthesis of C5.   VERTEBRAE/DISC SPACES: No  compression deformity or acute displaced fracture. Multilevel degenerative changes including atlantoaxial joint space narrowing spurring, multilevel intervertebral disc space narrowing with endplate sclerosis and spurring, multilevel bilateral facet joint narrowing and spurring. At least partial fusion across the anterior C5-6 vertebra.   ADDITIONAL FINDINGS: No abnormal thickening of the prevertebral soft tissues.  Dependent fluid/thickening in the sphenoid sinus. Ill-defined biapical infiltrates. Small right pleural effusion. Partially included pacer wires in the left upper chest.        No cervical vertebral compression deformity or acute displaced fracture. Multilevel productive/degenerative changes with straightening of the cervical lordosis and slight spondylolisthesis at C5.   Paraspinal/soft tissue findings as above.   MACRO: None.   Signed by: Mary Jane Foreman 6/23/2025 1:16 PM Dictation workstation:   [id number]     CT head wo IV contrast  Result Date: 6/23/2025  Interpreted By:  Mary Jane Foreman, STUDY: CT HEAD WO IV CONTRAST;  6/23/2025 1:05 pm   INDICATION: Signs/Symptoms:headache fall blood thinners r/o sah ich mass.     COMPARISON: 08/23/2023   ACCESSION NUMBER(S): [id number]   ORDERING CLINICIAN: MURTAZA HINOJOSA   TECHNIQUE: Unenhanced CT images of the head were obtained.   FINDINGS: The ventricles, cisterns and sulci are enlarged, consistent with diffuse volume loss. There are areas of nonspecific white matter hypodensity, which are probably age related or microvascular in nature. These findings are similar to the prior exam. There is no acute intracranial hemorrhage, mass effect or midline shift. No extraaxial fluid collection.   No acute displaced calvarial fracture. No focal calvarial lesion.   Left sphenoid sinus dependent fluid/thickening. Remaining visualized paranasal sinuses are clear. Dense presumed surgical material along the anterior margin of each globe again seen.        No acute  intracranial hemorrhage or mass-effect.   Mild fluid or mucosal thickening in the left sphenoid sinus..   MACRO: None.   Signed by: Mary Jane Foreman 6/23/2025 1:08 PM Dictation workstation:   [id number]        Assessment & Plan  Acute on chronic diastolic congestive heart failure     Acute bacterial sinusitis     Syncope     Advance care planning     Constipation           Admit to telemetry  Inpatient  Appreciate cardiology recommendations  I am not sure if patient's CHF represents more of a chronic condition so we will give 40 mg IV Lasix x 1 and resume torsemide tomorrow and look to cardiology for further recommendations  Please see echo from 2024 and carotid duplex from 2029 above  Start Rocephin 1 g IV daily  Orthostatic vital signs  Repeat EKG and troponin pacer check  Continue home aspirin and Plavix  Daily weight  Intake and output  AM CBC, BMP, coags, lipids  Check flu and COVID  Urinalysis pending  PT/OT     Chronic conditions: HFpEF, MI, 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 Plan discussed with interdisciplinary team, consults placed, appreciate input. Will continue current and repeat labs in the AM.          Discharge planning discussed with patient and care team. Therapy evaluations ordered. Patient aware and agreeable to current plan, continue plan as above.      I spent a total of 75 minutes on the date of the  service which included preparing to see the patient, face-to-face patient care, completing clinical documentation, obtaining and/or reviewing separately obtained history, performing a medically appropriate examination, counseling and educating the patient/family/caregiver, ordering medications, tests, or procedures, communicating with other HCPs (not separately reported), independently interpreting results (not separately reported), communicating results to the patient/family/caregiver, and care coordination (not separately reported).            Riya Urena                        Physical Exam  Vitals and nursing note reviewed.            General: in no acute distress  Eyes: PERRLA   HENT: Normo-cephalic, atraumatic.   CV: Irregular rate, irregular rhythm.   Resp: Breathing non-labored,  diminished to auscultation bilaterally  GI: BS x4   : monitor intake and output  MSK/Extremities: No gross bony deformities. PT/OT on the case  Skin: Warm. Appropriate color, continue offloading  Neuro:  Face symmetric.   Psych: returning to baseline, improved      10 point ROS negative unless otherwise noted in HPI     Patient fully evaluated 06/26  for    Problem List Items Addressed This Visit         Dyspnea on minimal exertion     Relevant Medications     cefuroxime (Ceftin) 250 mg tablet     Pleural effusion     * (Principal) Acute on chronic diastolic congestive heart failure - Primary     Relevant Medications     clopidogrel (Plavix) tablet 75 mg     metoprolol tartrate (Lopressor) tablet 12.5 mg (Start on 6/26/2025  9:00 PM)     Syncope     Relevant Medications     acetaminophen (Tylenol) 325 mg tablet     dapagliflozin propanediol (Farxiga) 10 mg tablet (Start on 6/27/2025)     heparin sodium,porcine (heparin, porcine,) 5,000 unit/mL injection     lactobacillus acidophilus tablet tablet     oxygen (O2) gas therapy     sennosides-docusate sodium (Sindy-Colace) 8.6-50 mg tablet     Other Relevant Orders     Cardiac device  check - Inpatient     Transthoracic Echo Complete (Completed)     CBC     Comprehensive metabolic panel      Brought to hospital - had concerns including Fall.  Patient seen resting in bed with head of bed elevated, no s/s or c/o acute difficulties at this time.  Vital signs for last 24 hours Temp:  [35.6 °C (96.1 °F)-36.3 °C (97.3 °F)] 36.1 °C (97 °F)  Heart Rate:  [60-63] 63  Resp:  [16-19] 19  BP: (105-139)/(50-63) 122/57    No intake/output data recorded.  Patient still requiring frequent cardiac and SPO2 monitoring. Continue aggressive pulmonary hygiene and oral hygiene. Off loading as tolerated for skin integrity. Medications and labs reviewed-         Results for orders placed or performed during the hospital encounter of 06/23/25 (from the past 24 hours)   Comprehensive Metabolic Panel   Result Value Ref Range     Glucose 92 74 - 99 mg/dL     Sodium 137 136 - 145 mmol/L     Potassium 3.8 3.5 - 5.3 mmol/L     Chloride 100 98 - 107 mmol/L     Bicarbonate 31 21 - 32 mmol/L     Anion Gap 10 10 - 20 mmol/L     Urea Nitrogen 32 (H) 6 - 23 mg/dL     Creatinine 0.90 0.50 - 1.05 mg/dL     eGFR 60 (L) >60 mL/min/1.73m*2     Calcium 9.1 8.6 - 10.3 mg/dL     Albumin 3.3 (L) 3.4 - 5.0 g/dL     Alkaline Phosphatase 90 33 - 136 U/L     Total Protein 5.7 (L) 6.4 - 8.2 g/dL     AST 28 9 - 39 U/L     Bilirubin, Total 0.3 0.0 - 1.2 mg/dL     ALT 19 7 - 45 U/L   CBC and Auto Differential   Result Value Ref Range     WBC 4.4 4.4 - 11.3 x10*3/uL     nRBC 0.0 0.0 - 0.0 /100 WBCs     RBC 3.27 (L) 4.00 - 5.20 x10*6/uL     Hemoglobin 9.6 (L) 12.0 - 16.0 g/dL     Hematocrit 30.7 (L) 36.0 - 46.0 %     MCV 94 80 - 100 fL     MCH 29.4 26.0 - 34.0 pg     MCHC 31.3 (L) 32.0 - 36.0 g/dL     RDW 15.3 (H) 11.5 - 14.5 %     Platelets 215 150 - 450 x10*3/uL     Neutrophils % 41.6 40.0 - 80.0 %     Immature Granulocytes %, Automated 0.5 0.0 - 0.9 %     Lymphocytes % 32.4 13.0 - 44.0 %     Monocytes % 16.0 2.0 - 10.0 %     Eosinophils % 8.8 0.0 -  6.0 %     Basophils % 0.7 0.0 - 2.0 %     Neutrophils Absolute 1.85 1.60 - 5.50 x10*3/uL     Immature Granulocytes Absolute, Automated 0.02 0.00 - 0.50 x10*3/uL     Lymphocytes Absolute 1.44 0.80 - 3.00 x10*3/uL     Monocytes Absolute 0.71 0.05 - 0.80 x10*3/uL     Eosinophils Absolute 0.39 0.00 - 0.40 x10*3/uL     Basophils Absolute 0.03 0.00 - 0.10 x10*3/uL      Patient recently received an antibiotic (last 12 hours)         None             Plan discussed with interdisciplinary team, continue current and repeat labs in the AM.      Discharge planning discussed with patient and care team. Therapy evaluations ordered.   Anticipate - [facility]/SNF     Patient aware and agreeable to current plan, continue plan as above.      I spent a total of 60 minutes on the date of the service which included preparing to see the patient, face-to-face patient care, completing clinical documentation, obtaining and/or reviewing separately obtained history, performing a medically appropriate examination, counseling and educating the patient/family/caregiver, ordering medications, tests, or procedures, communicating with other HCPs (not separately reported), independently interpreting results (not separately reported), communicating results to the patient/family/caregiver, and care coordination (not separately reported).   Relevant Results                   This patient currently has cardiac telemetry ordered; if you would like to modify or discontinue the telemetry order, click here to go to the orders activity to modify/discontinue the order.                    Assessment & Plan  Acute on chronic diastolic congestive heart failure     Acute bacterial sinusitis     Syncope     Advance care planning     Constipation        Syncope might be related to vasovagal.  Orthostatic vitals every shift        Riya Urena                      Revision History     Patient fully evaluated 06/27  for   Assessment & Plan  Acute on chronic diastolic  congestive heart failure    Acute bacterial sinusitis    Syncope    Advance care planning    Constipation      Patient with significant clinical improvement noted, patient medically cleared for discharge today. Patient seen resting in bed with head of bed elevated, no s/s or c/o acute difficulties at this time. Vital signs for last 24 hours:  Temp:  [35.8 °C (96.4 °F)-37 °C (98.6 °F)] 36.2 °C (97.2 °F)  Heart Rate:  [] 60  Resp:  [16-17] 16  BP: (100-146)/(50-65) 100/57 Medications and labs reviewed-   Results for orders placed or performed during the hospital encounter of 06/23/25 (from the past 24 hours)   Comprehensive Metabolic Panel   Result Value Ref Range    Glucose 94 74 - 99 mg/dL    Sodium 137 136 - 145 mmol/L    Potassium 3.9 3.5 - 5.3 mmol/L    Chloride 100 98 - 107 mmol/L    Bicarbonate 32 21 - 32 mmol/L    Anion Gap 9 (L) 10 - 20 mmol/L    Urea Nitrogen 33 (H) 6 - 23 mg/dL    Creatinine 0.94 0.50 - 1.05 mg/dL    eGFR 57 (L) >60 mL/min/1.73m*2    Calcium 9.3 8.6 - 10.3 mg/dL    Albumin 3.4 3.4 - 5.0 g/dL    Alkaline Phosphatase 100 33 - 136 U/L    Total Protein 5.9 (L) 6.4 - 8.2 g/dL    AST 34 9 - 39 U/L    Bilirubin, Total 0.4 0.0 - 1.2 mg/dL    ALT 24 7 - 45 U/L   CBC and Auto Differential   Result Value Ref Range    WBC 4.8 4.4 - 11.3 x10*3/uL    nRBC 0.0 0.0 - 0.0 /100 WBCs    RBC 3.48 (L) 4.00 - 5.20 x10*6/uL    Hemoglobin 10.4 (L) 12.0 - 16.0 g/dL    Hematocrit 32.6 (L) 36.0 - 46.0 %    MCV 94 80 - 100 fL    MCH 29.9 26.0 - 34.0 pg    MCHC 31.9 (L) 32.0 - 36.0 g/dL    RDW 15.4 (H) 11.5 - 14.5 %    Platelets 219 150 - 450 x10*3/uL    Neutrophils % 41.6 40.0 - 80.0 %    Immature Granulocytes %, Automated 0.2 0.0 - 0.9 %    Lymphocytes % 33.4 13.0 - 44.0 %    Monocytes % 15.1 2.0 - 10.0 %    Eosinophils % 8.7 0.0 - 6.0 %    Basophils % 1.0 0.0 - 2.0 %    Neutrophils Absolute 2.00 1.60 - 5.50 x10*3/uL    Immature Granulocytes Absolute, Automated 0.01 0.00 - 0.50 x10*3/uL    Lymphocytes Absolute  1.61 0.80 - 3.00 x10*3/uL    Monocytes Absolute 0.73 0.05 - 0.80 x10*3/uL    Eosinophils Absolute 0.42 (H) 0.00 - 0.40 x10*3/uL    Basophils Absolute 0.05 0.00 - 0.10 x10*3/uL      Patient recently received an antibiotic (last 12 hours)       Date/Time Action Medication Dose    06/27/25 0918 Given    cefuroxime (Ceftin) tablet 250 mg 250 mg           No results found for the last 90 days.       Continue aggressive pulmonary hygiene and oral hygiene. Off loading as tolerated for skin integrity. No new complaints per patient, stable at time of exam.  Plan discussed with interdisciplinary team, no acute events overnight, patient denies chest pain, worsening SOB at this time. Ok to discharge, will continue current and repeat labs in the AM if patient still hospitalized. Patient aware and agreeable to current plan, continue plan as above.     I spent 60 minutes on the date of the service which included preparing to see the patient, face-to-face patient care, completing clinical documentation, obtaining and/or reviewing separately obtained history, performing a medically appropriate examination, counseling and educating the patient/family/caregiver, ordering medications, tests, or procedures, communicating with other HCPs (not separately reported), independently interpreting results (not separately reported), communicating results to the patient/family/caregiver, and care coordination (not separately reported    Pertinent Physical Exam At Time of Discharge  Physical Exam  no acute events overnight, patient denies chest pain, worsening SOB at this time.  Outpatient Follow-Up  Future Appointments   Date Time Provider Department Center   7/9/2025  2:00 PM Naima Keane MD [id number] [place]   7/17/2025  3:00 PM Joselito Madden PA-C [id number] [place]   9/17/2025  2:30 PM [facility] [id number]         Riya Urena

## 2025-06-27 NOTE — PROGRESS NOTES
06/27/25 1245   Discharge Planning   Home or Post Acute Services Post acute facilities (Rehab/SNF/etc)   Type of Post Acute Facility Services Skilled nursing   Expected Discharge Disposition SNF     Rounds completed with Dr. Mensah, received update that patient is ready for discharge today. [facility] updated.     Addendum 1412: [facility] has arranged transport for 5:30 PM to [facility]. Patient updated at bedside. Called and updated patients daughter. Bedside nurse updated and provided with report number.

## 2025-06-27 NOTE — CARE PLAN
The patient's goals for the shift include      The clinical goals for the shift include safety and rest    Over the shift, the patient will maintain skin integrity, she will have decreased shortness of breath and refrain from further complications

## 2025-06-27 NOTE — PROGRESS NOTES
Cardiology Progress Note      Yesenia Milner is a 89 y.o. female known to Dr. 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 Patient presented to [facility] on 6/23/2025 with syncope and fall. Chronically on aspirin and Plavix. Cardiology has been consulted for syncope .       Subjective   Today, patient seen and assessed. She denies any shortness of breath at rest. Denies any recurrence of chest heaviness or lightheadedness at rest since PPM reprogramming yesterday. She has noticed some lightheadedness with exertion. No longer has lower extremity edema today. No recurrent pauses on telemetry since PPM reprogramming.        ROS:    Review of Systems   Constitutional: Negative for malaise/fatigue.   Cardiovascular:  Negative for chest pain, dyspnea on exertion, irregular heartbeat, leg swelling, near-syncope, orthopnea, palpitations and paroxysmal nocturnal dyspnea.   Respiratory:  Negative for shortness of breath.    Neurological:  Positive for light-headedness (with exertion).        Past Medical History:  Medical History[1]    Problem List Items Addressed This Visit          Cardiac and Vasculature    Dyspnea on minimal exertion    Relevant Medications    cefuroxime (Ceftin) 250 mg tablet    * (Principal) Acute on chronic diastolic congestive heart failure - Primary    Relevant Medications    clopidogrel (Plavix) tablet 75 mg    metoprolol tartrate (Lopressor) tablet 12.5 mg       Pulmonary and Pneumonias    Pleural effusion       Symptoms and Signs    Syncope    Relevant Medications    acetaminophen (Tylenol) 325 mg tablet    dapagliflozin propanediol (Farxiga) 10 mg tablet    heparin sodium,porcine  (heparin, porcine,) 5,000 unit/mL injection    lactobacillus acidophilus tablet tablet    oxygen (O2) gas therapy    sennosides-docusate sodium (Sindy-Colace) 8.6-50 mg tablet    Other Relevant Orders    Cardiac device check - Inpatient    Transthoracic Echo Complete (Completed)    CBC    Comprehensive metabolic panel       Family History:  No relevant family history has been documented for this patient.    Social History:  Social History     Tobacco Use    Smoking status: Former     Current packs/day: 0.00     Types: Cigarettes     Quit date:      Years since quittin.5     Passive exposure: Past    Smokeless tobacco: Never   Substance Use Topics    Alcohol use: Never         Allergies:  Allergies[2]        MEDICATION:    Scheduled medications  Scheduled Medications[3]  Continuous medications  Continuous Medications[4]  PRN medications  PRN Medications[5]     Assessment & Plan  Acute on chronic diastolic congestive heart failure    Acute bacterial sinusitis    Syncope    Advance care planning    Constipation      OBJECTIVE:    Visit Vitals  /61   Pulse 60   Temp 36.2 °C (97.2 °F) (Temporal)   Resp 16          Intake/Output Summary (Last 24 hours) at 2025 0905  Last data filed at 2025  Gross per 24 hour   Intake --   Output 300 ml   Net -300 ml          Physical Exam   Physical Exam  Constitutional:       Appearance: Normal appearance.   HENT:      Head: Normocephalic and atraumatic.   Cardiovascular:      Rate and Rhythm: Normal rate and regular rhythm.      Pulses: Normal pulses.           Radial pulses are 2+ on the right side.        Posterior tibial pulses are 2+ on the right side and 2+ on the left side.      Heart sounds: Normal heart sounds, S1 normal and S2 normal. No murmur heard.  Pulmonary:      Effort: Pulmonary effort is normal.      Breath sounds: Normal breath sounds.   Musculoskeletal:      Cervical back: Normal range of motion and neck supple.      Right lower leg: No  edema.      Left lower leg: No edema.   Skin:     General: Skin is warm and dry.   Neurological:      General: No focal deficit present.      Mental Status: She is alert.   Psychiatric:         Mood and Affect: Mood normal.          Recent Image Results  XR chest 1 view  Narrative: Interpreted By:  Bryce Redding,   STUDY:  XR CHEST 1 VIEW;  6/25/2025 1:47 pm      INDICATION:  Signs/Symptoms:shortness of breath, PPM Lead eval.      COMPARISON:  03/24/2025      ACCESSION NUMBER(S):  [id number]      ORDERING CLINICIAN:  KAYLIN NAVA      FINDINGS:  CARDIOMEDIASTINAL SILHOUETTE AND VASCULATURE:      Cardiac size:  Within normal limits considering a right pericardial  fat pad and similar to the prior exam. Aortic shadow:  Within normal  limits.      Mediastinal contours: Within normal limits.      Pulmonary vasculature:  The central vasculature is unremarkable      LUNGS:  There is blunting of the left costophrenic angle is probably from a  residual effusion with atelectasis, improved from the previous exam.  The lungs otherwise relatively clear.      ABDOMEN AND OTHER FINDINGS:  Cardiac pacemaker device overlies the left chest similar to the  previous exam.      BONES:  No acute osseous changes.      Impression: 1.  Improved left basilar atelectasis and effusion.      Signed by: Bryce Redding 6/25/2025 5:03 PM  Dictation workstation:   [id number]        Relevant Results:  Lab Results   Component Value Date    WBC 4.8 06/27/2025    HGB 10.4 (L) 06/27/2025    HCT 32.6 (L) 06/27/2025    MCV 94 06/27/2025     06/27/2025        Lab Results   Component Value Date    CREATININE 0.94 06/27/2025    BUN 33 (H) 06/27/2025     06/27/2025    K 3.9 06/27/2025     06/27/2025    CO2 32 06/27/2025        Assessment/Plan      Yesenia Milner is a 89 y.o. female known to Dr. 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 Patient presented to [facility] on 6/23/2025 with syncope and fall. Chronically on aspirin and Plavix. Cardiology following for syncope .     Syncope   - Continues to have lightheadedness with position changes but denies any recurrent lightheadedness at rest since PPM was reprogrammed yesterday. Upon review of telemetry, no recurrent pauses since PPM reprogrammed.  - Syncope likely related to prolonged pauses and potentially orthostatic hypotension. PPM has been reprogrammed and Torsemide was decreased to avoid underlying dehydration/orthostatic hypotension. Patient appears stable from a cardiac standpoint for discharge. However, she does appear weak and deconditioned upon exam. May require further PT/OT prior to discharge vs potential placement. Will defer to primary team.    Chest Heaviness   - Troponin I High Sensitivity: 19 -> 23 -> 23. Mild chronic elevation without delta pattern.   - No acute ischemic changes noted on EKG   - Denies any chest heaviness or pain since PPM reprogrammed yesterday.  Chest heaviness likely secondary to 2-3 second pauses she was having.   - Continue BB     Chronic Diastolic Heart Failure   - EF 50%  - BNP: 548  - I&Os: -1,646  - Patient appears euvolemic and compensated upon exam. She is on her baseline home oxygen 2L 02 NC. She denies any shortness of breath, orthopnea, or PND. No longer has BLE edema   - Continue Farxiga 10 mg , Metoprolol 12.5 mg, and Torsemide 40 mg   - Low sodium diet       Discussed Case with Dr. Hess. Cardiology will Continue to follow peripherally. Please contact with any questions or concerns.    Some portions of this note were copied from previous note and edited appropriately to reflect today's assessment and treatment plan.    Lexie Wolfe, APRN-CNP          [1]   Past Medical History:  Diagnosis Date    Gross hematuria 10/07/2020    Impacted cerumen 02/22/2024    Other conditions influencing health status     Normal stress echocardiogram    Personal history of other medical treatment     History of echocardiogram    Personal history of other medical treatment     H/O Doppler ultrasound    Systolic CHF (Multi) 05/18/2023   [2]   Allergies  Allergen Reactions    Iodine Rash    Shrimp Diarrhea and Nausea/vomiting    Hydrocodone Unknown     Pt does not remember    Adhesive Tape-Silicones Itching   [3] allopurinol, 100 mg, oral, Daily  aspirin, 81 mg, oral, Daily  cefuroxime, 250 mg, oral, BID  clopidogrel, 75 mg, oral, Daily  colchicine, 0.6 mg, oral, Daily  dapagliflozin propanediol, 10 mg, oral, q24h  DULoxetine, 30 mg, oral, Daily  ferrous sulfate, 65 mg of elemental iron, oral, Daily with breakfast  heparin (porcine), 5,000 Units, subcutaneous, q12h  lactobacillus acidophilus, 1 tablet, oral, BID  metoprolol tartrate, 12.5 mg, oral, BID  polyethylene glycol, 17 g, oral, BID  sennosides-docusate sodium, 2 tablet, oral, BID  torsemide, 40 mg, oral, Daily  [4]    [5] PRN medications: acetaminophen **OR** acetaminophen **OR** acetaminophen, albuterol, lubricating eye drops, ondansetron, oxygen

## 2025-06-27 NOTE — CARE PLAN
The patient's goals for the shift include  rest    The clinical goals for the shift include safety and rest      Problem: Safety - Adult  Goal: Free from fall injury  Outcome: Progressing  Flowsheets (Taken 6/27/2025 0236)  Free from fall injury: Instruct family/caregiver on patient safety     Problem: Chronic Conditions and Co-morbidities  Goal: Patient's chronic conditions and co-morbidity symptoms are monitored and maintained or improved  Outcome: Progressing  Flowsheets (Taken 6/27/2025 0236)  Care Plan - Patient's Chronic Conditions and Co-Morbidity Symptoms are Monitored and Maintained or Improved:   Monitor and assess patient's chronic conditions and comorbid symptoms for stability, deterioration, or improvement   Collaborate with multidisciplinary team to address chronic and comorbid conditions and prevent exacerbation or deterioration   Update acute care plan with appropriate goals if chronic or comorbid symptoms are exacerbated and prevent overall improvement and discharge     Problem: Skin  Goal: Prevent/manage excess moisture  Outcome: Progressing  Flowsheets (Taken 6/27/2025 0236)  Prevent/manage excess moisture:   Cleanse incontinence/protect with barrier cream   Follow provider orders for dressing changes   Moisturize dry skin   Monitor for/manage infection if present  Goal: Prevent/minimize sheer/friction injuries  Outcome: Progressing  Flowsheets (Taken 6/27/2025 0236)  Prevent/minimize sheer/friction injuries:   Complete micro-shifts as needed if patient unable. Adjust patient position to relieve pressure points, not a full turn   Turn/reposition every 2 hours/use positioning/transfer devices   HOB 30 degrees or less   Use pull sheet   Increase activity/out of bed for meals

## 2025-06-28 LAB
ALBUMIN SERPL BCP-MCNC: 3.5 G/DL (ref 3.4–5)
ALP SERPL-CCNC: 102 U/L (ref 33–136)
ALT SERPL W P-5'-P-CCNC: 24 U/L (ref 7–45)
ANION GAP SERPL CALC-SCNC: 10 MMOL/L (ref 10–20)
AST SERPL W P-5'-P-CCNC: 31 U/L (ref 9–39)
BASOPHILS # BLD AUTO: 0.04 X10*3/UL (ref 0–0.1)
BASOPHILS NFR BLD AUTO: 0.9 %
BILIRUB SERPL-MCNC: 0.4 MG/DL (ref 0–1.2)
BUN SERPL-MCNC: 31 MG/DL (ref 6–23)
CALCIUM SERPL-MCNC: 9.4 MG/DL (ref 8.6–10.3)
CHLORIDE SERPL-SCNC: 99 MMOL/L (ref 98–107)
CO2 SERPL-SCNC: 32 MMOL/L (ref 21–32)
CREAT SERPL-MCNC: 0.92 MG/DL (ref 0.5–1.05)
EGFRCR SERPLBLD CKD-EPI 2021: 59 ML/MIN/1.73M*2
EOSINOPHIL # BLD AUTO: 0.37 X10*3/UL (ref 0–0.4)
EOSINOPHIL NFR BLD AUTO: 7.9 %
ERYTHROCYTE [DISTWIDTH] IN BLOOD BY AUTOMATED COUNT: 15.2 % (ref 11.5–14.5)
GLUCOSE SERPL-MCNC: 93 MG/DL (ref 74–99)
HCT VFR BLD AUTO: 33.8 % (ref 36–46)
HGB BLD-MCNC: 10.5 G/DL (ref 12–16)
IMM GRANULOCYTES # BLD AUTO: 0.02 X10*3/UL (ref 0–0.5)
IMM GRANULOCYTES NFR BLD AUTO: 0.4 % (ref 0–0.9)
LYMPHOCYTES # BLD AUTO: 1.37 X10*3/UL (ref 0.8–3)
LYMPHOCYTES NFR BLD AUTO: 29.4 %
MCH RBC QN AUTO: 29.4 PG (ref 26–34)
MCHC RBC AUTO-ENTMCNC: 31.1 G/DL (ref 32–36)
MCV RBC AUTO: 95 FL (ref 80–100)
MONOCYTES # BLD AUTO: 0.71 X10*3/UL (ref 0.05–0.8)
MONOCYTES NFR BLD AUTO: 15.2 %
NEUTROPHILS # BLD AUTO: 2.15 X10*3/UL (ref 1.6–5.5)
NEUTROPHILS NFR BLD AUTO: 46.2 %
NRBC BLD-RTO: 0 /100 WBCS (ref 0–0)
PLATELET # BLD AUTO: 218 X10*3/UL (ref 150–450)
POTASSIUM SERPL-SCNC: 3.7 MMOL/L (ref 3.5–5.3)
PROT SERPL-MCNC: 6 G/DL (ref 6.4–8.2)
RBC # BLD AUTO: 3.57 X10*6/UL (ref 4–5.2)
SODIUM SERPL-SCNC: 137 MMOL/L (ref 136–145)
WBC # BLD AUTO: 4.7 X10*3/UL (ref 4.4–11.3)

## 2025-06-28 PROCEDURE — 1100000001 HC PRIVATE ROOM DAILY

## 2025-06-28 PROCEDURE — 2500000001 HC RX 250 WO HCPCS SELF ADMINISTERED DRUGS (ALT 637 FOR MEDICARE OP): Performed by: NURSE PRACTITIONER

## 2025-06-28 PROCEDURE — 2500000004 HC RX 250 GENERAL PHARMACY W/ HCPCS (ALT 636 FOR OP/ED): Performed by: NURSE PRACTITIONER

## 2025-06-28 PROCEDURE — 36415 COLL VENOUS BLD VENIPUNCTURE: CPT | Performed by: INTERNAL MEDICINE

## 2025-06-28 PROCEDURE — 2500000001 HC RX 250 WO HCPCS SELF ADMINISTERED DRUGS (ALT 637 FOR MEDICARE OP): Performed by: INTERNAL MEDICINE

## 2025-06-28 PROCEDURE — 80053 COMPREHEN METABOLIC PANEL: CPT | Performed by: INTERNAL MEDICINE

## 2025-06-28 PROCEDURE — 85025 COMPLETE CBC W/AUTO DIFF WBC: CPT | Performed by: INTERNAL MEDICINE

## 2025-06-28 RX ADMIN — POLYETHYLENE GLYCOL 3350 17 G: 17 POWDER, FOR SOLUTION ORAL at 20:29

## 2025-06-28 RX ADMIN — ALLOPURINOL 100 MG: 100 TABLET ORAL at 09:19

## 2025-06-28 RX ADMIN — METOPROLOL TARTRATE 12.5 MG: 25 TABLET, FILM COATED ORAL at 09:18

## 2025-06-28 RX ADMIN — Medication 1 TABLET: at 09:18

## 2025-06-28 RX ADMIN — Medication 1 TABLET: at 20:29

## 2025-06-28 RX ADMIN — ASPIRIN 81 MG CHEWABLE TABLET 81 MG: 81 TABLET CHEWABLE at 09:18

## 2025-06-28 RX ADMIN — CLOPIDOGREL BISULFATE 75 MG: 75 TABLET, FILM COATED ORAL at 09:18

## 2025-06-28 RX ADMIN — SENNOSIDES AND DOCUSATE SODIUM 2 TABLET: 50; 8.6 TABLET ORAL at 09:18

## 2025-06-28 RX ADMIN — DULOXETINE 30 MG: 30 CAPSULE, DELAYED RELEASE ORAL at 09:18

## 2025-06-28 RX ADMIN — CEFUROXIME AXETIL 250 MG: 250 TABLET, FILM COATED ORAL at 20:29

## 2025-06-28 RX ADMIN — CEFUROXIME AXETIL 250 MG: 250 TABLET, FILM COATED ORAL at 09:19

## 2025-06-28 RX ADMIN — HEPARIN SODIUM 5000 UNITS: 5000 INJECTION, SOLUTION INTRAVENOUS; SUBCUTANEOUS at 03:33

## 2025-06-28 RX ADMIN — SENNOSIDES AND DOCUSATE SODIUM 2 TABLET: 50; 8.6 TABLET ORAL at 20:29

## 2025-06-28 RX ADMIN — ACETAMINOPHEN 650 MG: 325 TABLET ORAL at 14:26

## 2025-06-28 RX ADMIN — TORSEMIDE 40 MG: 20 TABLET ORAL at 09:19

## 2025-06-28 RX ADMIN — FERROUS SULFATE TAB 325 MG (65 MG ELEMENTAL FE) 1 TABLET: 325 (65 FE) TAB at 09:19

## 2025-06-28 RX ADMIN — COLCHICINE 0.6 MG: 0.6 TABLET, FILM COATED ORAL at 09:18

## 2025-06-28 RX ADMIN — POLYETHYLENE GLYCOL 3350 17 G: 17 POWDER, FOR SOLUTION ORAL at 09:18

## 2025-06-28 ASSESSMENT — COGNITIVE AND FUNCTIONAL STATUS - GENERAL
WALKING IN HOSPITAL ROOM: A LITTLE
HELP NEEDED FOR BATHING: A LITTLE
CLIMB 3 TO 5 STEPS WITH RAILING: A LITTLE
PERSONAL GROOMING: A LITTLE
CLIMB 3 TO 5 STEPS WITH RAILING: A LOT
STANDING UP FROM CHAIR USING ARMS: A LITTLE
MOBILITY SCORE: 19
DAILY ACTIVITIY SCORE: 21
DAILY ACTIVITIY SCORE: 19
DRESSING REGULAR LOWER BODY CLOTHING: A LITTLE
DRESSING REGULAR LOWER BODY CLOTHING: A LITTLE
MOBILITY SCORE: 20
HELP NEEDED FOR BATHING: A LITTLE
MOVING TO AND FROM BED TO CHAIR: A LITTLE
DRESSING REGULAR UPPER BODY CLOTHING: A LITTLE
TOILETING: A LITTLE
TOILETING: A LITTLE
MOVING TO AND FROM BED TO CHAIR: A LITTLE
STANDING UP FROM CHAIR USING ARMS: A LITTLE
WALKING IN HOSPITAL ROOM: A LITTLE

## 2025-06-28 ASSESSMENT — PAIN DESCRIPTION - DESCRIPTORS
DESCRIPTORS: ACHING
DESCRIPTORS: ACHING

## 2025-06-28 ASSESSMENT — PAIN SCALES - GENERAL
PAINLEVEL_OUTOF10: 5 - MODERATE PAIN
PAINLEVEL_OUTOF10: 1
PAINLEVEL_OUTOF10: 0 - NO PAIN
PAINLEVEL_OUTOF10: 0 - NO PAIN

## 2025-06-28 ASSESSMENT — PAIN - FUNCTIONAL ASSESSMENT
PAIN_FUNCTIONAL_ASSESSMENT: 0-10

## 2025-06-28 ASSESSMENT — PAIN DESCRIPTION - LOCATION: LOCATION: SHOULDER

## 2025-06-28 ASSESSMENT — PAIN DESCRIPTION - ORIENTATION: ORIENTATION: RIGHT;LEFT

## 2025-06-28 NOTE — CARE PLAN
The patient's goals for the shift include      The clinical goals for the shift include rest and comfort    Over the shift, the patient's skin integrity will be maintained and she will refrain from further complications

## 2025-06-28 NOTE — PROGRESS NOTES
"Yesenia Milner is a 89 y.o. female on day 5 of admission presenting with Acute on chronic diastolic congestive heart failure.            HPI:      Physical Exam:        Last Recorded Vitals  Blood pressure 139/54, pulse 60, temperature 36.2 °C (97.2 °F), temperature source Temporal, resp. rate 16, height 1.626 m (5' 4\"), weight 69.9 kg (154 lb), SpO2 98%.  Intake/Output last 3 Shifts:  I/O last 3 completed shifts:  In: - (0 mL/kg)   Out: 300 (4.3 mL/kg) [Urine:300 (0.1 mL/kg/hr)]  Weight: 69.9 kg     Medications:  Scheduled medications  Scheduled Medications[1]    Labs:    CMP:  Recent Labs     06/27/25  0523 06/26/25  0524 06/25/25  0530 06/24/25  0522 06/23/25  1317 04/30/25  1419 03/26/25  0704 03/25/25  0656 03/24/25  0624 03/23/25  0636 03/22/25  0721 03/21/25  1055 03/17/25  0500 03/14/25  0500    137 137 140 138 139 136 135* 135* 131* 131* 129*   < > 136   K 3.9 3.8 3.8 3.6 3.6 3.8 3.3* 3.5 3.6 3.4* 3.7 3.3*   < > 4.2    100 99 100 99 94* 97* 97* 97* 93* 94* 93*   < > 100   CO2 32 31 31 30 27 32 32 30 31 30 28 28   < > 30   ANIONGAP 9* 10 11 14 16 13 10 12 11 11 13 11   < > 10   BUN 33* 32* 27* 28* 30* 27* 13 18 22 24* 21 19   < > 27*   CREATININE 0.94 0.90 0.88 0.95 1.00 1.02* 0.89 1.03 0.95 0.99 0.95 0.87   < > 1.00   EGFR 57* 60* 62 57* 53* 52* 61 51* 57* 54* 57* 63   < > 53*   MG  --   --   --   --  2.24  --  2.17 2.08 2.17 2.22 2.17 2.07  --  2.19    < > = values in this interval not displayed.     Recent Labs     06/27/25  0523 06/26/25  0524 06/25/25  0530 06/23/25  1317 03/26/25  0704 01/13/25  0626 01/12/25  1122   ALBUMIN 3.4 3.3* 3.4 3.8 3.2*   < > 3.9   ALKPHOS 100 90 83 89 63   < > 83   ALT 24 19 15 17 19   < > 15   AST 34 28 23 24 17   < > 17   BILITOT 0.4 0.3 0.4 0.6 0.5   < > 0.5   LIPASE  --   --   --  18  --   --  23    < > = values in this interval not displayed.     CBC:  Recent Labs     06/27/25  0523 06/26/25  0524 06/25/25  0530 06/24/25  0522 06/23/25  1317 04/30/25  1420 " 03/26/25  0704 03/25/25  0656   WBC 4.8 4.4 5.0 4.7 6.0 6.6 5.7 6.0   HGB 10.4* 9.6* 9.9* 10.1* 10.1* 10.8* 8.0* 7.6*   HCT 32.6* 30.7* 32.0* 32.0* 31.3* 34.0* 27.1* 25.9*    215 213 215 206 294 238 238   MCV 94 94 94 94 93 90.9 84 86     COAG:   Recent Labs     06/24/25  0522 06/23/25  1317 01/16/25  0709 01/12/25  1122 01/05/25  1412 01/05/25  1028 01/04/25  0612 01/03/25  1539 01/03/25  0900 01/03/25  0344 01/02/25  2334 12/21/24  0538 12/20/24  1013 12/20/24  0251 08/21/23  0056 07/13/23  1335   INR 1.2* 1.1 1.5* 1.7*  --   --   --   --   --   --   --  1.1  --  1.1 1.1 1.1   HAUF  --   --   --   --  1.8* 0.4 0.4 0.5 0.7 0.9 1.0  --  1.1*  --   --   --      ABO:   Recent Labs     01/12/25  1205   ABO A     HEME/ENDO:  Recent Labs     03/23/25  0636 03/14/25  0500 03/04/25  1309 01/13/25  0626 12/20/24  0204 01/24/24  1049 08/21/23  0056 05/18/23  1346 01/17/23  0943 04/11/19  0930   FERRITIN  --   --   --  28  --   --   --  72  --   --    IRONSAT 10*  --   --  5*  --   --   --  28  --  30   TSH  --  1.27 2.96  --   --   --   --   --  2.25  --    HGBA1C  --  5.2  --   --  5.5 5.4 5.9*  --   --   --       CARDIAC:   Recent Labs     06/23/25  1621 06/23/25  1410 06/23/25  1317 03/23/25  0636 03/22/25  0721 03/21/25  2016 03/21/25  1605 03/21/25  1055 03/14/25  0500 03/04/25  1309 03/04/25  1212 02/02/25  1916 02/02/25  1704 01/02/25  1420 01/02/25  1227   TROPHS 22* 23* 19*  --   --  21* 24* 24*  --  19* 19*   < > 15*   < > 36*   BNP  --   --  548* 310* 815*  --   --  811* 668*  --  535*  --  462*  --  485*    < > = values in this interval not displayed.     Recent Labs     06/24/25  0522 12/20/24  0204 01/24/24  1049 08/21/23  0056 01/17/23  0943 03/16/21  1015   CHOL 119 145 152 162 155 180   LDLF  --   --   --  81 75 106*   HDL 49.2 63.1 65.4 64.3 65.5 57.8   TRIG 56 70 66 85 72 82     MICRO:   Recent Labs     03/21/25  1425 01/17/23  0943   CRP  --  1.76*   PROCAL 0.20*  --      No results found for the  last 90 days.      Test Results:        Assessment/Plan:    1.  Atrial fibrillation with complete heart block: This patient has a history of a [employer] pacemaker insertion back in 2013 and was replaced in July 2023.  The estimated battery longevity on the current device is between 5 and 6 years.  However, the patient continues to have intermittent pauses on telemetry which have been unexplained, because the device testing has been normal.  We have reprogrammed the pacemaker twice during this hospitalization, and I have asked [employer] to come back in this morning to reassess.  If this continues, we will have to consider insertion of a new ventricular pacing lead which I can do on Tuesday afternoon.  The patient should remain hospitalized while we continue to adjust her pacemaker and monitor its function.    James C Ramicone,            [1] allopurinol, 100 mg, oral, Daily  aspirin, 81 mg, oral, Daily  cefuroxime, 250 mg, oral, BID  clopidogrel, 75 mg, oral, Daily  colchicine, 0.6 mg, oral, Daily  dapagliflozin propanediol, 10 mg, oral, q24h  DULoxetine, 30 mg, oral, Daily  ferrous sulfate, 65 mg of elemental iron, oral, Daily with breakfast  heparin (porcine), 5,000 Units, subcutaneous, q12h  lactobacillus acidophilus, 1 tablet, oral, BID  metoprolol tartrate, 12.5 mg, oral, BID  polyethylene glycol, 17 g, oral, BID  sennosides-docusate sodium, 2 tablet, oral, BID  torsemide, 40 mg, oral, Daily

## 2025-06-28 NOTE — CARE PLAN
Problem: Heart Failure  Goal: Improved gas exchange this shift  Outcome: Progressing  Goal: Improved urinary output this shift  Outcome: Progressing  Goal: Reduction in peripheral edema within 24 hours  Outcome: Progressing  Goal: Report improvement of dyspnea/breathlessness this shift  Outcome: Progressing  Goal: Weight from fluid excess reduced over 2-3 days, then stabilize  Outcome: Progressing  Goal: Increase self care and/or family involvement in 24 hours  Outcome: Progressing     Problem: Pain - Adult  Goal: Verbalizes/displays adequate comfort level or baseline comfort level  Outcome: Progressing     Problem: Safety - Adult  Goal: Free from fall injury  Outcome: Progressing     Problem: Discharge Planning  Goal: Discharge to home or other facility with appropriate resources  Outcome: Progressing     Problem: Chronic Conditions and Co-morbidities  Goal: Patient's chronic conditions and co-morbidity symptoms are monitored and maintained or improved  Outcome: Progressing     Problem: Nutrition  Goal: Nutrient intake appropriate for maintaining nutritional needs  Outcome: Progressing     Problem: Skin  Goal: Decreased wound size/increased tissue granulation at next dressing change  Outcome: Progressing  Flowsheets (Taken 6/28/2025 0233)  Decreased wound size/increased tissue granulation at next dressing change: Protective dressings over bony prominences  Goal: Participates in plan/prevention/treatment measures  Outcome: Progressing  Flowsheets (Taken 6/28/2025 0233)  Participates in plan/prevention/treatment measures:   Elevate heels   Increase activity/out of bed for meals  Goal: Prevent/manage excess moisture  Outcome: Progressing  Flowsheets (Taken 6/28/2025 0233)  Prevent/manage excess moisture: Cleanse incontinence/protect with barrier cream  Goal: Prevent/minimize sheer/friction injuries  Outcome: Progressing  Flowsheets (Taken 6/28/2025 0233)  Prevent/minimize sheer/friction injuries:   HOB 30 degrees or  less   Increase activity/out of bed for meals  Goal: Promote/optimize nutrition  Outcome: Progressing  Flowsheets (Taken 6/28/2025 0233)  Promote/optimize nutrition:   Consume > 50% meals/supplements   Monitor/record intake including meals   Offer water/supplements/favorite foods  Goal: Promote skin healing  Outcome: Progressing  Flowsheets (Taken 6/28/2025 0233)  Promote skin healing: Assess skin/pad under line(s)/device(s)

## 2025-06-29 VITALS
DIASTOLIC BLOOD PRESSURE: 49 MMHG | SYSTOLIC BLOOD PRESSURE: 105 MMHG | TEMPERATURE: 97.7 F | HEIGHT: 64 IN | BODY MASS INDEX: 18.44 KG/M2 | HEART RATE: 55 BPM | OXYGEN SATURATION: 99 % | WEIGHT: 108.03 LBS | RESPIRATION RATE: 18 BRPM

## 2025-06-29 LAB
ALBUMIN SERPL BCP-MCNC: 3.4 G/DL (ref 3.4–5)
ALP SERPL-CCNC: 100 U/L (ref 33–136)
ALT SERPL W P-5'-P-CCNC: 22 U/L (ref 7–45)
ANION GAP SERPL CALC-SCNC: 10 MMOL/L (ref 10–20)
AST SERPL W P-5'-P-CCNC: 32 U/L (ref 9–39)
BASOPHILS # BLD AUTO: 0.03 X10*3/UL (ref 0–0.1)
BASOPHILS NFR BLD AUTO: 0.7 %
BILIRUB SERPL-MCNC: 0.4 MG/DL (ref 0–1.2)
BUN SERPL-MCNC: 31 MG/DL (ref 6–23)
CALCIUM SERPL-MCNC: 9.4 MG/DL (ref 8.6–10.3)
CHLORIDE SERPL-SCNC: 98 MMOL/L (ref 98–107)
CO2 SERPL-SCNC: 32 MMOL/L (ref 21–32)
CREAT SERPL-MCNC: 0.94 MG/DL (ref 0.5–1.05)
EGFRCR SERPLBLD CKD-EPI 2021: 57 ML/MIN/1.73M*2
EOSINOPHIL # BLD AUTO: 0.28 X10*3/UL (ref 0–0.4)
EOSINOPHIL NFR BLD AUTO: 6.9 %
ERYTHROCYTE [DISTWIDTH] IN BLOOD BY AUTOMATED COUNT: 15.1 % (ref 11.5–14.5)
GLUCOSE SERPL-MCNC: 88 MG/DL (ref 74–99)
HCT VFR BLD AUTO: 34.2 % (ref 36–46)
HGB BLD-MCNC: 10.5 G/DL (ref 12–16)
IMM GRANULOCYTES # BLD AUTO: 0.01 X10*3/UL (ref 0–0.5)
IMM GRANULOCYTES NFR BLD AUTO: 0.2 % (ref 0–0.9)
LYMPHOCYTES # BLD AUTO: 1.44 X10*3/UL (ref 0.8–3)
LYMPHOCYTES NFR BLD AUTO: 35.7 %
MCH RBC QN AUTO: 29.6 PG (ref 26–34)
MCHC RBC AUTO-ENTMCNC: 30.7 G/DL (ref 32–36)
MCV RBC AUTO: 96 FL (ref 80–100)
MONOCYTES # BLD AUTO: 0.57 X10*3/UL (ref 0.05–0.8)
MONOCYTES NFR BLD AUTO: 14.1 %
NEUTROPHILS # BLD AUTO: 1.7 X10*3/UL (ref 1.6–5.5)
NEUTROPHILS NFR BLD AUTO: 42.4 %
NRBC BLD-RTO: 0 /100 WBCS (ref 0–0)
PLATELET # BLD AUTO: 217 X10*3/UL (ref 150–450)
POTASSIUM SERPL-SCNC: 3.9 MMOL/L (ref 3.5–5.3)
PROT SERPL-MCNC: 5.9 G/DL (ref 6.4–8.2)
RBC # BLD AUTO: 3.55 X10*6/UL (ref 4–5.2)
SODIUM SERPL-SCNC: 136 MMOL/L (ref 136–145)
WBC # BLD AUTO: 4 X10*3/UL (ref 4.4–11.3)

## 2025-06-29 PROCEDURE — 36415 COLL VENOUS BLD VENIPUNCTURE: CPT | Performed by: INTERNAL MEDICINE

## 2025-06-29 PROCEDURE — 2500000001 HC RX 250 WO HCPCS SELF ADMINISTERED DRUGS (ALT 637 FOR MEDICARE OP): Performed by: NURSE PRACTITIONER

## 2025-06-29 PROCEDURE — 2500000001 HC RX 250 WO HCPCS SELF ADMINISTERED DRUGS (ALT 637 FOR MEDICARE OP): Performed by: INTERNAL MEDICINE

## 2025-06-29 PROCEDURE — 85025 COMPLETE CBC W/AUTO DIFF WBC: CPT | Performed by: INTERNAL MEDICINE

## 2025-06-29 PROCEDURE — 1100000001 HC PRIVATE ROOM DAILY

## 2025-06-29 PROCEDURE — 80053 COMPREHEN METABOLIC PANEL: CPT | Performed by: INTERNAL MEDICINE

## 2025-06-29 PROCEDURE — 2500000004 HC RX 250 GENERAL PHARMACY W/ HCPCS (ALT 636 FOR OP/ED): Performed by: NURSE PRACTITIONER

## 2025-06-29 PROCEDURE — 99233 SBSQ HOSP IP/OBS HIGH 50: CPT | Performed by: INTERNAL MEDICINE

## 2025-06-29 RX ADMIN — CLOPIDOGREL BISULFATE 75 MG: 75 TABLET, FILM COATED ORAL at 09:18

## 2025-06-29 RX ADMIN — POLYETHYLENE GLYCOL 3350 17 G: 17 POWDER, FOR SOLUTION ORAL at 20:26

## 2025-06-29 RX ADMIN — COLCHICINE 0.6 MG: 0.6 TABLET, FILM COATED ORAL at 09:18

## 2025-06-29 RX ADMIN — DAPAGLIFLOZIN 10 MG: 10 TABLET, FILM COATED ORAL at 12:47

## 2025-06-29 RX ADMIN — METOPROLOL TARTRATE 12.5 MG: 25 TABLET, FILM COATED ORAL at 20:28

## 2025-06-29 RX ADMIN — HEPARIN SODIUM 5000 UNITS: 5000 INJECTION, SOLUTION INTRAVENOUS; SUBCUTANEOUS at 02:57

## 2025-06-29 RX ADMIN — FERROUS SULFATE TAB 325 MG (65 MG ELEMENTAL FE) 1 TABLET: 325 (65 FE) TAB at 09:19

## 2025-06-29 RX ADMIN — HEPARIN SODIUM 5000 UNITS: 5000 INJECTION, SOLUTION INTRAVENOUS; SUBCUTANEOUS at 16:05

## 2025-06-29 RX ADMIN — TORSEMIDE 40 MG: 20 TABLET ORAL at 09:18

## 2025-06-29 RX ADMIN — Medication 1 TABLET: at 09:18

## 2025-06-29 RX ADMIN — ALLOPURINOL 100 MG: 100 TABLET ORAL at 09:18

## 2025-06-29 RX ADMIN — SENNOSIDES AND DOCUSATE SODIUM 2 TABLET: 50; 8.6 TABLET ORAL at 20:26

## 2025-06-29 RX ADMIN — DULOXETINE 30 MG: 30 CAPSULE, DELAYED RELEASE ORAL at 09:18

## 2025-06-29 RX ADMIN — SENNOSIDES AND DOCUSATE SODIUM 2 TABLET: 50; 8.6 TABLET ORAL at 09:18

## 2025-06-29 RX ADMIN — ASPIRIN 81 MG CHEWABLE TABLET 81 MG: 81 TABLET CHEWABLE at 09:18

## 2025-06-29 RX ADMIN — METOPROLOL TARTRATE 12.5 MG: 25 TABLET, FILM COATED ORAL at 09:19

## 2025-06-29 RX ADMIN — POLYETHYLENE GLYCOL 3350 17 G: 17 POWDER, FOR SOLUTION ORAL at 09:19

## 2025-06-29 RX ADMIN — CEFUROXIME AXETIL 250 MG: 250 TABLET, FILM COATED ORAL at 09:19

## 2025-06-29 RX ADMIN — Medication 1 TABLET: at 20:26

## 2025-06-29 RX ADMIN — CEFUROXIME AXETIL 250 MG: 250 TABLET, FILM COATED ORAL at 20:26

## 2025-06-29 ASSESSMENT — COGNITIVE AND FUNCTIONAL STATUS - GENERAL
DAILY ACTIVITIY SCORE: 19
STANDING UP FROM CHAIR USING ARMS: A LITTLE
HELP NEEDED FOR BATHING: A LITTLE
CLIMB 3 TO 5 STEPS WITH RAILING: A LITTLE
STANDING UP FROM CHAIR USING ARMS: A LITTLE
MOBILITY SCORE: 20
PERSONAL GROOMING: A LITTLE
DRESSING REGULAR LOWER BODY CLOTHING: A LITTLE
DRESSING REGULAR LOWER BODY CLOTHING: A LITTLE
DAILY ACTIVITIY SCORE: 19
PERSONAL GROOMING: A LITTLE
MOVING TO AND FROM BED TO CHAIR: A LITTLE
CLIMB 3 TO 5 STEPS WITH RAILING: A LOT
WALKING IN HOSPITAL ROOM: A LOT
TOILETING: A LITTLE
HELP NEEDED FOR BATHING: A LITTLE
MOVING TO AND FROM BED TO CHAIR: A LITTLE
TURNING FROM BACK TO SIDE WHILE IN FLAT BAD: A LITTLE
TOILETING: A LITTLE
DRESSING REGULAR UPPER BODY CLOTHING: A LITTLE
MOVING FROM LYING ON BACK TO SITTING ON SIDE OF FLAT BED WITH BEDRAILS: A LITTLE
MOBILITY SCORE: 16
WALKING IN HOSPITAL ROOM: A LITTLE
DRESSING REGULAR UPPER BODY CLOTHING: A LITTLE

## 2025-06-29 ASSESSMENT — PAIN SCALES - GENERAL
PAINLEVEL_OUTOF10: 0 - NO PAIN
PAINLEVEL_OUTOF10: 0 - NO PAIN

## 2025-06-29 ASSESSMENT — PAIN - FUNCTIONAL ASSESSMENT
PAIN_FUNCTIONAL_ASSESSMENT: 0-10
PAIN_FUNCTIONAL_ASSESSMENT: 0-10

## 2025-06-29 NOTE — PROGRESS NOTES
Subjective Data:  No specific complaints    Overnight Events:    Non reported     Objective Data:  Last Recorded Vitals:  Vitals:    06/29/25 0348 06/29/25 0600 06/29/25 0828 06/29/25 1147   BP: 107/51  121/58 124/57   BP Location: Right arm  Right arm    Patient Position: Lying  Lying    Pulse: 62  61    Resp: 16  16    Temp: 36.2 °C (97.2 °F)  36.4 °C (97.5 °F) 36.2 °C (97.2 °F)   TempSrc: Temporal  Temporal    SpO2: 96%  99%    Weight:  49 kg (108 lb 0.4 oz)     Height:           Last Labs:  CBC - 6/29/2025:  5:44 AM  4.0 10.5 217    34.2      CMP - 6/29/2025:  5:44 AM  9.4 5.9 32 --- 0.4   2.6 3.4 22 100      PTT - 6/24/2025:  5:22 AM  1.2   12.8 36     TROPHS   Date/Time Value Ref Range Status   06/23/2025 04:21 PM 22 0 - 13 ng/L Final   06/23/2025 02:10 PM 23 0 - 13 ng/L Final   06/23/2025 01:17 PM 19 0 - 13 ng/L Final     BNP   Date/Time Value Ref Range Status   06/23/2025 01:17  0 - 99 pg/mL Final   03/23/2025 06:36  0 - 99 pg/mL Final     HGBA1C   Date/Time Value Ref Range Status   03/14/2025 05:00 AM 5.2 See comment % Final   12/20/2024 02:04 AM 5.5 See comment % Final     LDLCALC   Date/Time Value Ref Range Status   06/24/2025 05:22 AM 59 <=99 mg/dL Final     Comment:                                 Near   Borderline      AGE      Desirable  Optimal    High     High     Very High     0-19 Y     0 - 109     ---    110-129   >/= 130     ----    20-24 Y     0 - 119     ---    120-159   >/= 160     ----      >24 Y     0 -  99   100-129  130-159   160-189     >/=190     12/20/2024 02:04 AM 68 <=99 mg/dL Final     Comment:                                 Near   Borderline      AGE      Desirable  Optimal    High     High     Very High     0-19 Y     0 - 109     ---    110-129   >/= 130     ----    20-24 Y     0 - 119     ---    120-159   >/= 160     ----      >24 Y     0 -  99   100-129  130-159   160-189     >/=190     01/24/2024 10:49 AM 73 <=99 mg/dL Final     Comment:                                  Near   Borderline      AGE      Desirable  Optimal    High     High     Very High     0-19 Y     0 - 109     ---    110-129   >/= 130     ----    20-24 Y     0 - 119     ---    120-159   >/= 160     ----      >24 Y     0 -  99   100-129  130-159   160-189     >/=190       VLDL   Date/Time Value Ref Range Status   06/24/2025 05:22 AM 11 0 - 40 mg/dL Final   12/20/2024 02:04 AM 14 0 - 40 mg/dL Final   01/24/2024 10:49 AM 13 0 - 40 mg/dL Final      Last I/O:  I/O last 3 completed shifts:  In: 480 (9.8 mL/kg) [P.O.:480]  Out: 1300 (26.5 mL/kg) [Urine:1300 (0.7 mL/kg/hr)]  Weight: 49 kg     Past Cardiology Tests (Last 3 Years):  EKG:  ECG 12 lead 03/21/2025      Electrocardiogram, 12-lead PRN ACS symtpoms 03/08/2025      ECG 12 Lead 03/04/2025      ECG 12 lead 02/02/2025      ECG 12 lead 01/12/2025      ECG 12 lead 01/02/2025      ECG 12 lead 12/21/2024      ECG 12 lead 12/20/2024      ECG 12 lead 12/20/2024    Echo:  Transthoracic Echo Complete 06/24/2025      Transthoracic echo (TTE) complete 12/21/2024    Ejection Fractions:  EF   Date/Time Value Ref Range Status   06/24/2025 03:04 PM 53 %    12/21/2024 09:03 AM 63 %      Cath:  Cardiac Catheterization Procedure 12/20/2024    Stress Test:  No results found for this or any previous visit from the past 1095 days.    Cardiac Imaging:  No results found for this or any previous visit from the past 1095 days.      Inpatient Medications:  Scheduled Medications[1]  PRN Medications[2]  Continuous Medications[3]    Physical Exam:  GEN: Frail 89 yo wf sitting comfortably in the bedside chair  HEENT: Atraumatic, normocephalic  COR: Reg  LUNGS: Clear  EXT: Warm     Assessment/Plan   1.) Pacer malfunction noted on tele with intermittent no functioning and non capture  2.) Lightheadedness likely related to pacer malfunction  PLAN:  Pacer revision this coming week       Code Status:  Full Code    I spent 30 minutes in the professional and overall care of this  patient.        Magnus Hess MD       [1]   Scheduled medications   Medication Dose Route Frequency    allopurinol  100 mg oral Daily    aspirin  81 mg oral Daily    cefuroxime  250 mg oral BID    clopidogrel  75 mg oral Daily    colchicine  0.6 mg oral Daily    dapagliflozin propanediol  10 mg oral q24h    DULoxetine  30 mg oral Daily    ferrous sulfate  65 mg of elemental iron oral Daily with breakfast    heparin (porcine)  5,000 Units subcutaneous q12h    lactobacillus acidophilus  1 tablet oral BID    metoprolol tartrate  12.5 mg oral BID    polyethylene glycol  17 g oral BID    sennosides-docusate sodium  2 tablet oral BID    torsemide  40 mg oral Daily   [2]   PRN medications   Medication    acetaminophen    Or    acetaminophen    Or    acetaminophen    albuterol    lubricating eye drops    ondansetron    oxygen   [3]   Continuous Medications   Medication Dose Last Rate

## 2025-06-29 NOTE — PROGRESS NOTES
Yesenia Milner is a 89 y.o. female on day 6 of admission presenting with Acute on chronic diastolic congestive heart failure.      Subjective   06/29- Patient seen and fully examined at bedside, patient ill appearing, acutely complex, marked decline from baseline. Patient remains hospitalized for acute onset with complex medical and pharmacological management. Acute on chronic diastolic congestive heart failure, Acute bacterial sinusitis, Syncope, Advance care planning, Constipation. Patient seen by cardiology with cancellation of discharge due to complete heart block as evidenced by continued malfunction after multiple revisions. Seen by cardiology today with plan- 1.) Pacer malfunction noted on tele with intermittent no functioning and non capture  2.) Lightheadedness likely related to pacer malfunction  PLAN:  Pacer revision this coming week  Appreciate input and agree with plan. Will continue antibiotics for sinusitis.  Continue to monitor overnight and repeat labs in the morning. Slow but progressive improvements noted.   High Complexity with updates to multiple problems, adjustments to treatment plan, and need for ongoing inpatient care.   Long discussion on goals of care with patient and family, will continue current and await diagnostic findings.  Patient reports: no new complaints, decline from baseline, weakness                 Objective     Last Recorded Vitals  /57 (BP Location: Right arm, Patient Position: Lying)   Pulse 58   Temp 36.2 °C (97.2 °F) (Temporal)   Resp 16   Wt 49 kg (108 lb 0.4 oz)   SpO2 98%   Intake/Output last 3 Shifts:    Intake/Output Summary (Last 24 hours) at 6/29/2025 1555  Last data filed at 6/29/2025 0828  Gross per 24 hour   Intake 240 ml   Output 600 ml   Net -360 ml       Admission Weight  Weight: 69.9 kg (154 lb) (06/23/25 1227)    Daily Weight  06/29/25 : 49 kg (108 lb 0.4 oz)    Image Results  XR chest 1 view  Narrative: Interpreted By:  Byrce Redding,    STUDY:  XR CHEST 1 VIEW;  6/25/2025 1:47 pm      INDICATION:  Signs/Symptoms:shortness of breath, PPM Lead eval.      COMPARISON:  03/24/2025      ACCESSION NUMBER(S):  [id number]      ORDERING CLINICIAN:  KAYLIN NAVA      FINDINGS:  CARDIOMEDIASTINAL SILHOUETTE AND VASCULATURE:      Cardiac size:  Within normal limits considering a right pericardial  fat pad and similar to the prior exam. Aortic shadow:  Within normal  limits.      Mediastinal contours: Within normal limits.      Pulmonary vasculature:  The central vasculature is unremarkable      LUNGS:  There is blunting of the left costophrenic angle is probably from a  residual effusion with atelectasis, improved from the previous exam.  The lungs otherwise relatively clear.      ABDOMEN AND OTHER FINDINGS:  Cardiac pacemaker device overlies the left chest similar to the  previous exam.      BONES:  No acute osseous changes.      Impression: 1.  Improved left basilar atelectasis and effusion.      Signed by: Bryce Redding 6/25/2025 5:03 PM  Dictation workstation:   [id number]        Fei Mensah MD   Physician  Internal Medicine     H&P      Signed     Date of Service: 6/23/2025  2:22 PM     Signed         History Of Present Illness  Yesenia Milner is a 89 y.o. 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  Patient has had multiple admissions for congestive heart failure exacerbation who presented today after a syncopal episode.  Patient notes she was gathering her clothing to get ready for the day when she became lightheaded and passed out.  She denies any injury from the fall.  She notes over the past 2 weeks  she has been experiencing a stuffy nose and a cough upon wakening in the morning.  She otherwise denies any fevers, chills, chest pain, change in her chronic shortness of breath with exertion, abdominal pain, nausea, vomiting, diarrhea, or dysuria.  She denies any change in her chronic intermittent bilateral lower extremity edema.  She denies any weight gain.  She denies any recent medication changes.  She reports compliance with her medications.  She is on her baseline oxygen at 2 L.  She notes her cardiologist is Dr. Ramicone.  In regards to her colon cancer, family notes that patient was deemed a risk for surgery via cardiology and surgeon, Dr. Vazquez, recommended patient speak with Dr. Ramicone in regards to her risk for surgery before he would schedule her for an operation.  They noted that her appointment is not until the fall and they are hopeful to speak with him this admission about her risk for surgery.  Patient notes she lives alone and uses a walker.  CODE STATUS discussed and patient became tearful during conversation.  She would like to remain a full code at this time and referred to family if an emergency would arise that she was incapacitated to answer for herself.     In the ED lab work, EKG, head CT, C-spine CT and CT chest/abdomen/pelvis were performed. Labs revealed troponin 19 (appears baseline with previous result 21 in March 2025), H&H 10.1 and 31.3 (within patient's recent baseline),  (310 in March 2025).  EKG, per ER physician impression revealed Dual paced rhythm. Motion artifact present. Irregular rate. Normal HI, wide QRS and Qtc intervals. No ST segment elevations, depressions, or T wave inversions. Compared to March 2025 imaging positive for fluid/mucosal thickening of the left sphenoid sinus.  Bilateral infiltrates and right greater than left pleural effusions. Moderate fecal residue.  Heart rate was 120 on arrival, vitals were otherwise stable.    Echo 12/21/2024:      CONCLUSIONS:   1. Left ventricular ejection fraction is normal, by visual estimate at 60-65%.   2. Abnormal left venticular wall motion.   3. Left ventricular diastolic filling is indeterminate.   4. There is a moderate apical and anteroapical myocardial infarction.   5. There is normal right ventricular global systolic function.   6. There is moderate mitral annular calcification.   7. Moderate mitral valve regurgitation.   8. Moderate to severe tricuspid regurgitation visualized.   9. Moderately elevated right ventricular systolic pressure.  10. Aortic valve sclerosis.     Carotid duplex 2019:     Right Carotid: Findings are consistent with less than 50% stenosis of the right proximal ICA. Laminar flow seen by color Doppler. Right external carotid artery appears patent with no evidence of stenosis. The right vertebral artery is patent with antegrade flow. No evidence of hemodynamically significant stenosis in the right subclavian.  Left Carotid: Findings are consistent with less than 50% stenosis of the left proximal ICA. Laminar flow seen by color Doppler. Left external carotid artery appears patent with no evidence of stenosis. The left vertebral artery is patent with antegrade flow.  No evidence of hemodynamically significant stenosis in the left subclavian.        Past Medical History  [Medical History]    [Medical History]  Past Medical History       Diagnosis Date    Gross hematuria 10/07/2020    Impacted cerumen 02/22/2024    Other conditions influencing health status       Normal stress echocardiogram    Personal history of other medical treatment       History of echocardiogram    Personal history of other medical treatment       H/O Doppler ultrasound    Systolic CHF (Multi) 05/18/2023        Surgical History  [Surgical History]    [Surgical History]  Past Surgical History        Procedure Laterality Date    APPENDECTOMY   11/22/2017     Appendectomy    CARDIAC CATHETERIZATION N/A 12/20/2024      Procedure: Left Heart Cath, With LV;  Surgeon: Tahir Ruelas MD;  Location: [facility] Cardiac [facility];  Service: Cardiovascular;  Laterality: N/A;    CARDIAC PACEMAKER PLACEMENT   03/11/2021     Pacemaker Placement    HYSTERECTOMY   11/22/2017     Hysterectomy    IR ANGIOGRAM INFERIOR EPIGASTRIC PELVIC   12/14/2020     IR ANGIOGRAM INFERIOR EPIGASTRIC PELVIC 12/14/2020 [facility]    IR ANGIOGRAM INFERIOR EPIGASTRIC PELVIC   12/14/2020     IR ANGIOGRAM INFERIOR EPIGASTRIC PELVIC 12/14/2020 [facility]    IR ANGIOGRAM RENAL BILATERAL Bilateral 12/14/2020     IR ANGIOGRAM RENAL BILATERAL 12/14/2020 [facility]    OTHER SURGICAL HISTORY   11/22/2017     Stab Phlebectomy Of Varicose Veins    OTHER SURGICAL HISTORY   11/22/2017     Venous Ligation With Stripping    TONSILLECTOMY   11/22/2017     Tonsillectomy        Social History  She reports that she quit smoking about 52 years ago. Her smoking use included cigarettes. She has been exposed to tobacco smoke. She has never used smokeless tobacco. She reports that she does not drink alcohol and does not use drugs.     Family History  [Family History]    [Family History]         Problem Relation Name Age of Onset    No Known Problems Mother            Allergies  Iodine, Shrimp, Hydrocodone, and Adhesive tape-silicones     10 point review systems performed and is negative besides what is stated in HPI     Physical Exam  HENT:      Head: Normocephalic and atraumatic.      Nose: Nose normal.      Mouth/Throat:      Mouth: Mucous membranes are dry.   Eyes:      Conjunctiva/sclera: Conjunctivae normal.   Cardiovascular:      Rate and Rhythm: Normal rate. Rhythm irregular.      Heart sounds: Murmur heard.   Pulmonary:      Effort: Pulmonary effort is normal.      Breath sounds: Rales present.   Abdominal:      General: Bowel sounds are normal.      Tenderness: There is abdominal tenderness.   Musculoskeletal:         General: Normal range of motion.      Cervical back:  "Neck supple.   Skin:     General: Skin is warm and dry.   Neurological:      Mental Status: She is alert. Mental status is at baseline.   Psychiatric:         Mood and Affect: Mood normal.            Last Recorded Vitals  Blood pressure 114/58, pulse 70, temperature 36.3 °C (97.3 °F), temperature source Temporal, resp. rate 19, height 1.626 m (5' 4\"), weight 69.9 kg (154 lb), SpO2 95%.     Relevant Results     [Medications Ordered Prior to Encounter]     [Medications Ordered Prior to Encounter]  No current facility-administered medications on file prior to encounter.             Current Outpatient Medications on File Prior to Encounter   Medication Sig Dispense Refill    allopurinol (Zyloprim) 100 mg tablet Take 1 tablet (100 mg) by mouth once daily. 90 tablet 0    aspirin 81 mg chewable tablet Chew 1 tablet (81 mg) once daily.        calcium carbonate-vitamin D3 (Calcium 600 + D,3,) 600 mg-5 mcg (200 unit) tablet Take 1 tablet by mouth once daily.        clopidogrel (Plavix) 75 mg tablet Take 1 tablet by mouth once daily 90 tablet 0    colchicine 0.6 mg tablet Take 1 tablet (0.6 mg) by mouth once daily. 90 tablet 3    DULoxetine (Cymbalta) 30 mg DR capsule Take 1 capsule (30 mg) by mouth once daily. Do not crush or chew. 90 capsule 1    ferrous sulfate 325 (65 Fe) mg EC tablet Take 1 tablet by mouth once daily. Do not crush, chew, or split.        fluticasone propion-salmeteroL (Advair) 115-21 mcg/actuation inhaler Inhale 2 puffs 2 times a day. Rinse mouth with water after use to reduce aftertaste and incidence of candidiasis. Do not swallow. 12 g 11    metoprolol tartrate (Lopressor) 25 mg tablet Take 1 tablet (25 mg) by mouth once daily. 30 tablet 0    polyethylene glycol (Glycolax, Miralax) 17 gram/dose powder Mix 1 capful (17 g) of powder with 4 to 8 ounces of water or juice and drink 2 times a day. 1010 g 1    torsemide (Demadex) 20 mg tablet Take 3 tablets (60 mg) by mouth once daily. 90 tablet 11    " acetaminophen (Tylenol) 325 mg tablet Take 2 tablets (650 mg) by mouth every 4 hours if needed for mild pain (1 - 3). (Patient not taking: Reported on 6/23/2025)        albuterol 2.5 mg /3 mL (0.083 %) nebulizer solution Take 3 mL (2.5 mg) by nebulization every 4 hours if needed for wheezing. 75 mL 1    enoxaparin (Lovenox) 40 mg/0.4 mL syringe Inject 0.4 mL (40 mg) under the skin once every 24 hours. (Patient not taking: Reported on 6/23/2025)        guaiFENesin (Mucinex) 600 mg 12 hr tablet Take 1 tablet (600 mg) by mouth 2 times a day as needed for cough. Do not crush, chew, or split. (Patient not taking: Reported on 6/23/2025)        hydrocortisone 1 % lotion Apply topically 2 times a day. Apply to both legs , wash hands after applying lotion (Patient not taking: Reported on 6/23/2025) 60 mL 0    lubricating eye drops ophthalmic solution Administer 1 drop into both eyes once daily as needed for dry eyes.        nebulizers misc With tubing and mask.   Use as directed 1 each 0    [DISCONTINUED] ipratropium-albuteroL (Duo-Neb) 0.5-2.5 mg/3 mL nebulizer solution Take 3 mL by nebulization every 6 hours.                  Results for orders placed or performed during the hospital encounter of 06/23/25 (from the past 24 hours)   CBC and Auto Differential   Result Value Ref Range     WBC 6.0 4.4 - 11.3 x10*3/uL     nRBC 0.0 0.0 - 0.0 /100 WBCs     RBC 3.37 (L) 4.00 - 5.20 x10*6/uL     Hemoglobin 10.1 (L) 12.0 - 16.0 g/dL     Hematocrit 31.3 (L) 36.0 - 46.0 %     MCV 93 80 - 100 fL     MCH 30.0 26.0 - 34.0 pg     MCHC 32.3 32.0 - 36.0 g/dL     RDW 15.9 (H) 11.5 - 14.5 %     Platelets 206 150 - 450 x10*3/uL     Neutrophils % 69.9 40.0 - 80.0 %     Immature Granulocytes %, Automated 0.5 0.0 - 0.9 %     Lymphocytes % 16.6 13.0 - 44.0 %     Monocytes % 10.7 2.0 - 10.0 %     Eosinophils % 2.0 0.0 - 6.0 %     Basophils % 0.3 0.0 - 2.0 %     Neutrophils Absolute 4.18 1.60 - 5.50 x10*3/uL     Immature Granulocytes Absolute,  Automated 0.03 0.00 - 0.50 x10*3/uL     Lymphocytes Absolute 0.99 0.80 - 3.00 x10*3/uL     Monocytes Absolute 0.64 0.05 - 0.80 x10*3/uL     Eosinophils Absolute 0.12 0.00 - 0.40 x10*3/uL     Basophils Absolute 0.02 0.00 - 0.10 x10*3/uL   Comprehensive metabolic panel   Result Value Ref Range     Glucose 116 (H) 74 - 99 mg/dL     Sodium 138 136 - 145 mmol/L     Potassium 3.6 3.5 - 5.3 mmol/L     Chloride 99 98 - 107 mmol/L     Bicarbonate 27 21 - 32 mmol/L     Anion Gap 16 10 - 20 mmol/L     Urea Nitrogen 30 (H) 6 - 23 mg/dL     Creatinine 1.00 0.50 - 1.05 mg/dL     eGFR 53 (L) >60 mL/min/1.73m*2     Calcium 9.6 8.6 - 10.3 mg/dL     Albumin 3.8 3.4 - 5.0 g/dL     Alkaline Phosphatase 89 33 - 136 U/L     Total Protein 6.3 (L) 6.4 - 8.2 g/dL     AST 24 9 - 39 U/L     Bilirubin, Total 0.6 0.0 - 1.2 mg/dL     ALT 17 7 - 45 U/L   Lipase   Result Value Ref Range     Lipase 18 9 - 82 U/L   Magnesium   Result Value Ref Range     Magnesium 2.24 1.60 - 2.40 mg/dL   aPTT   Result Value Ref Range     aPTT 26 26 - 36 seconds   Protime-INR   Result Value Ref Range     Protime 12.4 9.8 - 12.4 seconds     INR 1.1 0.9 - 1.1   B-Type Natriuretic Peptide   Result Value Ref Range      (H) 0 - 99 pg/mL   Troponin I, High Sensitivity, Initial   Result Value Ref Range     Troponin I, High Sensitivity 19 (H) 0 - 13 ng/L   Troponin, High Sensitivity, 1 Hour   Result Value Ref Range     Troponin I, High Sensitivity 23 (H) 0 - 13 ng/L   Sars-CoV-2, Influenza A/B and RSV PCR   Result Value Ref Range     Coronavirus 2019, PCR Not Detected Not Detected     Flu A Result Not Detected Not Detected     Flu B Result Not Detected Not Detected     RSV PCR Not Detected Not Detected   Troponin I, High Sensitivity   Result Value Ref Range     Troponin I, High Sensitivity 22 (H) 0 - 13 ng/L   Urinalysis with Reflex Culture and Microscopic   Result Value Ref Range     Color, Urine Light-Yellow Light-Yellow, Yellow, Dark-Yellow     Appearance, Urine  Clear Clear     Specific Gravity, Urine 1.010 1.005 - 1.035     pH, Urine 7.0 5.0, 5.5, 6.0, 6.5, 7.0, 7.5, 8.0     Protein, Urine NEGATIVE NEGATIVE, 10 (TRACE), 20 (TRACE) mg/dL     Glucose, Urine Normal Normal mg/dL     Blood, Urine NEGATIVE NEGATIVE mg/dL     Ketones, Urine NEGATIVE NEGATIVE mg/dL     Bilirubin, Urine NEGATIVE NEGATIVE mg/dL     Urobilinogen, Urine Normal Normal mg/dL     Nitrite, Urine NEGATIVE NEGATIVE     Leukocyte Esterase, Urine NEGATIVE NEGATIVE   CBC   Result Value Ref Range     WBC 4.7 4.4 - 11.3 x10*3/uL     nRBC 0.0 0.0 - 0.0 /100 WBCs     RBC 3.41 (L) 4.00 - 5.20 x10*6/uL     Hemoglobin 10.1 (L) 12.0 - 16.0 g/dL     Hematocrit 32.0 (L) 36.0 - 46.0 %     MCV 94 80 - 100 fL     MCH 29.6 26.0 - 34.0 pg     MCHC 31.6 (L) 32.0 - 36.0 g/dL     RDW 15.8 (H) 11.5 - 14.5 %     Platelets 215 150 - 450 x10*3/uL   Coagulation Screen   Result Value Ref Range     Protime 12.8 (H) 9.8 - 12.4 seconds     INR 1.2 (H) 0.9 - 1.1     aPTT 36 26 - 36 seconds   Basic Metabolic Panel   Result Value Ref Range     Glucose 98 74 - 99 mg/dL     Sodium 140 136 - 145 mmol/L     Potassium 3.6 3.5 - 5.3 mmol/L     Chloride 100 98 - 107 mmol/L     Bicarbonate 30 21 - 32 mmol/L     Anion Gap 14 10 - 20 mmol/L     Urea Nitrogen 28 (H) 6 - 23 mg/dL     Creatinine 0.95 0.50 - 1.05 mg/dL     eGFR 57 (L) >60 mL/min/1.73m*2     Calcium 9.1 8.6 - 10.3 mg/dL   Lipid Panel   Result Value Ref Range     Cholesterol 119 0 - 199 mg/dL     HDL-Cholesterol 49.2 mg/dL     Cholesterol/HDL Ratio 2.4       LDL Calculated 59 <=99 mg/dL     VLDL 11 0 - 40 mg/dL     Triglycerides 56 0 - 149 mg/dL     Non HDL Cholesterol 70 0 - 149 mg/dL      *Note: Due to a large number of results and/or encounters for the requested time period, some results have not been displayed. A complete set of results can be found in Results Review.         CT chest abdomen pelvis wo IV contrast  Result Date: 6/23/2025  Interpreted By:  Mary Jane Foreman, STUDY: CT  CHEST ABDOMEN PELVIS WO CONTRAST;  6/23/2025 1:05 pm   INDICATION: Signs/Symptoms:abdominal pain prior cancer syncope r/o mass, nephro jesse traumatic injury.     COMPARISON: CT chest 03/21/2025, CT abdomen and pelvis 01/12/2025   ACCESSION NUMBER(S): [id number]   ORDERING CLINICIAN: MURTAZA HINOJOSA   TECHNIQUE: CT of the chest, abdomen, and pelvis was performed. Sagittal and coronal reconstructions were generated. No intravenous contrast given for the examination.   FINDINGS: CHEST:   Hilar, vessel, and solid organ evaluation limited without IV contrast.   CHEST WALL AND LOWER NECK: Metallic streak artifact from port in the left anterior chest wall limits assessment of adjacent structures. No gross axillary adenopathy as visualized. Slightly prominent heterogenous right thyroid lobe.   MEDIASTINUM AND HARITHA:  Limited hilar assessment on unenhanced exam. No significant mediastinal or hilar adenopathy within limits of unenhanced study. Mild gaseous distention of the proximal esophagus.   HEART AND VESSELS:  Lack of IV contrast precludes vascular luminal assessment. The heart is normal in size. No significant pericardial effusion. Pacer wires in the right side of the heart. Multifocal atherosclerotic calcifications.   LUNGS, PLEURA, LARGE AIRWAYS:  Mild motion artifact in the lower chest. Pulmonary heterogeneity including scattered bilateral ground-glass infiltrates and reticular densities most prominent in the lung bases. Moderate right and small to moderate left pleural effusions and presumed compressive atelectasis of the adjacent lung bases. Slight fluid/thickening along the superior right main fissure. No pneumothorax. The central airways are grossly patent.   BONES:  Kyphosis and degenerative endplate spurring in the thoracic spine.     ABDOMEN/PELVIS:   Solid organ and vessel evaluation limited without IV contrast. Artifact related to overlying upper extremities.   ABDOMINAL ORGANS:   LIVER: No focal lesion  within limits of unenhanced exam   GALL BLADDER AND BILIARY TREE: No calcified gallstone   SPLEEN: No focal lesion within limits of unenhanced exam   PANCREAS: No focal lesion within limits of unenhanced exam   ADRENALS: No adrenal mass   KIDNEYS AND URETERS: No renal mass or hydronephrosis within limits of unenhanced exam   BOWEL: No abnormally dilated large or small bowel loops. Moderate fecal residue. Distal colon diverticulosis. Within limits of unenhanced exam probable persistent circumferential wall thickening in the distal transverse colon for example image 110/201, as noted on the prior exam.   PERITONEUM, RETROPERITONEUM, NODES: No significant free fluid. No free air. No significant retroperitoneal lymphadenopathy within limits of unenhanced exam. Slight increased density in the lower abdominal mesentery.   VESSELS:  Lack of IV contrast precludes vascular luminal assessment. Multifocal atherosclerotic calcifications. No abdominal aortic aneurysm.   PELVIS: Urinary bladder is moderately distended and grossly normal in contour. 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   ABDOMINAL WALL: No sizable abdominal wall hernia.   BONES: Osteopenia. Multifocal degenerative changes. Mild scoliosis of the lumbar spine.        CHEST:   Bilateral infiltrates and right-greater-than-left pleural effusions. Differential includes CHF and pneumonia. Clinical correlation and follow-up to ensure resolution after appropriate treatment recommended.   ABDOMEN AND PELVIS:   Apparent persistent distal transverse colon wall thickening consistent with reportedly known malignancy.   No gross evidence of solid organ injury or free fluid within limits of unenhanced exam.   Fecal retention. Distal colon diverticulosis.   Other findings as described above.   MACRO: None.   Signed by: Mary Jane Foreman 6/23/2025 1:32 PM Dictation workstation:   [id number]     CT cervical spine wo IV contrast  Result Date: 6/23/2025  Interpreted By:  Kellen  Mary Jane, STUDY: CT CERVICAL SPINE WO IV CONTRAST;  6/23/2025 1:05 pm   INDICATION: Signs/Symptoms:fall blood thinners r/o frx.     COMPARISON: None.   ACCESSION NUMBER(S): [id number]   ORDERING CLINICIAN: MURTAZA HINOJOSA   TECHNIQUE: CT images were obtained through the cervical spine. Sagittal and coronal reconstructions were generated.   FINDINGS:     ALIGNMENT: Mild levocurvature. Straightening of the lower cervical lordosis. Slight retrolisthesis of C5.   VERTEBRAE/DISC SPACES: No compression deformity or acute displaced fracture. Multilevel degenerative changes including atlantoaxial joint space narrowing spurring, multilevel intervertebral disc space narrowing with endplate sclerosis and spurring, multilevel bilateral facet joint narrowing and spurring. At least partial fusion across the anterior C5-6 vertebra.   ADDITIONAL FINDINGS: No abnormal thickening of the prevertebral soft tissues.  Dependent fluid/thickening in the sphenoid sinus. Ill-defined biapical infiltrates. Small right pleural effusion. Partially included pacer wires in the left upper chest.        No cervical vertebral compression deformity or acute displaced fracture. Multilevel productive/degenerative changes with straightening of the cervical lordosis and slight spondylolisthesis at C5.   Paraspinal/soft tissue findings as above.   MACRO: None.   Signed by: Mary Jane Foreman 6/23/2025 1:16 PM Dictation workstation:   [id number]     CT head wo IV contrast  Result Date: 6/23/2025  Interpreted By:  Mary Jane Foreman, STUDY: CT HEAD WO IV CONTRAST;  6/23/2025 1:05 pm   INDICATION: Signs/Symptoms:headache fall blood thinners r/o sah ich mass.     COMPARISON: 08/23/2023   ACCESSION NUMBER(S): [id number]   ORDERING CLINICIAN: MURTAZA HINOJOSA   TECHNIQUE: Unenhanced CT images of the head were obtained.   FINDINGS: The ventricles, cisterns and sulci are enlarged, consistent with diffuse volume loss. There are areas of nonspecific white matter hypodensity,  which are probably age related or microvascular in nature. These findings are similar to the prior exam. There is no acute intracranial hemorrhage, mass effect or midline shift. No extraaxial fluid collection.   No acute displaced calvarial fracture. No focal calvarial lesion.   Left sphenoid sinus dependent fluid/thickening. Remaining visualized paranasal sinuses are clear. Dense presumed surgical material along the anterior margin of each globe again seen.        No acute intracranial hemorrhage or mass-effect.   Mild fluid or mucosal thickening in the left sphenoid sinus..   MACRO: None.   Signed by: Mary Jane Foreman 6/23/2025 1:08 PM Dictation workstation:   [id number]        Assessment & Plan  Acute on chronic diastolic congestive heart failure     Acute bacterial sinusitis     Syncope     Advance care planning     Constipation           Admit to telemetry  Inpatient  Appreciate cardiology recommendations  I am not sure if patient's CHF represents more of a chronic condition so we will give 40 mg IV Lasix x 1 and resume torsemide tomorrow and look to cardiology for further recommendations  Please see echo from 2024 and carotid duplex from 2029 above  Start Rocephin 1 g IV daily  Orthostatic vital signs  Repeat EKG and troponin pacer check  Continue home aspirin and Plavix  Daily weight  Intake and output  AM CBC, BMP, coags, lipids  Check flu and COVID  Urinalysis pending  PT/OT     Chronic conditions: HFpEF, MI, heart block/SVT/SSS s/p Medtronic dual-chamber pacemaker 2012 with generator change on pacemaker 7/13/2023, HTN, ALS, COPD (on 1 to 2 L of oxygen at baseline), 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 Plan discussed with interdisciplinary team, consults placed, appreciate input. Will continue current and repeat labs in the AM.          Discharge planning discussed with patient and care team. Therapy evaluations ordered. Patient aware and agreeable to current plan, continue plan as above.      I spent a total of 75 minutes on the date of the service which included preparing to see the patient, face-to-face patient care, completing clinical documentation, obtaining and/or reviewing separately obtained history, performing a medically appropriate examination, counseling and educating the patient/family/caregiver, ordering medications, tests, or procedures, communicating with other HCPs (not separately reported), independently interpreting results (not separately reported), communicating results to the patient/family/caregiver, and care coordination (not separately reported).            Riya Urena                 Revision History         Physical Exam    General: in no acute distress  Eyes: PERRLA   HENT: Normo-cephalic, atraumatic.   CV: Irregular rate, irregular rhythm.   Resp: Breathing non-labored,  diminished to auscultation bilaterally  GI: BS x4   : monitor intake and output  MSK/Extremities: No gross bony deformities. PT/OT on the case  Skin: Warm. Appropriate color, continue offloading  Neuro:  Face symmetric.   Psych: returning to baseline, improved     10 point ROS negative unless otherwise noted in HPI    Patient fully evaluated 06/29  for    Problem List Items Addressed This Visit       Dyspnea on minimal exertion    Relevant Medications    cefuroxime (Ceftin) 250 mg tablet    metoprolol tartrate (Lopressor) 25 mg tablet    Other Relevant Orders    CBC    Comprehensive metabolic panel    Pleural effusion    * (Principal) Acute on chronic diastolic congestive  heart failure - Primary    Relevant Medications    clopidogrel (Plavix) tablet 75 mg    metoprolol tartrate (Lopressor) tablet 12.5 mg    metoprolol tartrate (Lopressor) 25 mg tablet    Syncope    Relevant Medications    acetaminophen (Tylenol) 325 mg tablet    dapagliflozin propanediol (Farxiga) 10 mg tablet    heparin sodium,porcine (heparin, porcine,) 5,000 unit/mL injection    lactobacillus acidophilus tablet tablet    oxygen (O2) gas therapy    sennosides-docusate sodium (Sindy-Colace) 8.6-50 mg tablet    Other Relevant Orders    Cardiac device check - Inpatient    Transthoracic Echo Complete (Completed)    CBC    Comprehensive metabolic panel     Brought to hospital - had concerns including Fall.  Patient seen resting in bed with head of bed elevated, no s/s or c/o acute difficulties at this time.  Vital signs for last 24 hours Temp:  [36.1 °C (97 °F)-36.9 °C (98.4 °F)] 36.2 °C (97.2 °F)  Heart Rate:  [58-62] 58  Resp:  [16] 16  BP: ()/(46-60) 124/57    I/O this shift:  In: 240 [P.O.:240]  Out: -   Patient still requiring frequent cardiac and SPO2 monitoring. Continue aggressive pulmonary hygiene and oral hygiene. Off loading as tolerated for skin integrity. Medications and labs reviewed-   Results for orders placed or performed during the hospital encounter of 06/23/25 (from the past 24 hours)   Comprehensive Metabolic Panel   Result Value Ref Range    Glucose 88 74 - 99 mg/dL    Sodium 136 136 - 145 mmol/L    Potassium 3.9 3.5 - 5.3 mmol/L    Chloride 98 98 - 107 mmol/L    Bicarbonate 32 21 - 32 mmol/L    Anion Gap 10 10 - 20 mmol/L    Urea Nitrogen 31 (H) 6 - 23 mg/dL    Creatinine 0.94 0.50 - 1.05 mg/dL    eGFR 57 (L) >60 mL/min/1.73m*2    Calcium 9.4 8.6 - 10.3 mg/dL    Albumin 3.4 3.4 - 5.0 g/dL    Alkaline Phosphatase 100 33 - 136 U/L    Total Protein 5.9 (L) 6.4 - 8.2 g/dL    AST 32 9 - 39 U/L    Bilirubin, Total 0.4 0.0 - 1.2 mg/dL    ALT 22 7 - 45 U/L   CBC and Auto Differential   Result Value  Ref Range    WBC 4.0 (L) 4.4 - 11.3 x10*3/uL    nRBC 0.0 0.0 - 0.0 /100 WBCs    RBC 3.55 (L) 4.00 - 5.20 x10*6/uL    Hemoglobin 10.5 (L) 12.0 - 16.0 g/dL    Hematocrit 34.2 (L) 36.0 - 46.0 %    MCV 96 80 - 100 fL    MCH 29.6 26.0 - 34.0 pg    MCHC 30.7 (L) 32.0 - 36.0 g/dL    RDW 15.1 (H) 11.5 - 14.5 %    Platelets 217 150 - 450 x10*3/uL    Neutrophils % 42.4 40.0 - 80.0 %    Immature Granulocytes %, Automated 0.2 0.0 - 0.9 %    Lymphocytes % 35.7 13.0 - 44.0 %    Monocytes % 14.1 2.0 - 10.0 %    Eosinophils % 6.9 0.0 - 6.0 %    Basophils % 0.7 0.0 - 2.0 %    Neutrophils Absolute 1.70 1.60 - 5.50 x10*3/uL    Immature Granulocytes Absolute, Automated 0.01 0.00 - 0.50 x10*3/uL    Lymphocytes Absolute 1.44 0.80 - 3.00 x10*3/uL    Monocytes Absolute 0.57 0.05 - 0.80 x10*3/uL    Eosinophils Absolute 0.28 0.00 - 0.40 x10*3/uL    Basophils Absolute 0.03 0.00 - 0.10 x10*3/uL      Patient recently received an antibiotic (last 12 hours)       Date/Time Action Medication Dose    06/29/25 0919 Given    cefuroxime (Ceftin) tablet 250 mg 250 mg           Plan discussed with interdisciplinary team, continue current and repeat labs in the AM.     Discharge planning discussed with patient and care team. Therapy evaluations ordered.   AMPAC-   15  Anticipate - [facility]    Patient aware and agreeable to current plan, continue plan as above.     I spent a total of 60 minutes on the date of the service which included preparing to see the patient, face-to-face patient care, completing clinical documentation, obtaining and/or reviewing separately obtained history, performing a medically appropriate examination, counseling and educating the patient/family/caregiver, ordering medications, tests, or procedures, communicating with other HCPs (not separately reported), independently interpreting results (not separately reported), communicating results to the patient/family/caregiver, and care coordination (not separately reported).   Relevant  Results                              Assessment & Plan  Acute on chronic diastolic congestive heart failure    Acute bacterial sinusitis    Syncope    Advance care planning    Saulo Urena

## 2025-06-29 NOTE — CARE PLAN
The patient's goals for the shift include      The clinical goals for the shift include Remain HDS    Over the shift, the patient's skin integrity will be maintained by repositioning and turning, patient will have decreased shortness of breath and will refrain from further complications

## 2025-06-29 NOTE — CARE PLAN
The patient's goals for the shift include  rest    The clinical goals for the shift include Remain HDS      Problem: Safety - Adult  Goal: Free from fall injury  Outcome: Progressing  Flowsheets (Taken 6/29/2025 0038)  Free from fall injury: Instruct family/caregiver on patient safety     Problem: Chronic Conditions and Co-morbidities  Goal: Patient's chronic conditions and co-morbidity symptoms are monitored and maintained or improved  Outcome: Progressing  Flowsheets (Taken 6/29/2025 0038)  Care Plan - Patient's Chronic Conditions and Co-Morbidity Symptoms are Monitored and Maintained or Improved:   Monitor and assess patient's chronic conditions and comorbid symptoms for stability, deterioration, or improvement   Collaborate with multidisciplinary team to address chronic and comorbid conditions and prevent exacerbation or deterioration   Update acute care plan with appropriate goals if chronic or comorbid symptoms are exacerbated and prevent overall improvement and discharge     Problem: Skin  Goal: Prevent/manage excess moisture  Outcome: Progressing  Flowsheets (Taken 6/29/2025 0038)  Prevent/manage excess moisture:   Cleanse incontinence/protect with barrier cream   Follow provider orders for dressing changes   Moisturize dry skin   Monitor for/manage infection if present  Goal: Prevent/minimize sheer/friction injuries  Outcome: Progressing  Flowsheets (Taken 6/29/2025 0038)  Prevent/minimize sheer/friction injuries:   Complete micro-shifts as needed if patient unable. Adjust patient position to relieve pressure points, not a full turn   Turn/reposition every 2 hours/use positioning/transfer devices   HOB 30 degrees or less   Use pull sheet   Increase activity/out of bed for meals

## 2025-06-29 NOTE — PROGRESS NOTES
Yesenia Milner is a 89 y.o. female on day 5 of admission presenting with Acute on chronic diastolic congestive heart failure.      Subjective   Patient with transient complete heart block. Discharge cancelled.       Objective     Last Recorded Vitals  BP (!) 98/46   Pulse 60   Temp 36.9 °C (98.4 °F)   Resp 16   Wt 69.9 kg (154 lb)   SpO2 97%   Intake/Output last 3 Shifts:    Intake/Output Summary (Last 24 hours) at 6/28/2025 2047  Last data filed at 6/28/2025 1613  Gross per 24 hour   Intake 480 ml   Output 1300 ml   Net -820 ml       Admission Weight  Weight: 69.9 kg (154 lb) (06/23/25 1227)    Daily Weight  06/23/25 : 69.9 kg (154 lb)    Image Results  XR chest 1 view  Narrative: Interpreted By:  Bryce Redding,   STUDY:  XR CHEST 1 VIEW;  6/25/2025 1:47 pm      INDICATION:  Signs/Symptoms:shortness of breath, PPM Lead eval.      COMPARISON:  03/24/2025      ACCESSION NUMBER(S):  [id number]      ORDERING CLINICIAN:  KAYLIN NAVA      FINDINGS:  CARDIOMEDIASTINAL SILHOUETTE AND VASCULATURE:      Cardiac size:  Within normal limits considering a right pericardial  fat pad and similar to the prior exam. Aortic shadow:  Within normal  limits.      Mediastinal contours: Within normal limits.      Pulmonary vasculature:  The central vasculature is unremarkable      LUNGS:  There is blunting of the left costophrenic angle is probably from a  residual effusion with atelectasis, improved from the previous exam.  The lungs otherwise relatively clear.      ABDOMEN AND OTHER FINDINGS:  Cardiac pacemaker device overlies the left chest similar to the  previous exam.      BONES:  No acute osseous changes.      Impression: 1.  Improved left basilar atelectasis and effusion.      Signed by: Bryce Redding 6/25/2025 5:03 PM  Dictation workstation:   [id number]      Physical Exam  HENT:      Head: Normocephalic and atraumatic.      Nose: Nose normal.      Mouth/Throat:      Mouth: Mucous membranes are dry.   Eyes:       Conjunctiva/sclera: Conjunctivae normal.   Cardiovascular:      Rate and Rhythm: Normal rate. Rhythm irregular.      Heart sounds: Murmur heard.   Pulmonary:      Effort: Pulmonary effort is normal.      Breath sounds: Rales present.   Abdominal:      General: Bowel sounds are normal.      Tenderness: There is abdominal tenderness.   Musculoskeletal:         General: Normal range of motion.      Cervical back: Neck supple.   Skin:     General: Skin is warm and dry.   Neurological:      Mental Status: She is alert. Mental status is at baseline.   Psychiatric:         Mood and Affect: Mood normal.      Relevant Results                              Assessment & Plan  Acute on chronic diastolic congestive heart failure    Acute bacterial sinusitis    Syncope    Advance care planning    Constipation    Admit to telemetry  Inpatient  Appreciate cardiology recommendations  I am not sure if patient's CHF represents more of a chronic condition so we will give 40 mg IV Lasix x 1 and resume torsemide tomorrow and look to cardiology for further recommendations  Please see echo from 2024 and carotid duplex from 2029 above  Start Rocephin 1 g IV daily  Orthostatic vital signs  Repeat EKG and troponin pacer check  Continue home aspirin and Plavix  Daily weight  Intake and output  AM CBC, BMP, coags, lipids  Check flu and COVID  Urinalysis pending  PT/OT     Chronic conditions: HFpEF, MI, heart block/SVT/SSS s/p Medtronic dual-chamber pacemaker 2012 with generator change on pacemaker 7/13/2023, HTN, ALS, COPD (on 1 to 2 L of oxygen at baseline), 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  Appreciate electrophysiology recommendations.  Patient may need a new pacemaker.  Continue to monitor on telemetry.           Chuck Enamorado, DO

## 2025-06-30 LAB
ALBUMIN SERPL BCP-MCNC: 3.4 G/DL (ref 3.4–5)
ALP SERPL-CCNC: 105 U/L (ref 33–136)
ALT SERPL W P-5'-P-CCNC: 26 U/L (ref 7–45)
ANION GAP SERPL CALC-SCNC: 13 MMOL/L (ref 10–20)
AST SERPL W P-5'-P-CCNC: 34 U/L (ref 9–39)
BASOPHILS # BLD AUTO: 0.03 X10*3/UL (ref 0–0.1)
BASOPHILS NFR BLD AUTO: 0.7 %
BILIRUB SERPL-MCNC: 0.4 MG/DL (ref 0–1.2)
BUN SERPL-MCNC: 30 MG/DL (ref 6–23)
CALCIUM SERPL-MCNC: 9.2 MG/DL (ref 8.6–10.3)
CHLORIDE SERPL-SCNC: 98 MMOL/L (ref 98–107)
CO2 SERPL-SCNC: 32 MMOL/L (ref 21–32)
CREAT SERPL-MCNC: 0.91 MG/DL (ref 0.5–1.05)
EGFRCR SERPLBLD CKD-EPI 2021: 60 ML/MIN/1.73M*2
EOSINOPHIL # BLD AUTO: 0.36 X10*3/UL (ref 0–0.4)
EOSINOPHIL NFR BLD AUTO: 8.1 %
ERYTHROCYTE [DISTWIDTH] IN BLOOD BY AUTOMATED COUNT: 15.1 % (ref 11.5–14.5)
GLUCOSE SERPL-MCNC: 91 MG/DL (ref 74–99)
HCT VFR BLD AUTO: 31.3 % (ref 36–46)
HGB BLD-MCNC: 10.1 G/DL (ref 12–16)
IMM GRANULOCYTES # BLD AUTO: 0.01 X10*3/UL (ref 0–0.5)
IMM GRANULOCYTES NFR BLD AUTO: 0.2 % (ref 0–0.9)
LYMPHOCYTES # BLD AUTO: 1.51 X10*3/UL (ref 0.8–3)
LYMPHOCYTES NFR BLD AUTO: 33.9 %
MCH RBC QN AUTO: 30 PG (ref 26–34)
MCHC RBC AUTO-ENTMCNC: 32.3 G/DL (ref 32–36)
MCV RBC AUTO: 93 FL (ref 80–100)
MONOCYTES # BLD AUTO: 0.69 X10*3/UL (ref 0.05–0.8)
MONOCYTES NFR BLD AUTO: 15.5 %
NEUTROPHILS # BLD AUTO: 1.85 X10*3/UL (ref 1.6–5.5)
NEUTROPHILS NFR BLD AUTO: 41.6 %
NRBC BLD-RTO: 0 /100 WBCS (ref 0–0)
PLATELET # BLD AUTO: 229 X10*3/UL (ref 150–450)
POTASSIUM SERPL-SCNC: 4 MMOL/L (ref 3.5–5.3)
PROT SERPL-MCNC: 5.6 G/DL (ref 6.4–8.2)
RBC # BLD AUTO: 3.37 X10*6/UL (ref 4–5.2)
SODIUM SERPL-SCNC: 139 MMOL/L (ref 136–145)
WBC # BLD AUTO: 4.5 X10*3/UL (ref 4.4–11.3)

## 2025-06-30 PROCEDURE — 2500000004 HC RX 250 GENERAL PHARMACY W/ HCPCS (ALT 636 FOR OP/ED): Performed by: NURSE PRACTITIONER

## 2025-06-30 PROCEDURE — 99233 SBSQ HOSP IP/OBS HIGH 50: CPT | Performed by: INTERNAL MEDICINE

## 2025-06-30 PROCEDURE — 36415 COLL VENOUS BLD VENIPUNCTURE: CPT | Performed by: INTERNAL MEDICINE

## 2025-06-30 PROCEDURE — 2500000001 HC RX 250 WO HCPCS SELF ADMINISTERED DRUGS (ALT 637 FOR MEDICARE OP): Performed by: NURSE PRACTITIONER

## 2025-06-30 PROCEDURE — 2500000001 HC RX 250 WO HCPCS SELF ADMINISTERED DRUGS (ALT 637 FOR MEDICARE OP): Performed by: INTERNAL MEDICINE

## 2025-06-30 PROCEDURE — 80053 COMPREHEN METABOLIC PANEL: CPT | Performed by: INTERNAL MEDICINE

## 2025-06-30 PROCEDURE — 1200000002 HC GENERAL ROOM WITH TELEMETRY DAILY

## 2025-06-30 PROCEDURE — 85025 COMPLETE CBC W/AUTO DIFF WBC: CPT | Performed by: INTERNAL MEDICINE

## 2025-06-30 RX ADMIN — CLOPIDOGREL BISULFATE 75 MG: 75 TABLET, FILM COATED ORAL at 08:40

## 2025-06-30 RX ADMIN — HEPARIN SODIUM 5000 UNITS: 5000 INJECTION, SOLUTION INTRAVENOUS; SUBCUTANEOUS at 03:02

## 2025-06-30 RX ADMIN — ASPIRIN 81 MG CHEWABLE TABLET 81 MG: 81 TABLET CHEWABLE at 08:40

## 2025-06-30 RX ADMIN — POLYETHYLENE GLYCOL 3350 17 G: 17 POWDER, FOR SOLUTION ORAL at 20:30

## 2025-06-30 RX ADMIN — CEFUROXIME AXETIL 250 MG: 250 TABLET, FILM COATED ORAL at 08:40

## 2025-06-30 RX ADMIN — POLYETHYLENE GLYCOL 3350 17 G: 17 POWDER, FOR SOLUTION ORAL at 08:39

## 2025-06-30 RX ADMIN — TORSEMIDE 40 MG: 20 TABLET ORAL at 08:40

## 2025-06-30 RX ADMIN — FERROUS SULFATE TAB 325 MG (65 MG ELEMENTAL FE) 1 TABLET: 325 (65 FE) TAB at 07:56

## 2025-06-30 RX ADMIN — SENNOSIDES AND DOCUSATE SODIUM 2 TABLET: 50; 8.6 TABLET ORAL at 08:40

## 2025-06-30 RX ADMIN — DAPAGLIFLOZIN 10 MG: 10 TABLET, FILM COATED ORAL at 10:57

## 2025-06-30 RX ADMIN — METOPROLOL TARTRATE 12.5 MG: 25 TABLET, FILM COATED ORAL at 20:29

## 2025-06-30 RX ADMIN — CEFUROXIME AXETIL 250 MG: 250 TABLET, FILM COATED ORAL at 20:29

## 2025-06-30 RX ADMIN — HEPARIN SODIUM 5000 UNITS: 5000 INJECTION, SOLUTION INTRAVENOUS; SUBCUTANEOUS at 15:03

## 2025-06-30 RX ADMIN — Medication 1 TABLET: at 20:30

## 2025-06-30 RX ADMIN — COLCHICINE 0.6 MG: 0.6 TABLET, FILM COATED ORAL at 08:40

## 2025-06-30 RX ADMIN — DULOXETINE 30 MG: 30 CAPSULE, DELAYED RELEASE ORAL at 08:40

## 2025-06-30 RX ADMIN — ALLOPURINOL 100 MG: 100 TABLET ORAL at 08:39

## 2025-06-30 RX ADMIN — Medication 1 TABLET: at 08:40

## 2025-06-30 RX ADMIN — METOPROLOL TARTRATE 12.5 MG: 25 TABLET, FILM COATED ORAL at 08:39

## 2025-06-30 ASSESSMENT — COGNITIVE AND FUNCTIONAL STATUS - GENERAL
TURNING FROM BACK TO SIDE WHILE IN FLAT BAD: A LITTLE
DAILY ACTIVITIY SCORE: 18
DRESSING REGULAR LOWER BODY CLOTHING: A LITTLE
TOILETING: A LITTLE
HELP NEEDED FOR BATHING: A LITTLE
DRESSING REGULAR LOWER BODY CLOTHING: A LITTLE
PERSONAL GROOMING: A LITTLE
STANDING UP FROM CHAIR USING ARMS: A LITTLE
MOBILITY SCORE: 16
CLIMB 3 TO 5 STEPS WITH RAILING: A LOT
WALKING IN HOSPITAL ROOM: A LOT
MOVING TO AND FROM BED TO CHAIR: A LITTLE
DAILY ACTIVITIY SCORE: 19
MOVING FROM LYING ON BACK TO SITTING ON SIDE OF FLAT BED WITH BEDRAILS: A LITTLE
DRESSING REGULAR UPPER BODY CLOTHING: A LITTLE
TOILETING: A LITTLE
MOBILITY SCORE: 17
HELP NEEDED FOR BATHING: A LITTLE
MOVING FROM LYING ON BACK TO SITTING ON SIDE OF FLAT BED WITH BEDRAILS: A LITTLE
WALKING IN HOSPITAL ROOM: A LOT
STANDING UP FROM CHAIR USING ARMS: A LITTLE
PERSONAL GROOMING: A LITTLE
CLIMB 3 TO 5 STEPS WITH RAILING: A LITTLE
DRESSING REGULAR UPPER BODY CLOTHING: A LITTLE
MOVING TO AND FROM BED TO CHAIR: A LITTLE
EATING MEALS: A LITTLE
TURNING FROM BACK TO SIDE WHILE IN FLAT BAD: A LITTLE

## 2025-06-30 ASSESSMENT — PAIN - FUNCTIONAL ASSESSMENT: PAIN_FUNCTIONAL_ASSESSMENT: 0-10

## 2025-06-30 ASSESSMENT — PAIN SCALES - GENERAL
PAINLEVEL_OUTOF10: 0 - NO PAIN
PAINLEVEL_OUTOF10: 0 - NO PAIN

## 2025-06-30 NOTE — PROGRESS NOTES
Subjective Data:  No complaints    Overnight Events:    None reported     Objective Data:  Last Recorded Vitals:  Vitals:    06/30/25 0339 06/30/25 0600 06/30/25 0735 06/30/25 1113   BP: 113/56  118/56 111/53   BP Location: Right arm  Right arm Right arm   Patient Position: Lying  Lying Lying   Pulse: 54  61 60   Resp: 18  20 19   Temp: 36.2 °C (97.2 °F)  36.5 °C (97.7 °F) 36.2 °C (97.2 °F)   TempSrc: Temporal  Temporal Temporal   SpO2: 96%  98% 95%   Weight:  51.5 kg (113 lb 8.6 oz)     Height:           Last Labs:  CBC - 6/30/2025:  5:30 AM  4.5 10.1 229    31.3      CMP - 6/30/2025:  5:30 AM  9.2 5.6 34 --- 0.4   2.6 3.4 26 105      PTT - 6/24/2025:  5:22 AM  1.2   12.8 36     TROPHS   Date/Time Value Ref Range Status   06/23/2025 04:21 PM 22 0 - 13 ng/L Final   06/23/2025 02:10 PM 23 0 - 13 ng/L Final   06/23/2025 01:17 PM 19 0 - 13 ng/L Final     BNP   Date/Time Value Ref Range Status   06/23/2025 01:17  0 - 99 pg/mL Final   03/23/2025 06:36  0 - 99 pg/mL Final     HGBA1C   Date/Time Value Ref Range Status   03/14/2025 05:00 AM 5.2 See comment % Final   12/20/2024 02:04 AM 5.5 See comment % Final     LDLCALC   Date/Time Value Ref Range Status   06/24/2025 05:22 AM 59 <=99 mg/dL Final     Comment:                                 Near   Borderline      AGE      Desirable  Optimal    High     High     Very High     0-19 Y     0 - 109     ---    110-129   >/= 130     ----    20-24 Y     0 - 119     ---    120-159   >/= 160     ----      >24 Y     0 -  99   100-129  130-159   160-189     >/=190     12/20/2024 02:04 AM 68 <=99 mg/dL Final     Comment:                                 Near   Borderline      AGE      Desirable  Optimal    High     High     Very High     0-19 Y     0 - 109     ---    110-129   >/= 130     ----    20-24 Y     0 - 119     ---    120-159   >/= 160     ----      >24 Y     0 -  99   100-129  130-159   160-189     >/=190     01/24/2024 10:49 AM 73 <=99 mg/dL Final     Comment:                                  Near   Borderline      AGE      Desirable  Optimal    High     High     Very High     0-19 Y     0 - 109     ---    110-129   >/= 130     ----    20-24 Y     0 - 119     ---    120-159   >/= 160     ----      >24 Y     0 -  99   100-129  130-159   160-189     >/=190       VLDL   Date/Time Value Ref Range Status   06/24/2025 05:22 AM 11 0 - 40 mg/dL Final   12/20/2024 02:04 AM 14 0 - 40 mg/dL Final   01/24/2024 10:49 AM 13 0 - 40 mg/dL Final      Last I/O:  I/O last 3 completed shifts:  In: 480 (9.3 mL/kg) [P.O.:480]  Out: 1150 (22.3 mL/kg) [Urine:1150 (0.6 mL/kg/hr)]  Weight: 51.5 kg     Past Cardiology Tests (Last 3 Years):  EKG:  ECG 12 lead 03/21/2025      Electrocardiogram, 12-lead PRN ACS symtpoms 03/08/2025      ECG 12 Lead 03/04/2025      ECG 12 lead 02/02/2025      ECG 12 lead 01/12/2025      ECG 12 lead 01/02/2025      ECG 12 lead 12/21/2024      ECG 12 lead 12/20/2024      ECG 12 lead 12/20/2024    Echo:  Transthoracic Echo Complete 06/24/2025      Transthoracic echo (TTE) complete 12/21/2024    Ejection Fractions:  EF   Date/Time Value Ref Range Status   06/24/2025 03:04 PM 53 %    12/21/2024 09:03 AM 63 %      Cath:  Cardiac Catheterization Procedure 12/20/2024    Stress Test:  No results found for this or any previous visit from the past 1095 days.    Cardiac Imaging:  No results found for this or any previous visit from the past 1095 days.      Inpatient Medications:  Scheduled Medications[1]  PRN Medications[2]  Continuous Medications[3]    Physical Exam:  GEN: Atraumatic, normocephalic  HEENT: Atraumatic  COR; Reg  LUNGS: Few rhonchi  EXT: Warm     Assessment/Plan   1.) Pacemaker malfunction  2.) HFpEF  PLAN Pacemaker revsion as per Dr. Ramicone       Code Status:  Full Code    I spent 30minutes in the professional and overall care of this patient.        Magnus Hess MD       [1]   Scheduled medications   Medication Dose Route Frequency    allopurinol  100 mg oral  Daily    aspirin  81 mg oral Daily    cefuroxime  250 mg oral BID    clopidogrel  75 mg oral Daily    colchicine  0.6 mg oral Daily    dapagliflozin propanediol  10 mg oral q24h    DULoxetine  30 mg oral Daily    ferrous sulfate  65 mg of elemental iron oral Daily with breakfast    heparin (porcine)  5,000 Units subcutaneous q12h    lactobacillus acidophilus  1 tablet oral BID    metoprolol tartrate  12.5 mg oral BID    polyethylene glycol  17 g oral BID    sennosides-docusate sodium  2 tablet oral BID    torsemide  40 mg oral Daily   [2]   PRN medications   Medication    acetaminophen    Or    acetaminophen    Or    acetaminophen    albuterol    lubricating eye drops    ondansetron    oxygen   [3]   Continuous Medications   Medication Dose Last Rate

## 2025-06-30 NOTE — PROGRESS NOTES
06/30/25 1024   Discharge Planning   Home or Post Acute Services Post acute facilities (Rehab/SNF/etc)   Type of Post Acute Facility Services Skilled nursing   Expected Discharge Disposition SNF   Does the patient need discharge transport arranged? Yes   RoundTrip coordination needed? Yes   Has discharge transport been arranged? No     Plan to discharge to [facility] when medically ready for discharge. Patient has pre-cert approved through 07/2.

## 2025-06-30 NOTE — CARE PLAN
The patient's goals for the shift include  rest    The clinical goals for the shift include Remain HDS      Problem: Safety - Adult  Goal: Free from fall injury  Outcome: Progressing  Flowsheets (Taken 6/29/2025 2049)  Free from fall injury: Instruct family/caregiver on patient safety     Problem: Chronic Conditions and Co-morbidities  Goal: Patient's chronic conditions and co-morbidity symptoms are monitored and maintained or improved  Outcome: Progressing  Flowsheets (Taken 6/29/2025 2049)  Care Plan - Patient's Chronic Conditions and Co-Morbidity Symptoms are Monitored and Maintained or Improved:   Monitor and assess patient's chronic conditions and comorbid symptoms for stability, deterioration, or improvement   Collaborate with multidisciplinary team to address chronic and comorbid conditions and prevent exacerbation or deterioration   Update acute care plan with appropriate goals if chronic or comorbid symptoms are exacerbated and prevent overall improvement and discharge     Problem: Skin  Goal: Prevent/manage excess moisture  Outcome: Progressing  Flowsheets (Taken 6/29/2025 2049)  Prevent/manage excess moisture:   Cleanse incontinence/protect with barrier cream   Follow provider orders for dressing changes   Moisturize dry skin   Monitor for/manage infection if present  Goal: Prevent/minimize sheer/friction injuries  Outcome: Progressing  Flowsheets (Taken 6/29/2025 2049)  Prevent/minimize sheer/friction injuries:   Turn/reposition every 2 hours/use positioning/transfer devices   Complete micro-shifts as needed if patient unable. Adjust patient position to relieve pressure points, not a full turn   HOB 30 degrees or less   Use pull sheet   Increase activity/out of bed for meals

## 2025-06-30 NOTE — PROGRESS NOTES
Yesenia Milner is a 89 y.o. female on day 7 of admission presenting with Acute on chronic diastolic congestive heart failure.      Subjective   06/30- Patient seen and fully examined at bedside, patient ill appearing, acutely complex, marked decline from baseline. Patient remains hospitalized for acute onset with complex medical and pharmacological management. Acute on chronic diastolic congestive heart failure, Acute bacterial sinusitis, Syncope, Advance care planning, Constipation. Patient seen by cardiology today with plan-   1.) Pacemaker malfunction  2.) HFpEF  PLAN Pacemaker revsion as per Dr. Ramicone  Appreciate input and agree with plan. Will renew telemetry orders. Will continue antibiotics for sinusitis.  Continue to diuresis patient, monitor renal function, strict intake and output. Will monitor overnight and repeat labs in the morning. Slow but progressive improvements noted. High Complexity with updates to multiple problems, adjustments to treatment plan, and need for ongoing inpatient care.   Long discussion on goals of care with patient and family, will continue current and await diagnostic findings.  Patient reports: no new complaints, decline from baseline, weakness                 Objective     Last Recorded Vitals  /53 (BP Location: Right arm, Patient Position: Lying)   Pulse 60   Temp 36.2 °C (97.2 °F) (Temporal)   Resp 19   Wt 51.5 kg (113 lb 8.6 oz)   SpO2 95%   Intake/Output last 3 Shifts:    Intake/Output Summary (Last 24 hours) at 6/30/2025 1421  Last data filed at 6/30/2025 0339  Gross per 24 hour   Intake --   Output 1150 ml   Net -1150 ml       Admission Weight  Weight: 69.9 kg (154 lb) (06/23/25 1227)    Daily Weight  06/30/25 : 51.5 kg (113 lb 8.6 oz)    Image Results  XR chest 1 view  Narrative: Interpreted By:  Bryce Redding,   STUDY:  XR CHEST 1 VIEW;  6/25/2025 1:47 pm      INDICATION:  Signs/Symptoms:shortness of breath, PPM Lead eval.      COMPARISON:  03/24/2025       ACCESSION NUMBER(S):  [id number]      ORDERING CLINICIAN:  KAYLIN NAVA      FINDINGS:  CARDIOMEDIASTINAL SILHOUETTE AND VASCULATURE:      Cardiac size:  Within normal limits considering a right pericardial  fat pad and similar to the prior exam. Aortic shadow:  Within normal  limits.      Mediastinal contours: Within normal limits.      Pulmonary vasculature:  The central vasculature is unremarkable      LUNGS:  There is blunting of the left costophrenic angle is probably from a  residual effusion with atelectasis, improved from the previous exam.  The lungs otherwise relatively clear.      ABDOMEN AND OTHER FINDINGS:  Cardiac pacemaker device overlies the left chest similar to the  previous exam.      BONES:  No acute osseous changes.      Impression: 1.  Improved left basilar atelectasis and effusion.      Signed by: Bryce Redding 6/25/2025 5:03 PM  Dictation workstation:   [id number]        Fei Mensah MD   Physician  Internal Medicine     H&P      Signed     Date of Service: 6/23/2025  2:22 PM     Signed         History Of Present Illness  Yesenia Milner is a 89 y.o. 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  Patient has had multiple admissions for congestive heart failure exacerbation who presented today after a syncopal episode.  Patient notes she was gathering her clothing to get ready for the day when she became lightheaded and passed out.  She denies any injury from the fall.  She notes over the past 2 weeks she has been experiencing a stuffy nose and a cough upon wakening in the morning.  She otherwise denies any fevers, chills, chest pain, change in  her chronic shortness of breath with exertion, abdominal pain, nausea, vomiting, diarrhea, or dysuria.  She denies any change in her chronic intermittent bilateral lower extremity edema.  She denies any weight gain.  She denies any recent medication changes.  She reports compliance with her medications.  She is on her baseline oxygen at 2 L.  She notes her cardiologist is Dr. Ramicone.  In regards to her colon cancer, family notes that patient was deemed a risk for surgery via cardiology and surgeon, Dr. Vazquez, recommended patient speak with Dr. Ramicone in regards to her risk for surgery before he would schedule her for an operation.  They noted that her appointment is not until the fall and they are hopeful to speak with him this admission about her risk for surgery.  Patient notes she lives alone and uses a walker.  CODE STATUS discussed and patient became tearful during conversation.  She would like to remain a full code at this time and referred to family if an emergency would arise that she was incapacitated to answer for herself.     In the ED lab work, EKG, head CT, C-spine CT and CT chest/abdomen/pelvis were performed. Labs revealed troponin 19 (appears baseline with previous result 21 in March 2025), H&H 10.1 and 31.3 (within patient's recent baseline),  (310 in March 2025).  EKG, per ER physician impression revealed Dual paced rhythm. Motion artifact present. Irregular rate. Normal NV, wide QRS and Qtc intervals. No ST segment elevations, depressions, or T wave inversions. Compared to March 2025 imaging positive for fluid/mucosal thickening of the left sphenoid sinus.  Bilateral infiltrates and right greater than left pleural effusions. Moderate fecal residue.  Heart rate was 120 on arrival, vitals were otherwise stable.    Echo 12/21/2024:     CONCLUSIONS:   1. Left ventricular ejection fraction is normal, by visual estimate at 60-65%.   2. Abnormal left venticular wall motion.   3. Left  ventricular diastolic filling is indeterminate.   4. There is a moderate apical and anteroapical myocardial infarction.   5. There is normal right ventricular global systolic function.   6. There is moderate mitral annular calcification.   7. Moderate mitral valve regurgitation.   8. Moderate to severe tricuspid regurgitation visualized.   9. Moderately elevated right ventricular systolic pressure.  10. Aortic valve sclerosis.     Carotid duplex 2019:     Right Carotid: Findings are consistent with less than 50% stenosis of the right proximal ICA. Laminar flow seen by color Doppler. Right external carotid artery appears patent with no evidence of stenosis. The right vertebral artery is patent with antegrade flow. No evidence of hemodynamically significant stenosis in the right subclavian.  Left Carotid: Findings are consistent with less than 50% stenosis of the left proximal ICA. Laminar flow seen by color Doppler. Left external carotid artery appears patent with no evidence of stenosis. The left vertebral artery is patent with antegrade flow.  No evidence of hemodynamically significant stenosis in the left subclavian.        Past Medical History  [Medical History]    [Medical History]  Past Medical History       Diagnosis Date    Gross hematuria 10/07/2020    Impacted cerumen 02/22/2024    Other conditions influencing health status       Normal stress echocardiogram    Personal history of other medical treatment       History of echocardiogram    Personal history of other medical treatment       H/O Doppler ultrasound    Systolic CHF (Multi) 05/18/2023        Surgical History  [Surgical History]    [Surgical History]  Past Surgical History        Procedure Laterality Date    APPENDECTOMY   11/22/2017     Appendectomy    CARDIAC CATHETERIZATION N/A 12/20/2024     Procedure: Left Heart Cath, With LV;  Surgeon: Tahir Ruelas MD;  Location: [facility];  Service: Cardiovascular;  Laterality: N/A;    CARDIAC  PACEMAKER PLACEMENT   03/11/2021     Pacemaker Placement    HYSTERECTOMY   11/22/2017     Hysterectomy    IR ANGIOGRAM INFERIOR EPIGASTRIC PELVIC   12/14/2020     IR ANGIOGRAM INFERIOR EPIGASTRIC PELVIC 12/14/2020 [facility]    IR ANGIOGRAM INFERIOR EPIGASTRIC PELVIC   12/14/2020     IR ANGIOGRAM INFERIOR EPIGASTRIC PELVIC 12/14/2020 [facility]    IR ANGIOGRAM RENAL BILATERAL Bilateral 12/14/2020     IR ANGIOGRAM RENAL BILATERAL 12/14/2020 [facility]    OTHER SURGICAL HISTORY   11/22/2017     Stab Phlebectomy Of Varicose Veins    OTHER SURGICAL HISTORY   11/22/2017     Venous Ligation With Stripping    TONSILLECTOMY   11/22/2017     Tonsillectomy        Social History  She reports that she quit smoking about 52 years ago. Her smoking use included cigarettes. She has been exposed to tobacco smoke. She has never used smokeless tobacco. She reports that she does not drink alcohol and does not use drugs.     Family History  [Family History]    [Family History]         Problem Relation Name Age of Onset    No Known Problems Mother            Allergies  Iodine, Shrimp, Hydrocodone, and Adhesive tape-silicones     10 point review systems performed and is negative besides what is stated in HPI     Physical Exam  HENT:      Head: Normocephalic and atraumatic.      Nose: Nose normal.      Mouth/Throat:      Mouth: Mucous membranes are dry.   Eyes:      Conjunctiva/sclera: Conjunctivae normal.   Cardiovascular:      Rate and Rhythm: Normal rate. Rhythm irregular.      Heart sounds: Murmur heard.   Pulmonary:      Effort: Pulmonary effort is normal.      Breath sounds: Rales present.   Abdominal:      General: Bowel sounds are normal.      Tenderness: There is abdominal tenderness.   Musculoskeletal:         General: Normal range of motion.      Cervical back: Neck supple.   Skin:     General: Skin is warm and dry.   Neurological:      Mental Status: She is alert. Mental status is at baseline.   Psychiatric:          "Mood and Affect: Mood normal.            Last Recorded Vitals  Blood pressure 114/58, pulse 70, temperature 36.3 °C (97.3 °F), temperature source Temporal, resp. rate 19, height 1.626 m (5' 4\"), weight 69.9 kg (154 lb), SpO2 95%.     Relevant Results     [Medications Ordered Prior to Encounter]     [Medications Ordered Prior to Encounter]  No current facility-administered medications on file prior to encounter.             Current Outpatient Medications on File Prior to Encounter   Medication Sig Dispense Refill    allopurinol (Zyloprim) 100 mg tablet Take 1 tablet (100 mg) by mouth once daily. 90 tablet 0    aspirin 81 mg chewable tablet Chew 1 tablet (81 mg) once daily.        calcium carbonate-vitamin D3 (Calcium 600 + D,3,) 600 mg-5 mcg (200 unit) tablet Take 1 tablet by mouth once daily.        clopidogrel (Plavix) 75 mg tablet Take 1 tablet by mouth once daily 90 tablet 0    colchicine 0.6 mg tablet Take 1 tablet (0.6 mg) by mouth once daily. 90 tablet 3    DULoxetine (Cymbalta) 30 mg DR capsule Take 1 capsule (30 mg) by mouth once daily. Do not crush or chew. 90 capsule 1    ferrous sulfate 325 (65 Fe) mg EC tablet Take 1 tablet by mouth once daily. Do not crush, chew, or split.        fluticasone propion-salmeteroL (Advair) 115-21 mcg/actuation inhaler Inhale 2 puffs 2 times a day. Rinse mouth with water after use to reduce aftertaste and incidence of candidiasis. Do not swallow. 12 g 11    metoprolol tartrate (Lopressor) 25 mg tablet Take 1 tablet (25 mg) by mouth once daily. 30 tablet 0    polyethylene glycol (Glycolax, Miralax) 17 gram/dose powder Mix 1 capful (17 g) of powder with 4 to 8 ounces of water or juice and drink 2 times a day. 1010 g 1    torsemide (Demadex) 20 mg tablet Take 3 tablets (60 mg) by mouth once daily. 90 tablet 11    acetaminophen (Tylenol) 325 mg tablet Take 2 tablets (650 mg) by mouth every 4 hours if needed for mild pain (1 - 3). (Patient not taking: Reported on 6/23/2025)     "    albuterol 2.5 mg /3 mL (0.083 %) nebulizer solution Take 3 mL (2.5 mg) by nebulization every 4 hours if needed for wheezing. 75 mL 1    enoxaparin (Lovenox) 40 mg/0.4 mL syringe Inject 0.4 mL (40 mg) under the skin once every 24 hours. (Patient not taking: Reported on 6/23/2025)        guaiFENesin (Mucinex) 600 mg 12 hr tablet Take 1 tablet (600 mg) by mouth 2 times a day as needed for cough. Do not crush, chew, or split. (Patient not taking: Reported on 6/23/2025)        hydrocortisone 1 % lotion Apply topically 2 times a day. Apply to both legs , wash hands after applying lotion (Patient not taking: Reported on 6/23/2025) 60 mL 0    lubricating eye drops ophthalmic solution Administer 1 drop into both eyes once daily as needed for dry eyes.        nebulizers misc With tubing and mask.   Use as directed 1 each 0    [DISCONTINUED] ipratropium-albuteroL (Duo-Neb) 0.5-2.5 mg/3 mL nebulizer solution Take 3 mL by nebulization every 6 hours.                  Results for orders placed or performed during the hospital encounter of 06/23/25 (from the past 24 hours)   CBC and Auto Differential   Result Value Ref Range     WBC 6.0 4.4 - 11.3 x10*3/uL     nRBC 0.0 0.0 - 0.0 /100 WBCs     RBC 3.37 (L) 4.00 - 5.20 x10*6/uL     Hemoglobin 10.1 (L) 12.0 - 16.0 g/dL     Hematocrit 31.3 (L) 36.0 - 46.0 %     MCV 93 80 - 100 fL     MCH 30.0 26.0 - 34.0 pg     MCHC 32.3 32.0 - 36.0 g/dL     RDW 15.9 (H) 11.5 - 14.5 %     Platelets 206 150 - 450 x10*3/uL     Neutrophils % 69.9 40.0 - 80.0 %     Immature Granulocytes %, Automated 0.5 0.0 - 0.9 %     Lymphocytes % 16.6 13.0 - 44.0 %     Monocytes % 10.7 2.0 - 10.0 %     Eosinophils % 2.0 0.0 - 6.0 %     Basophils % 0.3 0.0 - 2.0 %     Neutrophils Absolute 4.18 1.60 - 5.50 x10*3/uL     Immature Granulocytes Absolute, Automated 0.03 0.00 - 0.50 x10*3/uL     Lymphocytes Absolute 0.99 0.80 - 3.00 x10*3/uL     Monocytes Absolute 0.64 0.05 - 0.80 x10*3/uL     Eosinophils Absolute 0.12 0.00  - 0.40 x10*3/uL     Basophils Absolute 0.02 0.00 - 0.10 x10*3/uL   Comprehensive metabolic panel   Result Value Ref Range     Glucose 116 (H) 74 - 99 mg/dL     Sodium 138 136 - 145 mmol/L     Potassium 3.6 3.5 - 5.3 mmol/L     Chloride 99 98 - 107 mmol/L     Bicarbonate 27 21 - 32 mmol/L     Anion Gap 16 10 - 20 mmol/L     Urea Nitrogen 30 (H) 6 - 23 mg/dL     Creatinine 1.00 0.50 - 1.05 mg/dL     eGFR 53 (L) >60 mL/min/1.73m*2     Calcium 9.6 8.6 - 10.3 mg/dL     Albumin 3.8 3.4 - 5.0 g/dL     Alkaline Phosphatase 89 33 - 136 U/L     Total Protein 6.3 (L) 6.4 - 8.2 g/dL     AST 24 9 - 39 U/L     Bilirubin, Total 0.6 0.0 - 1.2 mg/dL     ALT 17 7 - 45 U/L   Lipase   Result Value Ref Range     Lipase 18 9 - 82 U/L   Magnesium   Result Value Ref Range     Magnesium 2.24 1.60 - 2.40 mg/dL   aPTT   Result Value Ref Range     aPTT 26 26 - 36 seconds   Protime-INR   Result Value Ref Range     Protime 12.4 9.8 - 12.4 seconds     INR 1.1 0.9 - 1.1   B-Type Natriuretic Peptide   Result Value Ref Range      (H) 0 - 99 pg/mL   Troponin I, High Sensitivity, Initial   Result Value Ref Range     Troponin I, High Sensitivity 19 (H) 0 - 13 ng/L   Troponin, High Sensitivity, 1 Hour   Result Value Ref Range     Troponin I, High Sensitivity 23 (H) 0 - 13 ng/L   Sars-CoV-2, Influenza A/B and RSV PCR   Result Value Ref Range     Coronavirus 2019, PCR Not Detected Not Detected     Flu A Result Not Detected Not Detected     Flu B Result Not Detected Not Detected     RSV PCR Not Detected Not Detected   Troponin I, High Sensitivity   Result Value Ref Range     Troponin I, High Sensitivity 22 (H) 0 - 13 ng/L   Urinalysis with Reflex Culture and Microscopic   Result Value Ref Range     Color, Urine Light-Yellow Light-Yellow, Yellow, Dark-Yellow     Appearance, Urine Clear Clear     Specific Gravity, Urine 1.010 1.005 - 1.035     pH, Urine 7.0 5.0, 5.5, 6.0, 6.5, 7.0, 7.5, 8.0     Protein, Urine NEGATIVE NEGATIVE, 10 (TRACE), 20 (TRACE)  mg/dL     Glucose, Urine Normal Normal mg/dL     Blood, Urine NEGATIVE NEGATIVE mg/dL     Ketones, Urine NEGATIVE NEGATIVE mg/dL     Bilirubin, Urine NEGATIVE NEGATIVE mg/dL     Urobilinogen, Urine Normal Normal mg/dL     Nitrite, Urine NEGATIVE NEGATIVE     Leukocyte Esterase, Urine NEGATIVE NEGATIVE   CBC   Result Value Ref Range     WBC 4.7 4.4 - 11.3 x10*3/uL     nRBC 0.0 0.0 - 0.0 /100 WBCs     RBC 3.41 (L) 4.00 - 5.20 x10*6/uL     Hemoglobin 10.1 (L) 12.0 - 16.0 g/dL     Hematocrit 32.0 (L) 36.0 - 46.0 %     MCV 94 80 - 100 fL     MCH 29.6 26.0 - 34.0 pg     MCHC 31.6 (L) 32.0 - 36.0 g/dL     RDW 15.8 (H) 11.5 - 14.5 %     Platelets 215 150 - 450 x10*3/uL   Coagulation Screen   Result Value Ref Range     Protime 12.8 (H) 9.8 - 12.4 seconds     INR 1.2 (H) 0.9 - 1.1     aPTT 36 26 - 36 seconds   Basic Metabolic Panel   Result Value Ref Range     Glucose 98 74 - 99 mg/dL     Sodium 140 136 - 145 mmol/L     Potassium 3.6 3.5 - 5.3 mmol/L     Chloride 100 98 - 107 mmol/L     Bicarbonate 30 21 - 32 mmol/L     Anion Gap 14 10 - 20 mmol/L     Urea Nitrogen 28 (H) 6 - 23 mg/dL     Creatinine 0.95 0.50 - 1.05 mg/dL     eGFR 57 (L) >60 mL/min/1.73m*2     Calcium 9.1 8.6 - 10.3 mg/dL   Lipid Panel   Result Value Ref Range     Cholesterol 119 0 - 199 mg/dL     HDL-Cholesterol 49.2 mg/dL     Cholesterol/HDL Ratio 2.4       LDL Calculated 59 <=99 mg/dL     VLDL 11 0 - 40 mg/dL     Triglycerides 56 0 - 149 mg/dL     Non HDL Cholesterol 70 0 - 149 mg/dL      *Note: Due to a large number of results and/or encounters for the requested time period, some results have not been displayed. A complete set of results can be found in Results Review.         CT chest abdomen pelvis wo IV contrast  Result Date: 6/23/2025  Interpreted By:  Mary Jane Foreman, STUDY: CT CHEST ABDOMEN PELVIS WO CONTRAST;  6/23/2025 1:05 pm   INDICATION: Signs/Symptoms:abdominal pain prior cancer syncope r/o mass, nephro jesse traumatic injury.     COMPARISON: CT  chest 03/21/2025, CT abdomen and pelvis 01/12/2025   ACCESSION NUMBER(S): [id number]   ORDERING CLINICIAN: MURTAZA HINOJOSA   TECHNIQUE: CT of the chest, abdomen, and pelvis was performed. Sagittal and coronal reconstructions were generated. No intravenous contrast given for the examination.   FINDINGS: CHEST:   Hilar, vessel, and solid organ evaluation limited without IV contrast.   CHEST WALL AND LOWER NECK: Metallic streak artifact from port in the left anterior chest wall limits assessment of adjacent structures. No gross axillary adenopathy as visualized. Slightly prominent heterogenous right thyroid lobe.   MEDIASTINUM AND HARITHA:  Limited hilar assessment on unenhanced exam. No significant mediastinal or hilar adenopathy within limits of unenhanced study. Mild gaseous distention of the proximal esophagus.   HEART AND VESSELS:  Lack of IV contrast precludes vascular luminal assessment. The heart is normal in size. No significant pericardial effusion. Pacer wires in the right side of the heart. Multifocal atherosclerotic calcifications.   LUNGS, PLEURA, LARGE AIRWAYS:  Mild motion artifact in the lower chest. Pulmonary heterogeneity including scattered bilateral ground-glass infiltrates and reticular densities most prominent in the lung bases. Moderate right and small to moderate left pleural effusions and presumed compressive atelectasis of the adjacent lung bases. Slight fluid/thickening along the superior right main fissure. No pneumothorax. The central airways are grossly patent.   BONES:  Kyphosis and degenerative endplate spurring in the thoracic spine.     ABDOMEN/PELVIS:   Solid organ and vessel evaluation limited without IV contrast. Artifact related to overlying upper extremities.   ABDOMINAL ORGANS:   LIVER: No focal lesion within limits of unenhanced exam   GALL BLADDER AND BILIARY TREE: No calcified gallstone   SPLEEN: No focal lesion within limits of unenhanced exam   PANCREAS: No focal lesion  within limits of unenhanced exam   ADRENALS: No adrenal mass   KIDNEYS AND URETERS: No renal mass or hydronephrosis within limits of unenhanced exam   BOWEL: No abnormally dilated large or small bowel loops. Moderate fecal residue. Distal colon diverticulosis. Within limits of unenhanced exam probable persistent circumferential wall thickening in the distal transverse colon for example image 110/201, as noted on the prior exam.   PERITONEUM, RETROPERITONEUM, NODES: No significant free fluid. No free air. No significant retroperitoneal lymphadenopathy within limits of unenhanced exam. Slight increased density in the lower abdominal mesentery.   VESSELS:  Lack of IV contrast precludes vascular luminal assessment. Multifocal atherosclerotic calcifications. No abdominal aortic aneurysm.   PELVIS: Urinary bladder is moderately distended and grossly normal in contour. 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   ABDOMINAL WALL: No sizable abdominal wall hernia.   BONES: Osteopenia. Multifocal degenerative changes. Mild scoliosis of the lumbar spine.        CHEST:   Bilateral infiltrates and right-greater-than-left pleural effusions. Differential includes CHF and pneumonia. Clinical correlation and follow-up to ensure resolution after appropriate treatment recommended.   ABDOMEN AND PELVIS:   Apparent persistent distal transverse colon wall thickening consistent with reportedly known malignancy.   No gross evidence of solid organ injury or free fluid within limits of unenhanced exam.   Fecal retention. Distal colon diverticulosis.   Other findings as described above.   MACRO: None.   Signed by: Mary Jane Foreman 6/23/2025 1:32 PM Dictation workstation:   [id number]     CT cervical spine wo IV contrast  Result Date: 6/23/2025  Interpreted By:  Mary Jane Foreman, STUDY: CT CERVICAL SPINE WO IV CONTRAST;  6/23/2025 1:05 pm   INDICATION: Signs/Symptoms:fall blood thinners r/o frx.     COMPARISON: None.   ACCESSION NUMBER(S):  [id number]   ORDERING CLINICIAN: MURTAZA HINOJOSA   TECHNIQUE: CT images were obtained through the cervical spine. Sagittal and coronal reconstructions were generated.   FINDINGS:     ALIGNMENT: Mild levocurvature. Straightening of the lower cervical lordosis. Slight retrolisthesis of C5.   VERTEBRAE/DISC SPACES: No compression deformity or acute displaced fracture. Multilevel degenerative changes including atlantoaxial joint space narrowing spurring, multilevel intervertebral disc space narrowing with endplate sclerosis and spurring, multilevel bilateral facet joint narrowing and spurring. At least partial fusion across the anterior C5-6 vertebra.   ADDITIONAL FINDINGS: No abnormal thickening of the prevertebral soft tissues.  Dependent fluid/thickening in the sphenoid sinus. Ill-defined biapical infiltrates. Small right pleural effusion. Partially included pacer wires in the left upper chest.        No cervical vertebral compression deformity or acute displaced fracture. Multilevel productive/degenerative changes with straightening of the cervical lordosis and slight spondylolisthesis at C5.   Paraspinal/soft tissue findings as above.   MACRO: None.   Signed by: Mary Jane Foreman 6/23/2025 1:16 PM Dictation workstation:   [id number]     CT head wo IV contrast  Result Date: 6/23/2025  Interpreted By:  Mary Jane Foreman, STUDY: CT HEAD WO IV CONTRAST;  6/23/2025 1:05 pm   INDICATION: Signs/Symptoms:headache fall blood thinners r/o sah ich mass.     COMPARISON: 08/23/2023   ACCESSION NUMBER(S): [id number]   ORDERING CLINICIAN: MURTAZA HINOJOSA   TECHNIQUE: Unenhanced CT images of the head were obtained.   FINDINGS: The ventricles, cisterns and sulci are enlarged, consistent with diffuse volume loss. There are areas of nonspecific white matter hypodensity, which are probably age related or microvascular in nature. These findings are similar to the prior exam. There is no acute intracranial hemorrhage, mass effect or midline  shift. No extraaxial fluid collection.   No acute displaced calvarial fracture. No focal calvarial lesion.   Left sphenoid sinus dependent fluid/thickening. Remaining visualized paranasal sinuses are clear. Dense presumed surgical material along the anterior margin of each globe again seen.        No acute intracranial hemorrhage or mass-effect.   Mild fluid or mucosal thickening in the left sphenoid sinus..   MACRO: None.   Signed by: Mary Jane Foreman 6/23/2025 1:08 PM Dictation workstation:   [id number]        Assessment & Plan  Acute on chronic diastolic congestive heart failure     Acute bacterial sinusitis     Syncope     Advance care planning     Constipation           Admit to telemetry  Inpatient  Appreciate cardiology recommendations  I am not sure if patient's CHF represents more of a chronic condition so we will give 40 mg IV Lasix x 1 and resume torsemide tomorrow and look to cardiology for further recommendations  Please see echo from 2024 and carotid duplex from 2029 above  Start Rocephin 1 g IV daily  Orthostatic vital signs  Repeat EKG and troponin pacer check  Continue home aspirin and Plavix  Daily weight  Intake and output  AM CBC, BMP, coags, lipids  Check flu and COVID  Urinalysis pending  PT/OT     Chronic conditions: HFpEF, MI, 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 Plan discussed with interdisciplinary  team, consults placed, appreciate input. Will continue current and repeat labs in the AM.          Discharge planning discussed with patient and care team. Therapy evaluations ordered. Patient aware and agreeable to current plan, continue plan as above.      I spent a total of 75 minutes on the date of the service which included preparing to see the patient, face-to-face patient care, completing clinical documentation, obtaining and/or reviewing separately obtained history, performing a medically appropriate examination, counseling and educating the patient/family/caregiver, ordering medications, tests, or procedures, communicating with other HCPs (not separately reported), independently interpreting results (not separately reported), communicating results to the patient/family/caregiver, and care coordination (not separately reported).            Riya Urena                 Revision History         Physical Exam    General: in no acute distress  Eyes: PERRLA   HENT: Normo-cephalic, atraumatic.   CV: Irregular rate, irregular rhythm.   Resp: Breathing non-labored,  diminished to auscultation bilaterally  GI: BS x4   : monitor intake and output  MSK/Extremities: No gross bony deformities. PT/OT on the case  Skin: Warm. Appropriate color, continue offloading  Neuro:  Face symmetric.   Psych: returning to baseline, improved     10 point ROS negative unless otherwise noted in HPI    Patient fully evaluated 06/30  for    Problem List Items Addressed This Visit       Dyspnea on minimal exertion    Relevant Medications    cefuroxime (Ceftin) 250 mg tablet    metoprolol tartrate (Lopressor) 25 mg tablet    Other Relevant Orders    CBC    Comprehensive metabolic panel    Pleural effusion    * (Principal) Acute on chronic diastolic congestive heart failure - Primary    Relevant Medications    clopidogrel (Plavix) tablet 75 mg    metoprolol tartrate (Lopressor) tablet 12.5 mg    metoprolol tartrate (Lopressor) 25 mg tablet     Syncope    Relevant Medications    acetaminophen (Tylenol) 325 mg tablet    dapagliflozin propanediol (Farxiga) 10 mg tablet    heparin sodium,porcine (heparin, porcine,) 5,000 unit/mL injection    lactobacillus acidophilus tablet tablet    oxygen (O2) gas therapy    sennosides-docusate sodium (Sindy-Colace) 8.6-50 mg tablet    Other Relevant Orders    Cardiac device check - Inpatient    Transthoracic Echo Complete (Completed)    CBC    Comprehensive metabolic panel     Brought to hospital - had concerns including Fall.  Patient seen resting in bed with head of bed elevated, no s/s or c/o acute difficulties at this time.  Vital signs for last 24 hours Temp:  [36.2 °C (97.2 °F)-36.5 °C (97.7 °F)] 36.2 °C (97.2 °F)  Heart Rate:  [54-61] 60  Resp:  [16-20] 19  BP: (105-123)/(49-56) 111/53    No intake/output data recorded.  Patient still requiring frequent cardiac and SPO2 monitoring. Continue aggressive pulmonary hygiene and oral hygiene. Off loading as tolerated for skin integrity. Medications and labs reviewed-   Results for orders placed or performed during the hospital encounter of 06/23/25 (from the past 24 hours)   Comprehensive Metabolic Panel   Result Value Ref Range    Glucose 91 74 - 99 mg/dL    Sodium 139 136 - 145 mmol/L    Potassium 4.0 3.5 - 5.3 mmol/L    Chloride 98 98 - 107 mmol/L    Bicarbonate 32 21 - 32 mmol/L    Anion Gap 13 10 - 20 mmol/L    Urea Nitrogen 30 (H) 6 - 23 mg/dL    Creatinine 0.91 0.50 - 1.05 mg/dL    eGFR 60 (L) >60 mL/min/1.73m*2    Calcium 9.2 8.6 - 10.3 mg/dL    Albumin 3.4 3.4 - 5.0 g/dL    Alkaline Phosphatase 105 33 - 136 U/L    Total Protein 5.6 (L) 6.4 - 8.2 g/dL    AST 34 9 - 39 U/L    Bilirubin, Total 0.4 0.0 - 1.2 mg/dL    ALT 26 7 - 45 U/L   CBC and Auto Differential   Result Value Ref Range    WBC 4.5 4.4 - 11.3 x10*3/uL    nRBC 0.0 0.0 - 0.0 /100 WBCs    RBC 3.37 (L) 4.00 - 5.20 x10*6/uL    Hemoglobin 10.1 (L) 12.0 - 16.0 g/dL    Hematocrit 31.3 (L) 36.0 - 46.0 %     MCV 93 80 - 100 fL    MCH 30.0 26.0 - 34.0 pg    MCHC 32.3 32.0 - 36.0 g/dL    RDW 15.1 (H) 11.5 - 14.5 %    Platelets 229 150 - 450 x10*3/uL    Neutrophils % 41.6 40.0 - 80.0 %    Immature Granulocytes %, Automated 0.2 0.0 - 0.9 %    Lymphocytes % 33.9 13.0 - 44.0 %    Monocytes % 15.5 2.0 - 10.0 %    Eosinophils % 8.1 0.0 - 6.0 %    Basophils % 0.7 0.0 - 2.0 %    Neutrophils Absolute 1.85 1.60 - 5.50 x10*3/uL    Immature Granulocytes Absolute, Automated 0.01 0.00 - 0.50 x10*3/uL    Lymphocytes Absolute 1.51 0.80 - 3.00 x10*3/uL    Monocytes Absolute 0.69 0.05 - 0.80 x10*3/uL    Eosinophils Absolute 0.36 0.00 - 0.40 x10*3/uL    Basophils Absolute 0.03 0.00 - 0.10 x10*3/uL     *Note: Due to a large number of results and/or encounters for the requested time period, some results have not been displayed. A complete set of results can be found in Results Review.      Patient recently received an antibiotic (last 12 hours)       Date/Time Action Medication Dose    06/29/25 0919 Given    cefuroxime (Ceftin) tablet 250 mg 250 mg           Plan discussed with interdisciplinary team, continue current and repeat labs in the AM.     Discharge planning discussed with patient and care team. Therapy evaluations ordered.   [address]    Patient aware and agreeable to current plan, continue plan as above.     I spent a total of 60 minutes on the date of the service which included preparing to see the patient, face-to-face patient care, completing clinical documentation, obtaining and/or reviewing separately obtained history, performing a medically appropriate examination, counseling and educating the patient/family/caregiver, ordering medications, tests, or procedures, communicating with other HCPs (not separately reported), independently interpreting results (not separately reported), communicating results to the patient/family/caregiver, and care coordination (not separately reported).   Relevant  Results               This patient currently has cardiac telemetry ordered; if you would like to modify or discontinue the telemetry order, click here to go to the orders activity to modify/discontinue the order.              Assessment & Plan  Acute on chronic diastolic congestive heart failure    Acute bacterial sinusitis    Syncope    Advance care planning    Saulo Urena

## 2025-07-01 ENCOUNTER — APPOINTMENT (OUTPATIENT)
Dept: RADIOLOGY | Facility: HOSPITAL | Age: OVER 89
End: 2025-07-01
Payer: MEDICARE

## 2025-07-01 ENCOUNTER — SURGERY (OUTPATIENT)
Age: OVER 89
End: 2025-07-01
Payer: MEDICARE

## 2025-07-01 LAB
ALBUMIN SERPL BCP-MCNC: 3.4 G/DL (ref 3.4–5)
ALP SERPL-CCNC: 100 U/L (ref 33–136)
ALT SERPL W P-5'-P-CCNC: 32 U/L (ref 7–45)
ANION GAP SERPL CALC-SCNC: 11 MMOL/L (ref 10–20)
AST SERPL W P-5'-P-CCNC: 43 U/L (ref 9–39)
BASOPHILS # BLD AUTO: 0.03 X10*3/UL (ref 0–0.1)
BASOPHILS NFR BLD AUTO: 0.7 %
BILIRUB SERPL-MCNC: 0.4 MG/DL (ref 0–1.2)
BUN SERPL-MCNC: 34 MG/DL (ref 6–23)
CALCIUM SERPL-MCNC: 9.2 MG/DL (ref 8.6–10.3)
CHLORIDE SERPL-SCNC: 98 MMOL/L (ref 98–107)
CO2 SERPL-SCNC: 31 MMOL/L (ref 21–32)
CREAT SERPL-MCNC: 0.87 MG/DL (ref 0.5–1.05)
EGFRCR SERPLBLD CKD-EPI 2021: 63 ML/MIN/1.73M*2
EOSINOPHIL # BLD AUTO: 0.34 X10*3/UL (ref 0–0.4)
EOSINOPHIL NFR BLD AUTO: 7.6 %
ERYTHROCYTE [DISTWIDTH] IN BLOOD BY AUTOMATED COUNT: 15 % (ref 11.5–14.5)
GLUCOSE SERPL-MCNC: 94 MG/DL (ref 74–99)
HCT VFR BLD AUTO: 31.5 % (ref 36–46)
HGB BLD-MCNC: 10 G/DL (ref 12–16)
IMM GRANULOCYTES # BLD AUTO: 0 X10*3/UL (ref 0–0.5)
IMM GRANULOCYTES NFR BLD AUTO: 0 % (ref 0–0.9)
LYMPHOCYTES # BLD AUTO: 1.36 X10*3/UL (ref 0.8–3)
LYMPHOCYTES NFR BLD AUTO: 30.5 %
MCH RBC QN AUTO: 29.6 PG (ref 26–34)
MCHC RBC AUTO-ENTMCNC: 31.7 G/DL (ref 32–36)
MCV RBC AUTO: 93 FL (ref 80–100)
MONOCYTES # BLD AUTO: 0.67 X10*3/UL (ref 0.05–0.8)
MONOCYTES NFR BLD AUTO: 15 %
NEUTROPHILS # BLD AUTO: 2.06 X10*3/UL (ref 1.6–5.5)
NEUTROPHILS NFR BLD AUTO: 46.2 %
NRBC BLD-RTO: 0 /100 WBCS (ref 0–0)
PLATELET # BLD AUTO: 220 X10*3/UL (ref 150–450)
POTASSIUM SERPL-SCNC: 4 MMOL/L (ref 3.5–5.3)
PROT SERPL-MCNC: 5.8 G/DL (ref 6.4–8.2)
RBC # BLD AUTO: 3.38 X10*6/UL (ref 4–5.2)
SODIUM SERPL-SCNC: 136 MMOL/L (ref 136–145)
WBC # BLD AUTO: 4.5 X10*3/UL (ref 4.4–11.3)

## 2025-07-01 PROCEDURE — 2720000007 HC OR 272 NO HCPCS: Performed by: INTERNAL MEDICINE

## 2025-07-01 PROCEDURE — 7100000010 HC PHASE TWO TIME - EACH INCREMENTAL 1 MINUTE: Performed by: INTERNAL MEDICINE

## 2025-07-01 PROCEDURE — 2780000003 HC OR 278 NO HCPCS: Performed by: INTERNAL MEDICINE

## 2025-07-01 PROCEDURE — 02HK3JZ INSERTION OF PACEMAKER LEAD INTO RIGHT VENTRICLE, PERCUTANEOUS APPROACH: ICD-10-PCS | Performed by: INTERNAL MEDICINE

## 2025-07-01 PROCEDURE — 2500000001 HC RX 250 WO HCPCS SELF ADMINISTERED DRUGS (ALT 637 FOR MEDICARE OP): Performed by: NURSE PRACTITIONER

## 2025-07-01 PROCEDURE — 99233 SBSQ HOSP IP/OBS HIGH 50: CPT | Performed by: INTERNAL MEDICINE

## 2025-07-01 PROCEDURE — 2500000004 HC RX 250 GENERAL PHARMACY W/ HCPCS (ALT 636 FOR OP/ED): Performed by: NURSE PRACTITIONER

## 2025-07-01 PROCEDURE — 71045 X-RAY EXAM CHEST 1 VIEW: CPT | Performed by: STUDENT IN AN ORGANIZED HEALTH CARE EDUCATION/TRAINING PROGRAM

## 2025-07-01 PROCEDURE — 2750000001 HC OR 275 NO HCPCS: Performed by: INTERNAL MEDICINE

## 2025-07-01 PROCEDURE — 7100000009 HC PHASE TWO TIME - INITIAL BASE CHARGE: Performed by: INTERNAL MEDICINE

## 2025-07-01 PROCEDURE — C1781 MESH (IMPLANTABLE): HCPCS | Performed by: INTERNAL MEDICINE

## 2025-07-01 PROCEDURE — 2060000001 HC INTERMEDIATE ICU ROOM DAILY

## 2025-07-01 PROCEDURE — 36415 COLL VENOUS BLD VENIPUNCTURE: CPT | Performed by: INTERNAL MEDICINE

## 2025-07-01 PROCEDURE — 2500000005 HC RX 250 GENERAL PHARMACY W/O HCPCS: Performed by: EMERGENCY MEDICINE

## 2025-07-01 PROCEDURE — 2500000001 HC RX 250 WO HCPCS SELF ADMINISTERED DRUGS (ALT 637 FOR MEDICARE OP): Performed by: INTERNAL MEDICINE

## 2025-07-01 PROCEDURE — 71045 X-RAY EXAM CHEST 1 VIEW: CPT

## 2025-07-01 PROCEDURE — 85025 COMPLETE CBC W/AUTO DIFF WBC: CPT | Performed by: INTERNAL MEDICINE

## 2025-07-01 PROCEDURE — C1898 LEAD, PMKR, OTHER THAN TRANS: HCPCS | Performed by: INTERNAL MEDICINE

## 2025-07-01 PROCEDURE — 99152 MOD SED SAME PHYS/QHP 5/>YRS: CPT | Performed by: INTERNAL MEDICINE

## 2025-07-01 PROCEDURE — C1892 INTRO/SHEATH,FIXED,PEEL-AWAY: HCPCS | Performed by: INTERNAL MEDICINE

## 2025-07-01 PROCEDURE — 33216 INSERT 1 ELECTRODE PM-DEFIB: CPT | Performed by: INTERNAL MEDICINE

## 2025-07-01 PROCEDURE — 99153 MOD SED SAME PHYS/QHP EA: CPT | Performed by: INTERNAL MEDICINE

## 2025-07-01 PROCEDURE — 2500000004 HC RX 250 GENERAL PHARMACY W/ HCPCS (ALT 636 FOR OP/ED): Performed by: INTERNAL MEDICINE

## 2025-07-01 PROCEDURE — 80053 COMPREHEN METABOLIC PANEL: CPT | Performed by: INTERNAL MEDICINE

## 2025-07-01 PROCEDURE — C1894 INTRO/SHEATH, NON-LASER: HCPCS | Performed by: INTERNAL MEDICINE

## 2025-07-01 PROCEDURE — 02PA3MZ REMOVAL OF CARDIAC LEAD FROM HEART, PERCUTANEOUS APPROACH: ICD-10-PCS | Performed by: INTERNAL MEDICINE

## 2025-07-01 PROCEDURE — 97116 GAIT TRAINING THERAPY: CPT | Mod: GP,CQ

## 2025-07-01 DEVICE — LEAD 5076-52 CAPSUREFIX NOVUS US EN
Type: IMPLANTABLE DEVICE | Site: HEART | Status: FUNCTIONAL
Brand: CAPSUREFIX® NOVUS

## 2025-07-01 RX ORDER — CEFAZOLIN SODIUM 1 G/50ML
1 SOLUTION INTRAVENOUS EVERY 8 HOURS
Status: DISPENSED | OUTPATIENT
Start: 2025-07-02 | End: 2025-07-02

## 2025-07-01 RX ORDER — LIDOCAINE HYDROCHLORIDE 20 MG/ML
INJECTION, SOLUTION INFILTRATION; PERINEURAL AS NEEDED
Status: DISCONTINUED | OUTPATIENT
Start: 2025-07-01 | End: 2025-07-01 | Stop reason: HOSPADM

## 2025-07-01 RX ORDER — DIPHENHYDRAMINE HYDROCHLORIDE 50 MG/ML
25 INJECTION, SOLUTION INTRAMUSCULAR; INTRAVENOUS ONCE
Status: COMPLETED | OUTPATIENT
Start: 2025-07-01 | End: 2025-07-01

## 2025-07-01 RX ORDER — FENTANYL CITRATE 50 UG/ML
INJECTION, SOLUTION INTRAMUSCULAR; INTRAVENOUS AS NEEDED
Status: DISCONTINUED | OUTPATIENT
Start: 2025-07-01 | End: 2025-07-01 | Stop reason: HOSPADM

## 2025-07-01 RX ORDER — CEFAZOLIN SODIUM 1 G/50ML
1 SOLUTION INTRAVENOUS ONCE
Status: COMPLETED | OUTPATIENT
Start: 2025-07-01 | End: 2025-07-01

## 2025-07-01 RX ORDER — MIDAZOLAM HYDROCHLORIDE 1 MG/ML
INJECTION, SOLUTION INTRAMUSCULAR; INTRAVENOUS AS NEEDED
Status: DISCONTINUED | OUTPATIENT
Start: 2025-07-01 | End: 2025-07-01 | Stop reason: HOSPADM

## 2025-07-01 RX ADMIN — TORSEMIDE 40 MG: 20 TABLET ORAL at 08:34

## 2025-07-01 RX ADMIN — MIDAZOLAM 0.5 MG: 1 INJECTION INTRAMUSCULAR; INTRAVENOUS at 16:08

## 2025-07-01 RX ADMIN — DAPAGLIFLOZIN 10 MG: 10 TABLET, FILM COATED ORAL at 11:03

## 2025-07-01 RX ADMIN — METOPROLOL TARTRATE 12.5 MG: 25 TABLET, FILM COATED ORAL at 21:19

## 2025-07-01 RX ADMIN — Medication 1 TABLET: at 21:19

## 2025-07-01 RX ADMIN — FENTANYL CITRATE 25 MCG: 50 INJECTION, SOLUTION INTRAMUSCULAR; INTRAVENOUS at 16:48

## 2025-07-01 RX ADMIN — FENTANYL CITRATE 25 MCG: 50 INJECTION, SOLUTION INTRAMUSCULAR; INTRAVENOUS at 16:08

## 2025-07-01 RX ADMIN — MIDAZOLAM 0.5 MG: 1 INJECTION INTRAMUSCULAR; INTRAVENOUS at 16:48

## 2025-07-01 RX ADMIN — ALLOPURINOL 100 MG: 100 TABLET ORAL at 08:35

## 2025-07-01 RX ADMIN — DIPHENHYDRAMINE HYDROCHLORIDE 25 MG: 50 INJECTION INTRAMUSCULAR; INTRAVENOUS at 15:52

## 2025-07-01 RX ADMIN — HEPARIN SODIUM 5000 UNITS: 5000 INJECTION, SOLUTION INTRAVENOUS; SUBCUTANEOUS at 03:44

## 2025-07-01 RX ADMIN — FERROUS SULFATE TAB 325 MG (65 MG ELEMENTAL FE) 1 TABLET: 325 (65 FE) TAB at 08:35

## 2025-07-01 RX ADMIN — POLYETHYLENE GLYCOL 3350 17 G: 17 POWDER, FOR SOLUTION ORAL at 08:34

## 2025-07-01 RX ADMIN — METOPROLOL TARTRATE 12.5 MG: 25 TABLET, FILM COATED ORAL at 08:34

## 2025-07-01 RX ADMIN — CEFAZOLIN SODIUM 1 G: 1 INJECTION, SOLUTION INTRAVENOUS at 15:51

## 2025-07-01 RX ADMIN — HYDROCORTISONE SODIUM SUCCINATE 50 MG: 100 INJECTION, POWDER, FOR SOLUTION INTRAMUSCULAR; INTRAVENOUS at 15:52

## 2025-07-01 RX ADMIN — CLOPIDOGREL BISULFATE 75 MG: 75 TABLET, FILM COATED ORAL at 19:03

## 2025-07-01 RX ADMIN — LIDOCAINE HYDROCHLORIDE 5 ML: 20 INJECTION, SOLUTION INFILTRATION; PERINEURAL at 16:24

## 2025-07-01 RX ADMIN — ASPIRIN 81 MG CHEWABLE TABLET 81 MG: 81 TABLET CHEWABLE at 19:02

## 2025-07-01 RX ADMIN — Medication 2 L/MIN: at 17:35

## 2025-07-01 RX ADMIN — SENNOSIDES AND DOCUSATE SODIUM 2 TABLET: 50; 8.6 TABLET ORAL at 08:34

## 2025-07-01 RX ADMIN — CEFUROXIME AXETIL 250 MG: 250 TABLET, FILM COATED ORAL at 08:35

## 2025-07-01 RX ADMIN — COLCHICINE 0.6 MG: 0.6 TABLET, FILM COATED ORAL at 08:35

## 2025-07-01 RX ADMIN — DULOXETINE 30 MG: 30 CAPSULE, DELAYED RELEASE ORAL at 08:35

## 2025-07-01 RX ADMIN — FENTANYL CITRATE 25 MCG: 50 INJECTION, SOLUTION INTRAMUSCULAR; INTRAVENOUS at 16:33

## 2025-07-01 RX ADMIN — CEFUROXIME AXETIL 250 MG: 250 TABLET, FILM COATED ORAL at 21:19

## 2025-07-01 RX ADMIN — POLYETHYLENE GLYCOL 3350 17 G: 17 POWDER, FOR SOLUTION ORAL at 21:20

## 2025-07-01 RX ADMIN — Medication 1 TABLET: at 08:35

## 2025-07-01 RX ADMIN — LIDOCAINE HYDROCHLORIDE 5 ML: 20 INJECTION, SOLUTION INFILTRATION; PERINEURAL at 16:58

## 2025-07-01 RX ADMIN — MIDAZOLAM 0.5 MG: 1 INJECTION INTRAMUSCULAR; INTRAVENOUS at 16:23

## 2025-07-01 ASSESSMENT — PAIN SCALES - GENERAL
PAINLEVEL_OUTOF10: 0 - NO PAIN

## 2025-07-01 ASSESSMENT — COGNITIVE AND FUNCTIONAL STATUS - GENERAL
CLIMB 3 TO 5 STEPS WITH RAILING: A LOT
TURNING FROM BACK TO SIDE WHILE IN FLAT BAD: A LITTLE
PERSONAL GROOMING: A LITTLE
CLIMB 3 TO 5 STEPS WITH RAILING: TOTAL
MOBILITY SCORE: 15
DRESSING REGULAR LOWER BODY CLOTHING: A LITTLE
DAILY ACTIVITIY SCORE: 19
MOBILITY SCORE: 16
HELP NEEDED FOR BATHING: A LOT
HELP NEEDED FOR BATHING: A LITTLE
TURNING FROM BACK TO SIDE WHILE IN FLAT BAD: A LITTLE
MOVING FROM LYING ON BACK TO SITTING ON SIDE OF FLAT BED WITH BEDRAILS: A LITTLE
TOILETING: A LOT
MOVING FROM LYING ON BACK TO SITTING ON SIDE OF FLAT BED WITH BEDRAILS: A LITTLE
DAILY ACTIVITIY SCORE: 16
DRESSING REGULAR LOWER BODY CLOTHING: A LOT
STANDING UP FROM CHAIR USING ARMS: A LITTLE
MOVING TO AND FROM BED TO CHAIR: A LITTLE
MOVING TO AND FROM BED TO CHAIR: A LITTLE
TOILETING: A LITTLE
WALKING IN HOSPITAL ROOM: A LITTLE
DRESSING REGULAR UPPER BODY CLOTHING: A LITTLE
DRESSING REGULAR UPPER BODY CLOTHING: A LITTLE
STANDING UP FROM CHAIR USING ARMS: A LOT
WALKING IN HOSPITAL ROOM: A LOT
PERSONAL GROOMING: A LITTLE

## 2025-07-01 ASSESSMENT — PAIN - FUNCTIONAL ASSESSMENT
PAIN_FUNCTIONAL_ASSESSMENT: 0-10

## 2025-07-01 NOTE — H&P
History and Physical   Pre Surgical Review (< 30 days)      History & Physical Reviewed    I have reviewed the History and Physical dated:  6/23/25   History and Physical reviewed and relevant findings noted. Patient examined to review pertinent physical  findings.: No significant changes   Home Medications Reviewed: see EMR   Allergies Reviewed: no changes noted      Home Medications  Current Outpatient Medications   Medication Instructions    acetaminophen (TYLENOL) 650 mg, oral, Every 4 hours PRN    acetaminophen (TYLENOL) 650 mg, oral, Every 4 hours PRN    albuterol 2.5 mg, nebulization, Every 4 hours PRN    allopurinol (ZYLOPRIM) 100 mg, oral, Daily    aspirin 81 mg, oral, Daily    calcium carbonate-vitamin D3 (Calcium 600 + D,3,) 600 mg-5 mcg (200 unit) tablet 1 tablet, Daily    cefuroxime (CEFTIN) 250 mg, oral, 2 times daily    clopidogrel (PLAVIX) 75 mg, oral, Daily    colchicine 0.6 mg, oral, Daily    dapagliflozin propanediol (FARXIGA) 10 mg, oral, Every 24 hours    DULoxetine (CYMBALTA) 30 mg, oral, Daily, Do not crush or chew.    enoxaparin (LOVENOX) 40 mg, subcutaneous, Every 24 hours    ferrous sulfate 325 mg, Daily    fluticasone propion-salmeteroL (Advair) 115-21 mcg/actuation inhaler 2 puffs, inhalation, 2 times daily, Rinse mouth with water after use to reduce aftertaste and incidence of candidiasis. Do not swallow.    guaiFENesin (MUCINEX) 600 mg, oral, 2 times daily PRN, Do not crush, chew, or split.    heparin (porcine) 5,000 Units, subcutaneous, Every 12 hours    hydrocortisone 1 % lotion Topical, 2 times daily, Apply to both legs , wash hands after applying lotion    lactobacillus acidophilus tablet tablet 1 tablet, oral, 2 times daily    lubricating eye drops ophthalmic solution 1 drop, Daily PRN    metoprolol tartrate (LOPRESSOR) 25 mg, oral, Daily    metoprolol tartrate (LOPRESSOR) 12.5 mg, oral, 2 times daily    nebulizers misc With tubing and mask.   Use as directed    oxygen  (O2) 2 L/min (2 L/min), inhalation, Every 24 hours    polyethylene glycol (Glycolax, Miralax) 17 gram/dose powder Mix 1 capful (17 g) of powder with 4 to 8 ounces of water or juice and drink 2 times a day.    sennosides-docusate sodium (Sindy-Colace) 8.6-50 mg tablet 2 tablets, oral, 2 times daily    torsemide (DEMADEX) 60 mg, oral, Daily        Allergies  RX Allergies[1]    Physical Exam  Physical Exam  Constitutional:       Comments: Follows commands.   Cardiovascular:      Rate and Rhythm: Rhythm irregular.   Pulmonary:      Effort: Pulmonary effort is normal. No respiratory distress.      Comments: Clear bilaterally.  Abdominal:      General: Bowel sounds are normal.   Skin:     General: Skin is warm and dry.   Neurological:      General: No focal deficit present.      Mental Status: She is alert. Mental status is at baseline.   Psychiatric:         Mood and Affect: Mood normal.         Behavior: Behavior normal.          Airway/Sedation Assessment     Assessment by anesthesia N/A     ·  Mouth Opening OK yes      Neck Flexibility OK yes      Loose Teeth No   ·  Oropharyngeal Classification Grade II   ·  ASA PS Classification ASA III - Patient with severe systemic disease       Sedation Plan Moderate     Risks, benefits, and alternatives discussed with patient.     ERAS (Enhanced Recovery After Surgery):  ·  ERAS Patient: No      Consent:   COVID-19 Consent:  ·  COVID-19 Risk Consent Surgeon has reviewed key risks related to the risk of desi COVID-19 and if they contract COVID-19 what the risks are.     Emely Troy, SAQIB-CNP          [1]   Allergies  Allergen Reactions    Iodine Rash    Shrimp Diarrhea and Nausea/vomiting    Hydrocodone Unknown     Pt does not remember    Adhesive Tape-Silicones Itching

## 2025-07-01 NOTE — PROGRESS NOTES
Occupational Therapy    OT Treatment    Patient Name: Yesenia Milner  MRN: [id number]  Today's Date: 7/1/2025  Time Calculation  Start Time: 0906  Stop Time: 0921  Time Calculation (min): 15 min       808/808-B    Assessment:  End of Session Communication: Bedside nurse  End of Session Patient Position: Up in chair, Alarm on (pt high falls risk)       Plan:  Treatment Interventions: ADL retraining, Functional transfer training, Endurance training  OT Frequency: 3 times per week  OT Discharge Recommendations: Moderate intensity level of continued care  OT - OK to Discharge: Yes (from OT perspective)  Treatment Interventions: ADL retraining, Functional transfer training, Endurance training  Subjective     Current Problem:  Problem List[1]    General:  OT Received On: 07/01/25  Family/Caregiver Present: No  Co-Treatment:  (co-tx with PTA for skilled assist with progression of functional mobility and safety)  Prior to Session Communication: Bedside nurse (reports pt stable to participate in session; pt planned for pacemacer revision this afternoon per RN)  Patient Position Received: Up in chair, Alarm off, not on at start of session  General Comment: Pt pleasant and cooperative, agreeable to session. [place], pt donned hearing aids.    Vital Signs:  Vital Signs  Vital Signs Comment: VSS    Pain:  Pain Assessment  Pain Assessment:  (pt reports 0/10 pain at rest, increased chronic arthritic (B) knee pain with mobility but remains unrated. Provided repositioning and mobility to tolerance.)  Objective      Activities of Daily Living: Feeding  Feeding Level of Assistance: Independent  Grooming  Grooming Level of Assistance: Setup              Toileting  Toileting Comments: pt declines urgency during session, has female external catheter in place for urinary urgency/incontinence, but pt reports also using BSC with nursing staff        Bed Mobility/Transfers: Bed Mobility  Bed Mobility:  (NT; pt OOBTC at onset and wished to return  to chair at end of session)  Transfers  Transfer: Yes (Min x2 STS chair to RW, cues for hand placement, increased time to achieve full stand)                    Outcome Measures:[facility] Daily Activity  Putting on and taking off regular lower body clothing: A lot  Bathing (including washing, rinsing, drying): A lot  Putting on and taking off regular upper body clothing: A little  Toileting, which includes using toilet, bedpan or urinal: A lot  Taking care of personal grooming such as brushing teeth: A little  Eating Meals: None  Daily Activity - Total Score: 16  Education Documentation  ADL Training, taught by Sasha Valencia OT at 7/1/2025  9:59 AM.  Learner: Patient  Readiness: Acceptance  Method: Explanation  Response: Verbalizes Understanding    Education Comments  No comments found.           EDUCATION:  Education  Risk and Benefits Discussed with Patient/Caregiver/Other: yes  Patient/Caregiver Demonstrated Understanding: yes    Goals:  Encounter Problems       Encounter Problems (Active)       OT Goals       Increase UE bathing/dressing to SBA and LE bathing/dressing to min assist with adaptive equipment as needed.  (Progressing)       Start:  06/24/25    Expected End:  07/08/25            Increase functional transfers & mobility to/from bed, chair & commode to CGA with DME for safety  (Progressing)       Start:  06/24/25    Expected End:  07/08/25            Increase dynamic stand balance to SBA with UE support to promote increased independence with ADL & functional transfers  (Progressing)       Start:  06/24/25    Expected End:  07/08/25            Tolerate 15 minutes UE therex with rest periods prn to promote increased activity tolerance for ADLs  (Progressing)       Start:  06/24/25    Expected End:  07/08/25                                [1]   Patient Active Problem List  Diagnosis    Aortic stenosis, mild    Arthritis    Complete heart block    COPD (chronic obstructive pulmonary disease) (Multi)     History of paroxysmal supraventricular tachycardia    HTN (hypertension)    Paresthesia    Presence of permanent cardiac pacemaker    Raynauds disease    Sensorineural hearing loss (SNHL) of both ears    Dyspnea on minimal exertion    Varicose veins of lower extremities with inflammation    Vertigo    Ischemic cerebrovascular accident (CVA) (Multi)    Acquired deformity of toe    Benign neoplasm of skin of foot    Benign paroxysmal positional vertigo    Dermatophytosis of nail    Leg swelling    Macular degeneration    Mitral valve insufficiency    Moderate mitral valve regurgitation    Plantar wart of left foot    Venous insufficiency    Dysarthria following cerebral infarction    Hyperglycemia    Hand paresthesia    Sick sinus syndrome (Multi)    Injury of kidney    NSTEMI (non-ST elevated myocardial infarction) (Multi)    Acute superior vena cava thrombosis (Multi)    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)    Gastrointestinal hemorrhage, unspecified gastrointestinal hemorrhage type    Malignant neoplasm of transverse colon (Multi)    Stage 3 chronic kidney disease, unspecified whether stage 3a or 3b CKD (Multi)    Hypoxia    Pleural effusion    Cellulitis of lower extremity    Adenocarcinoma of colon    Bilateral leg edema    Acute on chronic diastolic heart failure    Nonrheumatic tricuspid valve regurgitation    Acute on chronic diastolic congestive heart failure    Acute bacterial sinusitis    Syncope    Advance care planning    Constipation

## 2025-07-01 NOTE — POST-PROCEDURE NOTE
Physician Transition of Care Summary  Invasive Cardiovascular Lab    Procedure Date: 7/1/2025  Attending:    * James C Ramicone - Primary  Resident/Fellow/Other Assistant: Surgeons and Role:  * No surgeons found with a matching role *    Indications:   Pre-op Diagnosis      * Syncope [R55]    Post-procedure diagnosis:   Post-op Diagnosis     * Syncope [R55]    Procedure(s):   PPM Single Ventricle Implant  66251 - MN INS NEW/RPLC PRM PACEMAKER W/TRANSV ELTRD VENTR        Procedure Findings:       Description of the Procedure:   Right ventricular pacing lead insertion    Complications:   None    Stents/Implants:   Implants          Pacemaker          Lead, Capsurefix Novus, 52 Cm - [id number] - Implanted        Inventory item: LEAD, CAPSUREFIX NOVUS, 52 CM Model/Cat number: 5076-52    Serial number: [id number] : [employer]    Lot number: [id number] Device identifier: [id number]    Implant Date: 7/1/2025        GUDID Information       Request status Successful        Brand name: Capsurefix® Novus Version/Model: 5076-52    Company name: [employer] MRI safety info as of 7/1/25: Labeling does not contain MRI Safety Information    Contains dry or latex rubber: No      GMDN P.T. name: Endocardial/interventricular septal pacing lead                As of 7/1/2025       Status: Implanted                              Anticoagulation/Antiplatelet Plan:   Hold heparin    Estimated Blood Loss:   3 mL    Anesthesia: Moderate Sedation Anesthesia Staff: No anesthesia staff entered.    Any Specimen(s) Removed:   No specimens collected during this procedure.    Disposition:   Monitor on telemetry tonight.      Electronically signed by: James C Ramicone, DO, 7/1/2025 5:34 PM

## 2025-07-01 NOTE — PROGRESS NOTES
History Of Present Illness:      The patient has no chest pain or shortness of breath.  Telemetry review shows intermittent pauses which have been occurring despite pacemaker reprogramming.    Review of Systems  Other review of systems negative  Last Recorded Vitals:      6/30/2025     3:21 PM 6/30/2025     7:52 PM 6/30/2025     8:29 PM 6/30/2025    11:45 PM 7/1/2025     3:49 AM 7/1/2025     6:00 AM 7/1/2025     7:31 AM   Vitals   Systolic 108 109  105 109  131   Diastolic 52 53  50 51  59   BP Location Right arm Right arm  Right arm Right arm  Right arm   Heart Rate 60 57 60 61 63  60   Temp 36.2 °C (97.2 °F) 36.2 °C (97.2 °F)  36.3 °C (97.3 °F) 36.2 °C (97.2 °F)  36 °C (96.8 °F)   Resp 17 20  20 20  20   Weight (lb)      --      Allergies:  Iodine, Shrimp, Hydrocodone, and Adhesive tape-silicones  Outpatient Medications:  Current Outpatient Medications   Medication Instructions    acetaminophen (TYLENOL) 650 mg, oral, Every 4 hours PRN    acetaminophen (TYLENOL) 650 mg, oral, Every 4 hours PRN    albuterol 2.5 mg, nebulization, Every 4 hours PRN    allopurinol (ZYLOPRIM) 100 mg, oral, Daily    aspirin 81 mg, oral, Daily    calcium carbonate-vitamin D3 (Calcium 600 + D,3,) 600 mg-5 mcg (200 unit) tablet 1 tablet, Daily    cefuroxime (CEFTIN) 250 mg, oral, 2 times daily    clopidogrel (PLAVIX) 75 mg, oral, Daily    colchicine 0.6 mg, oral, Daily    dapagliflozin propanediol (FARXIGA) 10 mg, oral, Every 24 hours    DULoxetine (CYMBALTA) 30 mg, oral, Daily, Do not crush or chew.    enoxaparin (LOVENOX) 40 mg, subcutaneous, Every 24 hours    ferrous sulfate 325 mg, Daily    fluticasone propion-salmeteroL (Advair) 115-21 mcg/actuation inhaler 2 puffs, inhalation, 2 times daily, Rinse mouth with water after use to reduce aftertaste and incidence of candidiasis. Do not swallow.    guaiFENesin (MUCINEX) 600 mg, oral, 2 times daily PRN, Do not crush, chew, or split.    heparin (porcine) 5,000 Units, subcutaneous, Every  12 hours    hydrocortisone 1 % lotion Topical, 2 times daily, Apply to both legs , wash hands after applying lotion    lactobacillus acidophilus tablet tablet 1 tablet, oral, 2 times daily    lubricating eye drops ophthalmic solution 1 drop, Daily PRN    metoprolol tartrate (LOPRESSOR) 25 mg, oral, Daily    metoprolol tartrate (LOPRESSOR) 12.5 mg, oral, 2 times daily    nebulizers misc With tubing and mask.   Use as directed    oxygen (O2) 2 L/min (2 L/min), inhalation, Every 24 hours    polyethylene glycol (Glycolax, Miralax) 17 gram/dose powder Mix 1 capful (17 g) of powder with 4 to 8 ounces of water or juice and drink 2 times a day.    sennosides-docusate sodium (Sindy-Colace) 8.6-50 mg tablet 2 tablets, oral, 2 times daily    torsemide (DEMADEX) 60 mg, oral, Daily       Physical Exam:    General Appearance:  Alert, oriented, no distress  Skin:  Warm and dry  Head and Neck:  No elevation of JVP, no carotid bruits  Cardiac Exam:  Rhythm is regular, S1 and S2 are normal, no murmur S3 or S4  Lungs:  Clear to auscultation  Extremities:  no edema  Neurologic:  No focal deficits  Psychiatric:  Appropriate mood and behavior    Lab Results:    CMP:  Recent Labs     07/01/25  0531 06/30/25  0530 06/29/25  0544 06/28/25  0540 06/27/25  0523 06/26/25  0524 06/25/25  0530 06/24/25  0522 06/23/25  1317 04/30/25  1419 03/26/25  0704 03/25/25  0656 03/24/25  0624 03/23/25  0636 03/22/25  0721 03/21/25  1055 03/17/25  0500 03/14/25  0500    139 136 137 137 137 137 140 138   < > 136 135* 135* 131* 131* 129*   < > 136   K 4.0 4.0 3.9 3.7 3.9 3.8 3.8 3.6 3.6   < > 3.3* 3.5 3.6 3.4* 3.7 3.3*   < > 4.2   CL 98 98 98 99 100 100 99 100 99   < > 97* 97* 97* 93* 94* 93*   < > 100   CO2 31 32 32 32 32 31 31 30 27   < > 32 30 31 30 28 28   < > 30   ANIONGAP 11 13 10 10 9* 10 11 14 16   < > 10 12 11 11 13 11   < > 10   BUN 34* 30* 31* 31* 33* 32* 27* 28* 30*   < > 13 18 22 24* 21 19   < > 27*   CREATININE 0.87 0.91 0.94 0.92 0.94 0.90  0.88 0.95 1.00   < > 0.89 1.03 0.95 0.99 0.95 0.87   < > 1.00   EGFR 63 60* 57* 59* 57* 60* 62 57* 53*   < > 61 51* 57* 54* 57* 63   < > 53*   MG  --   --   --   --   --   --   --   --  2.24  --  2.17 2.08 2.17 2.22 2.17 2.07  --  2.19    < > = values in this interval not displayed.     Recent Labs     07/01/25  0531 06/30/25  0530 06/29/25  0544 06/28/25  0540 06/27/25  0523 06/25/25  0530 06/23/25  1317 01/13/25  0626 01/12/25  1122   ALBUMIN 3.4 3.4 3.4 3.5 3.4   < > 3.8   < > 3.9   ALKPHOS 100 105 100 102 100   < > 89   < > 83   ALT 32 26 22 24 24   < > 17   < > 15   AST 43* 34 32 31 34   < > 24   < > 17   BILITOT 0.4 0.4 0.4 0.4 0.4   < > 0.6   < > 0.5   LIPASE  --   --   --   --   --   --  18  --  23    < > = values in this interval not displayed.     CBC:  Recent Labs     07/01/25  0531 06/30/25  0530 06/29/25  0544 06/28/25  0540 06/27/25  0523 06/26/25  0524 06/25/25  0530 06/24/25  0522   WBC 4.5 4.5 4.0* 4.7 4.8 4.4 5.0 4.7   HGB 10.0* 10.1* 10.5* 10.5* 10.4* 9.6* 9.9* 10.1*   HCT 31.5* 31.3* 34.2* 33.8* 32.6* 30.7* 32.0* 32.0*    229 217 218 219 215 213 215   MCV 93 93 96 95 94 94 94 94     COAG:   Recent Labs     06/24/25  0522 06/23/25  1317 01/16/25  0709 01/12/25  1122 01/05/25  1412 01/05/25  1028 01/04/25  0612 01/03/25  1539 01/03/25  0900 01/03/25  0344 01/02/25  2334 12/21/24  0538 12/20/24  1013 12/20/24  0251   INR 1.2* 1.1 1.5* 1.7*  --   --   --   --   --   --   --  1.1  --  1.1   HAUF  --   --   --   --  1.8* 0.4 0.4 0.5 0.7 0.9 1.0  --  1.1*  --      Cardiology Tests (personally reviewed):    Telemetry: Ventricular paced rhythm, intermittent pauses secondary to oversensing    Assessment/Plan     1.  Complete heart block: This patient has atrial fibrillation with complete heart block and is pacemaker dependent.  She presented with syncope and there have been intermittent pauses occurring because of oversensing on the ventricular lead.  We have reprogrammed the pacemaker 3 times over  the past week and continue to monitor her telemetry and still these pauses are occurring.  The right ventricular lead appears to be failing and therefore insertion of a new right ventricular pacing lead is recommended.  The procedure and risks were discussed with the patient today.  I also spoke with her daughter Vannessa via telephone regarding the procedure that will be performed later this afternoon.  The patient and her daughter are in agreement with the recommendations.    James C Ramicone, DO

## 2025-07-01 NOTE — PROGRESS NOTES
Yesenia Milner is a 89 y.o. female on day 8 of admission presenting with Acute on chronic diastolic congestive heart failure.      Subjective   07/01- Patient seen and fully examined at bedside, patient ill appearing, acutely complex, marked decline from baseline. Patient remains hospitalized for acute onset with complex medical and pharmacological management. Acute on chronic diastolic congestive heart failure, Acute bacterial sinusitis, Syncope, Advance care planning, Constipation. Patient seen by cardiology today with Pacemaker revsion as per Dr. Ramicone today with lead replacement. Patient and daughter aware. Appreciate input and agree with plan.  Will continue antibiotics for sinusitis.  Continue strict intake and output., telemetry. Will monitor overnight and repeat labs in the morning. Slow but progressive improvements noted. High Complexity with updates to multiple problems, adjustments to treatment plan, and need for ongoing inpatient care.   Long discussion on goals of care with patient and family, will continue current and await diagnostic findings.Patient reports: no new complaints, decline from baseline, weakness                 Objective     Last Recorded Vitals  /55 (BP Location: Left arm, Patient Position: Lying)   Pulse 62   Temp 36.1 °C (97 °F) (Temporal)   Resp 20   Wt 51.5 kg (113 lb 8.6 oz)   SpO2 98%   Intake/Output last 3 Shifts:    Intake/Output Summary (Last 24 hours) at 7/1/2025 1340  Last data filed at 6/30/2025 2345  Gross per 24 hour   Intake --   Output 250 ml   Net -250 ml       Admission Weight  Weight: 69.9 kg (154 lb) (06/23/25 1227)    Daily Weight  06/30/25 : 51.5 kg (113 lb 8.6 oz)    Image Results  XR chest 1 view  Narrative: Interpreted By:  Bryce Redding,   STUDY:  XR CHEST 1 VIEW;  6/25/2025 1:47 pm      INDICATION:  Signs/Symptoms:shortness of breath, PPM Lead eval.      COMPARISON:  03/24/2025      ACCESSION NUMBER(S):  [id number]      ORDERING  CLINICIAN:  KAYLIN NAVA      FINDINGS:  CARDIOMEDIASTINAL SILHOUETTE AND VASCULATURE:      Cardiac size:  Within normal limits considering a right pericardial  fat pad and similar to the prior exam. Aortic shadow:  Within normal  limits.      Mediastinal contours: Within normal limits.      Pulmonary vasculature:  The central vasculature is unremarkable      LUNGS:  There is blunting of the left costophrenic angle is probably from a  residual effusion with atelectasis, improved from the previous exam.  The lungs otherwise relatively clear.      ABDOMEN AND OTHER FINDINGS:  Cardiac pacemaker device overlies the left chest similar to the  previous exam.      BONES:  No acute osseous changes.      Impression: 1.  Improved left basilar atelectasis and effusion.      Signed by: Bryce Redding 6/25/2025 5:03 PM  Dictation workstation:   [id number]        Fei Mensah MD   Physician  Internal Medicine     H&P      Signed     Date of Service: 6/23/2025  2:22 PM     Signed         History Of Present Illness  Yesenia Milner is a 89 y.o. 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  Patient has had multiple admissions for congestive heart failure exacerbation who presented today after a syncopal episode.  Patient notes she was gathering her clothing to get ready for the day when she became lightheaded and passed out.  She denies any injury from the fall.  She notes over the past 2 weeks she has been experiencing a stuffy nose and a cough upon wakening in the morning.  She otherwise denies any fevers, chills, chest pain, change in her chronic shortness of breath with exertion,  abdominal pain, nausea, vomiting, diarrhea, or dysuria.  She denies any change in her chronic intermittent bilateral lower extremity edema.  She denies any weight gain.  She denies any recent medication changes.  She reports compliance with her medications.  She is on her baseline oxygen at 2 L.  She notes her cardiologist is Dr. Ramicone.  In regards to her colon cancer, family notes that patient was deemed a risk for surgery via cardiology and surgeon, Dr. Vazquez, recommended patient speak with Dr. Ramicone in regards to her risk for surgery before he would schedule her for an operation.  They noted that her appointment is not until the fall and they are hopeful to speak with him this admission about her risk for surgery.  Patient notes she lives alone and uses a walker.  CODE STATUS discussed and patient became tearful during conversation.  She would like to remain a full code at this time and referred to family if an emergency would arise that she was incapacitated to answer for herself.     In the ED lab work, EKG, head CT, C-spine CT and CT chest/abdomen/pelvis were performed. Labs revealed troponin 19 (appears baseline with previous result 21 in March 2025), H&H 10.1 and 31.3 (within patient's recent baseline),  (310 in March 2025).  EKG, per ER physician impression revealed Dual paced rhythm. Motion artifact present. Irregular rate. Normal AK, wide QRS and Qtc intervals. No ST segment elevations, depressions, or T wave inversions. Compared to March 2025 imaging positive for fluid/mucosal thickening of the left sphenoid sinus.  Bilateral infiltrates and right greater than left pleural effusions. Moderate fecal residue.  Heart rate was 120 on arrival, vitals were otherwise stable.    Echo 12/21/2024:     CONCLUSIONS:   1. Left ventricular ejection fraction is normal, by visual estimate at 60-65%.   2. Abnormal left venticular wall motion.   3. Left ventricular diastolic filling is indeterminate.   4.  There is a moderate apical and anteroapical myocardial infarction.   5. There is normal right ventricular global systolic function.   6. There is moderate mitral annular calcification.   7. Moderate mitral valve regurgitation.   8. Moderate to severe tricuspid regurgitation visualized.   9. Moderately elevated right ventricular systolic pressure.  10. Aortic valve sclerosis.     Carotid duplex 2019:     Right Carotid: Findings are consistent with less than 50% stenosis of the right proximal ICA. Laminar flow seen by color Doppler. Right external carotid artery appears patent with no evidence of stenosis. The right vertebral artery is patent with antegrade flow. No evidence of hemodynamically significant stenosis in the right subclavian.  Left Carotid: Findings are consistent with less than 50% stenosis of the left proximal ICA. Laminar flow seen by color Doppler. Left external carotid artery appears patent with no evidence of stenosis. The left vertebral artery is patent with antegrade flow.  No evidence of hemodynamically significant stenosis in the left subclavian.        Past Medical History  [Medical History]    [Medical History]  Past Medical History       Diagnosis Date    Gross hematuria 10/07/2020    Impacted cerumen 02/22/2024    Other conditions influencing health status       Normal stress echocardiogram    Personal history of other medical treatment       History of echocardiogram    Personal history of other medical treatment       H/O Doppler ultrasound    Systolic CHF (Multi) 05/18/2023        Surgical History  [Surgical History]    [Surgical History]  Past Surgical History        Procedure Laterality Date    APPENDECTOMY   11/22/2017     Appendectomy    CARDIAC CATHETERIZATION N/A 12/20/2024     Procedure: Left Heart Cath, With LV;  Surgeon: Tahir Ruelas MD;  Location: [facility] Cardiac [facility];  Service: Cardiovascular;  Laterality: N/A;    CARDIAC PACEMAKER PLACEMENT   03/11/2021     Pacemaker  Placement    HYSTERECTOMY   11/22/2017     Hysterectomy    IR ANGIOGRAM INFERIOR EPIGASTRIC PELVIC   12/14/2020     IR ANGIOGRAM INFERIOR EPIGASTRIC PELVIC 12/14/2020 [facility]    IR ANGIOGRAM INFERIOR EPIGASTRIC PELVIC   12/14/2020     IR ANGIOGRAM INFERIOR EPIGASTRIC PELVIC 12/14/2020 [facility]    IR ANGIOGRAM RENAL BILATERAL Bilateral 12/14/2020     IR ANGIOGRAM RENAL BILATERAL 12/14/2020 [facility]    OTHER SURGICAL HISTORY   11/22/2017     Stab Phlebectomy Of Varicose Veins    OTHER SURGICAL HISTORY   11/22/2017     Venous Ligation With Stripping    TONSILLECTOMY   11/22/2017     Tonsillectomy        Social History  She reports that she quit smoking about 52 years ago. Her smoking use included cigarettes. She has been exposed to tobacco smoke. She has never used smokeless tobacco. She reports that she does not drink alcohol and does not use drugs.     Family History  [Family History]    [Family History]         Problem Relation Name Age of Onset    No Known Problems Mother            Allergies  Iodine, Shrimp, Hydrocodone, and Adhesive tape-silicones     10 point review systems performed and is negative besides what is stated in HPI     Physical Exam  HENT:      Head: Normocephalic and atraumatic.      Nose: Nose normal.      Mouth/Throat:      Mouth: Mucous membranes are dry.   Eyes:      Conjunctiva/sclera: Conjunctivae normal.   Cardiovascular:      Rate and Rhythm: Normal rate. Rhythm irregular.      Heart sounds: Murmur heard.   Pulmonary:      Effort: Pulmonary effort is normal.      Breath sounds: Rales present.   Abdominal:      General: Bowel sounds are normal.      Tenderness: There is abdominal tenderness.   Musculoskeletal:         General: Normal range of motion.      Cervical back: Neck supple.   Skin:     General: Skin is warm and dry.   Neurological:      Mental Status: She is alert. Mental status is at baseline.   Psychiatric:         Mood and Affect: Mood normal.            Last  "Recorded Vitals  Blood pressure 114/58, pulse 70, temperature 36.3 °C (97.3 °F), temperature source Temporal, resp. rate 19, height 1.626 m (5' 4\"), weight 69.9 kg (154 lb), SpO2 95%.     Relevant Results     [Medications Ordered Prior to Encounter]     [Medications Ordered Prior to Encounter]  No current facility-administered medications on file prior to encounter.             Current Outpatient Medications on File Prior to Encounter   Medication Sig Dispense Refill    allopurinol (Zyloprim) 100 mg tablet Take 1 tablet (100 mg) by mouth once daily. 90 tablet 0    aspirin 81 mg chewable tablet Chew 1 tablet (81 mg) once daily.        calcium carbonate-vitamin D3 (Calcium 600 + D,3,) 600 mg-5 mcg (200 unit) tablet Take 1 tablet by mouth once daily.        clopidogrel (Plavix) 75 mg tablet Take 1 tablet by mouth once daily 90 tablet 0    colchicine 0.6 mg tablet Take 1 tablet (0.6 mg) by mouth once daily. 90 tablet 3    DULoxetine (Cymbalta) 30 mg DR capsule Take 1 capsule (30 mg) by mouth once daily. Do not crush or chew. 90 capsule 1    ferrous sulfate 325 (65 Fe) mg EC tablet Take 1 tablet by mouth once daily. Do not crush, chew, or split.        fluticasone propion-salmeteroL (Advair) 115-21 mcg/actuation inhaler Inhale 2 puffs 2 times a day. Rinse mouth with water after use to reduce aftertaste and incidence of candidiasis. Do not swallow. 12 g 11    metoprolol tartrate (Lopressor) 25 mg tablet Take 1 tablet (25 mg) by mouth once daily. 30 tablet 0    polyethylene glycol (Glycolax, Miralax) 17 gram/dose powder Mix 1 capful (17 g) of powder with 4 to 8 ounces of water or juice and drink 2 times a day. 1010 g 1    torsemide (Demadex) 20 mg tablet Take 3 tablets (60 mg) by mouth once daily. 90 tablet 11    acetaminophen (Tylenol) 325 mg tablet Take 2 tablets (650 mg) by mouth every 4 hours if needed for mild pain (1 - 3). (Patient not taking: Reported on 6/23/2025)        albuterol 2.5 mg /3 mL (0.083 %) nebulizer " solution Take 3 mL (2.5 mg) by nebulization every 4 hours if needed for wheezing. 75 mL 1    enoxaparin (Lovenox) 40 mg/0.4 mL syringe Inject 0.4 mL (40 mg) under the skin once every 24 hours. (Patient not taking: Reported on 6/23/2025)        guaiFENesin (Mucinex) 600 mg 12 hr tablet Take 1 tablet (600 mg) by mouth 2 times a day as needed for cough. Do not crush, chew, or split. (Patient not taking: Reported on 6/23/2025)        hydrocortisone 1 % lotion Apply topically 2 times a day. Apply to both legs , wash hands after applying lotion (Patient not taking: Reported on 6/23/2025) 60 mL 0    lubricating eye drops ophthalmic solution Administer 1 drop into both eyes once daily as needed for dry eyes.        nebulizers misc With tubing and mask.   Use as directed 1 each 0    [DISCONTINUED] ipratropium-albuteroL (Duo-Neb) 0.5-2.5 mg/3 mL nebulizer solution Take 3 mL by nebulization every 6 hours.                  Results for orders placed or performed during the hospital encounter of 06/23/25 (from the past 24 hours)   CBC and Auto Differential   Result Value Ref Range     WBC 6.0 4.4 - 11.3 x10*3/uL     nRBC 0.0 0.0 - 0.0 /100 WBCs     RBC 3.37 (L) 4.00 - 5.20 x10*6/uL     Hemoglobin 10.1 (L) 12.0 - 16.0 g/dL     Hematocrit 31.3 (L) 36.0 - 46.0 %     MCV 93 80 - 100 fL     MCH 30.0 26.0 - 34.0 pg     MCHC 32.3 32.0 - 36.0 g/dL     RDW 15.9 (H) 11.5 - 14.5 %     Platelets 206 150 - 450 x10*3/uL     Neutrophils % 69.9 40.0 - 80.0 %     Immature Granulocytes %, Automated 0.5 0.0 - 0.9 %     Lymphocytes % 16.6 13.0 - 44.0 %     Monocytes % 10.7 2.0 - 10.0 %     Eosinophils % 2.0 0.0 - 6.0 %     Basophils % 0.3 0.0 - 2.0 %     Neutrophils Absolute 4.18 1.60 - 5.50 x10*3/uL     Immature Granulocytes Absolute, Automated 0.03 0.00 - 0.50 x10*3/uL     Lymphocytes Absolute 0.99 0.80 - 3.00 x10*3/uL     Monocytes Absolute 0.64 0.05 - 0.80 x10*3/uL     Eosinophils Absolute 0.12 0.00 - 0.40 x10*3/uL     Basophils Absolute 0.02  0.00 - 0.10 x10*3/uL   Comprehensive metabolic panel   Result Value Ref Range     Glucose 116 (H) 74 - 99 mg/dL     Sodium 138 136 - 145 mmol/L     Potassium 3.6 3.5 - 5.3 mmol/L     Chloride 99 98 - 107 mmol/L     Bicarbonate 27 21 - 32 mmol/L     Anion Gap 16 10 - 20 mmol/L     Urea Nitrogen 30 (H) 6 - 23 mg/dL     Creatinine 1.00 0.50 - 1.05 mg/dL     eGFR 53 (L) >60 mL/min/1.73m*2     Calcium 9.6 8.6 - 10.3 mg/dL     Albumin 3.8 3.4 - 5.0 g/dL     Alkaline Phosphatase 89 33 - 136 U/L     Total Protein 6.3 (L) 6.4 - 8.2 g/dL     AST 24 9 - 39 U/L     Bilirubin, Total 0.6 0.0 - 1.2 mg/dL     ALT 17 7 - 45 U/L   Lipase   Result Value Ref Range     Lipase 18 9 - 82 U/L   Magnesium   Result Value Ref Range     Magnesium 2.24 1.60 - 2.40 mg/dL   aPTT   Result Value Ref Range     aPTT 26 26 - 36 seconds   Protime-INR   Result Value Ref Range     Protime 12.4 9.8 - 12.4 seconds     INR 1.1 0.9 - 1.1   B-Type Natriuretic Peptide   Result Value Ref Range      (H) 0 - 99 pg/mL   Troponin I, High Sensitivity, Initial   Result Value Ref Range     Troponin I, High Sensitivity 19 (H) 0 - 13 ng/L   Troponin, High Sensitivity, 1 Hour   Result Value Ref Range     Troponin I, High Sensitivity 23 (H) 0 - 13 ng/L   Sars-CoV-2, Influenza A/B and RSV PCR   Result Value Ref Range     Coronavirus 2019, PCR Not Detected Not Detected     Flu A Result Not Detected Not Detected     Flu B Result Not Detected Not Detected     RSV PCR Not Detected Not Detected   Troponin I, High Sensitivity   Result Value Ref Range     Troponin I, High Sensitivity 22 (H) 0 - 13 ng/L   Urinalysis with Reflex Culture and Microscopic   Result Value Ref Range     Color, Urine Light-Yellow Light-Yellow, Yellow, Dark-Yellow     Appearance, Urine Clear Clear     Specific Gravity, Urine 1.010 1.005 - 1.035     pH, Urine 7.0 5.0, 5.5, 6.0, 6.5, 7.0, 7.5, 8.0     Protein, Urine NEGATIVE NEGATIVE, 10 (TRACE), 20 (TRACE) mg/dL     Glucose, Urine Normal Normal mg/dL      Blood, Urine NEGATIVE NEGATIVE mg/dL     Ketones, Urine NEGATIVE NEGATIVE mg/dL     Bilirubin, Urine NEGATIVE NEGATIVE mg/dL     Urobilinogen, Urine Normal Normal mg/dL     Nitrite, Urine NEGATIVE NEGATIVE     Leukocyte Esterase, Urine NEGATIVE NEGATIVE   CBC   Result Value Ref Range     WBC 4.7 4.4 - 11.3 x10*3/uL     nRBC 0.0 0.0 - 0.0 /100 WBCs     RBC 3.41 (L) 4.00 - 5.20 x10*6/uL     Hemoglobin 10.1 (L) 12.0 - 16.0 g/dL     Hematocrit 32.0 (L) 36.0 - 46.0 %     MCV 94 80 - 100 fL     MCH 29.6 26.0 - 34.0 pg     MCHC 31.6 (L) 32.0 - 36.0 g/dL     RDW 15.8 (H) 11.5 - 14.5 %     Platelets 215 150 - 450 x10*3/uL   Coagulation Screen   Result Value Ref Range     Protime 12.8 (H) 9.8 - 12.4 seconds     INR 1.2 (H) 0.9 - 1.1     aPTT 36 26 - 36 seconds   Basic Metabolic Panel   Result Value Ref Range     Glucose 98 74 - 99 mg/dL     Sodium 140 136 - 145 mmol/L     Potassium 3.6 3.5 - 5.3 mmol/L     Chloride 100 98 - 107 mmol/L     Bicarbonate 30 21 - 32 mmol/L     Anion Gap 14 10 - 20 mmol/L     Urea Nitrogen 28 (H) 6 - 23 mg/dL     Creatinine 0.95 0.50 - 1.05 mg/dL     eGFR 57 (L) >60 mL/min/1.73m*2     Calcium 9.1 8.6 - 10.3 mg/dL   Lipid Panel   Result Value Ref Range     Cholesterol 119 0 - 199 mg/dL     HDL-Cholesterol 49.2 mg/dL     Cholesterol/HDL Ratio 2.4       LDL Calculated 59 <=99 mg/dL     VLDL 11 0 - 40 mg/dL     Triglycerides 56 0 - 149 mg/dL     Non HDL Cholesterol 70 0 - 149 mg/dL      *Note: Due to a large number of results and/or encounters for the requested time period, some results have not been displayed. A complete set of results can be found in Results Review.         CT chest abdomen pelvis wo IV contrast  Result Date: 6/23/2025  Interpreted By:  Mary Jane Foreman, STUDY: CT CHEST ABDOMEN PELVIS WO CONTRAST;  6/23/2025 1:05 pm   INDICATION: Signs/Symptoms:abdominal pain prior cancer syncope r/o mass, nephro jesse traumatic injury.     COMPARISON: CT chest 03/21/2025, CT abdomen and pelvis  01/12/2025   ACCESSION NUMBER(S): [id number]   ORDERING CLINICIAN: MURTAZA HINOJOSA   TECHNIQUE: CT of the chest, abdomen, and pelvis was performed. Sagittal and coronal reconstructions were generated. No intravenous contrast given for the examination.   FINDINGS: CHEST:   Hilar, vessel, and solid organ evaluation limited without IV contrast.   CHEST WALL AND LOWER NECK: Metallic streak artifact from port in the left anterior chest wall limits assessment of adjacent structures. No gross axillary adenopathy as visualized. Slightly prominent heterogenous right thyroid lobe.   MEDIASTINUM AND HARITHA:  Limited hilar assessment on unenhanced exam. No significant mediastinal or hilar adenopathy within limits of unenhanced study. Mild gaseous distention of the proximal esophagus.   HEART AND VESSELS:  Lack of IV contrast precludes vascular luminal assessment. The heart is normal in size. No significant pericardial effusion. Pacer wires in the right side of the heart. Multifocal atherosclerotic calcifications.   LUNGS, PLEURA, LARGE AIRWAYS:  Mild motion artifact in the lower chest. Pulmonary heterogeneity including scattered bilateral ground-glass infiltrates and reticular densities most prominent in the lung bases. Moderate right and small to moderate left pleural effusions and presumed compressive atelectasis of the adjacent lung bases. Slight fluid/thickening along the superior right main fissure. No pneumothorax. The central airways are grossly patent.   BONES:  Kyphosis and degenerative endplate spurring in the thoracic spine.     ABDOMEN/PELVIS:   Solid organ and vessel evaluation limited without IV contrast. Artifact related to overlying upper extremities.   ABDOMINAL ORGANS:   LIVER: No focal lesion within limits of unenhanced exam   GALL BLADDER AND BILIARY TREE: No calcified gallstone   SPLEEN: No focal lesion within limits of unenhanced exam   PANCREAS: No focal lesion within limits of unenhanced exam   ADRENALS:  No adrenal mass   KIDNEYS AND URETERS: No renal mass or hydronephrosis within limits of unenhanced exam   BOWEL: No abnormally dilated large or small bowel loops. Moderate fecal residue. Distal colon diverticulosis. Within limits of unenhanced exam probable persistent circumferential wall thickening in the distal transverse colon for example image 110/201, as noted on the prior exam.   PERITONEUM, RETROPERITONEUM, NODES: No significant free fluid. No free air. No significant retroperitoneal lymphadenopathy within limits of unenhanced exam. Slight increased density in the lower abdominal mesentery.   VESSELS:  Lack of IV contrast precludes vascular luminal assessment. Multifocal atherosclerotic calcifications. No abdominal aortic aneurysm.   PELVIS: Urinary bladder is moderately distended and grossly normal in contour. 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   ABDOMINAL WALL: No sizable abdominal wall hernia.   BONES: Osteopenia. Multifocal degenerative changes. Mild scoliosis of the lumbar spine.        CHEST:   Bilateral infiltrates and right-greater-than-left pleural effusions. Differential includes CHF and pneumonia. Clinical correlation and follow-up to ensure resolution after appropriate treatment recommended.   ABDOMEN AND PELVIS:   Apparent persistent distal transverse colon wall thickening consistent with reportedly known malignancy.   No gross evidence of solid organ injury or free fluid within limits of unenhanced exam.   Fecal retention. Distal colon diverticulosis.   Other findings as described above.   MACRO: None.   Signed by: Mary Jane Foreman 6/23/2025 1:32 PM Dictation workstation:   [id number]     CT cervical spine wo IV contrast  Result Date: 6/23/2025  Interpreted By:  Mary Jane Foreman, STUDY: CT CERVICAL SPINE WO IV CONTRAST;  6/23/2025 1:05 pm   INDICATION: Signs/Symptoms:fall blood thinners r/o frx.     COMPARISON: None.   ACCESSION NUMBER(S): [id number]   ORDERING CLINICIAN: MURTAZA HINOJOSA    TECHNIQUE: CT images were obtained through the cervical spine. Sagittal and coronal reconstructions were generated.   FINDINGS:     ALIGNMENT: Mild levocurvature. Straightening of the lower cervical lordosis. Slight retrolisthesis of C5.   VERTEBRAE/DISC SPACES: No compression deformity or acute displaced fracture. Multilevel degenerative changes including atlantoaxial joint space narrowing spurring, multilevel intervertebral disc space narrowing with endplate sclerosis and spurring, multilevel bilateral facet joint narrowing and spurring. At least partial fusion across the anterior C5-6 vertebra.   ADDITIONAL FINDINGS: No abnormal thickening of the prevertebral soft tissues.  Dependent fluid/thickening in the sphenoid sinus. Ill-defined biapical infiltrates. Small right pleural effusion. Partially included pacer wires in the left upper chest.        No cervical vertebral compression deformity or acute displaced fracture. Multilevel productive/degenerative changes with straightening of the cervical lordosis and slight spondylolisthesis at C5.   Paraspinal/soft tissue findings as above.   MACRO: None.   Signed by: Mary Jane Foreman 6/23/2025 1:16 PM Dictation workstation:   [id number]     CT head wo IV contrast  Result Date: 6/23/2025  Interpreted By:  Mary Jane Foreman, STUDY: CT HEAD WO IV CONTRAST;  6/23/2025 1:05 pm   INDICATION: Signs/Symptoms:headache fall blood thinners r/o sah ich mass.     COMPARISON: 08/23/2023   ACCESSION NUMBER(S): [id number]   ORDERING CLINICIAN: MURTAZA HINOJOSA   TECHNIQUE: Unenhanced CT images of the head were obtained.   FINDINGS: The ventricles, cisterns and sulci are enlarged, consistent with diffuse volume loss. There are areas of nonspecific white matter hypodensity, which are probably age related or microvascular in nature. These findings are similar to the prior exam. There is no acute intracranial hemorrhage, mass effect or midline shift. No extraaxial fluid collection.   No acute  displaced calvarial fracture. No focal calvarial lesion.   Left sphenoid sinus dependent fluid/thickening. Remaining visualized paranasal sinuses are clear. Dense presumed surgical material along the anterior margin of each globe again seen.        No acute intracranial hemorrhage or mass-effect.   Mild fluid or mucosal thickening in the left sphenoid sinus..   MACRO: None.   Signed by: Mary Jane Foreman 6/23/2025 1:08 PM Dictation workstation:   [id number]        Assessment & Plan  Acute on chronic diastolic congestive heart failure     Acute bacterial sinusitis     Syncope     Advance care planning     Constipation           Admit to telemetry  Inpatient  Appreciate cardiology recommendations  I am not sure if patient's CHF represents more of a chronic condition so we will give 40 mg IV Lasix x 1 and resume torsemide tomorrow and look to cardiology for further recommendations  Please see echo from 2024 and carotid duplex from 2029 above  Start Rocephin 1 g IV daily  Orthostatic vital signs  Repeat EKG and troponin pacer check  Continue home aspirin and Plavix  Daily weight  Intake and output  AM CBC, BMP, coags, lipids  Check flu and COVID  Urinalysis pending  PT/OT     Chronic conditions: HFpEF, MI, 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 Plan discussed with interdisciplinary team, consults placed, appreciate input. Will  continue current and repeat labs in the AM.          Discharge planning discussed with patient and care team. Therapy evaluations ordered. Patient aware and agreeable to current plan, continue plan as above.      I spent a total of 75 minutes on the date of the service which included preparing to see the patient, face-to-face patient care, completing clinical documentation, obtaining and/or reviewing separately obtained history, performing a medically appropriate examination, counseling and educating the patient/family/caregiver, ordering medications, tests, or procedures, communicating with other HCPs (not separately reported), independently interpreting results (not separately reported), communicating results to the patient/family/caregiver, and care coordination (not separately reported).            Riya Urena                 Revision History         Physical Exam    General: in no acute distress  Eyes: PERRLA   HENT: Normo-cephalic, atraumatic.   CV: Irregular rate, irregular rhythm.   Resp: Breathing non-labored,  diminished to auscultation bilaterally  GI: BS x4   : monitor intake and output  MSK/Extremities: No gross bony deformities. PT/OT on the case  Skin: Warm. Appropriate color, continue offloading  Neuro:  Face symmetric.   Psych: returning to baseline, improved     10 point ROS negative unless otherwise noted in HPI    Patient fully evaluated 07/01  for    Problem List Items Addressed This Visit       Dyspnea on minimal exertion    Relevant Medications    cefuroxime (Ceftin) 250 mg tablet    metoprolol tartrate (Lopressor) 25 mg tablet    Other Relevant Orders    CBC    Comprehensive metabolic panel    Pleural effusion    * (Principal) Acute on chronic diastolic congestive heart failure - Primary    Relevant Medications    clopidogrel (Plavix) tablet 75 mg    metoprolol tartrate (Lopressor) tablet 12.5 mg    metoprolol tartrate (Lopressor) 25 mg tablet    Syncope    Relevant Medications     acetaminophen (Tylenol) 325 mg tablet    dapagliflozin propanediol (Farxiga) 10 mg tablet    heparin sodium,porcine (heparin, porcine,) 5,000 unit/mL injection    lactobacillus acidophilus tablet tablet    oxygen (O2) gas therapy    sennosides-docusate sodium (Sindy-Colace) 8.6-50 mg tablet    Other Relevant Orders    Case Request Cath Lab: PPM IMPLANT DUAL (Completed)    Electrophysiology procedure    Cardiac device check - Inpatient    Transthoracic Echo Complete (Completed)    CBC    Comprehensive metabolic panel     Brought to hospital - had concerns including Fall.  Patient seen resting in bed with head of bed elevated, no s/s or c/o acute difficulties at this time.  Vital signs for last 24 hours Temp:  [36 °C (96.8 °F)-36.3 °C (97.3 °F)] 36.1 °C (97 °F)  Heart Rate:  [57-63] 62  Resp:  [17-20] 20  BP: (105-131)/(50-59) 118/55    No intake/output data recorded.  Patient still requiring frequent cardiac and SPO2 monitoring. Continue aggressive pulmonary hygiene and oral hygiene. Off loading as tolerated for skin integrity. Medications and labs reviewed-   Results for orders placed or performed during the hospital encounter of 06/23/25 (from the past 24 hours)   Comprehensive Metabolic Panel   Result Value Ref Range    Glucose 94 74 - 99 mg/dL    Sodium 136 136 - 145 mmol/L    Potassium 4.0 3.5 - 5.3 mmol/L    Chloride 98 98 - 107 mmol/L    Bicarbonate 31 21 - 32 mmol/L    Anion Gap 11 10 - 20 mmol/L    Urea Nitrogen 34 (H) 6 - 23 mg/dL    Creatinine 0.87 0.50 - 1.05 mg/dL    eGFR 63 >60 mL/min/1.73m*2    Calcium 9.2 8.6 - 10.3 mg/dL    Albumin 3.4 3.4 - 5.0 g/dL    Alkaline Phosphatase 100 33 - 136 U/L    Total Protein 5.8 (L) 6.4 - 8.2 g/dL    AST 43 (H) 9 - 39 U/L    Bilirubin, Total 0.4 0.0 - 1.2 mg/dL    ALT 32 7 - 45 U/L   CBC and Auto Differential   Result Value Ref Range    WBC 4.5 4.4 - 11.3 x10*3/uL    nRBC 0.0 0.0 - 0.0 /100 WBCs    RBC 3.38 (L) 4.00 - 5.20 x10*6/uL    Hemoglobin 10.0 (L) 12.0 - 16.0  g/dL    Hematocrit 31.5 (L) 36.0 - 46.0 %    MCV 93 80 - 100 fL    MCH 29.6 26.0 - 34.0 pg    MCHC 31.7 (L) 32.0 - 36.0 g/dL    RDW 15.0 (H) 11.5 - 14.5 %    Platelets 220 150 - 450 x10*3/uL    Neutrophils % 46.2 40.0 - 80.0 %    Immature Granulocytes %, Automated 0.0 0.0 - 0.9 %    Lymphocytes % 30.5 13.0 - 44.0 %    Monocytes % 15.0 2.0 - 10.0 %    Eosinophils % 7.6 0.0 - 6.0 %    Basophils % 0.7 0.0 - 2.0 %    Neutrophils Absolute 2.06 1.60 - 5.50 x10*3/uL    Immature Granulocytes Absolute, Automated 0.00 0.00 - 0.50 x10*3/uL    Lymphocytes Absolute 1.36 0.80 - 3.00 x10*3/uL    Monocytes Absolute 0.67 0.05 - 0.80 x10*3/uL    Eosinophils Absolute 0.34 0.00 - 0.40 x10*3/uL    Basophils Absolute 0.03 0.00 - 0.10 x10*3/uL     *Note: Due to a large number of results and/or encounters for the requested time period, some results have not been displayed. A complete set of results can be found in Results Review.      Patient recently received an antibiotic (last 12 hours)       Date/Time Action Medication Dose    06/29/25 0919 Given    cefuroxime (Ceftin) tablet 250 mg 250 mg           Plan discussed with interdisciplinary team, continue current and repeat labs in the AM.     Discharge planning discussed with patient and care team. Therapy evaluations ordered.   [address]    Patient aware and agreeable to current plan, continue plan as above.     I spent a total of 60 minutes on the date of the service which included preparing to see the patient, face-to-face patient care, completing clinical documentation, obtaining and/or reviewing separately obtained history, performing a medically appropriate examination, counseling and educating the patient/family/caregiver, ordering medications, tests, or procedures, communicating with other HCPs (not separately reported), independently interpreting results (not separately reported), communicating results to the patient/family/caregiver, and care coordination  (not separately reported).   Relevant Results               This patient currently has cardiac telemetry ordered; if you would like to modify or discontinue the telemetry order, click here to go to the orders activity to modify/discontinue the order.              Assessment & Plan  Acute on chronic diastolic congestive heart failure    Acute bacterial sinusitis    Syncope    Advance care planning    Saulo Urena

## 2025-07-01 NOTE — PROGRESS NOTES
Physical Therapy    Physical Therapy Treatment    Patient Name: Yesenia Milner  MRN: [id number]  Department: [facility]  Room: [facility]808-  Today's Date: 7/1/2025  Time Calculation  Start Time: 0905  Stop Time: 0925  Time Calculation (min): 20 min         Assessment/Plan   PT Assessment  End of Session Communication: Bedside nurse  End of Session Patient Position: Up in chair, Alarm on     PT Plan  Treatment/Interventions: Bed mobility, Transfer training, Gait training, Strengthening  PT Plan: Ongoing PT  PT Frequency: 3 times per week  PT Discharge Recommendations: Moderate intensity level of continued care  PT Recommended Transfer Status: Assist x2  PT - OK to Discharge: Yes (once cleared by medical team)      General Visit Information:   General  Co-Treatment: OT  Co-Treatment Reason: to maximize patient safety & mobility  Prior to Session Communication: Bedside nurse  Patient Position Received: Up in chair, Alarm off, not on at start of session  General Comment: pt pleasant and agreeable to participate in therapy    Subjective   Precautions:  Precautions  Hearing/Visual Limitations: [place]; hearing aids donned for tx  Medical Precautions: Fall precautions, Oxygen therapy device and L/min (2L)  Precautions Comment: oob with assist; tele; purewick          Objective   Pain:  Pain Assessment  Pain Assessment:  (pt reports no pain at rest;  c/o chronic bilat knee pain with mobility though does not quantify)    Cognition:  Cognition  Overall Cognitive Status: Within Functional Limits    Treatments:  Bed Mobility  Bed Mobility:  (NT - pt in chair upon therapy arrival and departure)    Ambulation/Gait Training  Ambulation/Gait Training Performed:  (pt ambulates approx. 75 ft x 2 with FWW and min A x 1.  slow pace; SSS; partial step-through; fwd flexed.)    Transfers  Transfer:  (sit <> stand with FWW and min A x 2.  cuing for safe hand placement, sequencing, and upright posture.  increased time needed to achieve full upright  stance with both hands on walker handles.)    Outcome Measures:  [facility] Basic Mobility  Turning from your back to your side while in a flat bed without using bedrails: A little  Moving from lying on your back to sitting on the side of a flat bed without using bedrails: A little  Moving to and from bed to chair (including a wheelchair): A little  Standing up from a chair using your arms (e.g. wheelchair or bedside chair): A lot  To walk in hospital room: A little  Climbing 3-5 steps with railing: Total  Basic Mobility - Total Score: 15    Education Documentation  Precautions, taught by Inez Jalloh PTA at 7/1/2025  3:01 PM.  Learner: Patient  Readiness: Acceptance  Method: Explanation  Response: Verbalizes Understanding    Mobility Training, taught by Inez Jalloh PTA at 7/1/2025  3:01 PM.  Learner: Patient  Readiness: Acceptance  Method: Explanation  Response: Verbalizes Understanding    Education Comments  No comments found.        EDUCATION:       Encounter Problems       Encounter Problems (Active)       Impaired mobility       STG: bed mobility sit<>supine with mod indep  (Progressing)       Start:  06/24/25    Expected End:  07/08/25            STG: transfers sit<>stand with mod indep  (Progressing)       Start:  06/24/25    Expected End:  07/08/25            STG: pt ambulates 50' via ww and cga  (Progressing)       Start:  06/24/25    Expected End:  07/08/25            STG: b/l le there ex x 20 reps  (Met)       Start:  06/24/25    Expected End:  07/08/25    Resolved:  06/26/25

## 2025-07-01 NOTE — CARE PLAN
Problem: Pain - Adult  Goal: Verbalizes/displays adequate comfort level or baseline comfort level  Outcome: Progressing     Problem: Safety - Adult  Goal: Free from fall injury  Outcome: Progressing     Problem: Discharge Planning  Goal: Discharge to home or other facility with appropriate resources  Outcome: Progressing     Problem: Chronic Conditions and Co-morbidities  Goal: Patient's chronic conditions and co-morbidity symptoms are monitored and maintained or improved  Outcome: Progressing     Problem: Skin  Goal: Prevent/minimize sheer/friction injuries  Outcome: Progressing  Flowsheets (Taken 7/1/2025 0700)  Prevent/minimize sheer/friction injuries:   Increase activity/out of bed for meals   Use pull sheet   HOB 30 degrees or less   Complete micro-shifts as needed if patient unable. Adjust patient position to relieve pressure points, not a full turn     Problem: Respiratory  Goal: No signs of respiratory distress (eg. Use of accessory muscles. Peds grunting)  Outcome: Progressing  Goal: Wean oxygen to maintain O2 saturation per order/standard this shift  Outcome: Progressing    The clinical goals for the shift include Patient will remain hemodynamically stable; rest & safety

## 2025-07-02 ENCOUNTER — APPOINTMENT (OUTPATIENT)
Dept: CARDIOLOGY | Facility: HOSPITAL | Age: OVER 89
End: 2025-07-02
Payer: MEDICARE

## 2025-07-02 LAB
ALBUMIN SERPL BCP-MCNC: 3.5 G/DL (ref 3.4–5)
ALP SERPL-CCNC: 92 U/L (ref 33–136)
ALT SERPL W P-5'-P-CCNC: 26 U/L (ref 7–45)
ANION GAP SERPL CALC-SCNC: 9 MMOL/L (ref 10–20)
AST SERPL W P-5'-P-CCNC: 31 U/L (ref 9–39)
BASOPHILS # BLD AUTO: 0.03 X10*3/UL (ref 0–0.1)
BASOPHILS NFR BLD AUTO: 0.5 %
BILIRUB SERPL-MCNC: 0.4 MG/DL (ref 0–1.2)
BUN SERPL-MCNC: 34 MG/DL (ref 6–23)
CALCIUM SERPL-MCNC: 9.4 MG/DL (ref 8.6–10.3)
CHLORIDE SERPL-SCNC: 97 MMOL/L (ref 98–107)
CO2 SERPL-SCNC: 35 MMOL/L (ref 21–32)
CREAT SERPL-MCNC: 1.03 MG/DL (ref 0.5–1.05)
EGFRCR SERPLBLD CKD-EPI 2021: 51 ML/MIN/1.73M*2
EOSINOPHIL # BLD AUTO: 0.37 X10*3/UL (ref 0–0.4)
EOSINOPHIL NFR BLD AUTO: 6.5 %
ERYTHROCYTE [DISTWIDTH] IN BLOOD BY AUTOMATED COUNT: 15 % (ref 11.5–14.5)
GLUCOSE SERPL-MCNC: 82 MG/DL (ref 74–99)
HCT VFR BLD AUTO: 33.9 % (ref 36–46)
HGB BLD-MCNC: 10.4 G/DL (ref 12–16)
IMM GRANULOCYTES # BLD AUTO: 0.02 X10*3/UL (ref 0–0.5)
IMM GRANULOCYTES NFR BLD AUTO: 0.4 % (ref 0–0.9)
LYMPHOCYTES # BLD AUTO: 1.4 X10*3/UL (ref 0.8–3)
LYMPHOCYTES NFR BLD AUTO: 24.5 %
MCH RBC QN AUTO: 29.2 PG (ref 26–34)
MCHC RBC AUTO-ENTMCNC: 30.7 G/DL (ref 32–36)
MCV RBC AUTO: 95 FL (ref 80–100)
MONOCYTES # BLD AUTO: 0.8 X10*3/UL (ref 0.05–0.8)
MONOCYTES NFR BLD AUTO: 14 %
NEUTROPHILS # BLD AUTO: 3.09 X10*3/UL (ref 1.6–5.5)
NEUTROPHILS NFR BLD AUTO: 54.1 %
NRBC BLD-RTO: 0 /100 WBCS (ref 0–0)
PLATELET # BLD AUTO: 232 X10*3/UL (ref 150–450)
POTASSIUM SERPL-SCNC: 3.7 MMOL/L (ref 3.5–5.3)
PROT SERPL-MCNC: 5.9 G/DL (ref 6.4–8.2)
RBC # BLD AUTO: 3.56 X10*6/UL (ref 4–5.2)
SODIUM SERPL-SCNC: 137 MMOL/L (ref 136–145)
WBC # BLD AUTO: 5.7 X10*3/UL (ref 4.4–11.3)

## 2025-07-02 PROCEDURE — 99233 SBSQ HOSP IP/OBS HIGH 50: CPT

## 2025-07-02 PROCEDURE — 2500000001 HC RX 250 WO HCPCS SELF ADMINISTERED DRUGS (ALT 637 FOR MEDICARE OP): Performed by: INTERNAL MEDICINE

## 2025-07-02 PROCEDURE — 2500000001 HC RX 250 WO HCPCS SELF ADMINISTERED DRUGS (ALT 637 FOR MEDICARE OP): Performed by: NURSE PRACTITIONER

## 2025-07-02 PROCEDURE — 2060000001 HC INTERMEDIATE ICU ROOM DAILY

## 2025-07-02 PROCEDURE — 93005 ELECTROCARDIOGRAM TRACING: CPT

## 2025-07-02 PROCEDURE — 2500000004 HC RX 250 GENERAL PHARMACY W/ HCPCS (ALT 636 FOR OP/ED): Performed by: NURSE PRACTITIONER

## 2025-07-02 PROCEDURE — 85025 COMPLETE CBC W/AUTO DIFF WBC: CPT | Performed by: INTERNAL MEDICINE

## 2025-07-02 PROCEDURE — 36415 COLL VENOUS BLD VENIPUNCTURE: CPT | Performed by: INTERNAL MEDICINE

## 2025-07-02 PROCEDURE — 80053 COMPREHEN METABOLIC PANEL: CPT | Performed by: INTERNAL MEDICINE

## 2025-07-02 PROCEDURE — 99233 SBSQ HOSP IP/OBS HIGH 50: CPT | Performed by: NURSE PRACTITIONER

## 2025-07-02 RX ORDER — CEFAZOLIN SODIUM 1 G/50ML
1 SOLUTION INTRAVENOUS ONCE
Status: COMPLETED | OUTPATIENT
Start: 2025-07-02 | End: 2025-07-02

## 2025-07-02 RX ADMIN — POLYETHYLENE GLYCOL 3350 17 G: 17 POWDER, FOR SOLUTION ORAL at 09:18

## 2025-07-02 RX ADMIN — CEFAZOLIN SODIUM 1 G: 1 INJECTION, SOLUTION INTRAVENOUS at 11:11

## 2025-07-02 RX ADMIN — ASPIRIN 81 MG CHEWABLE TABLET 81 MG: 81 TABLET CHEWABLE at 09:18

## 2025-07-02 RX ADMIN — ACETAMINOPHEN 650 MG: 325 TABLET ORAL at 20:44

## 2025-07-02 RX ADMIN — POLYETHYLENE GLYCOL 3350 17 G: 17 POWDER, FOR SOLUTION ORAL at 20:38

## 2025-07-02 RX ADMIN — SENNOSIDES AND DOCUSATE SODIUM 2 TABLET: 50; 8.6 TABLET ORAL at 20:38

## 2025-07-02 RX ADMIN — DAPAGLIFLOZIN 10 MG: 10 TABLET, FILM COATED ORAL at 11:17

## 2025-07-02 RX ADMIN — TORSEMIDE 40 MG: 20 TABLET ORAL at 09:17

## 2025-07-02 RX ADMIN — Medication 1 TABLET: at 09:17

## 2025-07-02 RX ADMIN — CEFUROXIME AXETIL 250 MG: 250 TABLET, FILM COATED ORAL at 20:38

## 2025-07-02 RX ADMIN — METOPROLOL TARTRATE 12.5 MG: 25 TABLET, FILM COATED ORAL at 20:38

## 2025-07-02 RX ADMIN — CLOPIDOGREL BISULFATE 75 MG: 75 TABLET, FILM COATED ORAL at 09:18

## 2025-07-02 RX ADMIN — Medication 1 TABLET: at 20:38

## 2025-07-02 RX ADMIN — FERROUS SULFATE TAB 325 MG (65 MG ELEMENTAL FE) 1 TABLET: 325 (65 FE) TAB at 09:18

## 2025-07-02 RX ADMIN — DULOXETINE 30 MG: 30 CAPSULE, DELAYED RELEASE ORAL at 10:27

## 2025-07-02 RX ADMIN — CEFUROXIME AXETIL 250 MG: 250 TABLET, FILM COATED ORAL at 09:18

## 2025-07-02 RX ADMIN — CEFAZOLIN SODIUM 1 G: 1 INJECTION, SOLUTION INTRAVENOUS at 01:18

## 2025-07-02 RX ADMIN — METOPROLOL TARTRATE 12.5 MG: 25 TABLET, FILM COATED ORAL at 09:17

## 2025-07-02 RX ADMIN — COLCHICINE 0.6 MG: 0.6 TABLET, FILM COATED ORAL at 09:23

## 2025-07-02 RX ADMIN — ALLOPURINOL 100 MG: 100 TABLET ORAL at 09:18

## 2025-07-02 ASSESSMENT — PAIN - FUNCTIONAL ASSESSMENT
PAIN_FUNCTIONAL_ASSESSMENT: 0-10
PAIN_FUNCTIONAL_ASSESSMENT: 0-10

## 2025-07-02 ASSESSMENT — ENCOUNTER SYMPTOMS
NEAR-SYNCOPE: 0
PALPITATIONS: 0
LIGHT-HEADEDNESS: 1
SHORTNESS OF BREATH: 0
PND: 0
DYSPNEA ON EXERTION: 0
ORTHOPNEA: 0

## 2025-07-02 ASSESSMENT — COGNITIVE AND FUNCTIONAL STATUS - GENERAL
CLIMB 3 TO 5 STEPS WITH RAILING: A LOT
MOVING FROM LYING ON BACK TO SITTING ON SIDE OF FLAT BED WITH BEDRAILS: A LITTLE
PERSONAL GROOMING: A LITTLE
STANDING UP FROM CHAIR USING ARMS: A LITTLE
MOBILITY SCORE: 16
TOILETING: A LITTLE
WALKING IN HOSPITAL ROOM: A LOT
DRESSING REGULAR LOWER BODY CLOTHING: A LITTLE
DAILY ACTIVITIY SCORE: 19
MOVING TO AND FROM BED TO CHAIR: A LITTLE
DRESSING REGULAR UPPER BODY CLOTHING: A LITTLE
HELP NEEDED FOR BATHING: A LITTLE
TURNING FROM BACK TO SIDE WHILE IN FLAT BAD: A LITTLE

## 2025-07-02 ASSESSMENT — PAIN SCALES - GENERAL
PAINLEVEL_OUTOF10: 7
PAINLEVEL_OUTOF10: 0 - NO PAIN

## 2025-07-02 ASSESSMENT — PAIN DESCRIPTION - LOCATION: LOCATION: CHEST

## 2025-07-02 NOTE — DISCHARGE SUMMARY
Discharge Diagnosis  Acute on chronic diastolic congestive heart failure     Issues Requiring Follow-Up  07/02-Patient fully examined at the bedside. Brought to hospital - had concerns including Fall. Patient with weakness, will benefit from further rehab at [facility]. Awake, more animated, with no acute events overnight, no new complaints. Slow but progressive improvements noted. Patient medically stable at time of exam, cleared for discharge today. Goals of care emphasized with patient and family, will continue current plan of care.     Discharge Meds     Medication List      START taking these medications     cefuroxime 250 mg tablet; Commonly known as: Ceftin; Take 1 tablet (250   mg) by mouth 2 times a day for 10 days.   dapagliflozin propanediol 10 mg tablet; Commonly known as: Farxiga; Take   1 tablet (10 mg) by mouth once every 24 hours.   heparin (porcine) 5,000 unit/mL injection; Inject 1 mL (5,000 Units)   under the skin every 12 hours.   lactobacillus acidophilus tablet tablet; Take 1 tablet by mouth 2 times   a day.   oxygen gas therapy; Commonly known as: O2; Inhale 2 L/min at 120,000   mL/hr once every 24 hours.   sennosides-docusate sodium 8.6-50 mg tablet; Commonly known as:   Sindy-Colace; Take 2 tablets by mouth 2 times a day.     CHANGE how you take these medications     metoprolol tartrate 25 mg tablet; Commonly known as: Lopressor; Take 0.5   tablets (12.5 mg) by mouth 2 times a day.; What changed: how much to take,   when to take this     CONTINUE taking these medications     acetaminophen 325 mg tablet; Commonly known as: Tylenol; Take 2 tablets   (650 mg) by mouth every 4 hours if needed for mild pain (1 - 3).   albuterol 2.5 mg /3 mL (0.083 %) nebulizer solution; Take 3 mL (2.5 mg)   by nebulization every 4 hours if needed for wheezing.   allopurinol 100 mg tablet; Commonly known as: Zyloprim; Take 1 tablet   (100 mg) by mouth once daily.   aspirin 81 mg chewable tablet; Chew 1 tablet  (81 mg) once daily.   Calcium 600 + D(3) 600 mg-5 mcg (200 unit) tablet; Generic drug: calcium   carbonate-vitamin D3   clopidogrel 75 mg tablet; Commonly known as: Plavix; Take 1 tablet by   mouth once daily   colchicine 0.6 mg tablet; Take 1 tablet (0.6 mg) by mouth once daily.   DULoxetine 30 mg DR capsule; Commonly known as: Cymbalta; Take 1 capsule   (30 mg) by mouth once daily. Do not crush or chew.   ferrous sulfate 325 (65 Fe) mg EC tablet   fluticasone propion-salmeteroL 115-21 mcg/actuation inhaler; Commonly   known as: Advair; Inhale 2 puffs 2 times a day. Rinse mouth with water   after use to reduce aftertaste and incidence of candidiasis. Do not   swallow.   lubricating eye drops ophthalmic solution   nebulizers misc; With tubing and mask.  Use as directed   polyethylene glycol 17 gram/dose powder; Commonly known as: Glycolax,   Miralax; Mix 1 capful (17 g) of powder with 4 to 8 ounces of water or   juice and drink 2 times a day.   torsemide 20 mg tablet; Commonly known as: Demadex; Take 3 tablets (60   mg) by mouth once daily.     STOP taking these medications     enoxaparin 40 mg/0.4 mL syringe; Commonly known as: Lovenox   guaiFENesin 600 mg 12 hr tablet; Commonly known as: Mucinex   hydrocortisone 1 % lotion       Test Results Pending At Discharge  Pending Labs       No current pending labs.            Hospital Course           Fei Mensah MD   Physician  Internal Medicine     Progress Notes      Signed     Date of Service: 7/1/2025  1:40 PM     Signed       Expand All Collapse All    Yesenia Milner is a 89 y.o. female on day 8 of admission presenting with Acute on chronic diastolic congestive heart failure.        Subjective  07/01- Patient seen and fully examined at bedside, patient ill appearing, acutely complex, marked decline from baseline. Patient remains hospitalized for acute onset with complex medical and pharmacological management. Acute on chronic diastolic congestive heart  failure, Acute bacterial sinusitis, Syncope, Advance care planning, Constipation. Patient seen by cardiology today with Pacemaker revsion as per Dr. Ramicone today with lead replacement. Patient and daughter aware. Appreciate input and agree with plan.  Will continue antibiotics for sinusitis.  Continue strict intake and output., telemetry. Will monitor overnight and repeat labs in the morning. Slow but progressive improvements noted. High Complexity with updates to multiple problems, adjustments to treatment plan, and need for ongoing inpatient care.   Long discussion on goals of care with patient and family, will continue current and await diagnostic findings.Patient reports: no new complaints, decline from baseline, weakness                     Objective  Last Recorded Vitals  /55 (BP Location: Left arm, Patient Position: Lying)   Pulse 62   Temp 36.1 °C (97 °F) (Temporal)   Resp 20   Wt 51.5 kg (113 lb 8.6 oz)   SpO2 98%   Intake/Output last 3 Shifts:     Intake/Output Summary (Last 24 hours) at 7/1/2025 1340  Last data filed at 6/30/2025 2345      Gross per 24 hour   Intake --   Output 250 ml   Net -250 ml         Admission Weight  Weight: 69.9 kg (154 lb) (06/23/25 1227)     Daily Weight  06/30/25 : 51.5 kg (113 lb 8.6 oz)     Image Results  XR chest 1 view  Narrative: Interpreted By:  Bryce Redding,   STUDY:  XR CHEST 1 VIEW;  6/25/2025 1:47 pm      INDICATION:  Signs/Symptoms:shortness of breath, PPM Lead eval.      COMPARISON:  03/24/2025      ACCESSION NUMBER(S):  [id number]      ORDERING CLINICIAN:  KAYLIN NAVA      FINDINGS:  CARDIOMEDIASTINAL SILHOUETTE AND VASCULATURE:      Cardiac size:  Within normal limits considering a right pericardial  fat pad and similar to the prior exam. Aortic shadow:  Within normal  limits.      Mediastinal contours: Within normal limits.      Pulmonary vasculature:  The central vasculature is unremarkable      LUNGS:  There is blunting of the left  costophrenic angle is probably from a  residual effusion with atelectasis, improved from the previous exam.  The lungs otherwise relatively clear.      ABDOMEN AND OTHER FINDINGS:  Cardiac pacemaker device overlies the left chest similar to the  previous exam.      BONES:  No acute osseous changes.      Impression: 1.  Improved left basilar atelectasis and effusion.      Signed by: Bryce Redding 6/25/2025 5:03 PM  Dictation workstation:   [id number]        Fei Mensah MD   Physician  Internal Medicine     H&P      Signed     Date of Service: 6/23/2025  2:22 PM      Signed          History Of Present Illness  Yesenia Milner is a 89 y.o. 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  Patient has had multiple admissions for congestive heart failure exacerbation who presented today after a syncopal episode.  Patient notes she was gathering her clothing to get ready for the day when she became lightheaded and passed out.  She denies any injury from the fall.  She notes over the past 2 weeks she has been experiencing a stuffy nose and a cough upon wakening in the morning.  She otherwise denies any fevers, chills, chest pain, change in her chronic shortness of breath with exertion, abdominal pain, nausea, vomiting, diarrhea, or dysuria.  She denies any change in her chronic intermittent bilateral lower extremity edema.  She denies any weight gain.  She denies any recent medication changes.  She reports compliance with her medications.  She is on her baseline oxygen at 2 L.  She notes her cardiologist is Dr. Ramicone.  In regards to her colon cancer, family notes that patient was  deemed a risk for surgery via cardiology and surgeon, Dr. Vazquez, recommended patient speak with Dr. Ramicone in regards to her risk for surgery before he would schedule her for an operation.  They noted that her appointment is not until the fall and they are hopeful to speak with him this admission about her risk for surgery.  Patient notes she lives alone and uses a walker.  CODE STATUS discussed and patient became tearful during conversation.  She would like to remain a full code at this time and referred to family if an emergency would arise that she was incapacitated to answer for herself.     In the ED lab work, EKG, head CT, C-spine CT and CT chest/abdomen/pelvis were performed. Labs revealed troponin 19 (appears baseline with previous result 21 in March 2025), H&H 10.1 and 31.3 (within patient's recent baseline),  (310 in March 2025).  EKG, per ER physician impression revealed Dual paced rhythm. Motion artifact present. Irregular rate. Normal MT, wide QRS and Qtc intervals. No ST segment elevations, depressions, or T wave inversions. Compared to March 2025 imaging positive for fluid/mucosal thickening of the left sphenoid sinus.  Bilateral infiltrates and right greater than left pleural effusions. Moderate fecal residue.  Heart rate was 120 on arrival, vitals were otherwise stable.    Echo 12/21/2024:     CONCLUSIONS:   1. Left ventricular ejection fraction is normal, by visual estimate at 60-65%.   2. Abnormal left venticular wall motion.   3. Left ventricular diastolic filling is indeterminate.   4. There is a moderate apical and anteroapical myocardial infarction.   5. There is normal right ventricular global systolic function.   6. There is moderate mitral annular calcification.   7. Moderate mitral valve regurgitation.   8. Moderate to severe tricuspid regurgitation visualized.   9. Moderately elevated right ventricular systolic pressure.  10. Aortic valve sclerosis.     Carotid duplex 2019:      Right Carotid: Findings are consistent with less than 50% stenosis of the right proximal ICA. Laminar flow seen by color Doppler. Right external carotid artery appears patent with no evidence of stenosis. The right vertebral artery is patent with antegrade flow. No evidence of hemodynamically significant stenosis in the right subclavian.  Left Carotid: Findings are consistent with less than 50% stenosis of the left proximal ICA. Laminar flow seen by color Doppler. Left external carotid artery appears patent with no evidence of stenosis. The left vertebral artery is patent with antegrade flow.  No evidence of hemodynamically significant stenosis in the left subclavian.        Past Medical History  [Medical History]    [Medical History]  Past Medical History          Diagnosis Date    Gross hematuria 10/07/2020    Impacted cerumen 02/22/2024    Other conditions influencing health status       Normal stress echocardiogram    Personal history of other medical treatment       History of echocardiogram    Personal history of other medical treatment       H/O Doppler ultrasound    Systolic CHF (Multi) 05/18/2023         Surgical History  [Surgical History]    [Surgical History]  Past Surgical History            Procedure Laterality Date    APPENDECTOMY   11/22/2017     Appendectomy    CARDIAC CATHETERIZATION N/A 12/20/2024     Procedure: Left Heart Cath, With LV;  Surgeon: Tahir Ruelas MD;  Location: [facility] Cardiac [facility];  Service: Cardiovascular;  Laterality: N/A;    CARDIAC PACEMAKER PLACEMENT   03/11/2021     Pacemaker Placement    HYSTERECTOMY   11/22/2017     Hysterectomy    IR ANGIOGRAM INFERIOR EPIGASTRIC PELVIC   12/14/2020     IR ANGIOGRAM INFERIOR EPIGASTRIC PELVIC 12/14/2020 [facility]    IR ANGIOGRAM INFERIOR EPIGASTRIC PELVIC   12/14/2020     IR ANGIOGRAM INFERIOR EPIGASTRIC PELVIC 12/14/2020 [facility]    IR ANGIOGRAM RENAL BILATERAL Bilateral 12/14/2020     IR ANGIOGRAM RENAL BILATERAL  "12/14/2020 [facility] [facility]    OTHER SURGICAL HISTORY   11/22/2017     Stab Phlebectomy Of Varicose Veins    OTHER SURGICAL HISTORY   11/22/2017     Venous Ligation With Stripping    TONSILLECTOMY   11/22/2017     Tonsillectomy         Social History  She reports that she quit smoking about 52 years ago. Her smoking use included cigarettes. She has been exposed to tobacco smoke. She has never used smokeless tobacco. She reports that she does not drink alcohol and does not use drugs.     Family History  [Family History]    [Family History]              Problem Relation Name Age of Onset    No Known Problems Mother             Allergies  Iodine, Shrimp, Hydrocodone, and Adhesive tape-silicones     10 point review systems performed and is negative besides what is stated in HPI     Physical Exam  HENT:      Head: Normocephalic and atraumatic.      Nose: Nose normal.      Mouth/Throat:      Mouth: Mucous membranes are dry.   Eyes:      Conjunctiva/sclera: Conjunctivae normal.   Cardiovascular:      Rate and Rhythm: Normal rate. Rhythm irregular.      Heart sounds: Murmur heard.   Pulmonary:      Effort: Pulmonary effort is normal.      Breath sounds: Rales present.   Abdominal:      General: Bowel sounds are normal.      Tenderness: There is abdominal tenderness.   Musculoskeletal:         General: Normal range of motion.      Cervical back: Neck supple.   Skin:     General: Skin is warm and dry.   Neurological:      Mental Status: She is alert. Mental status is at baseline.   Psychiatric:         Mood and Affect: Mood normal.            Last Recorded Vitals  Blood pressure 114/58, pulse 70, temperature 36.3 °C (97.3 °F), temperature source Temporal, resp. rate 19, height 1.626 m (5' 4\"), weight 69.9 kg (154 lb), SpO2 95%.     Relevant Results     [Medications Ordered Prior to Encounter]     [Medications Ordered Prior to Encounter]  No current facility-administered medications on file prior to encounter.                "   Current Outpatient Medications on File Prior to Encounter   Medication Sig Dispense Refill    allopurinol (Zyloprim) 100 mg tablet Take 1 tablet (100 mg) by mouth once daily. 90 tablet 0    aspirin 81 mg chewable tablet Chew 1 tablet (81 mg) once daily.        calcium carbonate-vitamin D3 (Calcium 600 + D,3,) 600 mg-5 mcg (200 unit) tablet Take 1 tablet by mouth once daily.        clopidogrel (Plavix) 75 mg tablet Take 1 tablet by mouth once daily 90 tablet 0    colchicine 0.6 mg tablet Take 1 tablet (0.6 mg) by mouth once daily. 90 tablet 3    DULoxetine (Cymbalta) 30 mg DR capsule Take 1 capsule (30 mg) by mouth once daily. Do not crush or chew. 90 capsule 1    ferrous sulfate 325 (65 Fe) mg EC tablet Take 1 tablet by mouth once daily. Do not crush, chew, or split.        fluticasone propion-salmeteroL (Advair) 115-21 mcg/actuation inhaler Inhale 2 puffs 2 times a day. Rinse mouth with water after use to reduce aftertaste and incidence of candidiasis. Do not swallow. 12 g 11    metoprolol tartrate (Lopressor) 25 mg tablet Take 1 tablet (25 mg) by mouth once daily. 30 tablet 0    polyethylene glycol (Glycolax, Miralax) 17 gram/dose powder Mix 1 capful (17 g) of powder with 4 to 8 ounces of water or juice and drink 2 times a day. 1010 g 1    torsemide (Demadex) 20 mg tablet Take 3 tablets (60 mg) by mouth once daily. 90 tablet 11    acetaminophen (Tylenol) 325 mg tablet Take 2 tablets (650 mg) by mouth every 4 hours if needed for mild pain (1 - 3). (Patient not taking: Reported on 6/23/2025)        albuterol 2.5 mg /3 mL (0.083 %) nebulizer solution Take 3 mL (2.5 mg) by nebulization every 4 hours if needed for wheezing. 75 mL 1    enoxaparin (Lovenox) 40 mg/0.4 mL syringe Inject 0.4 mL (40 mg) under the skin once every 24 hours. (Patient not taking: Reported on 6/23/2025)        guaiFENesin (Mucinex) 600 mg 12 hr tablet Take 1 tablet (600 mg) by mouth 2 times a day as needed for cough. Do not crush, chew, or  split. (Patient not taking: Reported on 6/23/2025)        hydrocortisone 1 % lotion Apply topically 2 times a day. Apply to both legs , wash hands after applying lotion (Patient not taking: Reported on 6/23/2025) 60 mL 0    lubricating eye drops ophthalmic solution Administer 1 drop into both eyes once daily as needed for dry eyes.        nebulizers misc With tubing and mask.   Use as directed 1 each 0    [DISCONTINUED] ipratropium-albuteroL (Duo-Neb) 0.5-2.5 mg/3 mL nebulizer solution Take 3 mL by nebulization every 6 hours.                       Results for orders placed or performed during the hospital encounter of 06/23/25 (from the past 24 hours)   CBC and Auto Differential   Result Value Ref Range     WBC 6.0 4.4 - 11.3 x10*3/uL     nRBC 0.0 0.0 - 0.0 /100 WBCs     RBC 3.37 (L) 4.00 - 5.20 x10*6/uL     Hemoglobin 10.1 (L) 12.0 - 16.0 g/dL     Hematocrit 31.3 (L) 36.0 - 46.0 %     MCV 93 80 - 100 fL     MCH 30.0 26.0 - 34.0 pg     MCHC 32.3 32.0 - 36.0 g/dL     RDW 15.9 (H) 11.5 - 14.5 %     Platelets 206 150 - 450 x10*3/uL     Neutrophils % 69.9 40.0 - 80.0 %     Immature Granulocytes %, Automated 0.5 0.0 - 0.9 %     Lymphocytes % 16.6 13.0 - 44.0 %     Monocytes % 10.7 2.0 - 10.0 %     Eosinophils % 2.0 0.0 - 6.0 %     Basophils % 0.3 0.0 - 2.0 %     Neutrophils Absolute 4.18 1.60 - 5.50 x10*3/uL     Immature Granulocytes Absolute, Automated 0.03 0.00 - 0.50 x10*3/uL     Lymphocytes Absolute 0.99 0.80 - 3.00 x10*3/uL     Monocytes Absolute 0.64 0.05 - 0.80 x10*3/uL     Eosinophils Absolute 0.12 0.00 - 0.40 x10*3/uL     Basophils Absolute 0.02 0.00 - 0.10 x10*3/uL   Comprehensive metabolic panel   Result Value Ref Range     Glucose 116 (H) 74 - 99 mg/dL     Sodium 138 136 - 145 mmol/L     Potassium 3.6 3.5 - 5.3 mmol/L     Chloride 99 98 - 107 mmol/L     Bicarbonate 27 21 - 32 mmol/L     Anion Gap 16 10 - 20 mmol/L     Urea Nitrogen 30 (H) 6 - 23 mg/dL     Creatinine 1.00 0.50 - 1.05 mg/dL     eGFR 53 (L) >60  mL/min/1.73m*2     Calcium 9.6 8.6 - 10.3 mg/dL     Albumin 3.8 3.4 - 5.0 g/dL     Alkaline Phosphatase 89 33 - 136 U/L     Total Protein 6.3 (L) 6.4 - 8.2 g/dL     AST 24 9 - 39 U/L     Bilirubin, Total 0.6 0.0 - 1.2 mg/dL     ALT 17 7 - 45 U/L   Lipase   Result Value Ref Range     Lipase 18 9 - 82 U/L   Magnesium   Result Value Ref Range     Magnesium 2.24 1.60 - 2.40 mg/dL   aPTT   Result Value Ref Range     aPTT 26 26 - 36 seconds   Protime-INR   Result Value Ref Range     Protime 12.4 9.8 - 12.4 seconds     INR 1.1 0.9 - 1.1   B-Type Natriuretic Peptide   Result Value Ref Range      (H) 0 - 99 pg/mL   Troponin I, High Sensitivity, Initial   Result Value Ref Range     Troponin I, High Sensitivity 19 (H) 0 - 13 ng/L   Troponin, High Sensitivity, 1 Hour   Result Value Ref Range     Troponin I, High Sensitivity 23 (H) 0 - 13 ng/L   Sars-CoV-2, Influenza A/B and RSV PCR   Result Value Ref Range     Coronavirus 2019, PCR Not Detected Not Detected     Flu A Result Not Detected Not Detected     Flu B Result Not Detected Not Detected     RSV PCR Not Detected Not Detected   Troponin I, High Sensitivity   Result Value Ref Range     Troponin I, High Sensitivity 22 (H) 0 - 13 ng/L   Urinalysis with Reflex Culture and Microscopic   Result Value Ref Range     Color, Urine Light-Yellow Light-Yellow, Yellow, Dark-Yellow     Appearance, Urine Clear Clear     Specific Gravity, Urine 1.010 1.005 - 1.035     pH, Urine 7.0 5.0, 5.5, 6.0, 6.5, 7.0, 7.5, 8.0     Protein, Urine NEGATIVE NEGATIVE, 10 (TRACE), 20 (TRACE) mg/dL     Glucose, Urine Normal Normal mg/dL     Blood, Urine NEGATIVE NEGATIVE mg/dL     Ketones, Urine NEGATIVE NEGATIVE mg/dL     Bilirubin, Urine NEGATIVE NEGATIVE mg/dL     Urobilinogen, Urine Normal Normal mg/dL     Nitrite, Urine NEGATIVE NEGATIVE     Leukocyte Esterase, Urine NEGATIVE NEGATIVE   CBC   Result Value Ref Range     WBC 4.7 4.4 - 11.3 x10*3/uL     nRBC 0.0 0.0 - 0.0 /100 WBCs     RBC 3.41 (L)  4.00 - 5.20 x10*6/uL     Hemoglobin 10.1 (L) 12.0 - 16.0 g/dL     Hematocrit 32.0 (L) 36.0 - 46.0 %     MCV 94 80 - 100 fL     MCH 29.6 26.0 - 34.0 pg     MCHC 31.6 (L) 32.0 - 36.0 g/dL     RDW 15.8 (H) 11.5 - 14.5 %     Platelets 215 150 - 450 x10*3/uL   Coagulation Screen   Result Value Ref Range     Protime 12.8 (H) 9.8 - 12.4 seconds     INR 1.2 (H) 0.9 - 1.1     aPTT 36 26 - 36 seconds   Basic Metabolic Panel   Result Value Ref Range     Glucose 98 74 - 99 mg/dL     Sodium 140 136 - 145 mmol/L     Potassium 3.6 3.5 - 5.3 mmol/L     Chloride 100 98 - 107 mmol/L     Bicarbonate 30 21 - 32 mmol/L     Anion Gap 14 10 - 20 mmol/L     Urea Nitrogen 28 (H) 6 - 23 mg/dL     Creatinine 0.95 0.50 - 1.05 mg/dL     eGFR 57 (L) >60 mL/min/1.73m*2     Calcium 9.1 8.6 - 10.3 mg/dL   Lipid Panel   Result Value Ref Range     Cholesterol 119 0 - 199 mg/dL     HDL-Cholesterol 49.2 mg/dL     Cholesterol/HDL Ratio 2.4       LDL Calculated 59 <=99 mg/dL     VLDL 11 0 - 40 mg/dL     Triglycerides 56 0 - 149 mg/dL     Non HDL Cholesterol 70 0 - 149 mg/dL      *Note: Due to a large number of results and/or encounters for the requested time period, some results have not been displayed. A complete set of results can be found in Results Review.         CT chest abdomen pelvis wo IV contrast  Result Date: 6/23/2025  Interpreted By:  Mary Jane Foreman, STUDY: CT CHEST ABDOMEN PELVIS WO CONTRAST;  6/23/2025 1:05 pm   INDICATION: Signs/Symptoms:abdominal pain prior cancer syncope r/o mass, nephro jesse traumatic injury.     COMPARISON: CT chest 03/21/2025, CT abdomen and pelvis 01/12/2025   ACCESSION NUMBER(S): [id number]   ORDERING CLINICIAN: MURTAZA HINOJOSA   TECHNIQUE: CT of the chest, abdomen, and pelvis was performed. Sagittal and coronal reconstructions were generated. No intravenous contrast given for the examination.   FINDINGS: CHEST:   Hilar, vessel, and solid organ evaluation limited without IV contrast.   CHEST WALL AND LOWER NECK:  Metallic streak artifact from port in the left anterior chest wall limits assessment of adjacent structures. No gross axillary adenopathy as visualized. Slightly prominent heterogenous right thyroid lobe.   MEDIASTINUM AND HARITHA:  Limited hilar assessment on unenhanced exam. No significant mediastinal or hilar adenopathy within limits of unenhanced study. Mild gaseous distention of the proximal esophagus.   HEART AND VESSELS:  Lack of IV contrast precludes vascular luminal assessment. The heart is normal in size. No significant pericardial effusion. Pacer wires in the right side of the heart. Multifocal atherosclerotic calcifications.   LUNGS, PLEURA, LARGE AIRWAYS:  Mild motion artifact in the lower chest. Pulmonary heterogeneity including scattered bilateral ground-glass infiltrates and reticular densities most prominent in the lung bases. Moderate right and small to moderate left pleural effusions and presumed compressive atelectasis of the adjacent lung bases. Slight fluid/thickening along the superior right main fissure. No pneumothorax. The central airways are grossly patent.   BONES:  Kyphosis and degenerative endplate spurring in the thoracic spine.     ABDOMEN/PELVIS:   Solid organ and vessel evaluation limited without IV contrast. Artifact related to overlying upper extremities.   ABDOMINAL ORGANS:   LIVER: No focal lesion within limits of unenhanced exam   GALL BLADDER AND BILIARY TREE: No calcified gallstone   SPLEEN: No focal lesion within limits of unenhanced exam   PANCREAS: No focal lesion within limits of unenhanced exam   ADRENALS: No adrenal mass   KIDNEYS AND URETERS: No renal mass or hydronephrosis within limits of unenhanced exam   BOWEL: No abnormally dilated large or small bowel loops. Moderate fecal residue. Distal colon diverticulosis. Within limits of unenhanced exam probable persistent circumferential wall thickening in the distal transverse colon for example image 110/201, as noted on the  prior exam.   PERITONEUM, RETROPERITONEUM, NODES: No significant free fluid. No free air. No significant retroperitoneal lymphadenopathy within limits of unenhanced exam. Slight increased density in the lower abdominal mesentery.   VESSELS:  Lack of IV contrast precludes vascular luminal assessment. Multifocal atherosclerotic calcifications. No abdominal aortic aneurysm.   PELVIS: Urinary bladder is moderately distended and grossly normal in contour. 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   ABDOMINAL WALL: No sizable abdominal wall hernia.   BONES: Osteopenia. Multifocal degenerative changes. Mild scoliosis of the lumbar spine.        CHEST:   Bilateral infiltrates and right-greater-than-left pleural effusions. Differential includes CHF and pneumonia. Clinical correlation and follow-up to ensure resolution after appropriate treatment recommended.   ABDOMEN AND PELVIS:   Apparent persistent distal transverse colon wall thickening consistent with reportedly known malignancy.   No gross evidence of solid organ injury or free fluid within limits of unenhanced exam.   Fecal retention. Distal colon diverticulosis.   Other findings as described above.   MACRO: None.   Signed by: Mary Jane Foreman 6/23/2025 1:32 PM Dictation workstation:   [id number]     CT cervical spine wo IV contrast  Result Date: 6/23/2025  Interpreted By:  Mary Jane Foreman, STUDY: CT CERVICAL SPINE WO IV CONTRAST;  6/23/2025 1:05 pm   INDICATION: Signs/Symptoms:fall blood thinners r/o frx.     COMPARISON: None.   ACCESSION NUMBER(S): [id number]   ORDERING CLINICIAN: MURTAZA HINOJOSA   TECHNIQUE: CT images were obtained through the cervical spine. Sagittal and coronal reconstructions were generated.   FINDINGS:     ALIGNMENT: Mild levocurvature. Straightening of the lower cervical lordosis. Slight retrolisthesis of C5.   VERTEBRAE/DISC SPACES: No compression deformity or acute displaced fracture. Multilevel degenerative changes including atlantoaxial joint  space narrowing spurring, multilevel intervertebral disc space narrowing with endplate sclerosis and spurring, multilevel bilateral facet joint narrowing and spurring. At least partial fusion across the anterior C5-6 vertebra.   ADDITIONAL FINDINGS: No abnormal thickening of the prevertebral soft tissues.  Dependent fluid/thickening in the sphenoid sinus. Ill-defined biapical infiltrates. Small right pleural effusion. Partially included pacer wires in the left upper chest.        No cervical vertebral compression deformity or acute displaced fracture. Multilevel productive/degenerative changes with straightening of the cervical lordosis and slight spondylolisthesis at C5.   Paraspinal/soft tissue findings as above.   MACRO: None.   Signed by: Mary Jane Foreman 6/23/2025 1:16 PM Dictation workstation:   [id number]     CT head wo IV contrast  Result Date: 6/23/2025  Interpreted By:  Mary Jane Foreman, STUDY: CT HEAD WO IV CONTRAST;  6/23/2025 1:05 pm   INDICATION: Signs/Symptoms:headache fall blood thinners r/o sah ich mass.     COMPARISON: 08/23/2023   ACCESSION NUMBER(S): [id number]   ORDERING CLINICIAN: MURTAZA HINOJOSA   TECHNIQUE: Unenhanced CT images of the head were obtained.   FINDINGS: The ventricles, cisterns and sulci are enlarged, consistent with diffuse volume loss. There are areas of nonspecific white matter hypodensity, which are probably age related or microvascular in nature. These findings are similar to the prior exam. There is no acute intracranial hemorrhage, mass effect or midline shift. No extraaxial fluid collection.   No acute displaced calvarial fracture. No focal calvarial lesion.   Left sphenoid sinus dependent fluid/thickening. Remaining visualized paranasal sinuses are clear. Dense presumed surgical material along the anterior margin of each globe again seen.        No acute intracranial hemorrhage or mass-effect.   Mild fluid or mucosal thickening in the left sphenoid sinus..   MACRO: None.    Signed by: Mary Jane Foreamn 6/23/2025 1:08 PM Dictation workstation:   [id number]        Assessment & Plan  Acute on chronic diastolic congestive heart failure     Acute bacterial sinusitis     Syncope     Advance care planning     Constipation           Admit to telemetry  Inpatient  Appreciate cardiology recommendations  I am not sure if patient's CHF represents more of a chronic condition so we will give 40 mg IV Lasix x 1 and resume torsemide tomorrow and look to cardiology for further recommendations  Please see echo from 2024 and carotid duplex from 2029 above  Start Rocephin 1 g IV daily  Orthostatic vital signs  Repeat EKG and troponin pacer check  Continue home aspirin and Plavix  Daily weight  Intake and output  AM CBC, BMP, coags, lipids  Check flu and COVID  Urinalysis pending  PT/OT     Chronic conditions: HFpEF, MI, 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 Plan discussed with interdisciplinary team, consults placed, appreciate input. Will continue current and repeat labs in the AM.          Discharge planning discussed with patient and care team. Therapy evaluations ordered. Patient aware and agreeable to current plan, continue plan as above.      I spent a total of 75 minutes on the date of the service which included preparing to see the patient, face-to-face patient care, completing clinical documentation,  obtaining and/or reviewing separately obtained history, performing a medically appropriate examination, counseling and educating the patient/family/caregiver, ordering medications, tests, or procedures, communicating with other HCPs (not separately reported), independently interpreting results (not separately reported), communicating results to the patient/family/caregiver, and care coordination (not separately reported).            Riya Urena                  Revision History          Physical Exam     General: in no acute distress  Eyes: PERRLA   HENT: Normo-cephalic, atraumatic.   CV: Irregular rate, irregular rhythm.   Resp: Breathing non-labored,  diminished to auscultation bilaterally  GI: BS x4   : monitor intake and output  MSK/Extremities: No gross bony deformities. PT/OT on the case  Skin: Warm. Appropriate color, continue offloading  Neuro:  Face symmetric.   Psych: returning to baseline, improved      10 point ROS negative unless otherwise noted in HPI     Patient fully evaluated 07/01  for    Problem List Items Addressed This Visit         Dyspnea on minimal exertion     Relevant Medications     cefuroxime (Ceftin) 250 mg tablet     metoprolol tartrate (Lopressor) 25 mg tablet     Other Relevant Orders     CBC     Comprehensive metabolic panel     Pleural effusion     * (Principal) Acute on chronic diastolic congestive heart failure - Primary     Relevant Medications     clopidogrel (Plavix) tablet 75 mg     metoprolol tartrate (Lopressor) tablet 12.5 mg     metoprolol tartrate (Lopressor) 25 mg tablet     Syncope     Relevant Medications     acetaminophen (Tylenol) 325 mg tablet     dapagliflozin propanediol (Farxiga) 10 mg tablet     heparin sodium,porcine (heparin, porcine,) 5,000 unit/mL injection     lactobacillus acidophilus tablet tablet     oxygen (O2) gas therapy     sennosides-docusate sodium (Sindy-Colace) 8.6-50 mg tablet     Other Relevant Orders     Case Request Cath Lab: PPM IMPLANT  DUAL (Completed)     Electrophysiology procedure     Cardiac device check - Inpatient     Transthoracic Echo Complete (Completed)     CBC     Comprehensive metabolic panel      Brought to hospital - had concerns including Fall.  Patient seen resting in bed with head of bed elevated, no s/s or c/o acute difficulties at this time.  Vital signs for last 24 hours Temp:  [36 °C (96.8 °F)-36.3 °C (97.3 °F)] 36.1 °C (97 °F)  Heart Rate:  [57-63] 62  Resp:  [17-20] 20  BP: (105-131)/(50-59) 118/55    No intake/output data recorded.  Patient still requiring frequent cardiac and SPO2 monitoring. Continue aggressive pulmonary hygiene and oral hygiene. Off loading as tolerated for skin integrity. Medications and labs reviewed-         Results for orders placed or performed during the hospital encounter of 06/23/25 (from the past 24 hours)   Comprehensive Metabolic Panel   Result Value Ref Range     Glucose 94 74 - 99 mg/dL     Sodium 136 136 - 145 mmol/L     Potassium 4.0 3.5 - 5.3 mmol/L     Chloride 98 98 - 107 mmol/L     Bicarbonate 31 21 - 32 mmol/L     Anion Gap 11 10 - 20 mmol/L     Urea Nitrogen 34 (H) 6 - 23 mg/dL     Creatinine 0.87 0.50 - 1.05 mg/dL     eGFR 63 >60 mL/min/1.73m*2     Calcium 9.2 8.6 - 10.3 mg/dL     Albumin 3.4 3.4 - 5.0 g/dL     Alkaline Phosphatase 100 33 - 136 U/L     Total Protein 5.8 (L) 6.4 - 8.2 g/dL     AST 43 (H) 9 - 39 U/L     Bilirubin, Total 0.4 0.0 - 1.2 mg/dL     ALT 32 7 - 45 U/L   CBC and Auto Differential   Result Value Ref Range     WBC 4.5 4.4 - 11.3 x10*3/uL     nRBC 0.0 0.0 - 0.0 /100 WBCs     RBC 3.38 (L) 4.00 - 5.20 x10*6/uL     Hemoglobin 10.0 (L) 12.0 - 16.0 g/dL     Hematocrit 31.5 (L) 36.0 - 46.0 %     MCV 93 80 - 100 fL     MCH 29.6 26.0 - 34.0 pg     MCHC 31.7 (L) 32.0 - 36.0 g/dL     RDW 15.0 (H) 11.5 - 14.5 %     Platelets 220 150 - 450 x10*3/uL     Neutrophils % 46.2 40.0 - 80.0 %     Immature Granulocytes %, Automated 0.0 0.0 - 0.9 %     Lymphocytes % 30.5 13.0 - 44.0  %     Monocytes % 15.0 2.0 - 10.0 %     Eosinophils % 7.6 0.0 - 6.0 %     Basophils % 0.7 0.0 - 2.0 %     Neutrophils Absolute 2.06 1.60 - 5.50 x10*3/uL     Immature Granulocytes Absolute, Automated 0.00 0.00 - 0.50 x10*3/uL     Lymphocytes Absolute 1.36 0.80 - 3.00 x10*3/uL     Monocytes Absolute 0.67 0.05 - 0.80 x10*3/uL     Eosinophils Absolute 0.34 0.00 - 0.40 x10*3/uL     Basophils Absolute 0.03 0.00 - 0.10 x10*3/uL      *Note: Due to a large number of results and/or encounters for the requested time period, some results have not been displayed. A complete set of results can be found in Results Review.      Patient recently received an antibiotic (last 12 hours)         Date/Time Action Medication Dose     06/29/25 0919 Given    cefuroxime (Ceftin) tablet 250 mg 250 mg             Plan discussed with interdisciplinary team, continue current and repeat labs in the AM.      Discharge planning discussed with patient and care team. Therapy evaluations ordered.   [facility]-   15  Anticipate - SNF     Patient aware and agreeable to current plan, continue plan as above.      I spent a total of 60 minutes on the date of the service which included preparing to see the patient, face-to-face patient care, completing clinical documentation, obtaining and/or reviewing separately obtained history, performing a medically appropriate examination, counseling and educating the patient/family/caregiver, ordering medications, tests, or procedures, communicating with other HCPs (not separately reported), independently interpreting results (not separately reported), communicating results to the patient/family/caregiver, and care coordination (not separately reported).   Relevant Results                   This patient currently has cardiac telemetry ordered; if you would like to modify or discontinue the telemetry order, click here to go to the orders activity to modify/discontinue the order.                    Assessment & Plan  Acute  on chronic diastolic congestive heart failure     Acute bacterial sinusitis     Syncope     Advance care planning     Constipation                 Riya Urena                      Revision History    Patient fully evaluated  07/02 for   Assessment & Plan  Acute on chronic diastolic congestive heart failure    Acute bacterial sinusitis    Syncope    Advance care planning    Constipation      Patient with significant clinical improvement noted, patient medically cleared for discharge today. Patient seen resting in bed with head of bed elevated, no s/s or c/o acute difficulties at this time. Vital signs for last 24 hours:  Temp:  [36 °C (96.8 °F)-36.7 °C (98.1 °F)] 36.7 °C (98.1 °F)  Heart Rate:  [60] 60  Resp:  [16-20] 16  BP: ()/(49-58) 106/51 Medications and labs reviewed-   Results for orders placed or performed during the hospital encounter of 06/23/25 (from the past 24 hours)   Comprehensive Metabolic Panel   Result Value Ref Range    Glucose 82 74 - 99 mg/dL    Sodium 137 136 - 145 mmol/L    Potassium 3.7 3.5 - 5.3 mmol/L    Chloride 97 (L) 98 - 107 mmol/L    Bicarbonate 35 (H) 21 - 32 mmol/L    Anion Gap 9 (L) 10 - 20 mmol/L    Urea Nitrogen 34 (H) 6 - 23 mg/dL    Creatinine 1.03 0.50 - 1.05 mg/dL    eGFR 51 (L) >60 mL/min/1.73m*2    Calcium 9.4 8.6 - 10.3 mg/dL    Albumin 3.5 3.4 - 5.0 g/dL    Alkaline Phosphatase 92 33 - 136 U/L    Total Protein 5.9 (L) 6.4 - 8.2 g/dL    AST 31 9 - 39 U/L    Bilirubin, Total 0.4 0.0 - 1.2 mg/dL    ALT 26 7 - 45 U/L   CBC and Auto Differential   Result Value Ref Range    WBC 5.7 4.4 - 11.3 x10*3/uL    nRBC 0.0 0.0 - 0.0 /100 WBCs    RBC 3.56 (L) 4.00 - 5.20 x10*6/uL    Hemoglobin 10.4 (L) 12.0 - 16.0 g/dL    Hematocrit 33.9 (L) 36.0 - 46.0 %    MCV 95 80 - 100 fL    MCH 29.2 26.0 - 34.0 pg    MCHC 30.7 (L) 32.0 - 36.0 g/dL    RDW 15.0 (H) 11.5 - 14.5 %    Platelets 232 150 - 450 x10*3/uL    Neutrophils % 54.1 40.0 - 80.0 %    Immature Granulocytes %, Automated 0.4 0.0 -  0.9 %    Lymphocytes % 24.5 13.0 - 44.0 %    Monocytes % 14.0 2.0 - 10.0 %    Eosinophils % 6.5 0.0 - 6.0 %    Basophils % 0.5 0.0 - 2.0 %    Neutrophils Absolute 3.09 1.60 - 5.50 x10*3/uL    Immature Granulocytes Absolute, Automated 0.02 0.00 - 0.50 x10*3/uL    Lymphocytes Absolute 1.40 0.80 - 3.00 x10*3/uL    Monocytes Absolute 0.80 0.05 - 0.80 x10*3/uL    Eosinophils Absolute 0.37 0.00 - 0.40 x10*3/uL    Basophils Absolute 0.03 0.00 - 0.10 x10*3/uL      Patient recently received an antibiotic (last 12 hours)       Date/Time Action Medication Dose Rate    07/02/25 1111 New Bag    ceFAZolin (Ancef) 1 g in dextrose (iso) IV 50 mL 1 g 100 mL/hr    07/02/25 0918 Given    cefuroxime (Ceftin) tablet 250 mg 250 mg            No results found for the last 90 days.       Continue aggressive pulmonary hygiene and oral hygiene. Off loading as tolerated for skin integrity. No new complaints per patient, stable at time of exam.  Plan discussed with interdisciplinary team, no acute events overnight, patient denies chest pain, worsening SOB at this time. Ok to discharge, will continue current and repeat labs in the AM if patient still hospitalized. Patient aware and agreeable to current plan, continue plan as above.     I spent 60 minutes on the date of the service which included preparing to see the patient, face-to-face patient care, completing clinical documentation, obtaining and/or reviewing separately obtained history, performing a medically appropriate examination, counseling and educating the patient/family/caregiver, ordering medications, tests, or procedures, communicating with other HCPs (not separately reported), independently interpreting results (not separately reported), communicating results to the patient/family/caregiver, and care coordination (not separately reported   Pertinent Physical Exam At Time of Discharge  Physical Exam    Outpatient Follow-Up  Future Appointments   Date Time Provider Department Center    7/9/2025  2:00 PM Naima Keane MD [id number] [place]   7/17/2025  3:00 PM Joselito Madden PA-C [id number] [place]   9/17/2025  2:30 PM [facility] [id number]         Riya Urena

## 2025-07-02 NOTE — PROGRESS NOTES
"Internal Medicine Progress Note    PATIENT NAME: Yesenia Milner  MRN: [id number]  DATE: 7/2/2025       Subjective   Patient has no complaints this morning, no acute overnight events.     Objective   /51   Pulse 60   Temp 36.7 °C (98.1 °F) (Temporal)   Resp 16   Ht 1.626 m (5' 4\")   Wt 51.5 kg (113 lb 8.6 oz)   SpO2 99%   BMI 19.49 kg/m²     General: Pleasant and cooperative  HEENT: Normocephalic, atraumatic, mucus membranes moist.  Eyes: EOMI  Neck:  Trachea midline.    Chest: Symmetric chest expansion, CTA bilaterally   CV:  Regular rate, regular rhythm.  Positive S1/S2.  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    # Transverse colon invasive moderately differentiated adenocarcinoma  - Multiple discussions were had regarding biopsy this patient however she is not agreeable to any sort of biopsy.  Further options were discussed including palliative surgery and colonic cancer radiation therapy however patient would likely poorly tolerate any of these interventions due to her age and comorbidities.  Recommend a palliative approach.  Continue supportive care    This is a preliminary note written by the resident. Please wait for attending addendum for finalization of note and recommendations.    Dr. Lam Leyva  Internal Medicine           [1] allopurinol, 100 mg, oral, " Daily  aspirin, 81 mg, oral, Daily  ceFAZolin, 1 g, intravenous, q8h  cefuroxime, 250 mg, oral, BID  clopidogrel, 75 mg, oral, Daily  colchicine, 0.6 mg, oral, Daily  dapagliflozin propanediol, 10 mg, oral, q24h  DULoxetine, 30 mg, oral, Daily  ferrous sulfate, 65 mg of elemental iron, oral, Daily with breakfast  [Held by provider] heparin (porcine), 5,000 Units, subcutaneous, q12h  lactobacillus acidophilus, 1 tablet, oral, BID  metoprolol tartrate, 12.5 mg, oral, BID  polyethylene glycol, 17 g, oral, BID  sennosides-docusate sodium, 2 tablet, oral, BID  torsemide, 40 mg, oral, Daily  [2]    [3] PRN medications: acetaminophen **OR** acetaminophen **OR** acetaminophen, albuterol, lubricating eye drops, ondansetron, oxygen

## 2025-07-02 NOTE — PROGRESS NOTES
07/02/25 1256   Discharge Planning   Home or Post Acute Services Post acute facilities (Rehab/SNF/etc)   Type of Post Acute Facility Services Skilled nursing   Expected Discharge Disposition SNF   Does the patient need discharge transport arranged? Yes   [facility] coordination needed? Yes   Has discharge transport been arranged? No     Received update from Dr. Mensah that patient is not ready for discharge today. Pre-cert approved through today. Patient will need a new pre-cert for discharge to [facility].     Addendum 1318: Received update from Dr. Mensah that patient is ready for discharge today. [facility] updated.     Addendum 1350: Confirmed that [facility] can accept patient today.     Addendum 1424: [facility] has arranged transport for 5:15 PM to [facility]. Called and updated patients daughter. Bedside nurse updated and provided with report number.     Addendum 1613: Received update from bedside nurse that she spoke with attending and discharge is being held today. [facility] updated. Requested for [facility] to cancel transport. Patient will need new pre-cert.

## 2025-07-02 NOTE — CARE PLAN
The patient's goals for the shift include      The clinical goals for the shift include Patient will remain [facility]

## 2025-07-02 NOTE — NURSING NOTE
Called Dr. Mensah in regards to discharge and the patient's condition. Patient discharge will be held for the moment.

## 2025-07-02 NOTE — PROGRESS NOTES
Cardiology Progress Note      Subjective   Today, patient seen and assessed resting in bed on her baseline 2L 02 NC. She denies chest pain or shortness of breath. Continues to endorse fatigue and lightheadedness. Has left chest wall discomfort at PPM site. Mild bruising and swelling noted, as expected. Endorses feeling a little lightheaded. Her Pacemaker was interrogated this morning with good function. There is a brief episode of bradycardia with Hrs in the 30's noted on telemetry that occurred when patient was undergoing her PPM.        ROS:    Review of Systems   Constitutional: Positive for malaise/fatigue.   Cardiovascular:  Negative for chest pain, dyspnea on exertion, leg swelling, near-syncope, orthopnea, palpitations and paroxysmal nocturnal dyspnea.   Respiratory:  Negative for shortness of breath.    Neurological:  Positive for light-headedness.        Past Medical History:  Medical History[1]    Problem List Items Addressed This Visit          Cardiac and Vasculature    Dyspnea on minimal exertion    Relevant Medications    cefuroxime (Ceftin) 250 mg tablet    metoprolol tartrate (Lopressor) 25 mg tablet    Other Relevant Orders    CBC    Comprehensive metabolic panel    * (Principal) Acute on chronic diastolic congestive heart failure - Primary    Relevant Medications    aspirin chewable tablet 81 mg    clopidogrel (Plavix) tablet 75 mg    heparin (porcine) injection 5,000 Units    lactobacillus acidophilus tablet 1 tablet    acetaminophen (Tylenol) tablet 650 mg    acetaminophen (Tylenol) oral liquid 650 mg    acetaminophen (Tylenol) suppository 650 mg    albuterol 2.5 mg /3 mL (0.083 %) nebulizer solution 2.5 mg    torsemide (Demadex) tablet 40 mg    dapagliflozin propanediol (Farxiga) tablet 10 mg    acetaminophen (Tylenol) 325 mg tablet    dapagliflozin propanediol (Farxiga) 10 mg tablet    heparin sodium,porcine (heparin, porcine,) 5,000 unit/mL injection    lactobacillus acidophilus tablet tablet     metoprolol tartrate (Lopressor) tablet 12.5 mg    metoprolol tartrate (Lopressor) 25 mg tablet       Pulmonary and Pneumonias    Pleural effusion       Symptoms and Signs    Syncope    Relevant Medications    acetaminophen (Tylenol) 325 mg tablet    dapagliflozin propanediol (Farxiga) 10 mg tablet    heparin sodium,porcine (heparin, porcine,) 5,000 unit/mL injection    lactobacillus acidophilus tablet tablet    oxygen (O2) gas therapy    sennosides-docusate sodium (Sindy-Colace) 8.6-50 mg tablet    Other Relevant Orders    Case Request Cath Lab: PPM IMPLANT DUAL (Completed)    Electrophysiology procedure (Completed)    Cardiac device check - Inpatient    Transthoracic Echo Complete (Completed)    CBC    Comprehensive metabolic panel       Family History:  No relevant family history has been documented for this patient.    Social History:  Social History     Tobacco Use    Smoking status: Former     Current packs/day: 0.00     Types: Cigarettes     Quit date:      Years since quittin.5     Passive exposure: Past    Smokeless tobacco: Never   Substance Use Topics    Alcohol use: Never         Allergies:  Allergies[2]        MEDICATION:    Scheduled medications  Scheduled Medications[3]  Continuous medications  Continuous Medications[4]  PRN medications  PRN Medications[5]     Assessment & Plan  Acute on chronic diastolic congestive heart failure    Acute bacterial sinusitis    Syncope    Advance care planning    Constipation      OBJECTIVE:    Visit Vitals  /51   Pulse 60   Temp 36.7 °C (98.1 °F) (Temporal)   Resp 16          Intake/Output Summary (Last 24 hours) at 2025 1402  Last data filed at 2025 1111  Gross per 24 hour   Intake 606 ml   Output 403 ml   Net 203 ml          Physical Exam   Physical Exam  Constitutional:       Appearance: Normal appearance.   HENT:      Head: Normocephalic and atraumatic.   Cardiovascular:      Rate and Rhythm: Normal rate and regular rhythm.      Pulses:            Posterior tibial pulses are 2+ on the right side and 2+ on the left side.      Heart sounds: Normal heart sounds, S1 normal and S2 normal. No murmur heard.  Pulmonary:      Effort: Pulmonary effort is normal.      Breath sounds: Normal breath sounds.   Musculoskeletal:      Cervical back: Normal range of motion.      Right lower leg: No edema.      Left lower leg: No edema.   Skin:     Comments: PPM site with dressing - no hematoma. Mild swelling and bruising    Neurological:      General: No focal deficit present.      Mental Status: She is alert.   Psychiatric:         Mood and Affect: Mood normal.          Recent Image Results  Electrophysiology procedure  Procedure: Right ventricular pacing lead insertion (40138)    Diagnosis:    Complete heart block  Right ventricular pacing lead failure    Anesthesia: Moderate sedation    History: This is a 89-year-old female with a history of complete heart   block.  She has a Medtronic pacemaker which was replaced in 2023.  She   presented with a syncopal event and has been having pauses on telemetry   suggestive of RV lead oversensing.  The patient is pacemaker dependent.    Despite numerous reprogramming attempts the patient was still having   oversensing on the right ventricular pacing lead and she presents to the   EP laboratory for insertion of a new right ventricular pacing lead.    Procedure:    Consent was obtained.  The preprocedure conscious sedation assessment was   performed.  The patient was brought into the electrophysiology laboratory   and placed in the supine position.  Defibrillation patches were placed on   the thorax in the anterior head posterior position.  The patient received   blood pressure and pulse oximetry monitoring throughout the procedure.  A   peripheral venogram was performed which showed patency of the left   subclavian vein.  The pacemaker site was prepped and draped in sterile   fashion and anesthetized with 15 cc of 2% lidocaine.   An incision was made   using a scalpel, and electrocautery dissection was carried out down to the   device capsule which was carefully opened.  The pacemaker and leads were   dissected free from the floor of the pocket and removed.  Left subclavian   vein access was obtained under fluoroscopic guidance using a 4 Dutch   micropuncture needle.  1 guidewire was retained in the right atrium.  A 7   Dutch introducer was inserted over the guidewire through which the   ventricular pacing lead was advanced.  The pacing lead was positioned on   the RV septum toward the apex under fluoroscopic guidance.  R-waves were   paced, lead impedance 741 ohms and the pacing threshold 0.8 V at 0.4 ms.    There was no diaphragm stimulation with pacing at 10 V.  The lead was   anchored with nonabsorbable suture.  The pocket was irrigated with   Irrisept solution.  The new RV lead was then used as a temporary   pacemaker.  The chronic right atrial and right ventricular leads were   disconnected from the existing pacemaker.  The RV lead was capped and   placed in the pocket.  The new RV lead was then plugged into the pacemaker   along with the right atrial pacing lead.  The device was placed in a Tyrx   antibiotic envelope, then in the pocket with the leads coiled underneath.    The incision was closed using 2 layers of 3-0 Vicryl suture followed by   subcuticular layer using 4-0 Vicryl suture.  Steri-Strips were placed   across the incision and a sterile occlusive dressing was applied.  The   patient tolerated the procedure well and there were no complications.    Conscious sedation was provided for 60 minutes.    Device implant data: The pacemaker pulse generator is a Medtronic [place] XT   DR MRI model number W1 DR 01, serial number [id number].  This device was   inserted July 13, 2023 at the time of a pacemaker pulse generator   replacement.  The chronic right atrial pacing lead is [employer] model   #5076, serial number [id number]  implanted October 15, 2012.  The chronic   right ventricular pacing lead that was capped and left in the pocket is   Medtronic model #5076, serial number [id number], implanted October 15,   2012.  The new right ventricular pacing lead is Medtronic model #5076,   serial number [id number].  The pacemaker is programmed VVIR 60 bpm.        Relevant Results:  Lab Results   Component Value Date    WBC 5.7 07/02/2025    HGB 10.4 (L) 07/02/2025    HCT 33.9 (L) 07/02/2025    MCV 95 07/02/2025     07/02/2025        Lab Results   Component Value Date    CREATININE 1.03 07/02/2025    BUN 34 (H) 07/02/2025     07/02/2025    K 3.7 07/02/2025    CL 97 (L) 07/02/2025    CO2 35 (H) 07/02/2025        Assessment/Plan    Yesenia Milner is a 89 y.o. female known to Dr. 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 Patient presented to [facility] on 6/23/2025 with syncope and fall. Chronically on aspirin and Plavix. Cardiology has been consulted for syncope and patient was found to have PPM dysfunction.      Complete Heart Block  - s/p right ventricle lead revision procedure yesterday. Her Pacemaker was interrogated this morning with good function. There is a brief episode of bradycardia with Hrs in the 30's noted on telemetry that occurred when patient was undergoing her PPM.   - Mild swelling and bruising at PPM site. No hematoma or drainage present.   - Patient is stable from a cardiac standpoint for discharge.       Discussed Case with Dr. Ramicone and Dr. Hess. Cardiology will sign off. Please contact with any questions or concerns.      SAQIB Ramirez-CNP          [1]   Past Medical  History:  Diagnosis Date    Gross hematuria 10/07/2020    Impacted cerumen 02/22/2024    Other conditions influencing health status     Normal stress echocardiogram    Personal history of other medical treatment     History of echocardiogram    Personal history of other medical treatment     H/O Doppler ultrasound    Systolic CHF (Multi) 05/18/2023   [2]   Allergies  Allergen Reactions    Iodine Rash    Shrimp Diarrhea and Nausea/vomiting    Hydrocodone Unknown     Pt does not remember    Adhesive Tape-Silicones Itching   [3] allopurinol, 100 mg, oral, Daily  aspirin, 81 mg, oral, Daily  ceFAZolin, 1 g, intravenous, q8h  cefuroxime, 250 mg, oral, BID  clopidogrel, 75 mg, oral, Daily  colchicine, 0.6 mg, oral, Daily  dapagliflozin propanediol, 10 mg, oral, q24h  DULoxetine, 30 mg, oral, Daily  ferrous sulfate, 65 mg of elemental iron, oral, Daily with breakfast  [Held by provider] heparin (porcine), 5,000 Units, subcutaneous, q12h  lactobacillus acidophilus, 1 tablet, oral, BID  metoprolol tartrate, 12.5 mg, oral, BID  polyethylene glycol, 17 g, oral, BID  sennosides-docusate sodium, 2 tablet, oral, BID  torsemide, 40 mg, oral, Daily  [4]    [5] PRN medications: acetaminophen **OR** acetaminophen **OR** acetaminophen, albuterol, lubricating eye drops, ondansetron, oxygen

## 2025-07-03 ENCOUNTER — APPOINTMENT (OUTPATIENT)
Dept: CARDIOLOGY | Facility: HOSPITAL | Age: OVER 89
End: 2025-07-03
Payer: MEDICARE

## 2025-07-03 VITALS
SYSTOLIC BLOOD PRESSURE: 100 MMHG | HEIGHT: 64 IN | RESPIRATION RATE: 17 BRPM | HEART RATE: 60 BPM | BODY MASS INDEX: 19.38 KG/M2 | OXYGEN SATURATION: 99 % | WEIGHT: 113.54 LBS | DIASTOLIC BLOOD PRESSURE: 49 MMHG | TEMPERATURE: 97 F

## 2025-07-03 DIAGNOSIS — I50.33 ACUTE ON CHRONIC DIASTOLIC HEART FAILURE: ICD-10-CM

## 2025-07-03 LAB
ALBUMIN SERPL BCP-MCNC: 3.3 G/DL (ref 3.4–5)
ALP SERPL-CCNC: 104 U/L (ref 33–136)
ALT SERPL W P-5'-P-CCNC: 23 U/L (ref 7–45)
ANION GAP SERPL CALC-SCNC: 9 MMOL/L (ref 10–20)
AST SERPL W P-5'-P-CCNC: 35 U/L (ref 9–39)
ATRIAL RATE: 57 BPM
BASOPHILS # BLD AUTO: 0.03 X10*3/UL (ref 0–0.1)
BASOPHILS NFR BLD AUTO: 0.6 %
BILIRUB SERPL-MCNC: 0.4 MG/DL (ref 0–1.2)
BUN SERPL-MCNC: 34 MG/DL (ref 6–23)
CALCIUM SERPL-MCNC: 9.1 MG/DL (ref 8.6–10.3)
CHLORIDE SERPL-SCNC: 97 MMOL/L (ref 98–107)
CO2 SERPL-SCNC: 33 MMOL/L (ref 21–32)
CREAT SERPL-MCNC: 1.02 MG/DL (ref 0.5–1.05)
EGFRCR SERPLBLD CKD-EPI 2021: 52 ML/MIN/1.73M*2
EOSINOPHIL # BLD AUTO: 0.45 X10*3/UL (ref 0–0.4)
EOSINOPHIL NFR BLD AUTO: 8.3 %
ERYTHROCYTE [DISTWIDTH] IN BLOOD BY AUTOMATED COUNT: 15.1 % (ref 11.5–14.5)
GLUCOSE SERPL-MCNC: 97 MG/DL (ref 74–99)
HCT VFR BLD AUTO: 32.1 % (ref 36–46)
HGB BLD-MCNC: 10.1 G/DL (ref 12–16)
IMM GRANULOCYTES # BLD AUTO: 0.01 X10*3/UL (ref 0–0.5)
IMM GRANULOCYTES NFR BLD AUTO: 0.2 % (ref 0–0.9)
LYMPHOCYTES # BLD AUTO: 1.46 X10*3/UL (ref 0.8–3)
LYMPHOCYTES NFR BLD AUTO: 27.1 %
MCH RBC QN AUTO: 29.4 PG (ref 26–34)
MCHC RBC AUTO-ENTMCNC: 31.5 G/DL (ref 32–36)
MCV RBC AUTO: 93 FL (ref 80–100)
MONOCYTES # BLD AUTO: 0.91 X10*3/UL (ref 0.05–0.8)
MONOCYTES NFR BLD AUTO: 16.9 %
NEUTROPHILS # BLD AUTO: 2.53 X10*3/UL (ref 1.6–5.5)
NEUTROPHILS NFR BLD AUTO: 46.9 %
NRBC BLD-RTO: 0 /100 WBCS (ref 0–0)
PLATELET # BLD AUTO: 208 X10*3/UL (ref 150–450)
POTASSIUM SERPL-SCNC: 3.8 MMOL/L (ref 3.5–5.3)
PROT SERPL-MCNC: 5.7 G/DL (ref 6.4–8.2)
Q ONSET: 202 MS
QRS COUNT: 9 BEATS
QRS DURATION: 190 MS
QT INTERVAL: 484 MS
QTC CALCULATION(BAZETT): 484 MS
QTC FREDERICIA: 484 MS
R AXIS: -47 DEGREES
RBC # BLD AUTO: 3.44 X10*6/UL (ref 4–5.2)
SODIUM SERPL-SCNC: 135 MMOL/L (ref 136–145)
T AXIS: 183 DEGREES
T OFFSET: 444 MS
VENTRICULAR RATE: 60 BPM
WBC # BLD AUTO: 5.4 X10*3/UL (ref 4.4–11.3)

## 2025-07-03 PROCEDURE — 85025 COMPLETE CBC W/AUTO DIFF WBC: CPT | Performed by: INTERNAL MEDICINE

## 2025-07-03 PROCEDURE — 2500000004 HC RX 250 GENERAL PHARMACY W/ HCPCS (ALT 636 FOR OP/ED): Performed by: NURSE PRACTITIONER

## 2025-07-03 PROCEDURE — 2500000001 HC RX 250 WO HCPCS SELF ADMINISTERED DRUGS (ALT 637 FOR MEDICARE OP): Performed by: INTERNAL MEDICINE

## 2025-07-03 PROCEDURE — 2500000001 HC RX 250 WO HCPCS SELF ADMINISTERED DRUGS (ALT 637 FOR MEDICARE OP): Performed by: NURSE PRACTITIONER

## 2025-07-03 PROCEDURE — 99233 SBSQ HOSP IP/OBS HIGH 50: CPT | Performed by: NURSE PRACTITIONER

## 2025-07-03 PROCEDURE — 80053 COMPREHEN METABOLIC PANEL: CPT | Performed by: INTERNAL MEDICINE

## 2025-07-03 PROCEDURE — 93005 ELECTROCARDIOGRAM TRACING: CPT

## 2025-07-03 PROCEDURE — 97530 THERAPEUTIC ACTIVITIES: CPT | Mod: GP

## 2025-07-03 PROCEDURE — 36415 COLL VENOUS BLD VENIPUNCTURE: CPT | Performed by: INTERNAL MEDICINE

## 2025-07-03 RX ORDER — TORSEMIDE 20 MG/1
20 TABLET ORAL DAILY
Status: DISCONTINUED | OUTPATIENT
Start: 2025-07-04 | End: 2025-07-03 | Stop reason: HOSPADM

## 2025-07-03 RX ORDER — TORSEMIDE 20 MG/1
20 TABLET ORAL DAILY
Qty: 90 TABLET | Refills: 3 | OUTPATIENT
Start: 2025-07-04

## 2025-07-03 RX ORDER — TORSEMIDE 20 MG/1
20 TABLET ORAL DAILY
Start: 2025-07-04

## 2025-07-03 RX ADMIN — DULOXETINE 30 MG: 30 CAPSULE, DELAYED RELEASE ORAL at 09:50

## 2025-07-03 RX ADMIN — DAPAGLIFLOZIN 10 MG: 10 TABLET, FILM COATED ORAL at 13:04

## 2025-07-03 RX ADMIN — METOPROLOL TARTRATE 12.5 MG: 25 TABLET, FILM COATED ORAL at 09:50

## 2025-07-03 RX ADMIN — TORSEMIDE 40 MG: 20 TABLET ORAL at 09:50

## 2025-07-03 RX ADMIN — POLYETHYLENE GLYCOL 3350 17 G: 17 POWDER, FOR SOLUTION ORAL at 09:49

## 2025-07-03 RX ADMIN — ALLOPURINOL 100 MG: 100 TABLET ORAL at 09:50

## 2025-07-03 RX ADMIN — CEFUROXIME AXETIL 250 MG: 250 TABLET, FILM COATED ORAL at 09:50

## 2025-07-03 RX ADMIN — ASPIRIN 81 MG CHEWABLE TABLET 81 MG: 81 TABLET CHEWABLE at 09:50

## 2025-07-03 RX ADMIN — FERROUS SULFATE TAB 325 MG (65 MG ELEMENTAL FE) 1 TABLET: 325 (65 FE) TAB at 09:50

## 2025-07-03 RX ADMIN — CLOPIDOGREL BISULFATE 75 MG: 75 TABLET, FILM COATED ORAL at 09:50

## 2025-07-03 RX ADMIN — Medication 1 TABLET: at 09:50

## 2025-07-03 RX ADMIN — COLCHICINE 0.6 MG: 0.6 TABLET, FILM COATED ORAL at 09:50

## 2025-07-03 ASSESSMENT — COGNITIVE AND FUNCTIONAL STATUS - GENERAL
WALKING IN HOSPITAL ROOM: TOTAL
HELP NEEDED FOR BATHING: A LOT
DRESSING REGULAR LOWER BODY CLOTHING: A LOT
DRESSING REGULAR UPPER BODY CLOTHING: TOTAL
MOVING TO AND FROM BED TO CHAIR: A LOT
EATING MEALS: A LITTLE
MOVING FROM LYING ON BACK TO SITTING ON SIDE OF FLAT BED WITH BEDRAILS: A LITTLE
PERSONAL GROOMING: A LITTLE
TURNING FROM BACK TO SIDE WHILE IN FLAT BAD: A LOT
TOILETING: A LOT
STANDING UP FROM CHAIR USING ARMS: A LOT
DAILY ACTIVITIY SCORE: 13
MOBILITY SCORE: 11
CLIMB 3 TO 5 STEPS WITH RAILING: TOTAL

## 2025-07-03 ASSESSMENT — ENCOUNTER SYMPTOMS
PND: 0
PALPITATIONS: 0
ORTHOPNEA: 0
WEAKNESS: 1
SHORTNESS OF BREATH: 0

## 2025-07-03 ASSESSMENT — PAIN SCALES - GENERAL
PAINLEVEL_OUTOF10: 7
PAINLEVEL_OUTOF10: 7

## 2025-07-03 ASSESSMENT — PAIN - FUNCTIONAL ASSESSMENT
PAIN_FUNCTIONAL_ASSESSMENT: 0-10
PAIN_FUNCTIONAL_ASSESSMENT: 0-10

## 2025-07-03 NOTE — CARE PLAN
Problem: Heart Failure  Goal: Improved gas exchange this shift  Outcome: Progressing  Goal: Improved urinary output this shift  Outcome: Progressing  Goal: Reduction in peripheral edema within 24 hours  Outcome: Progressing  Goal: Report improvement of dyspnea/breathlessness this shift  Outcome: Progressing  Goal: Weight from fluid excess reduced over 2-3 days, then stabilize  Outcome: Progressing  Goal: Increase self care and/or family involvement in 24 hours  Outcome: Progressing  Problem: Pain - Adult  Goal: Verbalizes/displays adequate comfort level or baseline comfort level  Outcome: Progressing       The clinical goals for the shift include Patient will remain HDS

## 2025-07-03 NOTE — PROGRESS NOTES
25 1000   Discharge Planning   Expected Discharge Disposition SNF  (Auth for transition to [facility]  . PT saw patient today and DSC is starting precert again today for ADOD of today.)        25 1100   Discharge Planning   Expected Discharge Disposition SNF  (Auth received from [facility] and good 7/3-. Spoke with daughter Cherelle and made her aware of dc to snf today 1-2pm. [facility] is working on 7000 in [employer] and transport)      25 1200   Discharge Planning   Expected Discharge Disposition SNF  (Neyda Austin @ [facility] completed 7000 in [facility] and 13:30p stretcher confirmed with [facility]. Floor aware of dc/snf transition)

## 2025-07-03 NOTE — PROGRESS NOTES
Occupational Therapy    OT Treatment    Patient Name: Yesenia Milner  MRN: [id number]  Today's Date: 7/3/2025  Time Calculation  Start Time: 0947  Stop Time: 1001  Time Calculation (min): 14 min       [id number]/808-B    Assessment:  OT Assessment: Patient requires assist for all care secondary to impaired activity tolerance, impaired balance, impaired functional use of LUE and generalized weakness; patient would benefit from 24 hour care with O.T. services at a moderate intensity to improve independence with ADLs, transfers & mobility.  Prognosis: Good  Barriers to Discharge Home: Caregiver assistance, Physical needs  Caregiver Assistance: Patient lives alone and/or does not have reliable caregiver assistance  Physical Needs: 24hr mobility assistance needed, 24hr ADL assistance needed  End of Session Communication: Bedside nurse  End of Session Patient Position: Bed, 2 rail up, Alarm on (call light in reach)  OT Assessment Results: Decreased ADL status, Decreased functional mobility, Decreased safe judgment during ADL, Decreased endurance, Decreased trunk control for functional activities  Prognosis: Good  Strengths: Ability to acquire knowledge    Plan:  Treatment Interventions: ADL retraining, Functional transfer training, Endurance training, Neuromuscular reeducation, Compensatory technique education  OT Frequency: 3 times per week  OT Discharge Recommendations: Moderate intensity level of continued care (Based on current functional status and rehab potential, patient is anticipated to tolerate and benefit from 5 or more days per week of skilled rehabilitative therapy after discharge from this acute inpatient hospitilization.)  OT Recommended Transfer Status: Assist of 2, Minimal assist  OT - OK to Discharge: Yes  Treatment Interventions: ADL retraining, Functional transfer training, Endurance training, Neuromuscular reeducation, Compensatory technique education  Subjective     Current Problem:  Problem  List[1]    General:  OT Received On: 07/03/25  Reason for Referral: OT eval & treat for ADLs (Acute on chronic CHF, syncope)  Referred By: SELVIN Zavala  Past Medical History Relevant to Rehab: 6/23/25: lightheaded, passed out. CT head & C-spine: negative. PMH: HTN, HFpEF, MI, SVT, SSS, pacemaker, ALS, COPD, CVA, gout, colon CA  Family/Caregiver Present: No  Co-Treatment: PT  Co-Treatment Reason: To maximize patient safety and mobility  Prior to Session Communication: Bedside nurse (Per RN, patient is stable for therapy; Has sling LUE s/p right ventricle lead revision and pacemaker interrogation on 7/1/25. RN reports plan to order better fitting sling for comfort)  Patient Position Received: Bed, 2 rail up    Pain:  Pain Assessment  Pain Assessment: 0-10  0-10 (Numeric) Pain Score: 7  Pain Interventions: Repositioned  Objective      Activities of Daily Living:    Grooming  Grooming Level of Assistance: Close supervision, Setup (to comb hair, wash face & hands at bed level)            Bed Mobility/Transfers:   Bed Mobility  Bed Mobility:  (mod assist x 1 supine to sit; mod assist x 2 sit to supine)  Bed Mobility 1  Bed Mobility Comments 1: SBA supine to sit with HOB elevated and use of bed rail  Transfers  Transfer:  (mod assist x 2 sit to stand x 2 trials with RUE hand held assist ; retropulsive in standing; tolerates 30 seconds standing.)  Transfer 1  Trials/Comments 1: mod assist x 2 sit to stand              Outcome Measures:[facility] Daily Activity  Putting on and taking off regular lower body clothing: A lot  Bathing (including washing, rinsing, drying): A lot  Putting on and taking off regular upper body clothing: Total  Toileting, which includes using toilet, bedpan or urinal: A lot  Taking care of personal grooming such as brushing teeth: A little  Eating Meals: A little  Daily Activity - Total Score: 13       EDUCATION:  Education  Risk and Benefits Discussed with Patient/Caregiver/Other:  yes  Patient/Caregiver Demonstrated Understanding: yes    Goals:  Encounter Problems       Encounter Problems (Active)       OT Goals       Increase UE bathing/dressing to SBA and LE bathing/dressing to min assist with adaptive equipment as needed.  (Progressing)       Start:  06/24/25    Expected End:  07/08/25            Increase functional transfers & mobility to/from bed, chair & commode to CGA with DME for safety  (Progressing)       Start:  06/24/25    Expected End:  07/08/25            Increase dynamic stand balance to SBA with UE support to promote increased independence with ADL & functional transfers  (Progressing)       Start:  06/24/25    Expected End:  07/08/25            Tolerate 15 minutes UE therex with rest periods prn to promote increased activity tolerance for ADLs  (Progressing)       Start:  06/24/25    Expected End:  07/08/25                                [1]   Patient Active Problem List  Diagnosis    Aortic stenosis, mild    Arthritis    Complete heart block    COPD (chronic obstructive pulmonary disease) (Multi)    History of paroxysmal supraventricular tachycardia    HTN (hypertension)    Paresthesia    Presence of permanent cardiac pacemaker    Raynauds disease    Sensorineural hearing loss (SNHL) of both ears    Dyspnea on minimal exertion    Varicose veins of lower extremities with inflammation    Vertigo    Ischemic cerebrovascular accident (CVA) (Multi)    Acquired deformity of toe    Benign neoplasm of skin of foot    Benign paroxysmal positional vertigo    Dermatophytosis of nail    Leg swelling    Macular degeneration    Mitral valve insufficiency    Moderate mitral valve regurgitation    Plantar wart of left foot    Venous insufficiency    Dysarthria following cerebral infarction    Hyperglycemia    Hand paresthesia    Sick sinus syndrome (Multi)    Injury of kidney    NSTEMI (non-ST elevated myocardial infarction) (Multi)    Acute superior vena cava thrombosis (Multi)    Acute  thrombosis of right internal jugular vein (Multi)    Gastrointestinal hemorrhage, unspecified gastrointestinal hemorrhage type    Malignant neoplasm of transverse colon (Multi)    Stage 3 chronic kidney disease, unspecified whether stage 3a or 3b CKD (Multi)    Hypoxia    Pleural effusion    Cellulitis of lower extremity    Adenocarcinoma of colon    Bilateral leg edema    Acute on chronic diastolic heart failure    Nonrheumatic tricuspid valve regurgitation    Acute on chronic diastolic congestive heart failure    Acute bacterial sinusitis    Syncope    Advance care planning    Constipation

## 2025-07-03 NOTE — NURSING NOTE
Called to give report. I left number with the / to give to the nurse. I let them know the time that the patient would be discharged from the hospital.

## 2025-07-03 NOTE — CARE PLAN
The patient's goals for the shift include      The clinical goals for the shift include Patient will remain HDS    Over the shift, the patient did not make progress toward the following goals. Barriers to progression include ***. Recommendations to address these barriers include ***.

## 2025-07-03 NOTE — PROGRESS NOTES
Physical Therapy    Physical Therapy Treatment    Patient Name: Yesenia Milner  MRN: [id number]  Today's Date: 7/3/2025  Time Calculation  Start Time: 0948  Stop Time: 1001  Time Calculation (min): 13 min     [id number]/808-[id number]    Assessment/Plan   PT Assessment  PT Assessment Results: Decreased strength, Decreased endurance, Impaired balance, Decreased mobility  Rehab Prognosis: Good  Barriers to Discharge Home: Physical needs  Physical Needs: 24hr mobility assistance needed  Evaluation/Treatment Tolerance: Patient limited by fatigue  Medical Staff Made Aware: Yes  Strengths: Ability to acquire knowledge  End of Session Communication: Bedside nurse  End of Session Patient Position: Alarm on, Bed, 2 rail up (call light in reach)     PT Plan  Treatment/Interventions: Bed mobility, Transfer training, Gait training, Strengthening  PT Plan: Ongoing PT  PT Frequency: 3 times per week  PT Discharge Recommendations: Moderate intensity level of continued care  PT Recommended Transfer Status: Assist x2  PT - OK to Discharge: Yes (once cleared by medical team)    Current Problem:  Problem List[1]    General Visit Information:   PT  Visit  PT Received On: 07/03/25  General  Reason for Referral: PT treatment  Co-Treatment: OT  Co-Treatment Reason: to maximize patient safety & mobility  Prior to Session Communication: Bedside nurse  Patient Position Received: Alarm on, Bed, 2 rail up  General Comment: Pt pleasant and cooperative, agreeable to session.  Subjective     Precautions:  Precautions  Hearing/Visual Limitations: Mentasta; hearing aids donne for tx  Medical Precautions: Fall precautions, Oxygen therapy device and L/min (2L)  Precautions Comment: high falls, full code, OOB with assist, Mentasta, pacemaker precautions, L arm sling, strict I and O    Vital Signs:  Vital Signs  Vital Signs Comment: VSS  Objective     Pain:  Pain Assessment  Pain Assessment: 0-10  0-10 (Numeric) Pain Score: 7  Pain Type: Surgical pain  Pain Location:  Chest  Pain Interventions: Repositioned    Cognition:  Cognition  Overall Cognitive Status: Within Functional Limits    Treatments:  Therapeutic Exercise  Therapeutic Exercise Performed:  (completed seated marches x 20; completed LAQ x 5)        Bed Mobility  Bed Mobility:  (completed sup to sit with modAx1; completed sit to sup with modAx2; HOB elevated)     Transfers  Transfer:  (completed STS with modAx2 and RUE HHA ; retropulsive; tolerated static standing for 30 secs with modAx2)          Outcome Measures:     [facility] Basic Mobility  Turning from your back to your side while in a flat bed without using bedrails: A little  Moving from lying on your back to sitting on the side of a flat bed without using bedrails: A lot  Moving to and from bed to chair (including a wheelchair): A lot  Standing up from a chair using your arms (e.g. wheelchair or bedside chair): A lot  To walk in hospital room: Total  Climbing 3-5 steps with railing: Total  Basic Mobility - Total Score: 11                                      Education Documentation  Precautions, taught by Raissa France PT at 7/3/2025 10:12 AM.  Learner: Patient  Readiness: Acceptance  Method: Explanation  Response: Verbalizes Understanding    Mobility Training, taught by Raissa France PT at 7/3/2025 10:12 AM.  Learner: Patient  Readiness: Acceptance  Method: Explanation  Response: Verbalizes Understanding    Education Comments  No comments found.           EDUCATION:  Outpatient Education  Education Comment:  (Pt educated on treatment intervention and goals of treatment.)  Encounter Problems       Encounter Problems (Active)       Impaired mobility       STG: bed mobility sit<>supine with mod indep  (Progressing)       Start:  06/24/25    Expected End:  07/08/25            STG: transfers sit<>stand with mod indep  (Progressing)       Start:  06/24/25    Expected End:  07/08/25            STG: pt ambulates 50' via ww and cga  (Progressing)       Start:  06/24/25     Expected End:  07/08/25            STG: b/l le there ex x 20 reps  (Met)       Start:  06/24/25    Expected End:  07/08/25    Resolved:  06/26/25            Pain - Adult                  [1]   Patient Active Problem List  Diagnosis    Aortic stenosis, mild    Arthritis    Complete heart block    COPD (chronic obstructive pulmonary disease) (Multi)    History of paroxysmal supraventricular tachycardia    HTN (hypertension)    Paresthesia    Presence of permanent cardiac pacemaker    Raynauds disease    Sensorineural hearing loss (SNHL) of both ears    Dyspnea on minimal exertion    Varicose veins of lower extremities with inflammation    Vertigo    Ischemic cerebrovascular accident (CVA) (Multi)    Acquired deformity of toe    Benign neoplasm of skin of foot    Benign paroxysmal positional vertigo    Dermatophytosis of nail    Leg swelling    Macular degeneration    Mitral valve insufficiency    Moderate mitral valve regurgitation    Plantar wart of left foot    Venous insufficiency    Dysarthria following cerebral infarction    Hyperglycemia    Hand paresthesia    Sick sinus syndrome (Multi)    Injury of kidney    NSTEMI (non-ST elevated myocardial infarction) (Multi)    Acute superior vena cava thrombosis (Multi)    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)    Gastrointestinal hemorrhage, unspecified gastrointestinal hemorrhage type    Malignant neoplasm of transverse colon (Multi)    Stage 3 chronic kidney disease, unspecified whether stage 3a or 3b CKD (Multi)    Hypoxia    Pleural effusion    Cellulitis of lower extremity    Adenocarcinoma of colon    Bilateral leg edema    Acute on chronic diastolic heart failure    Nonrheumatic tricuspid valve regurgitation    Acute on chronic diastolic congestive heart failure    Acute bacterial sinusitis    Syncope    Advance care planning    Constipation

## 2025-07-03 NOTE — NURSING NOTE
This patient was discharged by SHELIA Parra to the [facility] prior to me starting my shift.

## 2025-07-03 NOTE — PROGRESS NOTES
Cardiology Progress Note      Subjective   Today, patient seen and assessed sitting at the side of the bed on her baseline 2L O2 NC. She denies any chest pain or pressure overnight. She had an episode of orthostatic hypotension yesterday, therefore discharge was delayed. Denies any current lightheadedness today, Tenderness at left chest wall PPM site with mild bruising.       ROS:    Review of Systems   Constitutional: Positive for malaise/fatigue.   Cardiovascular:  Negative for chest pain, leg swelling, orthopnea, palpitations and paroxysmal nocturnal dyspnea.   Respiratory:  Negative for shortness of breath.    Neurological:  Positive for weakness.        Past Medical History:  Medical History[1]    Problem List Items Addressed This Visit          Cardiac and Vasculature    Dyspnea on minimal exertion    Relevant Medications    cefuroxime (Ceftin) 250 mg tablet    metoprolol tartrate (Lopressor) 25 mg tablet    Other Relevant Orders    CBC    Comprehensive metabolic panel    Acute on chronic diastolic heart failure - Primary    Relevant Medications    aspirin chewable tablet 81 mg    clopidogrel (Plavix) tablet 75 mg    heparin (porcine) injection 5,000 Units    lactobacillus acidophilus tablet 1 tablet    acetaminophen (Tylenol) tablet 650 mg    acetaminophen (Tylenol) oral liquid 650 mg    acetaminophen (Tylenol) suppository 650 mg    albuterol 2.5 mg /3 mL (0.083 %) nebulizer solution 2.5 mg    torsemide (Demadex) tablet 40 mg    dapagliflozin propanediol (Farxiga) tablet 10 mg    acetaminophen (Tylenol) 325 mg tablet    dapagliflozin propanediol (Farxiga) 10 mg tablet    heparin sodium,porcine (heparin, porcine,) 5,000 unit/mL injection    lactobacillus acidophilus tablet tablet    metoprolol tartrate (Lopressor) tablet 12.5 mg    metoprolol tartrate (Lopressor) 25 mg tablet    Other Relevant Orders    PT eval and treat    * (Principal) Acute on chronic diastolic congestive heart failure    Relevant  Medications    aspirin chewable tablet 81 mg    clopidogrel (Plavix) tablet 75 mg    heparin (porcine) injection 5,000 Units    lactobacillus acidophilus tablet 1 tablet    acetaminophen (Tylenol) tablet 650 mg    acetaminophen (Tylenol) oral liquid 650 mg    acetaminophen (Tylenol) suppository 650 mg    albuterol 2.5 mg /3 mL (0.083 %) nebulizer solution 2.5 mg    torsemide (Demadex) tablet 40 mg    dapagliflozin propanediol (Farxiga) tablet 10 mg    acetaminophen (Tylenol) 325 mg tablet    dapagliflozin propanediol (Farxiga) 10 mg tablet    heparin sodium,porcine (heparin, porcine,) 5,000 unit/mL injection    lactobacillus acidophilus tablet tablet    metoprolol tartrate (Lopressor) tablet 12.5 mg    metoprolol tartrate (Lopressor) 25 mg tablet       Pulmonary and Pneumonias    Pleural effusion       Symptoms and Signs    Bilateral leg edema    Relevant Orders    PT eval and treat    Syncope    Relevant Medications    acetaminophen (Tylenol) 325 mg tablet    dapagliflozin propanediol (Farxiga) 10 mg tablet    heparin sodium,porcine (heparin, porcine,) 5,000 unit/mL injection    lactobacillus acidophilus tablet tablet    oxygen (O2) gas therapy    sennosides-docusate sodium (Sindy-Colace) 8.6-50 mg tablet    Other Relevant Orders    Case Request Cath Lab: PPM IMPLANT DUAL (Completed)    Electrophysiology procedure (Completed)    Cardiac device check - Inpatient    Transthoracic Echo Complete (Completed)    CBC    Comprehensive metabolic panel       Family History:  No relevant family history has been documented for this patient.    Social History:  Social History     Tobacco Use    Smoking status: Former     Current packs/day: 0.00     Types: Cigarettes     Quit date:      Years since quittin.5     Passive exposure: Past    Smokeless tobacco: Never   Substance Use Topics    Alcohol use: Never         Allergies:  Allergies[2]        MEDICATION:    Scheduled medications  Scheduled Medications[3]  Continuous  medications  Continuous Medications[4]  PRN medications  PRN Medications[5]     Assessment & Plan  Acute on chronic diastolic congestive heart failure    Acute bacterial sinusitis    Syncope    Advance care planning    Constipation      OBJECTIVE:    Visit Vitals  /60   Pulse 60   Temp 36.3 °C (97.3 °F)   Resp 17          Intake/Output Summary (Last 24 hours) at 7/3/2025 1057  Last data filed at 7/2/2025 1900  Gross per 24 hour   Intake 50 ml   Output 400 ml   Net -350 ml          Physical Exam   Physical Exam  Constitutional:       Appearance: Normal appearance.   HENT:      Head: Normocephalic and atraumatic.   Cardiovascular:      Rate and Rhythm: Normal rate and regular rhythm.      Pulses: Normal pulses.      Heart sounds: Normal heart sounds, S1 normal and S2 normal. No murmur heard.  Pulmonary:      Effort: Pulmonary effort is normal.      Breath sounds: Normal breath sounds.   Musculoskeletal:         General: Normal range of motion.      Cervical back: Normal range of motion and neck supple.      Right lower leg: No edema.      Left lower leg: No edema.   Skin:     Findings: Bruising (mild around PPM site) present.   Neurological:      General: No focal deficit present.      Mental Status: She is alert.   Psychiatric:         Mood and Affect: Mood normal.          Recent Image Results  ECG 12 lead tomorrow at 8 AM  Ventricular-paced rhythm  Abnormal ECG  When compared with ECG of 21-MAR-2025 11:16,  PREVIOUS ECG IS PRESENT        Relevant Results:  Lab Results   Component Value Date    WBC 5.4 07/03/2025    HGB 10.1 (L) 07/03/2025    HCT 32.1 (L) 07/03/2025    MCV 93 07/03/2025     07/03/2025        Lab Results   Component Value Date    CREATININE 1.02 07/03/2025    BUN 34 (H) 07/03/2025     (L) 07/03/2025    K 3.8 07/03/2025    CL 97 (L) 07/03/2025    CO2 33 (H) 07/03/2025        Assessment/Plan    Yesenia Milner is a 89 y.o. female known to Dr. 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 Patient presented to [facility] on 6/23/2025 with syncope and fall. Chronically on aspirin and Plavix. Cardiology has been consulted for syncope and patient was found to have PPM dysfunction.       Complete Heart Block  - s/p right ventricle lead revision procedure 7/1/2025 with Dr. Ramicone. Her Pacemaker was interrogated 7/2/2025 with good function.   - Continues to have mild swelling and bruising at PPM site. No hematoma or drainage present.   - Patient is stable from a cardiac standpoint for discharge.     Orthostatic Hypotension   - Patient had an episode of orthostatic hypotension yesterday with SBPs in the 80's. She had soft blood pressure overnight.   - BP this morning 131/60  - Recommend to monitor orthostatic blood pressure.  - Recommend to decrease Torsemide to 20 mg daily       Discussed Case with Dr. Ramicone. Cardiology will continue follow peripherally until discharge. Please contact with any questions or concerns.      SAQIB Ramirez-CNP           [1]   Past Medical History:  Diagnosis Date    Gross hematuria 10/07/2020    Impacted cerumen 02/22/2024    Other conditions influencing health status     Normal stress echocardiogram    Personal history of other medical treatment     History of echocardiogram    Personal history of other medical treatment     H/O Doppler ultrasound    Systolic CHF (Multi) 05/18/2023   [2]   Allergies  Allergen Reactions    Iodine Rash    Shrimp Diarrhea and Nausea/vomiting    Hydrocodone Unknown     Pt does not remember    Adhesive Tape-Silicones Itching   [3] allopurinol, 100 mg, oral, Daily  aspirin, 81 mg, oral, Daily  cefuroxime, 250 mg, oral,  BID  clopidogrel, 75 mg, oral, Daily  colchicine, 0.6 mg, oral, Daily  dapagliflozin propanediol, 10 mg, oral, q24h  DULoxetine, 30 mg, oral, Daily  ferrous sulfate, 65 mg of elemental iron, oral, Daily with breakfast  [Held by provider] heparin (porcine), 5,000 Units, subcutaneous, q12h  lactobacillus acidophilus, 1 tablet, oral, BID  metoprolol tartrate, 12.5 mg, oral, BID  polyethylene glycol, 17 g, oral, BID  sennosides-docusate sodium, 2 tablet, oral, BID  torsemide, 40 mg, oral, Daily  [4]    [5] PRN medications: acetaminophen **OR** acetaminophen **OR** acetaminophen, albuterol, lubricating eye drops, ondansetron, oxygen

## 2025-07-03 NOTE — DISCHARGE SUMMARY
Discharge Diagnosis  Acute on chronic diastolic congestive heart failure     Issues Requiring Follow-Up  07/03 -Patient fully examined at the bedside.  Brought to hospital - had concerns including Fall. Patient seen by cardiology- baseline 2L O2 NC. She denies any chest pain or pressure overnight. She had an episode of orthostatic hypotension yesterday, therefore discharge was delayed. Denies any current lightheadedness today, Tenderness at left chest wall PPM site with mild bruising.  Patient with weakness, will benefit from further rehab at [facility]. Awake, more animated, with no acute events overnight, no new complaints. Slow but progressive improvements noted. Patient medically stable at time of exam, cleared for discharge today. Goals of care emphasized with patient and family, will continue current plan of care.     Discharge Meds     Medication List      START taking these medications     cefuroxime 250 mg tablet; Commonly known as: Ceftin; Take 1 tablet (250   mg) by mouth 2 times a day for 10 days.   dapagliflozin propanediol 10 mg tablet; Commonly known as: Farxiga; Take   1 tablet (10 mg) by mouth once every 24 hours.   heparin (porcine) 5,000 unit/mL injection; Inject 1 mL (5,000 Units)   under the skin every 12 hours.   lactobacillus acidophilus tablet tablet; Take 1 tablet by mouth 2 times   a day.   oxygen gas therapy; Commonly known as: O2; Inhale 2 L/min at 120,000   mL/hr once every 24 hours.   sennosides-docusate sodium 8.6-50 mg tablet; Commonly known as:   Sindy-Colace; Take 2 tablets by mouth 2 times a day.     CHANGE how you take these medications     metoprolol tartrate 25 mg tablet; Commonly known as: Lopressor; Take 0.5   tablets (12.5 mg) by mouth 2 times a day.; What changed: how much to take,   when to take this   torsemide 20 mg tablet; Commonly known as: Demadex; Take 1 tablet (20   mg) by mouth once daily. Do not fill before July 4, 2025.; Start taking   on: July 4, 2025; What  changed: how much to take     CONTINUE taking these medications     acetaminophen 325 mg tablet; Commonly known as: Tylenol; Take 2 tablets   (650 mg) by mouth every 4 hours if needed for mild pain (1 - 3).   albuterol 2.5 mg /3 mL (0.083 %) nebulizer solution; Take 3 mL (2.5 mg)   by nebulization every 4 hours if needed for wheezing.   allopurinol 100 mg tablet; Commonly known as: Zyloprim; Take 1 tablet   (100 mg) by mouth once daily.   aspirin 81 mg chewable tablet; Chew 1 tablet (81 mg) once daily.   Calcium 600 + D(3) 600 mg-5 mcg (200 unit) tablet; Generic drug: calcium   carbonate-vitamin D3   clopidogrel 75 mg tablet; Commonly known as: Plavix; Take 1 tablet by   mouth once daily   colchicine 0.6 mg tablet; Take 1 tablet (0.6 mg) by mouth once daily.   DULoxetine 30 mg DR capsule; Commonly known as: Cymbalta; Take 1 capsule   (30 mg) by mouth once daily. Do not crush or chew.   ferrous sulfate 325 (65 Fe) mg EC tablet   fluticasone propion-salmeteroL 115-21 mcg/actuation inhaler; Commonly   known as: Advair; Inhale 2 puffs 2 times a day. Rinse mouth with water   after use to reduce aftertaste and incidence of candidiasis. Do not   swallow.   lubricating eye drops ophthalmic solution   nebulizers misc; With tubing and mask.  Use as directed   polyethylene glycol 17 gram/dose powder; Commonly known as: Glycolax,   Miralax; Mix 1 capful (17 g) of powder with 4 to 8 ounces of water or   juice and drink 2 times a day.     STOP taking these medications     enoxaparin 40 mg/0.4 mL syringe; Commonly known as: Lovenox   guaiFENesin 600 mg 12 hr tablet; Commonly known as: Mucinex   hydrocortisone 1 % lotion       Test Results Pending At Discharge  Pending Labs       No current pending labs.            Hospital Course           Fei Mensah MD   Physician  Internal Medicine     Progress Notes      Signed     Date of Service: 7/1/2025  1:40 PM     Signed       Expand All Collapse All    Yesenia Milner is a 89 y.o.  female on day 8 of admission presenting with Acute on chronic diastolic congestive heart failure.        Subjective  07/01- Patient seen and fully examined at bedside, patient ill appearing, acutely complex, marked decline from baseline. Patient remains hospitalized for acute onset with complex medical and pharmacological management. Acute on chronic diastolic congestive heart failure, Acute bacterial sinusitis, Syncope, Advance care planning, Constipation. Patient seen by cardiology today with Pacemaker revsion as per Dr. Ramicone today with lead replacement. Patient and daughter aware. Appreciate input and agree with plan.  Will continue antibiotics for sinusitis.  Continue strict intake and output., telemetry. Will monitor overnight and repeat labs in the morning. Slow but progressive improvements noted. High Complexity with updates to multiple problems, adjustments to treatment plan, and need for ongoing inpatient care.   Long discussion on goals of care with patient and family, will continue current and await diagnostic findings.Patient reports: no new complaints, decline from baseline, weakness                     Objective  Last Recorded Vitals  /55 (BP Location: Left arm, Patient Position: Lying)   Pulse 62   Temp 36.1 °C (97 °F) (Temporal)   Resp 20   Wt 51.5 kg (113 lb 8.6 oz)   SpO2 98%   Intake/Output last 3 Shifts:     Intake/Output Summary (Last 24 hours) at 7/1/2025 1340  Last data filed at 6/30/2025 2345      Gross per 24 hour   Intake --   Output 250 ml   Net -250 ml         Admission Weight  Weight: 69.9 kg (154 lb) (06/23/25 1227)     Daily Weight  06/30/25 : 51.5 kg (113 lb 8.6 oz)     Image Results  XR chest 1 view  Narrative: Interpreted By:  Bryce Redding,   STUDY:  XR CHEST 1 VIEW;  6/25/2025 1:47 pm      INDICATION:  Signs/Symptoms:shortness of breath, PPM Lead eval.      COMPARISON:  03/24/2025      ACCESSION NUMBER(S):  [id number]      ORDERING CLINICIAN:  KAYLIN NAVA       FINDINGS:  CARDIOMEDIASTINAL SILHOUETTE AND VASCULATURE:      Cardiac size:  Within normal limits considering a right pericardial  fat pad and similar to the prior exam. Aortic shadow:  Within normal  limits.      Mediastinal contours: Within normal limits.      Pulmonary vasculature:  The central vasculature is unremarkable      LUNGS:  There is blunting of the left costophrenic angle is probably from a  residual effusion with atelectasis, improved from the previous exam.  The lungs otherwise relatively clear.      ABDOMEN AND OTHER FINDINGS:  Cardiac pacemaker device overlies the left chest similar to the  previous exam.      BONES:  No acute osseous changes.      Impression: 1.  Improved left basilar atelectasis and effusion.      Signed by: Byrce Redding 6/25/2025 5:03 PM  Dictation workstation:   [id number]        Fei Mensah MD   Physician  Internal Medicine     H&P      Signed     Date of Service: 6/23/2025  2:22 PM      Signed          History Of Present Illness  Yesenia Milner is a 89 y.o. 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  Patient has had multiple admissions for congestive heart failure exacerbation who presented today after a syncopal episode.  Patient notes she was gathering her clothing to get ready for the day when she became lightheaded and passed out.  She denies any injury from the fall.  She notes over the past 2 weeks she has been experiencing a stuffy nose and a cough upon wakening in the morning.  She otherwise denies any fevers, chills, chest pain, change in her chronic shortness of breath with exertion, abdominal pain, nausea, vomiting,  diarrhea, or dysuria.  She denies any change in her chronic intermittent bilateral lower extremity edema.  She denies any weight gain.  She denies any recent medication changes.  She reports compliance with her medications.  She is on her baseline oxygen at 2 L.  She notes her cardiologist is Dr. Ramicone.  In regards to her colon cancer, family notes that patient was deemed a risk for surgery via cardiology and surgeon, Dr. Vazquez, recommended patient speak with Dr. Ramicone in regards to her risk for surgery before he would schedule her for an operation.  They noted that her appointment is not until the fall and they are hopeful to speak with him this admission about her risk for surgery.  Patient notes she lives alone and uses a walker.  CODE STATUS discussed and patient became tearful during conversation.  She would like to remain a full code at this time and referred to family if an emergency would arise that she was incapacitated to answer for herself.     In the ED lab work, EKG, head CT, C-spine CT and CT chest/abdomen/pelvis were performed. Labs revealed troponin 19 (appears baseline with previous result 21 in March 2025), H&H 10.1 and 31.3 (within patient's recent baseline),  (310 in March 2025).  EKG, per ER physician impression revealed Dual paced rhythm. Motion artifact present. Irregular rate. Normal DC, wide QRS and Qtc intervals. No ST segment elevations, depressions, or T wave inversions. Compared to March 2025 imaging positive for fluid/mucosal thickening of the left sphenoid sinus.  Bilateral infiltrates and right greater than left pleural effusions. Moderate fecal residue.  Heart rate was 120 on arrival, vitals were otherwise stable.    Echo 12/21/2024:     CONCLUSIONS:   1. Left ventricular ejection fraction is normal, by visual estimate at 60-65%.   2. Abnormal left venticular wall motion.   3. Left ventricular diastolic filling is indeterminate.   4. There is a moderate apical and  anteroapical myocardial infarction.   5. There is normal right ventricular global systolic function.   6. There is moderate mitral annular calcification.   7. Moderate mitral valve regurgitation.   8. Moderate to severe tricuspid regurgitation visualized.   9. Moderately elevated right ventricular systolic pressure.  10. Aortic valve sclerosis.     Carotid duplex 2019:     Right Carotid: Findings are consistent with less than 50% stenosis of the right proximal ICA. Laminar flow seen by color Doppler. Right external carotid artery appears patent with no evidence of stenosis. The right vertebral artery is patent with antegrade flow. No evidence of hemodynamically significant stenosis in the right subclavian.  Left Carotid: Findings are consistent with less than 50% stenosis of the left proximal ICA. Laminar flow seen by color Doppler. Left external carotid artery appears patent with no evidence of stenosis. The left vertebral artery is patent with antegrade flow.  No evidence of hemodynamically significant stenosis in the left subclavian.        Past Medical History  [Medical History]    [Medical History]  Past Medical History          Diagnosis Date    Gross hematuria 10/07/2020    Impacted cerumen 02/22/2024    Other conditions influencing health status       Normal stress echocardiogram    Personal history of other medical treatment       History of echocardiogram    Personal history of other medical treatment       H/O Doppler ultrasound    Systolic CHF (Multi) 05/18/2023         Surgical History  [Surgical History]    [Surgical History]  Past Surgical History            Procedure Laterality Date    APPENDECTOMY   11/22/2017     Appendectomy    CARDIAC CATHETERIZATION N/A 12/20/2024     Procedure: Left Heart Cath, With LV;  Surgeon: Tahir Ruelas MD;  Location: [facility];  Service: Cardiovascular;  Laterality: N/A;    CARDIAC PACEMAKER PLACEMENT   03/11/2021     Pacemaker Placement    HYSTERECTOMY    11/22/2017     Hysterectomy    IR ANGIOGRAM INFERIOR EPIGASTRIC PELVIC   12/14/2020     IR ANGIOGRAM INFERIOR EPIGASTRIC PELVIC 12/14/2020 [facility]    IR ANGIOGRAM INFERIOR EPIGASTRIC PELVIC   12/14/2020     IR ANGIOGRAM INFERIOR EPIGASTRIC PELVIC 12/14/2020 [facility]    IR ANGIOGRAM RENAL BILATERAL Bilateral 12/14/2020     IR ANGIOGRAM RENAL BILATERAL 12/14/2020 [facility]    OTHER SURGICAL HISTORY   11/22/2017     Stab Phlebectomy Of Varicose Veins    OTHER SURGICAL HISTORY   11/22/2017     Venous Ligation With Stripping    TONSILLECTOMY   11/22/2017     Tonsillectomy         Social History  She reports that she quit smoking about 52 years ago. Her smoking use included cigarettes. She has been exposed to tobacco smoke. She has never used smokeless tobacco. She reports that she does not drink alcohol and does not use drugs.     Family History  [Family History]    [Family History]              Problem Relation Name Age of Onset    No Known Problems Mother             Allergies  Iodine, Shrimp, Hydrocodone, and Adhesive tape-silicones     10 point review systems performed and is negative besides what is stated in HPI     Physical Exam  HENT:      Head: Normocephalic and atraumatic.      Nose: Nose normal.      Mouth/Throat:      Mouth: Mucous membranes are dry.   Eyes:      Conjunctiva/sclera: Conjunctivae normal.   Cardiovascular:      Rate and Rhythm: Normal rate. Rhythm irregular.      Heart sounds: Murmur heard.   Pulmonary:      Effort: Pulmonary effort is normal.      Breath sounds: Rales present.   Abdominal:      General: Bowel sounds are normal.      Tenderness: There is abdominal tenderness.   Musculoskeletal:         General: Normal range of motion.      Cervical back: Neck supple.   Skin:     General: Skin is warm and dry.   Neurological:      Mental Status: She is alert. Mental status is at baseline.   Psychiatric:         Mood and Affect: Mood normal.            Last Recorded  "Vitals  Blood pressure 114/58, pulse 70, temperature 36.3 °C (97.3 °F), temperature source Temporal, resp. rate 19, height 1.626 m (5' 4\"), weight 69.9 kg (154 lb), SpO2 95%.     Relevant Results     [Medications Ordered Prior to Encounter]     [Medications Ordered Prior to Encounter]  No current facility-administered medications on file prior to encounter.                  Current Outpatient Medications on File Prior to Encounter   Medication Sig Dispense Refill    allopurinol (Zyloprim) 100 mg tablet Take 1 tablet (100 mg) by mouth once daily. 90 tablet 0    aspirin 81 mg chewable tablet Chew 1 tablet (81 mg) once daily.        calcium carbonate-vitamin D3 (Calcium 600 + D,3,) 600 mg-5 mcg (200 unit) tablet Take 1 tablet by mouth once daily.        clopidogrel (Plavix) 75 mg tablet Take 1 tablet by mouth once daily 90 tablet 0    colchicine 0.6 mg tablet Take 1 tablet (0.6 mg) by mouth once daily. 90 tablet 3    DULoxetine (Cymbalta) 30 mg DR capsule Take 1 capsule (30 mg) by mouth once daily. Do not crush or chew. 90 capsule 1    ferrous sulfate 325 (65 Fe) mg EC tablet Take 1 tablet by mouth once daily. Do not crush, chew, or split.        fluticasone propion-salmeteroL (Advair) 115-21 mcg/actuation inhaler Inhale 2 puffs 2 times a day. Rinse mouth with water after use to reduce aftertaste and incidence of candidiasis. Do not swallow. 12 g 11    metoprolol tartrate (Lopressor) 25 mg tablet Take 1 tablet (25 mg) by mouth once daily. 30 tablet 0    polyethylene glycol (Glycolax, Miralax) 17 gram/dose powder Mix 1 capful (17 g) of powder with 4 to 8 ounces of water or juice and drink 2 times a day. 1010 g 1    torsemide (Demadex) 20 mg tablet Take 3 tablets (60 mg) by mouth once daily. 90 tablet 11    acetaminophen (Tylenol) 325 mg tablet Take 2 tablets (650 mg) by mouth every 4 hours if needed for mild pain (1 - 3). (Patient not taking: Reported on 6/23/2025)        albuterol 2.5 mg /3 mL (0.083 %) nebulizer " solution Take 3 mL (2.5 mg) by nebulization every 4 hours if needed for wheezing. 75 mL 1    enoxaparin (Lovenox) 40 mg/0.4 mL syringe Inject 0.4 mL (40 mg) under the skin once every 24 hours. (Patient not taking: Reported on 6/23/2025)        guaiFENesin (Mucinex) 600 mg 12 hr tablet Take 1 tablet (600 mg) by mouth 2 times a day as needed for cough. Do not crush, chew, or split. (Patient not taking: Reported on 6/23/2025)        hydrocortisone 1 % lotion Apply topically 2 times a day. Apply to both legs , wash hands after applying lotion (Patient not taking: Reported on 6/23/2025) 60 mL 0    lubricating eye drops ophthalmic solution Administer 1 drop into both eyes once daily as needed for dry eyes.        nebulizers misc With tubing and mask.   Use as directed 1 each 0    [DISCONTINUED] ipratropium-albuteroL (Duo-Neb) 0.5-2.5 mg/3 mL nebulizer solution Take 3 mL by nebulization every 6 hours.                       Results for orders placed or performed during the hospital encounter of 06/23/25 (from the past 24 hours)   CBC and Auto Differential   Result Value Ref Range     WBC 6.0 4.4 - 11.3 x10*3/uL     nRBC 0.0 0.0 - 0.0 /100 WBCs     RBC 3.37 (L) 4.00 - 5.20 x10*6/uL     Hemoglobin 10.1 (L) 12.0 - 16.0 g/dL     Hematocrit 31.3 (L) 36.0 - 46.0 %     MCV 93 80 - 100 fL     MCH 30.0 26.0 - 34.0 pg     MCHC 32.3 32.0 - 36.0 g/dL     RDW 15.9 (H) 11.5 - 14.5 %     Platelets 206 150 - 450 x10*3/uL     Neutrophils % 69.9 40.0 - 80.0 %     Immature Granulocytes %, Automated 0.5 0.0 - 0.9 %     Lymphocytes % 16.6 13.0 - 44.0 %     Monocytes % 10.7 2.0 - 10.0 %     Eosinophils % 2.0 0.0 - 6.0 %     Basophils % 0.3 0.0 - 2.0 %     Neutrophils Absolute 4.18 1.60 - 5.50 x10*3/uL     Immature Granulocytes Absolute, Automated 0.03 0.00 - 0.50 x10*3/uL     Lymphocytes Absolute 0.99 0.80 - 3.00 x10*3/uL     Monocytes Absolute 0.64 0.05 - 0.80 x10*3/uL     Eosinophils Absolute 0.12 0.00 - 0.40 x10*3/uL     Basophils Absolute  0.02 0.00 - 0.10 x10*3/uL   Comprehensive metabolic panel   Result Value Ref Range     Glucose 116 (H) 74 - 99 mg/dL     Sodium 138 136 - 145 mmol/L     Potassium 3.6 3.5 - 5.3 mmol/L     Chloride 99 98 - 107 mmol/L     Bicarbonate 27 21 - 32 mmol/L     Anion Gap 16 10 - 20 mmol/L     Urea Nitrogen 30 (H) 6 - 23 mg/dL     Creatinine 1.00 0.50 - 1.05 mg/dL     eGFR 53 (L) >60 mL/min/1.73m*2     Calcium 9.6 8.6 - 10.3 mg/dL     Albumin 3.8 3.4 - 5.0 g/dL     Alkaline Phosphatase 89 33 - 136 U/L     Total Protein 6.3 (L) 6.4 - 8.2 g/dL     AST 24 9 - 39 U/L     Bilirubin, Total 0.6 0.0 - 1.2 mg/dL     ALT 17 7 - 45 U/L   Lipase   Result Value Ref Range     Lipase 18 9 - 82 U/L   Magnesium   Result Value Ref Range     Magnesium 2.24 1.60 - 2.40 mg/dL   aPTT   Result Value Ref Range     aPTT 26 26 - 36 seconds   Protime-INR   Result Value Ref Range     Protime 12.4 9.8 - 12.4 seconds     INR 1.1 0.9 - 1.1   B-Type Natriuretic Peptide   Result Value Ref Range      (H) 0 - 99 pg/mL   Troponin I, High Sensitivity, Initial   Result Value Ref Range     Troponin I, High Sensitivity 19 (H) 0 - 13 ng/L   Troponin, High Sensitivity, 1 Hour   Result Value Ref Range     Troponin I, High Sensitivity 23 (H) 0 - 13 ng/L   Sars-CoV-2, Influenza A/B and RSV PCR   Result Value Ref Range     Coronavirus 2019, PCR Not Detected Not Detected     Flu A Result Not Detected Not Detected     Flu B Result Not Detected Not Detected     RSV PCR Not Detected Not Detected   Troponin I, High Sensitivity   Result Value Ref Range     Troponin I, High Sensitivity 22 (H) 0 - 13 ng/L   Urinalysis with Reflex Culture and Microscopic   Result Value Ref Range     Color, Urine Light-Yellow Light-Yellow, Yellow, Dark-Yellow     Appearance, Urine Clear Clear     Specific Gravity, Urine 1.010 1.005 - 1.035     pH, Urine 7.0 5.0, 5.5, 6.0, 6.5, 7.0, 7.5, 8.0     Protein, Urine NEGATIVE NEGATIVE, 10 (TRACE), 20 (TRACE) mg/dL     Glucose, Urine Normal Normal  mg/dL     Blood, Urine NEGATIVE NEGATIVE mg/dL     Ketones, Urine NEGATIVE NEGATIVE mg/dL     Bilirubin, Urine NEGATIVE NEGATIVE mg/dL     Urobilinogen, Urine Normal Normal mg/dL     Nitrite, Urine NEGATIVE NEGATIVE     Leukocyte Esterase, Urine NEGATIVE NEGATIVE   CBC   Result Value Ref Range     WBC 4.7 4.4 - 11.3 x10*3/uL     nRBC 0.0 0.0 - 0.0 /100 WBCs     RBC 3.41 (L) 4.00 - 5.20 x10*6/uL     Hemoglobin 10.1 (L) 12.0 - 16.0 g/dL     Hematocrit 32.0 (L) 36.0 - 46.0 %     MCV 94 80 - 100 fL     MCH 29.6 26.0 - 34.0 pg     MCHC 31.6 (L) 32.0 - 36.0 g/dL     RDW 15.8 (H) 11.5 - 14.5 %     Platelets 215 150 - 450 x10*3/uL   Coagulation Screen   Result Value Ref Range     Protime 12.8 (H) 9.8 - 12.4 seconds     INR 1.2 (H) 0.9 - 1.1     aPTT 36 26 - 36 seconds   Basic Metabolic Panel   Result Value Ref Range     Glucose 98 74 - 99 mg/dL     Sodium 140 136 - 145 mmol/L     Potassium 3.6 3.5 - 5.3 mmol/L     Chloride 100 98 - 107 mmol/L     Bicarbonate 30 21 - 32 mmol/L     Anion Gap 14 10 - 20 mmol/L     Urea Nitrogen 28 (H) 6 - 23 mg/dL     Creatinine 0.95 0.50 - 1.05 mg/dL     eGFR 57 (L) >60 mL/min/1.73m*2     Calcium 9.1 8.6 - 10.3 mg/dL   Lipid Panel   Result Value Ref Range     Cholesterol 119 0 - 199 mg/dL     HDL-Cholesterol 49.2 mg/dL     Cholesterol/HDL Ratio 2.4       LDL Calculated 59 <=99 mg/dL     VLDL 11 0 - 40 mg/dL     Triglycerides 56 0 - 149 mg/dL     Non HDL Cholesterol 70 0 - 149 mg/dL      *Note: Due to a large number of results and/or encounters for the requested time period, some results have not been displayed. A complete set of results can be found in Results Review.         CT chest abdomen pelvis wo IV contrast  Result Date: 6/23/2025  Interpreted By:  Mary Jane Foreman, STUDY: CT CHEST ABDOMEN PELVIS WO CONTRAST;  6/23/2025 1:05 pm   INDICATION: Signs/Symptoms:abdominal pain prior cancer syncope r/o mass, nephro jesse traumatic injury.     COMPARISON: CT chest 03/21/2025, CT abdomen and  pelvis 01/12/2025   ACCESSION NUMBER(S): [id number]   ORDERING CLINICIAN: MURTAZA HINOJOSA   TECHNIQUE: CT of the chest, abdomen, and pelvis was performed. Sagittal and coronal reconstructions were generated. No intravenous contrast given for the examination.   FINDINGS: CHEST:   Hilar, vessel, and solid organ evaluation limited without IV contrast.   CHEST WALL AND LOWER NECK: Metallic streak artifact from port in the left anterior chest wall limits assessment of adjacent structures. No gross axillary adenopathy as visualized. Slightly prominent heterogenous right thyroid lobe.   MEDIASTINUM AND HARITHA:  Limited hilar assessment on unenhanced exam. No significant mediastinal or hilar adenopathy within limits of unenhanced study. Mild gaseous distention of the proximal esophagus.   HEART AND VESSELS:  Lack of IV contrast precludes vascular luminal assessment. The heart is normal in size. No significant pericardial effusion. Pacer wires in the right side of the heart. Multifocal atherosclerotic calcifications.   LUNGS, PLEURA, LARGE AIRWAYS:  Mild motion artifact in the lower chest. Pulmonary heterogeneity including scattered bilateral ground-glass infiltrates and reticular densities most prominent in the lung bases. Moderate right and small to moderate left pleural effusions and presumed compressive atelectasis of the adjacent lung bases. Slight fluid/thickening along the superior right main fissure. No pneumothorax. The central airways are grossly patent.   BONES:  Kyphosis and degenerative endplate spurring in the thoracic spine.     ABDOMEN/PELVIS:   Solid organ and vessel evaluation limited without IV contrast. Artifact related to overlying upper extremities.   ABDOMINAL ORGANS:   LIVER: No focal lesion within limits of unenhanced exam   GALL BLADDER AND BILIARY TREE: No calcified gallstone   SPLEEN: No focal lesion within limits of unenhanced exam   PANCREAS: No focal lesion within limits of unenhanced exam    ADRENALS: No adrenal mass   KIDNEYS AND URETERS: No renal mass or hydronephrosis within limits of unenhanced exam   BOWEL: No abnormally dilated large or small bowel loops. Moderate fecal residue. Distal colon diverticulosis. Within limits of unenhanced exam probable persistent circumferential wall thickening in the distal transverse colon for example image 110/201, as noted on the prior exam.   PERITONEUM, RETROPERITONEUM, NODES: No significant free fluid. No free air. No significant retroperitoneal lymphadenopathy within limits of unenhanced exam. Slight increased density in the lower abdominal mesentery.   VESSELS:  Lack of IV contrast precludes vascular luminal assessment. Multifocal atherosclerotic calcifications. No abdominal aortic aneurysm.   PELVIS: Urinary bladder is moderately distended and grossly normal in contour. 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   ABDOMINAL WALL: No sizable abdominal wall hernia.   BONES: Osteopenia. Multifocal degenerative changes. Mild scoliosis of the lumbar spine.        CHEST:   Bilateral infiltrates and right-greater-than-left pleural effusions. Differential includes CHF and pneumonia. Clinical correlation and follow-up to ensure resolution after appropriate treatment recommended.   ABDOMEN AND PELVIS:   Apparent persistent distal transverse colon wall thickening consistent with reportedly known malignancy.   No gross evidence of solid organ injury or free fluid within limits of unenhanced exam.   Fecal retention. Distal colon diverticulosis.   Other findings as described above.   MACRO: None.   Signed by: Mary Jane Foreman 6/23/2025 1:32 PM Dictation workstation:   [id number]     CT cervical spine wo IV contrast  Result Date: 6/23/2025  Interpreted By:  Mary Jane Foreman, STUDY: CT CERVICAL SPINE WO IV CONTRAST;  6/23/2025 1:05 pm   INDICATION: Signs/Symptoms:fall blood thinners r/o frx.     COMPARISON: None.   ACCESSION NUMBER(S): [id number]   ORDERING CLINICIAN: MURTAZA  ASHISH   TECHNIQUE: CT images were obtained through the cervical spine. Sagittal and coronal reconstructions were generated.   FINDINGS:     ALIGNMENT: Mild levocurvature. Straightening of the lower cervical lordosis. Slight retrolisthesis of C5.   VERTEBRAE/DISC SPACES: No compression deformity or acute displaced fracture. Multilevel degenerative changes including atlantoaxial joint space narrowing spurring, multilevel intervertebral disc space narrowing with endplate sclerosis and spurring, multilevel bilateral facet joint narrowing and spurring. At least partial fusion across the anterior C5-6 vertebra.   ADDITIONAL FINDINGS: No abnormal thickening of the prevertebral soft tissues.  Dependent fluid/thickening in the sphenoid sinus. Ill-defined biapical infiltrates. Small right pleural effusion. Partially included pacer wires in the left upper chest.        No cervical vertebral compression deformity or acute displaced fracture. Multilevel productive/degenerative changes with straightening of the cervical lordosis and slight spondylolisthesis at C5.   Paraspinal/soft tissue findings as above.   MACRO: None.   Signed by: Mary Jane Foreman 6/23/2025 1:16 PM Dictation workstation:   [id number]     CT head wo IV contrast  Result Date: 6/23/2025  Interpreted By:  Mary Jane Foreman, STUDY: CT HEAD WO IV CONTRAST;  6/23/2025 1:05 pm   INDICATION: Signs/Symptoms:headache fall blood thinners r/o sah ich mass.     COMPARISON: 08/23/2023   ACCESSION NUMBER(S): [id number]   ORDERING CLINICIAN: MURTAZA HINOJOSA   TECHNIQUE: Unenhanced CT images of the head were obtained.   FINDINGS: The ventricles, cisterns and sulci are enlarged, consistent with diffuse volume loss. There are areas of nonspecific white matter hypodensity, which are probably age related or microvascular in nature. These findings are similar to the prior exam. There is no acute intracranial hemorrhage, mass effect or midline shift. No extraaxial fluid collection.    No acute displaced calvarial fracture. No focal calvarial lesion.   Left sphenoid sinus dependent fluid/thickening. Remaining visualized paranasal sinuses are clear. Dense presumed surgical material along the anterior margin of each globe again seen.        No acute intracranial hemorrhage or mass-effect.   Mild fluid or mucosal thickening in the left sphenoid sinus..   MACRO: None.   Signed by: Mary Jane Foreman 6/23/2025 1:08 PM Dictation workstation:   [id number]        Assessment & Plan  Acute on chronic diastolic congestive heart failure     Acute bacterial sinusitis     Syncope     Advance care planning     Constipation           Admit to telemetry  Inpatient  Appreciate cardiology recommendations  I am not sure if patient's CHF represents more of a chronic condition so we will give 40 mg IV Lasix x 1 and resume torsemide tomorrow and look to cardiology for further recommendations  Please see echo from 2024 and carotid duplex from 2029 above  Start Rocephin 1 g IV daily  Orthostatic vital signs  Repeat EKG and troponin pacer check  Continue home aspirin and Plavix  Daily weight  Intake and output  AM CBC, BMP, coags, lipids  Check flu and COVID  Urinalysis pending  PT/OT     Chronic conditions: HFpEF, MI, 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 Plan discussed with interdisciplinary team, consults placed, appreciate input.  Will continue current and repeat labs in the AM.          Discharge planning discussed with patient and care team. Therapy evaluations ordered. Patient aware and agreeable to current plan, continue plan as above.      I spent a total of 75 minutes on the date of the service which included preparing to see the patient, face-to-face patient care, completing clinical documentation, obtaining and/or reviewing separately obtained history, performing a medically appropriate examination, counseling and educating the patient/family/caregiver, ordering medications, tests, or procedures, communicating with other HCPs (not separately reported), independently interpreting results (not separately reported), communicating results to the patient/family/caregiver, and care coordination (not separately reported).            Riya Urena                  Revision History          Physical Exam     General: in no acute distress  Eyes: PERRLA   HENT: Normo-cephalic, atraumatic.   CV: Irregular rate, irregular rhythm.   Resp: Breathing non-labored,  diminished to auscultation bilaterally  GI: BS x4   : monitor intake and output  MSK/Extremities: No gross bony deformities. PT/OT on the case  Skin: Warm. Appropriate color, continue offloading  Neuro:  Face symmetric.   Psych: returning to baseline, improved      10 point ROS negative unless otherwise noted in HPI     Patient fully evaluated 07/01  for    Problem List Items Addressed This Visit         Dyspnea on minimal exertion     Relevant Medications     cefuroxime (Ceftin) 250 mg tablet     metoprolol tartrate (Lopressor) 25 mg tablet     Other Relevant Orders     CBC     Comprehensive metabolic panel     Pleural effusion     * (Principal) Acute on chronic diastolic congestive heart failure - Primary     Relevant Medications     clopidogrel (Plavix) tablet 75 mg     metoprolol tartrate (Lopressor) tablet 12.5 mg     metoprolol tartrate (Lopressor) 25 mg tablet     Syncope      Relevant Medications     acetaminophen (Tylenol) 325 mg tablet     dapagliflozin propanediol (Farxiga) 10 mg tablet     heparin sodium,porcine (heparin, porcine,) 5,000 unit/mL injection     lactobacillus acidophilus tablet tablet     oxygen (O2) gas therapy     sennosides-docusate sodium (Sindy-Colace) 8.6-50 mg tablet     Other Relevant Orders     Case Request Cath Lab: PPM IMPLANT DUAL (Completed)     Electrophysiology procedure     Cardiac device check - Inpatient     Transthoracic Echo Complete (Completed)     CBC     Comprehensive metabolic panel      Brought to hospital - had concerns including Fall.  Patient seen resting in bed with head of bed elevated, no s/s or c/o acute difficulties at this time.  Vital signs for last 24 hours Temp:  [36 °C (96.8 °F)-36.3 °C (97.3 °F)] 36.1 °C (97 °F)  Heart Rate:  [57-63] 62  Resp:  [17-20] 20  BP: (105-131)/(50-59) 118/55    No intake/output data recorded.  Patient still requiring frequent cardiac and SPO2 monitoring. Continue aggressive pulmonary hygiene and oral hygiene. Off loading as tolerated for skin integrity. Medications and labs reviewed-         Results for orders placed or performed during the hospital encounter of 06/23/25 (from the past 24 hours)   Comprehensive Metabolic Panel   Result Value Ref Range     Glucose 94 74 - 99 mg/dL     Sodium 136 136 - 145 mmol/L     Potassium 4.0 3.5 - 5.3 mmol/L     Chloride 98 98 - 107 mmol/L     Bicarbonate 31 21 - 32 mmol/L     Anion Gap 11 10 - 20 mmol/L     Urea Nitrogen 34 (H) 6 - 23 mg/dL     Creatinine 0.87 0.50 - 1.05 mg/dL     eGFR 63 >60 mL/min/1.73m*2     Calcium 9.2 8.6 - 10.3 mg/dL     Albumin 3.4 3.4 - 5.0 g/dL     Alkaline Phosphatase 100 33 - 136 U/L     Total Protein 5.8 (L) 6.4 - 8.2 g/dL     AST 43 (H) 9 - 39 U/L     Bilirubin, Total 0.4 0.0 - 1.2 mg/dL     ALT 32 7 - 45 U/L   CBC and Auto Differential   Result Value Ref Range     WBC 4.5 4.4 - 11.3 x10*3/uL     nRBC 0.0 0.0 - 0.0 /100 WBCs     RBC 3.38  (L) 4.00 - 5.20 x10*6/uL     Hemoglobin 10.0 (L) 12.0 - 16.0 g/dL     Hematocrit 31.5 (L) 36.0 - 46.0 %     MCV 93 80 - 100 fL     MCH 29.6 26.0 - 34.0 pg     MCHC 31.7 (L) 32.0 - 36.0 g/dL     RDW 15.0 (H) 11.5 - 14.5 %     Platelets 220 150 - 450 x10*3/uL     Neutrophils % 46.2 40.0 - 80.0 %     Immature Granulocytes %, Automated 0.0 0.0 - 0.9 %     Lymphocytes % 30.5 13.0 - 44.0 %     Monocytes % 15.0 2.0 - 10.0 %     Eosinophils % 7.6 0.0 - 6.0 %     Basophils % 0.7 0.0 - 2.0 %     Neutrophils Absolute 2.06 1.60 - 5.50 x10*3/uL     Immature Granulocytes Absolute, Automated 0.00 0.00 - 0.50 x10*3/uL     Lymphocytes Absolute 1.36 0.80 - 3.00 x10*3/uL     Monocytes Absolute 0.67 0.05 - 0.80 x10*3/uL     Eosinophils Absolute 0.34 0.00 - 0.40 x10*3/uL     Basophils Absolute 0.03 0.00 - 0.10 x10*3/uL      *Note: Due to a large number of results and/or encounters for the requested time period, some results have not been displayed. A complete set of results can be found in Results Review.      Patient recently received an antibiotic (last 12 hours)         Date/Time Action Medication Dose     06/29/25 0919 Given    cefuroxime (Ceftin) tablet 250 mg 250 mg             Plan discussed with interdisciplinary team, continue current and repeat labs in the AM.      Discharge planning discussed with patient and care team. Therapy evaluations ordered.   AMPAC-   15  Anticipate - SNF     Patient aware and agreeable to current plan, continue plan as above.      I spent a total of 60 minutes on the date of the service which included preparing to see the patient, face-to-face patient care, completing clinical documentation, obtaining and/or reviewing separately obtained history, performing a medically appropriate examination, counseling and educating the patient/family/caregiver, ordering medications, tests, or procedures, communicating with other HCPs (not separately reported), independently interpreting results (not separately  reported), communicating results to the patient/family/caregiver, and care coordination (not separately reported).   Relevant Results                   This patient currently has cardiac telemetry ordered; if you would like to modify or discontinue the telemetry order, click here to go to the orders activity to modify/discontinue the order.                    Assessment & Plan  Acute on chronic diastolic congestive heart failure     Acute bacterial sinusitis     Syncope     Advance care planning     Constipation                 Riya Urena                      Revision History    Patient fully evaluated  07/03 for   Assessment & Plan  Acute on chronic diastolic congestive heart failure    Acute bacterial sinusitis    Syncope    Advance care planning    Constipation     Patient with significant clinical improvement noted, patient medically cleared for discharge today. Patient seen resting in bed with head of bed elevated, no s/s or c/o acute difficulties at this time. Vital signs for last 24 hours:  Temp:  [35.5 °C (95.9 °F)-36.4 °C (97.5 °F)] 36.1 °C (97 °F)  Heart Rate:  [58-60] 60  Resp:  [17] 17  BP: (100-131)/(48-60) 100/49 Medications and labs reviewed-   Results for orders placed or performed during the hospital encounter of 06/23/25 (from the past 24 hours)   Comprehensive Metabolic Panel   Result Value Ref Range    Glucose 97 74 - 99 mg/dL    Sodium 135 (L) 136 - 145 mmol/L    Potassium 3.8 3.5 - 5.3 mmol/L    Chloride 97 (L) 98 - 107 mmol/L    Bicarbonate 33 (H) 21 - 32 mmol/L    Anion Gap 9 (L) 10 - 20 mmol/L    Urea Nitrogen 34 (H) 6 - 23 mg/dL    Creatinine 1.02 0.50 - 1.05 mg/dL    eGFR 52 (L) >60 mL/min/1.73m*2    Calcium 9.1 8.6 - 10.3 mg/dL    Albumin 3.3 (L) 3.4 - 5.0 g/dL    Alkaline Phosphatase 104 33 - 136 U/L    Total Protein 5.7 (L) 6.4 - 8.2 g/dL    AST 35 9 - 39 U/L    Bilirubin, Total 0.4 0.0 - 1.2 mg/dL    ALT 23 7 - 45 U/L   CBC and Auto Differential   Result Value Ref Range    WBC 5.4  4.4 - 11.3 x10*3/uL    nRBC 0.0 0.0 - 0.0 /100 WBCs    RBC 3.44 (L) 4.00 - 5.20 x10*6/uL    Hemoglobin 10.1 (L) 12.0 - 16.0 g/dL    Hematocrit 32.1 (L) 36.0 - 46.0 %    MCV 93 80 - 100 fL    MCH 29.4 26.0 - 34.0 pg    MCHC 31.5 (L) 32.0 - 36.0 g/dL    RDW 15.1 (H) 11.5 - 14.5 %    Platelets 208 150 - 450 x10*3/uL    Neutrophils % 46.9 40.0 - 80.0 %    Immature Granulocytes %, Automated 0.2 0.0 - 0.9 %    Lymphocytes % 27.1 13.0 - 44.0 %    Monocytes % 16.9 2.0 - 10.0 %    Eosinophils % 8.3 0.0 - 6.0 %    Basophils % 0.6 0.0 - 2.0 %    Neutrophils Absolute 2.53 1.60 - 5.50 x10*3/uL    Immature Granulocytes Absolute, Automated 0.01 0.00 - 0.50 x10*3/uL    Lymphocytes Absolute 1.46 0.80 - 3.00 x10*3/uL    Monocytes Absolute 0.91 (H) 0.05 - 0.80 x10*3/uL    Eosinophils Absolute 0.45 (H) 0.00 - 0.40 x10*3/uL    Basophils Absolute 0.03 0.00 - 0.10 x10*3/uL     *Note: Due to a large number of results and/or encounters for the requested time period, some results have not been displayed. A complete set of results can be found in Results Review.      Patient recently received an antibiotic (last 12 hours)       Date/Time Action Medication Dose Rate    07/02/25 1111 New Bag    ceFAZolin (Ancef) 1 g in dextrose (iso) IV 50 mL 1 g 100 mL/hr    07/02/25 0918 Given    cefuroxime (Ceftin) tablet 250 mg 250 mg            No results found for the last 90 days.       Continue aggressive pulmonary hygiene and oral hygiene. Off loading as tolerated for skin integrity. No new complaints per patient, stable at time of exam.  Plan discussed with interdisciplinary team, no acute events overnight, patient denies chest pain, worsening SOB at this time. Ok to discharge, will continue current and repeat labs in the AM if patient still hospitalized. Patient aware and agreeable to current plan, continue plan as above.     I spent 60 minutes on the date of the service which included preparing to see the patient, face-to-face patient care,  completing clinical documentation, obtaining and/or reviewing separately obtained history, performing a medically appropriate examination, counseling and educating the patient/family/caregiver, ordering medications, tests, or procedures, communicating with other HCPs (not separately reported), independently interpreting results (not separately reported), communicating results to the patient/family/caregiver, and care coordination (not separately reported   Pertinent Physical Exam At Time of Discharge  Physical Exam    Outpatient Follow-Up  Future Appointments   Date Time Provider Department Center   7/9/2025  2:00 PM Naima Keane MD [id number] [place]   7/17/2025  3:00 PM Joselito Madden PA-C [id number] [place]   9/17/2025  2:30 PM [facility] [id number]         Riya Urena

## 2025-07-09 ENCOUNTER — APPOINTMENT (OUTPATIENT)
Dept: PRIMARY CARE | Facility: CLINIC | Age: OVER 89
End: 2025-07-09
Payer: MEDICARE

## 2025-07-17 ENCOUNTER — APPOINTMENT (OUTPATIENT)
Dept: CARDIOLOGY | Facility: CLINIC | Age: OVER 89
End: 2025-07-17
Payer: MEDICARE

## 2025-07-24 ENCOUNTER — APPOINTMENT (OUTPATIENT)
Dept: PRIMARY CARE | Facility: CLINIC | Age: OVER 89
End: 2025-07-24
Payer: MEDICARE

## 2025-07-26 LAB
ATRIAL RATE: 57 BPM
Q ONSET: 202 MS
QRS COUNT: 9 BEATS
QRS DURATION: 190 MS
QT INTERVAL: 484 MS
QTC CALCULATION(BAZETT): 484 MS
QTC FREDERICIA: 484 MS
R AXIS: -47 DEGREES
T AXIS: 183 DEGREES
T OFFSET: 444 MS
VENTRICULAR RATE: 60 BPM

## 2025-08-01 ENCOUNTER — PATIENT OUTREACH (OUTPATIENT)
Dept: PRIMARY CARE | Facility: CLINIC | Age: OVER 89
End: 2025-08-01
Payer: MEDICARE

## 2025-08-01 NOTE — PROGRESS NOTES
Discharge Facility: [facility] Diagnosis: Chest pain  Admission Date: 7/30/25  Discharge Date: 7/31/25    PCP Appointment Date: TBD  Specialist Appointment Date: TBD  Hospital Encounter and Summary Linked: Yes Unknown      Two attempts were made to reach patient within two business days after discharge. Left voicemail with contact information for patient to call back with any non-emergent questions or concerns.

## 2025-08-05 PROBLEM — Z86.79 HISTORY OF PAROXYSMAL SUPRAVENTRICULAR TACHYCARDIA: Status: RESOLVED | Noted: 2023-05-18 | Resolved: 2025-08-05

## 2025-08-05 PROBLEM — I50.33 ACUTE ON CHRONIC DIASTOLIC HEART FAILURE: Status: RESOLVED | Noted: 2025-03-11 | Resolved: 2025-08-05

## 2025-08-05 PROBLEM — G12.20 MOTOR NEURON DISEASE: Status: ACTIVE | Noted: 2025-07-03

## 2025-08-05 PROBLEM — E11.9 DIABETES MELLITUS, TYPE 2 (MULTI): Status: ACTIVE | Noted: 2025-07-03

## 2025-08-05 PROBLEM — I44.2 COMPLETE HEART BLOCK: Status: RESOLVED | Noted: 2023-05-18 | Resolved: 2025-08-05

## 2025-08-05 PROBLEM — I34.0 MODERATE MITRAL VALVE REGURGITATION: Status: RESOLVED | Noted: 2023-09-20 | Resolved: 2025-08-05
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
## 2025-08-05 PROBLEM — Z99.81 DEPENDENCE ON SUPPLEMENTAL OXYGEN: Status: ACTIVE | Noted: 2025-07-03

## 2025-08-05 PROBLEM — I49.5 SICK SINUS SYNDROME (MULTI): Status: RESOLVED | Noted: 2024-02-26 | Resolved: 2025-08-05

## 2025-08-15 ENCOUNTER — PATIENT OUTREACH (OUTPATIENT)
Dept: PRIMARY CARE | Facility: CLINIC | Age: OVER 89
End: 2025-08-15
Payer: MEDICARE

## 2025-08-25 ENCOUNTER — HOSPITAL ENCOUNTER (OUTPATIENT)
Dept: CARDIOLOGY | Facility: CLINIC | Age: OVER 89
Discharge: HOME | End: 2025-08-25
Payer: MEDICARE

## 2025-08-25 DIAGNOSIS — Z95.0 PRESENCE OF PERMANENT CARDIAC PACEMAKER: ICD-10-CM

## 2025-08-25 DIAGNOSIS — I44.2 COMPLETE HEART BLOCK: ICD-10-CM

## 2025-08-25 PROCEDURE — 93296 REM INTERROG EVL PM/IDS: CPT

## 2025-08-26 ENCOUNTER — APPOINTMENT (OUTPATIENT)
Dept: CARDIOLOGY | Facility: CLINIC | Age: OVER 89
End: 2025-08-26
Payer: MEDICARE

## 2025-08-26 ENCOUNTER — OFFICE VISIT (OUTPATIENT)
Dept: CARDIOLOGY | Facility: CLINIC | Age: OVER 89
End: 2025-08-26
Payer: MEDICARE

## 2025-08-26 VITALS
WEIGHT: 151 LBS | BODY MASS INDEX: 25.92 KG/M2 | HEART RATE: 61 BPM | DIASTOLIC BLOOD PRESSURE: 50 MMHG | SYSTOLIC BLOOD PRESSURE: 122 MMHG | OXYGEN SATURATION: 92 %

## 2025-08-26 DIAGNOSIS — E87.6 HYPOKALEMIA: ICD-10-CM

## 2025-08-26 DIAGNOSIS — R06.09 DYSPNEA ON MINIMAL EXERTION: ICD-10-CM

## 2025-08-26 DIAGNOSIS — Z95.0 PRESENCE OF PERMANENT CARDIAC PACEMAKER: ICD-10-CM

## 2025-08-26 DIAGNOSIS — I50.33 ACUTE ON CHRONIC DIASTOLIC CONGESTIVE HEART FAILURE: Primary | ICD-10-CM

## 2025-08-26 DIAGNOSIS — I36.1 NONRHEUMATIC TRICUSPID VALVE REGURGITATION: ICD-10-CM

## 2025-08-26 PROCEDURE — 99212 OFFICE O/P EST SF 10 MIN: CPT

## 2025-08-26 PROCEDURE — 3074F SYST BP LT 130 MM HG: CPT | Performed by: PHYSICIAN ASSISTANT

## 2025-08-26 PROCEDURE — 1159F MED LIST DOCD IN RCRD: CPT | Performed by: PHYSICIAN ASSISTANT

## 2025-08-26 PROCEDURE — 3078F DIAST BP <80 MM HG: CPT | Performed by: PHYSICIAN ASSISTANT

## 2025-08-26 PROCEDURE — 1160F RVW MEDS BY RX/DR IN RCRD: CPT | Performed by: PHYSICIAN ASSISTANT

## 2025-08-26 PROCEDURE — 99214 OFFICE O/P EST MOD 30 MIN: CPT | Performed by: PHYSICIAN ASSISTANT

## 2025-08-26 ASSESSMENT — ENCOUNTER SYMPTOMS
DEPRESSION: 0
LOSS OF SENSATION IN FEET: 1
OCCASIONAL FEELINGS OF UNSTEADINESS: 0

## 2025-09-29 ENCOUNTER — APPOINTMENT (OUTPATIENT)
Dept: CARDIOLOGY | Facility: CLINIC | Age: OVER 89
End: 2025-09-29
Payer: MEDICARE

## (undated) DEVICE — INTRODUCER SYSTEM, PRELUDE SNAP, SPLITTABLE, HEMOSTATIC, 7FR

## (undated) DEVICE — DRESSING, MEPILEX BORDER, POST-OP AG, 4 X 6 IN

## (undated) DEVICE — TR BAND, RADIAL COMPRESSION, STANDARD, 24CM

## (undated) DEVICE — CATHETER, OPTITORQUE, 5FR, TIG, 1H/100CM

## (undated) DEVICE — ACCESS KIT, MINI MAK, 4FR X 10CML, 0.018 X 40CM, SS/SS, ECHO ENHANCED 7CM NDL

## (undated) DEVICE — ELECTRODE, MULTIFUNCTION, QUICK-COMBO, EDGE SYSTEM, 2 FT

## (undated) DEVICE — SHEATH, GLIDESHEATH, SLENDER, 6FR 10CM

## (undated) DEVICE — Device

## (undated) DEVICE — ENVELOPE, ANTIBACTERIAL, AIGIS RX TYRX, ABSORBABLE, LRG

## (undated) DEVICE — WOUND SYSTEM, DEBRIDEMENT & CLEANING, O.R DUOPAK

## (undated) DEVICE — KIT, STANDARD, LEFT HEART, WITH MANIFOLD

## (undated) DEVICE — KIT, WRENCH, STERILE, F/LEADS

## (undated) DEVICE — GUIDEWIRE, INQWIRE, 3MM J, .035 X 210CM, FIXED

## (undated) DEVICE — CABLE, SURGICAL, SM CLIP

## (undated) DEVICE — 6FR X 110CM IMPULSE ANGIO DIAG CATH, PIG 145-DEG CURVE, (UPN H74916599410, EA/1 UOM)